# Patient Record
Sex: FEMALE | Race: WHITE | Employment: OTHER
[De-identification: names, ages, dates, MRNs, and addresses within clinical notes are randomized per-mention and may not be internally consistent; named-entity substitution may affect disease eponyms.]

---

## 2017-01-04 ENCOUNTER — PROCEDURE VISIT (OUTPATIENT)
Dept: PAIN MANAGEMENT | Facility: CLINIC | Age: 77
End: 2017-01-04

## 2017-01-04 DIAGNOSIS — M46.1 SACROILIITIS (HCC): Primary | ICD-10-CM

## 2017-01-04 PROCEDURE — 27096 INJECT SACROILIAC JOINT: CPT | Performed by: PAIN MEDICINE

## 2017-01-25 ENCOUNTER — PROCEDURE VISIT (OUTPATIENT)
Dept: PAIN MANAGEMENT | Facility: CLINIC | Age: 77
End: 2017-01-25

## 2017-01-25 DIAGNOSIS — M46.1 SACROILIITIS (HCC): Primary | ICD-10-CM

## 2017-01-25 PROCEDURE — 1036F TOBACCO NON-USER: CPT | Performed by: PAIN MEDICINE

## 2017-01-25 PROCEDURE — 27096 INJECT SACROILIAC JOINT: CPT | Performed by: PAIN MEDICINE

## 2017-02-21 ENCOUNTER — OFFICE VISIT (OUTPATIENT)
Dept: PAIN MANAGEMENT | Facility: CLINIC | Age: 77
End: 2017-02-21

## 2017-02-21 VITALS
RESPIRATION RATE: 13 BRPM | HEIGHT: 64 IN | HEART RATE: 60 BPM | DIASTOLIC BLOOD PRESSURE: 58 MMHG | BODY MASS INDEX: 35.85 KG/M2 | SYSTOLIC BLOOD PRESSURE: 124 MMHG | WEIGHT: 210 LBS

## 2017-02-21 DIAGNOSIS — M48.061 LUMBAR CANAL STENOSIS: ICD-10-CM

## 2017-02-21 DIAGNOSIS — M48.061 SPINAL STENOSIS, LUMBAR REGION, WITHOUT NEUROGENIC CLAUDICATION: Chronic | ICD-10-CM

## 2017-02-21 DIAGNOSIS — M51.26 LUMBAR DISCOGENIC PAIN SYNDROME: ICD-10-CM

## 2017-02-21 DIAGNOSIS — M51.37 DEGENERATION OF LUMBAR OR LUMBOSACRAL INTERVERTEBRAL DISC: Chronic | ICD-10-CM

## 2017-02-21 DIAGNOSIS — M51.36 LUMBAR DEGENERATIVE DISC DISEASE: Primary | ICD-10-CM

## 2017-02-21 LAB
AMPHETAMINE SCREEN, URINE: NEGATIVE
BARBITURATE SCREEN, URINE: NEGATIVE
BENZODIAZEPINE SCREEN, URINE: NEGATIVE
COCAINE METABOLITE SCREEN URINE: NEGATIVE
MDMA URINE: NORMAL
METHADONE SCREEN, URINE: NEGATIVE
METHAMPHETAMINE, URINE: NEGATIVE
OPIATE SCREEN URINE: NEGATIVE
OXYCODONE SCREEN URINE: POSITIVE
PHENCYCLIDINE SCREEN URINE: NEGATIVE
PROPOXYPHENE SCREEN, URINE: NORMAL
THC: NEGATIVE
TRICYCLIC ANTIDEPRESSANTS, UR: POSITIVE

## 2017-02-21 PROCEDURE — 80305 DRUG TEST PRSMV DIR OPT OBS: CPT | Performed by: NURSE PRACTITIONER

## 2017-02-21 PROCEDURE — 4040F PNEUMOC VAC/ADMIN/RCVD: CPT | Performed by: NURSE PRACTITIONER

## 2017-02-21 PROCEDURE — 1036F TOBACCO NON-USER: CPT | Performed by: NURSE PRACTITIONER

## 2017-02-21 PROCEDURE — 1090F PRES/ABSN URINE INCON ASSESS: CPT | Performed by: NURSE PRACTITIONER

## 2017-02-21 PROCEDURE — G8427 DOCREV CUR MEDS BY ELIG CLIN: HCPCS | Performed by: NURSE PRACTITIONER

## 2017-02-21 PROCEDURE — G8400 PT W/DXA NO RESULTS DOC: HCPCS | Performed by: NURSE PRACTITIONER

## 2017-02-21 PROCEDURE — G8484 FLU IMMUNIZE NO ADMIN: HCPCS | Performed by: NURSE PRACTITIONER

## 2017-02-21 PROCEDURE — G8417 CALC BMI ABV UP PARAM F/U: HCPCS | Performed by: NURSE PRACTITIONER

## 2017-02-21 PROCEDURE — 1123F ACP DISCUSS/DSCN MKR DOCD: CPT | Performed by: NURSE PRACTITIONER

## 2017-02-21 PROCEDURE — 99214 OFFICE O/P EST MOD 30 MIN: CPT | Performed by: NURSE PRACTITIONER

## 2017-02-21 RX ORDER — OXYCODONE AND ACETAMINOPHEN 10; 325 MG/1; MG/1
1 TABLET ORAL EVERY 8 HOURS PRN
Qty: 90 TABLET | Refills: 0 | Status: SHIPPED | OUTPATIENT
Start: 2017-03-14 | End: 2017-11-10 | Stop reason: ALTCHOICE

## 2017-02-21 RX ORDER — LIDOCAINE 50 MG/G
5 OINTMENT TOPICAL 3 TIMES DAILY
Qty: 1 TUBE | Refills: 1 | Status: SHIPPED | OUTPATIENT
Start: 2017-02-21 | End: 2017-11-21 | Stop reason: ALTCHOICE

## 2017-02-21 ASSESSMENT — ENCOUNTER SYMPTOMS
BACK PAIN: 1
ABDOMINAL PAIN: 0
COUGH: 0
WHEEZING: 0
SHORTNESS OF BREATH: 0

## 2017-04-11 ENCOUNTER — OFFICE VISIT (OUTPATIENT)
Dept: PAIN MANAGEMENT | Age: 77
End: 2017-04-11
Payer: MEDICARE

## 2017-04-11 VITALS
BODY MASS INDEX: 34.83 KG/M2 | WEIGHT: 204 LBS | SYSTOLIC BLOOD PRESSURE: 133 MMHG | HEIGHT: 64 IN | DIASTOLIC BLOOD PRESSURE: 67 MMHG | RESPIRATION RATE: 14 BRPM | HEART RATE: 63 BPM

## 2017-04-11 DIAGNOSIS — M47.817 LUMBOSACRAL SPONDYLOSIS WITHOUT MYELOPATHY: ICD-10-CM

## 2017-04-11 DIAGNOSIS — M51.36 LUMBAR DEGENERATIVE DISC DISEASE: ICD-10-CM

## 2017-04-11 DIAGNOSIS — M51.26 LUMBAR DISCOGENIC PAIN SYNDROME: ICD-10-CM

## 2017-04-11 DIAGNOSIS — M48.061 LUMBAR CANAL STENOSIS: ICD-10-CM

## 2017-04-11 DIAGNOSIS — M48.061 SPINAL STENOSIS, LUMBAR REGION, WITHOUT NEUROGENIC CLAUDICATION: Chronic | ICD-10-CM

## 2017-04-11 DIAGNOSIS — M51.37 DEGENERATION OF LUMBAR OR LUMBOSACRAL INTERVERTEBRAL DISC: Chronic | ICD-10-CM

## 2017-04-11 PROCEDURE — 4040F PNEUMOC VAC/ADMIN/RCVD: CPT | Performed by: INTERNAL MEDICINE

## 2017-04-11 PROCEDURE — G8427 DOCREV CUR MEDS BY ELIG CLIN: HCPCS | Performed by: INTERNAL MEDICINE

## 2017-04-11 PROCEDURE — G8400 PT W/DXA NO RESULTS DOC: HCPCS | Performed by: INTERNAL MEDICINE

## 2017-04-11 PROCEDURE — 1036F TOBACCO NON-USER: CPT | Performed by: INTERNAL MEDICINE

## 2017-04-11 PROCEDURE — G8417 CALC BMI ABV UP PARAM F/U: HCPCS | Performed by: INTERNAL MEDICINE

## 2017-04-11 PROCEDURE — 1090F PRES/ABSN URINE INCON ASSESS: CPT | Performed by: INTERNAL MEDICINE

## 2017-04-11 PROCEDURE — 1123F ACP DISCUSS/DSCN MKR DOCD: CPT | Performed by: INTERNAL MEDICINE

## 2017-04-11 PROCEDURE — 99214 OFFICE O/P EST MOD 30 MIN: CPT | Performed by: INTERNAL MEDICINE

## 2017-04-11 RX ORDER — OXYCODONE HYDROCHLORIDE 10 MG/1
10 TABLET ORAL EVERY 6 HOURS PRN
Qty: 100 TABLET | Refills: 0 | Status: SHIPPED | OUTPATIENT
Start: 2017-04-28 | End: 2017-11-10 | Stop reason: SDUPTHER

## 2017-04-11 RX ORDER — OXYCODONE HYDROCHLORIDE 10 MG/1
10 TABLET ORAL EVERY 6 HOURS PRN
Qty: 100 TABLET | Refills: 0 | Status: SHIPPED | OUTPATIENT
Start: 2017-05-28 | End: 2017-07-11 | Stop reason: SDUPTHER

## 2017-04-11 RX ORDER — OXYCODONE AND ACETAMINOPHEN 10; 325 MG/1; MG/1
1 TABLET ORAL EVERY 8 HOURS PRN
Qty: 90 TABLET | Refills: 0 | Status: CANCELLED | OUTPATIENT
Start: 2017-04-13 | End: 2017-05-13

## 2017-04-11 ASSESSMENT — ENCOUNTER SYMPTOMS
BACK PAIN: 1
ABDOMINAL PAIN: 0

## 2017-07-11 ENCOUNTER — OFFICE VISIT (OUTPATIENT)
Dept: PAIN MANAGEMENT | Age: 77
End: 2017-07-11
Payer: MEDICARE

## 2017-07-11 VITALS
DIASTOLIC BLOOD PRESSURE: 55 MMHG | SYSTOLIC BLOOD PRESSURE: 117 MMHG | RESPIRATION RATE: 15 BRPM | HEIGHT: 64 IN | HEART RATE: 59 BPM | BODY MASS INDEX: 34.83 KG/M2 | WEIGHT: 204 LBS

## 2017-07-11 DIAGNOSIS — M51.36 LUMBAR DEGENERATIVE DISC DISEASE: ICD-10-CM

## 2017-07-11 DIAGNOSIS — M47.817 LUMBOSACRAL SPONDYLOSIS WITHOUT MYELOPATHY: ICD-10-CM

## 2017-07-11 DIAGNOSIS — M48.061 LUMBAR CANAL STENOSIS: ICD-10-CM

## 2017-07-11 DIAGNOSIS — M51.26 LUMBAR DISCOGENIC PAIN SYNDROME: ICD-10-CM

## 2017-07-11 PROCEDURE — G8417 CALC BMI ABV UP PARAM F/U: HCPCS | Performed by: INTERNAL MEDICINE

## 2017-07-11 PROCEDURE — 1123F ACP DISCUSS/DSCN MKR DOCD: CPT | Performed by: INTERNAL MEDICINE

## 2017-07-11 PROCEDURE — G8427 DOCREV CUR MEDS BY ELIG CLIN: HCPCS | Performed by: INTERNAL MEDICINE

## 2017-07-11 PROCEDURE — 1036F TOBACCO NON-USER: CPT | Performed by: INTERNAL MEDICINE

## 2017-07-11 PROCEDURE — 4040F PNEUMOC VAC/ADMIN/RCVD: CPT | Performed by: INTERNAL MEDICINE

## 2017-07-11 PROCEDURE — 1090F PRES/ABSN URINE INCON ASSESS: CPT | Performed by: INTERNAL MEDICINE

## 2017-07-11 PROCEDURE — G8400 PT W/DXA NO RESULTS DOC: HCPCS | Performed by: INTERNAL MEDICINE

## 2017-07-11 PROCEDURE — 99213 OFFICE O/P EST LOW 20 MIN: CPT | Performed by: INTERNAL MEDICINE

## 2017-07-11 RX ORDER — LIDOCAINE 40 MG/G
CREAM TOPICAL PRN
COMMUNITY

## 2017-07-11 RX ORDER — OXYCODONE HYDROCHLORIDE 10 MG/1
10 TABLET ORAL EVERY 6 HOURS PRN
Qty: 100 TABLET | Refills: 0 | Status: SHIPPED | OUTPATIENT
Start: 2017-07-15 | End: 2017-08-15 | Stop reason: SDUPTHER

## 2017-07-11 ASSESSMENT — ENCOUNTER SYMPTOMS
ABDOMINAL PAIN: 0
BACK PAIN: 1

## 2017-08-15 ENCOUNTER — OFFICE VISIT (OUTPATIENT)
Dept: PAIN MANAGEMENT | Age: 77
End: 2017-08-15
Payer: MEDICARE

## 2017-08-15 VITALS
WEIGHT: 204 LBS | DIASTOLIC BLOOD PRESSURE: 74 MMHG | SYSTOLIC BLOOD PRESSURE: 123 MMHG | HEIGHT: 64 IN | BODY MASS INDEX: 34.83 KG/M2 | HEART RATE: 68 BPM | RESPIRATION RATE: 14 BRPM

## 2017-08-15 DIAGNOSIS — M47.817 LUMBOSACRAL SPONDYLOSIS WITHOUT MYELOPATHY: ICD-10-CM

## 2017-08-15 DIAGNOSIS — M48.061 LUMBAR CANAL STENOSIS: ICD-10-CM

## 2017-08-15 DIAGNOSIS — M51.36 LUMBAR DEGENERATIVE DISC DISEASE: ICD-10-CM

## 2017-08-15 DIAGNOSIS — M51.26 LUMBAR DISCOGENIC PAIN SYNDROME: ICD-10-CM

## 2017-08-15 LAB
AMPHETAMINE SCREEN, URINE: NEGATIVE
BARBITURATE SCREEN, URINE: NEGATIVE
BENZODIAZEPINE SCREEN, URINE: NEGATIVE
COCAINE METABOLITE SCREEN URINE: NEGATIVE
MDMA URINE: NORMAL
METHADONE SCREEN, URINE: NEGATIVE
METHAMPHETAMINE, URINE: NEGATIVE
OPIATE SCREEN URINE: NEGATIVE
OXYCODONE SCREEN URINE: POSITIVE
PHENCYCLIDINE SCREEN URINE: NEGATIVE
PROPOXYPHENE SCREEN, URINE: NORMAL
THC: NEGATIVE
TRICYCLIC ANTIDEPRESSANTS, UR: NEGATIVE

## 2017-08-15 PROCEDURE — 80305 DRUG TEST PRSMV DIR OPT OBS: CPT | Performed by: INTERNAL MEDICINE

## 2017-08-15 PROCEDURE — 1036F TOBACCO NON-USER: CPT | Performed by: INTERNAL MEDICINE

## 2017-08-15 PROCEDURE — G8417 CALC BMI ABV UP PARAM F/U: HCPCS | Performed by: INTERNAL MEDICINE

## 2017-08-15 PROCEDURE — G8427 DOCREV CUR MEDS BY ELIG CLIN: HCPCS | Performed by: INTERNAL MEDICINE

## 2017-08-15 PROCEDURE — 1123F ACP DISCUSS/DSCN MKR DOCD: CPT | Performed by: INTERNAL MEDICINE

## 2017-08-15 PROCEDURE — G8400 PT W/DXA NO RESULTS DOC: HCPCS | Performed by: INTERNAL MEDICINE

## 2017-08-15 PROCEDURE — 4040F PNEUMOC VAC/ADMIN/RCVD: CPT | Performed by: INTERNAL MEDICINE

## 2017-08-15 PROCEDURE — 1090F PRES/ABSN URINE INCON ASSESS: CPT | Performed by: INTERNAL MEDICINE

## 2017-08-15 PROCEDURE — 99214 OFFICE O/P EST MOD 30 MIN: CPT | Performed by: INTERNAL MEDICINE

## 2017-08-15 RX ORDER — OXYCODONE HYDROCHLORIDE 10 MG/1
10 TABLET ORAL EVERY 6 HOURS PRN
Qty: 100 TABLET | Refills: 0 | Status: SHIPPED | OUTPATIENT
Start: 2017-08-15 | End: 2017-09-12 | Stop reason: SDUPTHER

## 2017-08-15 RX ORDER — TOPIRAMATE 25 MG/1
25 TABLET ORAL 2 TIMES DAILY
Qty: 60 TABLET | Refills: 2 | Status: SHIPPED | OUTPATIENT
Start: 2017-08-15 | End: 2017-10-31 | Stop reason: SDUPTHER

## 2017-08-15 ASSESSMENT — ENCOUNTER SYMPTOMS
BACK PAIN: 1
ABDOMINAL PAIN: 0

## 2017-09-12 ENCOUNTER — OFFICE VISIT (OUTPATIENT)
Dept: PAIN MANAGEMENT | Age: 77
End: 2017-09-12
Payer: MEDICARE

## 2017-09-12 VITALS
RESPIRATION RATE: 14 BRPM | WEIGHT: 198 LBS | DIASTOLIC BLOOD PRESSURE: 64 MMHG | HEIGHT: 64 IN | HEART RATE: 60 BPM | SYSTOLIC BLOOD PRESSURE: 121 MMHG | BODY MASS INDEX: 33.8 KG/M2

## 2017-09-12 DIAGNOSIS — M48.061 LUMBAR CANAL STENOSIS: ICD-10-CM

## 2017-09-12 DIAGNOSIS — M47.817 LUMBOSACRAL SPONDYLOSIS WITHOUT MYELOPATHY: ICD-10-CM

## 2017-09-12 DIAGNOSIS — M51.26 LUMBAR DISCOGENIC PAIN SYNDROME: ICD-10-CM

## 2017-09-12 DIAGNOSIS — M51.36 LUMBAR DEGENERATIVE DISC DISEASE: ICD-10-CM

## 2017-09-12 PROCEDURE — G8417 CALC BMI ABV UP PARAM F/U: HCPCS | Performed by: INTERNAL MEDICINE

## 2017-09-12 PROCEDURE — 4040F PNEUMOC VAC/ADMIN/RCVD: CPT | Performed by: INTERNAL MEDICINE

## 2017-09-12 PROCEDURE — 1036F TOBACCO NON-USER: CPT | Performed by: INTERNAL MEDICINE

## 2017-09-12 PROCEDURE — G8427 DOCREV CUR MEDS BY ELIG CLIN: HCPCS | Performed by: INTERNAL MEDICINE

## 2017-09-12 PROCEDURE — 1123F ACP DISCUSS/DSCN MKR DOCD: CPT | Performed by: INTERNAL MEDICINE

## 2017-09-12 PROCEDURE — 99214 OFFICE O/P EST MOD 30 MIN: CPT | Performed by: INTERNAL MEDICINE

## 2017-09-12 PROCEDURE — 1090F PRES/ABSN URINE INCON ASSESS: CPT | Performed by: INTERNAL MEDICINE

## 2017-09-12 PROCEDURE — G8400 PT W/DXA NO RESULTS DOC: HCPCS | Performed by: INTERNAL MEDICINE

## 2017-09-12 RX ORDER — OXYCODONE HYDROCHLORIDE 10 MG/1
10 TABLET ORAL EVERY 6 HOURS PRN
Qty: 100 TABLET | Refills: 0 | Status: SHIPPED | OUTPATIENT
Start: 2017-09-14 | End: 2017-10-31 | Stop reason: SDUPTHER

## 2017-09-12 ASSESSMENT — ENCOUNTER SYMPTOMS
BACK PAIN: 1
ABDOMINAL PAIN: 0

## 2017-10-31 ENCOUNTER — OFFICE VISIT (OUTPATIENT)
Dept: PAIN MANAGEMENT | Age: 77
End: 2017-10-31
Payer: MEDICARE

## 2017-10-31 VITALS
HEART RATE: 59 BPM | DIASTOLIC BLOOD PRESSURE: 62 MMHG | WEIGHT: 199 LBS | SYSTOLIC BLOOD PRESSURE: 104 MMHG | HEIGHT: 64 IN | RESPIRATION RATE: 15 BRPM | BODY MASS INDEX: 33.97 KG/M2

## 2017-10-31 DIAGNOSIS — M51.36 LUMBAR DEGENERATIVE DISC DISEASE: ICD-10-CM

## 2017-10-31 DIAGNOSIS — M53.3 SACRO-ILIAC PAIN: Primary | ICD-10-CM

## 2017-10-31 DIAGNOSIS — M47.817 LUMBOSACRAL SPONDYLOSIS WITHOUT MYELOPATHY: ICD-10-CM

## 2017-10-31 DIAGNOSIS — M46.1 SACROILIITIS (HCC): ICD-10-CM

## 2017-10-31 DIAGNOSIS — M51.26 LUMBAR DISCOGENIC PAIN SYNDROME: ICD-10-CM

## 2017-10-31 PROCEDURE — G8484 FLU IMMUNIZE NO ADMIN: HCPCS | Performed by: INTERNAL MEDICINE

## 2017-10-31 PROCEDURE — G8427 DOCREV CUR MEDS BY ELIG CLIN: HCPCS | Performed by: INTERNAL MEDICINE

## 2017-10-31 PROCEDURE — 4040F PNEUMOC VAC/ADMIN/RCVD: CPT | Performed by: INTERNAL MEDICINE

## 2017-10-31 PROCEDURE — 1123F ACP DISCUSS/DSCN MKR DOCD: CPT | Performed by: INTERNAL MEDICINE

## 2017-10-31 PROCEDURE — 1090F PRES/ABSN URINE INCON ASSESS: CPT | Performed by: INTERNAL MEDICINE

## 2017-10-31 PROCEDURE — 1036F TOBACCO NON-USER: CPT | Performed by: INTERNAL MEDICINE

## 2017-10-31 PROCEDURE — G8417 CALC BMI ABV UP PARAM F/U: HCPCS | Performed by: INTERNAL MEDICINE

## 2017-10-31 PROCEDURE — 99214 OFFICE O/P EST MOD 30 MIN: CPT | Performed by: INTERNAL MEDICINE

## 2017-10-31 PROCEDURE — G8400 PT W/DXA NO RESULTS DOC: HCPCS | Performed by: INTERNAL MEDICINE

## 2017-10-31 RX ORDER — TOPIRAMATE 25 MG/1
25 TABLET ORAL 2 TIMES DAILY
Qty: 60 TABLET | Refills: 0 | Status: SHIPPED | OUTPATIENT
Start: 2017-10-31 | End: 2018-02-06 | Stop reason: SDUPTHER

## 2017-10-31 RX ORDER — OXYCODONE HYDROCHLORIDE 10 MG/1
10 TABLET ORAL EVERY 6 HOURS PRN
Qty: 100 TABLET | Refills: 0 | Status: SHIPPED | OUTPATIENT
Start: 2017-10-31 | End: 2017-11-20 | Stop reason: SDUPTHER

## 2017-10-31 ASSESSMENT — ENCOUNTER SYMPTOMS
ABDOMINAL PAIN: 0
BACK PAIN: 1

## 2017-10-31 NOTE — Clinical Note
Right and then Left Sacro Iliac joint Injections at two different appointments. Return visit with me.

## 2017-10-31 NOTE — PROGRESS NOTES
Patient ID: Ange Pastor is 68 y.o. caucasianfemale, with  Back Pain (low back and right and left hips)  . She c/o improvement in her leg and foot numbness but is feeling drowsy during the day. She has severe recurrent low back pain. Chief Complaint:   Chief Complaint   Patient presents with    Back Pain     low back and right and left hips        Back Pain   This is a chronic problem. The current episode started more than 1 year ago. The problem occurs intermittently. The problem has been gradually worsening since onset. The pain is present in the lumbar spine and gluteal (sharp). The quality of the pain is described as burning (sharp ). The pain radiates to the left thigh. The pain is at a severity of 3/10. The pain is mild. The pain is worse during the night. The symptoms are aggravated by standing (reaching). Stiffness is present in the morning. Associated symptoms include leg pain, tingling and weakness (low back). Pertinent negatives include no abdominal pain, chest pain, dysuria, fever, headaches or pelvic pain. Risk factors include lack of exercise. She has tried heat (oral narcotics,) for the symptoms. The treatment provided significant relief. Vitals:    10/31/17 1456   BP: 104/62   Pulse: 59   Resp: 15         Social History     Social History    Marital status:      Spouse name: N/A    Number of children: N/A    Years of education: N/A     Occupational History    Not on file.      Social History Main Topics    Smoking status: Never Smoker    Smokeless tobacco: Not on file    Alcohol use Yes      Comment: very rare    Drug use: No    Sexual activity: Not on file     Other Topics Concern    Not on file     Social History Narrative    No narrative on file       Current Outpatient Prescriptions   Medication Sig Dispense Refill    oxyCODONE HCl (OXY-IR) 10 MG immediate release tablet Take 1 tablet by mouth every 6 hours as needed for Pain (TAKE IT EVERY 6-8  HOURS AS NEEDED.) . Earliest Fill Date: 10/31/17 100 tablet 0    topiramate (TOPAMAX) 25 MG tablet Take 1 tablet by mouth 2 times daily Take one tablet at 9 pm x 2 weeks then   one two times a day at 9 am and 9 pm. 60 tablet 0    lidocaine (ASPERCREME W/LIDOCAINE) 4 % cream Apply topically as needed for Pain Apply topically as needed.  lidocaine (XYLOCAINE) 5 % ointment Apply 5 g topically 3 times daily Disp 30 Gm tube 1 Tube 1    furosemide (LASIX) 40 MG tablet Take 40 mg by mouth 3 times daily      aluminum & magnesium hydroxide-simethicone (MAALOX) 200-200-20 MG/5ML SUSP suspension Take 5 mLs by mouth every 6 hours as needed for Indigestion      fluticasone propionate 50 MCG/BLIST AEPB Inhale into the lungs      albuterol (ACCUNEB) 1.25 MG/3ML nebulizer solution Inhale 1 ampule into the lungs every 6 hours as needed for Wheezing      metoprolol (LOPRESSOR) 25 MG tablet Take 25 mg by mouth 2 times daily      simvastatin (ZOCOR) 40 MG tablet Take 40 mg by mouth nightly      Salmeterol Xinafoate (SEREVENT DISKUS IN) Inhale 1 puff into the lungs 2 times daily      Ketoprofen  MG CP24 Take 1 tablet by mouth daily      flecainide (TAMBOCOR) 50 MG tablet Take 50 mg by mouth 2 times daily.  Ipratropium Bromide (ATROVENT IN) Inhale 2 sprays into the lungs 2 times daily.  omeprazole (PRILOSEC) 40 MG capsule Take 40 mg by mouth nightly.  guaiFENesin (MUCINEX) 600 MG SR tablet Take 1,200 mg by mouth 2 times daily.  montelukast (SINGULAIR) 10 MG tablet Take 10 mg by mouth nightly.  DULoxetine (CYMBALTA) 60 MG capsule Take 60 mg by mouth daily.  artificial tears (ARTIFICIAL TEARS) OINT as needed.  beclomethasone (QVAR) 80 MCG/ACT inhaler Inhale 2 puffs into the lungs 2 times daily       lidocaine (LIDODERM) 5 % Place 1 patch onto the skin daily. 12 hours on, 12 hours off.       Saline 0.2 % SOLN by Nasal route daily as needed       Immune Globulin, Human, (HIZENTRA) 10 GM/50ML  Anesthesia complication     2nd post op day from total knee patient stated \"I was wild and mean\"    Anxiety and depression     Arthritis     ASD (atrial septal defect)     repair in [address] [facility] [facility]     COPD [facility]ch[facility] obstructive [facility] disease) [facility]     Disorder of immune system ([facility] [address] [facility]     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 (total left knee 3/2015 at [facility])    History of renal stone     passed    Hx of blood clots     DVT    Hypertension     MVP (mitral valve prolapse)     LONI (obstructive sleep apnea)     uses bipap nightly    Rectocele     Spinal stenosis     Thyroid disease late     overactive radioactive iodine done       Past Surgical History:   Procedure Laterality Date    BREAST LUMPECTOMY Left     BREAST LUMPECTOMY Left     CARDIAC SURGERY      ASD repair    CARDIAC VALVE SURGERY      mitral valve     SECTION  /    2x    CHOLECYSTECTOMY      COLONOSCOPY      three    DIAGNOSTIC CARDIAC CATH LAB PROCEDURE      DILATION AND CURETTAGE OF UTERUS      EYE SURGERY Bilateral     cataract    HYSTERECTOMY  2009    HYSTERECTOMY  2009    Total    JOINT REPLACEMENT Bilateral     knee    KNEE ARTHROSCOPY Left     PACEMAKER PLACEMENT  2014    ROTATOR CUFF REPAIR Right 2005 & 2010    2x       Family History   Problem Relation Age of Onset    Heart Disease Father          Chronic Pain Assessment Update  Last procedure: --   Date:-  Relief--25%    Since your [facility] to [facility]    have you been seen for pain by Anyone:No     Is any change in her health: No     Are you satisfied with your pain control? Yes    Do you have any questions or CP24 Take 1 tablet by mouth daily      flecainide (TAMBOCOR) 50 MG tablet Take 50 mg by mouth 2 times daily.  Ipratropium Bromide (ATROVENT IN) Inhale 2 sprays into the lungs 2 times daily.  omeprazole (PRILOSEC) 40 MG capsule Take 40 mg by mouth nightly.  guaiFENesin (MUCINEX) 600 MG SR tablet Take 1,200 mg by mouth 2 times daily.  montelukast (SINGULAIR) 10 MG tablet Take 10 mg by mouth nightly.  DULoxetine (CYMBALTA) 60 MG capsule Take 60 mg by mouth daily.  artificial tears (ARTIFICIAL TEARS) OINT as needed.  beclomethasone (QVAR) 80 MCG/ACT inhaler Inhale 2 puffs into the lungs 2 times daily       lidocaine (LIDODERM) 5 % Place 1 patch onto the skin daily. 12 hours on, 12 hours off.  Saline 0.2 % SOLN by Nasal route daily as needed       Immune Globulin, Human, (HIZENTRA) 10 GM/50ML SOLN Inject into the skin Tuesdays      diphenhydrAMINE (BENADRYL) 25 MG capsule Take 25 mg by mouth every 6 hours as needed for Itching.  Ranitidine HCl (ZANTAC 75 PO) Take  by mouth.  oxyCODONE HCl (OXY-IR) 10 MG immediate release tablet Take 1 tablet by mouth every 6 hours as needed for Pain (TAKE IT EVERY 6-8  HOURS AS NEEDED.) . Earliest Fill Date: 4/28/17 100 tablet 0    oxyCODONE-acetaminophen (PERCOCET)  MG per tablet Take 1 tablet by mouth every 8 hours as needed for Pain .  Earliest Fill Date: 3/14/17 90 tablet 0    oxyCODONE HCl (OXY-IR) 10 MG immediate release tablet Take 1 tablet by mouth every 6 hours as needed for Pain (TAKE IT EVERY 6-8  HOURS AS NEEDED.) 100 tablet 0     Current Facility-Administered Medications   Medication Dose Route Frequency Provider Last Rate Last Dose    triamcinolone acetonide (KENALOG-40) injection 40 mg  40 mg Intra-articular Once Blade Moe MD        lidocaine 2 % injection 5 mL  5 mL Intradermal Once Blade Moe MD        iohexol (OMNIPAQUE 180) injection 3 mL  3 mL Other ONCE PRN Blade Moe MD  bupivacaine (MARCAINE) 0.5 % injection 150 mg  30 mL Intra-articular Once Julius Aleman MD           Allergies:      Allergies   Allergen Reactions    Corgard [Nadolol] Other (See Comments)     Severe coughing and broke a rib as a result     Sulfa Antibiotics Shortness Of Breath    Ansaid [Flurbiprofen] Other (See Comments)     Light headed and difficult with vision \"seeing\"    Erythromycin Nausea Only    Inderal [Propranolol] Other (See Comments)     Severe cough    Mirapex [Pramipexole Dihydrochloride]     Mobic [Meloxicam] Nausea Only    Nsaids Other (See Comments)     lightheaded    Reglan [Metoclopramide] Other (See Comments)     Hyperactive and stomach pain    Ultram [Tramadol] Itching    Garamycin [Gentamicin Sulfate] Other (See Comments)     Redness and irritation       Past Medical History:     Past Medical History:   Diagnosis Date    Anesthesia complication     2nd post op day from total knee patient stated \"I was wild and mean\"    Anxiety and depression     Arthritis     ASD (atrial septal defect)     repair in 1963    Asthma     [facility] [address]     [facility]'s    Disorder of immune system ([facility])     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 ([facility])    History of renal stone     passed    Hx of blood clots 1970    DVT    Hypertension     MVP (mitral valve prolapse)     LONI (obstructive sleep apnea)     uses bipap nightly    Rectocele     Spinal stenosis     Thyroid disease late 1980's    overactive radioactive iodine done       Past Surgical History:     Past Surgical History:   Procedure Laterality Date    BREAST LUMPECTOMY Left     BREAST tenderness. She exhibits no edema. Right hip: She exhibits decreased range of motion, decreased strength, tenderness and bony tenderness. She exhibits no swelling, no crepitus, no deformity and no laceration. Left hip: She exhibits decreased range of motion, decreased strength, tenderness and bony tenderness. She exhibits no swelling, no crepitus, no deformity and no laceration. Lumbar back: She exhibits decreased range of motion, tenderness, bony tenderness, swelling, pain and spasm. She exhibits no edema, no deformity, no laceration and normal pulse. Back:    Inspection of the Sacrum and Lumbar spine show the spine to be in midline. Inspection of the sacrum shows the skin to be intact  without any lesions. Palpation  shows tenderness over bilateral  sacroiliac joints. Hip flexion, abduction and external rotation are positive Bilateral    Paramjit's test is positive bilateral with reproduction of some of the low back pain. No sensory or motor deficits noted. Tone and muscle strength in lower extremities is normal.     No muscle atrophy noted. Deep tendon reflexes are normal in the lower extremities without hyper reflexia. Lymphadenopathy:     She has no cervical adenopathy. Neurological: She is alert and oriented to person, place, and time. No cranial nerve deficit. Coordination normal.   Skin: Skin is warm and dry. No rash noted. She is not diaphoretic. No erythema. No pallor. Psychiatric: She has a normal mood and affect. Her speech is normal and behavior is normal. Judgment and thought content normal. Cognition and memory are normal.   Nursing note and vitals reviewed. Ortho Exam      Assessment:     DIAGNOSIS:    1. Sacro-iliac pain    2. Lumbar degenerative disc disease    3. Lumbar discogenic pain syndrome    4. Lumbosacral spondylosis without myelopathy    5. Sacroiliitis ([address]      She is symptomatic with bilateral sacro iliac joint pain.    As consulting with us. Patient had not been to any other pain clinic or ER since the last visit. Patient  does not complain of drowsiness and constipation occasionally from pain medications. Patient has taken medications as instructed and is satisfied with pain control. Patient denied illegal use of drugs and  is not currently enrolled in Physical Therapy or Psychological services. Patient does have questions or concerns. Patient's OARRS were reviewed. It is acceptable and appears patient is not receiving prescriptions from multiple prescribers. At each of patient's future visits we will try to taper pain medications, while adjusting the adjunct medications, and re-evaluating for Physical Therapy to improve spinal and joint strength. We will continue to have discussions to decrease pain medications as tolerated. We discussed the same at today's visit and have not been to implement it, as the patient's pain is not under control with current medications. Decision Making Process : Patient's health history and referral records thoroughly reviewed before focused physical examination and discussion with patient. Over 50% of today's visit is spent on examining the patient and counseling. Level of complexity of date to be reviewed is Moderate. The chart date reviewed include the following: Imaging Reports. Summary of Care. Time spent reviewing with patient the below reports:   Medication safety, Treatment options. Level of diagnosis and management options of this case is multiple: involving the following management options: Interventions as needed, medication management as appropriate, future visits, activity modification, heat/ice as needed, Urine drug screen as required. Return in  as soon as possible for bilateral sacro iliac joint injections at two different appointments weeks with  Ramila Bowers MD  for procedure and further plan of treatment. See orders for Rx changes. Carley Dowell M.D.     Electronically signed by Roberto Bran MD on 11/1/2017 at 1:11 PM

## 2017-11-10 ENCOUNTER — HOSPITAL ENCOUNTER (OUTPATIENT)
Dept: GENERAL RADIOLOGY | Age: 77
Discharge: HOME | End: 2017-11-10
Payer: MEDICARE

## 2017-11-10 ENCOUNTER — HOSPITAL ENCOUNTER (OUTPATIENT)
Dept: PAIN MANAGEMENT | Age: 77
Discharge: HOME | End: 2017-11-10
Payer: MEDICARE

## 2017-11-10 VITALS
HEIGHT: 64 IN | SYSTOLIC BLOOD PRESSURE: 136 MMHG | HEART RATE: 60 BPM | WEIGHT: 199 LBS | DIASTOLIC BLOOD PRESSURE: 77 MMHG | RESPIRATION RATE: 16 BRPM | TEMPERATURE: 98 F | BODY MASS INDEX: 33.97 KG/M2 | OXYGEN SATURATION: 97 %

## 2017-11-10 DIAGNOSIS — M70.61 GREATER TROCHANTERIC BURSITIS OF RIGHT HIP: ICD-10-CM

## 2017-11-10 DIAGNOSIS — M53.3 SACRO-ILIAC PAIN: ICD-10-CM

## 2017-11-10 DIAGNOSIS — M46.1 SACROILIITIS (HCC): Primary | ICD-10-CM

## 2017-11-10 DIAGNOSIS — R52 PAIN: ICD-10-CM

## 2017-11-10 PROCEDURE — 3209999900 FLUORO FOR SURGICAL PROCEDURES

## 2017-11-10 PROCEDURE — 20610 DRAIN/INJ JOINT/BURSA W/O US: CPT | Performed by: PAIN MEDICINE

## 2017-11-10 PROCEDURE — 27096 INJECT SACROILIAC JOINT: CPT | Performed by: PAIN MEDICINE

## 2017-11-10 PROCEDURE — 77002 NEEDLE LOCALIZATION BY XRAY: CPT

## 2017-11-10 PROCEDURE — 6360000004 HC RX CONTRAST MEDICATION

## 2017-11-10 PROCEDURE — 6360000002 HC RX W HCPCS

## 2017-11-10 PROCEDURE — G0260 INJ FOR SACROILIAC JT ANESTH: HCPCS

## 2017-11-10 PROCEDURE — 20610 DRAIN/INJ JOINT/BURSA W/O US: CPT

## 2017-11-10 RX ORDER — POTASSIUM CHLORIDE 20 MEQ/1
TABLET, EXTENDED RELEASE ORAL
Refills: 0 | COMMUNITY
Start: 2017-09-05 | End: 2018-05-15 | Stop reason: SDUPTHER

## 2017-11-10 ASSESSMENT — PAIN DESCRIPTION - DESCRIPTORS: DESCRIPTORS: SHARP;ACHING

## 2017-11-10 ASSESSMENT — ACTIVITIES OF DAILY LIVING (ADL): EFFECT OF PAIN ON DAILY ACTIVITIES: PAIN INCREASES WITH STANDING AND WALKING

## 2017-11-10 ASSESSMENT — PAIN DESCRIPTION - PAIN TYPE: TYPE: CHRONIC PAIN

## 2017-11-10 ASSESSMENT — PAIN DESCRIPTION - FREQUENCY: FREQUENCY: INTERMITTENT

## 2017-11-10 ASSESSMENT — PAIN DESCRIPTION - ORIENTATION: ORIENTATION: RIGHT;LEFT

## 2017-11-10 ASSESSMENT — PAIN SCALES - GENERAL
PAINLEVEL_OUTOF10: 3
PAINLEVEL_OUTOF10: 0

## 2017-11-10 ASSESSMENT — PAIN DESCRIPTION - LOCATION: LOCATION: BACK;HIP

## 2017-11-10 ASSESSMENT — PAIN DESCRIPTION - ONSET: ONSET: ON-GOING

## 2017-11-10 NOTE — PROCEDURES
Pre-Procedure Note    Patient Name: Mary Catherine   YOB: 1940  Room/Bed: Room/bed info not found  Medical Record Number: [id number]  Date: 11/10/2017       Indication:    1. Sacroiliitis ([address]    2. Sacro-iliac pain    3. Greater trochanteric bursitis of right hip        Consent: On file. Vital Signs:   Vitals:    11/10/17 1213   BP: 136/77   Pulse: 60   Resp: 16   Temp:    SpO2: 97%       Past Medical History:   has a past medical history of Anesthesia complication; Anxiety and depression; Arthritis; ASD (atrial septal defect); Asthma; COPD (chronic obstructive pulmonary disease) ([facility] [address] COPD (chronic obstructive pulmonary disease) ([facility] [address] Disorder of immune system ([facility] [address] GERD (gastroesophageal reflux disease); H/O cardiac catheterization; Heart palpitations; History of anesthesia complications; History of renal stone; Hx of blood clots; Hypertension; MVP (mitral valve prolapse); LONI (obstructive sleep apnea); Rectocele; Spinal stenosis; and Thyroid disease. Past Surgical History:   has a past surgical history that includes Hysterectomy (2009); Dilation and curettage of uterus (); Breast lumpectomy (Left); Cholecystectomy; Colonoscopy; Hysterectomy ();  section (8100/3493); Breast lumpectomy (Left); Rotator cuff repair (Right, 2005 & 2010); Knee arthroscopy (Left, ); pacemaker placement (2014); joint replacement (Bilateral); eye surgery (Bilateral); Diagnostic Cardiac Cath Lab Procedure; Cardiac valuve replacement; and Cardiac surgery. Pre-Sedation Documentation and Exam:   Vital signs have been reviewed (see flow sheet for vitals). Mallampati Airway Assessment:  normal    ASA Classification:  Class 3 - A patient with severe systemic disease that limits activity but is not incapacitating    Sedation/ Anesthesia Plan:   intravenous sedation  as needed. Medications Planned:   midazolam (Versed) / Fentanyl  Intravenously  as needed.     Patient is an (see eMar), Cold applied  Response to Pain Intervention:  (SI injection in past helped 90% per patient)  RASS Score (Ventilated): Alert and calm   Procedure: . The patient's vital signs including BP, EKG and SaO2 were monitored by the RN and they remained stable during the procedure. A meaningful communication was kept up with the patient throughout the procedure. The patient is placed in prone position. Skin over the back and right hip was prepped and draped in sterile manner. Then using fluoroscopy the Right sacroiliac joint was identified. Then the angle of the fluoroscopy was adjusted such that the view of the caudal aspect of the joint space was optimized. Then skin and deep tissues over the caudal aspect of the joint were infiltrated with 2 ml of 0.5% Naropin. The #22-gauge, 3-1/2 inch spinal needle was introduced through the skin wheal and directed such that the tip of the needle lies in the joint space. This was confirmed by injecting 1 ml of Omnipaque-180 through the needle and observing the spread of the contrast along the joint space. Then after negative aspiration a total of 30 mg of triamcinolone with 4 ml of  0.5%  Naropin was injected through the needle. The needle is removed and a Band-Aid was placed over the needle insertion site. Then skin and deep tissues over the right greater trochanter were infiltrated with about 1 mL of fluid 0.5% Naropin. Then 22-gauge 10/2 inch spinal needle was inserted under fluoroscopy guidance such that the tip of the needle lies over the tip of the right greater trochanter. The left. Patient about 3 mL of 0.5% Naropin and 10 mg of triamcinolone was injected through the needle. The patient tolerated the procedure well and vital signs remained stable. The patient was discharged home in stable condition and will be followed in the pain clinic in the next few weeks or further planning.     Electronically signed by Rk Santos MD on 11/10/2017 at 1:39 PM

## 2017-11-13 ENCOUNTER — TELEPHONE (OUTPATIENT)
Dept: PAIN MANAGEMENT | Age: 77
End: 2017-11-13

## 2017-11-20 ENCOUNTER — OFFICE VISIT (OUTPATIENT)
Dept: PAIN MANAGEMENT | Age: 77
End: 2017-11-20
Payer: MEDICARE

## 2017-11-20 VITALS
HEART RATE: 68 BPM | SYSTOLIC BLOOD PRESSURE: 127 MMHG | DIASTOLIC BLOOD PRESSURE: 71 MMHG | RESPIRATION RATE: 15 BRPM | HEIGHT: 64 IN | WEIGHT: 199 LBS | BODY MASS INDEX: 33.97 KG/M2

## 2017-11-20 DIAGNOSIS — M46.1 BILATERAL SACROILIITIS (HCC): ICD-10-CM

## 2017-11-20 DIAGNOSIS — G89.29 CHRONIC BILATERAL LOW BACK PAIN WITHOUT SCIATICA: Primary | ICD-10-CM

## 2017-11-20 DIAGNOSIS — M70.62 GREATER TROCHANTERIC BURSITIS OF LEFT HIP: ICD-10-CM

## 2017-11-20 DIAGNOSIS — M54.50 CHRONIC BILATERAL LOW BACK PAIN WITHOUT SCIATICA: Primary | ICD-10-CM

## 2017-11-20 DIAGNOSIS — M47.817 LUMBOSACRAL SPONDYLOSIS WITHOUT MYELOPATHY: ICD-10-CM

## 2017-11-20 DIAGNOSIS — M51.36 LUMBAR DEGENERATIVE DISC DISEASE: ICD-10-CM

## 2017-11-20 DIAGNOSIS — M51.26 LUMBAR DISCOGENIC PAIN SYNDROME: ICD-10-CM

## 2017-11-20 PROCEDURE — G8427 DOCREV CUR MEDS BY ELIG CLIN: HCPCS | Performed by: NURSE PRACTITIONER

## 2017-11-20 PROCEDURE — G8484 FLU IMMUNIZE NO ADMIN: HCPCS | Performed by: NURSE PRACTITIONER

## 2017-11-20 PROCEDURE — 99214 OFFICE O/P EST MOD 30 MIN: CPT | Performed by: NURSE PRACTITIONER

## 2017-11-20 PROCEDURE — 1036F TOBACCO NON-USER: CPT | Performed by: NURSE PRACTITIONER

## 2017-11-20 PROCEDURE — G8400 PT W/DXA NO RESULTS DOC: HCPCS | Performed by: NURSE PRACTITIONER

## 2017-11-20 PROCEDURE — G8417 CALC BMI ABV UP PARAM F/U: HCPCS | Performed by: NURSE PRACTITIONER

## 2017-11-20 PROCEDURE — 1090F PRES/ABSN URINE INCON ASSESS: CPT | Performed by: NURSE PRACTITIONER

## 2017-11-20 PROCEDURE — 1123F ACP DISCUSS/DSCN MKR DOCD: CPT | Performed by: NURSE PRACTITIONER

## 2017-11-20 PROCEDURE — 4040F PNEUMOC VAC/ADMIN/RCVD: CPT | Performed by: NURSE PRACTITIONER

## 2017-11-20 RX ORDER — OXYCODONE HYDROCHLORIDE 10 MG/1
10 TABLET ORAL EVERY 6 HOURS PRN
Qty: 100 TABLET | Refills: 0 | Status: SHIPPED | OUTPATIENT
Start: 2018-01-10 | End: 2018-02-05 | Stop reason: SDUPTHER

## 2017-11-20 RX ORDER — OXYCODONE HYDROCHLORIDE 10 MG/1
10 TABLET ORAL EVERY 6 HOURS PRN
Qty: 100 TABLET | Refills: 0 | Status: SHIPPED | OUTPATIENT
Start: 2017-12-11 | End: 2017-11-20 | Stop reason: SDUPTHER

## 2017-11-20 ASSESSMENT — ENCOUNTER SYMPTOMS
VOMITING: 0
COUGH: 0
SHORTNESS OF BREATH: 0
ABDOMINAL PAIN: 0
EYES NEGATIVE: 1
NAUSEA: 0
ABDOMINAL DISTENTION: 0
CHEST TIGHTNESS: 0
CHOKING: 0
BLOOD IN STOOL: 0
COLOR CHANGE: 0
CONSTIPATION: 1
DIARRHEA: 0
BACK PAIN: 1

## 2017-11-20 NOTE — PROGRESS NOTES
keeping all meds safely secured, and promptly reporting any side effects including, but not limited to, sedation. We will continue current medications as current medications are being tolerated without any adverse side effects and patient is adhering to dosing ecommendations. We will see her again in 8 weeks for reevaluation. Orders Placed This Encounter   Medications    DISCONTD: oxyCODONE HCl (OXY-IR) 10 MG immediate release tablet     Sig: Take 1 tablet by mouth every 6 hours as needed for Pain (TAKE IT EVERY 6-8  HOURS AS NEEDED.) . Earliest Fill Date: 12/11/17     Dispense:  100 tablet     Refill:  0    oxyCODONE HCl (OXY-IR) 10 MG immediate release tablet     Sig: Take 1 tablet by mouth every 6 hours as needed for Pain (TAKE IT EVERY 6-8  HOURS AS NEEDED.) . Earliest Fill Date: 1/10/18     Dispense:  100 tablet     Refill:  0     For chronic pain. May fill on or after1/10/18     She is encouraged to increase/continue core strengthening and stretching exercises and to return for an appt if any problems occur or if pain worsens. The plan of care for today's visit is a continuation of the treatment plan established by Dr. Radha Kelley and with regularly scheduled recheck appointments.   Melissa Duong, MS, ANP-BC (CNP)

## 2017-11-21 ENCOUNTER — HOSPITAL ENCOUNTER (OUTPATIENT)
Dept: GENERAL RADIOLOGY | Age: 77
Discharge: HOME | End: 2017-11-21
Payer: MEDICARE

## 2017-11-21 ENCOUNTER — HOSPITAL ENCOUNTER (OUTPATIENT)
Dept: PAIN MANAGEMENT | Age: 77
Discharge: HOME | End: 2017-11-21
Payer: MEDICARE

## 2017-11-21 VITALS
TEMPERATURE: 97.3 F | WEIGHT: 199 LBS | HEIGHT: 64 IN | DIASTOLIC BLOOD PRESSURE: 76 MMHG | SYSTOLIC BLOOD PRESSURE: 130 MMHG | HEART RATE: 73 BPM | RESPIRATION RATE: 16 BRPM | BODY MASS INDEX: 33.97 KG/M2 | OXYGEN SATURATION: 97 %

## 2017-11-21 DIAGNOSIS — M70.62 GREATER TROCHANTERIC BURSITIS OF LEFT HIP: ICD-10-CM

## 2017-11-21 DIAGNOSIS — R52 PAIN: ICD-10-CM

## 2017-11-21 DIAGNOSIS — M46.1 SACROILIITIS (HCC): Primary | ICD-10-CM

## 2017-11-21 PROCEDURE — 20610 DRAIN/INJ JOINT/BURSA W/O US: CPT | Performed by: PAIN MEDICINE

## 2017-11-21 PROCEDURE — 6360000002 HC RX W HCPCS

## 2017-11-21 PROCEDURE — 27096 INJECT SACROILIAC JOINT: CPT | Performed by: PAIN MEDICINE

## 2017-11-21 PROCEDURE — 20610 DRAIN/INJ JOINT/BURSA W/O US: CPT

## 2017-11-21 PROCEDURE — 6360000004 HC RX CONTRAST MEDICATION

## 2017-11-21 PROCEDURE — G0260 INJ FOR SACROILIAC JT ANESTH: HCPCS

## 2017-11-21 PROCEDURE — 3209999900 FLUORO FOR SURGICAL PROCEDURES

## 2017-11-21 RX ORDER — CITALOPRAM 10 MG/1
TABLET ORAL
Refills: 1 | COMMUNITY
Start: 2017-11-06 | End: 2018-04-16 | Stop reason: ALTCHOICE

## 2017-11-21 RX ORDER — BUDESONIDE AND FORMOTEROL FUMARATE DIHYDRATE 160; 4.5 UG/1; UG/1
2 AEROSOL RESPIRATORY (INHALATION) 2 TIMES DAILY
Status: ON HOLD | COMMUNITY
End: 2020-02-21 | Stop reason: ALTCHOICE

## 2017-11-21 RX ORDER — FLUOCINOLONE ACETONIDE 0.25 MG/G
OINTMENT TOPICAL 2 TIMES DAILY
COMMUNITY
End: 2018-04-16 | Stop reason: ALTCHOICE

## 2017-11-21 ASSESSMENT — ACTIVITIES OF DAILY LIVING (ADL): EFFECT OF PAIN ON DAILY ACTIVITIES: PAIN INCREASES W/STANDING & WALKING

## 2017-11-21 ASSESSMENT — PAIN DESCRIPTION - FREQUENCY: FREQUENCY: CONTINUOUS

## 2017-11-21 ASSESSMENT — PAIN DESCRIPTION - DIRECTION: RADIATING_TOWARDS: LEFT WORSE THAN RIGHT

## 2017-11-21 ASSESSMENT — PAIN DESCRIPTION - ORIENTATION: ORIENTATION: LEFT

## 2017-11-21 ASSESSMENT — PAIN DESCRIPTION - ONSET: ONSET: ON-GOING

## 2017-11-21 ASSESSMENT — PAIN DESCRIPTION - PAIN TYPE: TYPE: CHRONIC PAIN

## 2017-11-21 ASSESSMENT — PAIN - FUNCTIONAL ASSESSMENT: PAIN_FUNCTIONAL_ASSESSMENT: 0-10

## 2017-11-21 ASSESSMENT — PAIN DESCRIPTION - LOCATION: LOCATION: BACK;LEG;HIP

## 2017-11-21 ASSESSMENT — PAIN SCALES - GENERAL: PAINLEVEL_OUTOF10: 3

## 2017-11-21 ASSESSMENT — PAIN DESCRIPTION - PROGRESSION: CLINICAL_PROGRESSION: GRADUALLY WORSENING

## 2017-11-21 NOTE — PROGRESS NOTES
Discharge criteria met. Post procedure dressing dry and intact. Sensory and motor function intact as per pre-procedure. Patient alert and oriented x3  Instructions and follow up reviewed with pt at patient at discharge. Discharged home transported by wheelchair, accompanied by family .   Discharged @8052

## 2018-02-05 DIAGNOSIS — M47.817 LUMBOSACRAL SPONDYLOSIS WITHOUT MYELOPATHY: ICD-10-CM

## 2018-02-05 DIAGNOSIS — M54.50 CHRONIC BILATERAL LOW BACK PAIN WITHOUT SCIATICA: ICD-10-CM

## 2018-02-05 DIAGNOSIS — G89.29 CHRONIC BILATERAL LOW BACK PAIN WITHOUT SCIATICA: ICD-10-CM

## 2018-02-05 DIAGNOSIS — M51.36 LUMBAR DEGENERATIVE DISC DISEASE: ICD-10-CM

## 2018-02-05 DIAGNOSIS — M51.26 LUMBAR DISCOGENIC PAIN SYNDROME: ICD-10-CM

## 2018-02-06 RX ORDER — OXYCODONE HYDROCHLORIDE 10 MG/1
10 TABLET ORAL EVERY 6 HOURS PRN
Qty: 100 TABLET | Refills: 0 | Status: SHIPPED | OUTPATIENT
Start: 2018-02-09 | End: 2018-03-05 | Stop reason: SDUPTHER

## 2018-02-07 RX ORDER — TOPIRAMATE 25 MG/1
25 TABLET ORAL 2 TIMES DAILY
Qty: 60 TABLET | Refills: 0 | Status: SHIPPED | OUTPATIENT
Start: 2018-02-07 | End: 2018-03-05 | Stop reason: SDUPTHER

## 2018-03-05 ENCOUNTER — OFFICE VISIT (OUTPATIENT)
Dept: PAIN MANAGEMENT | Age: 78
End: 2018-03-05
Payer: MEDICARE

## 2018-03-05 VITALS
HEART RATE: 60 BPM | SYSTOLIC BLOOD PRESSURE: 120 MMHG | WEIGHT: 199.08 LBS | HEIGHT: 64 IN | OXYGEN SATURATION: 95 % | DIASTOLIC BLOOD PRESSURE: 64 MMHG | RESPIRATION RATE: 14 BRPM | BODY MASS INDEX: 33.99 KG/M2

## 2018-03-05 DIAGNOSIS — G89.29 CHRONIC BILATERAL LOW BACK PAIN WITHOUT SCIATICA: ICD-10-CM

## 2018-03-05 DIAGNOSIS — M47.817 LUMBOSACRAL SPONDYLOSIS WITHOUT MYELOPATHY: ICD-10-CM

## 2018-03-05 DIAGNOSIS — M54.50 CHRONIC BILATERAL LOW BACK PAIN WITHOUT SCIATICA: ICD-10-CM

## 2018-03-05 DIAGNOSIS — M51.36 LUMBAR DEGENERATIVE DISC DISEASE: ICD-10-CM

## 2018-03-05 DIAGNOSIS — M51.26 LUMBAR DISCOGENIC PAIN SYNDROME: ICD-10-CM

## 2018-03-05 PROCEDURE — G8417 CALC BMI ABV UP PARAM F/U: HCPCS | Performed by: INTERNAL MEDICINE

## 2018-03-05 PROCEDURE — 1123F ACP DISCUSS/DSCN MKR DOCD: CPT | Performed by: INTERNAL MEDICINE

## 2018-03-05 PROCEDURE — G8400 PT W/DXA NO RESULTS DOC: HCPCS | Performed by: INTERNAL MEDICINE

## 2018-03-05 PROCEDURE — 1036F TOBACCO NON-USER: CPT | Performed by: INTERNAL MEDICINE

## 2018-03-05 PROCEDURE — 1090F PRES/ABSN URINE INCON ASSESS: CPT | Performed by: INTERNAL MEDICINE

## 2018-03-05 PROCEDURE — G8484 FLU IMMUNIZE NO ADMIN: HCPCS | Performed by: INTERNAL MEDICINE

## 2018-03-05 PROCEDURE — G8427 DOCREV CUR MEDS BY ELIG CLIN: HCPCS | Performed by: INTERNAL MEDICINE

## 2018-03-05 PROCEDURE — 99213 OFFICE O/P EST LOW 20 MIN: CPT | Performed by: INTERNAL MEDICINE

## 2018-03-05 PROCEDURE — 4040F PNEUMOC VAC/ADMIN/RCVD: CPT | Performed by: INTERNAL MEDICINE

## 2018-03-05 RX ORDER — OXYCODONE HYDROCHLORIDE 10 MG/1
10 TABLET ORAL EVERY 6 HOURS PRN
Qty: 100 TABLET | Refills: 0 | Status: SHIPPED | OUTPATIENT
Start: 2018-04-18 | End: 2018-04-03 | Stop reason: SDUPTHER

## 2018-03-05 RX ORDER — OXYCODONE HYDROCHLORIDE 10 MG/1
10 TABLET ORAL EVERY 6 HOURS PRN
Qty: 100 TABLET | Refills: 0 | Status: SHIPPED | OUTPATIENT
Start: 2018-03-05 | End: 2018-03-05 | Stop reason: SDUPTHER

## 2018-03-05 RX ORDER — TOPIRAMATE 25 MG/1
25 TABLET ORAL 2 TIMES DAILY
Qty: 60 TABLET | Refills: 0 | Status: ON HOLD | OUTPATIENT
Start: 2018-03-05 | End: 2021-03-11

## 2018-03-05 ASSESSMENT — ENCOUNTER SYMPTOMS
BACK PAIN: 1
ABDOMINAL PAIN: 0

## 2018-03-05 NOTE — PROGRESS NOTES
Chronic Pain Assessment Update        Since your last visit to the [facility]     have you been seen for pain by Anyone:No      Is any change in her health: No      Are you satisfied with your pain control? Yes, mostly     Do you have any questions or concerns? No     ADVERSE MEDICATION EFFECTS:   Constipation: yes  Nausea/ vomiting:no  Drowsiness:  no  Urinary Retention: no  Any other side effects: no     ACTIVITY/EMOTIONAL:  Sleep Pattern: [place] Harper  Very restless   Activity Level:  unchanged  Currently in Physical Therapy:  No   Currently seeing a Psychiatrist or Psychologist:  No     ABERRANT BEHAVIORS SINCE LAST VISIT  Taking more medication than prescribed:----no  Received pain medications from anyone:---- no  Used  alcohol or any illegal drugs---------------no     Compliance:  pill count compliant:  Urine Drug Screen or --yes drug screen today

## 2018-03-05 NOTE — PROGRESS NOTES
[facility] PHYSICIANS  [facility] PAIN MANAGEMENT [address] [address]  Dept: [phone]  Dept Fax: [phone]    Dr. Eli Galindo    Progress Note    Date of patient's visit: 3/5/2018  YOB: 1940  Patient's Name:  Jason Christianson    Patient ID: Jason Christianson is 68 y.o. caucasianfemale, with  Lower Back Pain and Other (right thigh pain)  . Chief Complaint:   Chief Complaint   Patient presents with    Lower Back Pain    Other     right thigh pain        Back Pain   This is a chronic problem. The current episode started more than 1 year ago. The problem occurs intermittently. The problem has been gradually worsening since onset. The pain is present in the lumbar spine and gluteal (sharp). The quality of the pain is described as burning, aching and stabbing (sharp ). The pain radiates to the left thigh, right thigh, right knee and left knee. The pain is at a severity of 4/10. The pain is moderate. The pain is worse during the night. The symptoms are aggravated by standing (reaching). Stiffness is present in the morning. Associated symptoms include leg pain, tingling and weakness (low back). Pertinent negatives include no abdominal pain, chest pain, dysuria, fever, headaches or pelvic pain. Risk factors include lack of exercise. She has tried heat (oral narcotics,) for the symptoms. The treatment provided significant relief.         4 A's as obtained by Medical Assistant today reviewed by me during the visit. Medications Prior to Admission:     Current Outpatient Prescriptions   Medication Sig Dispense Refill    topiramate (TOPAMAX) 25 MG tablet Take 1 tablet by mouth 2 times daily 60 tablet 0    [START ON 4/18/2018] oxyCODONE HCl (OXY-IR) 10 MG immediate release tablet Take 1 tablet by mouth every 6 hours as needed for Pain (TAKE IT EVERY 6-8  HOURS AS NEEDED.) for up to 30 days.  Earliest Fill Date: 4/18/18 100 tablet 0    citalopram (CELEXA) Recent Vitals:     Vitals:    03/05/18 1333   BP: 120/64   Pulse: 60   Resp: 14   SpO2: 95%     Body mass index is 34.15 kg/m². Physical Exam   Constitutional: She is oriented to person, place, and time. She appears well-developed and well-nourished. No distress. HENT:   Head: Normocephalic and atraumatic. Eyes: Conjunctivae and EOM are normal. Pupils are equal, round, and reactive to light. No scleral icterus. Neck: Normal range of motion. Neck supple. No JVD present. No thyromegaly present. Pulmonary/Chest: Effort normal and breath sounds normal. No respiratory distress. Abdominal: Soft. Bowel sounds are normal. She exhibits no distension and no mass. There is no tenderness. Musculoskeletal: She exhibits tenderness. She exhibits no edema or deformity. Right hip: She exhibits decreased range of motion, decreased strength, tenderness and bony tenderness. She exhibits no swelling, no crepitus, no deformity and no laceration. Left hip: She exhibits decreased range of motion, decreased strength, tenderness and bony tenderness. She exhibits no swelling, no crepitus, no deformity and no laceration. Lumbar back: She exhibits decreased range of motion, tenderness, bony tenderness, swelling, pain and spasm. She exhibits no edema, no deformity, no laceration and normal pulse. Back:    Inspection of the Sacrum and Lumbar spine show the spine to be in midline. Inspection of the sacrum shows the skin to be intact  without any lesions. Palpation  shows tenderness over bilateral  sacroiliac joints. Hip flexion, abduction and external rotation are positive Bilateral    Paramjit's test is positive bilateral with reproduction of some of the low back pain. No sensory or motor deficits noted. Tone and muscle strength in lower extremities is normal.     No muscle atrophy noted. Deep tendon reflexes are normal in the lower extremities without hyper reflexia. Neurological: She is alert and oriented to person, place, and time. Coordination normal.   Skin: Skin is warm and dry. No rash noted. She is not diaphoretic. No erythema. No pallor. Psychiatric: She has a normal mood and affect. Her speech is normal and behavior is normal. Judgment and thought content normal. Cognition and memory are normal.   Nursing note and vitals reviewed. Ortho Exam      Assessment:     DIAGNOSIS:  1. Lumbar degenerative disc disease    2. Lumbar discogenic pain syndrome    3. Lumbosacral spondylosis without myelopathy    4. Chronic bilateral low back pain without sciatica          Treatment Plan:       Interventional Treatment:     No    Medical Necessity for Intervention:    See Chief complaint, HPI, Physical Examination. [x]The patient's questions were answered to the best of my abilities. Medical Management Plan: We will continue current pain medications. Current medications are being tolerated without any Adverse side effects. Goals for today's visit include to decrease pain to a lesser level, ability to engage in daily activities, decrease pain medication use, decrease health care utilization, muscle strengthening exercises. Controlled Substances Monitoring: Attestation: The Prescription Monitoring Report for this patient was reviewed today. (Jeffry Peoples MD)  Documentation: Obtaining appropriate analgesic effect of treatment., No signs of potential drug abuse or diversion identified. (Jeffry Peoples MD)    Orders Placed This Encounter   Medications    topiramate (TOPAMAX) 25 MG tablet     Sig: Take 1 tablet by mouth 2 times daily     Dispense:  60 tablet     Refill:  0    DISCONTD: oxyCODONE HCl (OXY-IR) 10 MG immediate release tablet     Sig: Take 1 tablet by mouth every 6 hours as needed for Pain (TAKE IT EVERY 6-8  HOURS AS NEEDED.) for up to 30 days.  Earliest Fill Date: 3/5/18     Dispense:  100 tablet     Refill:  0    oxyCODONE HCl

## 2018-04-03 ENCOUNTER — OFFICE VISIT (OUTPATIENT)
Dept: PAIN MANAGEMENT | Age: 78
End: 2018-04-03
Payer: MEDICARE

## 2018-04-03 VITALS
SYSTOLIC BLOOD PRESSURE: 101 MMHG | DIASTOLIC BLOOD PRESSURE: 58 MMHG | BODY MASS INDEX: 33.46 KG/M2 | HEIGHT: 64 IN | HEART RATE: 62 BPM | OXYGEN SATURATION: 95 % | RESPIRATION RATE: 14 BRPM | WEIGHT: 196 LBS

## 2018-04-03 DIAGNOSIS — M51.26 LUMBAR DISCOGENIC PAIN SYNDROME: ICD-10-CM

## 2018-04-03 DIAGNOSIS — M54.50 CHRONIC BILATERAL LOW BACK PAIN WITHOUT SCIATICA: ICD-10-CM

## 2018-04-03 DIAGNOSIS — G89.29 CHRONIC BILATERAL LOW BACK PAIN WITHOUT SCIATICA: ICD-10-CM

## 2018-04-03 DIAGNOSIS — M47.817 LUMBOSACRAL SPONDYLOSIS WITHOUT MYELOPATHY: ICD-10-CM

## 2018-04-03 DIAGNOSIS — M51.36 LUMBAR DEGENERATIVE DISC DISEASE: ICD-10-CM

## 2018-04-03 PROCEDURE — G8417 CALC BMI ABV UP PARAM F/U: HCPCS | Performed by: INTERNAL MEDICINE

## 2018-04-03 PROCEDURE — 1036F TOBACCO NON-USER: CPT | Performed by: INTERNAL MEDICINE

## 2018-04-03 PROCEDURE — 4040F PNEUMOC VAC/ADMIN/RCVD: CPT | Performed by: INTERNAL MEDICINE

## 2018-04-03 PROCEDURE — 1123F ACP DISCUSS/DSCN MKR DOCD: CPT | Performed by: INTERNAL MEDICINE

## 2018-04-03 PROCEDURE — G8400 PT W/DXA NO RESULTS DOC: HCPCS | Performed by: INTERNAL MEDICINE

## 2018-04-03 PROCEDURE — G8427 DOCREV CUR MEDS BY ELIG CLIN: HCPCS | Performed by: INTERNAL MEDICINE

## 2018-04-03 PROCEDURE — 99213 OFFICE O/P EST LOW 20 MIN: CPT | Performed by: INTERNAL MEDICINE

## 2018-04-03 PROCEDURE — 1090F PRES/ABSN URINE INCON ASSESS: CPT | Performed by: INTERNAL MEDICINE

## 2018-04-03 RX ORDER — OXYCODONE HYDROCHLORIDE 10 MG/1
10 TABLET ORAL EVERY 6 HOURS PRN
Qty: 100 TABLET | Refills: 0 | Status: SHIPPED | OUTPATIENT
Start: 2018-04-18 | End: 2018-05-15 | Stop reason: SDUPTHER

## 2018-04-03 ASSESSMENT — ENCOUNTER SYMPTOMS
SHORTNESS OF BREATH: 0
BLURRED VISION: 0
ABDOMINAL PAIN: 0
RESPIRATORY NEGATIVE: 1
BACK PAIN: 1
EYES NEGATIVE: 1
GASTROINTESTINAL NEGATIVE: 1

## 2018-04-09 ENCOUNTER — TELEPHONE (OUTPATIENT)
Dept: PAIN MANAGEMENT | Age: 78
End: 2018-04-09

## 2018-04-13 ENCOUNTER — TELEPHONE (OUTPATIENT)
Dept: PAIN MANAGEMENT | Age: 78
End: 2018-04-13

## 2018-04-16 ENCOUNTER — HOSPITAL ENCOUNTER (OUTPATIENT)
Dept: PAIN MANAGEMENT | Age: 78
Discharge: HOME | End: 2018-04-16
Payer: MEDICARE

## 2018-04-16 ENCOUNTER — HOSPITAL ENCOUNTER (OUTPATIENT)
Dept: GENERAL RADIOLOGY | Age: 78
Discharge: HOME | End: 2018-04-18
Payer: MEDICARE

## 2018-04-16 VITALS
SYSTOLIC BLOOD PRESSURE: 136 MMHG | RESPIRATION RATE: 16 BRPM | OXYGEN SATURATION: 100 % | BODY MASS INDEX: 33.46 KG/M2 | WEIGHT: 196 LBS | HEIGHT: 64 IN | HEART RATE: 66 BPM | DIASTOLIC BLOOD PRESSURE: 68 MMHG | TEMPERATURE: 97.9 F

## 2018-04-16 DIAGNOSIS — M54.5 CHRONIC BILATERAL LOW BACK PAIN, WITH SCIATICA PRESENCE UNSPECIFIED: ICD-10-CM

## 2018-04-16 DIAGNOSIS — G89.29 CHRONIC BILATERAL LOW BACK PAIN, WITH SCIATICA PRESENCE UNSPECIFIED: ICD-10-CM

## 2018-04-16 PROCEDURE — G0260 INJ FOR SACROILIAC JT ANESTH: HCPCS

## 2018-04-16 PROCEDURE — 77002 NEEDLE LOCALIZATION BY XRAY: CPT

## 2018-04-16 PROCEDURE — 6360000004 HC RX CONTRAST MEDICATION

## 2018-04-16 PROCEDURE — 2500000003 HC RX 250 WO HCPCS

## 2018-04-16 PROCEDURE — 6360000002 HC RX W HCPCS

## 2018-04-16 PROCEDURE — 20610 DRAIN/INJ JOINT/BURSA W/O US: CPT | Performed by: ANESTHESIOLOGY

## 2018-04-16 PROCEDURE — 27096 INJECT SACROILIAC JOINT: CPT | Performed by: ANESTHESIOLOGY

## 2018-04-16 PROCEDURE — 3209999900 FLUORO FOR SURGICAL PROCEDURES

## 2018-04-16 PROCEDURE — 20610 DRAIN/INJ JOINT/BURSA W/O US: CPT

## 2018-04-16 ASSESSMENT — ACTIVITIES OF DAILY LIVING (ADL): EFFECT OF PAIN ON DAILY ACTIVITIES: PAIN INCREASES W/STANDING & WALKING

## 2018-04-16 ASSESSMENT — PAIN DESCRIPTION - FREQUENCY: FREQUENCY: CONTINUOUS

## 2018-04-16 ASSESSMENT — PAIN DESCRIPTION - PAIN TYPE: TYPE: CHRONIC PAIN

## 2018-04-16 ASSESSMENT — PAIN SCALES - GENERAL
PAINLEVEL_OUTOF10: 3
PAINLEVEL_OUTOF10: 5

## 2018-04-16 ASSESSMENT — PAIN DESCRIPTION - PROGRESSION: CLINICAL_PROGRESSION: GRADUALLY WORSENING

## 2018-04-16 ASSESSMENT — PAIN DESCRIPTION - DESCRIPTORS: DESCRIPTORS: CONSTANT;ACHING

## 2018-04-16 ASSESSMENT — PAIN DESCRIPTION - ORIENTATION: ORIENTATION: RIGHT;LEFT

## 2018-04-16 ASSESSMENT — PAIN DESCRIPTION - ONSET: ONSET: ON-GOING

## 2018-04-16 ASSESSMENT — PAIN DESCRIPTION - LOCATION: LOCATION: BACK;LEG;HIP

## 2018-04-17 ENCOUNTER — TELEPHONE (OUTPATIENT)
Dept: PAIN MANAGEMENT | Age: 78
End: 2018-04-17

## 2018-05-15 ENCOUNTER — OFFICE VISIT (OUTPATIENT)
Dept: PAIN MANAGEMENT | Age: 78
End: 2018-05-15
Payer: MEDICARE

## 2018-05-15 VITALS
DIASTOLIC BLOOD PRESSURE: 79 MMHG | SYSTOLIC BLOOD PRESSURE: 134 MMHG | OXYGEN SATURATION: 97 % | WEIGHT: 195 LBS | HEIGHT: 64 IN | RESPIRATION RATE: 12 BRPM | HEART RATE: 61 BPM | BODY MASS INDEX: 33.29 KG/M2

## 2018-05-15 DIAGNOSIS — M46.1 BILATERAL SACROILIITIS (HCC): ICD-10-CM

## 2018-05-15 DIAGNOSIS — M54.50 CHRONIC BILATERAL LOW BACK PAIN WITHOUT SCIATICA: ICD-10-CM

## 2018-05-15 DIAGNOSIS — M70.62 GREATER TROCHANTERIC BURSITIS OF LEFT HIP: ICD-10-CM

## 2018-05-15 DIAGNOSIS — M47.817 LUMBOSACRAL SPONDYLOSIS WITHOUT MYELOPATHY: ICD-10-CM

## 2018-05-15 DIAGNOSIS — M51.36 LUMBAR DEGENERATIVE DISC DISEASE: Primary | ICD-10-CM

## 2018-05-15 DIAGNOSIS — G89.29 CHRONIC BILATERAL LOW BACK PAIN WITHOUT SCIATICA: ICD-10-CM

## 2018-05-15 DIAGNOSIS — M51.26 LUMBAR DISCOGENIC PAIN SYNDROME: ICD-10-CM

## 2018-05-15 PROCEDURE — G8400 PT W/DXA NO RESULTS DOC: HCPCS | Performed by: INTERNAL MEDICINE

## 2018-05-15 PROCEDURE — 99214 OFFICE O/P EST MOD 30 MIN: CPT | Performed by: INTERNAL MEDICINE

## 2018-05-15 PROCEDURE — 4040F PNEUMOC VAC/ADMIN/RCVD: CPT | Performed by: INTERNAL MEDICINE

## 2018-05-15 PROCEDURE — 1036F TOBACCO NON-USER: CPT | Performed by: INTERNAL MEDICINE

## 2018-05-15 PROCEDURE — 1123F ACP DISCUSS/DSCN MKR DOCD: CPT | Performed by: INTERNAL MEDICINE

## 2018-05-15 PROCEDURE — 1090F PRES/ABSN URINE INCON ASSESS: CPT | Performed by: INTERNAL MEDICINE

## 2018-05-15 PROCEDURE — G8417 CALC BMI ABV UP PARAM F/U: HCPCS | Performed by: INTERNAL MEDICINE

## 2018-05-15 PROCEDURE — G8427 DOCREV CUR MEDS BY ELIG CLIN: HCPCS | Performed by: INTERNAL MEDICINE

## 2018-05-15 RX ORDER — POTASSIUM CHLORIDE 20 MEQ/1
TABLET, EXTENDED RELEASE ORAL
Status: ON HOLD | COMMUNITY
Start: 2018-04-06 | End: 2022-03-04

## 2018-05-15 RX ORDER — DICYCLOMINE HYDROCHLORIDE 10 MG/1
CAPSULE ORAL
COMMUNITY
End: 2019-05-13 | Stop reason: ALTCHOICE

## 2018-05-15 RX ORDER — MONTELUKAST SODIUM 10 MG/1
10 TABLET ORAL
COMMUNITY
Start: 2018-05-09 | End: 2018-05-15 | Stop reason: SDUPTHER

## 2018-05-15 RX ORDER — OXYCODONE HYDROCHLORIDE 10 MG/1
10 TABLET ORAL EVERY 6 HOURS PRN
Qty: 100 TABLET | Refills: 0 | Status: SHIPPED | OUTPATIENT
Start: 2018-06-01 | End: 2019-05-03 | Stop reason: SDUPTHER

## 2018-05-15 RX ORDER — SIMVASTATIN 40 MG
TABLET ORAL
COMMUNITY
Start: 2018-04-20 | End: 2018-05-15 | Stop reason: SDUPTHER

## 2018-05-15 RX ORDER — GUAIFENESIN 600 MG/1
TABLET, EXTENDED RELEASE ORAL
COMMUNITY

## 2018-05-15 ASSESSMENT — ENCOUNTER SYMPTOMS
SHORTNESS OF BREATH: 0
BLURRED VISION: 0
GASTROINTESTINAL NEGATIVE: 1
EYES NEGATIVE: 1
BACK PAIN: 1
RESPIRATORY NEGATIVE: 1
NAUSEA: 0
ABDOMINAL PAIN: 0

## 2018-06-04 ENCOUNTER — OFFICE VISIT (OUTPATIENT)
Dept: PAIN MANAGEMENT | Age: 78
End: 2018-06-04
Payer: MEDICARE

## 2018-06-04 VITALS
BODY MASS INDEX: 32.78 KG/M2 | SYSTOLIC BLOOD PRESSURE: 124 MMHG | HEIGHT: 64 IN | WEIGHT: 192 LBS | DIASTOLIC BLOOD PRESSURE: 70 MMHG | RESPIRATION RATE: 20 BRPM

## 2018-06-04 DIAGNOSIS — M70.62 GREATER TROCHANTERIC BURSITIS OF BOTH HIPS: ICD-10-CM

## 2018-06-04 DIAGNOSIS — G89.29 ENCOUNTER FOR CHRONIC PAIN MANAGEMENT: ICD-10-CM

## 2018-06-04 DIAGNOSIS — M53.3 CHRONIC RIGHT SACROILIAC JOINT PAIN: Primary | ICD-10-CM

## 2018-06-04 DIAGNOSIS — M53.3 CHRONIC LEFT SACROILIAC JOINT PAIN: ICD-10-CM

## 2018-06-04 DIAGNOSIS — G89.29 CHRONIC LEFT SACROILIAC JOINT PAIN: ICD-10-CM

## 2018-06-04 DIAGNOSIS — G89.29 CHRONIC RIGHT SACROILIAC JOINT PAIN: Primary | ICD-10-CM

## 2018-06-04 DIAGNOSIS — M70.61 GREATER TROCHANTERIC BURSITIS OF BOTH HIPS: ICD-10-CM

## 2018-06-04 PROCEDURE — 4040F PNEUMOC VAC/ADMIN/RCVD: CPT | Performed by: NURSE PRACTITIONER

## 2018-06-04 PROCEDURE — G8427 DOCREV CUR MEDS BY ELIG CLIN: HCPCS | Performed by: NURSE PRACTITIONER

## 2018-06-04 PROCEDURE — G8400 PT W/DXA NO RESULTS DOC: HCPCS | Performed by: NURSE PRACTITIONER

## 2018-06-04 PROCEDURE — 1036F TOBACCO NON-USER: CPT | Performed by: NURSE PRACTITIONER

## 2018-06-04 PROCEDURE — 1123F ACP DISCUSS/DSCN MKR DOCD: CPT | Performed by: NURSE PRACTITIONER

## 2018-06-04 PROCEDURE — 99213 OFFICE O/P EST LOW 20 MIN: CPT | Performed by: NURSE PRACTITIONER

## 2018-06-04 PROCEDURE — G8417 CALC BMI ABV UP PARAM F/U: HCPCS | Performed by: NURSE PRACTITIONER

## 2018-06-04 PROCEDURE — 1090F PRES/ABSN URINE INCON ASSESS: CPT | Performed by: NURSE PRACTITIONER

## 2018-06-04 RX ORDER — LEVOTHYROXINE SODIUM 0.07 MG/1
75 TABLET ORAL DAILY
COMMUNITY
Start: 2018-05-18 | End: 2022-08-11 | Stop reason: SDUPTHER

## 2018-06-04 RX ORDER — PREDNISONE 10 MG/1
10 TABLET ORAL 2 TIMES DAILY
Status: ON HOLD | COMMUNITY
Start: 2018-05-28 | End: 2018-07-20 | Stop reason: ALTCHOICE

## 2018-06-04 RX ORDER — BACLOFEN 10 MG/1
10 TABLET ORAL
COMMUNITY
Start: 2018-05-30 | End: 2019-05-30

## 2018-06-04 RX ORDER — OXYCODONE HYDROCHLORIDE AND ACETAMINOPHEN 5; 325 MG/1; MG/1
TABLET ORAL
COMMUNITY
Start: 2018-05-28 | End: 2018-06-04 | Stop reason: ALTCHOICE

## 2018-06-04 ASSESSMENT — ENCOUNTER SYMPTOMS
CONSTIPATION: 1
RESPIRATORY NEGATIVE: 1
BACK PAIN: 1

## 2018-07-12 ENCOUNTER — OFFICE VISIT (OUTPATIENT)
Dept: PAIN MANAGEMENT | Age: 78
End: 2018-07-12
Payer: MEDICARE

## 2018-07-12 VITALS
BODY MASS INDEX: 33.97 KG/M2 | SYSTOLIC BLOOD PRESSURE: 130 MMHG | DIASTOLIC BLOOD PRESSURE: 72 MMHG | HEIGHT: 64 IN | HEART RATE: 60 BPM | WEIGHT: 199 LBS

## 2018-07-12 DIAGNOSIS — M70.62 GREATER TROCHANTERIC BURSITIS OF BOTH HIPS: ICD-10-CM

## 2018-07-12 DIAGNOSIS — M51.37 DEGENERATION OF LUMBAR OR LUMBOSACRAL INTERVERTEBRAL DISC: Chronic | ICD-10-CM

## 2018-07-12 DIAGNOSIS — G89.29 CHRONIC RIGHT SHOULDER PAIN: ICD-10-CM

## 2018-07-12 DIAGNOSIS — M25.511 CHRONIC RIGHT SHOULDER PAIN: ICD-10-CM

## 2018-07-12 DIAGNOSIS — M75.121 COMPLETE TEAR OF RIGHT ROTATOR CUFF: ICD-10-CM

## 2018-07-12 DIAGNOSIS — M53.3 SACROILIAC JOINT PAIN: ICD-10-CM

## 2018-07-12 DIAGNOSIS — M46.1 SACROILIITIS (HCC): ICD-10-CM

## 2018-07-12 DIAGNOSIS — G57.01 PIRIFORMIS SYNDROME OF RIGHT SIDE: ICD-10-CM

## 2018-07-12 DIAGNOSIS — M54.12 CERVICAL RADICULITIS: Primary | ICD-10-CM

## 2018-07-12 DIAGNOSIS — R41.3 MEMORY LOSS: ICD-10-CM

## 2018-07-12 DIAGNOSIS — Z95.0 PACEMAKER: ICD-10-CM

## 2018-07-12 DIAGNOSIS — M70.61 GREATER TROCHANTERIC BURSITIS OF BOTH HIPS: ICD-10-CM

## 2018-07-12 DIAGNOSIS — M48.061 SPINAL STENOSIS, LUMBAR REGION, WITHOUT NEUROGENIC CLAUDICATION: Chronic | ICD-10-CM

## 2018-07-12 PROCEDURE — 99214 OFFICE O/P EST MOD 30 MIN: CPT | Performed by: PHYSICAL MEDICINE & REHABILITATION

## 2018-07-12 PROCEDURE — 1090F PRES/ABSN URINE INCON ASSESS: CPT | Performed by: PHYSICAL MEDICINE & REHABILITATION

## 2018-07-12 PROCEDURE — 4040F PNEUMOC VAC/ADMIN/RCVD: CPT | Performed by: PHYSICAL MEDICINE & REHABILITATION

## 2018-07-12 PROCEDURE — 1123F ACP DISCUSS/DSCN MKR DOCD: CPT | Performed by: PHYSICAL MEDICINE & REHABILITATION

## 2018-07-12 PROCEDURE — 1101F PT FALLS ASSESS-DOCD LE1/YR: CPT | Performed by: PHYSICAL MEDICINE & REHABILITATION

## 2018-07-12 PROCEDURE — G8400 PT W/DXA NO RESULTS DOC: HCPCS | Performed by: PHYSICAL MEDICINE & REHABILITATION

## 2018-07-12 PROCEDURE — G8417 CALC BMI ABV UP PARAM F/U: HCPCS | Performed by: PHYSICAL MEDICINE & REHABILITATION

## 2018-07-12 PROCEDURE — 1036F TOBACCO NON-USER: CPT | Performed by: PHYSICAL MEDICINE & REHABILITATION

## 2018-07-12 PROCEDURE — G8427 DOCREV CUR MEDS BY ELIG CLIN: HCPCS | Performed by: PHYSICAL MEDICINE & REHABILITATION

## 2018-07-12 NOTE — PROGRESS NOTES
PAIN MANAGEMENT FOLLOW-UP NOTE  7/12/18    CHIEF COMPLAINT: This is a 68 y.o. female patient who returns to the Pain Management Clinic with a history of Back Pain (1 month follow up/medication management/pain in back and right shoulder)      PAIN HPI: Isidra Del Rio returns today for  reevaluation. Since the visit, the patient reports that the pain is worsened. Since last month has  Had   Almost daily R  Neck and shoulder pain causing twice to go to ER. ALso continues to have  R gluteal and  R hip pain  Which did get  Better with R S-I injection Dr. Rajani Uribe in April for 1 month then  Hurting more in  R gluteal  Slight into R lateral leg   ANALGESIA:   Are your Current Pain medication (s) helping to decrease pain? Yes. Percocet  10 2- 4 per day but  A few days no  Help  And had  Couple days of repeating self and memory loss  Current Pain score: Pain Score:   3    ADVERSE AFFECTS:   Medication Side Effects: Yes. ACTIVITY:  Are you able to be more active with your pain medications? No      ABERRANT BEHAVIORS SINCE LAST VISIT?  No    Review of Systems     Allergies   Allergen Reactions    Corgard [Nadolol] Other (See Comments)     Severe coughing and broke a rib as a result     Etodolac Anaphylaxis    Hydrocodone-Acetaminophen Anaphylaxis    Loratadine Anaphylaxis    Quinidine Anaphylaxis    Sulfa Antibiotics Shortness Of Breath    Sulfamethoxazole-Trimethoprim Anaphylaxis    Ansaid [Flurbiprofen] Other (See Comments)     Light headed and difficult with vision \"seeing\"    Erythromycin Nausea Only    Gentamicin     Inderal [Propranolol] Other (See Comments)     Severe cough    Lisinopril Itching    Mirapex [Pramipexole Dihydrochloride]     Mobic [Meloxicam] Nausea Only    Nsaids Other (See Comments)     lightheaded    Reglan [Metoclopramide] Other (See Comments)     Hyperactive and stomach pain    Trazodone     Ultram [Tramadol] Itching    Garamycin [Gentamicin Sulfate] Other (See Comments) Redness and irritation       Outpatient Medications Prior to Visit   Medication Sig Dispense Refill    levothyroxine (SYNTHROID) 75 MCG tablet Take 75 mcg by mouth daily      predniSONE (DELTASONE) 10 MG tablet Take 10 mg by mouth 2 times daily       dicyclomine (BENTYL) 10 MG capsule dicyclomine 10 mg capsule   Take 1 capsule 4 times a day by oral route for 10 days.  potassium chloride (KLOR-CON M) 20 MEQ extended release tablet TAKE ONE TABLET ONCE A DAY      guaiFENesin (MUCINEX) 600 MG extended release tablet Mucinex 600 mg tablet, extended release   Take 2 tablets every day by oral route.  oxyCODONE HCl (OXY-IR) 10 MG immediate release tablet Take 1 tablet by mouth every 6 hours as needed for Pain for up to 30 days. Vincent Jumper Date: 6/1/18 100 tablet 0    budesonide-formoterol (SYMBICORT) 160-4.5 MCG/ACT AERO Inhale 2 puffs into the lungs 2 times daily      lidocaine (ASPERCREME W/LIDOCAINE) 4 % cream Apply topically as needed for Pain Apply topically as needed.  furosemide (LASIX) 40 MG tablet Take 40 mg by mouth 3 times daily      aluminum & magnesium hydroxide-simethicone (MAALOX) 200-200-20 MG/5ML SUSP suspension Take 5 mLs by mouth every 6 hours as needed for Indigestion      fluticasone propionate 50 MCG/BLIST AEPB Inhale into the lungs      albuterol (ACCUNEB) 1.25 MG/3ML nebulizer solution Inhale 1 ampule into the lungs every 6 hours as needed for Wheezing      metoprolol (LOPRESSOR) 25 MG tablet Take 50 mg by mouth 2 times daily       simvastatin (ZOCOR) 40 MG tablet Take 40 mg by mouth nightly      flecainide (TAMBOCOR) 50 MG tablet Take 50 mg by mouth 2 times daily.  omeprazole (PRILOSEC) 40 MG capsule Take 40 mg by mouth nightly.  montelukast (SINGULAIR) 10 MG tablet Take 10 mg by mouth nightly.  DULoxetine (CYMBALTA) 60 MG capsule Take 60 mg by mouth daily.  artificial tears (ARTIFICIAL TEARS) OINT as needed.       Saline 0.2 % SOLN by Nasal route

## 2018-07-12 NOTE — PATIENT INSTRUCTIONS
[facility]  [address] [facility] [place], [address]  Telephone [phone]  Fax [phone]      PROCEDURE INSTRUCTIONS FOR  PAIN MANAGEMENT PROCEDURES WITHOUT IV SEDATION    Romayne Cocking scheduled to see Dr. Randy Batres to undergo the following procedure:  Right C5 and C6 Transforaminal Epidural Steroid Injection    Procedure Date: ____Friday 7/20/18____  Arrival Time: ____9:30am____     Report to the [place] [id number], Registration office on the 1st floor in the hospital, after check in and signing of paper work you will then go to the second floor to the surgery center. 1. Stop the following medications prior to the procedure:    Aspirin  Date__7/15/18__ : Stop blood thinners as directed before the injection, with permission from your cardiologist or primary care physician. We will send a letter to them requesting permission to hold the blood thinners. · Medication___Aspirin___ held for __5__ days    2. Take all routine medications unless otherwise instructed. Ok to take vitamins and antiinflammatory medications    3. EATING & DRINKING:  Ok to eat or drink before the injection - no IV sedation will be used. 4. If you are allergic to contrast or iodine, you must take benadryl and prednisone prior to the injection to prevent an allergic reaction. Follow the directions on the prescription for the times to take the medication. 5. Oral valium can be prescribed if your are anxious about the injections or feel that you can not lay still during the injection. If you take valium, you must have a . Make arrangements for a family member or friend to drive you to the surgery center. Your ride must stay in the hospital while you are having the injection done. If they cannot stay, the injection will be rescheduled. The valium may affect your judgment following the procedure and driving a vehicle within 24 hours after the sedation could be dangerous.     6.  Wear simple loose prednisone prior to the injection to prevent an allergic reaction. Follow the directions on the prescription for the times to take the medication. 5. Oral valium can be prescribed if your are anxious about the injections or feel that you can not lay still during the injection. If you take valium, you must have a . Make arrangements for a family member or friend to drive you to the surgery center. Your ride must stay in the hospital while you are having the injection done. If they cannot stay, the injection will be rescheduled. The valium may affect your judgment following the procedure and driving a vehicle within 24 hours after the sedation could be dangerous. 6.  Wear simple loose clothing, which can be easily changed. 7.  Leave jewelry (including rings) and other valuables at home. 8.  You will be asked to sign several forms prior to surgery; patients under the age of 25 must have a parent or legal guardian sign the permit to be able to do the procedure. 9.  You must have finished any antibiotic prescribed for recent infections. If required, please take pre-procedure antibiotic or other pre-procedure medications as instructed. 10. Bring inhalers and pain medications with you to your procedure. 11. Bring your MRI/CT films if they were done outside of the [facility]. 12. If you should develop a cold, sore throat, cough, fever or other new indication of illness or infection, or are started on antibiotics within 2 weeks of the scheduled procedure, please notify the [facility] office as early as possible at [phone].

## 2018-07-20 ENCOUNTER — APPOINTMENT (OUTPATIENT)
Dept: GENERAL RADIOLOGY | Age: 78
End: 2018-07-20
Attending: PHYSICAL MEDICINE & REHABILITATION
Payer: MEDICARE

## 2018-07-20 ENCOUNTER — HOSPITAL ENCOUNTER (OUTPATIENT)
Age: 78
Setting detail: OUTPATIENT SURGERY
Discharge: HOME | End: 2018-07-20
Attending: PHYSICAL MEDICINE & REHABILITATION | Admitting: PHYSICAL MEDICINE & REHABILITATION
Payer: MEDICARE

## 2018-07-20 VITALS
RESPIRATION RATE: 16 BRPM | SYSTOLIC BLOOD PRESSURE: 129 MMHG | DIASTOLIC BLOOD PRESSURE: 62 MMHG | HEART RATE: 68 BPM | WEIGHT: 199 LBS | OXYGEN SATURATION: 98 % | TEMPERATURE: 97.5 F | HEIGHT: 64 IN | BODY MASS INDEX: 33.97 KG/M2

## 2018-07-20 PROBLEM — M54.12 CERVICAL RADICULITIS: Status: ACTIVE | Noted: 2018-07-20

## 2018-07-20 PROBLEM — M54.2 CERVICALGIA: Status: ACTIVE | Noted: 2018-07-20

## 2018-07-20 PROCEDURE — 2709999900 HC NON-CHARGEABLE SUPPLY: Performed by: PHYSICAL MEDICINE & REHABILITATION

## 2018-07-20 PROCEDURE — 6360000002 HC RX W HCPCS: Performed by: PHYSICAL MEDICINE & REHABILITATION

## 2018-07-20 PROCEDURE — 7100000011 HC PHASE II RECOVERY - ADDTL 15 MIN: Performed by: PHYSICAL MEDICINE & REHABILITATION

## 2018-07-20 PROCEDURE — 7100000010 HC PHASE II RECOVERY - FIRST 15 MIN: Performed by: PHYSICAL MEDICINE & REHABILITATION

## 2018-07-20 PROCEDURE — 6360000004 HC RX CONTRAST MEDICATION: Performed by: PHYSICAL MEDICINE & REHABILITATION

## 2018-07-20 PROCEDURE — 64480 NJX AA&/STRD TFRM EPI C/T EA: CPT | Performed by: PHYSICAL MEDICINE & REHABILITATION

## 2018-07-20 PROCEDURE — 3600000056 HC PAIN LEVEL 4 BASE: Performed by: PHYSICAL MEDICINE & REHABILITATION

## 2018-07-20 PROCEDURE — 64479 NJX AA&/STRD TFRM EPI C/T 1: CPT | Performed by: PHYSICAL MEDICINE & REHABILITATION

## 2018-07-20 PROCEDURE — 2500000003 HC RX 250 WO HCPCS: Performed by: PHYSICAL MEDICINE & REHABILITATION

## 2018-07-20 PROCEDURE — 3209999900 FLUORO FOR SURGICAL PROCEDURES

## 2018-07-20 RX ORDER — BUPIVACAINE HYDROCHLORIDE 2.5 MG/ML
INJECTION, SOLUTION EPIDURAL; INFILTRATION; INTRACAUDAL PRN
Status: DISCONTINUED | OUTPATIENT
Start: 2018-07-20 | End: 2018-07-20 | Stop reason: HOSPADM

## 2018-07-20 RX ORDER — LIDOCAINE HYDROCHLORIDE 10 MG/ML
INJECTION, SOLUTION EPIDURAL; INFILTRATION; INTRACAUDAL; PERINEURAL PRN
Status: DISCONTINUED | OUTPATIENT
Start: 2018-07-20 | End: 2018-07-20 | Stop reason: HOSPADM

## 2018-07-20 RX ORDER — DEXAMETHASONE SODIUM PHOSPHATE 10 MG/ML
INJECTION INTRAMUSCULAR; INTRAVENOUS PRN
Status: DISCONTINUED | OUTPATIENT
Start: 2018-07-20 | End: 2018-07-20 | Stop reason: HOSPADM

## 2018-07-20 ASSESSMENT — PAIN SCALES - GENERAL
PAINLEVEL_OUTOF10: 1
PAINLEVEL_OUTOF10: 1

## 2018-07-20 ASSESSMENT — PAIN - FUNCTIONAL ASSESSMENT: PAIN_FUNCTIONAL_ASSESSMENT: 0-10

## 2018-07-20 ASSESSMENT — PAIN DESCRIPTION - DESCRIPTORS: DESCRIPTORS: DISCOMFORT

## 2018-07-20 NOTE — OP NOTE
TRANSFORAMINAL EPIDURAL STEROID INJECTION    7/20/18    Surgeon: Chano Gold MD    Pre-operative Diagnosis:   Patient Active Problem List   Diagnosis Code    Lumbar degenerative disc disease M51.36    Lumbar canal stenosis M48.061    Lumbar discogenic pain syndrome M51.26    Encounter for long-term (current) use of other medications Z79.899    Degeneration of lumbar or lumbosacral intervertebral disc M51.37    Spinal stenosis, lumbar region, without neurogenic claudication M48.061    Thoracic or lumbosacral neuritis or radiculitis, unspecified [id number]    Greater trochanteric bursitis of both hips M70.61, M70.62    Sacroiliitis (HCC) M46.1    Chronic left sacroiliac joint pain M53.3, G89.29    Encounter for chronic pain management G89.29    Cervical radiculitis M54.12    Cervicalgia M54.2       Post-operative Diagnosis: Same    Assistants: none    This is a 68 y.o. female patient with pain in the neck.  Previous treatment and examination findings are noted in the H&P.RC5,6 transforaminal epidural injection has been requested for diagnostic and therapeutic reasons. Conservative treatment was ineffective i.e.: ice, NSAIDS, rest, narcotic medication, chiropractic care, physical therapy and message therapy. Patient is unable to perform the following ADL's: toileting and personal cares     Pain Assessment: 0-10  Pain Level: 2     Pain Orientation: Right  Pain Location: Neck  Pain Descriptors: Discomfort    Last Plain films: 5/24/18      EXAMINATION:  1. RC5,6 transforaminal radiculogram/epidurogram.   2. RC5,6 transforaminal epidural anesthetic injection. 3. RC5,6 transforaminal epidural steroid injection. CONSENT: Written consent was obtained from the patient on preprinted consent form after explaining the procedure, indications, potential complications and outcomes. Alternative treatments were also discussed.     DISCUSSION: The patient was sterilely prepped and draped in the usual fashion in needle position verification, a test dose of .5 mL of 1 % lidocaine ( for C5 and  C6 ) was administered and patient monitored for any adverse effects. Then, 1 mL of Dexamethasone (Decadron 10 mg/mL) ( for C6 only)  was instilled into the epidural space and the patient's response was again monitored. Finally,  1 ml of 0.5% bupivacaine ( for C 6 only)was then instilled. The patient's response was again monitored. The spinal needle was removed. The patient tolerated the procedure well and without complications and was noted to be in stable condition prior to discharge from the procedure center with discharge instructions. EBL: no blood loss    SPECIMEN: none    IMPRESSIONS:  1. R C5,6  transforaminal epidurogram, epidural anesthesia and epidural steroid injection procedures accomplished without incident. RECOMMENDATIONS:  1. Complete and return Post-Procedure Pain and Activity Diary.   2. Contact the [address] for symptom exacerbation, fever or unusual symptoms. 3. Post-procedure care according to verbal and written discharge instructions    POST-PROCEDURE EPIDUROGRAPHY INTERPRETATION:    EXAMINATION: AP, lateral, and oblique views    FLUORO TIME: 26 seconds    DISCUSSION: Spot views of the spine reveal normal alignment and segmentation. Spinal needle is positioned at the R C5,6 neuroforamin. Contrast spreads and outlines the RC5,6 nerve/ neuroforamin and epidural space. The epidurogram reveals excellent contrast flow. Visualized spine reveals See radiology report. Soft tissues reveal no abnormalities. IMPRESSION: RC 5,6 transforaminal epidurogram/epineurogram reveals satisfactory needle position and contrast spread.      Electronically signed by Michael Ramirez MD on 7/20/2018 at 10:51 AM

## 2018-07-20 NOTE — H&P
Expand All Collapse All    ManualTemplate   Copied  PAIN MANAGEMENT FOLLOW-UP NOTE  7/12/18     CHIEF COMPLAINT: This is a 68 y.o. female patient who returns to the Pain Management Clinic with a history of Back Pain (1 month follow up/medication management/pain in back and right shoulder)        PAIN HPI: Hipolito White returns today for  reevaluation. Since the visit, the patient reports that the pain is worsened. Since last month has  Had   Almost daily R  Neck and shoulder pain causing twice to go to ER. ALso continues to have  R gluteal and  R hip pain  Which did get  Better with R S-I injection Dr. Doris Mari in April for 1 month then  Hurting more in  R gluteal  Slight into R lateral leg   ANALGESIA:   Are your Current Pain medication (s) helping to decrease pain? Yes. Percocet  10 2- 4 per day but  A few days no  Help  And had  Couple days of repeating self and memory loss  Current Pain score: Pain Score:   3     ADVERSE AFFECTS:   Medication Side Effects: Yes. ACTIVITY:  Are you able to be more active with your pain medications? No      ABERRANT BEHAVIORS SINCE LAST VISIT?  No     Review of Systems            Allergies   Allergen Reactions    Corgard [Nadolol] Other (See Comments)       Severe coughing and broke a rib as a result     Etodolac Anaphylaxis    Hydrocodone-Acetaminophen Anaphylaxis    Loratadine Anaphylaxis    Quinidine Anaphylaxis    Sulfa Antibiotics Shortness Of Breath    Sulfamethoxazole-Trimethoprim Anaphylaxis    Ansaid [Flurbiprofen] Other (See Comments)       Light headed and difficult with vision \"seeing\"    Erythromycin Nausea Only    Gentamicin      Inderal [Propranolol] Other (See Comments)       Severe cough    Lisinopril Itching    Mirapex [Pramipexole Dihydrochloride]      Mobic [Meloxicam] Nausea Only    Nsaids Other (See Comments)       lightheaded    Reglan [Metoclopramide] Other (See Comments)       Hyperactive and stomach pain    Trazodone      Ultram [Tramadol] Itching    Garamycin [Gentamicin Sulfate] Other (See Comments)       Redness and irritation         Medications Prior to Visit          Outpatient Medications Prior to Visit   Medication Sig Dispense Refill    levothyroxine (SYNTHROID) 75 MCG tablet Take 75 mcg by mouth daily        predniSONE (DELTASONE) 10 MG tablet Take 10 mg by mouth 2 times daily         dicyclomine (BENTYL) 10 MG capsule dicyclomine 10 mg capsule   Take 1 capsule 4 times a day by oral route for 10 days.  potassium chloride (KLOR-CON M) 20 MEQ extended release tablet TAKE ONE TABLET ONCE A DAY        guaiFENesin (MUCINEX) 600 MG extended release tablet Mucinex 600 mg tablet, extended release   Take 2 tablets every day by oral route.  oxyCODONE HCl (OXY-IR) 10 MG immediate release tablet Take 1 tablet by mouth every 6 hours as needed for Pain for up to 30 days. Marcial Dukes Date: 6/1/18 100 tablet 0    budesonide-formoterol (SYMBICORT) 160-4.5 MCG/ACT AERO Inhale 2 puffs into the lungs 2 times daily        lidocaine (ASPERCREME W/LIDOCAINE) 4 % cream Apply topically as needed for Pain Apply topically as needed.  furosemide (LASIX) 40 MG tablet Take 40 mg by mouth 3 times daily        aluminum & magnesium hydroxide-simethicone (MAALOX) 200-200-20 MG/5ML SUSP suspension Take 5 mLs by mouth every 6 hours as needed for Indigestion        fluticasone propionate 50 MCG/BLIST AEPB Inhale into the lungs        albuterol (ACCUNEB) 1.25 MG/3ML nebulizer solution Inhale 1 ampule into the lungs every 6 hours as needed for Wheezing        metoprolol (LOPRESSOR) 25 MG tablet Take 50 mg by mouth 2 times daily         simvastatin (ZOCOR) 40 MG tablet Take 40 mg by mouth nightly        flecainide (TAMBOCOR) 50 MG tablet Take 50 mg by mouth 2 times daily.  omeprazole (PRILOSEC) 40 MG capsule Take 40 mg by mouth nightly.         montelukast (SINGULAIR) 10 MG tablet Take 10 mg by mouth nightly.  DULoxetine (CYMBALTA) 60 MG capsule Take 60 mg by mouth daily.  artificial tears (ARTIFICIAL TEARS) OINT as needed.  Saline 0.2 % SOLN by Nasal route daily as needed         Immune Globulin, Human, (HIZENTRA) 10 GM/50ML SOLN Inject into the skin Tuesdays        diphenhydrAMINE (BENADRYL) 25 MG capsule Take 25 mg by mouth every 6 hours as needed for Itching.  Ranitidine HCl (ZANTAC 75 PO) Take  by mouth.  baclofen (LIORESAL) 10 MG tablet Take 10 mg by mouth        Heating Pads (RADHA PAD/SMARTHEAT PRO/) PADS 1 Units by Does not apply route 4 times daily Use for 15 minutes to 30 minutes at a time four times a day.  1 each 0    topiramate (TOPAMAX) 25 MG tablet Take 1 tablet by mouth 2 times daily (Patient taking differently: Take 25 mg by mouth daily ) 60 tablet 0                Facility-Administered Medications Prior to Visit   Medication Dose Route Frequency Provider Last Rate Last Dose    triamcinolone acetonide (KENALOG-40) injection 40 mg  40 mg Intra-articular Once Sundeep Shelley MD        lidocaine 2 % injection 5 mL  5 mL Intradermal Once Sundeep Shelley MD        iohexol (OMNIPAQUE 180) injection 3 mL  3 mL Other ONCE PRN Mak Felipe MD        bupivacaine (MARCAINE) 0.5 % injection 150 mg  30 mL Intra-articular Once Sundeep Shelley MD                Past Medical History        Past Medical History:   Diagnosis Date    Anesthesia complication       2nd post op day from total knee patient stated \"I was wild and mean\"    Anxiety and depression      Arthritis      ASD (atrial septal defect)       repair in [address] COPD (chronic obstructive pulmonary disease) ([address]      COPD (chronic obstructive pulmonary disease) ([address] 1980's    Disorder of immune system [address] [address] [facility]       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 Total time spent face to face with patient was 25 minutes in which  50% or more of the time was spent in counseling, education about risk and benefits of the above plan, and coordination of care.

## 2018-07-20 NOTE — INTERVAL H&P NOTE
I have interviewed and examined the patient and reviewed the recent History and Physical.  There have been no changes to the recent H&P documentation. The surgical consent form has been signed. Last anticoagulant medication use was:2-3 days    Premedication taken for contrast allergy? No    Valium taken for oral sedation? No    Outpatient Prescriptions Marked as Taking for the 7/20/18 encounter [facility] Encounter)   Medication Sig Dispense Refill    levothyroxine (SYNTHROID) 75 MCG tablet Take 75 mcg by mouth daily      potassium chloride (KLOR-CON M) 20 MEQ extended release tablet TAKE ONE TABLET ONCE A DAY      guaiFENesin (MUCINEX) 600 MG extended release tablet Mucinex 600 mg tablet, extended release   Take 2 tablets every day by oral route.  oxyCODONE HCl (OXY-IR) 10 MG immediate release tablet Take 1 tablet by mouth every 6 hours as needed for Pain for up to 30 days. Caty Cramer Date: 6/1/18 100 tablet 0    topiramate (TOPAMAX) 25 MG tablet Take 1 tablet by mouth 2 times daily (Patient taking differently: Take 25 mg by mouth daily ) 60 tablet 0    budesonide-formoterol (SYMBICORT) 160-4.5 MCG/ACT AERO Inhale 2 puffs into the lungs 2 times daily      lidocaine (ASPERCREME W/LIDOCAINE) 4 % cream Apply topically as needed for Pain Apply topically as needed.  furosemide (LASIX) 40 MG tablet Take 40 mg by mouth 3 times daily      fluticasone propionate 50 MCG/BLIST AEPB Inhale into the lungs      albuterol (ACCUNEB) 1.25 MG/3ML nebulizer solution Inhale 1 ampule into the lungs every 6 hours as needed for Wheezing      metoprolol (LOPRESSOR) 25 MG tablet Take 50 mg by mouth 2 times daily       simvastatin (ZOCOR) 40 MG tablet Take 40 mg by mouth nightly      flecainide (TAMBOCOR) 50 MG tablet Take 50 mg by mouth 2 times daily.  omeprazole (PRILOSEC) 40 MG capsule Take 40 mg by mouth nightly.  montelukast (SINGULAIR) 10 MG tablet Take 10 mg by mouth nightly.       DULoxetine

## 2018-07-31 ENCOUNTER — HOSPITAL ENCOUNTER (OUTPATIENT)
Age: 78
Setting detail: OUTPATIENT SURGERY
Discharge: HOME | End: 2018-07-31
Attending: PHYSICAL MEDICINE & REHABILITATION | Admitting: PHYSICAL MEDICINE & REHABILITATION
Payer: MEDICARE

## 2018-07-31 ENCOUNTER — APPOINTMENT (OUTPATIENT)
Dept: GENERAL RADIOLOGY | Age: 78
End: 2018-07-31
Attending: PHYSICAL MEDICINE & REHABILITATION
Payer: MEDICARE

## 2018-07-31 VITALS
SYSTOLIC BLOOD PRESSURE: 133 MMHG | HEIGHT: 64 IN | BODY MASS INDEX: 33.16 KG/M2 | TEMPERATURE: 98 F | WEIGHT: 194.25 LBS | DIASTOLIC BLOOD PRESSURE: 63 MMHG | OXYGEN SATURATION: 97 % | HEART RATE: 61 BPM | RESPIRATION RATE: 16 BRPM

## 2018-07-31 PROBLEM — M54.2 CERVICALGIA: Status: RESOLVED | Noted: 2018-07-20 | Resolved: 2018-07-31

## 2018-07-31 PROBLEM — M54.12 CERVICAL RADICULITIS: Status: RESOLVED | Noted: 2018-07-20 | Resolved: 2018-07-31

## 2018-07-31 PROBLEM — G57.02 PIRIFORMIS SYNDROME OF LEFT SIDE: Status: ACTIVE | Noted: 2018-07-31

## 2018-07-31 PROCEDURE — 20552 NJX 1/MLT TRIGGER POINT 1/2: CPT | Performed by: PHYSICAL MEDICINE & REHABILITATION

## 2018-07-31 PROCEDURE — 3600000056 HC PAIN LEVEL 4 BASE: Performed by: PHYSICAL MEDICINE & REHABILITATION

## 2018-07-31 PROCEDURE — 7100000011 HC PHASE II RECOVERY - ADDTL 15 MIN: Performed by: PHYSICAL MEDICINE & REHABILITATION

## 2018-07-31 PROCEDURE — 6360000002 HC RX W HCPCS: Performed by: PHYSICAL MEDICINE & REHABILITATION

## 2018-07-31 PROCEDURE — 20610 DRAIN/INJ JOINT/BURSA W/O US: CPT | Performed by: PHYSICAL MEDICINE & REHABILITATION

## 2018-07-31 PROCEDURE — 6360000004 HC RX CONTRAST MEDICATION: Performed by: PHYSICAL MEDICINE & REHABILITATION

## 2018-07-31 PROCEDURE — 27096 INJECT SACROILIAC JOINT: CPT | Performed by: PHYSICAL MEDICINE & REHABILITATION

## 2018-07-31 PROCEDURE — 2500000003 HC RX 250 WO HCPCS: Performed by: PHYSICAL MEDICINE & REHABILITATION

## 2018-07-31 PROCEDURE — 3209999900 FLUORO FOR SURGICAL PROCEDURES

## 2018-07-31 PROCEDURE — 7100000010 HC PHASE II RECOVERY - FIRST 15 MIN: Performed by: PHYSICAL MEDICINE & REHABILITATION

## 2018-07-31 PROCEDURE — 2709999900 HC NON-CHARGEABLE SUPPLY: Performed by: PHYSICAL MEDICINE & REHABILITATION

## 2018-07-31 RX ORDER — ROPIVACAINE HYDROCHLORIDE 5 MG/ML
INJECTION, SOLUTION EPIDURAL; INFILTRATION; PERINEURAL PRN
Status: DISCONTINUED | OUTPATIENT
Start: 2018-07-31 | End: 2018-07-31 | Stop reason: HOSPADM

## 2018-07-31 RX ORDER — DEXAMETHASONE SODIUM PHOSPHATE 10 MG/ML
INJECTION INTRAMUSCULAR; INTRAVENOUS PRN
Status: DISCONTINUED | OUTPATIENT
Start: 2018-07-31 | End: 2018-07-31 | Stop reason: HOSPADM

## 2018-07-31 RX ORDER — SODIUM CHLORIDE 0.9 % (FLUSH) 0.9 %
10 SYRINGE (ML) INJECTION PRN
Status: DISCONTINUED | OUTPATIENT
Start: 2018-07-31 | End: 2018-07-31

## 2018-07-31 RX ORDER — SODIUM CHLORIDE 0.9 % (FLUSH) 0.9 %
10 SYRINGE (ML) INJECTION EVERY 12 HOURS SCHEDULED
Status: DISCONTINUED | OUTPATIENT
Start: 2018-07-31 | End: 2018-07-31

## 2018-07-31 RX ORDER — LIDOCAINE HYDROCHLORIDE 20 MG/ML
INJECTION, SOLUTION EPIDURAL; INFILTRATION; INTRACAUDAL; PERINEURAL PRN
Status: DISCONTINUED | OUTPATIENT
Start: 2018-07-31 | End: 2018-07-31 | Stop reason: HOSPADM

## 2018-07-31 ASSESSMENT — PAIN SCALES - GENERAL: PAINLEVEL_OUTOF10: 0

## 2018-07-31 ASSESSMENT — PAIN - FUNCTIONAL ASSESSMENT: PAIN_FUNCTIONAL_ASSESSMENT: 0-10

## 2018-07-31 ASSESSMENT — ENCOUNTER SYMPTOMS
BACK PAIN: 1
RESPIRATORY NEGATIVE: 1
BLURRED VISION: 1
GASTROINTESTINAL NEGATIVE: 1

## 2018-07-31 NOTE — INTERVAL H&P NOTE
I have interviewed and examined the patient and reviewed the recent History and Physical.  There have been no changes to the recent H&P documentation. The surgical consent form has been signed. Last anticoagulant medication use was:na    Premedication taken for contrast allergy? No    Valium taken for oral sedation? No    Outpatient Prescriptions Marked as Taking for the 7/31/18 encounter [facility] Encounter)   Medication Sig Dispense Refill    levothyroxine (SYNTHROID) 75 MCG tablet Take 75 mcg by mouth daily      potassium chloride (KLOR-CON M) 20 MEQ extended release tablet TAKE ONE TABLET ONCE A DAY      guaiFENesin (MUCINEX) 600 MG extended release tablet Mucinex 600 mg tablet, extended release   Take 2 tablets every day by oral route.  Heating Pads (RADHA PAD/SMARTHEAT PRO/) PADS 1 Units by Does not apply route 4 times daily Use for 15 minutes to 30 minutes at a time four times a day. 1 each 0    budesonide-formoterol (SYMBICORT) 160-4.5 MCG/ACT AERO Inhale 2 puffs into the lungs 2 times daily      furosemide (LASIX) 40 MG tablet Take 40 mg by mouth 3 times daily      fluticasone propionate 50 MCG/BLIST AEPB Inhale into the lungs      albuterol (ACCUNEB) 1.25 MG/3ML nebulizer solution Inhale 1 ampule into the lungs every 6 hours as needed for Wheezing      metoprolol (LOPRESSOR) 25 MG tablet Take 50 mg by mouth 2 times daily       simvastatin (ZOCOR) 40 MG tablet Take 40 mg by mouth nightly      flecainide (TAMBOCOR) 50 MG tablet Take 50 mg by mouth 2 times daily.  omeprazole (PRILOSEC) 40 MG capsule Take 40 mg by mouth nightly.  montelukast (SINGULAIR) 10 MG tablet Take 10 mg by mouth nightly.  DULoxetine (CYMBALTA) 60 MG capsule Take 60 mg by mouth daily.  artificial tears (ARTIFICIAL TEARS) OINT as needed.  diphenhydrAMINE (BENADRYL) 25 MG capsule Take 25 mg by mouth every 6 hours as needed for Itching.          The patient understands the planned operation and

## 2018-07-31 NOTE — H&P
Other (See Comments)     Redness and irritation       Prior to Admission medications    Medication Sig Start Date End Date Taking? Authorizing Provider   aspirin 81 MG tablet Take 81 mg by mouth daily    Historical Provider, MD   levothyroxine (SYNTHROID) 75 MCG tablet Take 75 mcg by mouth daily 5/18/18 5/18/19  Historical Provider, MD   baclofen (LIORESAL) 10 MG tablet Take 10 mg by mouth 5/30/18 5/30/19  Historical Provider, MD   dicyclomine (BENTYL) 10 MG capsule dicyclomine 10 mg capsule   Take 1 capsule 4 times a day by oral route for 10 days. Historical Provider, MD   potassium chloride (KLOR-CON M) 20 MEQ extended release tablet TAKE ONE TABLET ONCE A DAY 4/6/18   Historical Provider, MD   guaiFENesin (MUCINEX) 600 MG extended release tablet Mucinex 600 mg tablet, extended release   Take 2 tablets every day by oral route. Historical Provider, MD   oxyCODONE HCl (OXY-IR) 10 MG immediate release tablet Take 1 tablet by mouth every 6 hours as needed for Pain for up to 30 days. Magdaleno Blunt Date: 6/1/18 6/1/18 7/20/18  Alberto Samuels MD   Heating Pads (RADHA PAD/SMARTHEAT PRO/) PADS 1 Units by Does not apply route 4 times daily Use for 15 minutes to 30 minutes at a time four times a day. 5/15/18 5/15/19  Krys Tom MD   topiramate (TOPAMAX) 25 MG tablet Take 1 tablet by mouth 2 times daily  Patient taking differently: Take 25 mg by mouth daily  3/5/18 7/20/18  Alberto Samuels MD   budesonide-formoterol (SYMBICORT) 160-4.5 MCG/ACT AERO Inhale 2 puffs into the lungs 2 times daily    Historical Provider, MD   lidocaine (ASPERCREME W/LIDOCAINE) 4 % cream Apply topically as needed for Pain Apply topically as needed.     Historical Provider, MD   furosemide (LASIX) 40 MG tablet Take 40 mg by mouth 3 times daily    Historical Provider, MD   aluminum & magnesium hydroxide-simethicone (MAALOX) 200-200-20 MG/5ML SUSP suspension Take 5 mLs by mouth every 6 hours as needed for Indigestion Neurologic Exam     Mental Status   Oriented to person, place, and time. Cranial Nerves     CN III, IV, VI   Pupils are equal, round, and reactive to light. Motor Exam     Strength   Right quadriceps: 5/5  Left quadriceps: 5/5  Right hamstrin/5  Left hamstrin/5    Back Exam     Tenderness   The patient is experiencing tenderness in the lumbar. Range of Motion   Lateral Bend Right: normal   Lateral Bend Left: abnormal   Rotation Right: normal   Rotation Left: abnormal     Muscle Strength   Right Quadriceps:  5/5   Left Quadriceps:  5/5   Right Hamstrings:  5/5   Left Hamstrings:  5/5     Tests   Straight leg raise right: negative  Straight leg raise left: positive    Other   Sensation: normal    Comments:  +FABERS L  +trochanteric bursa tender   ?+ Slump L   Kemps  Neg   Tender piriformis             Lab Results   Component Value Date    WBC 9.1 09/15/2015    HGB 12.2 09/15/2015    HCT 38.4 09/15/2015     09/15/2015     09/15/2015    K 4.1 09/15/2015     09/15/2015    CREATININE 0.67 09/15/2015    BUN 16 09/15/2015    CO2 25 09/15/2015       Assessment:                            Active Hospital Problems    Diagnosis Date Noted    Cervicalgia [M54.2] 2018    Cervical radiculitis [M54.12] 2018                  Plan:   Proceed with planned procedure  - L S-I /piriformis,  L trochanteric bursa injection    The patient understands the planned operation and its associated risks and benefits and agrees to proceed. The surgical consent form has been signed. Last NSAID/anticoagulant medication use was:na    Premedication taken for contrast allergy? No    Valium taken for oral sedation?  No

## 2018-07-31 NOTE — OP NOTE
[address] JOINT INJECTION    7/31/18    Surgeon: Araceli Nash MD    Pre-operative Diagnosis:   Patient Active Problem List   Diagnosis Code    Lumbar degenerative disc disease M51.36    Lumbar canal stenosis M48.061    Lumbar discogenic pain syndrome M51.26    Encounter for long-term (current) use of other medications Z79.899    Degeneration of lumbar or lumbosacral intervertebral disc M51.37    Spinal stenosis, lumbar region, without neurogenic claudication M48.061    Thoracic or lumbosacral neuritis or radiculitis, unspecified [id number]    Greater trochanteric bursitis of both hips M70.61, M70.62    Sacroiliitis (HCC) M46.1    Chronic left sacroiliac joint pain M53.3, G89.29    Encounter for chronic pain management G89.29    Piriformis syndrome of left side G57.02       Post-operative Diagnosis: Same    Assistants: none    INDICATION::Please see H&P for details on previous treatments, examination findings, and work up. Left  sacroiliac joint injection with arthrography is requested for diagnostic reasons. Conservative treatment was ineffective i.e.: ice, NSAIDS, rest, narcotic medication, chiropractic care, physical therapy and message therapy. Patient is unable to perform the following ADL's: ambulating and grooming     Pain Assessment: 0-10  Pain Level: 5     Pain Orientation: Lower  Pain Location: Back       Last Plain films: 5/24/18    EXAMINATION: Left sacroiliac joint injection with arthrography. CONSENT:  Written consent was obtained from the patient on preprinted consent form after explaining the procedure, indications, potential complications and outcomes. Alternative treatments were also discussed. DISCUSSION:  The patient was sterilely prepped and draped in the usual fashion in the prone position.  Time out was verified for correct patient, side, level and procedure.     SEDATION:   No conscious sedation was performed during the procedure.  The patient remained awake and negative for provocation of the patient's pain symptoms. 3. Left piriformis muscle injection    RECOMMENDATIONS:  1. Complete and return Post-Procedure Pain and Activity Diary.   2. Contact the [address] for symptom exacerbation, fever or unusual symptoms. 3. Post-procedure care according to verbal and written discharge instructions    PELVIC FLUOROSCOPIC IMAGE INTERPRETATION    EXAMINATION:  AP and lateral views. FLUORO TIME: 38 seconds    DISCUSSION:  Spot views of the spine reveal normal alignment and segmentation. Spinal needles are positioned in the Left sacroiliac joint. Contrast outlines the joint space and reveals excellent contrast flow. Spinal needles are placed just anterior to the sacrum for the piriformis muscle injection. Contrast pattern is consistent with contrast in the muscle. Visualized spine reveals See radiology report. Soft tissues reveal no abnormalities.     IMPRESSION:  Left sacroiliac joint arthrogram  with satiafactory needle placement and contrast dispersal.     Electronically signed by Oscar Carrero MD on 7/31/2018 at 1:54 PM

## 2018-07-31 NOTE — H&P
montelukast (SINGULAIR) 10 MG tablet Take 10 mg by mouth nightly.  DULoxetine (CYMBALTA) 60 MG capsule Take 60 mg by mouth daily.  artificial tears (ARTIFICIAL TEARS) OINT as needed.  Saline 0.2 % SOLN by Nasal route daily as needed         Immune Globulin, Human, (HIZENTRA) 10 GM/50ML SOLN Inject into the skin Tuesdays        diphenhydrAMINE (BENADRYL) 25 MG capsule Take 25 mg by mouth every 6 hours as needed for Itching.  Ranitidine HCl (ZANTAC 75 PO) Take  by mouth.  baclofen (LIORESAL) 10 MG tablet Take 10 mg by mouth        Heating Pads (RADHA PAD/SMARTHEAT PRO/) PADS 1 Units by Does not apply route 4 times daily Use for 15 minutes to 30 minutes at a time four times a day.  1 each 0    topiramate (TOPAMAX) 25 MG tablet Take 1 tablet by mouth 2 times daily (Patient taking differently: Take 25 mg by mouth daily ) 60 tablet 0                        Facility-Administered Medications Prior to Visit   Medication Dose Route Frequency Provider Last Rate Last Dose    triamcinolone acetonide (KENALOG-40) injection 40 mg  40 mg Intra-articular Once Maura Blanco MD        lidocaine 2 % injection 5 mL  5 mL Intradermal Once Maura Blanco MD        iohexol (OMNIPAQUE 180) injection 3 mL  3 mL Other ONCE PRN Mak Gregg Barnes MD        bupivacaine (MARCAINE) 0.5 % injection 150 mg  30 mL Intra-articular Once Maura Blanco MD                Past Medical History           Past Medical History:   Diagnosis Date    Anesthesia complication       2nd post op day from total knee patient stated \"I was wild and mean\"    Anxiety and depression      Arthritis      ASD (atrial septal defect)       repair in [address] COPD [facility]chronic obstructive pulmonary disease) [facility]      COPD (chronic obstructive pulmonary disease) ([facility] 1980's    Disorder of immune system ([facility]       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 (total left knee 3/2015 at [facility])    History of renal stone       passed    Hx of blood clots      DVT    Hypertension      MVP (mitral valve prolapse)      LONI (obstructive sleep apnea)       uses bipap nightly    Rectocele      Spinal stenosis      Thyroid disease late      overactive radioactive iodine done            Past Surgical History             Past Surgical History:   Procedure Laterality Date    BACK SURGERY        BREAST LUMPECTOMY Left      BREAST LUMPECTOMY Left      CARDIAC SURGERY         ASD repair    CARDIAC VALVE SURGERY         SECTION   3846/7837     2x    CHOLECYSTECTOMY        CHOLECYSTECTOMY        COLONOSCOPY         three    DIAGNOSTIC CARDIAC CATH LAB PROCEDURE        DILATION AND CURETTAGE OF UTERUS       EYE SURGERY Bilateral       cataract    HYSTERECTOMY   2009    HYSTERECTOMY   2009     Total    JOINT REPLACEMENT Bilateral       knee    KNEE ARTHROSCOPY Left     PACEMAKER PLACEMENT   2014    ROTATOR CUFF REPAIR Right 2005 & 2010     2x         Family History             Family History   Problem Relation Age of Onset    Heart Disease Father           Social History   Social History                Social History    Marital status:        Spouse name: N/A    Number of children: N/A    Years of education: N/A              Occupational History    retired                    Social History Main Topics    Smoking status: Never Smoker    Smokeless tobacco: Never Used    Alcohol use Yes         Comment: very rare    Drug use: No    Sexual activity: Not Asked              Other Topics Concern    None            Social History Narrative    None               Family and Social History reviewed in the electronic medical record. Imaging: Reviewed available imaging in our system with the patient. No results found. Objective:      Physical Exam:  Vitals         Vitals:     07/12/18 1354   BP: 130/72   Site: Left Arm   Position: Sitting   Cuff Size: Medium Adult   Pulse: 60   Weight: 199 lb (90.3 kg)   Height: 5' 4\" (1.626 m)         Pain Score:   3     Physical Exam +Spurlings R neck   +facet R neck  R shoulder 70 abduction    Sensorimotor R  Arm intact   lumbar kemps mild +  SLR R mild +  Fabers mild +  Trochanteric bursa tender   Ortho Exam     Assessment: This is a 68 y.o. female patient with:     Diagnosis:     Diagnosis Orders   1. Cervical radiculitis      2. Chronic right shoulder pain      3. Complete tear of right rotator cuff      4. Sacroiliac joint pain      5. Piriformis syndrome of right side      6. Pacemaker      7. Greater trochanteric bursitis of both hips      8. Degeneration of lumbar or lumbosacral intervertebral disc      9. Spinal stenosis, lumbar region, without neurogenic claudication      10. Sacroiliitis (HCC)            Medical Comorbidities:  As listed in the patient's past medical and surgical history     Functional Limitations:   Pain limits function and quality of life. Plan:   Obtain Ct Neck , can schedule   R C5,6 TFe x2  Hold baby ASA 3 days  Acute symtoms could be pain effect,  Cannot rule out  CNS manifestations from Percocet 3-4  Per day  Recommended take max 2 per day have neck procedures  Consider R shoulder injections or re eval with Ortho  Can have eval for lumbar, as this is  Radicular pain and secondary S-I      Meds:   Controlled Substances Monitoring: Pt educated about the risks of taking opiates, including increased sedation, constipation, slowed breathing, tolerance, dependence, and addiction. New Prescriptions     No medications on file         Encounter Medications    No orders of the defined types were placed in this encounter.       No orders of the defined types were placed in this encounter. No Follow-up on file. The patient expressed understanding of the above assessment and plan. Total time spent face to face with patient was 25 minutes in which  50% or more of the time was spent in counseling, education about risk and benefits of the above plan, and coordination of care.

## 2018-08-16 ENCOUNTER — OFFICE VISIT (OUTPATIENT)
Dept: PAIN MANAGEMENT | Age: 78
End: 2018-08-16
Payer: MEDICARE

## 2018-08-16 VITALS
BODY MASS INDEX: 32.09 KG/M2 | SYSTOLIC BLOOD PRESSURE: 120 MMHG | HEART RATE: 62 BPM | DIASTOLIC BLOOD PRESSURE: 72 MMHG | HEIGHT: 65 IN | RESPIRATION RATE: 16 BRPM | OXYGEN SATURATION: 99 % | WEIGHT: 192.6 LBS

## 2018-08-16 DIAGNOSIS — M47.816 LUMBAR FACET JOINT SYNDROME: ICD-10-CM

## 2018-08-16 DIAGNOSIS — M48.061 SPINAL STENOSIS, LUMBAR REGION, WITHOUT NEUROGENIC CLAUDICATION: Chronic | ICD-10-CM

## 2018-08-16 DIAGNOSIS — M46.1 BILATERAL SACROILIITIS (HCC): Primary | ICD-10-CM

## 2018-08-16 DIAGNOSIS — M54.16 LUMBAR RADICULITIS: ICD-10-CM

## 2018-08-16 PROCEDURE — 4040F PNEUMOC VAC/ADMIN/RCVD: CPT | Performed by: PHYSICAL MEDICINE & REHABILITATION

## 2018-08-16 PROCEDURE — 99214 OFFICE O/P EST MOD 30 MIN: CPT | Performed by: PHYSICAL MEDICINE & REHABILITATION

## 2018-08-16 PROCEDURE — G8427 DOCREV CUR MEDS BY ELIG CLIN: HCPCS | Performed by: PHYSICAL MEDICINE & REHABILITATION

## 2018-08-16 PROCEDURE — 1036F TOBACCO NON-USER: CPT | Performed by: PHYSICAL MEDICINE & REHABILITATION

## 2018-08-16 PROCEDURE — G8400 PT W/DXA NO RESULTS DOC: HCPCS | Performed by: PHYSICAL MEDICINE & REHABILITATION

## 2018-08-16 PROCEDURE — G8417 CALC BMI ABV UP PARAM F/U: HCPCS | Performed by: PHYSICAL MEDICINE & REHABILITATION

## 2018-08-16 PROCEDURE — 1090F PRES/ABSN URINE INCON ASSESS: CPT | Performed by: PHYSICAL MEDICINE & REHABILITATION

## 2018-08-16 PROCEDURE — 1101F PT FALLS ASSESS-DOCD LE1/YR: CPT | Performed by: PHYSICAL MEDICINE & REHABILITATION

## 2018-08-16 PROCEDURE — 1123F ACP DISCUSS/DSCN MKR DOCD: CPT | Performed by: PHYSICAL MEDICINE & REHABILITATION

## 2018-08-16 ASSESSMENT — ENCOUNTER SYMPTOMS
GASTROINTESTINAL NEGATIVE: 1
ALLERGIC/IMMUNOLOGIC NEGATIVE: 1
RESPIRATORY NEGATIVE: 1
EYES NEGATIVE: 1

## 2018-08-16 NOTE — PROGRESS NOTES
PAIN MANAGEMENT FOLLOW-UP NOTE  8/16/18    CHIEF COMPLAINT: This is a 66 y.o. female patient who returns to the [facility] with a history of Back Pain (pt states that the injection did not help)      PAIN HPI: Kenyatta Wilson returns today for  reevaluation. Since the visit, the patient reports that the pain is not changed. Pain in Right neck is better post Cervical R C5,6 TFEs. Pain in Gluteal   Sacroiliac is no better with  S-I piriformis injection ANALGESIA:   Are your Current Pain medication (s) helping to decrease pain? Yes. Has to take OxyIR 10 mg BID ( found an old bottle  A few weeks ago, 1/2 po not helpful , had been on OxyIR 10 TID QID in past years and was helpful   Current Pnoted S-I joint injections helped two years ago  And only took QoxyIR 1 daily when these helped for  A few monthsain score: Pain Score:   6    ADVERSE AFFECTS:   Medication Side Effects: No.    ACTIVITY:  Are you able to be more active with your pain medications? No      ABERRANT BEHAVIORS SINCE LAST VISIT? No    Review of Systems   Constitutional: Negative. HENT: Negative. Eyes: Negative. Respiratory: Negative. Cardiovascular: Negative. Gastrointestinal: Negative. Endocrine: Negative. Genitourinary: Negative. Musculoskeletal: Negative. Allergic/Immunologic: Negative. Neurological: Negative. Hematological: Negative. Psychiatric/Behavioral: Negative.          Allergies   Allergen Reactions    Corgard [Nadolol] Other (See Comments)     Severe coughing and broke a rib as a result     Etodolac Anaphylaxis    Hydrocodone-Acetaminophen Other (See Comments)     \"Didn't like the way it made me feel\"    Loratadine Anaphylaxis    Quinidine Anaphylaxis    Sulfa Antibiotics Shortness Of Breath    Sulfamethoxazole-Trimethoprim Anaphylaxis    Ansaid [Flurbiprofen] Other (See Comments)     Light headed and difficult with vision \"seeing\"    Erythromycin Nausea Only    Gentamicin     patient on hizentra injection    GERD (gastroesophageal reflux disease)     H/O cardiac catheterization     no blockage    Heart palpitations     History of anesthesia complications     pt states she went \"nuts\" with last anes. ([facility])    History of renal stone     passed    Hx of blood clots     DVT    Hypertension     MVP (mitral valve prolapse)     LONI (obstructive sleep apnea)     uses bipap nightly    Rectocele     Spinal stenosis     Thyroid disease late     overactive radioactive iodine done       Past Surgical History:   Procedure Laterality Date    BACK SURGERY      BREAST LUMPECTOMY Left     BREAST LUMPECTOMY Left     CARDIAC SURGERY      ASD repair    CARDIAC VALVE SURGERY       SECTION  0293/5616    2x    CHOLECYSTECTOMY      CHOLECYSTECTOMY      COLONOSCOPY      three    DIAGNOSTIC CARDIAC CATH LAB PROCEDURE      DILATION AND CURETTAGE OF UTERUS      EYE SURGERY Bilateral     cataract    HC INJECTION PROCEDURE FOR SACROILIAC JOINT Left 2018    Left SIJ and piriformis, left trocanteric bursa injection performed by Troy May MD at [address]  2009    HYSTERECTOMY  2009    Total    JOINT REPLACEMENT Bilateral     knee    KNEE ARTHROSCOPY Left     PACEMAKER PLACEMENT  2014    [place] INJECT ANES/STEROID FORAMEN CERV/THORACIC W IMG GUIDE ,EA ADD LEVEL Right 2018    Right C5 C6 TRANSFORAMINAL performed by Troy May MD at [facility] Right 2005 & 2010    2x     Family History   Problem Relation Age of Onset    Heart Disease Father      Social History     Social History    Marital status:      Spouse name: N/A    Number of children: N/A    Years of education: N/A     Occupational History    retired      Social History Main Topics    Smoking status: Never Smoker    Smokeless tobacco: Never Used    Alcohol use Yes      Comment: very rare

## 2018-08-30 ENCOUNTER — ANESTHESIA EVENT (OUTPATIENT)
Dept: OPERATING ROOM | Age: 78
End: 2018-08-30
Payer: MEDICARE

## 2018-08-30 ENCOUNTER — ANESTHESIA (OUTPATIENT)
Dept: OPERATING ROOM | Age: 78
End: 2018-08-30
Payer: MEDICARE

## 2018-08-30 ENCOUNTER — HOSPITAL ENCOUNTER (OUTPATIENT)
Age: 78
Setting detail: OUTPATIENT SURGERY
Discharge: HOME | End: 2018-08-30
Attending: PHYSICAL MEDICINE & REHABILITATION | Admitting: PHYSICAL MEDICINE & REHABILITATION
Payer: MEDICARE

## 2018-08-30 ENCOUNTER — APPOINTMENT (OUTPATIENT)
Dept: GENERAL RADIOLOGY | Age: 78
End: 2018-08-30
Attending: PHYSICAL MEDICINE & REHABILITATION
Payer: MEDICARE

## 2018-08-30 VITALS — DIASTOLIC BLOOD PRESSURE: 65 MMHG | OXYGEN SATURATION: 97 % | SYSTOLIC BLOOD PRESSURE: 140 MMHG

## 2018-08-30 VITALS
HEART RATE: 61 BPM | BODY MASS INDEX: 33.12 KG/M2 | HEIGHT: 64 IN | DIASTOLIC BLOOD PRESSURE: 63 MMHG | RESPIRATION RATE: 16 BRPM | WEIGHT: 194 LBS | SYSTOLIC BLOOD PRESSURE: 126 MMHG | OXYGEN SATURATION: 95 % | TEMPERATURE: 97.8 F

## 2018-08-30 PROBLEM — M47.816 LUMBAR FACET JOINT SYNDROME: Status: ACTIVE | Noted: 2018-08-30

## 2018-08-30 PROCEDURE — 6360000004 HC RX CONTRAST MEDICATION: Performed by: PHYSICAL MEDICINE & REHABILITATION

## 2018-08-30 PROCEDURE — 64495 INJ PARAVERT F JNT L/S 3 LEV: CPT | Performed by: PHYSICAL MEDICINE & REHABILITATION

## 2018-08-30 PROCEDURE — 64494 INJ PARAVERT F JNT L/S 2 LEV: CPT | Performed by: PHYSICAL MEDICINE & REHABILITATION

## 2018-08-30 PROCEDURE — 3209999900 FLUORO FOR SURGICAL PROCEDURES

## 2018-08-30 PROCEDURE — 3700000000 HC ANESTHESIA ATTENDED CARE: Performed by: PHYSICAL MEDICINE & REHABILITATION

## 2018-08-30 PROCEDURE — 6360000002 HC RX W HCPCS

## 2018-08-30 PROCEDURE — 3600000056 HC PAIN LEVEL 4 BASE: Performed by: PHYSICAL MEDICINE & REHABILITATION

## 2018-08-30 PROCEDURE — 7100000010 HC PHASE II RECOVERY - FIRST 15 MIN: Performed by: PHYSICAL MEDICINE & REHABILITATION

## 2018-08-30 PROCEDURE — 2500000003 HC RX 250 WO HCPCS: Performed by: NURSE ANESTHETIST, CERTIFIED REGISTERED

## 2018-08-30 PROCEDURE — 7100000011 HC PHASE II RECOVERY - ADDTL 15 MIN: Performed by: PHYSICAL MEDICINE & REHABILITATION

## 2018-08-30 PROCEDURE — 2580000003 HC RX 258: Performed by: NURSE ANESTHETIST, CERTIFIED REGISTERED

## 2018-08-30 PROCEDURE — 2709999900 HC NON-CHARGEABLE SUPPLY: Performed by: PHYSICAL MEDICINE & REHABILITATION

## 2018-08-30 PROCEDURE — 01992 ANES DX/THER NRV BLK&INJ PRN: CPT | Performed by: NURSE ANESTHETIST, CERTIFIED REGISTERED

## 2018-08-30 PROCEDURE — 6360000002 HC RX W HCPCS: Performed by: NURSE ANESTHETIST, CERTIFIED REGISTERED

## 2018-08-30 PROCEDURE — 64493 INJ PARAVERT F JNT L/S 1 LEV: CPT | Performed by: PHYSICAL MEDICINE & REHABILITATION

## 2018-08-30 PROCEDURE — 2500000003 HC RX 250 WO HCPCS: Performed by: PHYSICAL MEDICINE & REHABILITATION

## 2018-08-30 PROCEDURE — 2580000003 HC RX 258: Performed by: PHYSICAL MEDICINE & REHABILITATION

## 2018-08-30 RX ORDER — PROPOFOL 10 MG/ML
INJECTION, EMULSION INTRAVENOUS PRN
Status: DISCONTINUED | OUTPATIENT
Start: 2018-08-30 | End: 2018-08-30 | Stop reason: SDUPTHER

## 2018-08-30 RX ORDER — LIDOCAINE HYDROCHLORIDE 20 MG/ML
INJECTION, SOLUTION INFILTRATION; PERINEURAL PRN
Status: DISCONTINUED | OUTPATIENT
Start: 2018-08-30 | End: 2018-08-30 | Stop reason: SDUPTHER

## 2018-08-30 RX ORDER — SODIUM CHLORIDE 0.9 % (FLUSH) 0.9 %
10 SYRINGE (ML) INJECTION EVERY 12 HOURS SCHEDULED
Status: CANCELLED | OUTPATIENT
Start: 2018-08-30

## 2018-08-30 RX ORDER — SODIUM CHLORIDE 0.9 % (FLUSH) 0.9 %
10 SYRINGE (ML) INJECTION EVERY 12 HOURS SCHEDULED
Status: DISCONTINUED | OUTPATIENT
Start: 2018-08-30 | End: 2018-08-30 | Stop reason: HOSPADM

## 2018-08-30 RX ORDER — SODIUM CHLORIDE, SODIUM LACTATE, POTASSIUM CHLORIDE, CALCIUM CHLORIDE 600; 310; 30; 20 MG/100ML; MG/100ML; MG/100ML; MG/100ML
INJECTION, SOLUTION INTRAVENOUS CONTINUOUS PRN
Status: DISCONTINUED | OUTPATIENT
Start: 2018-08-30 | End: 2018-08-30 | Stop reason: SDUPTHER

## 2018-08-30 RX ORDER — SODIUM CHLORIDE 0.9 % (FLUSH) 0.9 %
10 SYRINGE (ML) INJECTION PRN
Status: DISCONTINUED | OUTPATIENT
Start: 2018-08-30 | End: 2018-08-30 | Stop reason: HOSPADM

## 2018-08-30 RX ORDER — SODIUM CHLORIDE, SODIUM LACTATE, POTASSIUM CHLORIDE, CALCIUM CHLORIDE 600; 310; 30; 20 MG/100ML; MG/100ML; MG/100ML; MG/100ML
INJECTION, SOLUTION INTRAVENOUS CONTINUOUS
Status: DISCONTINUED | OUTPATIENT
Start: 2018-08-30 | End: 2018-08-30 | Stop reason: HOSPADM

## 2018-08-30 RX ORDER — SODIUM CHLORIDE 0.9 % (FLUSH) 0.9 %
10 SYRINGE (ML) INJECTION PRN
Status: CANCELLED | OUTPATIENT
Start: 2018-08-30

## 2018-08-30 RX ORDER — LIDOCAINE HYDROCHLORIDE 20 MG/ML
INJECTION, SOLUTION EPIDURAL; INFILTRATION; INTRACAUDAL; PERINEURAL PRN
Status: DISCONTINUED | OUTPATIENT
Start: 2018-08-30 | End: 2018-08-30 | Stop reason: HOSPADM

## 2018-08-30 RX ADMIN — PROPOFOL 100 MG: 10 INJECTION, EMULSION INTRAVENOUS at 09:14

## 2018-08-30 RX ADMIN — LIDOCAINE HYDROCHLORIDE 40 MG: 20 INJECTION, SOLUTION INFILTRATION; PERINEURAL at 09:14

## 2018-08-30 RX ADMIN — SODIUM CHLORIDE, POTASSIUM CHLORIDE, SODIUM LACTATE AND CALCIUM CHLORIDE: 600; 310; 30; 20 INJECTION, SOLUTION INTRAVENOUS at 09:10

## 2018-08-30 RX ADMIN — SODIUM CHLORIDE, POTASSIUM CHLORIDE, SODIUM LACTATE AND CALCIUM CHLORIDE: 600; 310; 30; 20 INJECTION, SOLUTION INTRAVENOUS at 09:04

## 2018-08-30 ASSESSMENT — PAIN SCALES - GENERAL
PAINLEVEL_OUTOF10: 0

## 2018-08-30 ASSESSMENT — PULMONARY FUNCTION TESTS
PIF_VALUE: 0

## 2018-08-30 ASSESSMENT — PAIN - FUNCTIONAL ASSESSMENT: PAIN_FUNCTIONAL_ASSESSMENT: 0-10

## 2018-08-30 NOTE — H&P
ASD (atrial septal defect)       repair in  [address] COPD (chronic obstructive pulmonary disease) ([facility]      COPD (chronic obstructive pulmonary disease) ([facility]     Disorder of immune system ([facility]       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 ([facility] 3[facility])    History of renal stone       passed    Hx of blood clots      DVT    Hypertension      MVP (mitral valve prolapse)      LONI (obstructive sleep apnea)       uses bipap nightly    Rectocele      Spinal stenosis      Thyroid disease late      overactive radioactive iodine done            Past Surgical History         Past Surgical History:   Procedure Laterality Date    BACK SURGERY        BREAST LUMPECTOMY Left      BREAST LUMPECTOMY Left      CARDIAC SURGERY         ASD repair    CARDIAC VALVE SURGERY         SECTION   [id number]     2x    CHOLECYSTECTOMY        CHOLECYSTECTOMY        COLONOSCOPY         three    DIAGNOSTIC CARDIAC CATH LAB PROCEDURE        DILATION AND CURETTAGE OF UTERUS       EYE SURGERY Bilateral       cataract    HC INJECTION PROCEDURE FOR SACROILIAC JOINT Left 2018     Left SIJ and piriformis, left trocanteric bursa injection performed [facility]   2009    HYSTERECTOMY   2009     Total    JOINT REPLACEMENT Bilateral       knee    KNEE ARTHROSCOPY Left     PACEMAKER PLACEMENT   2014    [place] INJECT ANES/STEROID FORAMEN CERV/THORACIC W IMG GUIDE ,EA ADD LEVEL Right 2018     Right C5 C6 TRANSFORAMINAL performed by Steve Braga MD at [address] Right 2005 & 2010     2x         Family History         Family History   Problem Relation Age of Onset

## 2018-08-30 NOTE — ANESTHESIA PRE PROCEDURE
albuterol (ACCUNEB) 1.25 MG/3ML nebulizer solution Inhale 1 ampule into the lungs every 6 hours as needed for Wheezing   Yes Historical Provider, MD   metoprolol (LOPRESSOR) 25 MG tablet Take 50 mg by mouth 2 times daily    Yes Historical Provider, MD   simvastatin (ZOCOR) 40 MG tablet Take 40 mg by mouth nightly   Yes Historical Provider, MD   flecainide (TAMBOCOR) 50 MG tablet Take 50 mg by mouth 2 times daily. Yes Historical Provider, MD   omeprazole (PRILOSEC) 40 MG capsule Take 40 mg by mouth nightly. Yes Historical Provider, MD   montelukast (SINGULAIR) 10 MG tablet Take 10 mg by mouth nightly. Yes Historical Provider, MD   DULoxetine (CYMBALTA) 60 MG capsule Take 60 mg by mouth daily. Yes Historical Provider, MD   Immune Globulin, Human, (HIZENTRA) 10 GM/50ML SOLN Inject into the skin Tuesdays   Yes Historical Provider, MD   diphenhydrAMINE (BENADRYL) 25 MG capsule Take 25 mg by mouth every 6 hours as needed for Itching. Yes Historical Provider, MD   Ranitidine HCl (ZANTAC 75 PO) Take  by mouth. Yes Historical Provider, MD   baclofen (LIORESAL) 10 MG tablet Take 10 mg by mouth 5/30/18 5/30/19  Historical Provider, MD   dicyclomine (BENTYL) 10 MG capsule dicyclomine 10 mg capsule   Take 1 capsule 4 times a day by oral route for 10 days. Historical Provider, MD   Heating Pads (RADHA PAD/SMARTHEAT PRO/) PADS 1 Units by Does not apply route 4 times daily Use for 15 minutes to 30 minutes at a time four times a day. 5/15/18 5/15/19  Krys Stafford MD   artificial tears (ARTIFICIAL TEARS) OINT as needed. Historical Provider, MD   Saline 0.2 % SOLN by Nasal route daily as needed     Historical Provider, MD       Current medications:    No current facility-administered medications for this encounter. Allergies:     Allergies   Allergen Reactions    Corgard [Nadolol] Other (See Comments)     Severe coughing and broke a rib as a result     Etodolac Anaphylaxis    Hydrocodone-Acetaminophen Other (See Comments)     \"Didn't like the way it made me feel\"    Loratadine Anaphylaxis    Quinidine Anaphylaxis    Sulfa Antibiotics Shortness Of Breath    Sulfamethoxazole-Trimethoprim Anaphylaxis    Ansaid [Flurbiprofen] Other (See Comments)     Light headed and difficult with vision \"seeing\"    Erythromycin Nausea Only    Gentamicin     Inderal [Propranolol] Other (See Comments)     Severe cough    Lisinopril Itching    Mirapex [Pramipexole Dihydrochloride]     Mobic [Meloxicam] Nausea Only    Nsaids Other (See Comments)     lightheaded    Reglan [Metoclopramide] Other (See Comments)     Hyperactive and stomach pain    Trazodone     Ultram [Tramadol] Itching    Garamycin [Gentamicin Sulfate] Other (See Comments)     Redness and irritation       Problem List:    Patient Active Problem List   Diagnosis Code    Lumbar degenerative disc disease M51.36    Lumbar canal stenosis M48.061    Lumbar discogenic pain syndrome M51.26    Encounter for long-term (current) use of other medications Z79.899    Degeneration of lumbar or lumbosacral intervertebral disc M51.37    Spinal stenosis, lumbar region, without neurogenic claudication M48.061    Thoracic or lumbosacral neuritis or radiculitis, unspecified [id number]    Greater trochanteric bursitis of both hips M70.61, M70.62    Sacroiliitis ([facility]) M46.1    Chronic left sacroiliac joint pain M53.3, G89.29    Encounter for chronic pain management G89.29    Piriformis syndrome of left side G57.02    Lumbar facet joint syndrome (HCC) M46.96       Past Medical History:        Diagnosis Date    Anesthesia complication     2nd post op day from total knee patient stated \"I was wild and mean\"    Anxiety and depression     Arthritis     ASD (atrial septal defect)     repair in 1963    Asthma     [facility]     COPD (chronic obstructive pulmonary disease) ([facility]) 1980's    Disorder of immune system ([facility] [address] [facility]     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 ([facility] 3/2015 [facility])    History of renal stone     passed    Hx of blood clots     DVT    Hypertension     MVP (mitral valve prolapse)     LONI (obstructive sleep apnea)     uses bipap nightly    Rectocele     Spinal stenosis     Thyroid disease late     overactive radioactive iodine done       Past Surgical History:        Procedure Laterality Date    BACK SURGERY      BREAST LUMPECTOMY Left     BREAST LUMPECTOMY Left     CARDIAC SURGERY      ASD repair    CARDIAC VALVE SURGERY       SECTION  6715/3781    2x    CHOLECYSTECTOMY      CHOLECYSTECTOMY      COLONOSCOPY      three    DIAGNOSTIC CARDIAC CATH LAB PROCEDURE      DILATION AND CURETTAGE OF UTERUS      EYE SURGERY Bilateral     cataract    HC INJECTION PROCEDURE FOR SACROILIAC JOINT Left 2018    Left SIJ and piriformis, left trocanteric bursa injection performed by Michael Ramirez MD at [address]  2009    HYSTERECTOMY  2009    Total    JOINT REPLACEMENT Bilateral     knee    KNEE ARTHROSCOPY Left     PACEMAKER PLACEMENT  2014    [place] INJECT ANES/STEROID FORAMEN CERV/THORACIC W IMG GUIDE ,EA ADD LEVEL Right 2018    Right C5 C6 TRANSFORAMINAL performed by Michael Ramirez MD at [address] Right 2005 & 2010    2x       Social History:    Social History   Substance Use Topics    Smoking status: Never Smoker    Smokeless tobacco: Never Used    Alcohol use Yes      Comment: very rare                                Counseling given: Not Answered      Vital Signs (Current):   Vitals:    18 0847   BP: 126/60   Pulse: 60   Resp: 16   Temp: 36.6 °C (97.8 °F) TempSrc: Oral   SpO2: 98%   Weight: 194 lb (88 kg)   Height: 5' 4\" (1.626 m)                                              BP Readings from Last 3 Encounters:   08/30/18 126/60   08/16/18 120/72   07/31/18 133/63       NPO Status: Time of last liquid consumption: 2200 (am meds at 7am)                        Time of last solid consumption: 2200                        Date of last liquid consumption: 08/29/18                        Date of last solid food consumption: 08/29/18    BMI:   Wt Readings from Last 3 Encounters:   08/30/18 194 lb (88 kg)   08/16/18 192 lb 9.6 oz (87.4 kg)   07/31/18 194 lb 4 oz (88.1 kg)     Body mass index is 33.3 kg/m². CBC:   Lab Results   Component Value Date    WBC 9.1 09/15/2015    RBC 4.79 09/15/2015    HGB 12.2 09/15/2015    HCT 38.4 09/15/2015    MCV 80.2 09/15/2015    RDW 16.5 09/15/2015     09/15/2015       CMP:   Lab Results   Component Value Date     09/15/2015    K 4.1 09/15/2015     09/15/2015    CO2 25 09/15/2015    BUN 16 09/15/2015    CREATININE 0.67 09/15/2015    GFRAA >60 09/15/2015    LABGLOM >60 09/15/2015       POC Tests: No results for input(s): POCGLU, POCNA, POCK, POCCL, POCBUN, POCHEMO, POCHCT in the last 72 hours.     Coags: No results found for: PROTIME, INR, APTT    HCG (If Applicable): No results found for: PREGTESTUR, PREGSERUM, HCG, HCGQUANT     ABGs: No results found for: PHART, PO2ART, PPH4HJY, WMJ2DFV, BEART, M9EJXMTA     Type & Screen (If Applicable):  No results found for: LABABO, [address]    Anesthesia Evaluation  Patient summary reviewed and Nursing notes reviewed  Airway: Mallampati: II  TM distance: >3 FB   Neck ROM: full  Mouth opening: > = 3 FB Dental: normal exam         Pulmonary:Negative Pulmonary ROS and normal exam    (+) COPD:  sleep apnea: on CPAP,  asthma: exercise-induced asthma, allergic asthma, seasonal asthma,                            Cardiovascular:    (+) hypertension:,       ECG reviewed

## 2018-08-30 NOTE — ANESTHESIA POSTPROCEDURE EVALUATION
Department of Anesthesiology  Postprocedure Note    Patient: Karlo Dan  MRN: [id number]  YOB: 1940  Date of evaluation: 8/30/2018  Time:  9:30 AM     Procedure Summary     Date:  08/30/18 Room / Location:  [address] [place], [place] [id number] [facility]    Anesthesia Start:  0910 Anesthesia Stop:  0930    Procedure:  Bilateral L2 L3 L4 L5 Diagnostic Medial BB (Bilateral ) Diagnosis:  (panniculitis facet syndrom )    Surgeon:  Sadaf Mendoza MD Responsible Provider:  KENY Goldstein CRNA    Anesthesia Type:  MAC ASA Status:  4          Anesthesia Type: MAC    Felisha Phase I: Felisha Score: 10    Felisha Phase II:      Last vitals: Reviewed and per EMR flowsheets.        Anesthesia Post Evaluation    Patient location during evaluation: PACU  Patient participation: complete - patient participated  Level of consciousness: sleepy but conscious  Pain score: 0  Airway patency: patent  Nausea & Vomiting: no nausea and no vomiting  Complications: no  Cardiovascular status: blood pressure returned to baseline and hemodynamically stable  Respiratory status: acceptable, spontaneous ventilation and room air  Hydration status: euvolemic

## 2018-09-13 ENCOUNTER — APPOINTMENT (OUTPATIENT)
Dept: GENERAL RADIOLOGY | Age: 78
End: 2018-09-13
Attending: PHYSICAL MEDICINE & REHABILITATION
Payer: MEDICARE

## 2018-09-13 ENCOUNTER — HOSPITAL ENCOUNTER (OUTPATIENT)
Age: 78
Setting detail: OUTPATIENT SURGERY
Discharge: HOME | End: 2018-09-13
Attending: PHYSICAL MEDICINE & REHABILITATION | Admitting: PHYSICAL MEDICINE & REHABILITATION
Payer: MEDICARE

## 2018-09-13 ENCOUNTER — ANESTHESIA EVENT (OUTPATIENT)
Dept: OPERATING ROOM | Age: 78
End: 2018-09-13
Payer: MEDICARE

## 2018-09-13 ENCOUNTER — ANESTHESIA (OUTPATIENT)
Dept: OPERATING ROOM | Age: 78
End: 2018-09-13
Payer: MEDICARE

## 2018-09-13 VITALS
HEIGHT: 64 IN | OXYGEN SATURATION: 97 % | BODY MASS INDEX: 32.78 KG/M2 | WEIGHT: 192 LBS | HEART RATE: 60 BPM | TEMPERATURE: 97.7 F | SYSTOLIC BLOOD PRESSURE: 138 MMHG | DIASTOLIC BLOOD PRESSURE: 60 MMHG | RESPIRATION RATE: 16 BRPM

## 2018-09-13 VITALS — SYSTOLIC BLOOD PRESSURE: 138 MMHG | OXYGEN SATURATION: 73 % | DIASTOLIC BLOOD PRESSURE: 69 MMHG

## 2018-09-13 PROCEDURE — 6360000002 HC RX W HCPCS

## 2018-09-13 PROCEDURE — 3600000056 HC PAIN LEVEL 4 BASE: Performed by: PHYSICAL MEDICINE & REHABILITATION

## 2018-09-13 PROCEDURE — 3209999900 FLUORO FOR SURGICAL PROCEDURES

## 2018-09-13 PROCEDURE — 3600000057 HC PAIN LEVEL 4 ADDL 15 MIN: Performed by: PHYSICAL MEDICINE & REHABILITATION

## 2018-09-13 PROCEDURE — 2500000003 HC RX 250 WO HCPCS

## 2018-09-13 PROCEDURE — 2709999900 HC NON-CHARGEABLE SUPPLY: Performed by: PHYSICAL MEDICINE & REHABILITATION

## 2018-09-13 PROCEDURE — 6360000004 HC RX CONTRAST MEDICATION: Performed by: PHYSICAL MEDICINE & REHABILITATION

## 2018-09-13 PROCEDURE — 2580000003 HC RX 258: Performed by: PHYSICAL MEDICINE & REHABILITATION

## 2018-09-13 PROCEDURE — 7100000011 HC PHASE II RECOVERY - ADDTL 15 MIN: Performed by: PHYSICAL MEDICINE & REHABILITATION

## 2018-09-13 PROCEDURE — 3700000000 HC ANESTHESIA ATTENDED CARE: Performed by: PHYSICAL MEDICINE & REHABILITATION

## 2018-09-13 PROCEDURE — 01992 ANES DX/THER NRV BLK&INJ PRN: CPT | Performed by: NURSE ANESTHETIST, CERTIFIED REGISTERED

## 2018-09-13 PROCEDURE — 64493 INJ PARAVERT F JNT L/S 1 LEV: CPT | Performed by: PHYSICAL MEDICINE & REHABILITATION

## 2018-09-13 PROCEDURE — 64494 INJ PARAVERT F JNT L/S 2 LEV: CPT | Performed by: PHYSICAL MEDICINE & REHABILITATION

## 2018-09-13 PROCEDURE — 3700000001 HC ADD 15 MINUTES (ANESTHESIA): Performed by: PHYSICAL MEDICINE & REHABILITATION

## 2018-09-13 PROCEDURE — 6360000002 HC RX W HCPCS: Performed by: NURSE ANESTHETIST, CERTIFIED REGISTERED

## 2018-09-13 PROCEDURE — 7100000010 HC PHASE II RECOVERY - FIRST 15 MIN: Performed by: PHYSICAL MEDICINE & REHABILITATION

## 2018-09-13 PROCEDURE — 2500000003 HC RX 250 WO HCPCS: Performed by: PHYSICAL MEDICINE & REHABILITATION

## 2018-09-13 PROCEDURE — 64495 INJ PARAVERT F JNT L/S 3 LEV: CPT | Performed by: PHYSICAL MEDICINE & REHABILITATION

## 2018-09-13 RX ORDER — SODIUM CHLORIDE 0.9 % (FLUSH) 0.9 %
10 SYRINGE (ML) INJECTION EVERY 12 HOURS SCHEDULED
Status: DISCONTINUED | OUTPATIENT
Start: 2018-09-13 | End: 2018-09-13 | Stop reason: HOSPADM

## 2018-09-13 RX ORDER — BUPIVACAINE HYDROCHLORIDE 7.5 MG/ML
INJECTION, SOLUTION EPIDURAL; RETROBULBAR PRN
Status: DISCONTINUED | OUTPATIENT
Start: 2018-09-13 | End: 2018-09-13 | Stop reason: HOSPADM

## 2018-09-13 RX ORDER — SODIUM CHLORIDE, SODIUM LACTATE, POTASSIUM CHLORIDE, CALCIUM CHLORIDE 600; 310; 30; 20 MG/100ML; MG/100ML; MG/100ML; MG/100ML
INJECTION, SOLUTION INTRAVENOUS CONTINUOUS
Status: DISCONTINUED | OUTPATIENT
Start: 2018-09-13 | End: 2018-09-13 | Stop reason: HOSPADM

## 2018-09-13 RX ORDER — PROPOFOL 10 MG/ML
INJECTION, EMULSION INTRAVENOUS PRN
Status: DISCONTINUED | OUTPATIENT
Start: 2018-09-13 | End: 2018-09-13 | Stop reason: SDUPTHER

## 2018-09-13 RX ORDER — SODIUM CHLORIDE 0.9 % (FLUSH) 0.9 %
10 SYRINGE (ML) INJECTION PRN
Status: DISCONTINUED | OUTPATIENT
Start: 2018-09-13 | End: 2018-09-13 | Stop reason: HOSPADM

## 2018-09-13 RX ADMIN — SODIUM CHLORIDE, SODIUM LACTATE, POTASSIUM CHLORIDE, AND CALCIUM CHLORIDE: .6; .31; .03; .02 INJECTION, SOLUTION INTRAVENOUS at 09:31

## 2018-09-13 RX ADMIN — PROPOFOL 310 MG: 10 INJECTION, EMULSION INTRAVENOUS at 09:53

## 2018-09-13 ASSESSMENT — PULMONARY FUNCTION TESTS
PIF_VALUE: 0

## 2018-09-13 ASSESSMENT — PAIN - FUNCTIONAL ASSESSMENT: PAIN_FUNCTIONAL_ASSESSMENT: 0-10

## 2018-09-13 ASSESSMENT — PAIN SCALES - GENERAL: PAINLEVEL_OUTOF10: 0

## 2018-09-13 NOTE — INTERVAL H&P NOTE
I have interviewed and examined the patient and reviewed the recent History and Physical.  There have been no changes to the recent H&P documentation. The surgical consent form has been signed. Last anticoagulant medication use was:na    Premedication taken for contrast allergy? no    Valium taken for oral sedation? No    Outpatient Prescriptions Marked as Taking for the 9/13/18 encounter [facility] Encounter)   Medication Sig Dispense Refill    aspirin 81 MG tablet Take 81 mg by mouth daily      levothyroxine (SYNTHROID) 75 MCG tablet Take 75 mcg by mouth daily      baclofen (LIORESAL) 10 MG tablet Take 10 mg by mouth      dicyclomine (BENTYL) 10 MG capsule dicyclomine 10 mg capsule   Take 1 capsule 4 times a day by oral route for 10 days.  potassium chloride (KLOR-CON M) 20 MEQ extended release tablet TAKE ONE TABLET ONCE A DAY      budesonide-formoterol (SYMBICORT) 160-4.5 MCG/ACT AERO Inhale 2 puffs into the lungs 2 times daily      furosemide (LASIX) 40 MG tablet Take 40 mg by mouth 3 times daily      aluminum & magnesium hydroxide-simethicone (MAALOX) 200-200-20 MG/5ML SUSP suspension Take 5 mLs by mouth every 6 hours as needed for Indigestion      albuterol (ACCUNEB) 1.25 MG/3ML nebulizer solution Inhale 1 ampule into the lungs every 6 hours as needed for Wheezing      metoprolol (LOPRESSOR) 25 MG tablet Take 50 mg by mouth 2 times daily       simvastatin (ZOCOR) 40 MG tablet Take 40 mg by mouth nightly      flecainide (TAMBOCOR) 50 MG tablet Take 50 mg by mouth 2 times daily.  omeprazole (PRILOSEC) 40 MG capsule Take 40 mg by mouth nightly.  montelukast (SINGULAIR) 10 MG tablet Take 10 mg by mouth nightly.  DULoxetine (CYMBALTA) 60 MG capsule Take 60 mg by mouth daily.  artificial tears (ARTIFICIAL TEARS) OINT as needed.  Ranitidine HCl (ZANTAC 75 PO) Take  by mouth.          The patient understands the planned operation and its associated risks and benefits and

## 2018-09-13 NOTE — ANESTHESIA PRE PROCEDURE
Department of Anesthesiology  Preprocedure Note       Name:  Magdiel Santos   Age:  66 y.o.  :  1940                                          MRN:  [id number]         Date:  2018      Surgeon: Odalys Castro):  Momo Melgoza MD    Procedure: Procedure(s):  Bilateral L2 L3 L4 L5 Confirmatory Medial BB    Medications prior to admission:   Prior to Admission medications    Medication Sig Start Date End Date Taking? Authorizing Provider   aspirin 81 MG tablet Take 81 mg by mouth daily    Historical Provider, MD   levothyroxine (SYNTHROID) 75 MCG tablet Take 75 mcg by mouth daily 18  Historical Provider, MD   baclofen (LIORESAL) 10 MG tablet Take 10 mg by mouth 18  Historical Provider, MD   dicyclomine (BENTYL) 10 MG capsule dicyclomine 10 mg capsule   Take 1 capsule 4 times a day by oral route for 10 days. Historical Provider, MD   potassium chloride (KLOR-CON M) 20 MEQ extended release tablet TAKE ONE TABLET ONCE A DAY 18   Historical Provider, MD   guaiFENesin (MUCINEX) 600 MG extended release tablet Mucinex 600 mg tablet, extended release   Take 2 tablets every day by oral route. Historical Provider, MD   oxyCODONE HCl (OXY-IR) 10 MG immediate release tablet Take 1 tablet by mouth every 6 hours as needed for Pain for up to 30 days. Bonnie Roberson Date: 18  Patricia Anderson MD   Heating Pads (RADHA PAD/SMARTHEAT PRO/) PADS 1 Units by Does not apply route 4 times daily Use for 15 minutes to 30 minutes at a time four times a day.  5/15/18 5/15/19  Krys Bejarano MD   topiramate (TOPAMAX) 25 MG tablet Take 1 tablet by mouth 2 times daily  Patient taking differently: Take 25 mg by mouth daily  3/5/18 8/30/18  Patricia Anderson MD   budesonide-formoterol (SYMBICORT) 160-4.5 MCG/ACT AERO Inhale 2 puffs into the lungs 2 times daily    Historical Provider, MD   lidocaine (ASPERCREME W/LIDOCAINE) 4 % cream Apply topically as needed for Pain Allergies:     Allergies   Allergen Reactions    Corgard [Nadolol] Other (See Comments)     Severe coughing and broke a rib as a result     Etodolac Anaphylaxis    Hydrocodone-Acetaminophen Other (See Comments)     \"Didn't like the way it made me feel\"    Loratadine Anaphylaxis    Quinidine Anaphylaxis    Sulfa Antibiotics Shortness Of Breath    Sulfamethoxazole-Trimethoprim Anaphylaxis    Ansaid [Flurbiprofen] Other (See Comments)     Light headed and difficult with vision \"seeing\"    Erythromycin Nausea Only    Gentamicin     Inderal [Propranolol] Other (See Comments)     Severe cough    Lisinopril Itching    Mirapex [Pramipexole Dihydrochloride]     Mobic [Meloxicam] Nausea Only    Nsaids Other (See Comments)     lightheaded    Reglan [Metoclopramide] Other (See Comments)     Hyperactive and stomach pain    Trazodone     Ultram [Tramadol] Itching    Garamycin [Gentamicin Sulfate] Other (See Comments)     Redness and irritation       Problem List:    Patient Active Problem List   Diagnosis Code    Lumbar degenerative disc disease M51.36    Lumbar canal stenosis M48.061    Lumbar discogenic pain syndrome M51.26    Encounter for long-term (current) use of other medications Z79.899    Degeneration of lumbar or lumbosacral intervertebral disc M51.37    Spinal stenosis, lumbar region, without neurogenic claudication M48.061    Thoracic or lumbosacral neuritis or radiculitis, unspecified [id number]    Greater trochanteric bursitis of both hips M70.61, M70.62    Sacroiliitis (HCC) M46.1    Chronic left sacroiliac joint pain M53.3, G89.29    Encounter for chronic pain management G89.29    Piriformis syndrome of left side G57.02    Lumbar facet joint syndrome (HCC) M46.96       Past Medical History:        Diagnosis Date    Anesthesia complication     2nd post op day from total knee patient stated \"I was wild and mean\"    Anxiety and depression     Arthritis     ASD (atrial septal defect)     repair in [address] COPD [facility]ch[facility] obstructive pulmonary disease) [facility] [address]     COPD (chronic obstructive pulmonary disease) ([facility]     Disorder of immune system ([facility] [address]     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 ([facility])    History of renal stone     passed    Hx of blood clots     DVT    Hypertension     MVP (mitral valve prolapse)     LONI (obstructive sleep apnea)     uses bipap nightly    Rectocele     Spinal stenosis     Thyroid disease late     overactive radioactive iodine done       Past Surgical History:        Procedure Laterality Date    BACK SURGERY      BREAST LUMPECTOMY Left     BREAST LUMPECTOMY Left     CARDIAC SURGERY      ASD repair    CARDIAC VALVE SURGERY       SECTION  /    2x    CHOLECYSTECTOMY      CHOLECYSTECTOMY      COLONOSCOPY      three    DIAGNOSTIC CARDIAC CATH LAB PROCEDURE      DILATION AND CURETTAGE OF UTERUS      EYE SURGERY Bilateral     cataract    HC INJECTION PROCEDURE FOR SACROILIAC JOINT Left 2018    Left SIJ and piriformis, left trocanteric bursa injection performed by Rajan Carbajal MD at [address]  2009    HYSTERECTOMY  2009    Total    JOINT REPLACEMENT Bilateral     knee    KNEE ARTHROSCOPY Left     PACEMAKER PLACEMENT  2014    [place] INJ DX/THER AGNT PARAVERT FACET JOINT, LUMBAR/SAC, 2ND LEVEL Bilateral 2018    Bilateral L2 L3 L4 L5 Diagnostic Medial BB performed by Rajan Carbajal MD at [address] [facility] ANES/STEROID [address] [facility] ,EA ADD LEVEL Right 2018    Right C5 C6 TRANSFORAMINAL performed by Rajan Carbajal MD at [address] Right 2005 & 2010    2x Social History:    Social History   Substance Use Topics    Smoking status: Never Smoker    Smokeless tobacco: Never Used    Alcohol use Yes      Comment: very rare                                Counseling given: Not Answered      Vital Signs (Current): There were no vitals filed for this visit. BP Readings from Last 3 Encounters:   08/30/18 126/63   08/30/18 (!) 140/65   08/16/18 120/72       NPO Status:                                                                                 BMI:   Wt Readings from Last 3 Encounters:   08/30/18 194 lb (88 kg)   08/16/18 192 lb 9.6 oz (87.4 kg)   07/31/18 194 lb 4 oz (88.1 kg)     There is no height or weight on file to calculate BMI.    CBC:   Lab Results   Component Value Date    WBC 9.1 09/15/2015    RBC 4.79 09/15/2015    HGB 12.2 09/15/2015    HCT 38.4 09/15/2015    MCV 80.2 09/15/2015    RDW 16.5 09/15/2015     09/15/2015       CMP:   Lab Results   Component Value Date     09/15/2015    K 4.1 09/15/2015     09/15/2015    CO2 25 09/15/2015    BUN 16 09/15/2015    CREATININE 0.67 09/15/2015    GFRAA >60 09/15/2015    LABGLOM >60 09/15/2015       POC Tests: No results for input(s): POCGLU, POCNA, POCK, POCCL, POCBUN, POCHEMO, POCHCT in the last 72 hours.     Coags: No results found for: PROTIME, INR, APTT    HCG (If Applicable): No results found for: PREGTESTUR, PREGSERUM, HCG, HCGQUANT     ABGs: No results found for: PHART, PO2ART, NVX2FEB, JEU8UQS, BEART, H2DYFTRB     Type & Screen (If Applicable):  No results found for: [facility]    Anesthesia Evaluation  Patient summary reviewed and Nursing notes reviewed  Airway: Mallampati: II  TM distance: >3 FB   Neck ROM: full  Mouth opening: > = 3 FB Dental: normal exam         Pulmonary:Negative Pulmonary ROS and normal exam    (+) COPD:  sleep apnea: on CPAP,  asthma: exercise-induced asthma, allergic asthma, seasonal asthma,

## 2018-09-13 NOTE — OP NOTE
MEDIAL BRANCH BLOCK CONFIRMATORY    9/13/18    Surgeon: Rajan Carbajal MD    Pre-operative Diagnosis:   Patient Active Problem List   Diagnosis Code    Lumbar degenerative disc disease M51.36    Lumbar canal stenosis M48.061    Lumbar discogenic pain syndrome M51.26    Encounter for long-term (current) use of other medications Z79.899    Degeneration of lumbar or lumbosacral intervertebral disc M51.37    Spinal stenosis, lumbar region, without neurogenic claudication M48.061    Thoracic or lumbosacral neuritis or radiculitis, unspecified [id number]    Greater trochanteric bursitis of both hips M70.61, M70.62    Sacroiliitis (HCC) M46.1    Chronic left sacroiliac joint pain M53.3, G89.29    Encounter for chronic pain management G89.29    Piriformis syndrome of left side G57.02    Lumbar facet joint syndrome ([address] M46.96       Post-operative Diagnosis: Same    INDICATION:Please see H&P for details on previous treatments, examination findings, and work up. bilateral L2, L3, L4, L5 medial branch block( facet joints L-3 4- 4-5    Lds are requested for diagnostic reasons. Conservative treatment was ineffective i.e.: ice, NSAIDS, rest, narcotic medication, chiropractic care, physical therapy and message therapy. Patient is unable to perform the following ADL's: ambulating and grooming     Pain Assessment: 0-10  Pain Level: 4        Pain Location: Back, Hip, Leg       Last Plain films: 5/24/18    EXAMINATION:  bilateral L2, L3, L4, L5 medial branch blocks. CONSENT:  Written consent was obtained from the patient on preprinted consent form after explaining the procedure, indications, potential complications and outcomes. Alternative treatments were also discussed. DISCUSSION:  The patient was sterilely prepped and draped in the usual fashion in the prone position.  Time out was verified for correct patient, side, level and procedure.     SEDATION: per anesthesia    PROCEDURE:   Under image-intensifier control, 22 gauge needle x 5 inch spinal needles were directed into the bilateral L2, L3, L4, L5 medial branches of the dorsal rami at the target points, according to ARACELI guidelines.  Needle tip positions were confirmed with 0.2 mL of Omnipaque 240 contrast medium. Then, 1mL of equal volumes of 0.75% bupivacaine  were instilled at each site. EBL: no blood loss    SPECIMEN: none    The patient tolerated the procedure well and without complications and was noted to be in stable condition prior to discharge from the procedure center with discharge instructions. IMPRESSIONS:  1.     bilateral L2, L3, L4, L5 medial branch blocks performed uneventfully. 2.      bilateralL2, L3, L4, L5 medial branch blocks decreased pain to a 1 on 0 to 10 numeric pain scale at 15 and 30 minutes after the procedure. RECOMMENDATIONS:  1. Complete and return the \"Post-Procedure Pain and Activity Diary.   2. Contact me for symptom exacerbation, fever or unusual symptoms. 4.      Post-procedure care according to verbal and written instructions at discharge. 3. Follow this block with physical therapy, as per MD directions. 4. Consider confirmatory MBB if there is > 80% pain relief and improved activity scores. POST-PROCEDURE LUMBAR/CERVICAL SPINE FLUOROSCOPIC IMAGE INTERPRETATION:    EXAMINATION: AP, lateral, and oblique views. FLUORO TIME: 29 seconds    DISCUSSION:  Spot views of the spine reveal normal alignment and segmentation. Spinal needles are positioned at the base of the SAP at the junction with the transverse process/sacral ala  OR center of  the articular pillars on PA and lateral views. Contrast at needle tip confirms satisfactory placement. Visualized spine reveals bilateral See radiology report. Soft tissues reveal no abnormalities. IMPRESSION:  Satisfactory needle placement and contrast dispersal for bilateral L2, L3, L4, L5 medial branch block.      Electronically signed by Karla Alonso MD on 9/13/2018 at

## 2018-09-13 NOTE — ANESTHESIA POSTPROCEDURE EVALUATION
Department of Anesthesiology  Postprocedure Note    Patient: Purnima Marquez  MRN: [id number]  YOB: 1940  Date of evaluation: 9/13/2018  Time:  10:07 AM     Procedure Summary     Date:  09/13/18 Room / Location:  [address] [place] [address]    Anesthesia Start:  0895 Anesthesia Stop:  1007    Procedure:  Right L2 L3 L4 L5 Confirmatory Medial BB (Right ) Diagnosis:  (facet syndrom, panniculitis )    Surgeon:  Urszula Genao MD Responsible Provider:  KENY Solano CRNA    Anesthesia Type:  MAC ASA Status:  4          Anesthesia Type: MAC    Felisha Phase I: Felisha Score: 10    Felisha Phase II:      Last vitals: Reviewed and per EMR flowsheets.        Anesthesia Post Evaluation    Patient location during evaluation: PACU  Patient participation: complete - patient participated  Level of consciousness: awake and alert  Pain score: 0  Airway patency: patent  Nausea & Vomiting: no nausea and no vomiting  Complications: no  Cardiovascular status: blood pressure returned to baseline and hemodynamically stable  Respiratory status: acceptable  Hydration status: euvolemic

## 2018-09-13 NOTE — H&P
Ansaid [Flurbiprofen] Other (See Comments)       Light headed and difficult with vision \"seeing\"    Erythromycin Nausea Only    Gentamicin      Inderal [Propranolol] Other (See Comments)       Severe cough    Lisinopril Itching    Mirapex [Pramipexole Dihydrochloride]      Mobic [Meloxicam] Nausea Only    Nsaids Other (See Comments)       lightheaded    Reglan [Metoclopramide] Other (See Comments)       Hyperactive and stomach pain    Trazodone      Ultram [Tramadol] Itching    Garamycin [Gentamicin Sulfate] Other (See Comments)       Redness and irritation         Medications Prior to Visit               Outpatient Medications Prior to Visit   Medication Sig Dispense Refill    aspirin 81 MG tablet Take 81 mg by mouth daily        levothyroxine (SYNTHROID) 75 MCG tablet Take 75 mcg by mouth daily        baclofen (LIORESAL) 10 MG tablet Take 10 mg by mouth        dicyclomine (BENTYL) 10 MG capsule dicyclomine 10 mg capsule   Take 1 capsule 4 times a day by oral route for 10 days.  potassium chloride (KLOR-CON M) 20 MEQ extended release tablet TAKE ONE TABLET ONCE A DAY        guaiFENesin (MUCINEX) 600 MG extended release tablet Mucinex 600 mg tablet, extended release   Take 2 tablets every day by oral route.  Heating Pads (RADHA PAD/SMARTHEAT PRO/) PADS 1 Units by Does not apply route 4 times daily Use for 15 minutes to 30 minutes at a time four times a day. 1 each 0    budesonide-formoterol (SYMBICORT) 160-4.5 MCG/ACT AERO Inhale 2 puffs into the lungs 2 times daily        lidocaine (ASPERCREME W/LIDOCAINE) 4 % cream Apply topically as needed for Pain Apply topically as needed.         furosemide (LASIX) 40 MG tablet Take 40 mg by mouth 3 times daily        aluminum & magnesium hydroxide-simethicone (MAALOX) 200-200-20 MG/5ML SUSP suspension Take 5 mLs by mouth every 6 hours as needed for Indigestion        fluticasone propionate 50 MCG/BLIST AEPB Inhale into the lungs        albuterol (ACCUNEB) 1.25 MG/3ML nebulizer solution Inhale 1 ampule into the lungs every 6 hours as needed for Wheezing        metoprolol (LOPRESSOR) 25 MG tablet Take 50 mg by mouth 2 times daily         simvastatin (ZOCOR) 40 MG tablet Take 40 mg by mouth nightly        flecainide (TAMBOCOR) 50 MG tablet Take 50 mg by mouth 2 times daily.  omeprazole (PRILOSEC) 40 MG capsule Take 40 mg by mouth nightly.  montelukast (SINGULAIR) 10 MG tablet Take 10 mg by mouth nightly.  DULoxetine (CYMBALTA) 60 MG capsule Take 60 mg by mouth daily.  artificial tears (ARTIFICIAL TEARS) OINT as needed.  Saline 0.2 % SOLN by Nasal route daily as needed         Immune Globulin, Human, (HIZENTRA) 10 GM/50ML SOLN Inject into the skin Tuesdays        diphenhydrAMINE (BENADRYL) 25 MG capsule Take 25 mg by mouth every 6 hours as needed for Itching.  Ranitidine HCl (ZANTAC 75 PO) Take  by mouth.  oxyCODONE HCl (OXY-IR) 10 MG immediate release tablet Take 1 tablet by mouth every 6 hours as needed for Pain for up to 30 days. Bonnie Roberson Date: 6/1/18 100 tablet 0    topiramate (TOPAMAX) 25 MG tablet Take 1 tablet by mouth 2 times daily (Patient taking differently: Take 25 mg by mouth daily ) 60 tablet 0                        Facility-Administered Medications Prior to Visit   Medication Dose Route Frequency Provider Last Rate Last Dose    triamcinolone acetonide (KENALOG-40) injection 40 mg  40 mg Intra-articular Once Pascual Hatfield MD        lidocaine 2 % injection 5 mL  5 mL Intradermal Once Pascual Hatfield MD        iohexol (OMNIPAQUE 180) injection 3 mL  3 mL Other ONCE PRN Mak Yarbrough MD        bupivacaine (MARCAINE) 0.5 % injection 150 mg  30 mL Intra-articular Once Pascual Hatfield MD                Past Medical History           Past Medical History:   Diagnosis Date    Anesthesia complication       2nd post op day from total knee patient stated \"I was wild and mean\"    Anxiety and depression      Arthritis      ASD (atrial septal defect)       repair in [address] [facility]      COPD (chronic obstructive pulmonary disease) [facility]     Disorder of immune system ([facility]       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 ([facility] 3/[facility])    History of renal stone       passed    Hx of blood clots      DVT    Hypertension      MVP (mitral valve prolapse)      LONI (obstructive sleep apnea)       uses bipap nightly    Rectocele      Spinal stenosis      Thyroid disease late      overactive radioactive iodine done            Past Surgical History             Past Surgical History:   Procedure Laterality Date    BACK SURGERY        BREAST LUMPECTOMY Left      BREAST LUMPECTOMY Left      CARDIAC SURGERY         ASD repair    CARDIAC VALVE SURGERY         SECTION   2107/4452     2x    CHOLECYSTECTOMY        CHOLECYSTECTOMY        COLONOSCOPY         three    DIAGNOSTIC CARDIAC CATH LAB PROCEDURE        DILATION AND CURETTAGE OF UTERUS       EYE SURGERY Bilateral       cataract    HC INJECTION PROCEDURE FOR SACROILIAC JOINT Left 2018     Left SIJ and piriformis, left trocanteric bursa injection performed by Oscar Carrero MD at [address]   2009    HYSTERECTOMY   2009     Total    JOINT REPLACEMENT Bilateral       knee    KNEE ARTHROSCOPY Left     PACEMAKER PLACEMENT   2014    IA INJECT ANES/STEROID FORAMEN CERV/THORACIC W IMG GUIDE ,EA ADD LEVEL Right 2018     Right C5 C6 TRANSFORAMINAL performed by Oscar Carrero MD at [address] Right 2005 & 2010     2x

## 2018-10-08 ENCOUNTER — TELEPHONE (OUTPATIENT)
Dept: PAIN MANAGEMENT | Age: 78
End: 2018-10-08

## 2018-12-05 ENCOUNTER — TELEPHONE (OUTPATIENT)
Dept: PAIN MANAGEMENT | Age: 78
End: 2018-12-05

## 2018-12-20 ENCOUNTER — HOSPITAL ENCOUNTER (OUTPATIENT)
Age: 78
Setting detail: OUTPATIENT SURGERY
Discharge: HOME | End: 2018-12-20
Attending: PHYSICAL MEDICINE & REHABILITATION | Admitting: PHYSICAL MEDICINE & REHABILITATION
Payer: MEDICARE

## 2018-12-20 ENCOUNTER — APPOINTMENT (OUTPATIENT)
Dept: GENERAL RADIOLOGY | Age: 78
End: 2018-12-20
Attending: PHYSICAL MEDICINE & REHABILITATION
Payer: MEDICARE

## 2018-12-20 VITALS
RESPIRATION RATE: 16 BRPM | WEIGHT: 188.8 LBS | HEIGHT: 64 IN | OXYGEN SATURATION: 100 % | DIASTOLIC BLOOD PRESSURE: 72 MMHG | BODY MASS INDEX: 32.23 KG/M2 | SYSTOLIC BLOOD PRESSURE: 151 MMHG | HEART RATE: 73 BPM | TEMPERATURE: 98.1 F

## 2018-12-20 PROCEDURE — 64635 DESTROY LUMB/SAC FACET JNT: CPT | Performed by: PHYSICAL MEDICINE & REHABILITATION

## 2018-12-20 PROCEDURE — 2500000003 HC RX 250 WO HCPCS: Performed by: PHYSICAL MEDICINE & REHABILITATION

## 2018-12-20 PROCEDURE — 3209999900 FLUORO FOR SURGICAL PROCEDURES

## 2018-12-20 PROCEDURE — 3600000056 HC PAIN LEVEL 4 BASE: Performed by: PHYSICAL MEDICINE & REHABILITATION

## 2018-12-20 PROCEDURE — 7100000010 HC PHASE II RECOVERY - FIRST 15 MIN: Performed by: PHYSICAL MEDICINE & REHABILITATION

## 2018-12-20 PROCEDURE — 3600000057 HC PAIN LEVEL 4 ADDL 15 MIN: Performed by: PHYSICAL MEDICINE & REHABILITATION

## 2018-12-20 PROCEDURE — 2709999900 HC NON-CHARGEABLE SUPPLY: Performed by: PHYSICAL MEDICINE & REHABILITATION

## 2018-12-20 PROCEDURE — 64636 DESTROY L/S FACET JNT ADDL: CPT | Performed by: PHYSICAL MEDICINE & REHABILITATION

## 2018-12-20 PROCEDURE — 7100000011 HC PHASE II RECOVERY - ADDTL 15 MIN: Performed by: PHYSICAL MEDICINE & REHABILITATION

## 2018-12-20 PROCEDURE — 6360000002 HC RX W HCPCS: Performed by: PHYSICAL MEDICINE & REHABILITATION

## 2018-12-20 RX ORDER — DEXAMETHASONE SODIUM PHOSPHATE 10 MG/ML
INJECTION INTRAMUSCULAR; INTRAVENOUS PRN
Status: DISCONTINUED | OUTPATIENT
Start: 2018-12-20 | End: 2018-12-20 | Stop reason: HOSPADM

## 2018-12-20 RX ORDER — BUPIVACAINE HYDROCHLORIDE 5 MG/ML
INJECTION, SOLUTION EPIDURAL; INTRACAUDAL PRN
Status: DISCONTINUED | OUTPATIENT
Start: 2018-12-20 | End: 2018-12-20 | Stop reason: HOSPADM

## 2018-12-20 ASSESSMENT — PAIN DESCRIPTION - ORIENTATION
ORIENTATION: LEFT;LOWER
ORIENTATION: LOWER;LEFT

## 2018-12-20 ASSESSMENT — PAIN DESCRIPTION - PAIN TYPE
TYPE: CHRONIC PAIN
TYPE: CHRONIC PAIN

## 2018-12-20 ASSESSMENT — ENCOUNTER SYMPTOMS
DIARRHEA: 0
ALLERGIC/IMMUNOLOGIC NEGATIVE: 1
CONSTIPATION: 0
BACK PAIN: 1
RESPIRATORY NEGATIVE: 1
EYES NEGATIVE: 1

## 2018-12-20 ASSESSMENT — PAIN DESCRIPTION - LOCATION
LOCATION: BACK
LOCATION: BACK

## 2018-12-20 ASSESSMENT — PAIN SCALES - GENERAL
PAINLEVEL_OUTOF10: 2
PAINLEVEL_OUTOF10: 3

## 2018-12-20 ASSESSMENT — PAIN - FUNCTIONAL ASSESSMENT: PAIN_FUNCTIONAL_ASSESSMENT: 0-10

## 2018-12-20 NOTE — OP NOTE
[facility] OPERATIVE REPORT    12/20/18    Surgeon: Ferny Lemus MD    Pre-operative Diagnosis:   Patient Active Problem List   Diagnosis Code    Lumbar degenerative disc disease M51.36    Lumbar canal stenosis M48.061    Lumbar discogenic pain syndrome M51.26    Encounter for long-term (current) use of other medications Z79.899    Degeneration of lumbar or lumbosacral intervertebral disc M51.37    Spinal stenosis, lumbar region, without neurogenic claudication M48.061    Thoracic or lumbosacral neuritis or radiculitis, unspecified [id number]    Greater trochanteric bursitis of both hips M70.61, M70.62    Sacroiliitis (HCC) M46.1    Chronic left sacroiliac joint pain M53.3, G89.29    Encounter for chronic pain management G89.29    Piriformis syndrome of left side G57.02    Lumbar facet joint syndrome M47.816       Post-operative Diagnosis: Same    INDICATION:  Left  L2, L3, L4, L5 radiofrequency neurotomy ( facet joints L3-4 4-5 [id number] is requested for therapeutic reasons. Please see H&P for details on previous treatments, examination findings, and work up. left L2, L3, L4, L5 medial branch blocks are requested for diagnostic reasons. Conservative treatment was ineffective i.e.: ice, NSAIDS, rest,     Patient is unable to perform the following ADL's: ambulating, grooming, sitting and standing     Pain Assessment: 0-10  Pain Level: 4     Pain Orientation: Lower, Right, Left  Pain Location: Back       Last Plain films:5-24-18    EXAMINATION:  Left L2, L3, L4, L5 radiofrequency neurotomies. CONSENT:  Written consent was obtained from the patient on the preprinted consent form after explaining the procedure, indications, potential complications and outcomes. Alternative treatments were also discussed. DISCUSSION:  The patient was sterilely prepped and draped in the usual fashion in the prone position.   Time out was verified for the correct patient, side, level and procedure. SEDATION:   No conscious sedation was performed during the procedure.  The patient remained awake and conversed throughout the procedure.  The patient underwent pulse oximetry and blood pressure monitoring independently by a trained observer, as well as by a physician. FACET RF    Under image-intensifier control, a 14350  mm [employer] RFK insulated radiofrequency probe with 5 mm active tips were successfully directed at the Left L2, L3, L4, L5 medial branches of the dorsal rami at the target points described by ARACELI. Needle tip positions were confirmed in 3 views and sensory and motor test stimulation was performed. The patient reported sensory stimulation at 0.0 to 0.0 volts at 50 Hz and motor stimulation at 0.0 to 0.9 volts at 2 Hz, which confirmed satisfactory sensory motor dissociation. 1 ml of 2% lidocaine was instilled  at each site prior to lesioning. 3 lesions were performed at each level  BY SAGITTAL APPROACH at a temperature of 80 degrees Celsius for a duration of 90 seconds, as described by ARACELI. Impedance was measured and ranged from 209-315 ohms. Then, 0.5mL of  0.5% bupivacaine was instilled at each site after lesioning. The patient tolerated the procedure(s) well and without complications and was noted to be in stable condition prior to discharge from procedure center with discharge instructions. EBL: no blood loss    SPECIMEN: none    IMPRESSION:    1. Left L2, L3, L4, L5 radiofrequency neurotomies performed uneventfully. RECOMMENDATIONS:  1. Follow up in the [address] in 2 to 4 weeks  2. Continue Neurontin and opioid analgesia, as per protocol. 3.    Complete and return the \"Post-Procedure Pain and Activity Diary. 4.    Contact the [address] for symptom exacerbation, fever or unusual symptoms. 5.    Follow post-procedure care according to verbal and written instructions.     POST-PROCEDURE LUMBAR SPINE FLUOROSCOPIC IMAGE

## 2019-01-03 ENCOUNTER — HOSPITAL ENCOUNTER (OUTPATIENT)
Age: 79
Setting detail: OUTPATIENT SURGERY
Discharge: HOME | End: 2019-01-03
Attending: PHYSICAL MEDICINE & REHABILITATION | Admitting: PHYSICAL MEDICINE & REHABILITATION
Payer: MEDICARE

## 2019-01-03 ENCOUNTER — APPOINTMENT (OUTPATIENT)
Dept: GENERAL RADIOLOGY | Age: 79
End: 2019-01-03
Attending: PHYSICAL MEDICINE & REHABILITATION
Payer: MEDICARE

## 2019-01-03 VITALS
SYSTOLIC BLOOD PRESSURE: 136 MMHG | DIASTOLIC BLOOD PRESSURE: 65 MMHG | RESPIRATION RATE: 16 BRPM | WEIGHT: 176 LBS | BODY MASS INDEX: 30.05 KG/M2 | OXYGEN SATURATION: 97 % | TEMPERATURE: 98.3 F | HEART RATE: 64 BPM | HEIGHT: 64 IN

## 2019-01-03 PROCEDURE — 3600000057 HC PAIN LEVEL 4 ADDL 15 MIN: Performed by: PHYSICAL MEDICINE & REHABILITATION

## 2019-01-03 PROCEDURE — 3209999900 FLUORO FOR SURGICAL PROCEDURES

## 2019-01-03 PROCEDURE — 64636 DESTROY L/S FACET JNT ADDL: CPT | Performed by: PHYSICAL MEDICINE & REHABILITATION

## 2019-01-03 PROCEDURE — 6360000002 HC RX W HCPCS: Performed by: PHYSICAL MEDICINE & REHABILITATION

## 2019-01-03 PROCEDURE — 2709999900 HC NON-CHARGEABLE SUPPLY: Performed by: PHYSICAL MEDICINE & REHABILITATION

## 2019-01-03 PROCEDURE — 64635 DESTROY LUMB/SAC FACET JNT: CPT | Performed by: PHYSICAL MEDICINE & REHABILITATION

## 2019-01-03 PROCEDURE — 7100000011 HC PHASE II RECOVERY - ADDTL 15 MIN: Performed by: PHYSICAL MEDICINE & REHABILITATION

## 2019-01-03 PROCEDURE — 3600000056 HC PAIN LEVEL 4 BASE: Performed by: PHYSICAL MEDICINE & REHABILITATION

## 2019-01-03 PROCEDURE — 7100000010 HC PHASE II RECOVERY - FIRST 15 MIN: Performed by: PHYSICAL MEDICINE & REHABILITATION

## 2019-01-03 PROCEDURE — 2500000003 HC RX 250 WO HCPCS: Performed by: PHYSICAL MEDICINE & REHABILITATION

## 2019-01-03 RX ORDER — BUPIVACAINE HYDROCHLORIDE 5 MG/ML
INJECTION, SOLUTION EPIDURAL; INTRACAUDAL PRN
Status: DISCONTINUED | OUTPATIENT
Start: 2019-01-03 | End: 2019-01-03 | Stop reason: HOSPADM

## 2019-01-03 RX ORDER — DEXAMETHASONE SODIUM PHOSPHATE 10 MG/ML
INJECTION INTRAMUSCULAR; INTRAVENOUS PRN
Status: DISCONTINUED | OUTPATIENT
Start: 2019-01-03 | End: 2019-01-03 | Stop reason: HOSPADM

## 2019-01-03 RX ORDER — LIDOCAINE HYDROCHLORIDE 20 MG/ML
INJECTION, SOLUTION EPIDURAL; INFILTRATION; INTRACAUDAL; PERINEURAL PRN
Status: DISCONTINUED | OUTPATIENT
Start: 2019-01-03 | End: 2019-01-03 | Stop reason: HOSPADM

## 2019-01-03 ASSESSMENT — PAIN - FUNCTIONAL ASSESSMENT: PAIN_FUNCTIONAL_ASSESSMENT: 0-10

## 2019-01-03 ASSESSMENT — PAIN SCALES - GENERAL: PAINLEVEL_OUTOF10: 0

## 2019-02-14 ENCOUNTER — OFFICE VISIT (OUTPATIENT)
Dept: PAIN MANAGEMENT | Age: 79
End: 2019-02-14
Payer: MEDICARE

## 2019-02-14 VITALS
HEIGHT: 64 IN | DIASTOLIC BLOOD PRESSURE: 60 MMHG | HEART RATE: 62 BPM | RESPIRATION RATE: 16 BRPM | BODY MASS INDEX: 31.07 KG/M2 | WEIGHT: 182 LBS | SYSTOLIC BLOOD PRESSURE: 92 MMHG | OXYGEN SATURATION: 96 %

## 2019-02-14 DIAGNOSIS — M47.816 LUMBAR FACET JOINT SYNDROME: ICD-10-CM

## 2019-02-14 DIAGNOSIS — M48.061 SPINAL STENOSIS, LUMBAR REGION, WITHOUT NEUROGENIC CLAUDICATION: Primary | Chronic | ICD-10-CM

## 2019-02-14 DIAGNOSIS — Z91.81 AT HIGH RISK FOR FALLS: ICD-10-CM

## 2019-02-14 PROCEDURE — 1101F PT FALLS ASSESS-DOCD LE1/YR: CPT | Performed by: PHYSICAL MEDICINE & REHABILITATION

## 2019-02-14 PROCEDURE — 1090F PRES/ABSN URINE INCON ASSESS: CPT | Performed by: PHYSICAL MEDICINE & REHABILITATION

## 2019-02-14 PROCEDURE — 1036F TOBACCO NON-USER: CPT | Performed by: PHYSICAL MEDICINE & REHABILITATION

## 2019-02-14 PROCEDURE — G8427 DOCREV CUR MEDS BY ELIG CLIN: HCPCS | Performed by: PHYSICAL MEDICINE & REHABILITATION

## 2019-02-14 PROCEDURE — G8400 PT W/DXA NO RESULTS DOC: HCPCS | Performed by: PHYSICAL MEDICINE & REHABILITATION

## 2019-02-14 PROCEDURE — G8417 CALC BMI ABV UP PARAM F/U: HCPCS | Performed by: PHYSICAL MEDICINE & REHABILITATION

## 2019-02-14 PROCEDURE — 99214 OFFICE O/P EST MOD 30 MIN: CPT | Performed by: PHYSICAL MEDICINE & REHABILITATION

## 2019-02-14 PROCEDURE — 4040F PNEUMOC VAC/ADMIN/RCVD: CPT | Performed by: PHYSICAL MEDICINE & REHABILITATION

## 2019-02-14 PROCEDURE — G8484 FLU IMMUNIZE NO ADMIN: HCPCS | Performed by: PHYSICAL MEDICINE & REHABILITATION

## 2019-02-14 PROCEDURE — 1123F ACP DISCUSS/DSCN MKR DOCD: CPT | Performed by: PHYSICAL MEDICINE & REHABILITATION

## 2019-02-14 ASSESSMENT — ENCOUNTER SYMPTOMS
RESPIRATORY NEGATIVE: 1
BACK PAIN: 1
CONSTIPATION: 0
DIARRHEA: 0
ALLERGIC/IMMUNOLOGIC NEGATIVE: 1
EYES NEGATIVE: 1

## 2019-02-14 ASSESSMENT — PATIENT HEALTH QUESTIONNAIRE - PHQ9
SUM OF ALL RESPONSES TO PHQ QUESTIONS 1-9: 0
2. FEELING DOWN, DEPRESSED OR HOPELESS: 0
SUM OF ALL RESPONSES TO PHQ9 QUESTIONS 1 & 2: 0
SUM OF ALL RESPONSES TO PHQ QUESTIONS 1-9: 0
1. LITTLE INTEREST OR PLEASURE IN DOING THINGS: 0

## 2019-02-26 ENCOUNTER — APPOINTMENT (OUTPATIENT)
Dept: GENERAL RADIOLOGY | Age: 79
End: 2019-02-26
Attending: PHYSICAL MEDICINE & REHABILITATION
Payer: MEDICARE

## 2019-02-26 ENCOUNTER — HOSPITAL ENCOUNTER (OUTPATIENT)
Age: 79
Setting detail: OUTPATIENT SURGERY
Discharge: HOME | End: 2019-02-26
Attending: PHYSICAL MEDICINE & REHABILITATION | Admitting: PHYSICAL MEDICINE & REHABILITATION
Payer: MEDICARE

## 2019-02-26 VITALS
WEIGHT: 179 LBS | BODY MASS INDEX: 30.56 KG/M2 | RESPIRATION RATE: 16 BRPM | HEART RATE: 70 BPM | SYSTOLIC BLOOD PRESSURE: 124 MMHG | HEIGHT: 64 IN | OXYGEN SATURATION: 98 % | DIASTOLIC BLOOD PRESSURE: 61 MMHG | TEMPERATURE: 97.2 F

## 2019-02-26 PROCEDURE — 3600000056 HC PAIN LEVEL 4 BASE: Performed by: PHYSICAL MEDICINE & REHABILITATION

## 2019-02-26 PROCEDURE — 7100000010 HC PHASE II RECOVERY - FIRST 15 MIN: Performed by: PHYSICAL MEDICINE & REHABILITATION

## 2019-02-26 PROCEDURE — 64483 NJX AA&/STRD TFRM EPI L/S 1: CPT | Performed by: PHYSICAL MEDICINE & REHABILITATION

## 2019-02-26 PROCEDURE — 3209999900 FLUORO FOR SURGICAL PROCEDURES

## 2019-02-26 PROCEDURE — 6360000004 HC RX CONTRAST MEDICATION: Performed by: PHYSICAL MEDICINE & REHABILITATION

## 2019-02-26 PROCEDURE — 6360000002 HC RX W HCPCS: Performed by: PHYSICAL MEDICINE & REHABILITATION

## 2019-02-26 PROCEDURE — 2500000003 HC RX 250 WO HCPCS: Performed by: PHYSICAL MEDICINE & REHABILITATION

## 2019-02-26 PROCEDURE — 2580000003 HC RX 258: Performed by: PHYSICAL MEDICINE & REHABILITATION

## 2019-02-26 PROCEDURE — 2709999900 HC NON-CHARGEABLE SUPPLY: Performed by: PHYSICAL MEDICINE & REHABILITATION

## 2019-02-26 RX ORDER — DEXAMETHASONE SODIUM PHOSPHATE 10 MG/ML
INJECTION INTRAMUSCULAR; INTRAVENOUS PRN
Status: DISCONTINUED | OUTPATIENT
Start: 2019-02-26 | End: 2019-02-26 | Stop reason: ALTCHOICE

## 2019-02-26 RX ORDER — BUPIVACAINE HYDROCHLORIDE 5 MG/ML
INJECTION, SOLUTION EPIDURAL; INTRACAUDAL PRN
Status: DISCONTINUED | OUTPATIENT
Start: 2019-02-26 | End: 2019-02-26 | Stop reason: ALTCHOICE

## 2019-02-26 RX ORDER — SPIRONOLACTONE 25 MG/1
25 TABLET ORAL DAILY
COMMUNITY
End: 2019-09-16 | Stop reason: ALTCHOICE

## 2019-02-26 RX ORDER — 0.9 % SODIUM CHLORIDE 0.9 %
VIAL (ML) INJECTION PRN
Status: DISCONTINUED | OUTPATIENT
Start: 2019-02-26 | End: 2019-02-26 | Stop reason: ALTCHOICE

## 2019-02-26 ASSESSMENT — PAIN SCALES - GENERAL: PAINLEVEL_OUTOF10: 2

## 2019-02-26 ASSESSMENT — PAIN - FUNCTIONAL ASSESSMENT: PAIN_FUNCTIONAL_ASSESSMENT: 0-10

## 2019-02-26 ASSESSMENT — PAIN DESCRIPTION - LOCATION: LOCATION: BACK

## 2019-03-04 ENCOUNTER — TELEPHONE (OUTPATIENT)
Dept: PAIN MANAGEMENT | Age: 79
End: 2019-03-04

## 2019-03-12 ENCOUNTER — HOSPITAL ENCOUNTER (OUTPATIENT)
Age: 79
Setting detail: OUTPATIENT SURGERY
Discharge: HOME | End: 2019-03-12
Attending: PHYSICAL MEDICINE & REHABILITATION | Admitting: PHYSICAL MEDICINE & REHABILITATION
Payer: MEDICARE

## 2019-03-12 ENCOUNTER — APPOINTMENT (OUTPATIENT)
Dept: GENERAL RADIOLOGY | Age: 79
End: 2019-03-12
Attending: PHYSICAL MEDICINE & REHABILITATION
Payer: MEDICARE

## 2019-03-12 VITALS
HEART RATE: 70 BPM | HEIGHT: 64 IN | TEMPERATURE: 97 F | WEIGHT: 181 LBS | BODY MASS INDEX: 30.9 KG/M2 | SYSTOLIC BLOOD PRESSURE: 153 MMHG | RESPIRATION RATE: 16 BRPM | OXYGEN SATURATION: 98 % | DIASTOLIC BLOOD PRESSURE: 70 MMHG

## 2019-03-12 PROCEDURE — 2709999900 HC NON-CHARGEABLE SUPPLY: Performed by: PHYSICAL MEDICINE & REHABILITATION

## 2019-03-12 PROCEDURE — 6360000002 HC RX W HCPCS: Performed by: PHYSICAL MEDICINE & REHABILITATION

## 2019-03-12 PROCEDURE — 64483 NJX AA&/STRD TFRM EPI L/S 1: CPT | Performed by: PHYSICAL MEDICINE & REHABILITATION

## 2019-03-12 PROCEDURE — 2500000003 HC RX 250 WO HCPCS: Performed by: PHYSICAL MEDICINE & REHABILITATION

## 2019-03-12 PROCEDURE — 2580000003 HC RX 258: Performed by: PHYSICAL MEDICINE & REHABILITATION

## 2019-03-12 PROCEDURE — 7100000010 HC PHASE II RECOVERY - FIRST 15 MIN: Performed by: PHYSICAL MEDICINE & REHABILITATION

## 2019-03-12 PROCEDURE — 3209999900 FLUORO FOR SURGICAL PROCEDURES

## 2019-03-12 PROCEDURE — 6360000004 HC RX CONTRAST MEDICATION: Performed by: PHYSICAL MEDICINE & REHABILITATION

## 2019-03-12 PROCEDURE — 3600000056 HC PAIN LEVEL 4 BASE: Performed by: PHYSICAL MEDICINE & REHABILITATION

## 2019-03-12 RX ORDER — 0.9 % SODIUM CHLORIDE 0.9 %
VIAL (ML) INJECTION PRN
Status: DISCONTINUED | OUTPATIENT
Start: 2019-03-12 | End: 2019-03-12 | Stop reason: ALTCHOICE

## 2019-03-12 RX ORDER — DEXAMETHASONE SODIUM PHOSPHATE 10 MG/ML
INJECTION INTRAMUSCULAR; INTRAVENOUS PRN
Status: DISCONTINUED | OUTPATIENT
Start: 2019-03-12 | End: 2019-03-12 | Stop reason: ALTCHOICE

## 2019-03-12 RX ORDER — BUPIVACAINE HYDROCHLORIDE 5 MG/ML
INJECTION, SOLUTION EPIDURAL; INTRACAUDAL PRN
Status: DISCONTINUED | OUTPATIENT
Start: 2019-03-12 | End: 2019-03-12 | Stop reason: ALTCHOICE

## 2019-03-12 ASSESSMENT — PAIN - FUNCTIONAL ASSESSMENT: PAIN_FUNCTIONAL_ASSESSMENT: 0-10

## 2019-03-12 ASSESSMENT — PAIN SCALES - GENERAL: PAINLEVEL_OUTOF10: 0

## 2019-03-25 ENCOUNTER — OFFICE VISIT (OUTPATIENT)
Dept: PAIN MANAGEMENT | Age: 79
End: 2019-03-25
Payer: MEDICARE

## 2019-03-25 VITALS
BODY MASS INDEX: 30.9 KG/M2 | DIASTOLIC BLOOD PRESSURE: 80 MMHG | RESPIRATION RATE: 16 BRPM | WEIGHT: 181 LBS | HEART RATE: 72 BPM | OXYGEN SATURATION: 95 % | HEIGHT: 64 IN | SYSTOLIC BLOOD PRESSURE: 124 MMHG

## 2019-03-25 DIAGNOSIS — D50.0 IRON DEFICIENCY ANEMIA DUE TO CHRONIC BLOOD LOSS: ICD-10-CM

## 2019-03-25 DIAGNOSIS — F32.0 CURRENT MILD EPISODE OF MAJOR DEPRESSIVE DISORDER WITHOUT PRIOR EPISODE (HCC): ICD-10-CM

## 2019-03-25 DIAGNOSIS — M47.817 LUMBOSACRAL SPONDYLOSIS WITHOUT MYELOPATHY: ICD-10-CM

## 2019-03-25 DIAGNOSIS — M54.16 LUMBAR RADICULOPATHY: Primary | ICD-10-CM

## 2019-03-25 PROCEDURE — G8417 CALC BMI ABV UP PARAM F/U: HCPCS | Performed by: PHYSICAL MEDICINE & REHABILITATION

## 2019-03-25 PROCEDURE — G8400 PT W/DXA NO RESULTS DOC: HCPCS | Performed by: PHYSICAL MEDICINE & REHABILITATION

## 2019-03-25 PROCEDURE — 1123F ACP DISCUSS/DSCN MKR DOCD: CPT | Performed by: PHYSICAL MEDICINE & REHABILITATION

## 2019-03-25 PROCEDURE — 1101F PT FALLS ASSESS-DOCD LE1/YR: CPT | Performed by: PHYSICAL MEDICINE & REHABILITATION

## 2019-03-25 PROCEDURE — 1090F PRES/ABSN URINE INCON ASSESS: CPT | Performed by: PHYSICAL MEDICINE & REHABILITATION

## 2019-03-25 PROCEDURE — 99214 OFFICE O/P EST MOD 30 MIN: CPT | Performed by: PHYSICAL MEDICINE & REHABILITATION

## 2019-03-25 PROCEDURE — G8484 FLU IMMUNIZE NO ADMIN: HCPCS | Performed by: PHYSICAL MEDICINE & REHABILITATION

## 2019-03-25 PROCEDURE — 1036F TOBACCO NON-USER: CPT | Performed by: PHYSICAL MEDICINE & REHABILITATION

## 2019-03-25 PROCEDURE — G8427 DOCREV CUR MEDS BY ELIG CLIN: HCPCS | Performed by: PHYSICAL MEDICINE & REHABILITATION

## 2019-03-25 PROCEDURE — 4040F PNEUMOC VAC/ADMIN/RCVD: CPT | Performed by: PHYSICAL MEDICINE & REHABILITATION

## 2019-03-25 RX ORDER — TRAMADOL HYDROCHLORIDE 50 MG/1
50 TABLET ORAL EVERY 6 HOURS PRN
Qty: 120 TABLET | Refills: 0 | Status: SHIPPED | OUTPATIENT
Start: 2019-03-25 | End: 2019-04-19 | Stop reason: SDUPTHER

## 2019-03-25 ASSESSMENT — ENCOUNTER SYMPTOMS
CONSTIPATION: 0
EYES NEGATIVE: 1
RESPIRATORY NEGATIVE: 1
DIARRHEA: 0
BACK PAIN: 1
ALLERGIC/IMMUNOLOGIC NEGATIVE: 1

## 2019-03-25 ASSESSMENT — PATIENT HEALTH QUESTIONNAIRE - PHQ9
SUM OF ALL RESPONSES TO PHQ9 QUESTIONS 1 & 2: 0
2. FEELING DOWN, DEPRESSED OR HOPELESS: 0
SUM OF ALL RESPONSES TO PHQ QUESTIONS 1-9: 0
SUM OF ALL RESPONSES TO PHQ QUESTIONS 1-9: 0
1. LITTLE INTEREST OR PLEASURE IN DOING THINGS: 0

## 2019-04-05 ENCOUNTER — TELEPHONE (OUTPATIENT)
Dept: PAIN MANAGEMENT | Age: 79
End: 2019-04-05

## 2019-04-05 DIAGNOSIS — M54.16 LUMBAR RADICULITIS: Primary | ICD-10-CM

## 2019-04-12 NOTE — TELEPHONE ENCOUNTER
Called Baljit with [facility] office and informed her that Dr. Myrna Loera ordered the CT for the patient and that she is scheduled on 4/22/19. She requested that the report be sent to [facility] office when completed for there records.      When CT is complete report should be faxed to [facility] at [phone]

## 2019-04-16 DIAGNOSIS — F32.0 CURRENT MILD EPISODE OF MAJOR DEPRESSIVE DISORDER WITHOUT PRIOR EPISODE (HCC): ICD-10-CM

## 2019-04-16 DIAGNOSIS — M51.26 LUMBAR DISCOGENIC PAIN SYNDROME: ICD-10-CM

## 2019-04-16 DIAGNOSIS — D50.0 IRON DEFICIENCY ANEMIA DUE TO CHRONIC BLOOD LOSS: ICD-10-CM

## 2019-04-16 DIAGNOSIS — M51.36 LUMBAR DEGENERATIVE DISC DISEASE: ICD-10-CM

## 2019-04-16 DIAGNOSIS — M47.817 LUMBOSACRAL SPONDYLOSIS WITHOUT MYELOPATHY: ICD-10-CM

## 2019-04-16 DIAGNOSIS — M54.16 LUMBAR RADICULOPATHY: ICD-10-CM

## 2019-04-16 DIAGNOSIS — M54.50 CHRONIC BILATERAL LOW BACK PAIN WITHOUT SCIATICA: ICD-10-CM

## 2019-04-16 DIAGNOSIS — G89.29 CHRONIC BILATERAL LOW BACK PAIN WITHOUT SCIATICA: ICD-10-CM

## 2019-04-16 RX ORDER — TRAMADOL HYDROCHLORIDE 50 MG/1
50 TABLET ORAL EVERY 6 HOURS PRN
Qty: 120 TABLET | Refills: 0 | Status: CANCELLED | OUTPATIENT
Start: 2019-04-16 | End: 2019-05-16

## 2019-04-16 NOTE — TELEPHONE ENCOUNTER
When was the last time she filled oxycodone? Last note, Obdulia Chowdarylow wrote trial tramadol. She will need to pick one. If she is choosing tramadol, then okay for 30 days.

## 2019-04-16 NOTE — TELEPHONE ENCOUNTER
The oxycodone was last filled 6/1/18. Kaylynn JUNG with the patient asking that she call to discuss. When the patient called she will be asked how well she is tolerating the Tramadol and if it is effective.

## 2019-04-16 NOTE — TELEPHONE ENCOUNTER
Pt called refill line for Tramadol , to be sent to [facility]. Last Appt:  3/25/2019  Next Appt:   Visit date not found  Med verified in [address] checked for [place], and [place]: 3/25/19 Tramadol #30. Due Now.       LAst fill was a 7 day supply, okay to pend for a 30 day script

## 2019-04-18 RX ORDER — OXYCODONE HYDROCHLORIDE 10 MG/1
10 TABLET ORAL EVERY 6 HOURS PRN
Qty: 100 TABLET | Refills: 0 | Status: CANCELLED | OUTPATIENT
Start: 2019-04-18 | End: 2019-05-18

## 2019-04-18 NOTE — TELEPHONE ENCOUNTER
I meant please follow up on this phone call. What Rx is getting sent in. Oxycodone or tramadol?  She doesn't need seen on 4/22/19

## 2019-04-18 NOTE — TELEPHONE ENCOUNTER
Called the patient back. Patient stated that the Ox-IR work better then Tramadol. Patient would like Oxycodone 10 mg. Writer cancelled the appt for 4/22/19  Oxycodone is pended.

## 2019-04-19 RX ORDER — TRAMADOL HYDROCHLORIDE 50 MG/1
50 TABLET ORAL EVERY 6 HOURS PRN
Qty: 28 TABLET | Refills: 0 | OUTPATIENT
Start: 2019-04-19 | End: 2019-04-26

## 2019-04-19 NOTE — TELEPHONE ENCOUNTER
Since tramadol was the last prescribed by Dr. Mian Cintron and I have not seen her recently, continue tramadol until next OV. Can then discuss preferable opiate at that time.

## 2019-04-19 NOTE — TELEPHONE ENCOUNTER
Patient called the office back. Patient understood to just wait until the OV, to be switched to Oxycodone. Patient would like a short script to be sent to the pharamcy. Also informed the patient to bring the Tramadol script to the office visit on 4/25/19. Writer sent a 28 tablets into [facility].

## 2019-04-22 ENCOUNTER — HOSPITAL ENCOUNTER (OUTPATIENT)
Dept: CT IMAGING | Age: 79
Discharge: HOME | End: 2019-04-24
Payer: MEDICARE

## 2019-04-22 DIAGNOSIS — M54.16 LUMBAR RADICULITIS: ICD-10-CM

## 2019-04-22 PROCEDURE — 72131 CT LUMBAR SPINE W/O DYE: CPT

## 2019-04-23 ENCOUNTER — TELEPHONE (OUTPATIENT)
Dept: PAIN MANAGEMENT | Age: 79
End: 2019-04-23

## 2019-04-23 NOTE — TELEPHONE ENCOUNTER
Doyle for pt to call the office to discuss her CT results, and CT results were sent to [facility] as well

## 2019-05-03 ENCOUNTER — OFFICE VISIT (OUTPATIENT)
Dept: PAIN MANAGEMENT | Age: 79
End: 2019-05-03
Payer: MEDICARE

## 2019-05-03 ENCOUNTER — HOSPITAL ENCOUNTER (OUTPATIENT)
Age: 79
Setting detail: SPECIMEN
Discharge: HOME | End: 2019-05-03
Payer: MEDICARE

## 2019-05-03 VITALS
DIASTOLIC BLOOD PRESSURE: 74 MMHG | BODY MASS INDEX: 31.41 KG/M2 | WEIGHT: 184 LBS | HEIGHT: 64 IN | HEART RATE: 72 BPM | SYSTOLIC BLOOD PRESSURE: 116 MMHG

## 2019-05-03 DIAGNOSIS — Z02.83 ENCOUNTER FOR DRUG SCREENING: ICD-10-CM

## 2019-05-03 DIAGNOSIS — G89.29 CHRONIC BILATERAL LOW BACK PAIN WITHOUT SCIATICA: ICD-10-CM

## 2019-05-03 DIAGNOSIS — M47.817 LUMBOSACRAL SPONDYLOSIS WITHOUT MYELOPATHY: ICD-10-CM

## 2019-05-03 DIAGNOSIS — Z79.891 ENCOUNTER FOR LONG-TERM OPIATE ANALGESIC USE: ICD-10-CM

## 2019-05-03 DIAGNOSIS — M54.50 CHRONIC BILATERAL LOW BACK PAIN WITHOUT SCIATICA: ICD-10-CM

## 2019-05-03 DIAGNOSIS — M51.36 LUMBAR DEGENERATIVE DISC DISEASE: Primary | ICD-10-CM

## 2019-05-03 DIAGNOSIS — M51.26 LUMBAR DISCOGENIC PAIN SYNDROME: ICD-10-CM

## 2019-05-03 PROCEDURE — 4040F PNEUMOC VAC/ADMIN/RCVD: CPT | Performed by: NURSE PRACTITIONER

## 2019-05-03 PROCEDURE — G8417 CALC BMI ABV UP PARAM F/U: HCPCS | Performed by: NURSE PRACTITIONER

## 2019-05-03 PROCEDURE — 99214 OFFICE O/P EST MOD 30 MIN: CPT | Performed by: NURSE PRACTITIONER

## 2019-05-03 PROCEDURE — G8427 DOCREV CUR MEDS BY ELIG CLIN: HCPCS | Performed by: NURSE PRACTITIONER

## 2019-05-03 PROCEDURE — 1090F PRES/ABSN URINE INCON ASSESS: CPT | Performed by: NURSE PRACTITIONER

## 2019-05-03 PROCEDURE — 1123F ACP DISCUSS/DSCN MKR DOCD: CPT | Performed by: NURSE PRACTITIONER

## 2019-05-03 PROCEDURE — G8400 PT W/DXA NO RESULTS DOC: HCPCS | Performed by: NURSE PRACTITIONER

## 2019-05-03 PROCEDURE — 80307 DRUG TEST PRSMV CHEM ANLYZR: CPT

## 2019-05-03 PROCEDURE — 1036F TOBACCO NON-USER: CPT | Performed by: NURSE PRACTITIONER

## 2019-05-03 RX ORDER — OXYCODONE HYDROCHLORIDE 10 MG/1
10 TABLET ORAL 2 TIMES DAILY PRN
Qty: 60 TABLET | Refills: 0 | Status: SHIPPED | OUTPATIENT
Start: 2019-05-03 | End: 2019-07-05 | Stop reason: SDUPTHER

## 2019-05-03 ASSESSMENT — ENCOUNTER SYMPTOMS
BACK PAIN: 1
RESPIRATORY NEGATIVE: 1
CONSTIPATION: 1

## 2019-05-03 NOTE — PROGRESS NOTES
 Reglan [Metoclopramide] Other (See Comments)     Hyperactive and stomach pain    Trazodone     Ultram [Tramadol] Itching    Garamycin [Gentamicin Sulfate] Other (See Comments)     Redness and irritation       Outpatient Medications Marked as Taking for the 5/3/19 encounter (Office Visit) with KENY Tyler CNP   Medication Sig Dispense Refill    oxyCODONE HCl (OXY-IR) 10 MG immediate release tablet Take 1 tablet by mouth 2 times daily as needed for Pain for up to 30 days. 60 tablet 0    metoprolol tartrate (LOPRESSOR) 25 MG tablet Take 12.5 mg by mouth 2 times daily      spironolactone (ALDACTONE) 25 MG tablet Take 25 mg by mouth daily Indications: half tab daily       aspirin 81 MG tablet Take 81 mg by mouth daily      levothyroxine (SYNTHROID) 75 MCG tablet Take 75 mcg by mouth daily      baclofen (LIORESAL) 10 MG tablet Take 10 mg by mouth      dicyclomine (BENTYL) 10 MG capsule dicyclomine 10 mg capsule   Take 1 capsule 4 times a day by oral route for 10 days.  potassium chloride (KLOR-CON M) 20 MEQ extended release tablet TAKE ONE TABLET ONCE A DAY      guaiFENesin (MUCINEX) 600 MG extended release tablet Mucinex 600 mg tablet, extended release   Take 2 tablets every day by oral route.  Heating Pads (RADHA PAD/SMARTHEAT PRO/) PADS 1 Units by Does not apply route 4 times daily Use for 15 minutes to 30 minutes at a time four times a day. 1 each 0    budesonide-formoterol (SYMBICORT) 160-4.5 MCG/ACT AERO Inhale 2 puffs into the lungs 2 times daily      lidocaine (ASPERCREME W/LIDOCAINE) 4 % cream Apply topically as needed for Pain Apply topically as needed.       furosemide (LASIX) 40 MG tablet Take 40 mg by mouth 3 times daily      aluminum & magnesium hydroxide-simethicone (MAALOX) 200-200-20 MG/5ML SUSP suspension Take 5 mLs by mouth every 6 hours as needed for Indigestion      fluticasone propionate 50 MCG/BLIST AEPB Inhale into the lungs      albuterol (ACCUNEB) 1.25 MG/3ML nebulizer solution Inhale 1 ampule into the lungs every 6 hours as needed for Wheezing      simvastatin (ZOCOR) 40 MG tablet Take 40 mg by mouth nightly      flecainide (TAMBOCOR) 50 MG tablet Take 50 mg by mouth 2 times daily.  omeprazole (PRILOSEC) 40 MG capsule Take 40 mg by mouth nightly.  montelukast (SINGULAIR) 10 MG tablet Take 10 mg by mouth nightly.  DULoxetine (CYMBALTA) 60 MG capsule Take 60 mg by mouth daily.  artificial tears (ARTIFICIAL TEARS) OINT as needed.  Saline 0.2 % SOLN by Nasal route daily as needed       Immune Globulin, Human, (HIZENTRA) 10 GM/50ML SOLN Inject into the skin Tuesdays      diphenhydrAMINE (BENADRYL) 25 MG capsule Take 25 mg by mouth every 6 hours as needed for Itching.  Ranitidine HCl (ZANTAC 75 PO) Take  by mouth.          Past Medical History:   Diagnosis Date    Anesthesia complication     2nd post op day from total knee patient stated \"I was wild and mean\"    Anxiety and depression     Arthritis     ASD (atrial septal defect)     repair in [facility] COPD (chronic obstructive pulmonary disease) ([address]     COPD (chronic obstructive pulmonary disease) ([facility] [address] [facility] 1980's    Disorder of immune system ([facility] [address]     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 (total left knee 3/2015 at [facility])    History of renal stone     passed    Hx of blood clots 1970    DVT    Hypertension     MVP (mitral valve prolapse)     LONI (obstructive sleep apnea)     uses bipap nightly    Rectocele     Spinal stenosis     Thyroid disease late 1980's    overactive radioactive iodine done       Past Surgical History:   Procedure Laterality Date    BACK SURGERY      BREAST LUMPECTOMY Left     BREAST LUMPECTOMY Left     CARDIAC SURGERY ASD repair    CARDIAC VALVE SURGERY       SECTION  0933/5682    2x    CHOLECYSTECTOMY      CHOLECYSTECTOMY      COLONOSCOPY      three    DIAGNOSTIC CARDIAC CATH LAB PROCEDURE      DILATION AND CURETTAGE OF UTERUS      EYE SURGERY Bilateral     cataract    HC INJECTION PROCEDURE FOR SACROILIAC JOINT Left 2018    Left SIJ and piriformis, left trocanteric bursa injection performed by [facility] at [facility]  2009    HYSTERECTOMY  2009    Total    JOINT REPLACEMENT Bilateral     knee    KNEE ARTHROSCOPY Left     LUMBAR SPINE SURGERY Bilateral 2019    Bilateral L4 TRANSFORAMINAL  [id number] performed by Rayna Manzanares MD at [facility] Bilateral 3/12/2019    Bilateral L4 TRANSFORAMINAL performed by Rayna Manzanares MD at [place] [address]  2014    UT INJ DX/THER AGNT PARAVERT FACET JOINT, LUMBAR/SAC, 2ND LEVEL Bilateral 2018    Bilateral L2 L3 L4 L5 Diagnostic Medial BB performed by Rayna Manzanares MD at [address] [place] [address] DX/THER AGNT PARAVERT FACET JOINT, LUMBAR/SAC, 2ND LEVEL Right 2018    Right L2 L3 L4 L5 Confirmatory Medial BB performed by Rayna Manzanares MD at [facility] ANES/STEROID [facility] ADD LEVEL Right 2018    Right C5 C6 TRANSFORAMINAL performed by Rayna Manzanares MD at [address] Left 2018    Left L2 L3 L4 L5 RADIOFREQUENCY performed by Rayna Manzanares MD at [address] Right 1/3/2019    Right L2 L3 L4 L5 RADIOFREQUENCY performed by Rayna Manzanares MD at [place] [address] Right 2005 & 2010    2x       Family History   Problem Relation Age of Onset    Heart Disease Father        Social History     Socioeconomic History    Marital status:      Spouse name: None    Number of children: None    Years of education: None    Highest education level: None   Occupational History    Occupation: retired   Social Needs    Financial resource strain: None    Food insecurity:     Worry: None     Inability: None    Transportation needs:     Medical: None     Non-medical: None   Tobacco Use    Smoking status: Never Smoker    Smokeless tobacco: Never Used   Substance and Sexual Activity    Alcohol use: Yes     Comment: very rare    Drug use: No    Sexual activity: None   Lifestyle    Physical activity:     Days per week: None     Minutes per session: None    Stress: None   Relationships    Social connections:     Talks on phone: None     Gets together: None     Attends Ten[employer] service: None     Active member of club or organization: None     Attends meetings of clubs or organizations: None     Relationship status: None    Intimate partner violence:     Fear of current or ex partner: None     Emotionally abused: None     Physically abused: None     Forced sexual activity: None   Other Topics Concern    None   Social History Narrative    None     Review of Systems   Constitutional: Positive for activity change and fatigue. Respiratory: Negative. Cardiovascular: Negative. Gastrointestinal: Positive for constipation. Genitourinary: Negative. Musculoskeletal: Positive for arthralgias, back pain and myalgias. Neurological: Positive for weakness. Psychiatric/Behavioral: Positive for sleep disturbance. Objective:   Physical Exam   Constitutional: She is oriented to person, place, and time. She appears well-developed and well-nourished. She is cooperative. No distress. She is not intubated. HENT:   Head: Normocephalic and atraumatic. Cardiovascular: Normal rate. Pulmonary/Chest: Effort normal. No accessory muscle usage. No apnea, no tachypnea and no bradypnea. She is not intubated. No respiratory distress. Musculoskeletal:        Left shoulder: She exhibits pain. Lumbar back: She exhibits pain.    CT OF THE LUMBAR SPINE WITHOUT changes of bilateral sacroiliac joints.     SOFT TISSUES/RETROPERITONEUM: No paraspinal mass is seen.  Calcified plaque  in the wall of abdominal aorta.  Left renal vein has retroaortic course.        Impression  1.  6 lumbar vertebral bodies, however, for counting purposes, the last  vertebral body is counted as L5 with the last intervertebral disc space is  counted as L5-S1.     2.  Degenerative changes lead to severe spinal canal stenosis and moderate to  severe left neural foraminal narrowing at L3-L4.     3.  Moderate spinal canal stenosis at L2-L3.     4.  Moderate bilateral neural foraminal narrowing at L4-L5.          Neurological: She is alert and oriented to person, place, and time. No cranial nerve deficit. GCS eye subscore is 4. GCS verbal subscore is 5. GCS motor subscore is 6. Skin: Skin is warm, dry and intact. Capillary refill takes less than 2 seconds. She is not diaphoretic. No cyanosis. No pallor. Nails show no clubbing. Psychiatric: She has a normal mood and affect. Her speech is normal and behavior is normal. Judgment normal. She is not agitated, not aggressive, not withdrawn and not combative. She does not express impulsivity or inappropriate judgment. Vitals reviewed. Assessment:      1. Lumbar degenerative disc disease    2. Lumbar discogenic pain syndrome    3. Lumbosacral spondylosis without myelopathy    4. Chronic bilateral low back pain without sciatica    5. Encounter for long-term opiate analgesic use    6. Encounter for drug screening          Plan:    Discontinue tramadol. Will change to oxycodone 10mg bid  Controlled Substances Monitoring: The Prescription Monitoring Report for this patient was reviewed today.  Concepción Angeles, APRN - CNP)    Random urine drug screen sent today., Obtaining appropriate analgesic effect of treatment., No signs of potential drug abuse or diversion identified: otherwise, see note documentation Concepción Angeles, APRN - CNP)     follow up two months    Obtained or confirmed a written medication contract was on file.  Leesa Molina, KENY - BENOIT)

## 2019-05-05 LAB

## 2019-05-13 ENCOUNTER — OFFICE VISIT (OUTPATIENT)
Dept: PAIN MANAGEMENT | Age: 79
End: 2019-05-13
Payer: MEDICARE

## 2019-05-13 VITALS
WEIGHT: 178 LBS | BODY MASS INDEX: 30.39 KG/M2 | SYSTOLIC BLOOD PRESSURE: 98 MMHG | HEIGHT: 64 IN | RESPIRATION RATE: 16 BRPM | DIASTOLIC BLOOD PRESSURE: 50 MMHG

## 2019-05-13 DIAGNOSIS — M47.816 LUMBAR FACET JOINT SYNDROME: ICD-10-CM

## 2019-05-13 DIAGNOSIS — M51.36 LUMBAR DEGENERATIVE DISC DISEASE: Primary | ICD-10-CM

## 2019-05-13 DIAGNOSIS — M25.512 LEFT SHOULDER PAIN, UNSPECIFIED CHRONICITY: ICD-10-CM

## 2019-05-13 PROCEDURE — 1036F TOBACCO NON-USER: CPT | Performed by: NURSE PRACTITIONER

## 2019-05-13 PROCEDURE — 1123F ACP DISCUSS/DSCN MKR DOCD: CPT | Performed by: NURSE PRACTITIONER

## 2019-05-13 PROCEDURE — 1090F PRES/ABSN URINE INCON ASSESS: CPT | Performed by: NURSE PRACTITIONER

## 2019-05-13 PROCEDURE — 4040F PNEUMOC VAC/ADMIN/RCVD: CPT | Performed by: NURSE PRACTITIONER

## 2019-05-13 PROCEDURE — G8417 CALC BMI ABV UP PARAM F/U: HCPCS | Performed by: NURSE PRACTITIONER

## 2019-05-13 PROCEDURE — G8427 DOCREV CUR MEDS BY ELIG CLIN: HCPCS | Performed by: NURSE PRACTITIONER

## 2019-05-13 PROCEDURE — 99214 OFFICE O/P EST MOD 30 MIN: CPT | Performed by: NURSE PRACTITIONER

## 2019-05-13 PROCEDURE — G8400 PT W/DXA NO RESULTS DOC: HCPCS | Performed by: NURSE PRACTITIONER

## 2019-05-13 ASSESSMENT — ENCOUNTER SYMPTOMS
BACK PAIN: 1
RESPIRATORY NEGATIVE: 1
CONSTIPATION: 1

## 2019-05-13 NOTE — PROGRESS NOTES
(See Comments)     lightheaded    Reglan [Metoclopramide] Other (See Comments)     Hyperactive and stomach pain    Trazodone     Ultram [Tramadol] Itching    Garamycin [Gentamicin Sulfate] Other (See Comments)     Redness and irritation       Outpatient Medications Marked as Taking for the 19 encounter (Office Visit) with KENY Villagomez CNP   Medication Sig Dispense Refill    diclofenac sodium 1 % GEL Apply 2 g topically 4 times daily 3 Tube 0       Past Medical History:   Diagnosis Date    Anesthesia complication     2nd post op day from total knee patient stated \"I was wild and mean\"    Anxiety and depression     Arthritis     ASD (atrial septal defect)     repair in [address] COPD [facility]ch[facility] obstructive [facility]) [facility]     COPD (chronic obstructive pulmonary disease) [facility]     Disorder of immune system ([facility] [address]     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 (total left knee 3/2015 at [facility])    History of renal stone     passed    Hx of blood clots     DVT    Hypertension     MVP (mitral valve prolapse)     LONI (obstructive sleep apnea)     uses bipap nightly    Rectocele     Spinal stenosis     Thyroid disease late     overactive radioactive iodine done       Past Surgical History:   Procedure Laterality Date    BACK SURGERY      BREAST LUMPECTOMY Left     BREAST LUMPECTOMY Left     CARDIAC SURGERY      ASD repair    CARDIAC VALVE SURGERY       SECTION  /    2x    CHOLECYSTECTOMY      CHOLECYSTECTOMY      COLONOSCOPY      three    DIAGNOSTIC CARDIAC CATH LAB PROCEDURE      DILATION AND CURETTAGE OF UTERUS      EYE SURGERY Bilateral     cataract    HC INJECTION PROCEDURE FOR SACROILIAC JOINT Left 7/31/2018    Left SIJ and piriformis, left trocanteric bursa injection performed by Corina Larose MD at [address]  12/2009    HYSTERECTOMY  2009    Total    JOINT REPLACEMENT Bilateral     knee    KNEE ARTHROSCOPY Left 2008    LUMBAR SPINE SURGERY Bilateral 2/26/2019    Bilateral L4 TRANSFORAMINAL  [id number] performed by Corina Larose MD at [id number] [place] [address] Dr Bilateral 3/12/2019    Bilateral L4 TRANSFORAMINAL performed by Corina Larose MD at [place] [address]  8/2014    TN INJ DX/THER AGNT PARAVERT FACET JOINT, LUMBAR/SAC, 2ND LEVEL Bilateral 8/30/2018    Bilateral L2 L3 L4 L5 Diagnostic Medial BB performed by Corina Larose MD at [address] [facility] DX/THER AGNT PARAVERT FACET JOINT, LUMBAR/SAC, 2ND LEVEL Right 9/13/2018    Right L2 L3 L4 L5 Confirmatory Medial BB performed by Corina Larose MD at [address] [facility] ANES/STEROID [address] ,EA ADD LEVEL Right 7/20/2018    Right C5 C6 TRANSFORAMINAL performed by Corina Larose MD at [address] Left 12/20/2018    Left L2 L3 L4 L5 RADIOFREQUENCY performed by Corina Larose MD at [address] Right 1/3/2019    Right L2 L3 L4 L5 RADIOFREQUENCY performed by Corina Larose MD at [address] Right 01/28/2005 & 03/16/2010    2x       Family History   Problem Relation Age of Onset    Heart Disease Father        Social History     Socioeconomic History    Marital status:      Spouse name: None    Number of children: None    Years of education: None    Highest education level: None   Occupational History    Occupation: retired   Social Needs    Financial resource strain: None    Food insecurity:     Worry: None     Inability: None    Transportation needs:     Medical: None     Non-medical: None   Tobacco Use    Smoking status: Never Smoker    Smokeless tobacco: Never Used   Substance and Sexual intervertebral disc space is  counted as L5-S1.     2.  Degenerative changes lead to severe spinal canal stenosis and moderate to  severe left neural foraminal narrowing at L3-L4.     3.  Moderate spinal canal stenosis at L2-L3.     4.  Moderate bilateral neural foraminal narrowing at L4-L5.          Neurological: She is alert and oriented to person, place, and time. No cranial nerve deficit. GCS eye subscore is 4. GCS verbal subscore is 5. GCS motor subscore is 6. Skin: Skin is warm, dry and intact. Capillary refill takes less than 2 seconds. She is not diaphoretic. No cyanosis. No pallor. Nails show no clubbing. Psychiatric: She has a normal mood and affect. Her speech is normal and behavior is normal. Judgment normal. She is not agitated, not aggressive, not withdrawn and not combative. She does not express impulsivity or inappropriate judgment. Vitals reviewed. Assessment:      1. Lumbar degenerative disc disease    2. Lumbar facet joint syndrome    3. Left shoulder pain, unspecified chronicity          Plan:     Chronic pain diagnoses such as   1. Lumbar degenerative disc disease    2. Lumbar facet joint syndrome    3. Left shoulder pain, unspecified chronicity     controlled on current medication regime, wll continue current pain medications to improve quality of life and function. Voltaren gel left shoulder prn pain  Controlled Substances Monitoring: The Prescription Monitoring Report for this patient was reviewed today.  KENY Cottrell - CNP)          follow up two months

## 2019-07-05 DIAGNOSIS — M54.50 CHRONIC BILATERAL LOW BACK PAIN WITHOUT SCIATICA: ICD-10-CM

## 2019-07-05 DIAGNOSIS — G89.29 CHRONIC BILATERAL LOW BACK PAIN WITHOUT SCIATICA: ICD-10-CM

## 2019-07-05 DIAGNOSIS — M51.26 LUMBAR DISCOGENIC PAIN SYNDROME: Primary | ICD-10-CM

## 2019-07-05 DIAGNOSIS — M47.817 LUMBOSACRAL SPONDYLOSIS WITHOUT MYELOPATHY: ICD-10-CM

## 2019-07-05 DIAGNOSIS — M51.36 LUMBAR DEGENERATIVE DISC DISEASE: ICD-10-CM

## 2019-07-05 RX ORDER — OXYCODONE HYDROCHLORIDE 10 MG/1
10 TABLET ORAL 2 TIMES DAILY PRN
Qty: 60 TABLET | Refills: 0 | Status: SHIPPED | OUTPATIENT
Start: 2019-07-05 | End: 2019-08-16 | Stop reason: SDUPTHER

## 2019-07-05 NOTE — TELEPHONE ENCOUNTER
[place]AR[place] for [place], and [place]: 5/3/19 oxycodone 10mg #60. Due now.

## 2019-08-16 DIAGNOSIS — M51.36 LUMBAR DEGENERATIVE DISC DISEASE: Primary | ICD-10-CM

## 2019-08-16 DIAGNOSIS — M47.817 LUMBOSACRAL SPONDYLOSIS WITHOUT MYELOPATHY: ICD-10-CM

## 2019-08-16 DIAGNOSIS — G89.29 CHRONIC BILATERAL LOW BACK PAIN WITHOUT SCIATICA: ICD-10-CM

## 2019-08-16 DIAGNOSIS — M51.26 LUMBAR DISCOGENIC PAIN SYNDROME: ICD-10-CM

## 2019-08-16 DIAGNOSIS — M54.50 CHRONIC BILATERAL LOW BACK PAIN WITHOUT SCIATICA: ICD-10-CM

## 2019-08-16 RX ORDER — OXYCODONE HYDROCHLORIDE 10 MG/1
10 TABLET ORAL 2 TIMES DAILY PRN
Qty: 60 TABLET | Refills: 0 | Status: SHIPPED | OUTPATIENT
Start: 2019-08-16 | End: 2019-08-19 | Stop reason: SDUPTHER

## 2019-08-19 DIAGNOSIS — G89.29 CHRONIC BILATERAL LOW BACK PAIN WITHOUT SCIATICA: ICD-10-CM

## 2019-08-19 DIAGNOSIS — M51.36 LUMBAR DEGENERATIVE DISC DISEASE: ICD-10-CM

## 2019-08-19 DIAGNOSIS — M51.26 LUMBAR DISCOGENIC PAIN SYNDROME: ICD-10-CM

## 2019-08-19 DIAGNOSIS — M54.50 CHRONIC BILATERAL LOW BACK PAIN WITHOUT SCIATICA: ICD-10-CM

## 2019-08-19 DIAGNOSIS — M47.817 LUMBOSACRAL SPONDYLOSIS WITHOUT MYELOPATHY: ICD-10-CM

## 2019-08-19 RX ORDER — OXYCODONE HYDROCHLORIDE 10 MG/1
10 TABLET ORAL 2 TIMES DAILY PRN
Qty: 60 TABLET | Refills: 0 | Status: SHIPPED | OUTPATIENT
Start: 2019-08-19 | End: 2019-09-10 | Stop reason: SDUPTHER

## 2019-09-10 DIAGNOSIS — M51.36 LUMBAR DEGENERATIVE DISC DISEASE: ICD-10-CM

## 2019-09-10 DIAGNOSIS — M54.50 CHRONIC BILATERAL LOW BACK PAIN WITHOUT SCIATICA: ICD-10-CM

## 2019-09-10 DIAGNOSIS — G89.29 CHRONIC BILATERAL LOW BACK PAIN WITHOUT SCIATICA: ICD-10-CM

## 2019-09-10 DIAGNOSIS — M47.817 LUMBOSACRAL SPONDYLOSIS WITHOUT MYELOPATHY: ICD-10-CM

## 2019-09-10 DIAGNOSIS — M51.26 LUMBAR DISCOGENIC PAIN SYNDROME: ICD-10-CM

## 2019-09-11 RX ORDER — OXYCODONE HYDROCHLORIDE 10 MG/1
10 TABLET ORAL 2 TIMES DAILY PRN
Qty: 60 TABLET | Refills: 0 | Status: SHIPPED | OUTPATIENT
Start: 2019-09-15 | End: 2019-10-16 | Stop reason: SDUPTHER

## 2019-09-16 ENCOUNTER — OFFICE VISIT (OUTPATIENT)
Dept: PAIN MANAGEMENT | Age: 79
End: 2019-09-16
Payer: MEDICARE

## 2019-09-16 VITALS
DIASTOLIC BLOOD PRESSURE: 58 MMHG | HEIGHT: 64 IN | BODY MASS INDEX: 30.39 KG/M2 | SYSTOLIC BLOOD PRESSURE: 138 MMHG | WEIGHT: 178 LBS

## 2019-09-16 DIAGNOSIS — Z79.899 ENCOUNTER FOR LONG-TERM (CURRENT) USE OF OTHER MEDICATIONS: ICD-10-CM

## 2019-09-16 DIAGNOSIS — M48.061 SPINAL STENOSIS, LUMBAR REGION, WITHOUT NEUROGENIC CLAUDICATION: ICD-10-CM

## 2019-09-16 DIAGNOSIS — M51.36 LUMBAR DEGENERATIVE DISC DISEASE: ICD-10-CM

## 2019-09-16 DIAGNOSIS — M54.16 LUMBAR RADICULITIS: Primary | ICD-10-CM

## 2019-09-16 PROCEDURE — 1036F TOBACCO NON-USER: CPT | Performed by: PHYSICAL MEDICINE & REHABILITATION

## 2019-09-16 PROCEDURE — G8427 DOCREV CUR MEDS BY ELIG CLIN: HCPCS | Performed by: PHYSICAL MEDICINE & REHABILITATION

## 2019-09-16 PROCEDURE — 1090F PRES/ABSN URINE INCON ASSESS: CPT | Performed by: PHYSICAL MEDICINE & REHABILITATION

## 2019-09-16 PROCEDURE — 99214 OFFICE O/P EST MOD 30 MIN: CPT | Performed by: PHYSICAL MEDICINE & REHABILITATION

## 2019-09-16 PROCEDURE — 1123F ACP DISCUSS/DSCN MKR DOCD: CPT | Performed by: PHYSICAL MEDICINE & REHABILITATION

## 2019-09-16 PROCEDURE — G8400 PT W/DXA NO RESULTS DOC: HCPCS | Performed by: PHYSICAL MEDICINE & REHABILITATION

## 2019-09-16 PROCEDURE — G8417 CALC BMI ABV UP PARAM F/U: HCPCS | Performed by: PHYSICAL MEDICINE & REHABILITATION

## 2019-09-16 PROCEDURE — 4040F PNEUMOC VAC/ADMIN/RCVD: CPT | Performed by: PHYSICAL MEDICINE & REHABILITATION

## 2019-09-16 RX ORDER — METHYLPREDNISOLONE 4 MG/1
TABLET ORAL
Qty: 1 KIT | Refills: 1 | Status: SHIPPED | OUTPATIENT
Start: 2019-09-16 | End: 2019-09-22

## 2019-09-16 ASSESSMENT — ENCOUNTER SYMPTOMS
BACK PAIN: 1
RESPIRATORY NEGATIVE: 1
CONSTIPATION: 0
ALLERGIC/IMMUNOLOGIC NEGATIVE: 1
DIARRHEA: 0
EYES NEGATIVE: 1

## 2019-09-16 NOTE — PROGRESS NOTES
(KLOR-CON M) 20 MEQ extended release tablet TAKE ONE TABLET ONCE A DAY      guaiFENesin (MUCINEX) 600 MG extended release tablet Mucinex 600 mg tablet, extended release   Take 2 tablets every day by oral route.  Heating Pads (RADHA PAD/SMARTHEAT PRO/) PADS 1 Units by Does not apply route 4 times daily Use for 15 minutes to 30 minutes at a time four times a day. 1 each 0    topiramate (TOPAMAX) 25 MG tablet Take 1 tablet by mouth 2 times daily (Patient taking differently: Take 25 mg by mouth daily ) 60 tablet 0    budesonide-formoterol (SYMBICORT) 160-4.5 MCG/ACT AERO Inhale 2 puffs into the lungs 2 times daily      lidocaine (ASPERCREME W/LIDOCAINE) 4 % cream Apply topically as needed for Pain Apply topically as needed.  furosemide (LASIX) 40 MG tablet Take 40 mg by mouth 3 times daily      fluticasone propionate 50 MCG/BLIST AEPB Inhale into the lungs      albuterol (ACCUNEB) 1.25 MG/3ML nebulizer solution Inhale 1 ampule into the lungs every 6 hours as needed for Wheezing      simvastatin (ZOCOR) 40 MG tablet Take 40 mg by mouth nightly      flecainide (TAMBOCOR) 50 MG tablet Take 50 mg by mouth 2 times daily.  omeprazole (PRILOSEC) 40 MG capsule Take 40 mg by mouth nightly.  montelukast (SINGULAIR) 10 MG tablet Take 10 mg by mouth nightly.  DULoxetine (CYMBALTA) 60 MG capsule Take 60 mg by mouth daily.  artificial tears (ARTIFICIAL TEARS) OINT as needed.  Saline 0.2 % SOLN by Nasal route daily as needed       Immune Globulin, Human, (HIZENTRA) 10 GM/50ML SOLN Inject into the skin Tuesdays      diphenhydrAMINE (BENADRYL) 25 MG capsule Take 25 mg by mouth every 6 hours as needed for Itching.  Ranitidine HCl (ZANTAC 75 PO) Take  by mouth.        Facility-Administered Medications Prior to Visit   Medication Dose Route Frequency Provider Last Rate Last Dose    triamcinolone acetonide (KENALOG-40) injection 40 mg  40 mg Intra-articular Once Mallory Monsalve MD Stress: None   Relationships    Social connections:     Talks on phone: None     Gets together: None     Attends [employer] service: None     Active member of club or organization: None     Attends meetings of clubs or organizations: None     Relationship status: None    Intimate partner violence:     Fear of current or ex partner: None     Emotionally abused: None     Physically abused: None     Forced sexual activity: None   Other Topics Concern    None   Social History Narrative    None         Family and Social Historyreviewed in the electronic medical record. Imaging:Reviewed available imaging in our system with the patient. No results found. Objective:     Physical Exam:  Vitals:    09/16/19 1601   BP: (!) 138/58   Site: Right Upper Arm   Position: Sitting   Cuff Size: Medium Adult   Weight: 178 lb (80.7 kg)   Height: 5' 4\" (1.626 m)     Pain Score:  10 - Worst pain ever    Physical Exam   Constitutional: She is oriented to person, place, and time. She appears well-developed and well-nourished. No distress. HENT:   Head: Normocephalic and atraumatic. Right Ear: External ear normal. No decreased hearing is noted. Left Ear: External ear normal. No decreased hearing is noted. Nose: Nose normal.   Mouth/Throat: Oropharynx is clear and moist and mucous membranes are normal.   Eyes: Conjunctivae and lids are normal. No scleral icterus. Neck: Phonation normal.   Cardiovascular:   No BLE edema present   Pulmonary/Chest: Effort normal. No accessory muscle usage. No respiratory distress. Abdominal: Normal appearance. Genitourinary:   Genitourinary Comments: deferred   Neurological: She is alert and oriented to person, place, and time. Skin: Skin is warm, dry and intact. No rash noted. She is not diaphoretic. No cyanosis or erythema. No pallor. Psychiatric: She has a normal mood and affect.  Her speech is normal and behavior is normal.     Back Exam     Tenderness   The patient is experiencing tenderness in the lumbar. Range of Motion   Extension: normal   Flexion: normal   Lateral bend right: normal   Lateral bend left: normal   Rotation right: normal   Rotation left: normal     Muscle Strength   Right Quadriceps:  5/5   Left Quadriceps:  5/5   Right Hamstrings:  5/5   Left Hamstrings:  5/5     Tests   Straight leg raise right: negative  Straight leg raise left: negative    Other   Toe walk: normal  Heel walk: normal  Sensation: normal  Gait: normal                                      Assessment: This is a 78 y.o. female patient with:    Diagnosis:   Diagnosis Orders   1. Lumbar radiculitis     2. Lumbar degenerative disc disease     3. Spinal stenosis, lumbar region, without neurogenic claudication     4. Encounter for long-term (current) use of other medications         Medical Comorbidities:  As listed in the patient's past medical and surgical history    Functional Limitations:   Pain limits function and quality of life. Plan:   LL4,5 TFE x2 off asa 2 days      medrol dose pack if cant get spinal injection  For  A week   Meds:   Controlled Substances Monitoring: Pt educated about the risks of taking opiates,including increased sedation, constipation, slowed breathing, tolerance, dependence,and addiction. New Prescriptions    METHYLPREDNISOLONE (MEDROL DOSEPACK) 4 MG TABLET    Take by mouth. Orders Placed This Encounter   Medications    methylPREDNISolone (MEDROL DOSEPACK) 4 MG tablet     Sig: Take by mouth. Dispense:  1 kit     Refill:  1     No orders of the defined types were placed in this encounter. No follow-ups on file. The patient expressed understanding of the above assessment and plan. Totaltime spent face to face with patient was 25 minutes inwhich  50% or more of the time was spent in counseling, education about risk andbenefits of the above plan, and coordination of care.

## 2019-09-16 NOTE — PATIENT INSTRUCTIONS
judgment following the procedure and driving a vehicle within 24 hours after the sedation could be dangerous. 6.  Wear simple loose clothing, which can be easily changed. 7.  Leave jewelry (including rings) and other valuables at home. 8.  You will be asked to sign several forms prior to surgery; patients under the age of 25 must have a parent or legal guardian sign the permit to be able to do the procedure. 9.  You must have finished any antibiotic prescribed for recent infections. If required, please take pre-procedure antibiotic or other pre-procedure medications as instructed. 10. Bring inhalers and pain medications with you to your procedure. 11. Bring your MRI/CT films if they were done outside of the [facility]. 12. If you should develop a cold, sore throat, cough, fever or other new indication of illness or infection, or are started on antibiotics within 2 weeks of the scheduled procedure, please notify the [facility] office as early as possible at [phone]. Tarsha Saldaña scheduled to see Dr. Denis Ferraro to undergo the following procedure:  Left L4 and L5 Transforaminal Epidural Steroid Injection    Procedure Date: ____Tuesday 10/8/19____  Arrival Time: ____9:00am____     Report to the [facility], Registration office on the 1st floor in the hospital, after check in and signing of paper work you will then go to the second floor to the surgery center. 1. Stop the following medications prior to the procedure:    Aspirin  Date__10/5/19__ : Stop blood thinners as directed before the injection, with permission from your cardiologist or primary care physician. We will send a letter to them requesting permission to hold the blood thinners. · Medication___Aspirin___ held for __3__ days    If you take Warfarin (Coumadin), you must have your blood drawn for an INR the day before the procedure.  INR must be less than 1.5.    2.  Take all routine

## 2019-09-23 ENCOUNTER — TELEPHONE (OUTPATIENT)
Dept: PAIN MANAGEMENT | Age: 79
End: 2019-09-23

## 2019-10-15 ENCOUNTER — HOSPITAL ENCOUNTER (OUTPATIENT)
Age: 79
Setting detail: OUTPATIENT SURGERY
Discharge: HOME | End: 2019-10-15
Attending: PHYSICAL MEDICINE & REHABILITATION | Admitting: PHYSICAL MEDICINE & REHABILITATION
Payer: MEDICARE

## 2019-10-15 ENCOUNTER — APPOINTMENT (OUTPATIENT)
Dept: GENERAL RADIOLOGY | Age: 79
End: 2019-10-15
Attending: PHYSICAL MEDICINE & REHABILITATION
Payer: MEDICARE

## 2019-10-15 VITALS
HEIGHT: 64 IN | RESPIRATION RATE: 18 BRPM | WEIGHT: 178 LBS | HEART RATE: 70 BPM | OXYGEN SATURATION: 97 % | SYSTOLIC BLOOD PRESSURE: 130 MMHG | DIASTOLIC BLOOD PRESSURE: 66 MMHG | TEMPERATURE: 97 F | BODY MASS INDEX: 30.39 KG/M2

## 2019-10-15 PROCEDURE — 2500000003 HC RX 250 WO HCPCS: Performed by: PHYSICAL MEDICINE & REHABILITATION

## 2019-10-15 PROCEDURE — 64483 NJX AA&/STRD TFRM EPI L/S 1: CPT | Performed by: PHYSICAL MEDICINE & REHABILITATION

## 2019-10-15 PROCEDURE — 6360000002 HC RX W HCPCS: Performed by: PHYSICAL MEDICINE & REHABILITATION

## 2019-10-15 PROCEDURE — 3209999900 FLUORO FOR SURGICAL PROCEDURES

## 2019-10-15 PROCEDURE — 6360000004 HC RX CONTRAST MEDICATION: Performed by: PHYSICAL MEDICINE & REHABILITATION

## 2019-10-15 PROCEDURE — 7100000010 HC PHASE II RECOVERY - FIRST 15 MIN: Performed by: PHYSICAL MEDICINE & REHABILITATION

## 2019-10-15 PROCEDURE — 7100000011 HC PHASE II RECOVERY - ADDTL 15 MIN: Performed by: PHYSICAL MEDICINE & REHABILITATION

## 2019-10-15 PROCEDURE — 2709999900 HC NON-CHARGEABLE SUPPLY: Performed by: PHYSICAL MEDICINE & REHABILITATION

## 2019-10-15 PROCEDURE — 3600000056 HC PAIN LEVEL 4 BASE: Performed by: PHYSICAL MEDICINE & REHABILITATION

## 2019-10-15 PROCEDURE — 2580000003 HC RX 258: Performed by: PHYSICAL MEDICINE & REHABILITATION

## 2019-10-15 PROCEDURE — 64484 NJX AA&/STRD TFRM EPI L/S EA: CPT | Performed by: PHYSICAL MEDICINE & REHABILITATION

## 2019-10-15 RX ORDER — DEXAMETHASONE SODIUM PHOSPHATE 10 MG/ML
INJECTION INTRAMUSCULAR; INTRAVENOUS PRN
Status: DISCONTINUED | OUTPATIENT
Start: 2019-10-15 | End: 2019-10-15 | Stop reason: ALTCHOICE

## 2019-10-15 RX ORDER — BUPIVACAINE HYDROCHLORIDE 5 MG/ML
INJECTION, SOLUTION EPIDURAL; INTRACAUDAL PRN
Status: DISCONTINUED | OUTPATIENT
Start: 2019-10-15 | End: 2019-10-15 | Stop reason: ALTCHOICE

## 2019-10-15 RX ORDER — 0.9 % SODIUM CHLORIDE 0.9 %
VIAL (ML) INJECTION PRN
Status: DISCONTINUED | OUTPATIENT
Start: 2019-10-15 | End: 2019-10-15 | Stop reason: ALTCHOICE

## 2019-10-15 ASSESSMENT — PAIN SCALES - GENERAL: PAINLEVEL_OUTOF10: 0

## 2019-10-15 ASSESSMENT — PAIN - FUNCTIONAL ASSESSMENT: PAIN_FUNCTIONAL_ASSESSMENT: 0-10

## 2019-10-16 DIAGNOSIS — G89.29 CHRONIC BILATERAL LOW BACK PAIN WITHOUT SCIATICA: ICD-10-CM

## 2019-10-16 DIAGNOSIS — M51.36 LUMBAR DEGENERATIVE DISC DISEASE: ICD-10-CM

## 2019-10-16 DIAGNOSIS — M51.26 LUMBAR DISCOGENIC PAIN SYNDROME: ICD-10-CM

## 2019-10-16 DIAGNOSIS — M47.817 LUMBOSACRAL SPONDYLOSIS WITHOUT MYELOPATHY: ICD-10-CM

## 2019-10-16 DIAGNOSIS — M54.50 CHRONIC BILATERAL LOW BACK PAIN WITHOUT SCIATICA: ICD-10-CM

## 2019-10-16 RX ORDER — OXYCODONE HYDROCHLORIDE 10 MG/1
10 TABLET ORAL 2 TIMES DAILY PRN
Qty: 60 TABLET | Refills: 0 | Status: SHIPPED | OUTPATIENT
Start: 2019-10-16 | End: 2019-11-25 | Stop reason: SDUPTHER

## 2019-10-16 RX ORDER — OXYCODONE HYDROCHLORIDE 10 MG/1
10 TABLET ORAL EVERY 6 HOURS PRN
Qty: 20 TABLET | Refills: 0 | Status: SHIPPED | OUTPATIENT
Start: 2019-10-16 | End: 2019-11-04 | Stop reason: SDUPTHER

## 2019-11-01 ENCOUNTER — APPOINTMENT (OUTPATIENT)
Dept: GENERAL RADIOLOGY | Age: 79
End: 2019-11-01
Attending: PHYSICAL MEDICINE & REHABILITATION
Payer: MEDICARE

## 2019-11-01 ENCOUNTER — HOSPITAL ENCOUNTER (OUTPATIENT)
Age: 79
Setting detail: OUTPATIENT SURGERY
Discharge: HOME | End: 2019-11-01
Attending: PHYSICAL MEDICINE & REHABILITATION | Admitting: PHYSICAL MEDICINE & REHABILITATION
Payer: MEDICARE

## 2019-11-01 VITALS
RESPIRATION RATE: 16 BRPM | OXYGEN SATURATION: 98 % | DIASTOLIC BLOOD PRESSURE: 66 MMHG | TEMPERATURE: 98.2 F | SYSTOLIC BLOOD PRESSURE: 144 MMHG | HEART RATE: 71 BPM

## 2019-11-01 PROCEDURE — 64484 NJX AA&/STRD TFRM EPI L/S EA: CPT | Performed by: PHYSICAL MEDICINE & REHABILITATION

## 2019-11-01 PROCEDURE — 2500000003 HC RX 250 WO HCPCS: Performed by: PHYSICAL MEDICINE & REHABILITATION

## 2019-11-01 PROCEDURE — 7100000010 HC PHASE II RECOVERY - FIRST 15 MIN: Performed by: PHYSICAL MEDICINE & REHABILITATION

## 2019-11-01 PROCEDURE — 6360000004 HC RX CONTRAST MEDICATION: Performed by: PHYSICAL MEDICINE & REHABILITATION

## 2019-11-01 PROCEDURE — 7100000011 HC PHASE II RECOVERY - ADDTL 15 MIN: Performed by: PHYSICAL MEDICINE & REHABILITATION

## 2019-11-01 PROCEDURE — 2580000003 HC RX 258: Performed by: PHYSICAL MEDICINE & REHABILITATION

## 2019-11-01 PROCEDURE — 3600000056 HC PAIN LEVEL 4 BASE: Performed by: PHYSICAL MEDICINE & REHABILITATION

## 2019-11-01 PROCEDURE — 6360000002 HC RX W HCPCS: Performed by: PHYSICAL MEDICINE & REHABILITATION

## 2019-11-01 PROCEDURE — 2709999900 HC NON-CHARGEABLE SUPPLY: Performed by: PHYSICAL MEDICINE & REHABILITATION

## 2019-11-01 PROCEDURE — 64483 NJX AA&/STRD TFRM EPI L/S 1: CPT | Performed by: PHYSICAL MEDICINE & REHABILITATION

## 2019-11-01 PROCEDURE — 3209999900 FLUORO FOR SURGICAL PROCEDURES

## 2019-11-01 RX ORDER — DEXAMETHASONE SODIUM PHOSPHATE 10 MG/ML
INJECTION INTRAMUSCULAR; INTRAVENOUS PRN
Status: DISCONTINUED | OUTPATIENT
Start: 2019-11-01 | End: 2019-11-01 | Stop reason: ALTCHOICE

## 2019-11-01 RX ORDER — SODIUM CHLORIDE 9 MG/ML
INJECTION INTRAVENOUS PRN
Status: DISCONTINUED | OUTPATIENT
Start: 2019-11-01 | End: 2019-11-01 | Stop reason: ALTCHOICE

## 2019-11-01 RX ORDER — BUPIVACAINE HYDROCHLORIDE 5 MG/ML
INJECTION, SOLUTION EPIDURAL; INTRACAUDAL PRN
Status: DISCONTINUED | OUTPATIENT
Start: 2019-11-01 | End: 2019-11-01 | Stop reason: ALTCHOICE

## 2019-11-01 ASSESSMENT — PAIN SCALES - GENERAL: PAINLEVEL_OUTOF10: 0

## 2019-11-04 ENCOUNTER — OFFICE VISIT (OUTPATIENT)
Dept: PAIN MANAGEMENT | Age: 79
End: 2019-11-04
Payer: MEDICARE

## 2019-11-04 VITALS
DIASTOLIC BLOOD PRESSURE: 62 MMHG | BODY MASS INDEX: 30.8 KG/M2 | WEIGHT: 180.4 LBS | HEIGHT: 64 IN | RESPIRATION RATE: 16 BRPM | SYSTOLIC BLOOD PRESSURE: 112 MMHG

## 2019-11-04 DIAGNOSIS — M54.16 LUMBAR RADICULITIS: ICD-10-CM

## 2019-11-04 DIAGNOSIS — M48.061 SPINAL STENOSIS, LUMBAR REGION, WITHOUT NEUROGENIC CLAUDICATION: Chronic | ICD-10-CM

## 2019-11-04 DIAGNOSIS — M47.816 LUMBAR FACET JOINT SYNDROME: Primary | ICD-10-CM

## 2019-11-04 DIAGNOSIS — M51.36 LUMBAR DEGENERATIVE DISC DISEASE: ICD-10-CM

## 2019-11-04 DIAGNOSIS — M54.50 CHRONIC BILATERAL LOW BACK PAIN WITHOUT SCIATICA: ICD-10-CM

## 2019-11-04 DIAGNOSIS — G89.29 CHRONIC BILATERAL LOW BACK PAIN WITHOUT SCIATICA: ICD-10-CM

## 2019-11-04 PROCEDURE — 1090F PRES/ABSN URINE INCON ASSESS: CPT | Performed by: NURSE PRACTITIONER

## 2019-11-04 PROCEDURE — G8427 DOCREV CUR MEDS BY ELIG CLIN: HCPCS | Performed by: NURSE PRACTITIONER

## 2019-11-04 PROCEDURE — G8400 PT W/DXA NO RESULTS DOC: HCPCS | Performed by: NURSE PRACTITIONER

## 2019-11-04 PROCEDURE — 1123F ACP DISCUSS/DSCN MKR DOCD: CPT | Performed by: NURSE PRACTITIONER

## 2019-11-04 PROCEDURE — G8484 FLU IMMUNIZE NO ADMIN: HCPCS | Performed by: NURSE PRACTITIONER

## 2019-11-04 PROCEDURE — 99214 OFFICE O/P EST MOD 30 MIN: CPT | Performed by: NURSE PRACTITIONER

## 2019-11-04 PROCEDURE — G8417 CALC BMI ABV UP PARAM F/U: HCPCS | Performed by: NURSE PRACTITIONER

## 2019-11-04 PROCEDURE — 1036F TOBACCO NON-USER: CPT | Performed by: NURSE PRACTITIONER

## 2019-11-04 PROCEDURE — 4040F PNEUMOC VAC/ADMIN/RCVD: CPT | Performed by: NURSE PRACTITIONER

## 2019-11-04 RX ORDER — UREA 10 %
LOTION (ML) TOPICAL
COMMUNITY

## 2019-11-04 ASSESSMENT — ENCOUNTER SYMPTOMS
CONSTIPATION: 1
BACK PAIN: 1
RESPIRATORY NEGATIVE: 1

## 2019-11-25 DIAGNOSIS — M51.26 LUMBAR DISCOGENIC PAIN SYNDROME: ICD-10-CM

## 2019-11-25 DIAGNOSIS — G89.29 CHRONIC BILATERAL LOW BACK PAIN WITHOUT SCIATICA: ICD-10-CM

## 2019-11-25 DIAGNOSIS — M54.50 CHRONIC BILATERAL LOW BACK PAIN WITHOUT SCIATICA: ICD-10-CM

## 2019-11-25 DIAGNOSIS — M51.36 LUMBAR DEGENERATIVE DISC DISEASE: ICD-10-CM

## 2019-11-25 DIAGNOSIS — M47.817 LUMBOSACRAL SPONDYLOSIS WITHOUT MYELOPATHY: ICD-10-CM

## 2019-11-25 RX ORDER — OXYCODONE HYDROCHLORIDE 10 MG/1
10 TABLET ORAL 2 TIMES DAILY PRN
Qty: 60 TABLET | Refills: 0 | Status: SHIPPED | OUTPATIENT
Start: 2019-11-25 | End: 2019-12-06 | Stop reason: SDUPTHER

## 2019-12-06 ENCOUNTER — OFFICE VISIT (OUTPATIENT)
Dept: PAIN MANAGEMENT | Age: 79
End: 2019-12-06
Payer: MEDICARE

## 2019-12-06 VITALS
HEIGHT: 64 IN | SYSTOLIC BLOOD PRESSURE: 122 MMHG | RESPIRATION RATE: 16 BRPM | DIASTOLIC BLOOD PRESSURE: 74 MMHG | HEART RATE: 64 BPM | BODY MASS INDEX: 30.93 KG/M2 | WEIGHT: 181.2 LBS

## 2019-12-06 DIAGNOSIS — M51.36 LUMBAR DEGENERATIVE DISC DISEASE: ICD-10-CM

## 2019-12-06 DIAGNOSIS — M54.16 LUMBAR RADICULOPATHY: Primary | ICD-10-CM

## 2019-12-06 DIAGNOSIS — G89.29 CHRONIC BILATERAL LOW BACK PAIN WITHOUT SCIATICA: ICD-10-CM

## 2019-12-06 DIAGNOSIS — M47.817 LUMBOSACRAL SPONDYLOSIS WITHOUT MYELOPATHY: ICD-10-CM

## 2019-12-06 DIAGNOSIS — M48.07 SPINAL STENOSIS OF LUMBOSACRAL REGION: ICD-10-CM

## 2019-12-06 DIAGNOSIS — M51.26 LUMBAR DISCOGENIC PAIN SYNDROME: ICD-10-CM

## 2019-12-06 DIAGNOSIS — M54.50 CHRONIC BILATERAL LOW BACK PAIN WITHOUT SCIATICA: ICD-10-CM

## 2019-12-06 PROCEDURE — 1036F TOBACCO NON-USER: CPT | Performed by: PHYSICAL MEDICINE & REHABILITATION

## 2019-12-06 PROCEDURE — 99214 OFFICE O/P EST MOD 30 MIN: CPT | Performed by: PHYSICAL MEDICINE & REHABILITATION

## 2019-12-06 PROCEDURE — G8400 PT W/DXA NO RESULTS DOC: HCPCS | Performed by: PHYSICAL MEDICINE & REHABILITATION

## 2019-12-06 PROCEDURE — 1123F ACP DISCUSS/DSCN MKR DOCD: CPT | Performed by: PHYSICAL MEDICINE & REHABILITATION

## 2019-12-06 PROCEDURE — 1090F PRES/ABSN URINE INCON ASSESS: CPT | Performed by: PHYSICAL MEDICINE & REHABILITATION

## 2019-12-06 PROCEDURE — 4040F PNEUMOC VAC/ADMIN/RCVD: CPT | Performed by: PHYSICAL MEDICINE & REHABILITATION

## 2019-12-06 PROCEDURE — G8427 DOCREV CUR MEDS BY ELIG CLIN: HCPCS | Performed by: PHYSICAL MEDICINE & REHABILITATION

## 2019-12-06 PROCEDURE — G8417 CALC BMI ABV UP PARAM F/U: HCPCS | Performed by: PHYSICAL MEDICINE & REHABILITATION

## 2019-12-06 PROCEDURE — G8484 FLU IMMUNIZE NO ADMIN: HCPCS | Performed by: PHYSICAL MEDICINE & REHABILITATION

## 2019-12-06 RX ORDER — OXYCODONE HYDROCHLORIDE 10 MG/1
10 TABLET ORAL EVERY 8 HOURS PRN
Qty: 90 TABLET | Refills: 0 | Status: SHIPPED | OUTPATIENT
Start: 2019-12-06 | End: 2019-12-23

## 2019-12-06 ASSESSMENT — ENCOUNTER SYMPTOMS
DIARRHEA: 0
BACK PAIN: 1
EYES NEGATIVE: 1
ALLERGIC/IMMUNOLOGIC NEGATIVE: 1
CONSTIPATION: 0
RESPIRATORY NEGATIVE: 1

## 2020-01-06 ENCOUNTER — OFFICE VISIT (OUTPATIENT)
Dept: PAIN MANAGEMENT | Age: 80
End: 2020-01-06
Payer: MEDICARE

## 2020-01-06 VITALS
WEIGHT: 180 LBS | DIASTOLIC BLOOD PRESSURE: 68 MMHG | HEIGHT: 64 IN | HEART RATE: 76 BPM | BODY MASS INDEX: 30.73 KG/M2 | SYSTOLIC BLOOD PRESSURE: 124 MMHG

## 2020-01-06 PROCEDURE — 99214 OFFICE O/P EST MOD 30 MIN: CPT | Performed by: PHYSICAL MEDICINE & REHABILITATION

## 2020-01-06 PROCEDURE — 1123F ACP DISCUSS/DSCN MKR DOCD: CPT | Performed by: PHYSICAL MEDICINE & REHABILITATION

## 2020-01-06 PROCEDURE — 1036F TOBACCO NON-USER: CPT | Performed by: PHYSICAL MEDICINE & REHABILITATION

## 2020-01-06 PROCEDURE — 1090F PRES/ABSN URINE INCON ASSESS: CPT | Performed by: PHYSICAL MEDICINE & REHABILITATION

## 2020-01-06 PROCEDURE — G8484 FLU IMMUNIZE NO ADMIN: HCPCS | Performed by: PHYSICAL MEDICINE & REHABILITATION

## 2020-01-06 PROCEDURE — 4040F PNEUMOC VAC/ADMIN/RCVD: CPT | Performed by: PHYSICAL MEDICINE & REHABILITATION

## 2020-01-06 PROCEDURE — G8417 CALC BMI ABV UP PARAM F/U: HCPCS | Performed by: PHYSICAL MEDICINE & REHABILITATION

## 2020-01-06 PROCEDURE — G8427 DOCREV CUR MEDS BY ELIG CLIN: HCPCS | Performed by: PHYSICAL MEDICINE & REHABILITATION

## 2020-01-06 PROCEDURE — G8400 PT W/DXA NO RESULTS DOC: HCPCS | Performed by: PHYSICAL MEDICINE & REHABILITATION

## 2020-01-06 RX ORDER — METHYLPREDNISOLONE 4 MG/1
TABLET ORAL
Qty: 1 KIT | Refills: 1 | Status: SHIPPED | OUTPATIENT
Start: 2020-01-06 | End: 2020-01-12

## 2020-01-06 RX ORDER — BACLOFEN 10 MG/1
10 TABLET ORAL
COMMUNITY
Start: 2019-12-26 | End: 2020-01-25

## 2020-01-06 ASSESSMENT — ENCOUNTER SYMPTOMS
BACK PAIN: 1
EYES NEGATIVE: 1
ALLERGIC/IMMUNOLOGIC NEGATIVE: 1
CONSTIPATION: 0
DIARRHEA: 0
RESPIRATORY NEGATIVE: 1

## 2020-01-06 NOTE — PROGRESS NOTES
by oral route.  Heating Pads (RADHA PAD/SMARTHEAT PRO/) PADS 1 Units by Does not apply route 4 times daily Use for 15 minutes to 30 minutes at a time four times a day. 1 each 0    topiramate (TOPAMAX) 25 MG tablet Take 1 tablet by mouth 2 times daily (Patient taking differently: Take 25 mg by mouth daily ) 60 tablet 0    budesonide-formoterol (SYMBICORT) 160-4.5 MCG/ACT AERO Inhale 2 puffs into the lungs 2 times daily      furosemide (LASIX) 40 MG tablet Take 40 mg by mouth 3 times daily Indications: 40 in am & 20 @ night, 20mg additional if needed       fluticasone propionate 50 MCG/BLIST AEPB Inhale into the lungs      simvastatin (ZOCOR) 40 MG tablet Take 40 mg by mouth nightly      flecainide (TAMBOCOR) 50 MG tablet Take 50 mg by mouth 2 times daily.  omeprazole (PRILOSEC) 40 MG capsule Take 40 mg by mouth nightly.  montelukast (SINGULAIR) 10 MG tablet Take 10 mg by mouth nightly.  DULoxetine (CYMBALTA) 60 MG capsule Take 60 mg by mouth daily.  artificial tears (ARTIFICIAL TEARS) OINT as needed.  Immune Globulin, Human, (HIZENTRA) 10 GM/50ML SOLN Inject into the skin Tuesdays      diphenhydrAMINE (BENADRYL) 25 MG capsule Take 25 mg by mouth every 6 hours as needed for Itching.  BiPAP Machine MISC       lidocaine (ASPERCREME W/LIDOCAINE) 4 % cream Apply topically as needed for Pain Apply topically as needed.       albuterol (ACCUNEB) 1.25 MG/3ML nebulizer solution Inhale 1 ampule into the lungs every 6 hours as needed for Wheezing      Saline 0.2 % SOLN by Nasal route daily as needed        Facility-Administered Medications Prior to Visit   Medication Dose Route Frequency Provider Last Rate Last Dose    triamcinolone acetonide (KENALOG-40) injection 40 mg  40 mg Intra-articular Once Sonny Gtz MD        lidocaine 2 % injection 5 mL  5 mL Intradermal Once Sonny Gtz MD        bupivacaine (MARCAINE) 0.5 % injection 150 mg  30 mL Intra-articular Once Eric Gray MD           Past Medical History:   Diagnosis Date    Anesthesia complication     2nd post op day from total knee patient stated \"I was wild and mean\"    Anxiety and depression     Arthritis     ASD (atrial septal defect)     repair in [address] COPD [facility]chronic obstructive pulmonary [facility]) [facility] [address]     COPD (chronic obstructive pulmonary disease) ([facility] [address] 's    Disorder of immune system ([facility] [address]     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 ([facility] 3/2015 [facility])    History of renal stone     passed    Hx of blood clots     DVT    Hypertension     MVP (mitral valve prolapse)     LONI (obstructive sleep apnea)     uses bipap nightly    Rectocele     Spinal stenosis     Thyroid disease late     overactive radioactive iodine done       Past Surgical History:   Procedure Laterality Date    BACK SURGERY      BREAST LUMPECTOMY Left     BREAST LUMPECTOMY Left     CARDIAC SURGERY      ASD repair    CARDIAC VALVE SURGERY       SECTION  /    2x    CHOLECYSTECTOMY      CHOLECYSTECTOMY      COLONOSCOPY      three    DIAGNOSTIC CARDIAC CATH LAB PROCEDURE      DILATION AND CURETTAGE OF UTERUS      EYE SURGERY Bilateral     cataract    HC INJECTION PROCEDURE FOR SACROILIAC JOINT Left 2018    Left SIJ and piriformis, left trocanteric bursa injection performed by Marely Steele MD at [address]  2009    HYSTERECTOMY  2009    Total    JOINT REPLACEMENT Bilateral     knee    KNEE ARTHROSCOPY Left     LUMBAR SPINE SURGERY Bilateral 2019    Bilateral L4 TRANSFORAMINAL  [id number] performed by Marely Steele MD at [place] [address] Bilateral 3/12/2019    Bilateral L4 Relationships    Social connections:     Talks on phone: None     Gets together: None     Attends [employer] service: None     Active member of club or organization: None     Attends meetings of clubs or organizations: None     Relationship status: None    Intimate partner violence:     Fear of current or ex partner: None     Emotionally abused: None     Physically abused: None     Forced sexual activity: None   Other Topics Concern    None   Social History Narrative    None         Family and Social Historyreviewed in the electronic medical record. Imaging:Reviewed available imaging in our system with the patient. No results found. Objective:     Physical Exam:  Vitals:    01/06/20 1319   BP: 124/68   Pulse: 76   Weight: 180 lb (81.6 kg)   Height: 5' 4\" (1.626 m)          Physical Exam  Constitutional:       General: She is not in acute distress. Appearance: Normal appearance. She is well-developed. She is not diaphoretic. HENT:      Head: Normocephalic and atraumatic. Right Ear: External ear normal. No decreased hearing noted. Left Ear: External ear normal. No decreased hearing noted. Nose: Nose normal.   Eyes:      General: Lids are normal. No scleral icterus. Conjunctiva/sclera: Conjunctivae normal.   Neck:      Trachea: Phonation normal.   Cardiovascular:      Comments: No BLE edema present  Pulmonary:      Effort: Pulmonary effort is normal. No accessory muscle usage or respiratory distress. Genitourinary:     Comments: deferred  Skin:     General: Skin is warm and dry. Coloration: Skin is not pale. Findings: No erythema or rash. Neurological:      Mental Status: She is alert and oriented to person, place, and time.    Psychiatric:         Speech: Speech normal.         Behavior: Behavior normal.       Back Exam     Tenderness   The patient is experiencing tenderness in the lumbar and cervical.    Range of Motion   Extension: normal   Flexion: normal   Lateral

## 2020-01-09 ENCOUNTER — TELEPHONE (OUTPATIENT)
Dept: PAIN MANAGEMENT | Age: 80
End: 2020-01-09

## 2020-01-09 NOTE — TELEPHONE ENCOUNTER
FYI:  The patient stated that she has been having a lot of back pain and requested to have a procedure in her low back on both sides     Per last appt:     Plan:   Discussed can order R then L cervical facet injections  And consider R and L L4,5 TFEs    Writer scheduled the patient for her Left L4 and L5 TFE on 1/24/20 and Right on 2/7/20

## 2020-01-22 NOTE — TELEPHONE ENCOUNTER
The patient called to cancel her procedure on 1/24/20 due to being on an antibiotic for sinus infection. She rescheduled for 2/21/20.  She will be done with antibiotics on 1/26/20

## 2020-01-23 RX ORDER — OXYCODONE HYDROCHLORIDE 10 MG/1
10 TABLET ORAL 2 TIMES DAILY
Qty: 60 TABLET | Refills: 0 | Status: SHIPPED | OUTPATIENT
Start: 2020-01-25 | End: 2020-03-17 | Stop reason: SDUPTHER

## 2020-02-07 ENCOUNTER — HOSPITAL ENCOUNTER (OUTPATIENT)
Age: 80
Setting detail: OUTPATIENT SURGERY
Discharge: HOME | End: 2020-02-07
Attending: PHYSICAL MEDICINE & REHABILITATION | Admitting: PHYSICAL MEDICINE & REHABILITATION
Payer: MEDICARE

## 2020-02-07 ENCOUNTER — APPOINTMENT (OUTPATIENT)
Dept: GENERAL RADIOLOGY | Age: 80
End: 2020-02-07
Attending: PHYSICAL MEDICINE & REHABILITATION
Payer: MEDICARE

## 2020-02-07 VITALS
SYSTOLIC BLOOD PRESSURE: 116 MMHG | HEIGHT: 64 IN | OXYGEN SATURATION: 98 % | BODY MASS INDEX: 31.07 KG/M2 | DIASTOLIC BLOOD PRESSURE: 59 MMHG | RESPIRATION RATE: 16 BRPM | HEART RATE: 77 BPM | TEMPERATURE: 97 F | WEIGHT: 182 LBS

## 2020-02-07 PROCEDURE — 2709999900 HC NON-CHARGEABLE SUPPLY: Performed by: PHYSICAL MEDICINE & REHABILITATION

## 2020-02-07 PROCEDURE — 3600000056 HC PAIN LEVEL 4 BASE: Performed by: PHYSICAL MEDICINE & REHABILITATION

## 2020-02-07 PROCEDURE — 6360000002 HC RX W HCPCS: Performed by: PHYSICAL MEDICINE & REHABILITATION

## 2020-02-07 PROCEDURE — 3209999900 FLUORO FOR SURGICAL PROCEDURES

## 2020-02-07 PROCEDURE — 64484 NJX AA&/STRD TFRM EPI L/S EA: CPT | Performed by: PHYSICAL MEDICINE & REHABILITATION

## 2020-02-07 PROCEDURE — 6360000004 HC RX CONTRAST MEDICATION: Performed by: PHYSICAL MEDICINE & REHABILITATION

## 2020-02-07 PROCEDURE — 2580000003 HC RX 258: Performed by: PHYSICAL MEDICINE & REHABILITATION

## 2020-02-07 PROCEDURE — 64483 NJX AA&/STRD TFRM EPI L/S 1: CPT | Performed by: PHYSICAL MEDICINE & REHABILITATION

## 2020-02-07 PROCEDURE — 7100000010 HC PHASE II RECOVERY - FIRST 15 MIN: Performed by: PHYSICAL MEDICINE & REHABILITATION

## 2020-02-07 PROCEDURE — 2500000003 HC RX 250 WO HCPCS: Performed by: PHYSICAL MEDICINE & REHABILITATION

## 2020-02-07 RX ORDER — BUPIVACAINE HYDROCHLORIDE 5 MG/ML
INJECTION, SOLUTION EPIDURAL; INTRACAUDAL PRN
Status: DISCONTINUED | OUTPATIENT
Start: 2020-02-07 | End: 2020-02-07 | Stop reason: ALTCHOICE

## 2020-02-07 RX ORDER — DEXAMETHASONE SODIUM PHOSPHATE 10 MG/ML
INJECTION INTRAMUSCULAR; INTRAVENOUS PRN
Status: DISCONTINUED | OUTPATIENT
Start: 2020-02-07 | End: 2020-02-07 | Stop reason: ALTCHOICE

## 2020-02-07 RX ORDER — SODIUM CHLORIDE 9 MG/ML
INJECTION INTRAVENOUS PRN
Status: DISCONTINUED | OUTPATIENT
Start: 2020-02-07 | End: 2020-02-07 | Stop reason: ALTCHOICE

## 2020-02-07 ASSESSMENT — ENCOUNTER SYMPTOMS
ALLERGIC/IMMUNOLOGIC NEGATIVE: 1
CONSTIPATION: 0
BACK PAIN: 1
DIARRHEA: 0
EYES NEGATIVE: 1
RESPIRATORY NEGATIVE: 1

## 2020-02-07 ASSESSMENT — PAIN DESCRIPTION - DESCRIPTORS: DESCRIPTORS: SHARP

## 2020-02-07 ASSESSMENT — PAIN - FUNCTIONAL ASSESSMENT: PAIN_FUNCTIONAL_ASSESSMENT: 0-10

## 2020-02-07 ASSESSMENT — PAIN SCALES - GENERAL: PAINLEVEL_OUTOF10: 0

## 2020-02-07 NOTE — OP NOTE
TRANSFORAMINAL EPIDURAL STEROID INJECTION    2/7/20    Surgeon: Samantha Peace MD    Pre-operative Diagnosis:   Patient Active Problem List   Diagnosis Code    Lumbar degenerative disc disease M51.36    Lumbar canal stenosis M48.061    Lumbar discogenic pain syndrome M51.26    Encounter for long-term (current) use of other medications Z79.899    Degeneration of lumbar or lumbosacral intervertebral disc M51.37    Spinal stenosis, lumbar region, without neurogenic claudication M48.061    Thoracic or lumbosacral neuritis or radiculitis, unspecified [id number]    Greater trochanteric bursitis of both hips M70.61, M70.62    Sacroiliitis (HCC) M46.1    Chronic left sacroiliac joint pain M53.3, 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 female patient with pain in the Back, neck. Previous treatment and examination findings are noted in the H&P.RL4,5 transforaminal epidural injection has been requested for diagnostic and therapeutic reasons. Conservative treatment was ineffective i.e.: ice, NSAIDS, rest, narcotic medication, chiropractic care, physical therapy and message therapy. Patient is unable to perform the following ADL's: ambulating and grooming     Pain Assessment: 0-10  Pain Level: 5     Pain Orientation: Right, Lower  Pain Location: Back  Pain Descriptors: Sharp    Last Plain films: 2019      EXAMINATION:  1. RL4,5 transforaminal radiculogram/epidurogram.   2. RL4,5 transforaminal epidural anesthetic injection. 3. RL4,5 transforaminal epidural steroid injection. CONSENT: Written consent was obtained from the patient on preprinted consent form after explaining the procedure, indications, potential complications and outcomes. Alternative treatments were also discussed.     DISCUSSION: The

## 2020-02-21 ENCOUNTER — HOSPITAL ENCOUNTER (OUTPATIENT)
Age: 80
Setting detail: OUTPATIENT SURGERY
Discharge: HOME | End: 2020-02-21
Attending: PHYSICAL MEDICINE & REHABILITATION | Admitting: PHYSICAL MEDICINE & REHABILITATION
Payer: MEDICARE

## 2020-02-21 ENCOUNTER — APPOINTMENT (OUTPATIENT)
Dept: GENERAL RADIOLOGY | Age: 80
End: 2020-02-21
Attending: PHYSICAL MEDICINE & REHABILITATION
Payer: MEDICARE

## 2020-02-21 VITALS
HEIGHT: 64 IN | WEIGHT: 182 LBS | DIASTOLIC BLOOD PRESSURE: 73 MMHG | RESPIRATION RATE: 16 BRPM | HEART RATE: 70 BPM | SYSTOLIC BLOOD PRESSURE: 135 MMHG | BODY MASS INDEX: 31.07 KG/M2 | OXYGEN SATURATION: 98 % | TEMPERATURE: 98.2 F

## 2020-02-21 PROCEDURE — 3600000056 HC PAIN LEVEL 4 BASE: Performed by: PHYSICAL MEDICINE & REHABILITATION

## 2020-02-21 PROCEDURE — 64483 NJX AA&/STRD TFRM EPI L/S 1: CPT | Performed by: PHYSICAL MEDICINE & REHABILITATION

## 2020-02-21 PROCEDURE — 2709999900 HC NON-CHARGEABLE SUPPLY: Performed by: PHYSICAL MEDICINE & REHABILITATION

## 2020-02-21 PROCEDURE — 7100000010 HC PHASE II RECOVERY - FIRST 15 MIN: Performed by: PHYSICAL MEDICINE & REHABILITATION

## 2020-02-21 PROCEDURE — 2500000003 HC RX 250 WO HCPCS: Performed by: PHYSICAL MEDICINE & REHABILITATION

## 2020-02-21 PROCEDURE — 2580000003 HC RX 258: Performed by: PHYSICAL MEDICINE & REHABILITATION

## 2020-02-21 PROCEDURE — 6360000004 HC RX CONTRAST MEDICATION: Performed by: PHYSICAL MEDICINE & REHABILITATION

## 2020-02-21 PROCEDURE — 7100000011 HC PHASE II RECOVERY - ADDTL 15 MIN: Performed by: PHYSICAL MEDICINE & REHABILITATION

## 2020-02-21 PROCEDURE — 6360000002 HC RX W HCPCS: Performed by: PHYSICAL MEDICINE & REHABILITATION

## 2020-02-21 PROCEDURE — 3209999900 FLUORO FOR SURGICAL PROCEDURES

## 2020-02-21 PROCEDURE — 64484 NJX AA&/STRD TFRM EPI L/S EA: CPT | Performed by: PHYSICAL MEDICINE & REHABILITATION

## 2020-02-21 RX ORDER — BUPIVACAINE HYDROCHLORIDE 5 MG/ML
INJECTION, SOLUTION EPIDURAL; INTRACAUDAL PRN
Status: DISCONTINUED | OUTPATIENT
Start: 2020-02-21 | End: 2020-02-21 | Stop reason: ALTCHOICE

## 2020-02-21 RX ORDER — DEXAMETHASONE SODIUM PHOSPHATE 10 MG/ML
INJECTION INTRAMUSCULAR; INTRAVENOUS PRN
Status: DISCONTINUED | OUTPATIENT
Start: 2020-02-21 | End: 2020-02-21 | Stop reason: ALTCHOICE

## 2020-02-21 RX ORDER — SODIUM CHLORIDE 9 MG/ML
INJECTION INTRAVENOUS PRN
Status: DISCONTINUED | OUTPATIENT
Start: 2020-02-21 | End: 2020-02-21 | Stop reason: ALTCHOICE

## 2020-02-21 ASSESSMENT — PAIN - FUNCTIONAL ASSESSMENT: PAIN_FUNCTIONAL_ASSESSMENT: 0-10

## 2020-02-21 ASSESSMENT — PAIN SCALES - GENERAL
PAINLEVEL_OUTOF10: 0
PAINLEVEL_OUTOF10: 0

## 2020-02-21 NOTE — H&P
Signed        Expand All Collapse All     Show:Clear all  [x]Manual[x]Template[]Copied    Added by:  [x]Mariano Koenig MD    []Deborah for details     Subjective:       Patient is here today for a diagnostic/therapeutic injection. 2/7/20          Active Hospital Problems     Diagnosis Date Noted    Lumbar radiculitis [M54.16]      Lumbar facet joint syndrome [M47.816] 08/30/2018         HPI: Patient is here today for adiagnostic/therapeutic injection. The most recent [facility] office visit notes have been reviewed and are unchanged. Review of Systems   Constitutional: Positive for fatigue. HENT: Negative. Eyes: Negative. Respiratory: Negative. Cardiovascular: Negative. Gastrointestinal: Negative for constipation and diarrhea. Endocrine: Negative. Genitourinary: Negative for difficulty urinating and flank pain. Musculoskeletal: Positive for back pain and myalgias. Skin: Negative. Allergic/Immunologic: Negative. Neurological: Positive for weakness and numbness. Hematological: Negative. Psychiatric/Behavioral: Positive for sleep disturbance.                Allergies   Allergen Reactions    Sulfa Antibiotics Shortness Of Breath    Sulfamethoxazole-Trimethoprim Anaphylaxis    Ansaid [Flurbiprofen] Other (See Comments)       Light headed and difficult with vision \"seeing\"    Gentamicin Other (See Comments)       Eye ointment caused eye swelling, redness    Quinidine         Light headed    Hydrocodone-Acetaminophen      Mirapex [Pramipexole Dihydrochloride] Other (See Comments)       Unknown reaction    Mobic [Meloxicam] Nausea Only    Motrin [Ibuprofen] Other (See Comments)       \"I coughed so bad I broke a rib\"    Nsaids Other (See Comments)       lightheaded    Reglan [Metoclopramide] Other (See Comments)       Hyperactive and stomach pain    Trazodone Other (See Comments)       unknown    Corgard [Nadolol] Other (See deferred  Skin:     General: Skin is warm and dry. Coloration: Skin is not pale. Findings: No erythema or rash. Neurological:      Mental Status: She is alert and oriented to person, place, and time. Psychiatric:         Speech: Speech normal.         Behavior: Behavior normal.         Neurologic Exam      Mental Status   Oriented to person, place, and time. Speech: speech is normal      Motor Exam      Strength   Right quadriceps: 5/5  Left quadriceps: 5/5  Right hamstrin/5  Left hamstrin/5     Back Exam      Tenderness   The patient is experiencing tenderness in the lumbar. Range of Motion   Extension: normal   Flexion: normal   Lateral bend right: normal   Lateral bend left: normal   Rotation right: normal   Rotation left: normal      Muscle Strength   Right Quadriceps:  5/5   Left Quadriceps:  5/5   Right Hamstrings:  5/5   Left Hamstrings:  5/5      Tests   Straight leg raise right: positive  Straight leg raise left: negative     Other   Toe walk: normal  Heel walk: normal  Sensation: normal  Gait: normal      Comments:  +Slump B                       Lab Results   Component Value Date     WBC 9.1 09/15/2015     HGB 12.2 09/15/2015     HCT 38.4 09/15/2015      09/15/2015      09/15/2015     K 4.1 09/15/2015      09/15/2015     CREATININE 0.67 09/15/2015     BUN 16 09/15/2015     CO2 25 09/15/2015         Assessment:                                 Active Hospital Problems     Diagnosis Date Noted    Lumbar radiculitis [M54.16]      Lumbar facet joint syndrome [M47.816] 2018                                                                                                            Plan:   Proceed with planned procedure  -R L4,5 TFE     The patientunderstands the planned operation and its associated risks and benefits and agrees to proceed. The surgical consent form has been signed.      Last NSAID/anticoagulant medication use was:3-5 days     Premedication taken for contrast allergy? No     Valium taken for oral sedation?  No

## 2020-02-21 NOTE — OP NOTE
and draped in the usual fashion in the prone position. Time out was verified for correct patient, side, level and procedure. SEDATION:   No conscious sedation was performed during the procedure. The patient remained awake and conversed throughout the procedure. The patient underwent pulse oximetry and blood pressure monitoring independently by a trained observer, as well as by a physician. PROCEDURE:  Under image-intensifier control, a 22 gauge needle x 5 inch spinal needle was guided successfully into the epidural space employing a posterior lateral/oblique approach. Needle aspiration was negative for heme or CSF. Instillation of  .5 mL of Omnipaque 240 contrast medium opacified the spinal nerve and demonstrated contiguous flow into theLL4  epidural space. No vascular spread was noted. Digital subtraction was not employed to evaluate for vascular spread. The patient was monitored for any untoward reaction to contrast medium before proceeding with procedure #2. The patient did not report pain reproduction in a concordant distribution. Following needle position verification, a test dose of .5 mL of sterile lidocaine 0.5% was administered and patient monitored for any adverse effects. Then, 1 mL of   was instilled into the epidural space and the patient's response was again monitored. Finally, Dexamethasone (Decadron 10 mg/mL) 1 ml of 0.5% bupivacaine was then instilled. The patient's response was again monitored. The spinal needle was removed. Instillation of  .5 mL of Omnipaque 240 contrast medium opacified the spinal nerve and demonstrated contiguous flow into the LL5  epidural space. No vascular spread was noted. Digital subtraction was not employed to evaluate for vascular spread. The patient was monitored for any untoward reaction to contrast medium before proceeding with procedure #2. The patient did not report pain reproduction in a concordant distribution.     Following needle position verification, a test dose of .5 mL of sterile lidocaine 0.5% was administered and patient monitored for any adverse effects. Then, 1 mL of   was instilled into the epidural space and the patient's response was again monitored. Finally, Dexamethasone (Decadron 10 mg/mL) 1 ml of 0.5% bupivacaine was then instilled. The patient's response was again monitored. The spinal needle was removed. The patient tolerated the procedure well and without complications and was noted to be in stable condition prior to discharge from the procedure center with discharge instructions. EBL: no blood loss    SPECIMEN: none    IMPRESSIONS:  1. LL4,5 transforaminal epidurogram, epidural anesthesia and epidural steroid injection procedures accomplished without incident. RECOMMENDATIONS:  1. Complete and return Post-Procedure Pain and Activity Diary.   2. Contact the [address] for symptom exacerbation, fever or unusual symptoms. 3. Post-procedure care according to verbal and written discharge instructions    POST-PROCEDURE EPIDUROGRAPHY INTERPRETATION:    EXAMINATION: AP, lateral, and oblique views    FLUORO TIME: 23 seconds    DISCUSSION: Spot views of the spine reveal normal alignment and segmentation. Spinal needle is positioned at the LL4,5 neuroforamin. Contrast spreads and outlines the LL4,5 nerve/ neuroforamin and epidural space. The epidurogram reveals excellent contrast flow. Visualized spine reveals See radiology report. Soft tissues reveal no abnormalities. IMPRESSION: LL4,5 transforaminal epidurogram/epineurogram reveals satisfactory needle position and contrast spread.      Electronically signed by Rigo Kumari MD on 2/21/2020 at 9:56 AM

## 2020-02-21 NOTE — INTERVAL H&P NOTE
I have interviewed and examined the patient and reviewed the recent History and Physical.  There have been no changes to the recent H&P documentation. The surgical consent form has been signed. Last anticoagulant medication use was:na    Premedication taken for contrast allergy? No    Valium taken for oral sedation? No    Outpatient Medications Marked as Taking for the 2/21/20 encounter [facility] Encounter)   Medication Sig Dispense Refill    oxyCODONE HCl (OXY-IR) 10 MG immediate release tablet Take 1 tablet by mouth 2 times daily for 30 days. 60 tablet 0    melatonin 1 MG tablet melatonin   once daily      metoprolol tartrate (LOPRESSOR) 25 MG tablet Take 12.5 mg by mouth 2 times daily      potassium chloride (KLOR-CON M) 20 MEQ extended release tablet TAKE ONE TABLET ONCE A DAY      furosemide (LASIX) 40 MG tablet Take 40 mg by mouth 3 times daily Indications: 40 in am & 20 @ night, 20mg additional if needed       fluticasone propionate 50 MCG/BLIST AEPB Inhale into the lungs      albuterol (ACCUNEB) 1.25 MG/3ML nebulizer solution Inhale 1 ampule into the lungs every 6 hours as needed for Wheezing      simvastatin (ZOCOR) 40 MG tablet Take 40 mg by mouth nightly      flecainide (TAMBOCOR) 50 MG tablet Take 50 mg by mouth 2 times daily.  omeprazole (PRILOSEC) 40 MG capsule Take 40 mg by mouth nightly.  montelukast (SINGULAIR) 10 MG tablet Take 10 mg by mouth nightly.  DULoxetine (CYMBALTA) 60 MG capsule Take 60 mg by mouth daily.  Immune Globulin, Human, (HIZENTRA) 10 GM/50ML SOLN Inject into the skin Tuesdays         The patient understands the planned operation and its associated risks and benefits and agrees to proceed.         Electronically signed by Meg Massey MD on 2/21/2020 at 9:55 AM

## 2020-03-17 RX ORDER — OXYCODONE HYDROCHLORIDE 10 MG/1
10 TABLET ORAL 2 TIMES DAILY
Qty: 60 TABLET | Refills: 0 | Status: SHIPPED | OUTPATIENT
Start: 2020-03-17 | End: 2020-04-23 | Stop reason: SDUPTHER

## 2020-04-22 RX ORDER — OXYCODONE HYDROCHLORIDE 10 MG/1
10 TABLET ORAL 2 TIMES DAILY
Qty: 60 TABLET | Refills: 0 | Status: CANCELLED | OUTPATIENT
Start: 2020-04-22 | End: 2020-05-22

## 2020-04-23 RX ORDER — OXYCODONE HYDROCHLORIDE 10 MG/1
10 TABLET ORAL 2 TIMES DAILY
Qty: 60 TABLET | Refills: 0 | Status: SHIPPED | OUTPATIENT
Start: 2020-04-23 | End: 2020-05-22 | Stop reason: SDUPTHER

## 2020-04-23 NOTE — TELEPHONE ENCOUNTER
Last Appt:  04/06/20 VV  Next Appt:   Visit date not found  [facility] [place], and [place]: 03/17/20 Oxycodone 10mg #60. Due NOW    Pt did not give a 5 business day notice for the refill.

## 2020-05-05 ENCOUNTER — TELEPHONE (OUTPATIENT)
Dept: PAIN MANAGEMENT | Age: 80
End: 2020-05-05

## 2020-05-05 NOTE — TELEPHONE ENCOUNTER
I don't see where Patient has  Seen  Us for her L shoulder . I also don't see  Has been to PCP here  Or elsewhere. Can  Someone call  Her and ask her this.    If not , then can schedule for [employer] [facility] new problem L shoulder    Thank You

## 2020-05-12 ENCOUNTER — OFFICE VISIT (OUTPATIENT)
Dept: PAIN MANAGEMENT | Age: 80
End: 2020-05-12
Payer: MEDICARE

## 2020-05-12 VITALS
HEIGHT: 64 IN | BODY MASS INDEX: 31.5 KG/M2 | WEIGHT: 184.5 LBS | DIASTOLIC BLOOD PRESSURE: 62 MMHG | SYSTOLIC BLOOD PRESSURE: 100 MMHG

## 2020-05-12 PROCEDURE — 20610 DRAIN/INJ JOINT/BURSA W/O US: CPT | Performed by: NURSE PRACTITIONER

## 2020-05-12 RX ORDER — CITALOPRAM 10 MG/1
10 TABLET ORAL DAILY
COMMUNITY
End: 2020-11-24

## 2020-05-12 RX ORDER — MULTIVITAMIN
TABLET ORAL
COMMUNITY

## 2020-05-12 RX ORDER — TRIAMCINOLONE ACETONIDE 40 MG/ML
40 INJECTION, SUSPENSION INTRA-ARTICULAR; INTRAMUSCULAR ONCE
Status: COMPLETED | OUTPATIENT
Start: 2020-05-12 | End: 2020-05-12

## 2020-05-12 RX ORDER — KETOTIFEN FUMARATE 0.35 MG/ML
1 SOLUTION/ DROPS OPHTHALMIC 2 TIMES DAILY
COMMUNITY

## 2020-05-12 RX ORDER — FAMOTIDINE 10 MG
10 TABLET ORAL 2 TIMES DAILY
COMMUNITY
End: 2021-10-25

## 2020-05-12 RX ORDER — BACLOFEN 10 MG/1
10 TABLET ORAL DAILY
COMMUNITY
End: 2022-08-23 | Stop reason: SDUPTHER

## 2020-05-12 RX ADMIN — TRIAMCINOLONE ACETONIDE 40 MG: 40 INJECTION, SUSPENSION INTRA-ARTICULAR; INTRAMUSCULAR at 13:25

## 2020-05-19 ENCOUNTER — TELEPHONE (OUTPATIENT)
Dept: PAIN MANAGEMENT | Age: 80
End: 2020-05-19

## 2020-05-22 RX ORDER — OXYCODONE HYDROCHLORIDE 10 MG/1
10 TABLET ORAL 2 TIMES DAILY
Qty: 60 TABLET | Refills: 0 | Status: SHIPPED | OUTPATIENT
Start: 2020-05-22 | End: 2020-11-24 | Stop reason: SDUPTHER

## 2020-05-22 NOTE — TELEPHONE ENCOUNTER
Last Appt:  5/12/2020  Next Appt:   5/27/2020  [facility] [place], and [place]: 04/23/20 Oxycodone Hcl 10mg #60. Due 05/23/20 .

## 2020-05-27 ENCOUNTER — OFFICE VISIT (OUTPATIENT)
Dept: PAIN MANAGEMENT | Age: 80
End: 2020-05-27
Payer: MEDICARE

## 2020-05-27 ENCOUNTER — HOSPITAL ENCOUNTER (OUTPATIENT)
Age: 80
Setting detail: SPECIMEN
Discharge: HOME | End: 2020-05-27
Payer: MEDICARE

## 2020-05-27 VITALS — SYSTOLIC BLOOD PRESSURE: 94 MMHG | DIASTOLIC BLOOD PRESSURE: 68 MMHG

## 2020-05-27 PROCEDURE — 1123F ACP DISCUSS/DSCN MKR DOCD: CPT | Performed by: PHYSICAL MEDICINE & REHABILITATION

## 2020-05-27 PROCEDURE — 99213 OFFICE O/P EST LOW 20 MIN: CPT | Performed by: PHYSICAL MEDICINE & REHABILITATION

## 2020-05-27 PROCEDURE — 1090F PRES/ABSN URINE INCON ASSESS: CPT | Performed by: PHYSICAL MEDICINE & REHABILITATION

## 2020-05-27 PROCEDURE — 1036F TOBACCO NON-USER: CPT | Performed by: PHYSICAL MEDICINE & REHABILITATION

## 2020-05-27 PROCEDURE — G8400 PT W/DXA NO RESULTS DOC: HCPCS | Performed by: PHYSICAL MEDICINE & REHABILITATION

## 2020-05-27 PROCEDURE — 80307 DRUG TEST PRSMV CHEM ANLYZR: CPT

## 2020-05-27 PROCEDURE — 99214 OFFICE O/P EST MOD 30 MIN: CPT | Performed by: PHYSICAL MEDICINE & REHABILITATION

## 2020-05-27 PROCEDURE — G8427 DOCREV CUR MEDS BY ELIG CLIN: HCPCS | Performed by: PHYSICAL MEDICINE & REHABILITATION

## 2020-05-27 PROCEDURE — G8417 CALC BMI ABV UP PARAM F/U: HCPCS | Performed by: PHYSICAL MEDICINE & REHABILITATION

## 2020-05-27 PROCEDURE — 4040F PNEUMOC VAC/ADMIN/RCVD: CPT | Performed by: PHYSICAL MEDICINE & REHABILITATION

## 2020-05-27 RX ORDER — OXYCODONE HYDROCHLORIDE 10 MG/1
10 TABLET ORAL EVERY 8 HOURS PRN
Qty: 90 TABLET | Refills: 0 | Status: SHIPPED | OUTPATIENT
Start: 2020-06-22 | End: 2020-09-01 | Stop reason: SDUPTHER

## 2020-05-27 RX ORDER — OXYCODONE HYDROCHLORIDE 10 MG/1
10 TABLET ORAL 3 TIMES DAILY
Qty: 90 TABLET | Refills: 0 | Status: SHIPPED | OUTPATIENT
Start: 2020-06-22 | End: 2020-06-26 | Stop reason: SDUPTHER

## 2020-05-27 RX ORDER — NALOXONE HYDROCHLORIDE 4 MG/.1ML
1 SPRAY NASAL PRN
Qty: 1 EACH | Refills: 5 | Status: SHIPPED | OUTPATIENT
Start: 2020-05-27 | End: 2022-10-10

## 2020-05-27 ASSESSMENT — ENCOUNTER SYMPTOMS
EYES NEGATIVE: 1
ALLERGIC/IMMUNOLOGIC NEGATIVE: 1
RESPIRATORY NEGATIVE: 1
DIARRHEA: 0
BACK PAIN: 1
CONSTIPATION: 0

## 2020-05-27 NOTE — PROGRESS NOTES
 HYSTERECTOMY  2009    Total    JOINT REPLACEMENT Bilateral     knee    KNEE ARTHROSCOPY Left 2008    LUMBAR SPINE SURGERY Bilateral 2/26/2019    Bilateral L4 TRANSFORAMINAL  [id number] performed by Jose Mc MD at [address] [facility] [facility] Bilateral 3/12/2019    Bilateral L4 TRANSFORAMINAL performed by Jose Mc MD at [address] [facility] [facility] Right 10/15/2019    RIGHT L4 & L5 TRANSFORAMINAL performed by Jose Mc MD at [address] [facility] [facility] Left 11/1/2019    Left L4 & L5 TRANSFORAMINAL performed by Jose Mc MD at [place] [address]  8/2014    PAIN MANAGEMENT PROCEDURE Right 2/7/2020    Right L4 & L5 TRANSFORAMINAL performed by Jose Mc MD at [address] [facility] Left 2/21/2020    Left L4 & L5 TRANSFORAMINAL performed by Jose Mc MD at [address] [place] [address] DX/THER AGNT PARAVERT FACET JOINT, LUMBAR/SAC, 2ND LEVEL Bilateral 8/30/2018    Bilateral L2 L3 L4 L5 Diagnostic Medial BB performed by Jose Mc MD at [address] [place] [address] DX/THER AGNT PARAVERT FACET JOINT, LUMBAR/SAC, 2ND LEVEL Right 9/13/2018    Right L2 L3 L4 L5 Confirmatory Medial BB performed by Jose Mc MD at [address] [facility] ANES/STEROID [address] [facility] , ADD LEVEL Right 7/20/2018    Right C5 C6 TRANSFORAMINAL performed by Jose Mc MD at [address] Left 12/20/2018    Left L2 L3 L4 L5 RADIOFREQUENCY performed by Jose Mc MD at [address] Right 1/3/2019    Right L2 L3 L4 L5 RADIOFREQUENCY performed by Jose Mc MD at [address] Right 01/28/2005 & 03/16/2010    2x     Family History   Problem Relation Age of Onset    Heart Disease Father      Social History     Socioeconomic History    Marital status:      Spouse name: None    Number of children: None    Years of education: None    Highest education level: None Occupational History    Occupation: retired   Social Needs    Financial resource strain: None    Food insecurity     Worry: None     Inability: None    Transportation needs     Medical: None     Non-medical: None   Tobacco Use    Smoking status: Never Smoker    Smokeless tobacco: Never Used   Substance and Sexual Activity    Alcohol use: Yes     Comment: very rare    Drug use: No    Sexual activity: None   Lifestyle    Physical activity     Days per week: None     Minutes per session: None    Stress: None   Relationships    Social connections     Talks on phone: None     Gets together: None     Attends Orthodoxy service: None     Active member of club or organization: None     Attends meetings of clubs or organizations: None     Relationship status: None    Intimate partner violence     Fear of current or ex partner: None     Emotionally abused: None     Physically abused: None     Forced sexual activity: None   Other Topics Concern    None   Social History Narrative    None         Family and Social Historyreviewed in the electronic medical record. Imaging:Reviewed available imaging in our system with the patient. No results found. Objective:     Physical Exam:  Vitals:    05/27/20 1317   BP: 94/68   Site: Left Upper Arm   Position: Sitting   Cuff Size: Large Adult     Pain Score:   8    Physical Exam  Constitutional:       General: She is not in acute distress. Appearance: Normal appearance. She is well-developed. She is not diaphoretic. HENT:      Head: Normocephalic and atraumatic. Right Ear: External ear normal. No decreased hearing noted. Left Ear: External ear normal. No decreased hearing noted. Nose: Nose normal.   Eyes:      General: Lids are normal. No scleral icterus.      Conjunctiva/sclera: Conjunctivae normal.   Neck:      Trachea: Phonation normal.   Cardiovascular:      Comments: No BLE edema present  Pulmonary:      Effort: Pulmonary effort is normal. No accessory muscle usage or respiratory distress. Genitourinary:     Comments: deferred  Skin:     General: Skin is warm and dry. Coloration: Skin is not pale. Findings: No erythema or rash. Neurological:      Mental Status: She is alert and oriented to person, place, and time. Psychiatric:         Speech: Speech normal.         Behavior: Behavior normal.       Left Hip Exam     Range of Motion   Internal rotation: abnormal       Back Exam     Range of Motion   Extension: normal   Flexion: normal   Lateral bend right: normal   Lateral bend left: normal   Rotation right: normal   Rotation left: normal     Muscle Strength   Right Quadriceps:  5/5   Left Quadriceps:  5/5   Right Hamstrings:  5/5   Left Hamstrings:  5/5     Tests   Straight leg raise right: negative  Straight leg raise left: negative    Other   Toe walk: normal  Heel walk: normal  Sensation: normal  Gait: normal     Comments:  -gui luis                                        Assessment: This is a 78 y.o. female patient with:    Diagnosis:   Diagnosis Orders   1. Lumbar radiculopathy     2. Lumbosacral spondylosis without myelopathy     3. Encounter for long-term opiate analgesic use     4. Chronic left sacroiliac joint pain         Medical Comorbidities:  As listed in the patient's past medical and surgical history    Functional Limitations:   Pain limits function and quality of life. Plan:   Patient given info about  Her age [de-identified] and cardiac  Pacer, will hold off on LL4,5 TFE   versus L S-I  Piriformis injections  Cant have TENS due to pacer  Says topical lidocaine does not work   allergies to multiple pain medications  Desires increase   Percocet 10 BID  Will   Obtain UDT  Refilled 4 days  Ago         Meds:   Controlled Substances Monitoring: Pt educated about the risks of taking opiates,including increased sedation, constipation, slowed breathing, tolerance, dependence,and addiction.       New Prescriptions    No medications on file      No orders of the defined types were placed in this encounter. No orders of the defined types were placed in this encounter. Return in about 2 months (around 7/27/2020). The patient expressed understanding of the above assessment and plan. Totaltime spent face to face with patient was 25 minutes inwhich  50% or more of the time was spent in counseling, education about risk andbenefits of the above plan, and coordination of care.

## 2020-05-30 LAB

## 2020-06-03 ENCOUNTER — TELEPHONE (OUTPATIENT)
Dept: PAIN MANAGEMENT | Age: 80
End: 2020-06-03

## 2020-06-03 RX ORDER — METHYLPREDNISOLONE 4 MG/1
TABLET ORAL
Qty: 1 KIT | Refills: 1 | Status: SHIPPED | OUTPATIENT
Start: 2020-06-03 | End: 2020-06-09

## 2020-06-11 ENCOUNTER — TELEPHONE (OUTPATIENT)
Dept: PAIN MANAGEMENT | Age: 80
End: 2020-06-11

## 2020-06-12 RX ORDER — PREDNISONE 20 MG/1
20 TABLET ORAL SEE ADMIN INSTRUCTIONS
Qty: 10 TABLET | Refills: 0 | Status: SHIPPED | OUTPATIENT
Start: 2020-06-12 | End: 2020-06-22

## 2020-06-24 ENCOUNTER — TELEPHONE (OUTPATIENT)
Dept: PAIN MANAGEMENT | Age: 80
End: 2020-06-24

## 2020-06-24 RX ORDER — GABAPENTIN 100 MG/1
CAPSULE ORAL
Qty: 90 CAPSULE | Refills: 1 | Status: SHIPPED | OUTPATIENT
Start: 2020-06-24 | End: 2020-10-20

## 2020-06-24 NOTE — TELEPHONE ENCOUNTER
Last Appt:  5/27/2020  Next Appt:   Visit date not found  Med verified in Epic    Patient also reported that she is having back spasms so she has been taking baclofen 10 mg from Dr. Ally Pretty.

## 2020-06-24 NOTE — TELEPHONE ENCOUNTER
Please call Jo Ann Zabala she has been having muscle spasms for two days she has been taking baclofen and oxycodone  to try to keep them under control she has enough for two days please advise spasms are constant until she takes the meds and then returns before time for more meds.

## 2020-06-26 RX ORDER — OXYCODONE HYDROCHLORIDE 10 MG/1
10 TABLET ORAL 3 TIMES DAILY
Qty: 90 TABLET | Refills: 0 | Status: SHIPPED | OUTPATIENT
Start: 2020-06-26 | End: 2020-07-31 | Stop reason: SDUPTHER

## 2020-06-26 NOTE — TELEPHONE ENCOUNTER
[place] Report [place] for [place], and [place]: 05/22/20 Oxycodone HCL 10mg #60. Due NOW.

## 2020-07-01 ENCOUNTER — HOSPITAL ENCOUNTER (OUTPATIENT)
Dept: PHYSICAL THERAPY | Age: 80
Setting detail: THERAPIES SERIES
Discharge: HOME | End: 2020-07-01
Payer: MEDICARE

## 2020-07-01 PROCEDURE — 97162 PT EVAL MOD COMPLEX 30 MIN: CPT | Performed by: PHYSICAL THERAPIST

## 2020-07-01 PROCEDURE — 97110 THERAPEUTIC EXERCISES: CPT | Performed by: PHYSICAL THERAPIST

## 2020-07-01 ASSESSMENT — PAIN DESCRIPTION - FREQUENCY: FREQUENCY: CONTINUOUS

## 2020-07-01 ASSESSMENT — PAIN DESCRIPTION - LOCATION: LOCATION: BACK;LEG

## 2020-07-01 ASSESSMENT — PAIN DESCRIPTION - PROGRESSION: CLINICAL_PROGRESSION: GRADUALLY WORSENING

## 2020-07-01 ASSESSMENT — PAIN DESCRIPTION - ONSET: ONSET: ON-GOING

## 2020-07-01 ASSESSMENT — PAIN SCALES - GENERAL: PAINLEVEL_OUTOF10: 7

## 2020-07-01 ASSESSMENT — PAIN - FUNCTIONAL ASSESSMENT: PAIN_FUNCTIONAL_ASSESSMENT: PREVENTS OR INTERFERES SOME ACTIVE ACTIVITIES AND ADLS

## 2020-07-01 ASSESSMENT — PAIN DESCRIPTION - PAIN TYPE: TYPE: CHRONIC PAIN

## 2020-07-01 NOTE — PROGRESS NOTES
Physical Therapy  Initial Assessment  Date: 2020  Patient Name: La Nena Holly  MRN: [id number]  : 1940     Treatment Diagnosis: back pain    Restrictions       Subjective   General  Chart Reviewed: Yes  Patient assessed for rehabilitation services?: Yes  Response To Previous Treatment: Not applicable  Family / Caregiver Present: No  Referring Practitioner: Aviva Livingston CNP  Referral Date : 20  Diagnosis: Muscle spasm  Follows Commands: Within Functional Limits  General Comment  Comments: Previous laminectomy and discectomy  PT Visit Information  Onset Date: 20  PT Insurance Information: Medicare  Subjective  Subjective: \"I've had back pain and thigh/hip pain for about 20-30 years , but gotten really bad the past 4-5 years. Recently I only feel better/relief if I lay down on my side. Even pain injections and pain pills don't work. Standing or walking makes the pain worse or sitting for a long time. Anything where I'm on my feet more than a half hour makes the pain worse. The pain is right in the center of my back and then travels around the outside of the back and down into the outer hip region and around into the groin. Sometimes I feel like my legs aren't going to work right. I do have unrelated neuropathy in my right ankle/foot. \"             Pain Screening  Patient Currently in Pain: Yes  Pain Assessment  Pain Assessment: 0-10  Pain Level: 7  Patient's Stated Pain Goal: No pain  Pain Type: Chronic pain  Pain Location: Back;Leg  Pain Orientation: Right;Left;Lower;Mid;Outer;Posterior;Proximal  Pain Radiating Towards: from lower back and around hips and wraps to upper thigh and groin region, bilaterally  Pain Descriptors: Spasm;Nagging;Jabbing;Discomfort;Dull;Aching  Pain Frequency: Continuous  Pain Onset: On-going  Clinical Progression: Gradually worsening  Functional Pain Assessment: Prevents or interferes some active activities and ADLs  Non-Pharmaceutical Pain Intervention(s): Rest;Repositioned; Heat applied;Cold applied  Vital Signs  Patient Currently in Pain: Yes    Vision/Hearing       Orientation  Orientation  Overall Orientation Status: Within Normal Limits    Social/Functional History  Social/Functional History  Lives With: Alone  Type of Home: House  Home Layout: One level  Home Access: Stairs to enter with rails; Ramped entrance  Entrance Stairs - Number of Steps: 2  Bathroom Shower/Tub: Walk-in shower  Bathroom Toilet: Handicap height  Bathroom Equipment: Grab bars in shower;Grab bars around toilet  Bathroom Accessibility: Accessible  Home Equipment: [employer] Help From: Family  ADL Assistance: Independent  Homemaking Assistance: Independent  Homemaking Responsibilities: Yes  Meal Prep Responsibility: Primary  Laundry Responsibility: Primary  Cleaning Responsibility: Primary  Shopping Responsibility: Primary  Ambulation Assistance: Independent  Transfer Assistance: Independent  Active : Yes  Mode of Transportation: Car  Occupation: Retired  Leisure & Hobbies: taking care of 2 dogs; sewing, needlework, baking, reading    Objective     Observation/Palpation  Posture: Fair  Palpation: increased lumbar and thoracic paraspinals  Observation: 30 degree anterior pelvic tilt, increased lumbar lordosis    PROM RLE (degrees)  RLE PROM: WFL  AROM RLE (degrees)  RLE AROM: WFL  PROM LLE (degrees)  LLE PROM: WFL  AROM LLE (degrees)  LLE AROM : WFL  Spine  Lumbar: flexion: fingertips 5\" from floor   ext: to neutral   sidebend: 12 degrees bilaterally    Strength RLE  Strength RLE: WFL  Strength LLE  Strength LLE: WFL  Strength Other  Other: upper/lower abdominals: 3+/5   TransAb: Poor +     Additional Measures  Special Tests: + repeated lumbar flexion, - repeated lumbar extension     Ambulation  Ambulation?: Yes  Ambulation 1  Surface: level tile  Device: Single point cane  Assistance: Independent  Quality of Gait: anterior pelvic tilt with ambulation and forward flexed at trunk  Balance  Posture: Fair  Sitting - Static: Good  Sitting - Dynamic: Good  Standing - Static: Fair  Standing - Dynamic: Fair;-     Assessment   Conditions Requiring Skilled Therapeutic Intervention  Body structures, Functions, Activity limitations: Decreased ADL status; Decreased ROM; Decreased strength; Increased pain;Decreased posture  Treatment Diagnosis: back pain  Prognosis: Good  Decision Making: Medium Complexity  REQUIRES PT FOLLOW UP: Yes  Activity Tolerance  Activity Tolerance: Patient Tolerated treatment well     Plan   Plan  Times per week: 2  Plan weeks: 4  Current Treatment Recommendations: Strengthening, ROM, Neuromuscular Re-education, Manual Therapy - Soft Tissue Mobilization, Integrated Dry Needling, Modalities, Gait Training    Goals  Short term goals  Time Frame for Short term goals: 2 weeks  Short term goal 1: Initiate HEP  Short term goal 2: Decrease LBP to <5/10 for improved ease with ADL and ambulation  Short term goal 3: Increase lumbar/core strength to 4/5 for improved ease with ADL and ambulation  Long term goals  Time Frame for Long term goals : 4 weeks  Long term goal 1: Indep with HEP  Long term goal 2: Decrease LBP to <3/10 for improved ease with ADL and ambulation  Long term goal 3: Increase lumbar/core strength to 4+/5 for improved ease with ADL and ambulation  Long term goal 4:  Increase lumbar posture to [facility] for improved ease with ADL and ambulation/balance  Long term goal 5: Oswestry score <20% disabled for return to previous level of function     Therapy Time   Individual Concurrent Group Co-treatment   Time In 1430         Time Out 1521         Minutes 51         Timed Code Treatment Minutes: Good Raphael, PT, DPT

## 2020-07-01 NOTE — FLOWSHEET NOTE
Physical Therapy Daily Treatment Note    Date:  2020    Patient Name:  Jazmín Danielle    :  1940  MRN: [id number]  Restrictions/Precautions:     Medical/Treatment Diagnosis Information:   · Diagnosis: Muscle spasm  · Treatment Diagnosis: back pain  Insurance/Certification information:  PT Insurance Information: Medicare  Physician Information:  Referring Practitioner: Tanda Dubin, CNP  Plan of care signed (Y/N):  N  Visit# / total visits:  1/10  Pain level: 7/10     Time In: 2:30   Time Out: 4:21    Progress Note: [x]  Yes  []  No  Next due by: Visit #10  Or by 2020    Subjective:   See eval    Objective: see eval  Observation:   Test measurements:      Exercises:   Exercise/Equipment Resistance/Repetitions Other comments   Lay Prone          LTR 5\" x 10    PPT 3\" x 15    Bridges          Modified back bends 15x    Hip ext, ABD 10x each    Ab Brace      Ab Brace with sidesteps                         [x] Provided verbal/tactile cueing for activities related to strengthening, flexibility, endurance, ROM. (59636)  [] Provided verbal/tactile cueing for activities related to improving balance, coordination, kinesthetic sense, posture, motor skill, proprioception. (88383)    Therapeutic Activities:     [] Therapeutic activities, direct (one-on-one) patient contact (use of dynamic activities to improve functional performance). (25695)    Gait:   [] Provided training and instruction to the patient for ambulation re-education. (19763)    Self-Care/ADL's  [] Self-care/home management training and compensatory training, meal preparation, safety procedures, and instructions in use of assistive technology devices/adaptive equipment, direct one-on-one contact.  (15498)    Home Exercise Program:     [x] Reviewed/Progressed HEP activities related to strengthening, flexibility, endurance, ROM. (99192)  [] Reviewed/Progressed HEP activities related to improving balance, coordination, kinesthetic sense, posture, motor skill, proprioception.  (73836)    Manual Treatments:    [] Provided manual therapy to mobilize soft tissue/joints for the purpose of modulating pain, promoting relaxation,  increasing ROM, reducing/eliminating soft tissue swelling/inflammation/restriction, improving soft tissue extensibility. (20038)    Service Based Modalities:      Timed Code Treatment Minutes:   13' there-ex/HEP    Total Treatment Minutes:   46'    Treatment/Activity Tolerance:  [x] Patient tolerated treatment well [] Patient limited by fatique  [] Patient limited by pain  [] Patient limited by other medical complications  [] Other:     Prognosis: [x] Good [] Fair  [] Poor    Patient Requires Follow-up: [x] Yes  [] No      Goals:  Short term goals  Time Frame for Short term goals: 2 weeks  Short term goal 1: Initiate HEP  Short term goal 2: Decrease LBP to <5/10 for improved ease with ADL and ambulation  Short term goal 3: Increase lumbar/core strength to 4/5 for improved ease with ADL and ambulation    Long term goals  Time Frame for Long term goals : 4 weeks  Long term goal 1: Indep with HEP  Long term goal 2: Decrease LBP to <3/10 for improved ease with ADL and ambulation  Long term goal 3: Increase lumbar/core strength to 4+/5 for improved ease with ADL and ambulation  Long term goal 4:  Increase lumbar posture to [facility] for improved ease with ADL and ambulation/balance  Long term goal 5: Oswestry score <20% disabled for return to previous level of function    Plan:   [] Continue per plan of care [] Alter current plan (see comments)  [x] Plan of care initiated [] Hold pending MD visit [] Discharge    Plan for Next Session:  Progress as tolerated    Electronically signed by:  Roberto Colon DPT

## 2020-07-01 NOTE — PLAN OF CARE
[facility]    [] [place]  Phone: [phone]  Fax: [phone]      [] [place]  Phone: [phone]  Fax: [phone]        To: Referring Practitioner: Isamar Isaac CNP      Patient: Viktor Beasley  : 1940   MRN: [id number]  Evaluation Date: 2020      Diagnosis Information:  · Diagnosis: Muscle spasm   · Treatment Diagnosis: back pain     Physical Therapy Certification Form  Dear Ms Toussaintnorma Lopezever  The following patient has been evaluated for physical therapy services and for therapy to continue, Medicare requires monthly physician review of the treatment plan. Please review the attached evaluation and/or summary of the patient's plan of care, and verify that you agree therapy should continue by signing the attached document and sending it back to our office. Plan of Care/Treatment to date:  [x] Therapeutic Exercise    [] Modalities:  [] Therapeutic Activity     [] Ultrasound  [] Electrical Stimulation  [x] Gait Training      [] Cervical Traction [] Lumbar Traction  [x] Neuromuscular Re-education    [] Cold/hotpack [] Iontophoresis   [x] Instruction in HEP     Other:  [x] Manual Therapy / IDN      []             [] Aquatic Therapy      []           ? Goals:  Short term goals  Time Frame for Short term goals: 2 weeks  Short term goal 1: Initiate HEP  Short term goal 2: Decrease LBP to <5/10 for improved ease with ADL and ambulation  Short term goal 3: Increase lumbar/core strength to 4/5 for improved ease with ADL and ambulation    Long term goals  Time Frame for Long term goals : 4 weeks  Long term goal 1: Indep with HEP  Long term goal 2: Decrease LBP to <3/10 for improved ease with ADL and ambulation  Long term goal 3: Increase lumbar/core strength to 4+/5 for improved ease with ADL and ambulation  Long term goal 4:  Increase lumbar posture to [facility] for improved ease with ADL and ambulation/balance  Long term goal 5: Oswestry score <20% disabled for return to previous level of function    Frequency/Duration: 7/1/2020 - 8/1/2020  # Days per week: [] 1 day # Weeks: [] 1 week [] 5 weeks     [x] 2 days? [] 2 weeks [] 6 weeks     [] 3 days   [] 3 weeks [] 7 weeks     [] 4 days   [x] 4 weeks [] 8 weeks    Rehab Potential: [] Excellent [x] Good [] Fair  [] Poor     Electronically signed by:  Tamiko Zamora PT, DPT    If you have any questions or concerns, please don't hesitate to call.   Thank you for your referral.      Physician Signature:________________________________Date:__________________  By signing above, therapists plan is approved by physician

## 2020-07-07 ENCOUNTER — HOSPITAL ENCOUNTER (OUTPATIENT)
Dept: PHYSICAL THERAPY | Age: 80
Setting detail: THERAPIES SERIES
Discharge: HOME | End: 2020-07-07
Payer: MEDICARE

## 2020-07-07 PROCEDURE — 97110 THERAPEUTIC EXERCISES: CPT | Performed by: PHYSICAL THERAPIST

## 2020-07-07 NOTE — FLOWSHEET NOTE
Physical Therapy Daily Treatment Note    Date:  2020    Patient Name:  Aundrea Brumfield    :  1940  MRN: [id number]  Restrictions/Precautions:     Medical/Treatment Diagnosis Information:   · Diagnosis: Muscle spasm  · Treatment Diagnosis: back pain  Insurance/Certification information:  PT Insurance Information: Medicare  Physician Information:  Referring Practitioner: Michael Hopkins CNP  Plan of care signed (Y/N):  N  Visit# / total visits:  2/10  Pain level: 3-4/10     Time In: 11:20   Time Out: 12:00     NOTE: Patient arrives late for appointment this date      Progress Note: []  Yes  [x]  No  Next due by: Visit #10  Or by 2020    Subjective: \"I'm still having pain mainly on the right side of my lower back. I believe the exercises are already working, as I believe I am able to stand up taller. I am sometimes only able to do the exercises 1x per day at home because sometimes 2x per day wears me out. \"    Objective:   Observation: improved upright standing posture  Test measurements:      Exercises:   Exercise/Equipment Resistance/Repetitions Other comments   Lay Prone 5'    Prone knee bends 10x    Prone hip IR/ER     LTR 5\" x 10    PPT 3\" x 15    Bridges x10         Modified back bends 15x    Hip ext, ABD 10x each    Ab Brace 3\" x 15     Ab Brace with sidesteps                         [x] Provided verbal/tactile cueing for activities related to strengthening, flexibility, endurance, ROM. (93308)  [] Provided verbal/tactile cueing for activities related to improving balance, coordination, kinesthetic sense, posture, motor skill, proprioception. (49635)    Therapeutic Activities:     [] Therapeutic activities, direct (one-on-one) patient contact (use of dynamic activities to improve functional performance). (96187)    Gait:   [] Provided training and instruction to the patient for ambulation re-education.  (03648)    Self-Care/ADL's  [] Self-care/home management training and compensatory training, meal preparation, safety procedures, and instructions in use of assistive technology devices/adaptive equipment, direct one-on-one contact. (04096)    Home Exercise Program:     [x] Reviewed/Progressed HEP activities related to strengthening, flexibility, endurance, ROM. (59833)  [] Reviewed/Progressed HEP activities related to improving balance, coordination, kinesthetic sense, posture, motor skill, proprioception.  (97032)    Manual Treatments:    [] Provided manual therapy to mobilize soft tissue/joints for the purpose of modulating pain, promoting relaxation,  increasing ROM, reducing/eliminating soft tissue swelling/inflammation/restriction, improving soft tissue extensibility. (38329)    Service Based Modalities:      Timed Code Treatment Minutes:   36' there-ex    Total Treatment Minutes:   36'    Treatment/Activity Tolerance:  [x] Patient tolerated treatment well [] Patient limited by fatique  [] Patient limited by pain  [] Patient limited by other medical complications  [] Other:     Prognosis: [x] Good [] Fair  [] Poor    Patient Requires Follow-up: [x] Yes  [] No      Goals:  Short term goals  Time Frame for Short term goals: 2 weeks  Short term goal 1: Initiate HEP MET  Short term goal 2: Decrease LBP to <5/10 for improved ease with ADL and ambulation  Short term goal 3: Increase lumbar/core strength to 4/5 for improved ease with ADL and ambulation    Long term goals  Time Frame for Long term goals : 4 weeks  Long term goal 1: Indep with HEP  Long term goal 2: Decrease LBP to <3/10 for improved ease with ADL and ambulation  Long term goal 3: Increase lumbar/core strength to 4+/5 for improved ease with ADL and ambulation  Long term goal 4:  Increase lumbar posture to [facility] for improved ease with ADL and ambulation/balance  Long term goal 5: Oswestry score <20% disabled for return to previous level of function    Plan:   [x] Continue per plan of care [] Alter current plan (see comments)  [] Plan of care initiated [] Hold pending MD visit [] Discharge    Plan for Next Session:  Initiate IDN at next visit    Electronically signed by:  Oliver Francis DPT

## 2020-07-10 ENCOUNTER — HOSPITAL ENCOUNTER (OUTPATIENT)
Dept: PHYSICAL THERAPY | Age: 80
Setting detail: THERAPIES SERIES
Discharge: HOME | End: 2020-07-10
Payer: MEDICARE

## 2020-07-10 PROCEDURE — 97110 THERAPEUTIC EXERCISES: CPT | Performed by: PHYSICAL THERAPIST

## 2020-07-10 PROCEDURE — 97140 MANUAL THERAPY 1/> REGIONS: CPT | Performed by: PHYSICAL THERAPIST

## 2020-07-10 NOTE — FLOWSHEET NOTE
Physical Therapy Daily Treatment Note    Date:  7/10/2020    Patient Name:  Chelsey Forbes    :  1940  MRN: [id number]  Restrictions/Precautions:     Medical/Treatment Diagnosis Information:   · Diagnosis: Muscle spasm  · Treatment Diagnosis: back pain  Insurance/Certification information:  PT Insurance Information: Medicare  Physician Information:  Referring Practitioner: Imelda Hunt CNP  Plan of care signed (Y/N):  Y  Visit# / total visits:  3/10  Pain level: 3-4/10     Time In: 2:45   Time Out: 3:25    NOTE: Patient arrives 15' late for appointment this date      Progress Note: []  Yes  [x]  No  Next due by: Visit #10  Or by 2020    Subjective: \"I'm still having pain mainly on the right side of my lower back. I believe the exercises are already working, as I believe I am able to stand up taller. I am sometimes only able to do the exercises 1x per day at home because sometimes 2x per day wears me out. \"    Objective:   Observation: improved upright standing posture  Test measurements:      Exercises:   Exercise/Equipment Resistance/Repetitions Other comments   Lay Prone 5'    Prone Prop on Elbows 3'    Prone knee bends 10x    Prone hip IR/ER 10x with assist    LTR 5\" x 10    PPT 3\" x 15    Bridges x10         Modified back bends 15x    Hip ext, ABD 10x each    Ab Brace 3\" x 15     Ab Brace with sidesteps               Manual: IDN 10'         [x] Provided verbal/tactile cueing for activities related to strengthening, flexibility, endurance, ROM. (00461)  [] Provided verbal/tactile cueing for activities related to improving balance, coordination, kinesthetic sense, posture, motor skill, proprioception. (73159)    Therapeutic Activities:     [] Therapeutic activities, direct (one-on-one) patient contact (use of dynamic activities to improve functional performance). (15775)    Gait:   [] Provided training and instruction to the patient for ambulation re-education.  (60837)    Self-Care/ADL's  [] Self-care/home management training and compensatory training, meal preparation, safety procedures, and instructions in use of assistive technology devices/adaptive equipment, direct one-on-one contact. (32299)    Home Exercise Program:     [x] Reviewed/Progressed HEP activities related to strengthening, flexibility, endurance, ROM. (45578)  [] Reviewed/Progressed HEP activities related to improving balance, coordination, kinesthetic sense, posture, motor skill, proprioception.  (23548)    Manual Treatments:    [x] Provided manual therapy to mobilize soft tissue/joints for the purpose of modulating pain, promoting relaxation,  increasing ROM, reducing/eliminating soft tissue swelling/inflammation/restriction, improving soft tissue extensibility. (31799)    Service Based Modalities:      Timed Code Treatment Minutes:   27' there-ex       10' manual    Total Treatment Minutes:   36'    Treatment/Activity Tolerance:  [x] Patient tolerated treatment well [] Patient limited by fatique  [] Patient limited by pain  [] Patient limited by other medical complications  [] Other:     Prognosis: [x] Good [] Fair  [] Poor    Patient Requires Follow-up: [x] Yes  [] No      Goals:  Short term goals  Time Frame for Short term goals: 2 weeks  Short term goal 1: Initiate HEP MET  Short term goal 2: Decrease LBP to <5/10 for improved ease with ADL and ambulation  Short term goal 3: Increase lumbar/core strength to 4/5 for improved ease with ADL and ambulation    Long term goals  Time Frame for Long term goals : 4 weeks  Long term goal 1: Indep with HEP  Long term goal 2: Decrease LBP to <3/10 for improved ease with ADL and ambulation  Long term goal 3: Increase lumbar/core strength to 4+/5 for improved ease with ADL and ambulation  Long term goal 4:  Increase lumbar posture to [facility] for improved ease with ADL and ambulation/balance  Long term goal 5: Oswestry score <20% disabled for return to previous level of function    Plan:   [x]

## 2020-07-15 ENCOUNTER — HOSPITAL ENCOUNTER (OUTPATIENT)
Dept: PHYSICAL THERAPY | Age: 80
Setting detail: THERAPIES SERIES
Discharge: HOME | End: 2020-07-15
Payer: MEDICARE

## 2020-07-15 PROCEDURE — 97110 THERAPEUTIC EXERCISES: CPT | Performed by: PHYSICAL THERAPIST

## 2020-07-15 PROCEDURE — 97140 MANUAL THERAPY 1/> REGIONS: CPT | Performed by: PHYSICAL THERAPIST

## 2020-07-15 NOTE — FLOWSHEET NOTE
Physical Therapy Daily Treatment Note    Date:  7/15/2020    Patient Name:  Oswaldo Elizabeth    :  1940  MRN: [id number]  Restrictions/Precautions:     Medical/Treatment Diagnosis Information:   · Diagnosis: Muscle spasm  · Treatment Diagnosis: back pain  Insurance/Certification information:  PT Insurance Information: Medicare  Physician Information:  Referring Practitioner: Sukh Santamaria CNP  Plan of care signed (Y/N):  Y  Visit# / total visits:  4/10  Pain level: 3-4/10     Time In: 3:15   Time Out: 3:49    NOTE: Patient arrives 13' late for appointment this date      Progress Note: []  Yes  [x]  No  Next due by: Visit #10  Or by 2020    Subjective: \"My lower back is feeling much better. I still get some twinges on the left side, but the right side feels improved a lot. I have been feeling a little bit woozy since . I have another appointment in a different town, so I'd like to be finished about 10 minutes early today\"    Objective:   Observation: improved upright standing posture  Test measurements:      Exercises:   Exercise/Equipment Resistance/Repetitions Other comments   Lay Prone 5'    Prone Prop on Elbows 3'    Prone knee bends 10x    Prone hip IR/ER 10x with assist    LTR 5\" x 10    PPT 3\" x 15    Bridges x10         Modified back bends    Hip ext, ABD    Ab Brace     Ab Brace with sidesteps               Manual: IDN 10'         [x] Provided verbal/tactile cueing for activities related to strengthening, flexibility, endurance, ROM. (73013)  [] Provided verbal/tactile cueing for activities related to improving balance, coordination, kinesthetic sense, posture, motor skill, proprioception. (21789)    Therapeutic Activities:     [] Therapeutic activities, direct (one-on-one) patient contact (use of dynamic activities to improve functional performance). (56569)    Gait:   [] Provided training and instruction to the patient for ambulation re-education.  (58934)    Self-Care/ADL's  [] Self-care/home management training and compensatory training, meal preparation, safety procedures, and instructions in use of assistive technology devices/adaptive equipment, direct one-on-one contact. (85958)    Home Exercise Program:     [x] Reviewed/Progressed HEP activities related to strengthening, flexibility, endurance, ROM. (80966)  [] Reviewed/Progressed HEP activities related to improving balance, coordination, kinesthetic sense, posture, motor skill, proprioception.  (93519)    Manual Treatments:    [x] Provided manual therapy to mobilize soft tissue/joints for the purpose of modulating pain, promoting relaxation,  increasing ROM, reducing/eliminating soft tissue swelling/inflammation/restriction, improving soft tissue extensibility. (08759)    Service Based Modalities:      Timed Code Treatment Minutes:   24' there-ex  (1 unit)       10' manual    Total Treatment Minutes:   29'    Treatment/Activity Tolerance:  [x] Patient tolerated treatment well [] Patient limited by fatique  [] Patient limited by pain  [] Patient limited by other medical complications  [] Other:     Prognosis: [x] Good [] Fair  [] Poor    Patient Requires Follow-up: [x] Yes  [] No      Goals:  Short term goals  Time Frame for Short term goals: 2 weeks  Short term goal 1: Initiate HEP MET  Short term goal 2: Decrease LBP to <5/10 for improved ease with ADL and ambulation  Short term goal 3: Increase lumbar/core strength to 4/5 for improved ease with ADL and ambulation    Long term goals  Time Frame for Long term goals : 4 weeks  Long term goal 1: Indep with HEP  Long term goal 2: Decrease LBP to <3/10 for improved ease with ADL and ambulation  Long term goal 3: Increase lumbar/core strength to 4+/5 for improved ease with ADL and ambulation  Long term goal 4:  Increase lumbar posture to [facility] for improved ease with ADL and ambulation/balance  Long term goal 5: Oswestry score <20% disabled for return to previous level of function    Plan:   [x] Continue per plan of care [] Alter current plan (see comments)  [] Plan of care initiated [] Hold pending MD visit [] Discharge    Plan for Next Session:  Initiate IDN at next visit    Electronically signed by:  Stephanie Cates DPT

## 2020-07-17 ENCOUNTER — HOSPITAL ENCOUNTER (OUTPATIENT)
Dept: PHYSICAL THERAPY | Age: 80
Setting detail: THERAPIES SERIES
Discharge: HOME | End: 2020-07-17
Payer: MEDICARE

## 2020-07-17 PROCEDURE — 97110 THERAPEUTIC EXERCISES: CPT

## 2020-07-17 NOTE — FLOWSHEET NOTE
Physical Therapy Daily Treatment Note    Date:  2020    Patient Name:  Rachelle Juarez    :  1940  MRN: [id number]  Restrictions/Precautions:     Medical/Treatment Diagnosis Information:   · Diagnosis: Muscle spasm  · Treatment Diagnosis: back pain  Insurance/Certification information:  PT Insurance Information: Medicare  Physician Information:  Referring Practitioner: Aidee Mireles CNP  Plan of care signed (Y/N):  Y  Visit# / total visits:  5/10  Pain level: 2-3/10     Time In: 1:32   Time Out:2:15           Progress Note: []  Yes  [x]  No  Next due by: Visit #10  Or by 2020    Subjective:Pt reports my right side has greatly improved but the L side still is bothering. Reports she took a pain pill about an hr before she came. Objective: There ex completed per log. Verbal cues for proper technique and sequencing of exercises. Good tolerance to tx session with no pain reported post tx session. Observation: improved upright standing posture  Test measurements:      Exercises:   Exercise/Equipment Resistance/Repetitions Other comments   Lay Prone 5'    Prone Prop on Elbows 3'    Prone knee bends 10x    Prone hip IR/ER 10x with assist    LTR 5\" x 10    PPT 3\" x 15    Bridges x10         Modified back bends 15x    Hip ext, ABD 10x each    Ab Brace 3\" x 15     Ab Brace with sidesteps               Manual: IDN 5'         [x] Provided verbal/tactile cueing for activities related to strengthening, flexibility, endurance, ROM. (64020)  [] Provided verbal/tactile cueing for activities related to improving balance, coordination, kinesthetic sense, posture, motor skill, proprioception. (50298)    Therapeutic Activities:     [] Therapeutic activities, direct (one-on-one) patient contact (use of dynamic activities to improve functional performance). (06893)    Gait:   [] Provided training and instruction to the patient for ambulation re-education.  (08290)    Self-Care/ADL's  [] Self-care/home management training and compensatory training, meal preparation, safety procedures, and instructions in use of assistive technology devices/adaptive equipment, direct one-on-one contact. (57870)    Home Exercise Program:     [x] Reviewed/Progressed HEP activities related to strengthening, flexibility, endurance, ROM. (01219)  [] Reviewed/Progressed HEP activities related to improving balance, coordination, kinesthetic sense, posture, motor skill, proprioception.  (86681)    Manual Treatments:    [x] Provided manual therapy to mobilize soft tissue/joints for the purpose of modulating pain, promoting relaxation,  increasing ROM, reducing/eliminating soft tissue swelling/inflammation/restriction, improving soft tissue extensibility. (61898)  IDN performed by Emperatriz Stevens PT,DPT     Service Based Modalities:      Timed Code Treatment Minutes:   37' there-ex             Total Treatment Minutes:   37'    Treatment/Activity Tolerance:  [x] Patient tolerated treatment well [] Patient limited by fatique  [] Patient limited by pain  [] Patient limited by other medical complications  [] Other:     Prognosis: [x] Good [] Fair  [] Poor    Patient Requires Follow-up: [x] Yes  [] No      Goals:  Short term goals  Time Frame for Short term goals: 2 weeks  Short term goal 1: Initiate HEP MET  Short term goal 2: Decrease LBP to <5/10 for improved ease with ADL and ambulation  Short term goal 3: Increase lumbar/core strength to 4/5 for improved ease with ADL and ambulation    Long term goals  Time Frame for Long term goals : 4 weeks  Long term goal 1: Indep with HEP  Long term goal 2: Decrease LBP to <3/10 for improved ease with ADL and ambulation  Long term goal 3: Increase lumbar/core strength to 4+/5 for improved ease with ADL and ambulation  Long term goal 4:  Increase lumbar posture to [facility] for improved ease with ADL and ambulation/balance  Long term goal 5: Oswestry score <20% disabled for return to previous level of function    Plan:   [x]

## 2020-07-21 ENCOUNTER — HOSPITAL ENCOUNTER (OUTPATIENT)
Dept: PHYSICAL THERAPY | Age: 80
Setting detail: THERAPIES SERIES
Discharge: HOME | End: 2020-07-21
Payer: MEDICARE

## 2020-07-21 PROCEDURE — 97110 THERAPEUTIC EXERCISES: CPT

## 2020-07-21 NOTE — FLOWSHEET NOTE
Physical Therapy Daily Treatment Note    Date:  2020    Patient Name:  Kirt Shearer    :  1940  MRN: [id number]  Restrictions/Precautions:     Medical/Treatment Diagnosis Information:   · Diagnosis: Muscle spasm  · Treatment Diagnosis: back pain  Insurance/Certification information:  PT Insurance Information: Medicare  Physician Information:  Referring Practitioner: Marquita Farfan CNP  Plan of care signed (Y/N):  Y  Visit# / total visits:  6/10   Pain level: 4/10      Time In: 3:01   Time Out: 3:46          Progress Note: []  Yes  [x]  No  Next due by: Visit #10  Or by 2020    Subjective: Pt rpts to clinic with moderate complaints of LBP stating \"I have worse pain today and I don't know why. Its going down into my hips and legs a bit\". Objective: Pt tolerated todays session noting no change in symptoms. Pt was able to initiate SPO's with good tolerance but was limited by UE fatigue. Continues to tolerate IDN tx provided by Talento al Aula, PT,DPT well noting slight decrease in pain immediately following tx. Still has not met any goals to date.    Observation: improved upright standing posture  Test measurements:      Exercises:   Exercise/Equipment Resistance/Repetitions Other comments   Lay Prone 5'    Prone Prop on Elbows 3'    Prone knee bends 10x3    Prone hip IR/ER 10x with assist    LTR 5\" x 10    PPT 3\" x 15    Bridges x10    Prone hip ext x15 ea    Modified back bends 15x    Hip ext, ABD 10x each    Ab Brace 3\" x 15     Ab Brace with sidesteps 3 laps    T-band SPO's x15 red         Manual: IDN 5'         [x] Provided verbal/tactile cueing for activities related to strengthening, flexibility, endurance, ROM. (45290)  [] Provided verbal/tactile cueing for activities related to improving balance, coordination, kinesthetic sense, posture, motor skill, proprioception. (57809)    Therapeutic Activities:     [] Therapeutic activities, direct (one-on-one) patient contact (use of dynamic activities to improve functional performance). (97925)    Gait:   [] Provided training and instruction to the patient for ambulation re-education. (13451)    Self-Care/ADL's  [] Self-care/home management training and compensatory training, meal preparation, safety procedures, and instructions in use of assistive technology devices/adaptive equipment, direct one-on-one contact. (67702)    Home Exercise Program: Continue current HEP    [x] Reviewed/Progressed HEP activities related to strengthening, flexibility, endurance, ROM. (30291)  [] Reviewed/Progressed HEP activities related to improving balance, coordination, kinesthetic sense, posture, motor skill, proprioception.  (92829)    Manual Treatments:    [x] Provided manual therapy to mobilize soft tissue/joints for the purpose of modulating pain, promoting relaxation,  increasing ROM, reducing/eliminating soft tissue swelling/inflammation/restriction, improving soft tissue extensibility. (76799)  IDN performed by Marilyn Johnson PT,DPT     Service Based Modalities:      Timed Code Treatment Minutes:   39' there-ex             Total Treatment Minutes:   39'    Treatment/Activity Tolerance:  [x] Patient tolerated treatment well [] Patient limited by fatique  [] Patient limited by pain  [] Patient limited by other medical complications  [] Other:     Prognosis: [x] Good [] Fair  [] Poor    Patient Requires Follow-up: [x] Yes  [] No      Goals:  Short term goals  Time Frame for Short term goals: 2 weeks  Short term goal 1: Initiate HEP MET  Short term goal 2: Decrease LBP to <5/10 for improved ease with ADL and ambulation  Short term goal 3: Increase lumbar/core strength to 4/5 for improved ease with ADL and ambulation    Long term goals  Time Frame for Long term goals : 4 weeks  Long term goal 1: Indep with HEP  Long term goal 2: Decrease LBP to <3/10 for improved ease with ADL and ambulation (Ongoing)  Long term goal 3:  Increase lumbar/core strength to 4+/5 for

## 2020-07-24 ENCOUNTER — HOSPITAL ENCOUNTER (OUTPATIENT)
Dept: PHYSICAL THERAPY | Age: 80
Setting detail: THERAPIES SERIES
Discharge: HOME | End: 2020-07-24
Payer: MEDICARE

## 2020-07-24 PROCEDURE — 97140 MANUAL THERAPY 1/> REGIONS: CPT | Performed by: PHYSICAL THERAPIST

## 2020-07-24 PROCEDURE — 97110 THERAPEUTIC EXERCISES: CPT | Performed by: PHYSICAL THERAPIST

## 2020-07-24 NOTE — FLOWSHEET NOTE
Physical Therapy Daily Treatment Note    Date:  2020    Patient Name:  Nik Sanches    :  1940  MRN: [id number]  Restrictions/Precautions:     Medical/Treatment Diagnosis Information:   · Diagnosis: Muscle spasm  · Treatment Diagnosis: back pain  Insurance/Certification information:  PT Insurance Information: Medicare  Physician Information:  Referring Practitioner: Roverto Fuller CNP  Plan of care signed (Y/N):  Y  Visit# / total visits:  7/10   Pain level: 3/10      Time In: 3:34   Time Out: 4:30    Progress Note: []  Yes  [x]  No  Next due by: Visit #10  Or by 2020    Subjective: \"I've had a busy day today, but my back is doing alright. Last night it was a lot higher pain level than it is right now. But I haven't had to take a second pain pill today yet. \"      Objective: Pt tolerated todays session noting no change in symptoms. Pt was able to initiate SPO's with good tolerance but was limited by UE fatigue. Continues to tolerate IDN tx provided by Huan Xiong, PT,DPT well noting slight decrease in pain immediately following tx. Still has not met any goals to date. Observation: improved upright standing posture.    Test measurements:    Anterior pelvic tilt: 26 degrees (2020)    Exercises:   Exercise/Equipment Resistance/Repetitions Other comments   Lay Prone 5'    Prone Prop on Elbows 3'    Prone knee bends 10x3    Prone hip IR/ER 10x with assist    LTR 5\" x 10    PPT 3\" x 15    Bridges x10    Prone hip ext x15 ea    Modified back bends 15x    Hip ext, ABD 10x each    Ab Brace 3\" x 15     Ab Brace with sidesteps 3 laps    T-band SPO's x15 red         Manual: IDN 10'         [x] Provided verbal/tactile cueing for activities related to strengthening, flexibility, endurance, ROM. (14675)  [] Provided verbal/tactile cueing for activities related to improving balance, coordination, kinesthetic sense, posture, motor skill, proprioception. (77878)    Therapeutic Activities:     [] Therapeutic activities, direct (one-on-one) patient contact (use of dynamic activities to improve functional performance). (02623)    Gait:   [] Provided training and instruction to the patient for ambulation re-education. (25526)    Self-Care/ADL's  [] Self-care/home management training and compensatory training, meal preparation, safety procedures, and instructions in use of assistive technology devices/adaptive equipment, direct one-on-one contact. (20510)    Home Exercise Program: Continue current HEP    [x] Reviewed/Progressed HEP activities related to strengthening, flexibility, endurance, ROM. (10452)  [] Reviewed/Progressed HEP activities related to improving balance, coordination, kinesthetic sense, posture, motor skill, proprioception.  (64302)    Manual Treatments:    [x] Provided manual therapy to mobilize soft tissue/joints for the purpose of modulating pain, promoting relaxation,  increasing ROM, reducing/eliminating soft tissue swelling/inflammation/restriction, improving soft tissue extensibility. (45747)  IDN performed by Zenobia Smiley PT,DPT     Service Based Modalities:      Timed Code Treatment Minutes:   55' there-ex         10' Manual           Total Treatment Minutes:   64'    Treatment/Activity Tolerance:  [x] Patient tolerated treatment well [] Patient limited by fatique  [] Patient limited by pain  [] Patient limited by other medical complications  [] Other:     Prognosis: [x] Good [] Fair  [] Poor    Patient Requires Follow-up: [x] Yes  [] No      Goals:  Short term goals  Time Frame for Short term goals: 2 weeks  Short term goal 1: Initiate HEP MET  Short term goal 2: Decrease LBP to <5/10 for improved ease with ADL and ambulation  Short term goal 3:  Increase lumbar/core strength to 4/5 for improved ease with ADL and ambulation    Long term goals  Time Frame for Long term goals : 4 weeks  Long term goal 1: Indep with HEP  Long term goal 2: Decrease LBP to <3/10 for improved ease with ADL and ambulation (Ongoing)  Long term goal 3: Increase lumbar/core strength to 4+/5 for improved ease with ADL and ambulation   Long term goal 4: Increase lumbar posture to [facility] for improved ease with ADL and ambulation/balance  Long term goal 5: Oswestry score <20% disabled for return to previous level of function (Scored 23/50=46% disability.  21JUL)    Plan:   [x] Continue per plan of care [] Alter current plan (see comments)  [] Plan of care initiated [] Hold pending MD visit [] Discharge    Plan for Next Session:  Continue to progress per patient tolerance     Electronically signed by:  Leland Anderson PT, DPT

## 2020-07-27 ENCOUNTER — HOSPITAL ENCOUNTER (OUTPATIENT)
Dept: PHYSICAL THERAPY | Age: 80
Setting detail: THERAPIES SERIES
Discharge: HOME | End: 2020-07-27
Payer: MEDICARE

## 2020-07-27 PROCEDURE — 97140 MANUAL THERAPY 1/> REGIONS: CPT

## 2020-07-27 PROCEDURE — 97110 THERAPEUTIC EXERCISES: CPT

## 2020-07-27 NOTE — FLOWSHEET NOTE
Physical Therapy Daily Treatment Note    Date:  2020    Patient Name:  Marina Goddard    :  1940  MRN: [id number]  Restrictions/Precautions:     Medical/Treatment Diagnosis Information:   · Diagnosis: Muscle spasm  · Treatment Diagnosis: back pain   Insurance/Certification information:  PT Insurance Information: Medicare  Physician Information:  Referring Practitioner: Adelita Ignacio CNP  Plan of care signed (Y/N):  Y   Visit# / total visits:  8/10    Pain level: 3/10      Time In: 3:49   Time Out: 4:30    Progress Note: []  Yes  [x]  No  Next due by: Visit #10  Or by 2020    Subjective: Pt rpts to clinic with mild complaints of LBP stating \"It feels okay right now but I just took a pain pill recently\". Objective: Pt tolerated todays session noting no change in symptoms. Pt demonstrates good tolerance to progressions and notes no fatigue with any exercise this date. Continues to tolerate IDN tx provided by [employer], PT,DPT well noting slight decrease in pain immediately following tx. Observation: improved upright standing posture.     Test measurements:    Anterior pelvic tilt: 26 degrees (2020)    Exercises:   Exercise/Equipment Resistance/Repetitions Other comments   Lay Prone 5'    Prone Prop on Elbows 3'    Prone knee bends 10x3    Prone hip IR/ER 10x with assist    LTR 5\" x 10    PPT 3\" x 15    Bridges 2x10    Prone hip ext x15 ea    Modified back bends 15x    Hip ext, ABD 15x each    Ab Brace 3\" x 15     Ab Brace with sidesteps 3 laps    T-band SPO's x15 red    Back bends x15    Manual: IDN 10'         [x] Provided verbal/tactile cueing for activities related to strengthening, flexibility, endurance, ROM. (52460)  [] Provided verbal/tactile cueing for activities related to improving balance, coordination, kinesthetic sense, posture, motor skill, proprioception. (97022)    Therapeutic Activities:     [] Therapeutic activities, direct (one-on-one) patient contact (use of dynamic activities to improve functional performance). (28482)    Gait:   [] Provided training and instruction to the patient for ambulation re-education. (51834)    Self-Care/ADL's  [] Self-care/home management training and compensatory training, meal preparation, safety procedures, and instructions in use of assistive technology devices/adaptive equipment, direct one-on-one contact. (19590)    Home Exercise Program: Continue current HEP    [x] Reviewed/Progressed HEP activities related to strengthening, flexibility, endurance, ROM. (63666)  [] Reviewed/Progressed HEP activities related to improving balance, coordination, kinesthetic sense, posture, motor skill, proprioception.  (06926)    Manual Treatments:    [x] Provided manual therapy to mobilize soft tissue/joints for the purpose of modulating pain, promoting relaxation,  increasing ROM, reducing/eliminating soft tissue swelling/inflammation/restriction, improving soft tissue extensibility. (81040)  IDN performed by Jazmín Villanueva PT, DPT     Service Based Modalities:      Timed Code Treatment Minutes:   32' there-ex         10' Manual           Total Treatment Minutes:   39'    Treatment/Activity Tolerance:  [x] Patient tolerated treatment well [] Patient limited by fatique  [] Patient limited by pain  [] Patient limited by other medical complications  [] Other:     Prognosis: [x] Good [] Fair  [] Poor    Patient Requires Follow-up: [x] Yes  [] No      Goals:  Short term goals  Time Frame for Short term goals: 2 weeks  Short term goal 1: Initiate HEP MET  Short term goal 2: Decrease LBP to <5/10 for improved ease with ADL and ambulation  Short term goal 3: Increase lumbar/core strength to 4/5 for improved ease with ADL and ambulation    Long term goals  Time Frame for Long term goals : 4 weeks  Long term goal 1: Indep with HEP  Long term goal 2: Decrease LBP to <3/10 for improved ease with ADL and ambulation (Ongoing)  Long term goal 3:  Increase lumbar/core strength to 4+/5 for improved ease with ADL and ambulation   Long term goal 4: Increase lumbar posture to [facility] for improved ease with ADL and ambulation/balance  Long term goal 5: Oswestry score <20% disabled for return to previous level of function (Scored 23/50=46% disability.  21JUL)    Plan:   [x] Continue per plan of care [] Alter current plan (see comments)  [] Plan of care initiated [] Hold pending MD visit [] Discharge    Plan for Next Session:  Continue to progress per patient tolerance     Electronically signed by:  Valeria Tai PTA

## 2020-07-30 ENCOUNTER — HOSPITAL ENCOUNTER (OUTPATIENT)
Dept: PHYSICAL THERAPY | Age: 80
Setting detail: THERAPIES SERIES
Discharge: HOME | End: 2020-07-30
Payer: MEDICARE

## 2020-07-30 PROCEDURE — 97110 THERAPEUTIC EXERCISES: CPT

## 2020-07-30 NOTE — FLOWSHEET NOTE
Physical Therapy Daily Treatment Note    Date:  2020    Patient Name:  Marleen Stein    :  1940  MRN: [id number]  Restrictions/Precautions:     Medical/Treatment Diagnosis Information:   · Diagnosis: Muscle spasm  · Treatment Diagnosis: back pain   Insurance/Certification information:  PT Insurance Information: Medicare  Physician Information:  Referring Practitioner: Alex Lynn CNP  Plan of care signed (Y/N):  Y    Visit# / total visits:  9/10    Pain level: 3/10      Time In: 2:57   Time Out: 3:52    Progress Note: []  Yes  [x]  No  Next due by: Visit #10  Or by 2020    Subjective: Pt rpts to clinic with mild complaints of LBP stating \"It hurts more when I'm moving about but when I sit its still there its just not intense\". Objective: Pt tolerated todays session well indicating no increase in pain post session. Pt was able to perform all GUILHERME per flow chart with vc required for proper performance. Pt was most challenged by position changes on bed noting slight increase in pain. Able to progress multiple exercises this date with good tolerance. Sent home with grn T-band for HEP. Observation: improved upright standing posture.     Test measurements:    Anterior pelvic tilt: 26 degrees (2020)    Exercises:   Exercise/Equipment Resistance/Repetitions Other comments   Lay Prone 5'    Prone Prop on Elbows 3'    Prone knee bends 10x3    Prone hip IR/ER 10x with assist    LTR 5\" x 10    PPT 3\" x 15    Bridges 2x10    Prone hip ext x15 ea    Modified back bends 15x    Hip ext, ABD 15x each    Ab Brace 3\" x 15     Ab Brace with sidesteps 3 laps    T-band SPO's x15 grn    Back bends x15    Manual: IDN          [x] Provided verbal/tactile cueing for activities related to strengthening, flexibility, endurance, ROM. (47359)  [] Provided verbal/tactile cueing for activities related to improving balance, coordination, kinesthetic sense, posture, motor skill, proprioception. (37324)    Therapeutic Activities:     [] Therapeutic activities, direct (one-on-one) patient contact (use of dynamic activities to improve functional performance). (74679)    Gait:   [] Provided training and instruction to the patient for ambulation re-education. (36003)    Self-Care/ADL's  [] Self-care/home management training and compensatory training, meal preparation, safety procedures, and instructions in use of assistive technology devices/adaptive equipment, direct one-on-one contact. (18466)    Home Exercise Program: Continue current HEP    [x] Reviewed/Progressed HEP activities related to strengthening, flexibility, endurance, ROM. (96483)  [] Reviewed/Progressed HEP activities related to improving balance, coordination, kinesthetic sense, posture, motor skill, proprioception.  (21346)    Manual Treatments:    [] Provided manual therapy to mobilize soft tissue/joints for the purpose of modulating pain, promoting relaxation,  increasing ROM, reducing/eliminating soft tissue swelling/inflammation/restriction, improving soft tissue extensibility. (13283)      Service Based Modalities:      Timed Code Treatment Minutes:   54' there-ex                    Total Treatment Minutes:   54'    Treatment/Activity Tolerance:  [x] Patient tolerated treatment well [] Patient limited by fatique  [] Patient limited by pain  [] Patient limited by other medical complications  [] Other:     Prognosis: [x] Good [] Fair  [] Poor    Patient Requires Follow-up: [x] Yes  [] No      Goals:  Short term goals  Time Frame for Short term goals: 2 weeks  Short term goal 1: Initiate HEP MET  Short term goal 2: Decrease LBP to <5/10 for improved ease with ADL and ambulation (Ongoing)  Short term goal 3:  Increase lumbar/core strength to 4/5 for improved ease with ADL and ambulation    Long term goals  Time Frame for Long term goals : 4 weeks  Long term goal 1: Indep with HEP MET  Long term goal 2: Decrease LBP to <3/10 for improved ease with ADL and ambulation (Not met. Reaches 5/10 with standing/walking activities. 30JUL)  Long term goal 3: Increase lumbar/core strength to 4+/5 for improved ease with ADL and ambulation   Long term goal 4: [facility] for improved ease with ADL and ambulation/balance (Continues to utilize cane with hunched posture present. 30JUL)  Long term goal 5: Oswestry score <20% disabled for return to previous level of function (Scored 23/50=46% disability.  21JUL)     Plan:   [x] Continue per plan of care [] Alter current plan (see comments)  [] Plan of care initiated [] Hold pending MD visit [] Discharge    Plan for Next Session:  Continue to progress per patient tolerance     Electronically signed by:  Gera Jacob PTA

## 2020-07-31 RX ORDER — OXYCODONE HYDROCHLORIDE 10 MG/1
10 TABLET ORAL 3 TIMES DAILY
Qty: 90 TABLET | Refills: 0 | Status: SHIPPED | OUTPATIENT
Start: 2020-07-31 | End: 2020-09-01 | Stop reason: SDUPTHER

## 2020-07-31 NOTE — TELEPHONE ENCOUNTER
Patient called refill line for her oxy 10s. Last Appt:  5/27/2020  Next Appt:   Visit date not found  Med verified in [address] [place] [address] checked for [place], [place], and [place]: 6/26/20 oxycodone Hcl 10mg #90. Due Now .

## 2020-08-04 ENCOUNTER — HOSPITAL ENCOUNTER (OUTPATIENT)
Dept: PHYSICAL THERAPY | Age: 80
Setting detail: THERAPIES SERIES
Discharge: HOME | End: 2020-08-04
Payer: MEDICARE

## 2020-08-04 PROCEDURE — 97110 THERAPEUTIC EXERCISES: CPT | Performed by: PHYSICAL THERAPIST

## 2020-08-04 NOTE — FLOWSHEET NOTE
Physical Therapy Daily Treatment Note    Date:  2020    Patient Name:  Sameera Domingo    :  1940  MRN: [id number]  Restrictions/Precautions:     Medical/Treatment Diagnosis Information:   · Diagnosis: Muscle spasm  · Treatment Diagnosis: back pain   Insurance/Certification information:  PT Insurance Information: Medicare  Physician Information:  Referring Practitioner: Thaddeus Keane CNP  Plan of care signed (Y/N):  Y    Visit# / total visits:  10/10    Pain level: 3/10      Time In: 3:35   Time Out: 4:23    Progress Note: []  Yes  [x]  No  Next due by: Visit #10  Or by 2020    Subjective: Pt rpts to clinic with mild complaints of LBP stating \"It's way better than when I started, but still bothers me if I don't take a pain pill. \"    Objective: Pt tolerated todays session well indicating no increase in pain post session. Pt was able to perform all GUILHERME per flow chart with vc required for proper performance. Pt was most challenged by position changes on bed noting slight increase in pain. Able to progress multiple exercises this date with good tolerance. Sent home with grn T-band for HEP. Observation: improved upright standing posture.     Test measurements:    Anterior pelvic tilt: 26 degrees (2020)    Exercises:   Exercise/Equipment Resistance/Repetitions Other comments   Lay Prone 5'    Prone Prop on Elbows 5'    Prone knee bends 10x3    Prone hip IR/ER 15x with assist    LTR 5\" x 15    PPT 3\" x 15    Bridges 2x10    Prone hip ext x15 ea    Modified back bends 15x    Hip ext, ABD 15x each    Ab Brace 3\" x 15     Ab Brace with sidesteps 3 laps    T-band SPO's x15 grn    Back bends x15    Manual: IDN 5'         [x] Provided verbal/tactile cueing for activities related to strengthening, flexibility, endurance, ROM. (11330)  [] Provided verbal/tactile cueing for activities related to improving balance, coordination, kinesthetic sense, posture, motor skill, proprioception. (58494)    Therapeutic Activities:     [] Therapeutic activities, direct (one-on-one) patient contact (use of dynamic activities to improve functional performance). (45652)    Gait:   [] Provided training and instruction to the patient for ambulation re-education. (89825)    Self-Care/ADL's  [] Self-care/home management training and compensatory training, meal preparation, safety procedures, and instructions in use of assistive technology devices/adaptive equipment, direct one-on-one contact. (83016)    Home Exercise Program: Continue current HEP    [x] Reviewed/Progressed HEP activities related to strengthening, flexibility, endurance, ROM. (36389)  [] Reviewed/Progressed HEP activities related to improving balance, coordination, kinesthetic sense, posture, motor skill, proprioception.  (92294)    Manual Treatments:    [x] Provided manual therapy to mobilize soft tissue/joints for the purpose of modulating pain, promoting relaxation,  increasing ROM, reducing/eliminating soft tissue swelling/inflammation/restriction, improving soft tissue extensibility. (73632)  IDN performed by Tamiko Cintron PT, DPT     Service Based Modalities:      Timed Code Treatment Minutes:   50' there-ex             Total Treatment Minutes:   48'    Treatment/Activity Tolerance:  [x] Patient tolerated treatment well [] Patient limited by fatique  [] Patient limited by pain  [] Patient limited by other medical complications  [] Other:     Prognosis: [x] Good [] Fair  [] Poor    Patient Requires Follow-up: [x] Yes  [] No      Goals:  Short term goals  Time Frame for Short term goals: 2 weeks  Short term goal 1: Initiate HEP MET  Short term goal 2: Decrease LBP to <5/10 for improved ease with ADL and ambulation (Ongoing)  Short term goal 3:  Increase lumbar/core strength to 4/5 for improved ease with ADL and ambulation    Long term goals  Time Frame for Long term goals : 4 weeks  Long term goal 1: Indep with HEP MET  Long term goal 2: Decrease LBP to <3/10 for improved ease with ADL and ambulation (Not met. Reaches 5/10 with standing/walking activities. 30JUL)  Long term goal 3: Increase lumbar/core strength to 4+/5 for improved ease with ADL and ambulation   Long term goal 4: [facility] for improved ease with ADL and ambulation/balance (Continues to utilize cane with hunched posture present. 30JUL)  Long term goal 5: Oswestry score <20% disabled for return to previous level of function (Scored 23/50=46% disability.  21JUL)     Plan:   [x] Continue per plan of care [] Alter current plan (see comments)  [] Plan of care initiated [] Hold pending MD visit [] Discharge    Plan for Next Session:  Will try HEP only for 2 weeks    Electronically signed by:  Yovanny Toscano, PT, DPT

## 2020-08-18 ENCOUNTER — APPOINTMENT (OUTPATIENT)
Dept: PHYSICAL THERAPY | Age: 80
End: 2020-08-18
Payer: MEDICARE

## 2020-08-21 ENCOUNTER — HOSPITAL ENCOUNTER (OUTPATIENT)
Dept: PHYSICAL THERAPY | Age: 80
Setting detail: THERAPIES SERIES
Discharge: HOME | End: 2020-08-21
Payer: MEDICARE

## 2020-08-21 ENCOUNTER — APPOINTMENT (OUTPATIENT)
Dept: PHYSICAL THERAPY | Age: 80
End: 2020-08-21
Payer: MEDICARE

## 2020-08-25 ENCOUNTER — APPOINTMENT (OUTPATIENT)
Dept: PHYSICAL THERAPY | Age: 80
End: 2020-08-25
Payer: MEDICARE

## 2020-08-26 ENCOUNTER — HOSPITAL ENCOUNTER (OUTPATIENT)
Dept: PHYSICAL THERAPY | Age: 80
Setting detail: THERAPIES SERIES
Discharge: HOME | End: 2020-08-26
Payer: MEDICARE

## 2020-08-26 PROCEDURE — 97110 THERAPEUTIC EXERCISES: CPT

## 2020-08-26 NOTE — FLOWSHEET NOTE
Physical Therapy Daily Treatment Note    Date:  2020    Patient Name:  Perico Spencer    :  1940  MRN: [id number]  Restrictions/Precautions:     Medical/Treatment Diagnosis Information:   · Diagnosis: Muscle spasm  · Treatment Diagnosis: back pain   Insurance/Certification information:  PT Insurance Information: Medicare  Physician Information:  Referring Practitioner: Ayde Orantes CNP  Plan of care signed (Y/N):  Y    Visit# / total visits:  1/10    Pain level: 4-5/10      Time In: 1:20 PM   Time Out: 2:00 PM    Progress Note: [x]  Yes  []  No  Next due by: Visit #10  Or by 2020    Subjective: Patient reports she had been doing pretty good since last therapy visit and rated pain 2/10 until last few days she has had increased pain and rates at a 4-5/10. Patient states she is pretty good if she takes her pain medication in the morning and afternoon. Patient reports compliance with HEP. Objective: Performed there-ex as documented on flowsheet to improve strength and ROM for ease with daily tasks. Patient tolerated treatment well with no complaints of increased pain or soreness. Patient given minimal verbal cues to perform ther-ex properly.   Observation: fair posture noted with ambulation with a straight cane    Test measurements:    Exercises:   Exercise/Equipment Resistance/Repetitions Other comments   Lay Prone 5'    Prone Prop on Elbows 5'    Prone knee bends 10x3    Prone hip IR/ER 15x with assist    LTR 5\" x 15    PPT 3\" x 15    Bridges 2x10    Prone hip ext x15 ea    Modified back bends 15x    Hip ext, ABD 15x each    Ab Brace 3\" x 15     Ab Brace with sidesteps 3 laps    T-band SPO's x15 grn    Back bends x15    Manual: IDN          [x] Provided verbal/tactile cueing for activities related to strengthening, flexibility, endurance, ROM. (61432)  [] Provided verbal/tactile cueing for activities related to improving balance, coordination, kinesthetic sense, posture, motor skill, proprioception. (57842)    Therapeutic Activities:     [] Therapeutic activities, direct (one-on-one) patient contact (use of dynamic activities to improve functional performance). (71117)    Gait:   [] Provided training and instruction to the patient for ambulation re-education. (25558)    Self-Care/ADL's  [] Self-care/home management training and compensatory training, meal preparation, safety procedures, and instructions in use of assistive technology devices/adaptive equipment, direct one-on-one contact. (44669)    Home Exercise Program: Continue current HEP    [x] Reviewed/Progressed HEP activities related to strengthening, flexibility, endurance, ROM. (66888)  [] Reviewed/Progressed HEP activities related to improving balance, coordination, kinesthetic sense, posture, motor skill, proprioception.  (02417)    Manual Treatments:    [x] Provided manual therapy to mobilize soft tissue/joints for the purpose of modulating pain, promoting relaxation,  increasing ROM, reducing/eliminating soft tissue swelling/inflammation/restriction, improving soft tissue extensibility. (07798)      Service Based Modalities:      Timed Code Treatment Minutes:   36' ther-ex             Total Treatment Minutes:   36'    Treatment/Activity Tolerance:  [x] Patient tolerated treatment well [] Patient limited by fatique  [] Patient limited by pain  [] Patient limited by other medical complications  [] Other:     Prognosis: [x] Good [] Fair  [] Poor    Patient Requires Follow-up: [x] Yes  [] No      Goals:  Short term goals  Time Frame for Short term goals: 2 weeks  Short term goal 1: Initiate HEP MET  Short term goal 2: Decrease LBP to <5/10 for improved ease with ADL and ambulation (Ongoing)  Short term goal 3:  Increase lumbar/core strength to 4/5 for improved ease with ADL and ambulation    Long term goals  Time Frame for Long term goals : 4 weeks  Long term goal 1: Indep with HEP MET  Long term goal 2: Decrease LBP to <3/10 for

## 2020-09-01 RX ORDER — OXYCODONE HYDROCHLORIDE 10 MG/1
10 TABLET ORAL 3 TIMES DAILY
Qty: 90 TABLET | Refills: 0 | Status: SHIPPED | OUTPATIENT
Start: 2020-09-01 | End: 2020-10-01 | Stop reason: SDUPTHER

## 2020-09-01 NOTE — TELEPHONE ENCOUNTER
Pt called the office requesting a refill of oxycodone. [place], and [place]: 7/31/2020 oxycodone 10mg #90. Due NOW. LM for the patient to call the office to set up an OV.      Last Appt:  5/27/2020  Next Appt:   Visit date not found  Med verified in [facility] [address]

## 2020-09-15 ENCOUNTER — OFFICE VISIT (OUTPATIENT)
Dept: PAIN MANAGEMENT | Age: 80
End: 2020-09-15
Payer: MEDICARE

## 2020-09-15 VITALS
SYSTOLIC BLOOD PRESSURE: 110 MMHG | BODY MASS INDEX: 30.73 KG/M2 | DIASTOLIC BLOOD PRESSURE: 60 MMHG | HEIGHT: 64 IN | WEIGHT: 180 LBS

## 2020-09-15 PROCEDURE — 1036F TOBACCO NON-USER: CPT | Performed by: PHYSICAL MEDICINE & REHABILITATION

## 2020-09-15 PROCEDURE — 1090F PRES/ABSN URINE INCON ASSESS: CPT | Performed by: PHYSICAL MEDICINE & REHABILITATION

## 2020-09-15 PROCEDURE — G8427 DOCREV CUR MEDS BY ELIG CLIN: HCPCS | Performed by: PHYSICAL MEDICINE & REHABILITATION

## 2020-09-15 PROCEDURE — 99214 OFFICE O/P EST MOD 30 MIN: CPT

## 2020-09-15 PROCEDURE — G8417 CALC BMI ABV UP PARAM F/U: HCPCS | Performed by: PHYSICAL MEDICINE & REHABILITATION

## 2020-09-15 PROCEDURE — 1123F ACP DISCUSS/DSCN MKR DOCD: CPT | Performed by: PHYSICAL MEDICINE & REHABILITATION

## 2020-09-15 PROCEDURE — G8400 PT W/DXA NO RESULTS DOC: HCPCS | Performed by: PHYSICAL MEDICINE & REHABILITATION

## 2020-09-15 PROCEDURE — 99214 OFFICE O/P EST MOD 30 MIN: CPT | Performed by: PHYSICAL MEDICINE & REHABILITATION

## 2020-09-15 PROCEDURE — 4040F PNEUMOC VAC/ADMIN/RCVD: CPT | Performed by: PHYSICAL MEDICINE & REHABILITATION

## 2020-09-15 ASSESSMENT — ENCOUNTER SYMPTOMS
ALLERGIC/IMMUNOLOGIC NEGATIVE: 1
DIARRHEA: 0
BACK PAIN: 1
CONSTIPATION: 0
EYES NEGATIVE: 1
RESPIRATORY NEGATIVE: 1

## 2020-09-15 NOTE — PROGRESS NOTES
PAIN MANAGEMENT FOLLOW-UP NOTE  9/15/20    CHIEF COMPLAINT: This is [de-identified] y.o. female patientwho returns to the [facility] with a history of Back Pain      PAIN Jayson Kerr returns today for  reevaluation. Since the visit, the patient reports that the pain is not changed. Pain in B lumbar mostly  Some pain in   Upper legs  Says PT  Helps   Decrease intensity of  B lumbar     Says  Percocet 10 TID  Helps  If also lays down for a few minutes     Location: Neck  Location Modifier: entire back  Severity of Pain: 6  Duration of Pain: Intermittent  Frequency of Pain: Intermittent  Aggravating Factors: -  Limiting Behavior: Standing   Relieving Factors: relaxation        Previous pain medication trials have included:          Mental health:    Patient feels - secondary to their current pain problems as described above. H/O depression and anxiety: No   Patient is not seeing psychologist orpsychiatrist   Abuse history? No    Employed? No    ANALGESIA:   Are your Current Pain medication (s) helping to decrease pain? No.   Current Pain score: Pain Score:   4    ADVERSE AFFECTS:   Medication Side Effects: No.    ACTIVITY:  Are you able to be more active with your pain medications? No      ABERRANT BEHAVIORS SINCE LAST VISIT? No    Review of Systems   Constitutional: Positive for fatigue. HENT: Negative. Eyes: Negative. Respiratory: Negative. Cardiovascular: Negative. Gastrointestinal: Negative for constipation and diarrhea. Endocrine: Negative. Genitourinary: Negative for difficulty urinating and flank pain. Musculoskeletal: Positive for back pain and myalgias. Skin: Negative. Allergic/Immunologic: Negative. Neurological: Positive for weakness and numbness. Hematological: Negative. Psychiatric/Behavioral: Positive for sleep disturbance.         Allergies   Allergen Reactions    Sulfa Antibiotics Shortness Of Breath    Sulfamethoxazole-Trimethoprim Anaphylaxis    Ansaid [Flurbiprofen] Other (See Comments)     Light headed and difficult with vision \"seeing\"    Gentamicin Other (See Comments)     Eye ointment caused eye swelling, redness    Quinidine      Light headed    Hydrocodone-Acetaminophen     Mirapex [Pramipexole Dihydrochloride] Other (See Comments)     Unknown reaction    Mobic [Meloxicam] Nausea Only    Motrin [Ibuprofen] Other (See Comments)     \"I coughed so bad I broke a rib\"    Nsaids Other (See Comments)     lightheaded    Reglan [Metoclopramide] Other (See Comments)     Hyperactive and stomach pain    Trazodone Other (See Comments)     unknown    Corgard [Nadolol] Other (See Comments)     Severe coughing and broke a rib as a result     Erythromycin Nausea Only    Etodolac      GI issues    Garamycin [Gentamicin Sulfate] Other (See Comments)     Redness and irritation    Inderal [Propranolol] Other (See Comments)     Severe cough    Lisinopril Itching    Loratadine      Does not work    Ultram [Tramadol] Other (See Comments)     Didn't work         Outpatient Medications Prior to Visit   Medication Sig Dispense Refill    oxyCODONE HCl (OXY-IR) 10 MG immediate release tablet Take 1 tablet by mouth three times daily for 30 days.  90 tablet 0    naloxone 4 MG/0.1ML LIQD nasal spray 1 spray by Nasal route as needed for Opioid Reversal 1 each 5    citalopram (CELEXA) 10 MG tablet Take 10 mg by mouth daily      baclofen (LIORESAL) 10 MG tablet Take 10 mg by mouth 3 times daily      ketotifen (ZADITOR) 0.025 % ophthalmic solution 1 drop 2 times daily      Multiple Vitamin (MULTI-VITAMIN DAILY) TABS Take by mouth      famotidine (PEPCID) 10 MG tablet Take 10 mg by mouth 2 times daily      BiPAP Machine MISC       melatonin 1 MG tablet melatonin   once daily      Triprolidine-Pseudoephedrine (ANTIHISTAMINE PO) Take by mouth Indications: zyrtec       diclofenac sodium 1 % GEL Apply 2 g topically 4 times daily 3 Tube 0    metoprolol tartrate (LOPRESSOR) 25 MG tablet Take 12.5 mg by mouth 2 times daily      aspirin 81 MG tablet Take 81 mg by mouth daily      potassium chloride (KLOR-CON M) 20 MEQ extended release tablet TAKE ONE TABLET ONCE A DAY      guaiFENesin (MUCINEX) 600 MG extended release tablet Mucinex 600 mg tablet, extended release   Take 2 tablets every day by oral route.  lidocaine (ASPERCREME W/LIDOCAINE) 4 % cream Apply topically as needed for Pain Apply topically as needed.  furosemide (LASIX) 40 MG tablet Take 40 mg by mouth 3 times daily Indications: 40 in am & 20 @ night, 20mg additional if needed       fluticasone propionate 50 MCG/BLIST AEPB Inhale into the lungs      albuterol (ACCUNEB) 1.25 MG/3ML nebulizer solution Inhale 1 ampule into the lungs every 6 hours as needed for Wheezing      simvastatin (ZOCOR) 40 MG tablet Take 40 mg by mouth nightly      flecainide (TAMBOCOR) 50 MG tablet Take 50 mg by mouth 2 times daily.  omeprazole (PRILOSEC) 40 MG capsule Take 40 mg by mouth nightly.  montelukast (SINGULAIR) 10 MG tablet Take 10 mg by mouth nightly.  DULoxetine (CYMBALTA) 60 MG capsule Take 60 mg by mouth daily.  artificial tears (ARTIFICIAL TEARS) OINT as needed.  Saline 0.2 % SOLN by Nasal route daily as needed       Immune Globulin, Human, (HIZENTRA) 10 GM/50ML SOLN Inject into the skin Tuesdays      diphenhydrAMINE (BENADRYL) 25 MG capsule Take 25 mg by mouth every 6 hours as needed for Itching.  gabapentin (NEURONTIN) 100 MG capsule Gabapentin (Neurontin) Dosing Instructions: Take 1 (one) at bedtime for 5 days, then twice daily for 5 days, then three times per day. 90 capsule 1    oxyCODONE HCl (OXY-IR) 10 MG immediate release tablet Take 1 tablet by mouth 2 times daily for 30 days.  60 tablet 0    levothyroxine (SYNTHROID) 75 MCG tablet Take 75 mcg by mouth daily      Heating Pads (RADHA PAD/SMARTHEAT PRO/) PADS 1 Units by Does not apply route 4 times daily Use for 15 01/28/2005 & 03/16/2010    2x     Family History   Problem Relation Age of Onset    Heart Disease Father      Social History     Socioeconomic History    Marital status:      Spouse name: None    Number of children: None    Years of education: None    Highest education level: None   Occupational History    Occupation: retired   Social Needs    Financial resource strain: None    Food insecurity     Worry: None     Inability: None    Transportation needs     Medical: None     Non-medical: None   Tobacco Use    Smoking status: Never Smoker    Smokeless tobacco: Never Used   Substance and Sexual Activity    Alcohol use: Yes     Comment: very rare    Drug use: No    Sexual activity: None   Lifestyle    Physical activity     Days per week: None     Minutes per session: None    Stress: None   Relationships    Social connections     Talks on phone: None     Gets together: None     Attends Spiritism service: None     Active member of club or organization: None     Attends meetings of clubs or organizations: None     Relationship status: None    Intimate partner violence     Fear of current or ex partner: None     Emotionally abused: None     Physically abused: None     Forced sexual activity: None   Other Topics Concern    None   Social History Narrative    None         Family and Social Historyreviewed in the electronic medical record. Imaging:Reviewed available imaging in our system with the patient. No results found. Objective:     Physical Exam:  Vitals:    09/15/20 1347   BP: 110/60   Site: Right Upper Arm   Position: Sitting   Cuff Size: Medium Adult   Weight: 180 lb (81.6 kg)   Height: 5' 4\" (1.626 m)     Pain Score:   4    Physical Exam  Constitutional:       General: She is not in acute distress. Appearance: Normal appearance. She is well-developed. She is not diaphoretic. HENT:      Head: Normocephalic and atraumatic.       Right Ear: External ear normal. No decreased hearing visit  Meds:   Controlled Substances Monitoring: Pt educated about the risks of taking opiates,including increased sedation, constipation, slowed breathing, tolerance, dependence,and addiction. New Prescriptions    No medications on file      No orders of the defined types were placed in this encounter. No orders of the defined types were placed in this encounter. No follow-ups on file. The patient expressed understanding of the above assessment and plan. Totaltime spent face to face with patient was 25 minutes inwhich  50% or more of the time was spent in counseling, education about risk andbenefits of the above plan, and coordination of care.

## 2020-10-01 NOTE — TELEPHONE ENCOUNTER
Pt called refill line for her Oxycodone to [facility] in [place]. Last Appt:  9/15/2020  Next Appt:   11/24/2020  Med verified in [address] [place] [address] checked for [place], and [place]: 9/1/20 oxycodone Hcl 10mg #90. Due 10/1/20.

## 2020-10-02 RX ORDER — OXYCODONE HYDROCHLORIDE 10 MG/1
10 TABLET ORAL 3 TIMES DAILY
Qty: 90 TABLET | Refills: 0 | Status: SHIPPED | OUTPATIENT
Start: 2020-10-02 | End: 2020-10-29 | Stop reason: SDUPTHER

## 2020-10-02 NOTE — DISCHARGE SUMMARY
ADL    Plan:    D/C to HEP due to meeting goals at this time       Goals:    Short term goals  Time Frame for Short term goals: 2 weeks  Short term goal 1: Initiate HEP MET  Short term goal 2: Decrease LBP to <5/10 for improved ease with ADL and ambulation (Ongoing) met  Short term goal 3: Increase lumbar/core strength to 4/5 for improved ease with ADL and ambulation met     Long term goals  Time Frame for Long term goals : 4 weeks  Long term goal 1: Indep with HEP MET  Long term goal 2: Decrease LBP to <3/10 for improved ease with ADL and ambulation (Pain rated 2/10, currently has a little flare and pain rated at 4-5/10  26 AUG) met  Long term goal 3: Increase lumbar/core strength to 4+/5 for improved ease with ADL and ambulation (TA control: fair  26 AUG) met  Long term goal 4: Increase lumbar posture to [facility] for improved ease with ADL and ambulation/balance (Fair posture noted with ambulation with straight cane. 26 AUG) met  Long term goal 5: Oswestry score <20% disabled for return to previous level of function (Scored 22/50=44% disability.  26 AUG) Not met       Percentage of Goals Met:    7/8 = 88%         Discharge Prognosis: [] Excellent [x] Good [] Fair  [] Poor     Goal Status:  [x] Achieved [] Partially Achieved  [] Not Achieved       Electronically signed by:  Tee Lau, PT, DPT

## 2020-10-20 RX ORDER — GABAPENTIN 100 MG/1
CAPSULE ORAL
Qty: 90 CAPSULE | Refills: 2 | Status: SHIPPED | OUTPATIENT
Start: 2020-10-20 | End: 2020-10-29 | Stop reason: SDUPTHER

## 2020-10-23 ENCOUNTER — TELEPHONE (OUTPATIENT)
Dept: PAIN MANAGEMENT | Age: 80
End: 2020-10-23

## 2020-10-23 RX ORDER — METHYLPREDNISOLONE 4 MG/1
TABLET ORAL
Qty: 1 KIT | Refills: 0 | Status: SHIPPED | OUTPATIENT
Start: 2020-10-23 | End: 2020-10-29

## 2020-10-23 NOTE — TELEPHONE ENCOUNTER
Pt called stating that she is having increased right sided lower back pain that radiates down to her buttocks area and is wondering if she can get steroids for her sent to [facility] in [place]. Pt stated that she is in quarantine right now since her daughter is COVID 19 positive and she cannot go out right now.      Last Appt:  9/15/2020  Next Appt:   11/24/2020  Med verified in Epic

## 2020-10-29 RX ORDER — OXYCODONE HYDROCHLORIDE 10 MG/1
10 TABLET ORAL 3 TIMES DAILY
Qty: 90 TABLET | Refills: 0 | Status: SHIPPED | OUTPATIENT
Start: 2020-11-01 | End: 2020-12-03 | Stop reason: SDUPTHER

## 2020-10-29 RX ORDER — GABAPENTIN 100 MG/1
CAPSULE ORAL
Qty: 90 CAPSULE | Refills: 2 | Status: SHIPPED | OUTPATIENT
Start: 2020-11-01 | End: 2022-06-26

## 2020-10-29 NOTE — TELEPHONE ENCOUNTER
Last Appt:  9/15/2020  Next Appt:   11/24/2020  Med verified in [facility] checked for [place], and [place]: 10/02/2020 oxy #90. Due 11/01/2020. [place] for [place], and [place]: 10/02/2020 gabapentin #90. Due 11/01/2020.

## 2020-11-24 ENCOUNTER — OFFICE VISIT (OUTPATIENT)
Dept: PAIN MANAGEMENT | Age: 80
End: 2020-11-24
Payer: MEDICARE

## 2020-11-24 ENCOUNTER — HOSPITAL ENCOUNTER (OUTPATIENT)
Age: 80
Setting detail: SPECIMEN
Discharge: HOME | End: 2020-11-24
Payer: MEDICARE

## 2020-11-24 VITALS — DIASTOLIC BLOOD PRESSURE: 64 MMHG | SYSTOLIC BLOOD PRESSURE: 115 MMHG

## 2020-11-24 PROCEDURE — G8427 DOCREV CUR MEDS BY ELIG CLIN: HCPCS | Performed by: PHYSICAL MEDICINE & REHABILITATION

## 2020-11-24 PROCEDURE — 4040F PNEUMOC VAC/ADMIN/RCVD: CPT | Performed by: PHYSICAL MEDICINE & REHABILITATION

## 2020-11-24 PROCEDURE — 1036F TOBACCO NON-USER: CPT | Performed by: PHYSICAL MEDICINE & REHABILITATION

## 2020-11-24 PROCEDURE — 1123F ACP DISCUSS/DSCN MKR DOCD: CPT | Performed by: PHYSICAL MEDICINE & REHABILITATION

## 2020-11-24 PROCEDURE — 99214 OFFICE O/P EST MOD 30 MIN: CPT | Performed by: PHYSICAL MEDICINE & REHABILITATION

## 2020-11-24 PROCEDURE — G8417 CALC BMI ABV UP PARAM F/U: HCPCS | Performed by: PHYSICAL MEDICINE & REHABILITATION

## 2020-11-24 PROCEDURE — 80307 DRUG TEST PRSMV CHEM ANLYZR: CPT

## 2020-11-24 PROCEDURE — G8400 PT W/DXA NO RESULTS DOC: HCPCS | Performed by: PHYSICAL MEDICINE & REHABILITATION

## 2020-11-24 PROCEDURE — 1090F PRES/ABSN URINE INCON ASSESS: CPT | Performed by: PHYSICAL MEDICINE & REHABILITATION

## 2020-11-24 PROCEDURE — G8484 FLU IMMUNIZE NO ADMIN: HCPCS | Performed by: PHYSICAL MEDICINE & REHABILITATION

## 2020-11-24 PROCEDURE — 99212 OFFICE O/P EST SF 10 MIN: CPT

## 2020-11-24 RX ORDER — FUROSEMIDE 20 MG/1
20 TABLET ORAL 3 TIMES DAILY
COMMUNITY
Start: 2020-11-03 | End: 2022-08-11 | Stop reason: SDUPTHER

## 2020-11-24 RX ORDER — METHYLPREDNISOLONE 4 MG/1
TABLET ORAL
Qty: 1 KIT | Refills: 1 | Status: SHIPPED | OUTPATIENT
Start: 2020-11-24 | End: 2020-11-30

## 2020-11-24 ASSESSMENT — ENCOUNTER SYMPTOMS
EYES NEGATIVE: 1
CONSTIPATION: 0
DIARRHEA: 0
ALLERGIC/IMMUNOLOGIC NEGATIVE: 1
BACK PAIN: 1
RESPIRATORY NEGATIVE: 1

## 2020-11-24 NOTE — PROGRESS NOTES
PAIN MANAGEMENT FOLLOW-UP NOTE  11/24/20    CHIEF COMPLAINT: This is [de-identified] y.o. female patientwho returns to the [facility] with a history of Back Pain (2 month follow up) and Other (having thigh pain, and sciatica in the right side)      PAIN Peace Horn returns today for  reevaluation. Since the visit, the patient reports that the pain is not changed. Still B lumbar and  L leg pain to foot  B and B hips    did well with  Oral steroid   Not so much  With  TFEs    Feels legs could give out  And same with R  ankle     Location: Back  Location Modifier: entire back  Severity of Pain: 6  Duration of Pain: Intermittent  Frequency of Pain: Intermittent  Aggravating Factors: -  Limiting Behavior: Twisting  Relieving Factors: narcotics        Previous pain medication trials have included:          Mental health:    Patient feels - secondary to their current pain problems as described above. H/O depression and anxiety: No   Patient is not seeing psychologist orpsychiatrist   Abuse history? No    Employed? No    ANALGESIA:   Are your Current Pain medication (s) helping to decrease pain? No.   Current Pain score: Pain Score:   5    ADVERSE AFFECTS:   Medication Side Effects: No.    ACTIVITY:  Are you able to be more active with your pain medications? No      ABERRANT BEHAVIORS SINCE LAST VISIT? No    Review of Systems   Constitutional: Positive for fatigue. HENT: Negative. Eyes: Negative. Respiratory: Negative. Cardiovascular: Negative. Gastrointestinal: Negative for constipation and diarrhea. Endocrine: Negative. Genitourinary: Negative for difficulty urinating and flank pain. Musculoskeletal: Positive for back pain and myalgias. Skin: Negative. Allergic/Immunologic: Negative. Neurological: Positive for weakness and numbness. Hematological: Negative. Psychiatric/Behavioral: Positive for sleep disturbance.         Allergies   Allergen Reactions    Sulfa Antibiotics Shortness Of Breath    Sulfamethoxazole-Trimethoprim Anaphylaxis    Ansaid [Flurbiprofen] Other (See Comments)     Light headed and difficult with vision \"seeing\"    Gentamicin Other (See Comments)     Eye ointment caused eye swelling, redness    Quinidine      Light headed    Hydrocodone-Acetaminophen     Mirapex [Pramipexole Dihydrochloride] Other (See Comments)     Unknown reaction    Mobic [Meloxicam] Nausea Only    Motrin [Ibuprofen] Other (See Comments)     \"I coughed so bad I broke a rib\"    Nsaids Other (See Comments)     lightheaded    Reglan [Metoclopramide] Other (See Comments)     Hyperactive and stomach pain    Trazodone Other (See Comments)     unknown    Corgard [Nadolol] Other (See Comments)     Severe coughing and broke a rib as a result     Erythromycin Nausea Only    Etodolac      GI issues    Garamycin [Gentamicin Sulfate] Other (See Comments)     Redness and irritation    Inderal [Propranolol] Other (See Comments)     Severe cough    Lisinopril Itching    Loratadine      Does not work    Ultram [Tramadol] Other (See Comments)     Didn't work         Outpatient Medications Prior to Visit   Medication Sig Dispense Refill    furosemide (LASIX) 20 MG tablet take 2 tablets by mouth every morning and 1 EVERY AFTERNOON      gabapentin (NEURONTIN) 100 MG capsule TAKE 1 CAPSULE BY MOUTH THREE TIMES A DAY 90 capsule 2    oxyCODONE HCl (OXY-IR) 10 MG immediate release tablet Take 1 tablet by mouth three times daily for 30 days.  90 tablet 0    baclofen (LIORESAL) 10 MG tablet Take 10 mg by mouth 3 times daily      ketotifen (ZADITOR) 0.025 % ophthalmic solution 1 drop 2 times daily      Multiple Vitamin (MULTI-VITAMIN DAILY) TABS Take by mouth      famotidine (PEPCID) 10 MG tablet Take 10 mg by mouth 2 times daily      melatonin 1 MG tablet melatonin   once daily      Triprolidine-Pseudoephedrine (ANTIHISTAMINE PO) Take by mouth Indications: zyrtec       metoprolol tartrate (LOPRESSOR) 25 MG tablet Take 12.5 mg by mouth 2 times daily      aspirin 81 MG tablet Take 81 mg by mouth daily      potassium chloride (KLOR-CON M) 20 MEQ extended release tablet TAKE ONE TABLET ONCE A DAY      guaiFENesin (MUCINEX) 600 MG extended release tablet Mucinex 600 mg tablet, extended release   Take 2 tablets every day by oral route.  lidocaine (ASPERCREME W/LIDOCAINE) 4 % cream Apply topically as needed for Pain Apply topically as needed.  simvastatin (ZOCOR) 40 MG tablet Take 40 mg by mouth nightly      flecainide (TAMBOCOR) 50 MG tablet Take 50 mg by mouth 2 times daily.  omeprazole (PRILOSEC) 40 MG capsule Take 40 mg by mouth nightly.  montelukast (SINGULAIR) 10 MG tablet Take 10 mg by mouth nightly.  DULoxetine (CYMBALTA) 60 MG capsule Take 60 mg by mouth daily.  artificial tears (ARTIFICIAL TEARS) OINT as needed.  Immune Globulin, Human, (HIZENTRA) 10 GM/50ML SOLN Inject into the skin Tuesdays      diphenhydrAMINE (BENADRYL) 25 MG capsule Take 25 mg by mouth every 6 hours as needed for Itching.  naloxone 4 MG/0.1ML LIQD nasal spray 1 spray by Nasal route as needed for Opioid Reversal (Patient not taking: Reported on 11/24/2020) 1 each 5    oxyCODONE HCl (OXY-IR) 10 MG immediate release tablet Take 1 tablet by mouth 2 times daily for 30 days. 60 tablet 0    citalopram (CELEXA) 10 MG tablet Take 10 mg by mouth daily      BiPAP Machine MISC       diclofenac sodium 1 % GEL Apply 2 g topically 4 times daily 3 Tube 0    levothyroxine (SYNTHROID) 75 MCG tablet Take 75 mcg by mouth daily      Heating Pads (RADHA PAD/SMARTHEAT PRO/) PADS 1 Units by Does not apply route 4 times daily Use for 15 minutes to 30 minutes at a time four times a day.  1 each 0    topiramate (TOPAMAX) 25 MG tablet Take 1 tablet by mouth 2 times daily (Patient taking differently: Take 25 mg by mouth daily ) 60 tablet 0    furosemide (LASIX) 40 MG tablet Take 40 mg by mouth 3 times daily Indications: 40 in am & 20 @ night, 20mg additional if needed       fluticasone propionate 50 MCG/BLIST AEPB Inhale into the lungs      albuterol (ACCUNEB) 1.25 MG/3ML nebulizer solution Inhale 1 ampule into the lungs every 6 hours as needed for Wheezing      Saline 0.2 % SOLN by Nasal route daily as needed        Facility-Administered Medications Prior to Visit   Medication Dose Route Frequency Provider Last Rate Last Dose    triamcinolone acetonide (KENALOG-40) injection 40 mg  40 mg Intra-articular Once Courtenay Bamberger, MD        lidocaine 2 % injection 5 mL  5 mL Intradermal Once Courtenay Bamberger, MD        bupivacaine (MARCAINE) 0.5 % injection 150 mg  30 mL Intra-articular Once Courtenay Bamberger, MD           Past Medical History:   Diagnosis Date    Anesthesia complication     2nd post op day from total knee patient stated \"I was wild and mean\"    Anxiety and depression     Arthritis     ASD (atrial septal defect)     repair [facility] COPD [facility]chronic obstructive [facility] disease) [facility]     COPD [facility]chronic obstructive [facility] disease) [facility]     Disorder of immune system ([facility]     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 (total left knee 3/2015 at [facility])    History of renal stone     passed    Hx of blood clots     DVT    Hypertension     MVP (mitral valve prolapse)     LONI (obstructive sleep apnea)     uses bipap nightly    Rectocele     Spinal stenosis     Thyroid disease late     overactive radioactive iodine done       Past Surgical History:   Procedure Laterality Date    BACK SURGERY      BREAST LUMPECTOMY Left     BREAST LUMPECTOMY Left     CARDIAC SURGERY      ASD repair    CARDIAC VALVE SURGERY       SECTION  1962/1966    2x    CHOLECYSTECTOMY      CHOLECYSTECTOMY      COLONOSCOPY      three    DIAGNOSTIC CARDIAC CATH LAB PROCEDURE      DILATION AND CURETTAGE OF UTERUS  1980    EYE SURGERY Bilateral     cataract    HC INJECTION PROCEDURE FOR SACROILIAC JOINT Left 7/31/2018    Left SIJ and piriformis, left trocanteric bursa injection performed by Bucky Farnsworth MD at /[facility]  12/2009    HYSTERECTOMY  2009    Total    JOINT REPLACEMENT Bilateral     knee    KNEE ARTHROSCOPY Left 2008    LUMBAR SPINE SURGERY Bilateral 2/26/2019    Bilateral L4 TRANSFORAMINAL  [id number] performed by Bucky Farnsworth MD at [address] [place] [address] Dr Bilateral 3/12/2019    Bilateral L4 TRANSFORAMINAL performed by Bucky Farnsworth MD at [address] [place] [address] Dr Right 10/15/2019    RIGHT L4 & L5 TRANSFORAMINAL performed by Bucky Farnsworth MD at [address] [place] [address] Dr Left 11/1/2019    Left L4 & L5 TRANSFORAMINAL performed by Bucky Farnsworth MD at [place] [address]  8/2014    PAIN MANAGEMENT PROCEDURE Right 2/7/2020    Right L4 & L5 TRANSFORAMINAL performed by Bucky Farnsworth MD at [facility] Left 2/21/2020    Left L4 & L5 TRANSFORAMINAL performed by Bucky Farnsworth MD at [address] [place] [address] DX/THER AGNT PARAVERT FACET JOINT, LUMBAR/SAC, 2ND LEVEL Bilateral 8/30/2018    Bilateral L2 L3 L4 L5 Diagnostic Medial BB performed by Bucky Farnsworth MD at [address] [place] [address] DX/THER AGNT PARAVERT FACET JOINT, LUMBAR/SAC, 2ND LEVEL Right 9/13/2018    Right L2 L3 L4 L5 Confirmatory Medial BB performed by Bucky Farnsworth MD at [facility] ANES/STEROID [facility] ADD LEVEL Right 7/20/2018    Right C5 C6 TRANSFORAMINAL performed by Bucky Farnsworth MD at [address] Left 12/20/2018    Left L2 L3 L4 L5 RADIOFREQUENCY performed by Bucky Farnsworth MD at [address] Right 1/3/2019    Right L2 L3 L4 L5 RADIOFREQUENCY performed by Theo Duncan MD at [address] Right 01/28/2005 & 03/16/2010    2x     Family History   Problem Relation Age of Onset    Heart Disease Father      Social History     Socioeconomic History    Marital status:      Spouse name: None    Number of children: None    Years of education: None    Highest education level: None   Occupational History    Occupation: retired   Social Needs    Financial resource strain: None    Food insecurity     Worry: None     Inability: None    Transportation needs     Medical: None     Non-medical: None   Tobacco Use    Smoking status: Never Smoker    Smokeless tobacco: Never Used   Substance and Sexual Activity    Alcohol use: Yes     Comment: very rare    Drug use: No    Sexual activity: None   Lifestyle    Physical activity     Days per week: None     Minutes per session: None    Stress: None   Relationships    Social connections     Talks on phone: None     Gets together: None     Attends [employer] service: None     Active member of club or organization: None     Attends meetings of clubs or organizations: None     Relationship status: None    Intimate partner violence     Fear of current or ex partner: None     Emotionally abused: None     Physically abused: None     Forced sexual activity: None   Other Topics Concern    None   Social History Narrative    None         Family and Social Historyreviewed in the electronic medical record. Imaging:Reviewed available imaging in our system with the patient. No results found. Objective:     Physical Exam:  Vitals:    11/24/20 1448   BP: 115/64   Site: Right Upper Arm   Position: Sitting   Cuff Size: Large Adult     Pain Score:   5    Physical Exam  Constitutional:       General: She is not in acute distress. Appearance: Normal appearance. She is well-developed. She is not diaphoretic.    HENT:      Head: Normocephalic and atraumatic. Right Ear: External ear normal. No decreased hearing noted. Left Ear: External ear normal. No decreased hearing noted. Nose: Nose normal.   Eyes:      General: Lids are normal. No scleral icterus. Conjunctiva/sclera: Conjunctivae normal.   Neck:      Trachea: Phonation normal.   Cardiovascular:      Comments: No BLE edema present  Pulmonary:      Effort: Pulmonary effort is normal. No accessory muscle usage or respiratory distress. Genitourinary:     Comments: deferred  Skin:     General: Skin is warm and dry. Coloration: Skin is not pale. Findings: No erythema or rash. Neurological:      General: No focal deficit present. Mental Status: She is alert and oriented to person, place, and time. Psychiatric:         Mood and Affect: Mood normal.         Speech: Speech normal.         Behavior: Behavior normal.       Back Exam     Tenderness   The patient is experiencing tenderness in the lumbar. Range of Motion   Extension: normal   Flexion: normal   Lateral bend right: normal   Lateral bend left: normal   Rotation right: normal   Rotation left: normal     Muscle Strength   Right Quadriceps:  5/5   Left Quadriceps:  5/5   Right Hamstrings:  5/5   Left Hamstrings:  5/5     Tests   Straight leg raise right: negative  Straight leg raise left: negative    Other   Toe walk: normal  Heel walk: normal  Sensation: normal  Gait: normal     Comments:  +slump   SLR mild + B  -kemps   -fabers                                      Assessment: This is a [de-identified] y.o. female patient with:    Diagnosis:   Diagnosis Orders   1. Lumbosacral spondylosis without myelopathy     2. Lumbar radiculopathy         Medical Comorbidities:  As listed in the patient's past medical and surgical history    Functional Limitations:   Pain limits function and quality of life.      Plan:   Medrol dose  Pack  Stronger amount causes  Too much  Nausea    Consider B L5 TFEs  If not better with   Oral steroid    Meds:   Controlled Substances Monitoring: Pt educated about the risks of taking opiates,including increased sedation, constipation, slowed breathing, tolerance, dependence,and addiction. New Prescriptions    No medications on file      No orders of the defined types were placed in this encounter. No orders of the defined types were placed in this encounter. No follow-ups on file. The patient expressed understanding of the above assessment and plan. Totaltime spent face to face with patient was 30 minutes inwhich  50% or more of the time was spent in counseling, education about risk andbenefits of the above plan, and coordination of care.

## 2020-11-27 LAB
6-ACETYLMORPHINE, UR: NOT DETECTED
7-AMINOCLONAZEPAM, URINE: NOT DETECTED
ALPHA-OH-ALPRAZ, URINE: NOT DETECTED
ALPRAZOLAM, URINE: NOT DETECTED
AMPHETAMINES, URINE: NOT DETECTED
BARBITURATES, URINE: NOT DETECTED
BENZOYLECGONINE, UR: NOT DETECTED
BUPRENORPHINE URINE: NOT DETECTED
CARISOPRODOL, UR: NOT DETECTED
CLONAZEPAM, URINE: NOT DETECTED
CODEINE, URINE: NOT DETECTED
CREATININE URINE: 34 MG/DL (ref 20–400)
DIAZEPAM, URINE: NOT DETECTED
EER PAIN MGT DRUG PANEL, HIGH RES/EMIT U: NORMAL
ETHYL GLUCURONIDE UR: NOT DETECTED
FENTANYL URINE: NOT DETECTED
HYDROCODONE, URINE: NOT DETECTED
HYDROMORPHONE, URINE: NOT DETECTED
LORAZEPAM, URINE: NOT DETECTED
MARIJUANA METAB, UR: NOT DETECTED
MDA, UR: NOT DETECTED
MDEA, EVE, UR: NOT DETECTED
MDMA URINE: NOT DETECTED
MEPERIDINE METAB, UR: NOT DETECTED
METHADONE, URINE: NOT DETECTED
METHAMPHETAMINE, URINE: NOT DETECTED
METHYLPHENIDATE: NOT DETECTED
MIDAZOLAM, URINE: NOT DETECTED
MORPHINE URINE: NOT DETECTED
NORBUPRENORPHINE, URINE: NOT DETECTED
NORDIAZEPAM, URINE: NOT DETECTED
NORFENTANYL, URINE: NOT DETECTED
NORHYDROCODONE, URINE: NOT DETECTED
NOROXYCODONE, URINE: PRESENT
NOROXYMORPHONE, URINE: NOT DETECTED
OXAZEPAM, URINE: NOT DETECTED
OXYCODONE URINE: PRESENT
OXYMORPHONE, URINE: NOT DETECTED
PAIN MGT DRUG PANEL, HI RES, UR: NORMAL
PCP,URINE: NOT DETECTED
PHENTERMINE, UR: NOT DETECTED
PROPOXYPHENE, URINE: NOT DETECTED
TAPENTADOL, URINE: NOT DETECTED
TAPENTADOL-O-SULFATE, URINE: NOT DETECTED
TEMAZEPAM, URINE: NOT DETECTED
TRAMADOL, URINE: NOT DETECTED
ZOLPIDEM, URINE: NOT DETECTED

## 2020-12-03 RX ORDER — OXYCODONE HYDROCHLORIDE 10 MG/1
10 TABLET ORAL 3 TIMES DAILY
Qty: 90 TABLET | Refills: 0 | Status: SHIPPED | OUTPATIENT
Start: 2020-12-03 | End: 2020-12-23 | Stop reason: SDUPTHER

## 2020-12-03 NOTE — TELEPHONE ENCOUNTER
Pt called refill line for her oxycodone sent to [facility] in [place]. Last Appt:  11/24/2020  Next Appt:   1/25/2021  Med verified in [address] [place] [address] checked for [place], and [place]: 11/3/20 oxycodone Hcl 10mg #90. Due 12/3/20.

## 2020-12-22 ENCOUNTER — TELEPHONE (OUTPATIENT)
Dept: PAIN MANAGEMENT | Age: 80
End: 2020-12-22

## 2020-12-23 RX ORDER — OXYCODONE HYDROCHLORIDE 10 MG/1
10 TABLET ORAL 3 TIMES DAILY
Qty: 90 TABLET | Refills: 0 | Status: SHIPPED | OUTPATIENT
Start: 2021-01-03 | End: 2021-02-02 | Stop reason: SDUPTHER

## 2020-12-23 RX ORDER — OXYCODONE HYDROCHLORIDE 10 MG/1
10 TABLET ORAL 3 TIMES DAILY
Qty: 90 TABLET | Refills: 0 | Status: SHIPPED | OUTPATIENT
Start: 2021-01-03 | End: 2020-12-23 | Stop reason: SDUPTHER

## 2020-12-23 RX ORDER — PREDNISONE 20 MG/1
20 TABLET ORAL DAILY
Qty: 10 TABLET | Refills: 1 | Status: SHIPPED | OUTPATIENT
Start: 2020-12-23 | End: 2022-06-08 | Stop reason: SDUPTHER

## 2020-12-23 NOTE — TELEPHONE ENCOUNTER
Last Appt:  11/24/2020  Next Appt:   1/25/2021  Med verified in [address] checked for [place], and [place]: 12/03/20 Oxycodone Hcl 10mg #90. Due 01/03/20.

## 2021-01-28 ENCOUNTER — OFFICE VISIT (OUTPATIENT)
Dept: PAIN MANAGEMENT | Age: 81
End: 2021-01-28
Payer: MEDICARE

## 2021-01-28 VITALS
SYSTOLIC BLOOD PRESSURE: 124 MMHG | DIASTOLIC BLOOD PRESSURE: 80 MMHG | HEIGHT: 64 IN | BODY MASS INDEX: 30.73 KG/M2 | WEIGHT: 180 LBS

## 2021-01-28 DIAGNOSIS — M54.16 LUMBAR RADICULOPATHY: Primary | ICD-10-CM

## 2021-01-28 DIAGNOSIS — Z79.891 ENCOUNTER FOR LONG-TERM OPIATE ANALGESIC USE: ICD-10-CM

## 2021-01-28 DIAGNOSIS — M51.36 LUMBAR DEGENERATIVE DISC DISEASE: ICD-10-CM

## 2021-01-28 DIAGNOSIS — M47.817 LUMBOSACRAL SPONDYLOSIS WITHOUT MYELOPATHY: ICD-10-CM

## 2021-01-28 DIAGNOSIS — R42 VERTIGO: ICD-10-CM

## 2021-01-28 PROCEDURE — 1123F ACP DISCUSS/DSCN MKR DOCD: CPT | Performed by: PHYSICAL MEDICINE & REHABILITATION

## 2021-01-28 PROCEDURE — G8484 FLU IMMUNIZE NO ADMIN: HCPCS | Performed by: PHYSICAL MEDICINE & REHABILITATION

## 2021-01-28 PROCEDURE — 4040F PNEUMOC VAC/ADMIN/RCVD: CPT | Performed by: PHYSICAL MEDICINE & REHABILITATION

## 2021-01-28 PROCEDURE — G8427 DOCREV CUR MEDS BY ELIG CLIN: HCPCS | Performed by: PHYSICAL MEDICINE & REHABILITATION

## 2021-01-28 PROCEDURE — G8417 CALC BMI ABV UP PARAM F/U: HCPCS | Performed by: PHYSICAL MEDICINE & REHABILITATION

## 2021-01-28 PROCEDURE — 99215 OFFICE O/P EST HI 40 MIN: CPT | Performed by: PHYSICAL MEDICINE & REHABILITATION

## 2021-01-28 PROCEDURE — 1090F PRES/ABSN URINE INCON ASSESS: CPT | Performed by: PHYSICAL MEDICINE & REHABILITATION

## 2021-01-28 PROCEDURE — 1036F TOBACCO NON-USER: CPT | Performed by: PHYSICAL MEDICINE & REHABILITATION

## 2021-01-28 PROCEDURE — G8400 PT W/DXA NO RESULTS DOC: HCPCS | Performed by: PHYSICAL MEDICINE & REHABILITATION

## 2021-01-28 PROCEDURE — 99214 OFFICE O/P EST MOD 30 MIN: CPT | Performed by: PHYSICAL MEDICINE & REHABILITATION

## 2021-01-28 ASSESSMENT — ENCOUNTER SYMPTOMS
RESPIRATORY NEGATIVE: 1
BACK PAIN: 1
EYES NEGATIVE: 1
DIARRHEA: 0
CONSTIPATION: 0
ALLERGIC/IMMUNOLOGIC NEGATIVE: 1

## 2021-01-28 NOTE — PATIENT INSTRUCTIONS
[facility]  [address]  Telephone [phone]  Fax [phone]      PROCEDURE INSTRUCTIONS FOR  PAIN MANAGEMENT PROCEDURES WITHOUT IV SEDATION    Raheloctavia Porsha scheduled to see Dr. Osiel Tapia to undergo the following procedure:  Right L4 and L5 TFE    Procedure Date: ____2/9/21____  Carlos Stubbs will receive a phone call the day prior to your procedure to confirm a time or arrival.    Report to the [place] [address], Registration office on the 1st floor in the hospital, after check in and signing of paper work you will then go to the second floor to the surgery center. 1. Stop the following medications prior to the procedure:    Aspirin  Date__2/5/2021__ : Stop blood thinners as directed before the injection, with permission from your cardiologist or primary care physician. We will send a letter to them requesting permission to hold the blood thinners. · Medication___Aspirin___ held for __3__ days    If you take Warfarin (Coumadin), you must have your blood drawn for an INR the day before the procedure. INR must be less than 1.5. if not complete prior to check in the procedure this will be drawn at the procedure center prior to having the procedure completed. 2.  Take all routine medications unless otherwise instructed. Ok to take vitamins and antiinflammatory medications    3. EATING & DRINKING:  Ok to eat or drink before the injection - no IV sedation will be used. 4. If you are allergic to contrast or iodine, you must take benadryl and prednisone prior to the injection to prevent an allergic reaction. Follow the directions on the prescription for the times to take the medication. 5. Oral valium can be prescribed if your are anxious about the injections or feel that you can not lay still during the injection. If you take valium, you must have a . Make arrangements for a family member or friend to drive you to the surgery center. Your ride must stay in the hospital while you are having the injection done. If they cannot stay, the injection will be rescheduled. The valium may affect your judgment following the procedure and driving a vehicle within 24 hours after the sedation could be dangerous. 6.  Wear simple loose clothing, which can be easily changed. 7.  Leave jewelry (including rings) and other valuables at home. 8.  You will be asked to sign several forms prior to surgery; patients under the age of 25 must have a parent or legal guardian sign the permit to be able to do the procedure. 9.  You must have finished any antibiotic prescribed for recent infections. If required, please take pre-procedure antibiotic or other pre-procedure medications as instructed. 10. Bring inhalers and pain medications with you to your procedure. 11. Bring your MRI/CT films if they were done outside of the [facility]. 12. If you should develop a cold, sore throat, cough, fever or other new indication of illness or infection, or are started on antibiotics within 2 weeks of the scheduled procedure, please notify the [facility] office as early as possible at [phone] If calling after 4:30pm the day prior to your scheduled procedure please contact [phone] and Leave a Voice Message.        Gloria Galaviz scheduled to see Dr. Ayala Paige to undergo the following procedure:  Right L4 and L5 TFE    Procedure Date: ____2/23/21____  You will receive a phone call the day prior to your procedure to confirm a time or arrival. [facility] Registration office on the 1st floor in the hospital, after check in and signing of paper work you will then go to the second floor to the surgery center. 1. Stop the following medications prior to the procedure:    Aspirin  Date__2/19/2021__ : Stop blood thinners as directed before the injection, with permission from your cardiologist or primary care physician. We will send a letter to them requesting permission to hold the blood thinners. · Medication___Aspirin___ held for __3__ days    If you take Warfarin (Coumadin), you must have your blood drawn for an INR the day before the procedure. INR must be less than 1.5. if not complete prior to check in the procedure this will be drawn at the procedure center prior to having the procedure completed. 2.  Take all routine medications unless otherwise instructed. Ok to take vitamins and antiinflammatory medications    3. EATING & DRINKING:  Ok to eat or drink before the injection - no IV sedation will be used. 4. If you are allergic to contrast or iodine, you must take benadryl and prednisone prior to the injection to prevent an allergic reaction. Follow the directions on the prescription for the times to take the medication. 5. Oral valium can be prescribed if your are anxious about the injections or feel that you can not lay still during the injection. If you take valium, you must have a . Make arrangements for a family member or friend to drive you to the surgery center. Your ride must stay in the hospital while you are having the injection done. If they cannot stay, the injection will be rescheduled. The valium may affect your judgment following the procedure and driving a vehicle within 24 hours after the sedation could be dangerous. 6.  Wear simple loose clothing, which can be easily changed. 7.  Leave jewelry (including rings) and other valuables at home. 8.  You will be asked to sign several forms prior to surgery; patients under the age of 25 must have a parent or legal guardian sign the permit to be able to do the procedure. 9.  You must have finished any antibiotic prescribed for recent infections. If required, please take pre-procedure antibiotic or other pre-procedure medications as instructed. 10. Bring inhalers and pain medications with you to your procedure. 11. Bring your MRI/CT films if they were done outside of the [facility]. 12. If you should develop a cold, sore throat, cough, fever or other new indication of illness or infection, or are started on antibiotics within 2 weeks of the scheduled procedure, please notify the [place] office as early as possible at [phone]. If calling after 4:30pm the day prior to your scheduled procedure please contact [phone] and Leave a Voice Message.

## 2021-01-28 NOTE — PROGRESS NOTES
PAIN MANAGEMENT FOLLOW-UP NOTE  1/28/21    CHIEF COMPLAINT: This is [de-identified] y.o. female patientwho returns [facility] the [facility] with a history of Back Pain      PAIN [facility] returns today for  reevaluation. Since the visit, the patient reports that the pain is not changed. Notes last R L4,5 and LL4,5 TFEs in February 202 gave good relief for  6 months  And  Now R  Lumbar pain   Down anteromedial leg with numbness has returned. Also notes now cramping in R lateral thigh to knee no injury  To back or legs. Has ongoing numbness B feet and doesn't know if pain numbness from feet to  Legs or from back to  Feet . Also notes dizziness in a  Band  Across forehead  Has tried   Halving Baclofen  Dose with a little bit of improvement     Location: Back  Location Modifier: entire back  Severity of Pain: 4  Duration of Pain: Intermittent  Frequency of Pain: Intermittent  Aggravating Factors: -  Limiting Behavior: -  Relieving Factors: relaxation        Previous pain medication trials have included:          Mental health:    Patient feels - secondary to their current pain problems as described above. H/O depression and anxiety: No   Patient is not seeing psychologist orpsychiatrist   Abuse history? No    Employed? No    ANALGESIA:   Are your Current Pain medication (s) helping to decrease pain? No.   Current Pain score: Pain Score:   6    ADVERSE AFFECTS:   Medication Side Effects: No.    ACTIVITY:  Are you able to be more active with your pain medications? No      ABERRANT BEHAVIORS SINCE LAST VISIT? No    Review of Systems   Constitutional: Positive for fatigue. HENT: Negative. Eyes: Negative. Respiratory: Negative. Cardiovascular: Negative. Gastrointestinal: Negative for constipation and diarrhea. Endocrine: Negative. Genitourinary: Negative for difficulty urinating and flank pain. Musculoskeletal: Positive for back pain and myalgias. Skin: Negative.     Allergic/Immunologic: Negative. Neurological: Positive for weakness and numbness. Hematological: Negative. Psychiatric/Behavioral: Positive for sleep disturbance. Allergies   Allergen Reactions    Sulfa Antibiotics Shortness Of Breath    Sulfamethoxazole-Trimethoprim Anaphylaxis    Ansaid [Flurbiprofen] Other (See Comments)     Light headed and difficult with vision \"seeing\"    Gentamicin Other (See Comments)     Eye ointment caused eye swelling, redness    Quinidine      Light headed    Hydrocodone-Acetaminophen     Mirapex [Pramipexole Dihydrochloride] Other (See Comments)     Unknown reaction    Mobic [Meloxicam] Nausea Only    Motrin [Ibuprofen] Other (See Comments)     \"I coughed so bad I broke a rib\"    Nsaids Other (See Comments)     lightheaded    Reglan [Metoclopramide] Other (See Comments)     Hyperactive and stomach pain    Trazodone Other (See Comments)     unknown    Corgard [Nadolol] Other (See Comments)     Severe coughing and broke a rib as a result     Erythromycin Nausea Only    Etodolac      GI issues    Garamycin [Gentamicin Sulfate] Other (See Comments)     Redness and irritation    Inderal [Propranolol] Other (See Comments)     Severe cough    Lisinopril Itching    Loratadine      Does not work    Ultram [Tramadol] Other (See Comments)     Didn't work         Outpatient Medications Prior to Visit   Medication Sig Dispense Refill    oxyCODONE HCl (OXY-IR) 10 MG immediate release tablet Take 1 tablet by mouth three times daily for 30 days.  90 tablet 0    furosemide (LASIX) 20 MG tablet take 2 tablets by mouth every morning and 1 EVERY AFTERNOON      gabapentin (NEURONTIN) 100 MG capsule TAKE 1 CAPSULE BY MOUTH THREE TIMES A DAY 90 capsule 2    naloxone 4 MG/0.1ML LIQD nasal spray 1 spray by Nasal route as needed for Opioid Reversal 1 each 5    baclofen (LIORESAL) 10 MG tablet Take 10 mg by mouth 3 times daily      ketotifen (ZADITOR) 0.025 % ophthalmic solution 1 drop 2 times daily      Multiple Vitamin (MULTI-VITAMIN DAILY) TABS Take by mouth      famotidine (PEPCID) 10 MG tablet Take 10 mg by mouth 2 times daily      BiPAP Machine MISC       melatonin 1 MG tablet melatonin   once daily      Triprolidine-Pseudoephedrine (ANTIHISTAMINE PO) Take by mouth Indications: zyrtec       metoprolol tartrate (LOPRESSOR) 25 MG tablet Take 12.5 mg by mouth 2 times daily      aspirin 81 MG tablet Take 81 mg by mouth daily      potassium chloride (KLOR-CON M) 20 MEQ extended release tablet TAKE ONE TABLET ONCE A DAY      guaiFENesin (MUCINEX) 600 MG extended release tablet Mucinex 600 mg tablet, extended release   Take 2 tablets every day by oral route.  lidocaine (ASPERCREME W/LIDOCAINE) 4 % cream Apply topically as needed for Pain Apply topically as needed.  fluticasone propionate 50 MCG/BLIST AEPB Inhale into the lungs      albuterol (ACCUNEB) 1.25 MG/3ML nebulizer solution Inhale 1 ampule into the lungs every 6 hours as needed for Wheezing      simvastatin (ZOCOR) 40 MG tablet Take 40 mg by mouth nightly      flecainide (TAMBOCOR) 50 MG tablet Take 50 mg by mouth 2 times daily.  omeprazole (PRILOSEC) 40 MG capsule Take 40 mg by mouth nightly.  montelukast (SINGULAIR) 10 MG tablet Take 10 mg by mouth nightly.  DULoxetine (CYMBALTA) 60 MG capsule Take 60 mg by mouth daily.  artificial tears (ARTIFICIAL TEARS) OINT as needed.  Saline 0.2 % SOLN by Nasal route daily as needed       Immune Globulin, Human, (HIZENTRA) 10 GM/50ML SOLN Inject into the skin Tuesdays      diphenhydrAMINE (BENADRYL) 25 MG capsule Take 25 mg by mouth every 6 hours as needed for Itching.  levothyroxine (SYNTHROID) 75 MCG tablet Take 75 mcg by mouth daily      Heating Pads (RADHA PAD/SMARTHEAT PRO/) PADS 1 Units by Does not apply route 4 times daily Use for 15 minutes to 30 minutes at a time four times a day.  1 each 0    topiramate (TOPAMAX) 25 MG tablet Take 1 tablet by mouth 2 times daily (Patient taking differently: Take 25 mg by mouth daily ) 60 tablet 0     Facility-Administered Medications Prior to Visit   Medication Dose Route Frequency Provider Last Rate Last Admin    triamcinolone acetonide (KENALOG-40) injection 40 mg  40 mg Intra-articular Once Zainab Ramires MD        lidocaine 2 % injection 5 mL  5 mL Intradermal Once Zainab Ramires MD        bupivacaine (MARCAINE) 0.5 % injection 150 mg  30 mL Intra-articular Once Zainab Ramires MD           Past Medical History:   Diagnosis Date    Anesthesia complication     2nd post op day from total knee patient stated \"I was wild and mean\"    Anxiety and depression     Arthritis     ASD (atrial septal defect)     [facility]     COPD [facility] obstructive [facility] disease) [facility]     Disorder of immune system ([facility]     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 ([facility] 3/[facility])    History of renal stone     passed    Hx of blood clots     DVT    Hypertension     MVP (mitral valve prolapse)     LONI (obstructive sleep apnea)     uses bipap nightly    Rectocele     Spinal stenosis     Thyroid disease late     overactive radioactive iodine done       Past Surgical History:   Procedure Laterality Date    BACK SURGERY      BREAST LUMPECTOMY Left     BREAST LUMPECTOMY Left     CARDIAC SURGERY      ASD repair    CARDIAC VALVE SURGERY       SECTION  /    2x    CHOLECYSTECTOMY      CHOLECYSTECTOMY      COLONOSCOPY      three    DIAGNOSTIC CARDIAC CATH LAB PROCEDURE      DILATION AND CURETTAGE OF UTERUS      EYE SURGERY Bilateral     cataract    HC Socioeconomic History    Marital status:      Spouse name: None    Number of children: None    Years of education: None    Highest education level: None   Occupational History    Occupation: retired   Social Needs    Financial resource strain: None    Food insecurity     Worry: None     Inability: None    Transportation needs     Medical: None     Non-medical: None   Tobacco Use    Smoking status: Never Smoker    Smokeless tobacco: Never Used   Substance and Sexual Activity    Alcohol use: Yes     Comment: very rare    Drug use: No    Sexual activity: None   Lifestyle    Physical activity     Days per week: None     Minutes per session: None    Stress: None   Relationships    Social connections     Talks on phone: None     Gets together: None     Attends Druze service: None     Active member of club or organization: None     Attends meetings of clubs or organizations: None     Relationship status: None    Intimate partner violence     Fear of current or ex partner: None     Emotionally abused: None     Physically abused: None     Forced sexual activity: None   Other Topics Concern    None   Social History Narrative    None         Family and Social Historyreviewed in the electronic medical record. Imaging:Reviewed available imaging in our system with the patient. No results found. Objective:     Physical Exam:  Vitals:    01/28/21 1310   BP: 124/80   Site: Right Upper Arm   Position: Sitting   Cuff Size: Medium Adult   Weight: 180 lb (81.6 kg)   Height: 5' 4\" (1.626 m)     Pain Score:   6    Physical Exam  Vitals signs and nursing note reviewed. Constitutional:       General: She is not in acute distress. Appearance: Normal appearance. She is well-developed. She is not diaphoretic. HENT:      Head: Normocephalic and atraumatic. Right Ear: External ear normal. No decreased hearing noted. Left Ear: External ear normal. No decreased hearing noted.       Nose: Nose normal.   Eyes:      General: Lids are normal. No scleral icterus. Conjunctiva/sclera: Conjunctivae normal.   Neck:      Trachea: Phonation normal.   Cardiovascular:      Comments: No BLE edema present  Pulmonary:      Effort: Pulmonary effort is normal. No accessory muscle usage or respiratory distress. Genitourinary:     Comments: deferred  Skin:     General: Skin is warm and dry. Coloration: Skin is not pale. Findings: No erythema or rash. Neurological:      Mental Status: She is alert and oriented to person, place, and time. Psychiatric:         Speech: Speech normal.         Behavior: Behavior normal.       Back Exam     Tenderness   The patient is experiencing tenderness in the lumbar. Range of Motion   Extension: normal   Flexion: normal   Lateral bend right: normal   Lateral bend left: normal   Rotation right: normal   Rotation left: normal     Muscle Strength   Right Quadriceps:  5/5   Left Quadriceps:  5/5   Right Hamstrings:  5/5   Left Hamstrings:  5/5     Tests   Straight leg raise right: negative  Straight leg raise left: negative    Other   Toe walk: normal  Heel walk: normal  Sensation: normal  Gait: normal     Comments:  +slump Right more than Left  -fabers  Full ROM R hip   - kemps    Sensory decrease  R foot dorsal  Into  Anteromedial  Leg to knee   R  Ankle 4+/5 Manual Muscle Testing                                      Assessment: This is a [de-identified] y.o. female patient with:    Diagnosis:   Diagnosis Orders   1. Lumbar radiculopathy     2. Lumbar degenerative disc disease     3. Lumbosacral spondylosis without myelopathy     4. Encounter for long-term opiate analgesic use     5. Vertigo         Medical Comorbidities:  As listed in the patient's past medical and surgical history    Functional Limitations:   Pain limits function and quality of life.      Plan:   R L4,5 TFE x2 off ASA    To have cardiology  eval next week for dizziness  Has cut  Baclofen in half if still contributes to dizziness  Stop     Meds:   Controlled Substances Monitoring: Pt educated about the risks of taking opiates,including increased sedation, constipation, slowed breathing, tolerance, dependence,and addiction. New Prescriptions    No medications on file      No orders of the defined types were placed in this encounter. No orders of the defined types were placed in this encounter. No follow-ups on file. The patient expressed understanding of the above assessment and plan. Totaltime spent face to face with patient was 25 minutes inwhich  50% or more of the time was spent in counseling, education about risk andbenefits of the above plan, and coordination of care.

## 2021-02-02 DIAGNOSIS — G89.29 CHRONIC LEFT SACROILIAC JOINT PAIN: ICD-10-CM

## 2021-02-02 DIAGNOSIS — M47.817 LUMBOSACRAL SPONDYLOSIS WITHOUT MYELOPATHY: ICD-10-CM

## 2021-02-02 DIAGNOSIS — M53.3 CHRONIC LEFT SACROILIAC JOINT PAIN: ICD-10-CM

## 2021-02-02 DIAGNOSIS — M54.16 LUMBAR RADICULOPATHY: ICD-10-CM

## 2021-02-02 RX ORDER — OXYCODONE HYDROCHLORIDE 10 MG/1
10 TABLET ORAL 3 TIMES DAILY
Qty: 90 TABLET | Refills: 0 | Status: SHIPPED | OUTPATIENT
Start: 2021-02-02 | End: 2021-03-03

## 2021-02-02 NOTE — TELEPHONE ENCOUNTER
Last Appt:  1/28/2021  Next Appt:   3/9/2021  Med verified in [facility] checked for [place], and [place]: 01/03/21 Oxycodone 10mg #90. Due 02/02/21  (NOW)    ALICIA Osborne

## 2021-02-08 ENCOUNTER — TELEPHONE (OUTPATIENT)
Dept: PAIN MANAGEMENT | Age: 81
End: 2021-02-08

## 2021-02-08 NOTE — TELEPHONE ENCOUNTER
Pt returned call.  Please call her back    [phone]    Last Appt:  1/28/2021  Next Appt:   3/9/2021  Med verified in Epic

## 2021-02-08 NOTE — TELEPHONE ENCOUNTER
Patient called stating that she is nauseous and wishes to reschedule her procedure that is for tomorrow.      Last Appt:  1/28/2021  Next Appt:   3/9/2021  Med verified in Epic

## 2021-02-23 ENCOUNTER — APPOINTMENT (OUTPATIENT)
Dept: GENERAL RADIOLOGY | Age: 81
End: 2021-02-23
Attending: PHYSICAL MEDICINE & REHABILITATION
Payer: MEDICARE

## 2021-02-23 ENCOUNTER — HOSPITAL ENCOUNTER (OUTPATIENT)
Age: 81
Setting detail: OUTPATIENT SURGERY
Discharge: HOME | End: 2021-02-23
Attending: PHYSICAL MEDICINE & REHABILITATION | Admitting: PHYSICAL MEDICINE & REHABILITATION
Payer: MEDICARE

## 2021-02-23 VITALS
DIASTOLIC BLOOD PRESSURE: 60 MMHG | TEMPERATURE: 97.8 F | HEIGHT: 64 IN | BODY MASS INDEX: 31.07 KG/M2 | WEIGHT: 182 LBS | HEART RATE: 68 BPM | SYSTOLIC BLOOD PRESSURE: 128 MMHG | OXYGEN SATURATION: 95 % | RESPIRATION RATE: 16 BRPM

## 2021-02-23 PROCEDURE — 7100000011 HC PHASE II RECOVERY - ADDTL 15 MIN: Performed by: PHYSICAL MEDICINE & REHABILITATION

## 2021-02-23 PROCEDURE — 3209999900 FLUORO FOR SURGICAL PROCEDURES

## 2021-02-23 PROCEDURE — 64484 NJX AA&/STRD TFRM EPI L/S EA: CPT | Performed by: PHYSICAL MEDICINE & REHABILITATION

## 2021-02-23 PROCEDURE — 3600000056 HC PAIN LEVEL 4 BASE: Performed by: PHYSICAL MEDICINE & REHABILITATION

## 2021-02-23 PROCEDURE — 6360000002 HC RX W HCPCS: Performed by: PHYSICAL MEDICINE & REHABILITATION

## 2021-02-23 PROCEDURE — 2500000003 HC RX 250 WO HCPCS: Performed by: PHYSICAL MEDICINE & REHABILITATION

## 2021-02-23 PROCEDURE — 64483 NJX AA&/STRD TFRM EPI L/S 1: CPT | Performed by: PHYSICAL MEDICINE & REHABILITATION

## 2021-02-23 PROCEDURE — 2580000003 HC RX 258: Performed by: PHYSICAL MEDICINE & REHABILITATION

## 2021-02-23 PROCEDURE — 2709999900 HC NON-CHARGEABLE SUPPLY: Performed by: PHYSICAL MEDICINE & REHABILITATION

## 2021-02-23 PROCEDURE — 7100000010 HC PHASE II RECOVERY - FIRST 15 MIN: Performed by: PHYSICAL MEDICINE & REHABILITATION

## 2021-02-23 PROCEDURE — 6360000004 HC RX CONTRAST MEDICATION: Performed by: PHYSICAL MEDICINE & REHABILITATION

## 2021-02-23 RX ORDER — DEXAMETHASONE SODIUM PHOSPHATE 10 MG/ML
INJECTION INTRAMUSCULAR; INTRAVENOUS PRN
Status: DISCONTINUED | OUTPATIENT
Start: 2021-02-23 | End: 2021-02-23 | Stop reason: ALTCHOICE

## 2021-02-23 RX ORDER — BUPIVACAINE HYDROCHLORIDE 5 MG/ML
INJECTION, SOLUTION EPIDURAL; INTRACAUDAL PRN
Status: DISCONTINUED | OUTPATIENT
Start: 2021-02-23 | End: 2021-02-23 | Stop reason: ALTCHOICE

## 2021-02-23 RX ORDER — SODIUM CHLORIDE 9 MG/ML
INJECTION INTRAVENOUS PRN
Status: DISCONTINUED | OUTPATIENT
Start: 2021-02-23 | End: 2021-02-23 | Stop reason: ALTCHOICE

## 2021-02-23 ASSESSMENT — PAIN - FUNCTIONAL ASSESSMENT: PAIN_FUNCTIONAL_ASSESSMENT: 0-10

## 2021-02-23 NOTE — INTERVAL H&P NOTE
montelukast (SINGULAIR) 10 MG tablet Take 10 mg by mouth nightly. DULoxetine (CYMBALTA) 60 MG capsule Take 60 mg by mouth daily. artificial tears (ARTIFICIAL TEARS) OINT as needed. Immune Globulin, Human, (HIZENTRA) 10 GM/50ML SOLN Inject into the skin Tuesdays         The patient understands the planned operation and its associated risks and benefits and agrees to proceed.         Electronically signed by Tania Jaimes MD on 2/23/2021 at 12:13 PM

## 2021-02-23 NOTE — H&P
.            Signed        Expand AllCollapse All     Show:Clear all  [x]Manual[x]Template[]Copied    Added by:  [x]Mariano Coffey MD    []Deborah for details  PAIN MANAGEMENT FOLLOW-UP NOTE  1/28/21     CHIEF COMPLAINT: This is [de-identified] y.o. female patientwho returns to the [facility] with a history of Back Pain        PAIN Author Joyce returns today for  reevaluation. Since the visit, the patient reports that the pain is not changed. Notes last R L4,5 and LL4,5 TFEs in February 202 gave good relief for  6 months  And  Now R  Lumbar pain   Down anteromedial leg with numbness has returned. Also notes now cramping in R lateral thigh to knee no injury  To back or legs. Has ongoing numbness B feet and doesn't know if pain numbness from feet to  Legs or from back to  Feet . Also notes dizziness in a  Band  Across forehead  Has tried   Halving Baclofen  Dose with a little bit of improvement     Location: Back  Location Modifier: entire back  Severity of Pain: 4  Duration of Pain: Intermittent  Frequency of Pain: Intermittent  Aggravating Factors: -  Limiting Behavior: -  Relieving Factors: relaxation           Previous pain medication trials have included:                      Mental health:               Patient feels - secondary to their current pain problems as described above. H/O depression and anxiety: No              Patient is not seeing psychologist orpsychiatrist              Abuse history? No     Employed? No     ANALGESIA:   Are your Current Pain medication (s) helping to decrease pain? No.   Current Pain score: Pain Score:   6     ADVERSE AFFECTS:   Medication Side Effects: No.     ACTIVITY:  Are you able to be more active with your pain medications? No      ABERRANT BEHAVIORS SINCE LAST VISIT? No     Review of Systems   Constitutional: Positive for fatigue. HENT: Negative. Eyes: Negative. Respiratory: Negative. Cardiovascular: Negative. Gastrointestinal: Negative for constipation and diarrhea. Endocrine: Negative. Genitourinary: Negative for difficulty urinating and flank pain. Musculoskeletal: Positive for back pain and myalgias. Skin: Negative. Allergic/Immunologic: Negative. Neurological: Positive for weakness and numbness. Hematological: Negative. Psychiatric/Behavioral: Positive for sleep disturbance. Allergies   Allergen Reactions    Sulfa Antibiotics Shortness Of Breath    Sulfamethoxazole-Trimethoprim Anaphylaxis    Ansaid [Flurbiprofen] Other (See Comments)       Light headed and difficult with vision \"seeing\"    Gentamicin Other (See Comments)       Eye ointment caused eye swelling, redness    Quinidine         Light headed    Hydrocodone-Acetaminophen      Mirapex [Pramipexole Dihydrochloride] Other (See Comments)       Unknown reaction    Mobic [Meloxicam] Nausea Only    Motrin [Ibuprofen] Other (See Comments)       \"I coughed so bad I broke a rib\"    Nsaids Other (See Comments)       lightheaded    Reglan [Metoclopramide] Other (See Comments)       Hyperactive and stomach pain    Trazodone Other (See Comments)       unknown    Corgard [Nadolol] Other (See Comments)       Severe coughing and broke a rib as a result     Erythromycin Nausea Only    Etodolac         GI issues    Garamycin [Gentamicin Sulfate] Other (See Comments)       Redness and irritation    Inderal [Propranolol] Other (See Comments)       Severe cough    Lisinopril Itching    Loratadine         Does not work    Ultram [Tramadol] Other (See Comments)       Didn't work            Medications Prior to Visit   Outpatient Medications Prior to Visit   Medication Sig Dispense Refill    oxyCODONE HCl (OXY-IR) 10 MG immediate release tablet Take 1 tablet by mouth three times daily for 30 days.  90 tablet 0    furosemide (LASIX) 20 MG tablet take 2 tablets by mouth every morning and 1 EVERY AFTERNOON  gabapentin (NEURONTIN) 100 MG capsule TAKE 1 CAPSULE BY MOUTH THREE TIMES A DAY 90 capsule 2    naloxone 4 MG/0.1ML LIQD nasal spray 1 spray by Nasal route as needed for Opioid Reversal 1 each 5    baclofen (LIORESAL) 10 MG tablet Take 10 mg by mouth 3 times daily        ketotifen (ZADITOR) 0.025 % ophthalmic solution 1 drop 2 times daily        Multiple Vitamin (MULTI-VITAMIN DAILY) TABS Take by mouth        famotidine (PEPCID) 10 MG tablet Take 10 mg by mouth 2 times daily        BiPAP Machine MISC          melatonin 1 MG tablet melatonin   once daily        Triprolidine-Pseudoephedrine (ANTIHISTAMINE PO) Take by mouth Indications: zyrtec         metoprolol tartrate (LOPRESSOR) 25 MG tablet Take 12.5 mg by mouth 2 times daily        aspirin 81 MG tablet Take 81 mg by mouth daily        potassium chloride (KLOR-CON M) 20 MEQ extended release tablet TAKE ONE TABLET ONCE A DAY        guaiFENesin (MUCINEX) 600 MG extended release tablet Mucinex 600 mg tablet, extended release   Take 2 tablets every day by oral route.  lidocaine (ASPERCREME W/LIDOCAINE) 4 % cream Apply topically as needed for Pain Apply topically as needed.  fluticasone propionate 50 MCG/BLIST AEPB Inhale into the lungs        albuterol (ACCUNEB) 1.25 MG/3ML nebulizer solution Inhale 1 ampule into the lungs every 6 hours as needed for Wheezing        simvastatin (ZOCOR) 40 MG tablet Take 40 mg by mouth nightly        flecainide (TAMBOCOR) 50 MG tablet Take 50 mg by mouth 2 times daily.  omeprazole (PRILOSEC) 40 MG capsule Take 40 mg by mouth nightly.  montelukast (SINGULAIR) 10 MG tablet Take 10 mg by mouth nightly.  DULoxetine (CYMBALTA) 60 MG capsule Take 60 mg by mouth daily.  artificial tears (ARTIFICIAL TEARS) OINT as needed.         Saline 0.2 % SOLN by Nasal route daily as needed         Immune Globulin, Human, (HIZENTRA) 10 GM/50ML SOLN Inject into the skin Tuesdays  diphenhydrAMINE (BENADRYL) 25 MG capsule Take 25 mg by mouth every 6 hours as needed for Itching.  levothyroxine (SYNTHROID) 75 MCG tablet Take 75 mcg by mouth daily        Heating Pads (RADHA PAD/SMARTHEAT PRO/) PADS 1 Units by Does not apply route 4 times daily Use for 15 minutes to 30 minutes at a time four times a day.  1 each 0    topiramate (TOPAMAX) 25 MG tablet Take 1 tablet by mouth 2 times daily (Patient taking differently: Take 25 mg by mouth daily ) 60 tablet 0      Facility-Administered Medications Prior to Visit   Medication Dose Route Frequency Provider Last Rate Last Admin    triamcinolone acetonide (KENALOG-40) injection 40 mg  40 mg Intra-articular Once Светлана Hand MD        lidocaine 2 % injection 5 mL  5 mL Intradermal Once Светлана Hand MD        bupivacaine (MARCAINE) 0.5 % injection 150 mg  30 mL Intra-articular Once Светлана Hand MD                Past Medical History        Past Medical History:   Diagnosis Date    Anesthesia complication       2nd post op day from total knee patient stated \"I was wild and mean\"    Anxiety and depression      Arthritis      ASD (atrial septal defect)       repair in [address] COPD [facility]chronic obstructive [facility]      COPD (chronic obstructive pulmonary disease) ([facility] 1980's    Disorder of immune system ([facility]       low immune count patient on hizentra injection    GERD (gastroesophageal reflux disease)      H/O cardiac catheterization 2008     no blockage    Heart palpitations      History of anesthesia complications       pt states she went \"nuts\" with last anes. ([facility] 3/2015 [facility])    History of renal stone       passed    Hx of blood clots 1970     DVT    Hypertension      MVP (mitral valve prolapse)      LONI (obstructive sleep apnea)       uses bipap nightly    Rectocele      Spinal stenosis      Thyroid disease late 1980's overactive radioactive iodine done            Past Surgical History         Past Surgical History:   Procedure Laterality Date    BACK SURGERY        BREAST LUMPECTOMY Left      BREAST LUMPECTOMY Left      CARDIAC SURGERY         ASD repair    CARDIAC VALVE SURGERY         SECTION   8191/3784     2x    CHOLECYSTECTOMY        CHOLECYSTECTOMY        COLONOSCOPY         three    DIAGNOSTIC CARDIAC CATH LAB PROCEDURE        DILATION AND CURETTAGE OF UTERUS       EYE SURGERY Bilateral       cataract    HC INJECTION PROCEDURE FOR SACROILIAC JOINT Left 2018     Left SIJ and piriformis, left trocanteric bursa injection performed by Tania Jaimes MD at [address] [place] [address]   2009    HYSTERECTOMY   2009     Total    JOINT REPLACEMENT Bilateral       knee    KNEE ARTHROSCOPY Left     LUMBAR SPINE SURGERY Bilateral 2019     Bilateral L4 TRANSFORAMINAL  [id number] performed by Tania Jaimes MD at [address] [place] [address] Dr Bilateral 3/12/2019     Bilateral L4 TRANSFORAMINAL performed by Tania Jaimes MD at [address] [place] [address] Dr Right 10/15/2019     RIGHT L4 & L5 TRANSFORAMINAL performed by Tania Jaimes MD at [address] [place] [address] Dr Left 2019     Left L4 & L5 TRANSFORAMINAL performed by Tania Jaimes MD at [place] [address]   2014    PAIN MANAGEMENT PROCEDURE Right 2020     Right L4 & L5 TRANSFORAMINAL performed by Tania Jaimes MD at [place] [address] Left 2020     Left L4 & L5 TRANSFORAMINAL performed by Tania Jaimes MD at [address] [place] [address] DX/THER AGNT PARAVERT FACET JOINT, LUMBAR/SAC, 2ND LEVEL Bilateral 2018     Bilateral L2 L3 L4 L5 Diagnostic Medial BB performed by Tania Jaimes MD at [address] [place] [address] DX/THER AGNT PARAVERT FACET JOINT, LUMBAR/SAC, 2ND LEVEL Right 2018     Right L2 L3 L4 L5 Confirmatory Medial BB performed by Tania Jaimes MD at [address] [place] [address]  VA NJX AA&/STRD TFRML EPI CERVICAL/THORACIC EA ADDL Right 7/20/2018     Right C5 C6 TRANSFORAMINAL performed by Rafaela Allen MD at [address] [place] [address] Left 12/20/2018     Left L2 L3 L4 L5 RADIOFREQUENCY performed by Rafaela Allen MD at [address] [place] [address] Right 1/3/2019     Right L2 L3 L4 L5 RADIOFREQUENCY performed by Rafaela Allen MD at [address] [place] [address] Right 01/28/2005 & 03/16/2010     2x         Family History         Family History   Problem Relation Age of Onset    Heart Disease Father           Social History   Social History            Socioeconomic History    Marital status:        Spouse name: None    Number of children: None    Years of education: None    Highest education level: None   Occupational History    Occupation: retired   Social Needs    Financial resource strain: None    Food insecurity       Worry: None       Inability: None    Transportation needs       Medical: None       Non-medical: None   Tobacco Use    Smoking status: Never Smoker    Smokeless tobacco: Never Used   Substance and Sexual Activity    Alcohol use: Yes       Comment: very rare    Drug use: No    Sexual activity: None   Lifestyle    Physical activity       Days per week: None       Minutes per session: None    Stress: None   Relationships    Social connections       Talks on phone: None       Gets together: None       Attends [employer] service: None       Active member of club or organization: None       Attends meetings of clubs or organizations: None       Relationship status: None    Intimate partner violence       Fear of current or ex partner: None       Emotionally abused: None       Physically abused: None       Forced sexual activity: None   Other Topics Concern    None   Social History Narrative    None               Family and Social Historyreviewed in the electronic medical record. Imaging:Reviewed available imaging in our system with the patient. No results found. Objective:      Physical Exam:  Vitals       Vitals:     01/28/21 1310   BP: 124/80   Site: Right Upper Arm   Position: Sitting   Cuff Size: Medium Adult   Weight: 180 lb (81.6 kg)   Height: 5' 4\" (1.626 m)         Pain Score:   6     Physical Exam  Vitals signs and nursing note reviewed. Constitutional:       General: She is not in acute distress. Appearance: Normal appearance. She is well-developed. She is not diaphoretic. HENT:      Head: Normocephalic and atraumatic. Right Ear: External ear normal. No decreased hearing noted. Left Ear: External ear normal. No decreased hearing noted. Nose: Nose normal.   Eyes:      General: Lids are normal. No scleral icterus. Conjunctiva/sclera: Conjunctivae normal.   Neck:      Trachea: Phonation normal.   Cardiovascular:      Comments: No BLE edema present  Pulmonary:      Effort: Pulmonary effort is normal. No accessory muscle usage or respiratory distress. Genitourinary:     Comments: deferred  Skin:     General: Skin is warm and dry. Coloration: Skin is not pale. Findings: No erythema or rash. Neurological:      Mental Status: She is alert and oriented to person, place, and time. Psychiatric:         Speech: Speech normal.         Behavior: Behavior normal.         Back Exam      Tenderness   The patient is experiencing tenderness in the lumbar.      Range of Motion   Extension: normal   Flexion: normal   Lateral bend right: normal   Lateral bend left: normal   Rotation right: normal   Rotation left: normal      Muscle Strength   Right Quadriceps:  5/5   Left Quadriceps:  5/5   Right Hamstrings:  5/5   Left Hamstrings:  5/5      Tests   Straight leg raise right: negative  Straight leg raise left: negative     Other   Toe walk: normal  Heel walk: normal  Sensation: normal  Gait: normal      Comments:  +slump Right more than Left  -gui Full ROM R hip   - kemps     Sensory decrease  R foot dorsal  Into  Anteromedial  Leg to knee   R  Ankle 4+/5 Manual Muscle Testing                                          Assessment: This is a [de-identified] y.o. female patient with:     Diagnosis:    Diagnosis Orders   1. Lumbar radiculopathy      2. Lumbar degenerative disc disease      3. Lumbosacral spondylosis without myelopathy      4. Encounter for long-term opiate analgesic use      5. Vertigo            Medical Comorbidities:  As listed in the patient's past medical and surgical history     Functional Limitations:   Pain limits function and quality of life. Plan:   R L4,5 TFE x2 off ASA     To have cardiology  eval next week for dizziness  Has cut  Baclofen in half if still contributes to dizziness  Stop      Meds:   Controlled Substances Monitoring: Pt educated about the risks of taking opiates,including increased sedation, constipation, slowed breathing, tolerance, dependence,and addiction. New Prescriptions     No medications on file        Encounter Medications    No orders of the defined types were placed in this encounter. No orders of the defined types were placed in this encounter. No follow-ups on file. The patient expressed understanding of the above assessment and plan. Totaltime spent face to face with patient was 25 minutes inwhich  50% or more of the time was spent in counseling, education about risk andbenefits of the above plan, and coordination of care.

## 2021-02-23 NOTE — OP NOTE
TRANSFORAMINAL EPIDURAL STEROID INJECTION    2/23/21  The patient was counseled at length about the risks of min Covid-19 during their perioperative period and any recovery window from their procedure. The patient was made aware that min Covid-19  may worsen their prognosis for recovering from their procedure  and lend to a higher morbidity and/or mortality risk. All material risks, benefits, and reasonable alternatives including postponing the procedure were discussed. The patient does wish to proceed with the procedure at this time. Surgeon: Marlo Dickson MD    Pre-operative Diagnosis:   Patient Active Problem List   Diagnosis Code    Lumbar degenerative disc disease M51.36    Lumbar canal stenosis M48.061    Lumbar discogenic pain syndrome M51.26    Encounter for long-term (current) use of other medications Z79.899    Degeneration of lumbar or lumbosacral intervertebral disc M51.37    Spinal stenosis, lumbar region, without neurogenic claudication M48.061    Thoracic or lumbosacral neuritis or radiculitis, unspecified [id number]    Greater trochanteric bursitis of both hips M70.61, M70.62    Sacroiliitis (HCC) M46.1    Chronic left sacroiliac joint pain M53.3, G89.29    Encounter for chronic pain management G89.29    Piriformis syndrome of left side G57.02    Lumbar facet joint syndrome M47.816    Lumbar radiculitis M54.16       Post-operative Diagnosis: Same    Assistants: none    This is a [de-identified] y.o. female patient with pain in the Back, right leg. Previous treatment and examination findings are noted in the H&P.RL 4,5 transforaminal epidural injection has been requested for diagnostic and therapeutic reasons. Conservative treatment was ineffective i.e.: ice, NSAIDS, rest, narcotic medication, chiropractic care, physical therapy and message therapy.     Patient is unable to perform the following ADL's: ambulating and grooming     Pain Assessment: 0-10  Pain Level: 8 Pain Orientation: Left  Pain Location: Hip       Last Plain films: 2020      EXAMINATION:  1. RL4,5 transforaminal radiculogram/epidurogram.   2. RL4-,5 transforaminal epidural anesthetic injection. 3. RL4,5 transforaminal epidural steroid injection. CONSENT: Written consent was obtained from the patient on preprinted consent form after explaining the procedure, indications, potential complications and outcomes. Alternative treatments were also discussed. DISCUSSION: The patient was sterilely prepped and draped in the usual fashion in the prone position. Time out was verified for correct patient, side, level and procedure. SEDATION:   No conscious sedation was performed during the procedure. The patient remained awake and conversed throughout the procedure. The patient underwent pulse oximetry and blood pressure monitoring independently by a trained observer, as well as by a physician. PROCEDURE:  Under image-intensifier control, a 22 gauge needle x 5 inch spinal needle was guided successfully into the epidural space employing a posterior lateral/oblique approach. Needle aspiration was negative for heme or CSF. Instillation of  .5 mL of Omnipaque 240 contrast medium opacified the spinal nerve and demonstrated contiguous flow into the  RL4 epidural space. No vascular spread was noted. Digital subtraction was not employed to evaluate for vascular spread. The patient was monitored for any untoward reaction to contrast medium before proceeding with procedure #2. The patient did not report pain reproduction in a concordant distribution. Following needle position verification, a test dose of .5 mL of sterile lidocaine 0.5% was administered and patient monitored for any adverse effects. Then, 1 mL of   was instilled into the epidural space and the patient's response was again monitored. Finally, Dexamethasone (Decadron 10 mg/mL) 1 ml of 0.5% bupivacaine was then instilled. The patient's response was again monitored. The spinal needle was removed. Instillation of  .5 mL of Omnipaque 240 contrast medium opacified the spinal nerve and demonstrated contiguous flow into the  RL 5 epidural space. No vascular spread was noted. Digital subtraction was not employed to evaluate for vascular spread. The patient was monitored for any untoward reaction to contrast medium before proceeding with procedure #2. The patient did not report pain reproduction in a concordant distribution. Following needle position verification, a test dose of .5 mL of sterile lidocaine 0.5% was administered and patient monitored for any adverse effects. Then, 1 mL of   was instilled into the epidural space and the patient's response was again monitored. Finally, Dexamethasone (Decadron 10 mg/mL) 1 ml of 0.5% bupivacaine was then instilled. The patient's response was again monitored. The spinal needle was removed. The patient tolerated the procedure well and without complications and was noted to be in stable condition prior to discharge from the procedure center with discharge instructions. EBL: no blood loss    SPECIMEN: none    IMPRESSIONS:  1. RL4,5 transforaminal epidurogram, epidural anesthesia and epidural steroid injection procedures accomplished without incident. RECOMMENDATIONS:  1. Complete and return Post-Procedure Pain and Activity Diary.   2. Contact the [address] for symptom exacerbation, fever or unusual symptoms.    3. Post-procedure care according to verbal and written discharge instructions POST-PROCEDURE EPIDUROGRAPHY INTERPRETATION:    EXAMINATION: AP, lateral, and oblique views    FLUORO TIME: 21 seconds    DISCUSSION: Spot views of the spine reveal normal alignment and segmentation. Spinal needle is positioned at the RL4,5 neuroforamin. Contrast spreads and outlines the RL4,5 nerve/ neuroforamin and epidural space. The epidurogram reveals excellent contrast flow. Visualized spine reveals See radiology report. Soft tissues reveal no abnormalities. IMPRESSION: RL4,5 transforaminal epidurogram/epineurogram reveals satisfactory needle position and contrast spread.      Electronically signed by Carrie Schofield MD on 2/23/2021 at 12:15 PM

## 2021-03-11 ENCOUNTER — HOSPITAL ENCOUNTER (OUTPATIENT)
Age: 81
Setting detail: OUTPATIENT SURGERY
Discharge: HOME | End: 2021-03-11
Attending: PHYSICAL MEDICINE & REHABILITATION | Admitting: PHYSICAL MEDICINE & REHABILITATION
Payer: MEDICARE

## 2021-03-11 ENCOUNTER — APPOINTMENT (OUTPATIENT)
Dept: GENERAL RADIOLOGY | Age: 81
End: 2021-03-11
Attending: PHYSICAL MEDICINE & REHABILITATION
Payer: MEDICARE

## 2021-03-11 VITALS
OXYGEN SATURATION: 97 % | HEART RATE: 70 BPM | DIASTOLIC BLOOD PRESSURE: 67 MMHG | RESPIRATION RATE: 18 BRPM | WEIGHT: 180 LBS | BODY MASS INDEX: 29.99 KG/M2 | SYSTOLIC BLOOD PRESSURE: 144 MMHG | HEIGHT: 65 IN | TEMPERATURE: 98.2 F

## 2021-03-11 PROCEDURE — 3600000057 HC PAIN LEVEL 4 ADDL 15 MIN: Performed by: PHYSICAL MEDICINE & REHABILITATION

## 2021-03-11 PROCEDURE — 7100000011 HC PHASE II RECOVERY - ADDTL 15 MIN: Performed by: PHYSICAL MEDICINE & REHABILITATION

## 2021-03-11 PROCEDURE — 2580000003 HC RX 258: Performed by: PHYSICAL MEDICINE & REHABILITATION

## 2021-03-11 PROCEDURE — 7100000010 HC PHASE II RECOVERY - FIRST 15 MIN: Performed by: PHYSICAL MEDICINE & REHABILITATION

## 2021-03-11 PROCEDURE — 64483 NJX AA&/STRD TFRM EPI L/S 1: CPT | Performed by: PHYSICAL MEDICINE & REHABILITATION

## 2021-03-11 PROCEDURE — 3600000056 HC PAIN LEVEL 4 BASE: Performed by: PHYSICAL MEDICINE & REHABILITATION

## 2021-03-11 PROCEDURE — 3209999900 FLUORO FOR SURGICAL PROCEDURES

## 2021-03-11 PROCEDURE — 6360000004 HC RX CONTRAST MEDICATION: Performed by: PHYSICAL MEDICINE & REHABILITATION

## 2021-03-11 PROCEDURE — 64484 NJX AA&/STRD TFRM EPI L/S EA: CPT | Performed by: PHYSICAL MEDICINE & REHABILITATION

## 2021-03-11 PROCEDURE — 6360000002 HC RX W HCPCS: Performed by: PHYSICAL MEDICINE & REHABILITATION

## 2021-03-11 PROCEDURE — 2709999900 HC NON-CHARGEABLE SUPPLY: Performed by: PHYSICAL MEDICINE & REHABILITATION

## 2021-03-11 PROCEDURE — 2500000003 HC RX 250 WO HCPCS: Performed by: PHYSICAL MEDICINE & REHABILITATION

## 2021-03-11 RX ORDER — SODIUM CHLORIDE 9 MG/ML
INJECTION INTRAVENOUS PRN
Status: DISCONTINUED | OUTPATIENT
Start: 2021-03-11 | End: 2021-03-11 | Stop reason: ALTCHOICE

## 2021-03-11 RX ORDER — DEXAMETHASONE SODIUM PHOSPHATE 10 MG/ML
INJECTION INTRAMUSCULAR; INTRAVENOUS PRN
Status: DISCONTINUED | OUTPATIENT
Start: 2021-03-11 | End: 2021-03-11 | Stop reason: ALTCHOICE

## 2021-03-11 RX ORDER — BUPIVACAINE HYDROCHLORIDE 5 MG/ML
INJECTION, SOLUTION EPIDURAL; INTRACAUDAL PRN
Status: DISCONTINUED | OUTPATIENT
Start: 2021-03-11 | End: 2021-03-11 | Stop reason: ALTCHOICE

## 2021-03-11 ASSESSMENT — PAIN SCALES - GENERAL: PAINLEVEL_OUTOF10: 0

## 2021-03-11 ASSESSMENT — PAIN - FUNCTIONAL ASSESSMENT: PAIN_FUNCTIONAL_ASSESSMENT: 0-10

## 2021-03-11 NOTE — OP NOTE
Orientation: Left  Pain Location: Back, Hip  Pain Descriptors: Aching    Last Plain films: 2020      EXAMINATION:  1. LL4,5 transforaminal radiculogram/epidurogram.   2. LL4,5 transforaminal epidural anesthetic injection. 3. LL4,5 transforaminal epidural steroid injection. CONSENT: Written consent was obtained from the patient on preprinted consent form after explaining the procedure, indications, potential complications and outcomes. Alternative treatments were also discussed. DISCUSSION: The patient was sterilely prepped and draped in the usual fashion in the prone position. Time out was verified for correct patient, side, level and procedure. SEDATION:   No conscious sedation was performed during the procedure. The patient remained awake and conversed throughout the procedure. The patient underwent pulse oximetry and blood pressure monitoring independently by a trained observer, as well as by a physician. PROCEDURE:  Under image-intensifier control, a 22 gauge needle x 5 inch spinal needle was guided successfully into the epidural space employing a posterior lateral/oblique approach. Needle aspiration was negative for heme or CSF. Instillation of  .5 mL of Omnipaque 240 contrast medium opacified the spinal nerve and demonstrated contiguous flow into the epidural space. No vascular spread was noted. Digital subtraction was not employed to evaluate for vascular spread. The patient was monitored for any untoward reaction to contrast medium before proceeding with procedure #2. The patient did not report pain reproduction in a concordant distribution. Following needle position verification, a test dose of 1 mL of sterile lidocaine 0.5% was administered and patient monitored for any adverse effects. Then, .5 mL of   was instilled into the LL4 epidural space and the patient's response was again monitored. Finally, Dexamethasone (Decadron 10 mg/mL) 1 ml of 0.5% bupivacaine was then instilled. The patient's response was again monitored. The spinal needle was removed. Instillation of  .5 mL of Omnipaque 240 contrast medium opacified the spinal nerve and demonstrated contiguous flow into the epidural space. No vascular spread was noted. Digital subtraction was not employed to evaluate for vascular spread. The patient was monitored for any untoward reaction to contrast medium before proceeding with procedure #2. The patient did not report pain reproduction in a concordant distribution. Following needle position verification, a test dose of 1 mL of sterile lidocaine 0.5% was administered and patient monitored for any adverse effects. Then, .5 mL of   was instilled into the LL 5 epidural space and the patient's response was again monitored. Finally, Dexamethasone (Decadron 10 mg/mL) 1 ml of 0.5% bupivacaine was then instilled. The patient's response was again monitored. The spinal needle was removed. The patient tolerated the procedure well and without complications and was noted to be in stable condition prior to discharge from the procedure center with discharge instructions. EBL: no blood loss    SPECIMEN: none    IMPRESSIONS:  1. LL4,5 transforaminal epidurogram, epidural anesthesia and epidural steroid injection procedures accomplished without incident. RECOMMENDATIONS:  1. Complete and return Post-Procedure Pain and Activity Diary.   2. Contact the [address] for symptom exacerbation, fever or unusual symptoms. 3. Post-procedure care according to verbal and written discharge instructions    POST-PROCEDURE EPIDUROGRAPHY INTERPRETATION:    EXAMINATION: AP, lateral, and oblique views    FLUORO TIME: 21 seconds    DISCUSSION: Spot views of the spine reveal normal alignment and segmentation. Spinal needle is positioned at the LL4,5 neuroforamin. Contrast spreads and outlines the LL4,5 nerve/ neuroforamin and epidural space.  The epidurogram reveals excellent contrast flow. Visualized spine reveals See radiology report. Soft tissues reveal no abnormalities. IMPRESSION: LL4,5 transforaminal epidurogram/epineurogram reveals satisfactory needle position and contrast spread.      Electronically signed by Belén James MD on 3/11/2021 at 12:25 PM

## 2021-03-11 NOTE — H&P
Subjective:       Patient is here today for a diagnostic/therapeutic injection. 3/25/21    Active Hospital Problems    Diagnosis Date Noted    Lumbar canal stenosis [M48.061] 02/04/2014     Priority: High    Lumbar radiculitis [M54.16]     Degeneration of lumbar or lumbosacral intervertebral disc [M51.37] 03/15/2014       HPI: Patient is here today for adiagnostic/therapeutic injection. The most recent [facility] office visit notes have been reviewed and are unchanged. Review of Systems   Constitutional: Positive for fatigue. HENT: Negative. Eyes: Negative. Respiratory: Negative. Cardiovascular: Negative. Gastrointestinal: Negative for constipation and diarrhea. Endocrine: Negative. Genitourinary: Negative for difficulty urinating and flank pain. Musculoskeletal: Positive for back pain and myalgias. Skin: Negative. Allergic/Immunologic: Negative. Neurological: Positive for weakness and numbness. Hematological: Negative. Psychiatric/Behavioral: Positive for sleep disturbance.        Allergies   Allergen Reactions    Sulfa Antibiotics Shortness Of Breath    Sulfamethoxazole-Trimethoprim Anaphylaxis    Ansaid [Flurbiprofen] Other (See Comments)     Light headed and difficult with vision \"seeing\"    Gentamicin Other (See Comments)     Eye ointment caused eye swelling, redness    Quinidine      Light headed    Hydrocodone-Acetaminophen     Mirapex [Pramipexole Dihydrochloride] Other (See Comments)     Unknown reaction    Mobic [Meloxicam] Nausea Only    Motrin [Ibuprofen] Other (See Comments)     \"I coughed so bad I broke a rib\"    Nsaids Other (See Comments)     lightheaded    Reglan [Metoclopramide] Other (See Comments)     Hyperactive and stomach pain    Trazodone Other (See Comments)     unknown    Corgard [Nadolol] Other (See Comments)     Severe coughing and broke a rib as a result     Erythromycin Nausea Only    Etodolac      GI issues    Garamycin [Gentamicin Sulfate] Other (See Comments)     Redness and irritation    Inderal [Propranolol] Other (See Comments)     Severe cough    Lisinopril Itching    Loratadine      Does not work    Ultram [Tramadol] Other (See Comments)     Didn't work            Prior to Admission medications    Medication Sig Start Date End Date Taking? Authorizing Provider   oxyCODONE HCl (OXY-IR) 10 MG immediate release tablet TAKE 1 TABLET BY MOUTH THREE TIMES A DAY 3/4/21 4/3/21 Yes KENY Graham CNP   ketotifen (ZADITOR) 0.025 % ophthalmic solution 1 drop 2 times daily   Yes Historical Provider, MD   Multiple Vitamin (MULTI-VITAMIN DAILY) TABS Take by mouth   Yes Historical Provider, MD   melatonin 1 MG tablet melatonin   once daily   Yes Historical Provider, MD   metoprolol tartrate (LOPRESSOR) 25 MG tablet Take 12.5 mg by mouth 2 times daily   Yes Historical Provider, MD   aspirin 81 MG tablet Take 81 mg by mouth daily   Yes Historical Provider, MD   potassium chloride (KLOR-CON M) 20 MEQ extended release tablet TAKE ONE TABLET ONCE A DAY 4/6/18  Yes Historical Provider, MD   guaiFENesin (MUCINEX) 600 MG extended release tablet Mucinex 600 mg tablet, extended release   Take 2 tablets every day by oral route. Yes Historical Provider, MD   simvastatin (ZOCOR) 40 MG tablet Take 40 mg by mouth nightly   Yes Historical Provider, MD   DULoxetine (CYMBALTA) 60 MG capsule Take 60 mg by mouth daily. Yes Historical Provider, MD   artificial tears (ARTIFICIAL TEARS) OINT as needed.    Yes Historical Provider, MD   furosemide (LASIX) 20 MG tablet 20 mg daily  11/3/20 2/23/21  Historical Provider, MD   gabapentin (NEURONTIN) 100 MG capsule TAKE 1 CAPSULE BY MOUTH THREE TIMES A DAY 11/1/20 2/23/21  KENY Graham CNP   naloxone 4 MG/0.1ML LIQD nasal spray 1 spray by Nasal route as needed for Opioid Reversal 5/27/20   Yunior Bains MD   baclofen (LIORESAL) 10 MG tablet Take 10 mg by mouth 3 times daily    Historical Provider, MD   famotidine (PEPCID) 10 MG tablet Take 10 mg by mouth 2 times daily    Historical Provider, MD   BiPAP Machine MISC     Historical Provider, MD   Triprolidine-Pseudoephedrine (ANTIHISTAMINE PO) Take by mouth Indications: zyrtec     Historical Provider, MD   levothyroxine (SYNTHROID) 75 MCG tablet Take 75 mcg by mouth daily 5/18/18 2/23/21  Historical Provider, MD   Heating Pads (RADHA PAD/SMARTHEAT PRO/) PADS 1 Units by Does not apply route 4 times daily Use for 15 minutes to 30 minutes at a time four times a day. 5/15/18 2/7/20  Krys Dunbar MD   lidocaine (ASPERCREME W/LIDOCAINE) 4 % cream Apply topically as needed for Pain Apply topically as needed. Historical Provider, MD   fluticasone propionate 50 MCG/BLIST AEPB Inhale into the lungs    Historical Provider, MD   albuterol (ACCUNEB) 1.25 MG/3ML nebulizer solution Inhale 1 ampule into the lungs every 6 hours as needed for Wheezing    Historical Provider, MD   flecainide (TAMBOCOR) 50 MG tablet Take 50 mg by mouth 2 times daily. Historical Provider, MD   omeprazole (PRILOSEC) 40 MG capsule Take 40 mg by mouth nightly. Historical Provider, MD   montelukast (SINGULAIR) 10 MG tablet Take 10 mg by mouth nightly. Historical Provider, MD   Saline 0.2 % SOLN by Nasal route daily as needed     Historical Provider, MD   Immune Globulin, Human, (HIZENTRA) 10 GM/50ML SOLN Inject into the skin Tuesdays    Historical Provider, MD   diphenhydrAMINE (BENADRYL) 25 MG capsule Take 25 mg by mouth every 6 hours as needed for Itching.     Historical Provider, MD       Past Medical History:   Diagnosis Date    Anesthesia complication     2nd post op day from total knee patient stated \"I was wild and mean\"    Anxiety and depression     Arthritis     ASD (atrial septal defect)     repair in 1963    Asthma     [facility] [address]     COPD (chronic obstructive pulmonary disease) ([facility] [address] 1980's Kingsley Mccartney MD at [address] [facility] PAIN MANAGEMENT PROCEDURE Left 2/21/2020    Left L4 & L5 TRANSFORAMINAL performed by Gwyndolyn Felty, MD at [address] [place] [address] Right 2/23/2021    Right L4 & L5 TRANSFORAMINAL performed by Gwyndolyn Felty, MD at [address] [place] [address] Left 3/11/2021    left L4 & L5 TRANSFORAMINAL performed by Gwyndolyn Felty, MD at [address] [place] [address] DX/THER AGNT PARAVERT FACET JOINT, LUMBAR/SAC, 2ND LEVEL Bilateral 8/30/2018    Bilateral L2 L3 L4 L5 Diagnostic Medial BB performed by Gwyndolyn Felty, MD at [address] [place] [address] DX/THER AGNT PARAVERT FACET JOINT, LUMBAR/SAC, 2ND LEVEL Right 9/13/2018    Right L2 L3 L4 L5 Confirmatory Medial BB performed by Gwyndolyn Felty, MD at [address] [facility] AA&/STRD TFRML EPI CERVICAL/THORACIC EA ADDL Right 7/20/2018    Right C5 C6 TRANSFORAMINAL performed by Gwyndolyn Felty, MD at [address] Left 12/20/2018    Left L2 L3 L4 L5 RADIOFREQUENCY performed by Gwyndolyn Felty, MD at [address] Right 1/3/2019    Right L2 L3 L4 L5 RADIOFREQUENCY performed by Gwyndolyn Felty, MD at [address] [place] [address] Right 01/28/2005 & 03/16/2010    2x    TONSILLECTOMY      at age 12       Family andSocial History reviewed in the electronic medical record. Imaging: Reviewed available imaging in oursystem with the patient. No results found. Objective:     Vitals:    03/11/21 1229   BP: (!) 144/67   Pulse: 70   Resp: 18   Temp:    SpO2: 97%          Physical Exam  Constitutional:       General: She is not in acute distress. Appearance: Normal appearance. She is well-developed. She is not diaphoretic. HENT:      Head: Normocephalic and atraumatic. Right Ear: External ear normal. No decreased hearing noted. Left Ear: External ear normal. No decreased hearing noted. Nose: Nose normal.   Eyes:      General: Lids are normal. No scleral icterus.      Conjunctiva/sclera: Conjunctivae normal.   Neck:      Trachea: Phonation normal.   Cardiovascular:      Comments: No BLE edema present  Pulmonary:      Effort: Pulmonary effort is normal. No accessory muscle usage or respiratory distress. Abdominal:      General: Abdomen is flat. Palpations: Abdomen is soft. Genitourinary:     Comments: deferred  Skin:     General: Skin is warm and dry. Coloration: Skin is not pale. Findings: No erythema or rash. Neurological:      General: No focal deficit present. Mental Status: She is alert and oriented to person, place, and time. Psychiatric:         Mood and Affect: Mood normal.         Speech: Speech normal.         Behavior: Behavior normal.       Neurologic Exam     Mental Status   Oriented to person, place, and time. Speech: speech is normal     Motor Exam     Strength   Right quadriceps: 5/5  Left quadriceps: 5/5  Right hamstrin/5  Left hamstrin/5    Back Exam     Tenderness   The patient is experiencing tenderness in the lumbar.     Range of Motion   Extension: normal   Flexion: normal   Lateral bend right: normal   Lateral bend left: normal   Rotation right: normal   Rotation left: normal     Muscle Strength   Right Quadriceps:  5/5   Left Quadriceps:  5/5   Right Hamstrings:  5/5   Left Hamstrings:  5/5     Tests   Straight leg raise right: negative  Straight leg raise left: positive    Other   Toe walk: normal  Heel walk: normal  Sensation: normal  Gait: normal     Comments:  +slump Left lumbar            Lab Results   Component Value Date    WBC 9.1 09/15/2015    HGB 12.2 09/15/2015    HCT 38.4 09/15/2015     09/15/2015     09/15/2015    K 4.1 09/15/2015     09/15/2015    CREATININE 0.67 09/15/2015    BUN 16 09/15/2015    CO2 25 09/15/2015       Assessment:                            Active Hospital Problems    Diagnosis Date Noted    Lumbar canal stenosis [M48.061] 2014     Priority: High    Lumbar radiculitis [M54.16]    

## 2021-03-25 ASSESSMENT — ENCOUNTER SYMPTOMS
ALLERGIC/IMMUNOLOGIC NEGATIVE: 1
EYES NEGATIVE: 1
CONSTIPATION: 0
DIARRHEA: 0
BACK PAIN: 1
RESPIRATORY NEGATIVE: 1

## 2021-03-29 ENCOUNTER — HOSPITAL ENCOUNTER (OUTPATIENT)
Age: 81
Setting detail: SPECIMEN
Discharge: HOME | End: 2021-03-29
Payer: MEDICARE

## 2021-03-29 ENCOUNTER — OFFICE VISIT (OUTPATIENT)
Dept: PAIN MANAGEMENT | Age: 81
End: 2021-03-29
Payer: MEDICARE

## 2021-03-29 VITALS
DIASTOLIC BLOOD PRESSURE: 70 MMHG | BODY MASS INDEX: 30.73 KG/M2 | HEIGHT: 64 IN | WEIGHT: 180 LBS | SYSTOLIC BLOOD PRESSURE: 128 MMHG

## 2021-03-29 DIAGNOSIS — Z79.891 ENCOUNTER FOR LONG-TERM OPIATE ANALGESIC USE: ICD-10-CM

## 2021-03-29 DIAGNOSIS — M47.817 LUMBOSACRAL SPONDYLOSIS WITHOUT MYELOPATHY: ICD-10-CM

## 2021-03-29 DIAGNOSIS — M54.16 LUMBAR RADICULOPATHY: ICD-10-CM

## 2021-03-29 DIAGNOSIS — Z02.83 ENCOUNTER FOR DRUG SCREENING: ICD-10-CM

## 2021-03-29 DIAGNOSIS — Z79.891 ENCOUNTER FOR LONG-TERM OPIATE ANALGESIC USE: Primary | ICD-10-CM

## 2021-03-29 DIAGNOSIS — M79.671 ACUTE FOOT PAIN, RIGHT: ICD-10-CM

## 2021-03-29 PROCEDURE — 1123F ACP DISCUSS/DSCN MKR DOCD: CPT | Performed by: PHYSICAL MEDICINE & REHABILITATION

## 2021-03-29 PROCEDURE — G8427 DOCREV CUR MEDS BY ELIG CLIN: HCPCS | Performed by: PHYSICAL MEDICINE & REHABILITATION

## 2021-03-29 PROCEDURE — G8484 FLU IMMUNIZE NO ADMIN: HCPCS | Performed by: PHYSICAL MEDICINE & REHABILITATION

## 2021-03-29 PROCEDURE — 99214 OFFICE O/P EST MOD 30 MIN: CPT | Performed by: PHYSICAL MEDICINE & REHABILITATION

## 2021-03-29 PROCEDURE — 4040F PNEUMOC VAC/ADMIN/RCVD: CPT | Performed by: PHYSICAL MEDICINE & REHABILITATION

## 2021-03-29 PROCEDURE — G8417 CALC BMI ABV UP PARAM F/U: HCPCS | Performed by: PHYSICAL MEDICINE & REHABILITATION

## 2021-03-29 PROCEDURE — 99215 OFFICE O/P EST HI 40 MIN: CPT

## 2021-03-29 PROCEDURE — G8400 PT W/DXA NO RESULTS DOC: HCPCS | Performed by: PHYSICAL MEDICINE & REHABILITATION

## 2021-03-29 PROCEDURE — 1036F TOBACCO NON-USER: CPT | Performed by: PHYSICAL MEDICINE & REHABILITATION

## 2021-03-29 PROCEDURE — 1090F PRES/ABSN URINE INCON ASSESS: CPT | Performed by: PHYSICAL MEDICINE & REHABILITATION

## 2021-03-29 PROCEDURE — 80307 DRUG TEST PRSMV CHEM ANLYZR: CPT

## 2021-03-29 ASSESSMENT — ENCOUNTER SYMPTOMS
RESPIRATORY NEGATIVE: 1
EYES NEGATIVE: 1
BACK PAIN: 1
CONSTIPATION: 0
ALLERGIC/IMMUNOLOGIC NEGATIVE: 1
DIARRHEA: 0

## 2021-03-29 NOTE — PROGRESS NOTES
PAIN MANAGEMENT FOLLOW-UP NOTE  3/29/21    CHIEF COMPLAINT: This is [de-identified] y.o. female patientwho returns [facility] the [facility] with a history of Back Pain (radiates into hip ) and Chest Pain (pain in ribs, fell on escalator. )      PAIN Moo Watkins returns today for  reevaluation. Since the visit, the patient reports that the pain is not changed. About 60% better  After LL4,5 TFE few weeks ago   And tolerable  Notes accidentally fell  Right leg felt like toes stubbed   Some new pain  In R foot  ? R lumbar leg not as severe as Left leg  Had xRay  No fracture  Did not aggravate Left leg     Location: Back  Location Modifier: left lower back  Severity of Pain: 3  Duration of Pain: Intermittent  Frequency of Pain: Intermittent  Aggravating Factors: walking  Limiting Behavior: Standing   Relieving Factors: narcotics        Previous pain medication trials have included:          Mental health:    Patient feels - secondary to their current pain problems as described above. H/O depression and anxiety: No   Patient is not seeing psychologist orpsychiatrist   Abuse history? No    Employed? No    ANALGESIA:   Are your Current Pain medication (s) helping to decrease pain? No.   Current Pain score: Pain Score:  10 - Worst pain ever    ADVERSE AFFECTS:   Medication Side Effects: No.    ACTIVITY:  Are you able to be more active with your pain medications? No      ABERRANT BEHAVIORS SINCE LAST VISIT? No    Review of Systems   Constitutional: Positive for fatigue. HENT: Negative. Eyes: Negative. Respiratory: Negative. Cardiovascular: Negative. Gastrointestinal: Negative for constipation and diarrhea. Endocrine: Negative. Genitourinary: Negative for difficulty urinating and flank pain. Musculoskeletal: Positive for back pain and myalgias. Skin: Negative. Allergic/Immunologic: Negative. Neurological: Positive for weakness and numbness. Hematological: Negative. Psychiatric/Behavioral: Positive for sleep disturbance.         Allergies   Allergen Reactions    Sulfa Antibiotics Shortness Of Breath    Sulfamethoxazole-Trimethoprim Anaphylaxis    Ansaid [Flurbiprofen] Other (See Comments)     Light headed and difficult with vision \"seeing\"    Gentamicin Other (See Comments)     Eye ointment caused eye swelling, redness    Quinidine      Light headed    Hydrocodone-Acetaminophen     Mirapex [Pramipexole Dihydrochloride] Other (See Comments)     Unknown reaction    Mobic [Meloxicam] Nausea Only    Motrin [Ibuprofen] Other (See Comments)     \"I coughed so bad I broke a rib\"    Nsaids Other (See Comments)     lightheaded    Reglan [Metoclopramide] Other (See Comments)     Hyperactive and stomach pain    Trazodone Other (See Comments)     unknown    Corgard [Nadolol] Other (See Comments)     Severe coughing and broke a rib as a result     Erythromycin Nausea Only    Etodolac      GI issues    Garamycin [Gentamicin Sulfate] Other (See Comments)     Redness and irritation    Inderal [Propranolol] Other (See Comments)     Severe cough    Lisinopril Itching    Loratadine      Does not work    Ultram [Tramadol] Other (See Comments)     Didn't work         Outpatient Medications Prior to Visit   Medication Sig Dispense Refill    oxyCODONE HCl (OXY-IR) 10 MG immediate release tablet TAKE 1 TABLET BY MOUTH THREE TIMES A DAY 90 tablet 0    furosemide (LASIX) 20 MG tablet 20 mg daily       gabapentin (NEURONTIN) 100 MG capsule TAKE 1 CAPSULE BY MOUTH THREE TIMES A DAY 90 capsule 2    naloxone 4 MG/0.1ML LIQD nasal spray 1 spray by Nasal route as needed for Opioid Reversal 1 each 5    baclofen (LIORESAL) 10 MG tablet Take 10 mg by mouth 3 times daily      ketotifen (ZADITOR) 0.025 % ophthalmic solution 1 drop 2 times daily      Multiple Vitamin (MULTI-VITAMIN DAILY) TABS Take by mouth      famotidine (PEPCID) 10 MG tablet Take 10 mg by mouth 2 times daily      BiPAP Machine MISC       melatonin 1 MG tablet melatonin   once daily      Triprolidine-Pseudoephedrine (ANTIHISTAMINE PO) Take by mouth Indications: zyrtec       metoprolol tartrate (LOPRESSOR) 25 MG tablet Take 12.5 mg by mouth 2 times daily      aspirin 81 MG tablet Take 81 mg by mouth daily      levothyroxine (SYNTHROID) 75 MCG tablet Take 75 mcg by mouth daily      potassium chloride (KLOR-CON M) 20 MEQ extended release tablet TAKE ONE TABLET ONCE A DAY      guaiFENesin (MUCINEX) 600 MG extended release tablet Mucinex 600 mg tablet, extended release   Take 2 tablets every day by oral route.  Heating Pads (RADHA PAD/SMARTHEAT PRO/) PADS 1 Units by Does not apply route 4 times daily Use for 15 minutes to 30 minutes at a time four times a day. 1 each 0    lidocaine (ASPERCREME W/LIDOCAINE) 4 % cream Apply topically as needed for Pain Apply topically as needed.  fluticasone propionate 50 MCG/BLIST AEPB Inhale into the lungs      albuterol (ACCUNEB) 1.25 MG/3ML nebulizer solution Inhale 1 ampule into the lungs every 6 hours as needed for Wheezing      simvastatin (ZOCOR) 40 MG tablet Take 40 mg by mouth nightly      flecainide (TAMBOCOR) 50 MG tablet Take 50 mg by mouth 2 times daily.  omeprazole (PRILOSEC) 40 MG capsule Take 40 mg by mouth nightly.  montelukast (SINGULAIR) 10 MG tablet Take 10 mg by mouth nightly.  DULoxetine (CYMBALTA) 60 MG capsule Take 60 mg by mouth daily.  artificial tears (ARTIFICIAL TEARS) OINT as needed.  Saline 0.2 % SOLN by Nasal route daily as needed       Immune Globulin, Human, (HIZENTRA) 10 GM/50ML SOLN Inject into the skin Tuesdays      diphenhydrAMINE (BENADRYL) 25 MG capsule Take 25 mg by mouth every 6 hours as needed for Itching.        Facility-Administered Medications Prior to Visit   Medication Dose Route Frequency Provider Last Rate Last Admin    triamcinolone acetonide (KENALOG-40) injection 40 mg  40 mg Intra-articular Once Arnold Babcock MD        lidocaine 2 % injection 5 mL  5 mL Intradermal Once Arnold Babcock MD        bupivacaine (MARCAINE) 0.5 % injection 150 mg  30 mL Intra-articular Once Arnold Babcock MD           Past Medical History:   Diagnosis Date    Anesthesia complication     2nd post op day from total knee patient stated \"I was wild and mean\"    Anxiety and depression     Arthritis     ASD (atrial septal defect)     repair in [address] COPD (chronic obstructive pulmonary disease) ([address]     COPD (chronic obstructive pulmonary disease) ([address]     Disorder of immune system ([address]     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 ([facility])    History of renal stone     passed    Hx of blood clots     DVT    Hypertension     MVP (mitral valve prolapse)     LONI (obstructive sleep apnea)     has not used bipap since     Rectocele     Spinal stenosis     Thyroid disease late     overactive radioactive iodine done       Past Surgical History:   Procedure Laterality Date    BACK SURGERY      BREAST LUMPECTOMY Left     BREAST LUMPECTOMY Left     CARDIAC SURGERY      ASD repair     SECTION  /    2x    CHOLECYSTECTOMY      CHOLECYSTECTOMY      COLONOSCOPY      three    DIAGNOSTIC CARDIAC CATH LAB PROCEDURE      DILATION AND CURETTAGE OF UTERUS      EYE SURGERY Bilateral     cataract    HC INJECTION PROCEDURE FOR SACROILIAC JOINT Left 2018    Left SIJ and piriformis, left trocanteric bursa injection performed by Ko Gandara MD at [address]  2009    HYSTERECTOMY  2009    Total    JOINT REPLACEMENT Bilateral     knee    KNEE ARTHROSCOPY Left    [address] SURGERY Bilateral 2/26/2019    Bilateral L4 TRANSFORAMINAL  [id number] performed by Perry Gamez MD at [address] [place] [address]  Bilateral 3/12/2019    Bilateral L4 TRANSFORAMINAL performed by Perry Gamez MD at [address] [place] [address] Dr Right 10/15/2019    RIGHT L4 & L5 TRANSFORAMINAL performed by Perry Gamez MD at [address] [place] [address] Dr Left 11/1/2019    Left L4 & L5 TRANSFORAMINAL performed by Perry Gamez MD at [address] [facility] [address]  8/2014    PAIN MANAGEMENT PROCEDURE Right 2/7/2020    Right L4 & L5 TRANSFORAMINAL performed by Perry Gamez MD at [place] [id number] Left 2/21/2020    Left L4 & L5 TRANSFORAMINAL performed by Perry Gamez MD at [place] [address] Right 2/23/2021    Right L4 & L5 TRANSFORAMINAL performed by Perry Gamez MD at [place] [address] Left 3/11/2021    left L4 & L5 TRANSFORAMINAL performed by Perry Gamez MD at [address] [place] [address] DX/THER AGNT PARAVERT FACET JOINT, LUMBAR/SAC, 2ND LEVEL Bilateral 8/30/2018    Bilateral L2 L3 L4 L5 Diagnostic Medial BB performed by Perry Gamez MD at [address] [place] [address] DX/THER AGNT PARAVERT FACET JOINT, LUMBAR/SAC, 2ND LEVEL Right 9/13/2018    Right L2 L3 L4 L5 Confirmatory Medial BB performed by Perry Gamez MD at [address] [facility] AA&/STRD TFRML EPI CERVICAL/THORACIC EA ADDL Right 7/20/2018    Right C5 C6 TRANSFORAMINAL performed by Perry Gamez MD at [address] Left 12/20/2018    Left L2 L3 L4 L5 RADIOFREQUENCY performed by Perry Gamez MD at [address] Right 1/3/2019    Right L2 L3 L4 L5 RADIOFREQUENCY performed by Perry Gamez MD at [address] Right 01/28/2005 & 03/16/2010    2x    TONSILLECTOMY      at age 12     Family History   Problem Relation Age of Onset    Heart Disease Father      Social History     Socioeconomic History    Marital status:      Spouse name: None    Number of children: None    Years of education: None    Highest education level: None   Occupational History    Occupation: retired   Social Needs    Financial resource strain: None    Food insecurity     Worry: None     Inability: None    Transportation needs     Medical: None     Non-medical: None   Tobacco Use    Smoking status: Never Smoker    Smokeless tobacco: Never Used   Substance and Sexual Activity    Alcohol use: Yes     Comment: very rare    Drug use: No    Sexual activity: None   Lifestyle    Physical activity     Days per week: None     Minutes per session: None    Stress: None   Relationships    Social connections     Talks on phone: None     Gets together: None     Attends Gnosticism service: None     Active member of club or organization: None     Attends meetings of clubs or organizations: None     Relationship status: None    Intimate partner violence     Fear of current or ex partner: None     Emotionally abused: None     Physically abused: None     Forced sexual activity: None   Other Topics Concern    None   Social History Narrative    None         Family and Social Historyreviewed in the electronic medical record. Imaging:Reviewed available imaging in our system with the patient. No results found. Objective:     Physical Exam:  Vitals:    03/29/21 1337   BP: 128/70   Site: Left Upper Arm   Position: Sitting   Cuff Size: Medium Adult   Weight: 180 lb (81.6 kg)   Height: 5' 4\" (1.626 m)     Pain Score:  10 - Worst pain ever    Physical Exam  Constitutional:       General: She is not in acute distress. Appearance: Normal appearance. She is well-developed. She is not diaphoretic. HENT:      Head: Normocephalic and atraumatic. Right Ear: External ear normal. No decreased hearing noted. Left Ear: External ear normal. No decreased hearing noted.       Nose: Nose normal.   Eyes:      General: Lids are normal. No scleral icterus. Conjunctiva/sclera: Conjunctivae normal.   Neck:      Trachea: Phonation normal.   Cardiovascular:      Comments: No BLE edema present  Pulmonary:      Effort: Pulmonary effort is normal. No accessory muscle usage or respiratory distress. Abdominal:      General: Abdomen is flat. Palpations: Abdomen is soft. Genitourinary:     Comments: deferred  Skin:     General: Skin is warm and dry. Coloration: Skin is not pale. Findings: No erythema or rash. Neurological:      General: No focal deficit present. Mental Status: She is alert and oriented to person, place, and time. Psychiatric:         Mood and Affect: Mood normal.         Speech: Speech normal.         Behavior: Behavior normal.       Back Exam     Tenderness   The patient is experiencing tenderness in the lumbar. Range of Motion   Extension: normal   Flexion: normal   Lateral bend right: normal   Lateral bend left: normal   Rotation right: normal   Rotation left: normal     Muscle Strength   Right Quadriceps:  5/5   Left Quadriceps:  5/5   Right Hamstrings:  5/5   Left Hamstrings:  5/5     Tests   Straight leg raise right: negative  Straight leg raise left: negative    Other   Toe walk: normal  Heel walk: normal  Sensation: normal  Gait: normal                                   Research  has found that  Spine injections    reduce pain and  give  better functional  outcomes. Assessment: This is a [de-identified] y.o. female patient with:    Diagnosis:   Diagnosis Orders   1. Lumbar radiculopathy     2. Lumbosacral spondylosis without myelopathy     3. Acute foot pain, right     4. Encounter for long-term opiate analgesic use         Medical Comorbidities:  As listed in the patient's past medical and surgical history    Functional Limitations:   Pain limits function and quality of life.      Plan:   Wants to hold on  Another LL4,5 TFE  Will call  50 mg MEDD for pain function  Meds:   Controlled Substances Monitoring: Pt educated about the risks of taking opiates,including increased sedation, constipation, slowed breathing, tolerance, dependence,and addiction. New Prescriptions    No medications on file      No orders of the defined types were placed in this encounter. No orders of the defined types were placed in this encounter. No follow-ups on file. Opioid medication has  significant  risk  benefit concerns. We  instruct    our patients that  a Random Urine Drug Screen is required  along with a  an Opioid assessment questionaire such as ORT  or SOAPP  The patient expressed understanding of the above assessment and plan. Totaltime spent face to face with patient was 25 minutes inwhich  50% or more of the time was spent in counseling, education about risk andbenefits of the above plan, and coordination of care.

## 2021-04-02 LAB
6-ACETYLMORPHINE, UR: NOT DETECTED
7-AMINOCLONAZEPAM, URINE: NOT DETECTED
ALPHA-OH-ALPRAZ, URINE: NOT DETECTED
ALPHA-OH-MIDAZOLAM, URINE: NOT DETECTED
ALPRAZOLAM, URINE: NOT DETECTED
AMPHETAMINES, URINE: NOT DETECTED
BARBITURATES, URINE: NOT DETECTED
BENZOYLECGONINE, UR: NOT DETECTED
BUPRENORPHINE URINE: NOT DETECTED
CARISOPRODOL, UR: NOT DETECTED
CLONAZEPAM, URINE: NOT DETECTED
CODEINE, URINE: NOT DETECTED
CREATININE URINE: 24.1 MG/DL (ref 20–400)
DIAZEPAM, URINE: NOT DETECTED
EER PAIN MGT DRUG PANEL, HIGH RES/EMIT U: NORMAL
ETHYL GLUCURONIDE UR: NOT DETECTED
FENTANYL URINE: NOT DETECTED
GABAPENTIN: PRESENT
HYDROCODONE, URINE: NOT DETECTED
HYDROMORPHONE, URINE: NOT DETECTED
LORAZEPAM, URINE: NOT DETECTED
MARIJUANA METAB, UR: NOT DETECTED
MDA, UR: NOT DETECTED
MDEA, EVE, UR: NOT DETECTED
MDMA URINE: NOT DETECTED
MEPERIDINE METAB, UR: NOT DETECTED
METHADONE, URINE: NOT DETECTED
METHAMPHETAMINE, URINE: NOT DETECTED
METHYLPHENIDATE: NOT DETECTED
MIDAZOLAM, URINE: NOT DETECTED
MORPHINE URINE: NOT DETECTED
NALOXONE URINE: NOT DETECTED
NORBUPRENORPHINE, URINE: NOT DETECTED
NORDIAZEPAM, URINE: NOT DETECTED
NORFENTANYL, URINE: NOT DETECTED
NORHYDROCODONE, URINE: NOT DETECTED
NOROXYCODONE, URINE: PRESENT
NOROXYMORPHONE, URINE: NOT DETECTED
OXAZEPAM, URINE: NOT DETECTED
OXYCODONE URINE: PRESENT
OXYMORPHONE, URINE: PRESENT
PAIN MGT DRUG PANEL, HI RES, UR: NORMAL
PCP,URINE: NOT DETECTED
PHENTERMINE, UR: NOT DETECTED
PREGABALIN: NOT DETECTED
TAPENTADOL, URINE: NOT DETECTED
TAPENTADOL-O-SULFATE, URINE: NOT DETECTED
TEMAZEPAM, URINE: NOT DETECTED
TRAMADOL, URINE: NOT DETECTED
ZOLPIDEM METABOLITE (ZCA), URINE: NOT DETECTED
ZOLPIDEM, URINE: NOT DETECTED

## 2021-04-09 DIAGNOSIS — M47.817 LUMBOSACRAL SPONDYLOSIS WITHOUT MYELOPATHY: ICD-10-CM

## 2021-04-09 DIAGNOSIS — M54.16 LUMBAR RADICULOPATHY: ICD-10-CM

## 2021-04-09 DIAGNOSIS — M53.3 CHRONIC LEFT SACROILIAC JOINT PAIN: ICD-10-CM

## 2021-04-09 DIAGNOSIS — G89.29 CHRONIC LEFT SACROILIAC JOINT PAIN: ICD-10-CM

## 2021-04-09 RX ORDER — OXYCODONE HYDROCHLORIDE 10 MG/1
10 TABLET ORAL 3 TIMES DAILY
Qty: 90 TABLET | Refills: 0 | Status: SHIPPED | OUTPATIENT
Start: 2021-04-09 | End: 2021-05-10 | Stop reason: SDUPTHER

## 2021-04-09 NOTE — TELEPHONE ENCOUNTER
Last Appt:  3/29/2021  Next Appt:   4/13/2021  Med verified in [facility] checked for [place], [place], and [place]: 03/04/21 Oxycodone Hcl 10Mg #90.  Due NOW    Pt did not give 5 business day refill request.

## 2021-04-19 ENCOUNTER — OFFICE VISIT (OUTPATIENT)
Dept: PAIN MANAGEMENT | Age: 81
End: 2021-04-19
Payer: MEDICARE

## 2021-04-19 VITALS
DIASTOLIC BLOOD PRESSURE: 74 MMHG | HEIGHT: 64 IN | SYSTOLIC BLOOD PRESSURE: 126 MMHG | BODY MASS INDEX: 30.9 KG/M2 | WEIGHT: 181 LBS

## 2021-04-19 DIAGNOSIS — M54.16 LUMBAR RADICULOPATHY: Primary | ICD-10-CM

## 2021-04-19 DIAGNOSIS — Z79.891 ENCOUNTER FOR LONG-TERM OPIATE ANALGESIC USE: ICD-10-CM

## 2021-04-19 DIAGNOSIS — M47.817 LUMBOSACRAL SPONDYLOSIS WITHOUT MYELOPATHY: ICD-10-CM

## 2021-04-19 PROCEDURE — G8427 DOCREV CUR MEDS BY ELIG CLIN: HCPCS | Performed by: PHYSICAL MEDICINE & REHABILITATION

## 2021-04-19 PROCEDURE — 4040F PNEUMOC VAC/ADMIN/RCVD: CPT | Performed by: PHYSICAL MEDICINE & REHABILITATION

## 2021-04-19 PROCEDURE — 1123F ACP DISCUSS/DSCN MKR DOCD: CPT | Performed by: PHYSICAL MEDICINE & REHABILITATION

## 2021-04-19 PROCEDURE — 1036F TOBACCO NON-USER: CPT | Performed by: PHYSICAL MEDICINE & REHABILITATION

## 2021-04-19 PROCEDURE — 99215 OFFICE O/P EST HI 40 MIN: CPT | Performed by: PHYSICAL MEDICINE & REHABILITATION

## 2021-04-19 PROCEDURE — 1090F PRES/ABSN URINE INCON ASSESS: CPT | Performed by: PHYSICAL MEDICINE & REHABILITATION

## 2021-04-19 PROCEDURE — G8400 PT W/DXA NO RESULTS DOC: HCPCS | Performed by: PHYSICAL MEDICINE & REHABILITATION

## 2021-04-19 PROCEDURE — 99214 OFFICE O/P EST MOD 30 MIN: CPT | Performed by: PHYSICAL MEDICINE & REHABILITATION

## 2021-04-19 PROCEDURE — G8417 CALC BMI ABV UP PARAM F/U: HCPCS | Performed by: PHYSICAL MEDICINE & REHABILITATION

## 2021-04-19 RX ORDER — ACETAMINOPHEN 500 MG
650 TABLET ORAL EVERY 6 HOURS PRN
COMMUNITY
End: 2021-06-07

## 2021-04-19 RX ORDER — TOPIRAMATE 25 MG/1
25 TABLET ORAL 2 TIMES DAILY
COMMUNITY
End: 2022-08-23 | Stop reason: SDUPTHER

## 2021-04-19 ASSESSMENT — ENCOUNTER SYMPTOMS
RESPIRATORY NEGATIVE: 1
BACK PAIN: 1
CONSTIPATION: 0
ALLERGIC/IMMUNOLOGIC NEGATIVE: 1
EYES NEGATIVE: 1
DIARRHEA: 0

## 2021-04-19 NOTE — PROGRESS NOTES
PAIN MANAGEMENT FOLLOW-UP NOTE  4/19/21    CHIEF COMPLAINT: This is [de-identified] y.o. female patientwho returns to the [facility] with a history of Back Pain      [facility]  [facility]. Since the visit, the patient reports that the pain is not changed. Received  Moderate relief for 3-4 weeks with  L L4,5 TFEs  Now pain has recurred  Down  Side of L Leg      Location: Back  Location Modifier: left lower back  Severity of Pain: 4  Duration of Pain: Intermittent  Frequency of Pain: Intermittent  Aggravating Factors: -  Limiting Behavior: Standing   Relieving Factors: narcotics        Previous pain medication trials have included:          Mental health:    Patient feels - secondary to their current pain problems as described above. H/O depression and anxiety: No   Patient is not seeing psychologist orpsychiatrist   Abuse history? No    Employed? No    ANALGESIA:   Are your Current Pain medication (s) helping to decrease pain? No.   Current Pain score: Pain Score:   6    ADVERSE AFFECTS:   Medication Side Effects: No.    ACTIVITY:  Are you able to be more active with your pain medications? No      ABERRANT BEHAVIORS SINCE LAST VISIT? No    Review of Systems   Constitutional: Positive for fatigue. HENT: Negative. Eyes: Negative. Respiratory: Negative. Cardiovascular: Negative. Gastrointestinal: Negative for constipation and diarrhea. Endocrine: Negative. Genitourinary: Negative for difficulty urinating and flank pain. Musculoskeletal: Positive for back pain and myalgias. Skin: Negative. Allergic/Immunologic: Negative. Neurological: Positive for weakness and numbness. Hematological: Negative. Psychiatric/Behavioral: Positive for sleep disturbance.         Allergies   Allergen Reactions    Sulfa Antibiotics Shortness Of Breath    Sulfamethoxazole-Trimethoprim Anaphylaxis    Ansaid [Flurbiprofen] Other (See Comments)     Light headed and mg by mouth 2 times daily      aspirin 81 MG tablet Take 81 mg by mouth daily      potassium chloride (KLOR-CON M) 20 MEQ extended release tablet TAKE ONE TABLET ONCE A DAY      guaiFENesin (MUCINEX) 600 MG extended release tablet Mucinex 600 mg tablet, extended release   Take 2 tablets every day by oral route.  lidocaine (ASPERCREME W/LIDOCAINE) 4 % cream Apply topically as needed for Pain Apply topically as needed.  fluticasone propionate 50 MCG/BLIST AEPB Inhale into the lungs      albuterol (ACCUNEB) 1.25 MG/3ML nebulizer solution Inhale 1 ampule into the lungs every 6 hours as needed for Wheezing      simvastatin (ZOCOR) 40 MG tablet Take 40 mg by mouth nightly      flecainide (TAMBOCOR) 50 MG tablet Take 50 mg by mouth 2 times daily.  omeprazole (PRILOSEC) 40 MG capsule Take 40 mg by mouth nightly.  montelukast (SINGULAIR) 10 MG tablet Take 10 mg by mouth nightly.  DULoxetine (CYMBALTA) 60 MG capsule Take 60 mg by mouth daily.  artificial tears (ARTIFICIAL TEARS) OINT as needed.  Saline 0.2 % SOLN by Nasal route daily as needed       Immune Globulin, Human, (HIZENTRA) 10 GM/50ML SOLN Inject into the skin Tuesdays      diphenhydrAMINE (BENADRYL) 25 MG capsule Take 25 mg by mouth every 6 hours as needed for Itching.  furosemide (LASIX) 20 MG tablet 20 mg 3 times daily       gabapentin (NEURONTIN) 100 MG capsule TAKE 1 CAPSULE BY MOUTH THREE TIMES A DAY 90 capsule 2    levothyroxine (SYNTHROID) 75 MCG tablet Take 75 mcg by mouth daily      Heating Pads (RADHA PAD/SMARTHEAT PRO/) PADS 1 Units by Does not apply route 4 times daily Use for 15 minutes to 30 minutes at a time four times a day.  1 each 0     Facility-Administered Medications Prior to Visit   Medication Dose Route Frequency Provider Last Rate Last Admin    triamcinolone acetonide (KENALOG-40) injection 40 mg  40 mg Intra-articular Once Bethany Young MD        lidocaine 2 % injection 5 mL  5 mL Intradermal Once Candice Saenz MD        bupivacaine (MARCAINE) 0.5 % injection 150 mg  30 mL Intra-articular Once Candice Saenz MD           Past Medical History:   Diagnosis Date    Anesthesia complication     2nd post op day from total knee patient stated \"I was wild and mean\"    Anxiety and depression     Arthritis     ASD (atrial septal defect)     repair in [address] [facility]     COPD [facility]     Disorder of immune system ([facility]     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 ([facility])    History of renal stone     passed    Hx of blood clots     DVT    Hypertension     MVP (mitral valve prolapse)     LONI (obstructive sleep apnea)     has not used bipap since     Rectocele     Spinal stenosis     Thyroid disease late     overactive radioactive iodine done       Past Surgical History:   Procedure Laterality Date    BACK SURGERY      BREAST LUMPECTOMY Left     BREAST LUMPECTOMY Left     CARDIAC SURGERY      ASD repair     SECTION  /    2x    CHOLECYSTECTOMY      CHOLECYSTECTOMY      COLONOSCOPY      three    DIAGNOSTIC CARDIAC CATH LAB PROCEDURE      DILATION AND CURETTAGE OF UTERUS      EYE SURGERY Bilateral     cataract    HC INJECTION PROCEDURE FOR SACROILIAC JOINT Left 2018    Left SIJ and piriformis, left trocanteric bursa injection performed by Andrew Rubio MD at [address]  2009    HYSTERECTOMY  2009    Total    JOINT REPLACEMENT Bilateral     knee    KNEE ARTHROSCOPY Left     LUMBAR SPINE SURGERY Bilateral 2019    Bilateral L4 TRANSFORAMINAL  [id number] performed by Juan Simms MD at [address] [place] [address] Dr Bilateral 3/12/2019    Bilateral L4 TRANSFORAMINAL performed by Juan Simms MD at [address] [place] [address] Dr Right 10/15/2019    RIGHT L4 & L5 TRANSFORAMINAL performed by Juan Simms MD at [address] [place] [address] Dr Left 11/1/2019    Left L4 & L5 TRANSFORAMINAL performed by Juan Simms MD at [place] [address]  8/2014    PAIN MANAGEMENT PROCEDURE Right 2/7/2020    Right L4 & L5 TRANSFORAMINAL performed by Juan Simms MD at [facility] Left 2/21/2020    Left L4 & L5 TRANSFORAMINAL performed by Juan Simms MD at [facility] Right 2/23/2021    Right L4 & L5 TRANSFORAMINAL performed by Juan Simms MD at [facility] Left 3/11/2021    left L4 & L5 TRANSFORAMINAL performed by Juan Simms MD at [address] [place] [address] DX/THER AGNT PARAVERT FACET JOINT, LUMBAR/SAC, 2ND LEVEL Bilateral 8/30/2018    Bilateral L2 L3 L4 L5 Diagnostic Medial BB performed by Juan Simms MD at [address] [place] [address] DX/THER AGNT PARAVERT FACET JOINT, LUMBAR/SAC, 2ND LEVEL Right 9/13/2018    Right L2 L3 L4 L5 Confirmatory Medial BB performed by Juan Simms MD at [address] [facility] AA&/STRD TFRML EPI CERVICAL/THORACIC EA ADDL Right 7/20/2018    Right C5 C6 TRANSFORAMINAL performed by Juan Simms MD at [address] Left 12/20/2018    Left L2 L3 L4 L5 RADIOFREQUENCY performed by Juan Simms MD at [address] Right 1/3/2019    Right L2 L3 L4 L5 RADIOFREQUENCY performed by Juan Simms MD at [address] [place] [address] Right 01/28/2005 & 03/16/2010    2x    TONSILLECTOMY      at age 12     Family History   Problem Relation Age of Onset    Heart Disease Father      Social History     Socioeconomic History    Marital status:      Spouse name: None    Number of children: None normal.   Cardiovascular:      Comments: No BLE edema present  Pulmonary:      Effort: Pulmonary effort is normal. No accessory muscle usage or respiratory distress. Abdominal:      General: Abdomen is flat. Palpations: Abdomen is soft. Genitourinary:     Comments: deferred  Skin:     General: Skin is warm and dry. Coloration: Skin is not pale. Findings: No erythema or rash. Neurological:      General: No focal deficit present. Mental Status: She is alert and oriented to person, place, and time. Psychiatric:         Mood and Affect: Mood normal.         Speech: Speech normal.         Behavior: Behavior normal.       Back Exam     Tenderness   The patient is experiencing tenderness in the lumbar. Range of Motion   Extension: normal   Flexion: normal   Lateral bend right: normal   Lateral bend left: normal   Rotation right: normal   Rotation left: normal     Muscle Strength   Right Quadriceps:  5/5   Left Quadriceps:  5/5   Right Hamstrings:  5/5   Left Hamstrings:  5/5     Tests   Straight leg raise right: negative  Straight leg raise left: positive    Other   Toe walk: normal  Heel walk: normal  Sensation: normal  Gait: normal     Comments:  +slump  Left  -gui Rothman                                   Research  has found that  Spine injections    reduce pain and  give  better functional  outcomes. Assessment: This is a [de-identified] y.o. female patient with:    Diagnosis:   Diagnosis Orders   1. Lumbar radiculopathy     2. Lumbosacral spondylosis without myelopathy     3. Encounter for long-term opiate analgesic use     45 MEDD  Pain function  LL4,5 TFE x1     Medical Comorbidities:  As listed in the patient's past medical and surgical history    Functional Limitations:   Pain limits function and quality of life.      Plan:   MEDD 45   LL 4,5 for pain function     Meds:   Controlled Substances Monitoring: Pt educated about the risks of taking opiates,including increased sedation, constipation, slowed breathing, tolerance, dependence,and addiction. New Prescriptions    No medications on file      No orders of the defined types were placed in this encounter. No orders of the defined types were placed in this encounter. No follow-ups on file. Opioid medication has  significant  risk  benefit concerns. We  instruct    our patients that  a Random Urine Drug Screen is required  along with a  an Opioid assessment questionaire such as ORT  or SOAPP  The patient expressed understanding of the above assessment and plan. Totaltime spent face to face with patient was 20 minutes inwhich  50% or more of the time was spent in counseling, education about risk andbenefits of the above plan, and coordination of care.

## 2021-04-19 NOTE — PATIENT INSTRUCTIONS
[facility]  [address]  Telephone [phone]  Fax [phone]      PROCEDURE INSTRUCTIONS FOR  PAIN MANAGEMENT PROCEDURES WITHOUT IV SEDATION    Jose Antonio Noyola scheduled to see Dr. Nelly Cespedes to undergo the following procedure:  Left L4 and L5 Transforaminal MICHAEL     Procedure Date: ____Thursday 5/6/2021____  You will receive a phone call the day prior to your procedure to confirm a time or arrival.    Report to the [place] [address], Registration office on the 1st floor in the hospital, after check in and signing of paper work you will then go to the second floor to the surgery center. 1. Stop the following medications prior to the procedure:    Aspirin  Date__5/3/2021__ : Stop blood thinners as directed before the injection, with permission from your cardiologist or primary care physician. We will send a letter to them requesting permission to hold the blood thinners. · Medication___Aspirin___ held for __3__ days    If you take Warfarin (Coumadin), you must have your blood drawn for an INR the day before the procedure. INR must be less than 1.5. if not complete prior to check in the procedure this will be drawn at the procedure center prior to having the procedure completed. 2.  Take all routine medications unless otherwise instructed. Ok to take vitamins and antiinflammatory medications    3. EATING & DRINKING:  Ok to eat or drink before the injection - no IV sedation will be used. 4. If you are allergic to contrast or iodine, you must take benadryl and prednisone prior to the injection to prevent an allergic reaction. Follow the directions on the prescription for the times to take the medication. 5. Oral valium can be prescribed if your are anxious about the injections or feel that you can not lay still during the injection. If you take valium, you must have a .  Make arrangements for a family member or friend to drive you to the surgery center. Your ride must stay in the hospital while you are having the injection done. If they cannot stay, the injection will be rescheduled. The valium may affect your judgment following the procedure and driving a vehicle within 24 hours after the sedation could be dangerous. 6.  Wear simple loose clothing, which can be easily changed. 7.  Leave jewelry (including rings) and other valuables at home. 8.  You will be asked to sign several forms prior to surgery; patients under the age of 25 must have a parent or legal guardian sign the permit to be able to do the procedure. 9.  You must have finished any antibiotic prescribed for recent infections. If required, please take pre-procedure antibiotic or other pre-procedure medications as instructed. 10. Bring inhalers and pain medications with you to your procedure. 11. Bring your MRI/CT films if they were done outside of the [facility]. 12. If you should develop a cold, sore throat, cough, fever or other new indication of illness or infection, or are started on antibiotics within 2 weeks of the scheduled procedure, please notify the [facility] office as early as possible at [phone]. If calling after 4:30pm the day prior to your scheduled procedure please contact [phone] and Leave a Voice Message.

## 2021-05-06 ENCOUNTER — HOSPITAL ENCOUNTER (OUTPATIENT)
Age: 81
Setting detail: OUTPATIENT SURGERY
Discharge: HOME | End: 2021-05-06
Attending: PHYSICAL MEDICINE & REHABILITATION | Admitting: PHYSICAL MEDICINE & REHABILITATION
Payer: MEDICARE

## 2021-05-06 ENCOUNTER — APPOINTMENT (OUTPATIENT)
Dept: GENERAL RADIOLOGY | Age: 81
End: 2021-05-06
Attending: PHYSICAL MEDICINE & REHABILITATION
Payer: MEDICARE

## 2021-05-06 VITALS
BODY MASS INDEX: 31.38 KG/M2 | WEIGHT: 183.8 LBS | DIASTOLIC BLOOD PRESSURE: 64 MMHG | SYSTOLIC BLOOD PRESSURE: 136 MMHG | TEMPERATURE: 97.6 F | OXYGEN SATURATION: 96 % | HEIGHT: 64 IN | HEART RATE: 70 BPM | RESPIRATION RATE: 16 BRPM

## 2021-05-06 PROCEDURE — 6360000002 HC RX W HCPCS: Performed by: PHYSICAL MEDICINE & REHABILITATION

## 2021-05-06 PROCEDURE — 7100000010 HC PHASE II RECOVERY - FIRST 15 MIN: Performed by: PHYSICAL MEDICINE & REHABILITATION

## 2021-05-06 PROCEDURE — 6360000004 HC RX CONTRAST MEDICATION: Performed by: PHYSICAL MEDICINE & REHABILITATION

## 2021-05-06 PROCEDURE — 64483 NJX AA&/STRD TFRM EPI L/S 1: CPT | Performed by: PHYSICAL MEDICINE & REHABILITATION

## 2021-05-06 PROCEDURE — 3209999900 FLUORO FOR SURGICAL PROCEDURES

## 2021-05-06 PROCEDURE — 2500000003 HC RX 250 WO HCPCS: Performed by: PHYSICAL MEDICINE & REHABILITATION

## 2021-05-06 PROCEDURE — 2580000003 HC RX 258: Performed by: PHYSICAL MEDICINE & REHABILITATION

## 2021-05-06 PROCEDURE — 3600000056 HC PAIN LEVEL 4 BASE: Performed by: PHYSICAL MEDICINE & REHABILITATION

## 2021-05-06 PROCEDURE — 7100000011 HC PHASE II RECOVERY - ADDTL 15 MIN: Performed by: PHYSICAL MEDICINE & REHABILITATION

## 2021-05-06 PROCEDURE — 64484 NJX AA&/STRD TFRM EPI L/S EA: CPT | Performed by: PHYSICAL MEDICINE & REHABILITATION

## 2021-05-06 PROCEDURE — 2709999900 HC NON-CHARGEABLE SUPPLY: Performed by: PHYSICAL MEDICINE & REHABILITATION

## 2021-05-06 RX ORDER — DEXAMETHASONE SODIUM PHOSPHATE 10 MG/ML
INJECTION INTRAMUSCULAR; INTRAVENOUS PRN
Status: DISCONTINUED | OUTPATIENT
Start: 2021-05-06 | End: 2021-05-06 | Stop reason: ALTCHOICE

## 2021-05-06 RX ORDER — BUPIVACAINE HYDROCHLORIDE 5 MG/ML
INJECTION, SOLUTION EPIDURAL; INTRACAUDAL PRN
Status: DISCONTINUED | OUTPATIENT
Start: 2021-05-06 | End: 2021-05-06 | Stop reason: ALTCHOICE

## 2021-05-06 RX ORDER — SODIUM CHLORIDE 9 MG/ML
INJECTION INTRAVENOUS PRN
Status: DISCONTINUED | OUTPATIENT
Start: 2021-05-06 | End: 2021-05-06 | Stop reason: ALTCHOICE

## 2021-05-06 ASSESSMENT — PAIN DESCRIPTION - DESCRIPTORS: DESCRIPTORS: DISCOMFORT;ACHING

## 2021-05-06 ASSESSMENT — PAIN - FUNCTIONAL ASSESSMENT: PAIN_FUNCTIONAL_ASSESSMENT: 0-10

## 2021-05-06 NOTE — H&P
.            Signed        Expand AllCollapse All     Show:Clear all  [x]Manual[x]Template[]Copied    Added by:  [x]Mariano Duke MD    []Deborah for details  PAIN MANAGEMENT FOLLOW-UP NOTE  4/19/21     CHIEF COMPLAINT: This is [de-identified] y.o. female patientwho returns to the [facility] with a history of Back Pain        PAIN Yinka Cui returns today for  reevaluation. Since the visit, the patient reports that the pain is not changed. Received  Moderate relief for 3-4 weeks with  L L4,5 TFEs  Now pain has recurred  Down  Side of L Leg      Location: Back  Location Modifier: left lower back  Severity of Pain: 4  Duration of Pain: Intermittent  Frequency of Pain: Intermittent  Aggravating Factors: -  Limiting Behavior: Standing   Relieving Factors: narcotics           Previous pain medication trials have included:                      Mental health:               Patient feels - secondary to their current pain problems as described above. H/O depression and anxiety: No              Patient is not seeing psychologist orpsychiatrist              Abuse history? No     Employed? No     ANALGESIA:   Are your Current Pain medication (s) helping to decrease pain? No.   Current Pain score: Pain Score:   6     ADVERSE AFFECTS:   Medication Side Effects: No.     ACTIVITY:  Are you able to be more active with your pain medications? No      ABERRANT BEHAVIORS SINCE LAST VISIT? No     Review of Systems   Constitutional: Positive for fatigue. HENT: Negative. Eyes: Negative. Respiratory: Negative. Cardiovascular: Negative. Gastrointestinal: Negative for constipation and diarrhea. Endocrine: Negative. Genitourinary: Negative for difficulty urinating and flank pain. Musculoskeletal: Positive for back pain and myalgias. Skin: Negative. Allergic/Immunologic: Negative. Neurological: Positive for weakness and numbness. Hematological: Negative.     Psychiatric/Behavioral: Positive for sleep disturbance. Allergies   Allergen Reactions    Sulfa Antibiotics Shortness Of Breath    Sulfamethoxazole-Trimethoprim Anaphylaxis    Ansaid [Flurbiprofen] Other (See Comments)       Light headed and difficult with vision \"seeing\"    Gentamicin Other (See Comments)       Eye ointment caused eye swelling, redness    Quinidine         Light headed    Hydrocodone-Acetaminophen      Mirapex [Pramipexole Dihydrochloride] Other (See Comments)       Unknown reaction    Mobic [Meloxicam] Nausea Only    Motrin [Ibuprofen] Other (See Comments)       \"I coughed so bad I broke a rib\"    Nsaids Other (See Comments)       lightheaded    Reglan [Metoclopramide] Other (See Comments)       Hyperactive and stomach pain    Trazodone Other (See Comments)       unknown    Corgard [Nadolol] Other (See Comments)       Severe coughing and broke a rib as a result     Erythromycin Nausea Only    Etodolac         GI issues    Garamycin [Gentamicin Sulfate] Other (See Comments)       Redness and irritation    Inderal [Propranolol] Other (See Comments)       Severe cough    Lisinopril Itching    Loratadine         Does not work    Ultram [Tramadol] Other (See Comments)       Didn't work            Medications Prior to Visit          Outpatient Medications Prior to Visit   Medication Sig Dispense Refill    acetaminophen (TYLENOL) 500 MG tablet Take 500 mg by mouth every 6 hours as needed for Pain        topiramate (TOPAMAX) 25 MG tablet Take 25 mg by mouth 2 times daily        oxyCODONE HCl (OXY-IR) 10 MG immediate release tablet Take 1 tablet by mouth 3 times daily for 30 days.  90 tablet 0    naloxone 4 MG/0.1ML LIQD nasal spray 1 spray by Nasal route as needed for Opioid Reversal 1 each 5    baclofen (LIORESAL) 10 MG tablet Take 10 mg by mouth 3 times daily        ketotifen (ZADITOR) 0.025 % ophthalmic solution 1 drop 2 times daily        Multiple Vitamin (MULTI-VITAMIN DAILY) TABS Take by mouth        famotidine (PEPCID) 10 MG tablet Take 10 mg by mouth 2 times daily        BiPAP Machine MISC          melatonin 1 MG tablet melatonin   once daily        Triprolidine-Pseudoephedrine (ANTIHISTAMINE PO) Take by mouth Indications: zyrtec         metoprolol tartrate (LOPRESSOR) 25 MG tablet Take 12.5 mg by mouth 2 times daily        aspirin 81 MG tablet Take 81 mg by mouth daily        potassium chloride (KLOR-CON M) 20 MEQ extended release tablet TAKE ONE TABLET ONCE A DAY        guaiFENesin (MUCINEX) 600 MG extended release tablet Mucinex 600 mg tablet, extended release   Take 2 tablets every day by oral route.  lidocaine (ASPERCREME W/LIDOCAINE) 4 % cream Apply topically as needed for Pain Apply topically as needed.  fluticasone propionate 50 MCG/BLIST AEPB Inhale into the lungs        albuterol (ACCUNEB) 1.25 MG/3ML nebulizer solution Inhale 1 ampule into the lungs every 6 hours as needed for Wheezing        simvastatin (ZOCOR) 40 MG tablet Take 40 mg by mouth nightly        flecainide (TAMBOCOR) 50 MG tablet Take 50 mg by mouth 2 times daily.  omeprazole (PRILOSEC) 40 MG capsule Take 40 mg by mouth nightly.  montelukast (SINGULAIR) 10 MG tablet Take 10 mg by mouth nightly.  DULoxetine (CYMBALTA) 60 MG capsule Take 60 mg by mouth daily.  artificial tears (ARTIFICIAL TEARS) OINT as needed.  Saline 0.2 % SOLN by Nasal route daily as needed         Immune Globulin, Human, (HIZENTRA) 10 GM/50ML SOLN Inject into the skin Tuesdays        diphenhydrAMINE (BENADRYL) 25 MG capsule Take 25 mg by mouth every 6 hours as needed for Itching.         furosemide (LASIX) 20 MG tablet 20 mg 3 times daily         gabapentin (NEURONTIN) 100 MG capsule TAKE 1 CAPSULE BY MOUTH THREE TIMES A DAY 90 capsule 2    levothyroxine (SYNTHROID) 75 MCG tablet Take 75 mcg by mouth daily        Heating Pads (RADHA PAD/SMARTHEAT PRO/) PADS 1 Units by Does not PROCEDURE        DILATION AND CURETTAGE OF UTERUS   1980    EYE SURGERY Bilateral       cataract    HC INJECTION PROCEDURE FOR SACROILIAC JOINT Left 7/31/2018     Left SIJ and piriformis, left trocanteric bursa injection performed by Adrienne Granger MD at [facility] [address]   12/2009    HYSTERECTOMY   2009     Total    JOINT REPLACEMENT Bilateral       knee    KNEE ARTHROSCOPY Left 2008    LUMBAR SPINE SURGERY Bilateral 2/26/2019     Bilateral L4 TRANSFORAMINAL  [id number] performed by Adrienne Granger MD at [address] [place] [address] Dr Bilateral 3/12/2019     Bilateral L4 TRANSFORAMINAL performed by Adrienne Granger MD at [address] [place] [address] Dr Right 10/15/2019     RIGHT L4 & L5 TRANSFORAMINAL performed by Adrienne Granger MD at [address] [place] [address] Dr Left 11/1/2019     Left L4 & L5 TRANSFORAMINAL performed by Adrienne Granger MD at [place] [address]   8/2014    PAIN MANAGEMENT PROCEDURE Right 2/7/2020     Right L4 & L5 TRANSFORAMINAL performed by Adrienne Granger MD at [address] Left 2/21/2020     Left L4 & L5 TRANSFORAMINAL performed by Adrienne Granger MD at [address] Right 2/23/2021     Right L4 & L5 TRANSFORAMINAL performed by Adrienne Granger MD at [address] Left 3/11/2021     left L4 & L5 TRANSFORAMINAL performed by Adrienne Granger MD at [address] [place] [address] DX/THER AGNT PARAVERT FACET JOINT, LUMBAR/SAC, 2ND LEVEL Bilateral 8/30/2018     Bilateral L2 L3 L4 L5 Diagnostic Medial BB performed by Adrienne Granger MD at [address] [place] [address] DX/THER AGNT PARAVERT FACET JOINT, LUMBAR/SAC, 2ND LEVEL Right 9/13/2018     Right L2 L3 L4 L5 Confirmatory Medial BB performed by Adrienne Granger MD at [address] [facility] AA&/STRD TFRML EPI CERVICAL/THORACIC EA ADDL Right 7/20/2018     Right C5 C6 TRANSFORAMINAL performed by Adrienne Granger MD at [address] Left 12/20/2018 Left L2 L3 L4 L5 RADIOFREQUENCY performed by Alen Yarbrough MD at [address] Right 1/3/2019     Right L2 L3 L4 L5 RADIOFREQUENCY performed by Alen Yarbrough MD at [address] Right 01/28/2005 & 03/16/2010     2x    TONSILLECTOMY         at age 12         Family History         Family History   Problem Relation Age of Onset    Heart Disease Father           Social History   Social History            Socioeconomic History    Marital status:        Spouse name: None    Number of children: None    Years of education: None    Highest education level: None   Occupational History    Occupation: retired   Social Needs    Financial resource strain: None    Food insecurity       Worry: None       Inability: None    Transportation needs       Medical: None       Non-medical: None   Tobacco Use    Smoking status: Never Smoker    Smokeless tobacco: Never Used   Substance and Sexual Activity    Alcohol use: Yes       Comment: very rare    Drug use: No    Sexual activity: None   Lifestyle    Physical activity       Days per week: None       Minutes per session: None    Stress: None   Relationships    Social connections       Talks on phone: None       Gets together: None       Attends [employer] service: None       Active member of club or organization: None       Attends meetings of clubs or organizations: None       Relationship status: None    Intimate partner violence       Fear of current or ex partner: None       Emotionally abused: None       Physically abused: None       Forced sexual activity: None   Other Topics Concern    None   Social History Narrative    None               Family and Social Historyreviewed in the electronic medical record. Imaging:Reviewed available imaging in our system with the patient. No results found.      Objective:      Physical Exam:  Vitals       Vitals:     04/19/21 1348   BP: 126/74   Site: Left Upper Arm and  give  better functional  outcomes. Assessment: This is a [de-identified] y.o. female patient with:     Diagnosis:    Diagnosis Orders   1. Lumbar radiculopathy      2. Lumbosacral spondylosis without myelopathy      3. Encounter for long-term opiate analgesic use      45 MEDD  Pain function  LL4,5 TFE x1      Medical Comorbidities:  As listed in the patient's past medical and surgical history     Functional Limitations:   Pain limits function and quality of life. Plan:   MEDD 45   LL 4,5 for pain function      Meds:   Controlled Substances Monitoring: Pt educated about the risks of taking opiates,including increased sedation, constipation, slowed breathing, tolerance, dependence,and addiction. New Prescriptions     No medications on file        Encounter Medications    No orders of the defined types were placed in this encounter. No orders of the defined types were placed in this encounter. No follow-ups on file. Opioid medication has  significant  risk  benefit concerns. We  instruct    our patients that  a Random Urine Drug Screen is required  along with a  an Opioid assessment questionaire such as ORT  or SOAPP  The patient expressed understanding of the above assessment and plan. Totaltime spent face to face with patient was 20 minutes inwhich  50% or more of the time was spent in counseling, education about risk andbenefits of the above plan, and coordination of care.

## 2021-05-06 NOTE — OP NOTE
Orientation: Left, Lower  Pain Location: Back  Pain Descriptors: Discomfort, Aching    Last Plain films: 2020      EXAMINATION:  1. RL4,5 transforaminal radiculogram/epidurogram.   2. RL4,5 transforaminal epidural anesthetic injection. 3. RL4,5 transforaminal epidural steroid injection. CONSENT: Written consent was obtained from the patient on preprinted consent form after explaining the procedure, indications, potential complications and outcomes. Alternative treatments were also discussed. DISCUSSION: The patient was sterilely prepped and draped in the usual fashion in the prone position. Time out was verified for correct patient, side, level and procedure. SEDATION:   No conscious sedation was performed during the procedure. The patient remained awake and conversed throughout the procedure. The patient underwent pulse oximetry and blood pressure monitoring independently by a trained observer, as well as by a physician. PROCEDURE:  Under image-intensifier control, a 22 gauge needle x 5 inch spinal needle was guided successfully into the epidural space employing a posterior lateral/oblique approach. Needle aspiration was negative for heme or CSF. Instillation of  .5 mL of Omnipaque 240 contrast medium opacified the spinal nerve and demonstrated contiguous flow into the epidural space. No vascular spread was noted. Digital subtraction was not employed to evaluate for vascular spread. The patient was monitored for any untoward reaction to contrast medium before proceeding with procedure #2. The patient did not report pain reproduction in a concordant distribution. Following needle position verification, a test dose of 1 mL of sterile lidocaine 0.5% was administered and patient monitored for any adverse effects. Then, 1 mL of   was instilled into the RL 4 epidural space and the patient's response was again monitored.   Finally, Dexamethasone (Decadron 10 mg/mL) 1 ml of 0.5% bupivacaine was then instilled. The patient's response was again monitored. The spinal needle was removed. Instillation of  .5 mL of Omnipaque 240 contrast medium opacified the spinal nerve and demonstrated contiguous flow into the epidural space. No vascular spread was noted. Digital subtraction was not employed to evaluate for vascular spread. The patient was monitored for any untoward reaction to contrast medium before proceeding with procedure #2. The patient did not report pain reproduction in a concordant distribution. Following needle position verification, a test dose of 1 mL of sterile lidocaine 0.5% was administered and patient monitored for any adverse effects. Then, 1 mL of   was instilled into the  Right L 4 epidural space and the patient's response was again monitored. Finally, Dexamethasone (Decadron 10 mg/mL) 1 ml of 0.5% bupivacaine was then instilled. The patient's response was again monitored. The spinal needle was removed. The patient tolerated the procedure well and without complications and was noted to be in stable condition prior to discharge from the procedure center with discharge instructions. EBL: no blood loss    SPECIMEN: none    IMPRESSIONS:  1. RL4,5 transforaminal epidurogram, epidural anesthesia and epidural steroid injection procedures accomplished without incident. RECOMMENDATIONS:  1. Complete and return Post-Procedure Pain and Activity Diary.   2. Contact the [address] for symptom exacerbation, fever or unusual symptoms. 3. Post-procedure care according to verbal and written discharge instructions    POST-PROCEDURE EPIDUROGRAPHY INTERPRETATION:    EXAMINATION: AP, lateral, and oblique views    FLUORO TIME: 21 seconds    DISCUSSION: Spot views of the spine reveal normal alignment and segmentation. Spinal needle is positioned at the RL4,5 neuroforamin. Contrast spreads and outlines the RL4,5 nerve/ neuroforamin and epidural space.  The epidurogram reveals excellent contrast flow. Visualized spine reveals See radiology report. Soft tissues reveal no abnormalities. IMPRESSION: RL4,5 transforaminal epidurogram/epineurogram reveals satisfactory needle position and contrast spread.      Electronically signed by Angel Garcia MD on 5/6/2021 at 11:53 AM

## 2021-05-06 NOTE — INTERVAL H&P NOTE
fluticasone propionate 50 MCG/BLIST AEPB Inhale into the lungs      albuterol (ACCUNEB) 1.25 MG/3ML nebulizer solution Inhale 1 ampule into the lungs every 6 hours as needed for Wheezing      simvastatin (ZOCOR) 40 MG tablet Take 40 mg by mouth nightly      flecainide (TAMBOCOR) 50 MG tablet Take 50 mg by mouth 2 times daily. omeprazole (PRILOSEC) 40 MG capsule Take 40 mg by mouth nightly. montelukast (SINGULAIR) 10 MG tablet Take 10 mg by mouth nightly. DULoxetine (CYMBALTA) 60 MG capsule Take 60 mg by mouth daily. artificial tears (ARTIFICIAL TEARS) OINT as needed. Saline 0.2 % SOLN by Nasal route daily as needed          The patient understands the planned operation and its associated risks and benefits and agrees to proceed.         Electronically signed by Ansley Yang MD on 5/6/2021 at 11:51 AM

## 2021-05-10 DIAGNOSIS — M54.16 LUMBAR RADICULOPATHY: ICD-10-CM

## 2021-05-10 DIAGNOSIS — M53.3 CHRONIC LEFT SACROILIAC JOINT PAIN: ICD-10-CM

## 2021-05-10 DIAGNOSIS — M47.817 LUMBOSACRAL SPONDYLOSIS WITHOUT MYELOPATHY: ICD-10-CM

## 2021-05-10 DIAGNOSIS — G89.29 CHRONIC LEFT SACROILIAC JOINT PAIN: ICD-10-CM

## 2021-05-10 RX ORDER — OXYCODONE HYDROCHLORIDE 10 MG/1
10 TABLET ORAL 3 TIMES DAILY
Qty: 90 TABLET | Refills: 0 | Status: SHIPPED | OUTPATIENT
Start: 2021-05-10 | End: 2021-06-10 | Stop reason: SDUPTHER

## 2021-05-10 NOTE — TELEPHONE ENCOUNTER
Pt called for refill of her oxycodone 10mg sent to [facility] in [place]. Last Appt:  4/19/2021  Next Appt:   5/25/2021  Med verified in [address] [place] [address] checked for [place], [place], and [place]: 4/9/21 Oxycodone Hcl 10mg #90. Due Now.

## 2021-06-07 ENCOUNTER — OFFICE VISIT (OUTPATIENT)
Dept: PAIN MANAGEMENT | Age: 81
End: 2021-06-07
Payer: MEDICARE

## 2021-06-07 VITALS
BODY MASS INDEX: 30.56 KG/M2 | HEIGHT: 64 IN | DIASTOLIC BLOOD PRESSURE: 80 MMHG | SYSTOLIC BLOOD PRESSURE: 132 MMHG | WEIGHT: 179 LBS

## 2021-06-07 DIAGNOSIS — M46.1 SACROILIITIS (HCC): Primary | ICD-10-CM

## 2021-06-07 DIAGNOSIS — G57.03 PIRIFORMIS SYNDROME OF BOTH SIDES: ICD-10-CM

## 2021-06-07 DIAGNOSIS — M65.9 FLEXOR TENOSYNOVITIS OF FINGER: ICD-10-CM

## 2021-06-07 DIAGNOSIS — M51.36 LUMBAR DEGENERATIVE DISC DISEASE: ICD-10-CM

## 2021-06-07 PROCEDURE — 99215 OFFICE O/P EST HI 40 MIN: CPT | Performed by: PHYSICAL MEDICINE & REHABILITATION

## 2021-06-07 PROCEDURE — 4040F PNEUMOC VAC/ADMIN/RCVD: CPT | Performed by: PHYSICAL MEDICINE & REHABILITATION

## 2021-06-07 PROCEDURE — G8417 CALC BMI ABV UP PARAM F/U: HCPCS | Performed by: PHYSICAL MEDICINE & REHABILITATION

## 2021-06-07 PROCEDURE — G8400 PT W/DXA NO RESULTS DOC: HCPCS | Performed by: PHYSICAL MEDICINE & REHABILITATION

## 2021-06-07 PROCEDURE — 1090F PRES/ABSN URINE INCON ASSESS: CPT | Performed by: PHYSICAL MEDICINE & REHABILITATION

## 2021-06-07 PROCEDURE — 1123F ACP DISCUSS/DSCN MKR DOCD: CPT | Performed by: PHYSICAL MEDICINE & REHABILITATION

## 2021-06-07 PROCEDURE — 99214 OFFICE O/P EST MOD 30 MIN: CPT | Performed by: PHYSICAL MEDICINE & REHABILITATION

## 2021-06-07 PROCEDURE — G8427 DOCREV CUR MEDS BY ELIG CLIN: HCPCS | Performed by: PHYSICAL MEDICINE & REHABILITATION

## 2021-06-07 PROCEDURE — 20551 NJX 1 TENDON ORIGIN/INSJ: CPT | Performed by: PHYSICAL MEDICINE & REHABILITATION

## 2021-06-07 PROCEDURE — 1036F TOBACCO NON-USER: CPT | Performed by: PHYSICAL MEDICINE & REHABILITATION

## 2021-06-07 RX ORDER — DULOXETIN HYDROCHLORIDE 30 MG/1
30 CAPSULE, DELAYED RELEASE ORAL DAILY
COMMUNITY
End: 2022-08-11 | Stop reason: SDUPTHER

## 2021-06-07 RX ORDER — ACETAMINOPHEN 650 MG/1
650 SUPPOSITORY RECTAL EVERY 4 HOURS PRN
COMMUNITY
End: 2022-04-27

## 2021-06-07 ASSESSMENT — ENCOUNTER SYMPTOMS
BACK PAIN: 1
ALLERGIC/IMMUNOLOGIC NEGATIVE: 1
CONSTIPATION: 0
RESPIRATORY NEGATIVE: 1
EYES NEGATIVE: 1
DIARRHEA: 0

## 2021-06-07 NOTE — PROGRESS NOTES
10 mg by mouth 3 times daily      ketotifen (ZADITOR) 0.025 % ophthalmic solution 1 drop 2 times daily      Multiple Vitamin (MULTI-VITAMIN DAILY) TABS Take by mouth      famotidine (PEPCID) 10 MG tablet Take 10 mg by mouth 2 times daily      BiPAP Machine MISC       melatonin 1 MG tablet melatonin   once daily      Triprolidine-Pseudoephedrine (ANTIHISTAMINE PO) Take by mouth Indications: zyrtec       metoprolol tartrate (LOPRESSOR) 25 MG tablet Take 12.5 mg by mouth 2 times daily      aspirin 81 MG tablet Take 81 mg by mouth daily      levothyroxine (SYNTHROID) 75 MCG tablet Take 75 mcg by mouth daily      potassium chloride (KLOR-CON M) 20 MEQ extended release tablet TAKE ONE TABLET ONCE A DAY      guaiFENesin (MUCINEX) 600 MG extended release tablet Mucinex 600 mg tablet, extended release   Take 2 tablets every day by oral route.  Heating Pads (RADHA PAD/SMARTHEAT PRO/) PADS 1 Units by Does not apply route 4 times daily Use for 15 minutes to 30 minutes at a time four times a day. 1 each 0    lidocaine (ASPERCREME W/LIDOCAINE) 4 % cream Apply topically as needed for Pain Apply topically as needed.  fluticasone propionate 50 MCG/BLIST AEPB Inhale into the lungs      simvastatin (ZOCOR) 40 MG tablet Take 40 mg by mouth nightly      flecainide (TAMBOCOR) 50 MG tablet Take 50 mg by mouth 2 times daily.  omeprazole (PRILOSEC) 40 MG capsule Take 40 mg by mouth nightly.  montelukast (SINGULAIR) 10 MG tablet Take 10 mg by mouth nightly.  DULoxetine (CYMBALTA) 60 MG extended release capsule Take 60 mg by mouth daily       artificial tears (ARTIFICIAL TEARS) OINT as needed.  Saline 0.2 % SOLN by Nasal route daily as needed       Immune Globulin, Human, (HIZENTRA) 10 GM/50ML SOLN Inject into the skin Tuesdays      diphenhydrAMINE (BENADRYL) 25 MG capsule Take 25 mg by mouth every 6 hours as needed for Itching.       acetaminophen (TYLENOL) 500 MG tablet Take 650 mg by mouth PROCEDURE      DILATION AND CURETTAGE OF UTERUS  1980    EYE SURGERY Bilateral     cataract    HC INJECTION PROCEDURE FOR SACROILIAC JOINT Left 7/31/2018    Left SIJ and piriformis, left trocanteric bursa injection performed by Juan Simms MD at [address]  12/2009    HYSTERECTOMY  2009    Total    JOINT REPLACEMENT Bilateral     knee    KNEE ARTHROSCOPY Left 2008    LUMBAR SPINE SURGERY Bilateral 2/26/2019    Bilateral L4 TRANSFORAMINAL  [id number] performed by Juan Simms MD at [address] [place] [address] Dr Bilateral 3/12/2019    Bilateral L4 TRANSFORAMINAL performed by Juan Simms MD at [address] [place] [address] Dr Right 10/15/2019    RIGHT L4 & L5 TRANSFORAMINAL performed by Juan Simms MD at [address] [place] [address] Dr Left 11/1/2019    Left L4 & L5 TRANSFORAMINAL performed by Juan Simms MD at [place] [address]  8/2014    PAIN MANAGEMENT PROCEDURE Right 2/7/2020    Right L4 & L5 TRANSFORAMINAL performed by Juan Simms MD at [address] [place] [address] Left 2/21/2020    Left L4 & L5 TRANSFORAMINAL performed by Juan Simms MD at [address] [place] [address] Right 2/23/2021    Right L4 & L5 TRANSFORAMINAL performed by Juan Simms MD at [address] [place] [address] Left 3/11/2021    left L4 & L5 TRANSFORAMINAL performed by Juan Simms MD at [address] [place] [address] Left 5/6/2021    Left L4 & L5 TRANSFORAMINAL performed by Juan Simms MD at [address] [place] [address] DX/THER AGNT PARAVERT FACET JOINT, LUMBAR/SAC, 2ND LEVEL Bilateral 8/30/2018    Bilateral L2 L3 L4 L5 Diagnostic Medial BB performed by Juan Simms MD at [address] [place] [address] DX/THER AGNT PARAVERT FACET JOINT, LUMBAR/SAC, 2ND LEVEL Right 9/13/2018    Right L2 L3 L4 L5 Confirmatory Medial BB performed by Juan Simms MD at [address] [facility] AA&/STRD TFRML EPI CERVICAL/THORACIC EA ADDL Right 7/20/2018    Right C5 C6 TRANSFORAMINAL performed by Gladis Rayo MD at [address] Left 12/20/2018    Left L2 L3 L4 L5 RADIOFREQUENCY performed by Gladis Rayo MD at [address] Right 1/3/2019    Right L2 L3 L4 L5 RADIOFREQUENCY performed by Gladis Rayo MD at [address] Right 01/28/2005 & 03/16/2010    2x    TONSILLECTOMY      at age 12     Family History   Problem Relation Age of Onset    Heart Disease Father      Social History     Socioeconomic History    Marital status:      Spouse name: None    Number of children: None    Years of education: None    Highest education level: None   Occupational History    Occupation: retired   Tobacco Use    Smoking status: Never Smoker    Smokeless tobacco: Never Used   Vaping Use    Vaping Use: Never used   Substance and Sexual Activity    Alcohol use: Yes     Comment: very rare    Drug use: No    Sexual activity: None   Other Topics Concern    None   Social History Narrative    None     Social Determinants of Health     Financial Resource Strain:     Difficulty of Paying Living Expenses:    Food Insecurity:     Worried About Running Out of Food in the Last Year:     Ran Out of Food in the Last Year:    Transportation Needs:     Lack of Transportation (Medical):      Lack of Transportation (Non-Medical):    Physical Activity:     Days of Exercise per Week:     Minutes of Exercise per Session:    Stress:     Feeling of Stress :    Social Connections:     Frequency of Communication with Friends and Family:     Frequency of Social Gatherings with Friends and Family:     Attends Protestant Services:     Active Member of Clubs or Organizations:     Attends Club or Organization Meetings:     Marital Status:    Intimate Partner Violence:     Fear of Current or Ex-Partner:     Emotionally Abused:     Physically Abused:     Sexually Abused:          Family and Social Historyreviewed in the electronic medical record. Imaging:Reviewed available imaging in our system with the patient. No results found. Objective:     Physical Exam:  Vitals:    06/07/21 1132   BP: 132/80   Site: Left Upper Arm   Position: Sitting   Cuff Size: Medium Adult   Weight: 179 lb (81.2 kg)   Height: 5' 4\" (1.626 m)     Pain Score:   8    Physical Exam  Constitutional:       General: She is not in acute distress. Appearance: Normal appearance. She is well-developed. She is not diaphoretic. HENT:      Head: Normocephalic and atraumatic. Right Ear: External ear normal. No decreased hearing noted. Left Ear: External ear normal. No decreased hearing noted. Nose: Nose normal.   Eyes:      General: Lids are normal. No scleral icterus. Conjunctiva/sclera: Conjunctivae normal.   Neck:      Trachea: Phonation normal.   Cardiovascular:      Comments: No BLE edema present  Pulmonary:      Effort: Pulmonary effort is normal. No accessory muscle usage or respiratory distress. Abdominal:      General: Abdomen is flat. Palpations: Abdomen is soft. Genitourinary:     Comments: deferred  Musculoskeletal:      Lumbar back: Negative right straight leg raise test and negative left straight leg raise test.   Skin:     General: Skin is warm and dry. Coloration: Skin is not pale. Findings: No erythema or rash. Neurological:      General: No focal deficit present. Mental Status: She is alert and oriented to person, place, and time. Psychiatric:         Mood and Affect: Mood normal.         Speech: Speech normal.         Behavior: Behavior normal.       Back Exam     Tenderness   The patient is experiencing tenderness in the lumbar and sacroiliac.     Range of Motion   Extension: normal   Flexion: normal   Lateral bend right: normal   Lateral bend left: normal   Rotation right: normal   Rotation left: normal     Muscle Strength   Right Quadriceps:  5/5   Left Quadriceps:  5/5   Right Hamstrings: 5/5   Left Hamstrings:  5/5     Tests   Straight leg raise right: negative  Straight leg raise left: negative    Other   Toe walk: normal  Heel walk: normal  Sensation: normal  Gait: normal       Right Hand Exam     Tests   Right hand Finkelsteins test: R fourth digit full flexion contracture oat PIP DIP. Comments:  R  Hand 4th  Digit cannot  Extend without severe pain                                    Research  has found that  Spine injections    reduce pain and  give  better functional  outcomes. Assessment: This is a [de-identified] y.o. female patient with:    Diagnosis:   Diagnosis Orders   1. Sacroiliitis ([facility] [address]     2. Lumbar degenerative disc disease     3. Piriformis syndrome of both sides     4. Flexor tenosynovitis of finger         Medical Comorbidities:  As listed in the patient's past medical and surgical history    Functional Limitations:   Pain limits function and quality of life. Plan:   B S-I  And Piriformis injections    Today R Digit 4  Steroid injection of  Finger flexor     Meds:   Controlled Substances Monitoring: Pt educated about the risks of taking opiates,including increased sedation, constipation, slowed breathing, tolerance, dependence,and addiction. New Prescriptions    No medications on file      No orders of the defined types were placed in this encounter. No orders of the defined types were placed in this encounter. No follow-ups on file. Opioid medication has  significant  risk  benefit concerns. We  instruct    our patients that  a Random Urine Drug Screen is required  along with a  an Opioid assessment questionaire such as ORT  or SOAPP  The patient expressed understanding of the above assessment and plan. Totaltime spent face to face with patient was 30 minutes inwhich  50% or more of the time was spent in counseling, education about risk andbenefits of the above plan, and coordination of care.

## 2021-06-07 NOTE — PROGRESS NOTES
Saeed Valero  1940 6/7/21      PAIN MANAGEMENT CLINIC PROCEDURE NOTE    CHIEF COMPLAINT: This is a [de-identified] y.o. female patient who presents to the [facility] Management Clinic with a history of pain in the Right 4th  Finger . PRE-PROCEDURE DIAGNOSIS: Right fourth trigger finger flexor tendonitis  Stenosing     POST-PROCEDURE DIAGNOSIS: same as pre-procedure diagnosis    Vitals:    06/07/21 1132   BP: 132/80       Pain Score:   8    PROCEDURE:     The procedure was explained, including risks and benefits, and the patient has agreed to proceed. The injection site was marked on the patients skin. A large area around the injection site was cleaned with chlora-prep. JOINT INJECTION Right Fourth  Trigger Finger injection  A 25G 1.5 inch needle was inserted into the \"right 4th MCP joint/ space using a anterior approach. Depth and direction of the needle were monitored at all times. The syringe was aspirated and was negative for heme. Synovial fluid  was not not aspirated. Then, 1 ml of  1% lidocaine and 10mg of kenalog (NDC# 0527-7124-11) was injected into the joint. The injection site was cleaned and dressed with a spot band aid. The patient tolerated the procedure well and with out complication The patient was instructed avoid heat and excessive activity for 24-48 hours.

## 2021-06-07 NOTE — PATIENT INSTRUCTIONS
antibiotic prescribed for recent infections. If required, please take pre-procedure antibiotic or other pre-procedure medications as instructed. 10. Bring inhalers and pain medications with you to your procedure. 11. Bring your MRI/CT films if they were done outside of the [facility]. 12. If you should develop a cold, sore throat, cough, fever or other new indication of illness or infection, or are started on antibiotics within 2 weeks of the scheduled procedure, please notify the [facility] office as early as possible at [phone]. If calling after 4:30pm the day prior to your scheduled procedure please contact [phone] and Leave a Voice Message.

## 2021-06-10 ENCOUNTER — TELEPHONE (OUTPATIENT)
Dept: PAIN MANAGEMENT | Age: 81
End: 2021-06-10

## 2021-06-10 DIAGNOSIS — M53.3 CHRONIC LEFT SACROILIAC JOINT PAIN: ICD-10-CM

## 2021-06-10 DIAGNOSIS — G89.29 CHRONIC LEFT SACROILIAC JOINT PAIN: ICD-10-CM

## 2021-06-10 DIAGNOSIS — M47.817 LUMBOSACRAL SPONDYLOSIS WITHOUT MYELOPATHY: ICD-10-CM

## 2021-06-10 DIAGNOSIS — M54.16 LUMBAR RADICULOPATHY: ICD-10-CM

## 2021-06-10 NOTE — TELEPHONE ENCOUNTER
[place]: 5/12/21 oxycodone 10mg #90. Due 6/11/21.     Last Appt:  6/7/2021  Next Appt:   6/15/2021  Med verified in [address]

## 2021-06-10 NOTE — TELEPHONE ENCOUNTER
Pt calling to report had trigger finger injection. First day post, the finger was straight. Second day, finger was crooked again. What should she do?

## 2021-06-10 NOTE — TELEPHONE ENCOUNTER
Needs oxy refill.      Last Appt:  6/7/2021  Next Appt:   6/15/2021  Med verified in [address]

## 2021-06-11 RX ORDER — OXYCODONE HYDROCHLORIDE 10 MG/1
10 TABLET ORAL 3 TIMES DAILY
Qty: 90 TABLET | Refills: 0 | Status: SHIPPED | OUTPATIENT
Start: 2021-06-11 | End: 2021-07-09 | Stop reason: SDUPTHER

## 2021-06-15 ENCOUNTER — PROCEDURE VISIT (OUTPATIENT)
Dept: PAIN MANAGEMENT | Age: 81
End: 2021-06-15
Payer: MEDICARE

## 2021-06-15 VITALS
HEART RATE: 69 BPM | SYSTOLIC BLOOD PRESSURE: 118 MMHG | WEIGHT: 180 LBS | DIASTOLIC BLOOD PRESSURE: 82 MMHG | OXYGEN SATURATION: 96 % | HEIGHT: 64 IN | BODY MASS INDEX: 30.73 KG/M2

## 2021-06-15 DIAGNOSIS — M65.341 TRIGGER RING FINGER OF RIGHT HAND: ICD-10-CM

## 2021-06-15 DIAGNOSIS — M79.642 BILATERAL HAND PAIN: Primary | ICD-10-CM

## 2021-06-15 DIAGNOSIS — Z79.891 ENCOUNTER FOR LONG-TERM OPIATE ANALGESIC USE: ICD-10-CM

## 2021-06-15 DIAGNOSIS — G57.02 PIRIFORMIS SYNDROME OF LEFT SIDE: ICD-10-CM

## 2021-06-15 DIAGNOSIS — M79.641 BILATERAL HAND PAIN: Primary | ICD-10-CM

## 2021-06-15 PROCEDURE — 99214 OFFICE O/P EST MOD 30 MIN: CPT | Performed by: PHYSICAL MEDICINE & REHABILITATION

## 2021-06-15 PROCEDURE — 4040F PNEUMOC VAC/ADMIN/RCVD: CPT | Performed by: PHYSICAL MEDICINE & REHABILITATION

## 2021-06-15 PROCEDURE — 1036F TOBACCO NON-USER: CPT | Performed by: PHYSICAL MEDICINE & REHABILITATION

## 2021-06-15 PROCEDURE — G8417 CALC BMI ABV UP PARAM F/U: HCPCS | Performed by: PHYSICAL MEDICINE & REHABILITATION

## 2021-06-15 PROCEDURE — G8427 DOCREV CUR MEDS BY ELIG CLIN: HCPCS | Performed by: PHYSICAL MEDICINE & REHABILITATION

## 2021-06-15 PROCEDURE — 20600 DRAIN/INJ JOINT/BURSA W/O US: CPT | Performed by: PHYSICAL MEDICINE & REHABILITATION

## 2021-06-15 PROCEDURE — 1090F PRES/ABSN URINE INCON ASSESS: CPT | Performed by: PHYSICAL MEDICINE & REHABILITATION

## 2021-06-15 PROCEDURE — G8400 PT W/DXA NO RESULTS DOC: HCPCS | Performed by: PHYSICAL MEDICINE & REHABILITATION

## 2021-06-15 PROCEDURE — 1123F ACP DISCUSS/DSCN MKR DOCD: CPT | Performed by: PHYSICAL MEDICINE & REHABILITATION

## 2021-06-15 RX ORDER — KETOPROFEN 75 MG/1
75 CAPSULE ORAL 2 TIMES DAILY PRN
Qty: 120 CAPSULE | Refills: 0 | Status: SHIPPED | OUTPATIENT
Start: 2021-06-15 | End: 2021-09-13

## 2021-06-15 ASSESSMENT — ENCOUNTER SYMPTOMS
RESPIRATORY NEGATIVE: 1
DIARRHEA: 0
EYES NEGATIVE: 1
ALLERGIC/IMMUNOLOGIC NEGATIVE: 1
BACK PAIN: 1
CONSTIPATION: 0

## 2021-06-15 NOTE — PROGRESS NOTES
PAIN MANAGEMENT FOLLOW-UP NOTE  6/15/21    CHIEF COMPLAINT: This is [de-identified] y.o. female patientwho returns to the [facility] with a history of Procedure (TPI)      PAIN Prudy Mood returns today for  reevaluation. Since the visit, the patient reports that the pain is not changed. Patient  Received  One day of  Relief  In which R   Trigger  Finger  Extended out without  Pain but  Then returned to triggering  A day  Later. Also  Pain in  B MCP notes a little more swollen in last week    Pain tolerable in B S-I and piriformis      Location: B hands   Location Modifier: B Hands   Severity of Pain: 4  Duration of Pain: Intermittent  Frequency of Pain: Intermittent  Aggravating Factors: -  Limiting Behavior: using hands   Relieving Factors: relaxation        Previous pain medication trials have included:          Mental health:    Patient feels - secondary to their current pain problems as described above. H/O depression and anxiety: No   Patient is not seeing psychologist orpsychiatrist   Abuse history? No    Employed? No    ANALGESIA:   Are your Current Pain medication (s) helping to decrease pain? No.   Current Pain score:      ADVERSE AFFECTS:   Medication Side Effects: No.    ACTIVITY:  Are you able to be more active with your pain medications? No      ABERRANT BEHAVIORS SINCE LAST VISIT? No    Review of Systems   Constitutional: Positive for fatigue. HENT: Negative. Eyes: Negative. Respiratory: Negative. Cardiovascular: Negative. Gastrointestinal: Negative for constipation and diarrhea. Endocrine: Negative. Genitourinary: Negative for difficulty urinating and flank pain. Musculoskeletal: Positive for back pain and myalgias. Skin: Negative. Allergic/Immunologic: Negative. Neurological: Positive for weakness and numbness. Hematological: Negative. Psychiatric/Behavioral: Positive for sleep disturbance.         Allergies   Allergen Reactions    Sulfa Antibiotics Shortness Of Breath    Sulfamethoxazole-Trimethoprim Anaphylaxis     (Bactrim)    Ansaid [Flurbiprofen] Other (See Comments)     Light headed and difficult with vision \"seeing\"    Gentamicin Other (See Comments)     Eye ointment caused eye swelling, redness    Motrin [Ibuprofen] Other (See Comments)     \"I coughed so bad I broke a rib\"    Quinidine      Light headed    Hydrocodone-Acetaminophen     Mirapex [Pramipexole Dihydrochloride] Other (See Comments)     Unknown reaction    Mobic [Meloxicam] Nausea Only    Nsaids Other (See Comments)     lightheaded    Reglan [Metoclopramide] Other (See Comments)     Hyperactive and stomach pain    Trazodone Other (See Comments)     unknown    Corgard [Nadolol] Other (See Comments)     Severe coughing and broke a rib as a result     Erythromycin Nausea Only    Etodolac      GI issues    Garamycin [Gentamicin Sulfate] Other (See Comments)     Redness and irritation    Inderal [Propranolol] Other (See Comments)     Severe cough    Lisinopril Itching    Loratadine      Does not work    Ultram [Tramadol] Other (See Comments)     Didn't work         Outpatient Medications Prior to Visit   Medication Sig Dispense Refill    oxyCODONE HCl (OXY-IR) 10 MG immediate release tablet Take 1 tablet by mouth 3 times daily for 30 days.  90 tablet 0    DULoxetine (CYMBALTA) 30 MG extended release capsule Take 30 mg by mouth daily      topiramate (TOPAMAX) 25 MG tablet Take 25 mg by mouth 2 times daily      furosemide (LASIX) 20 MG tablet 20 mg 3 times daily       gabapentin (NEURONTIN) 100 MG capsule TAKE 1 CAPSULE BY MOUTH THREE TIMES A DAY 90 capsule 2    baclofen (LIORESAL) 10 MG tablet Take 10 mg by mouth daily       ketotifen (ZADITOR) 0.025 % ophthalmic solution 1 drop 2 times daily      Multiple Vitamin (MULTI-VITAMIN DAILY) TABS Take by mouth      melatonin 1 MG tablet melatonin   once daily      Triprolidine-Pseudoephedrine (ANTIHISTAMINE PO) Take by mouth Indications: zyrtec       metoprolol tartrate (LOPRESSOR) 25 MG tablet Take 12.5 mg by mouth 2 times daily      aspirin 81 MG tablet Take 81 mg by mouth daily      levothyroxine (SYNTHROID) 75 MCG tablet Take 75 mcg by mouth daily      potassium chloride (KLOR-CON M) 20 MEQ extended release tablet TAKE ONE TABLET ONCE A DAY      guaiFENesin (MUCINEX) 600 MG extended release tablet Mucinex 600 mg tablet, extended release   Take 2 tablets every day by oral route.  lidocaine (ASPERCREME W/LIDOCAINE) 4 % cream Apply topically as needed for Pain Apply topically as needed.  fluticasone propionate 50 MCG/BLIST AEPB Inhale into the lungs      simvastatin (ZOCOR) 40 MG tablet Take 40 mg by mouth nightly      flecainide (TAMBOCOR) 50 MG tablet Take 50 mg by mouth 2 times daily.  omeprazole (PRILOSEC) 40 MG capsule Take 40 mg by mouth nightly.  montelukast (SINGULAIR) 10 MG tablet Take 10 mg by mouth nightly.  DULoxetine (CYMBALTA) 60 MG extended release capsule Take 60 mg by mouth daily       artificial tears (ARTIFICIAL TEARS) OINT as needed.  Saline 0.2 % SOLN by Nasal route daily as needed       acetaminophen (TYLENOL) 650 MG suppository Place 650 mg rectally every 4 hours as needed for Fever (Patient not taking: Reported on 6/15/2021)      naloxone 4 MG/0.1ML LIQD nasal spray 1 spray by Nasal route as needed for Opioid Reversal (Patient not taking: Reported on 6/15/2021) 1 each 5    famotidine (PEPCID) 10 MG tablet Take 10 mg by mouth 2 times daily (Patient not taking: Reported on 6/15/2021)      BiPAP Machine MISC  (Patient not taking: Reported on 6/15/2021)      Heating Pads (RADHA PAD/SMARTHEAT PRO/) PADS 1 Units by Does not apply route 4 times daily Use for 15 minutes to 30 minutes at a time four times a day.  (Patient not taking: Reported on 6/15/2021) 1 each 0    albuterol (ACCUNEB) 1.25 MG/3ML nebulizer solution Inhale 1 ampule into the lungs every 6 hours as needed for SECTION  1962/1966    2x    CHOLECYSTECTOMY      CHOLECYSTECTOMY      COLONOSCOPY      three    DIAGNOSTIC CARDIAC CATH LAB PROCEDURE      DILATION AND CURETTAGE OF UTERUS  1980    EYE SURGERY Bilateral     cataract    HC INJECTION PROCEDURE FOR SACROILIAC JOINT Left 7/31/2018    Left SIJ and piriformis, left trocanteric bursa injection performed by Gladis [facility]o [facility] at [facility]  12/2009    HYSTERECTOMY  2009    Total    JOINT REPLACEMENT Bilateral     knee    KNEE ARTHROSCOPY Left 2008    LUMBAR SPINE SURGERY Bilateral 2/26/2019    Bilateral L4 TRANSFORAMINAL  [id number] performed by Gladis Rayo MD at [address] [place] [address] Dr Bilateral 3/12/2019    Bilateral L4 TRANSFORAMINAL performed by Gladis Rayo MD at [address] [place] [address] Dr Right 10/15/2019    RIGHT L4 & L5 TRANSFORAMINAL performed by Gladis Rayo MD at [address] [place] [address] Dr Left 11/1/2019    Left L4 & L5 TRANSFORAMINAL performed by Gladis Rayo MD at [place] [address]  8/2014    PAIN MANAGEMENT PROCEDURE Right 2/7/2020    Right L4 & L5 TRANSFORAMINAL performed by Gladis Rayo MD at [address] Left 2/21/2020    Left L4 & L5 TRANSFORAMINAL performed by Gladis Rayo MD at [address] Right 2/23/2021    Right L4 & L5 TRANSFORAMINAL performed by Gladis Rayo MD at [address] Left 3/11/2021    left L4 & L5 TRANSFORAMINAL performed by Gladis Rayo MD at [address] Left 5/6/2021    Left L4 & L5 TRANSFORAMINAL performed by Gladis Rayo MD at [address] [place] [address] DX/THER AGNT PARAVERT FACET JOINT, LUMBAR/SAC, 2ND LEVEL Bilateral 8/30/2018    Bilateral L2 L3 L4 L5 Diagnostic Medial BB performed by Gladis Rayo MD at [address] [place] [address] DX/THER AGNT PARAVERT FACET JOINT, LUMBAR/SAC, 2ND LEVEL Right 9/13/2018    Right L2 L3 L4 L5 Confirmatory Medial BB performed by Karla Gupta Melissa Currie MD at [address] [facility] [address] AA&/STRD TFRML EPI CERVICAL/THORACIC EA ADDL Right 7/20/2018    Right C5 C6 TRANSFORAMINAL performed by Elle Lr MD at [address] Left 12/20/2018    Left L2 L3 L4 L5 RADIOFREQUENCY performed by Elle Lr MD at [address] Right 1/3/2019    Right L2 L3 L4 L5 RADIOFREQUENCY performed by Elle Lr MD at [facility] Right 01/28/2005 & 03/16/2010    2x    TONSILLECTOMY      at age 12     Family History   Problem Relation Age of Onset    Heart Disease Father      Social History     Socioeconomic History    Marital status:      Spouse name: None    Number of children: None    Years of education: None    Highest education level: None   Occupational History    Occupation: retired   Tobacco Use    Smoking status: Never Smoker    Smokeless tobacco: Never Used   Vaping Use    Vaping Use: Never used   Substance and Sexual Activity    Alcohol use: Yes     Comment: very rare    Drug use: No    Sexual activity: None   Other Topics Concern    None   Social History Narrative    None     Social Determinants of Health     Financial Resource Strain:     Difficulty of Paying Living Expenses:    Food Insecurity:     Worried About Running Out of Food in the Last Year:     Ran Out of Food in the Last Year:    Transportation Needs:     Lack of Transportation (Medical):      Lack of Transportation (Non-Medical):    Physical Activity:     Days of Exercise per Week:     Minutes of Exercise per Session:    Stress:     Feeling of Stress :    Social Connections:     Frequency of Communication with Friends and Family:     Frequency of Social Gatherings with Friends and Family:     Attends Mandaen Services:     Active Member of Clubs or Organizations:     Attends Club or Organization Meetings:     Marital Status:    Intimate Partner Violence:     Fear of Current or Ex-Partner:  Emotionally Abused:     Physically Abused:     Sexually Abused:          Family and Social Historyreviewed in the electronic medical record. Imaging:Reviewed available imaging in our system with the patient. No results found. Objective:     Physical Exam:  Vitals:    06/15/21 1450   BP: 118/82   Site: Right Upper Arm   Position: Sitting   Cuff Size: Medium Adult   Pulse: 69   SpO2: 96%   Weight: 180 lb (81.6 kg)   Height: 5' 4\" (1.626 m)          Physical Exam  Constitutional:       General: She is not in acute distress. Appearance: Normal appearance. She is well-developed. She is not diaphoretic. HENT:      Head: Normocephalic and atraumatic. Right Ear: External ear normal. No decreased hearing noted. Left Ear: External ear normal. No decreased hearing noted. Nose: Nose normal.   Eyes:      General: Lids are normal. No scleral icterus. Conjunctiva/sclera: Conjunctivae normal.   Neck:      Trachea: Phonation normal.   Cardiovascular:      Comments: No BLE edema present  Pulmonary:      Effort: Pulmonary effort is normal. No accessory muscle usage or respiratory distress. Abdominal:      General: Abdomen is flat. Palpations: Abdomen is soft. Genitourinary:     Comments: deferred  Musculoskeletal:      Lumbar back: Negative right straight leg raise test and negative left straight leg raise test.   Skin:     General: Skin is warm and dry. Coloration: Skin is not pale. Findings: No erythema or rash. Neurological:      General: No focal deficit present. Mental Status: She is alert and oriented to person, place, and time. Psychiatric:         Mood and Affect: Mood normal.         Speech: Speech normal.         Behavior: Behavior normal.       Back Exam     Tenderness   The patient is experiencing tenderness in the sacroiliac.     Range of Motion   Extension: normal   Flexion: normal   Lateral bend right: normal   Lateral bend left: normal   Rotation right: normal   Rotation left: normal     Muscle Strength   Right Quadriceps:  5/5   Left Quadriceps:  5/5   Right Hamstrings:  5/5   Left Hamstrings:  5/5     Tests   Straight leg raise right: negative  Straight leg raise left: negative    Other   Toe walk: normal  Heel walk: normal  Sensation: normal  Gait: normal     Comments:  Faberes  ? Right Hand Exam     Comments:  R  4th finger   Flexed at DIP PIP and MCP   Tender  Volar MCP  Unable to  Fully extend  MCP 2 B  Red tender  Active synovitis ? Research  has found that  Spine injections    reduce pain and  give  better functional  outcomes. Assessment: This is a [de-identified] y.o. female patient with:    Diagnosis:   Diagnosis Orders   1. Bilateral hand pain     2. Trigger ring finger of right hand     3. Piriformis syndrome of left side     4. Encounter for long-term opiate analgesic use         Medical Comorbidities:  As listed in the patient's past medical and surgical history    Functional Limitations:   Pain limits function and quality of life. Plan:   Trigger finger  Injection today #2    If no improvement  Will consult ortho hand    Meds:   Controlled Substances Monitoring: Pt educated about the risks of taking opiates,including increased sedation, constipation, slowed breathing, tolerance, dependence,and addiction. New Prescriptions    KETOPROFEN (ORUDIS) 75 MG CAPSULE    Take 1 capsule by mouth 2 times daily as needed for Pain      Orders Placed This Encounter   Medications    ketoprofen (ORUDIS) 75 MG capsule     Sig: Take 1 capsule by mouth 2 times daily as needed for Pain     Dispense:  120 capsule     Refill:  0     No orders of the defined types were placed in this encounter. No follow-ups on file. Opioid medication has  significant  risk  benefit concerns.    We  instruct    our patients that  a Random Urine Drug Screen is required  along with a  an Opioid assessment questionaire such as ORT  or SOAPP  The patient expressed understanding of the above assessment and plan. Totaltime spent face to face with patient was 25 minutes inwhich  50% or more of the time was spent in counseling, education about risk andbenefits of the above plan, and coordination of care.

## 2021-07-09 ENCOUNTER — APPOINTMENT (OUTPATIENT)
Dept: GENERAL RADIOLOGY | Age: 81
End: 2021-07-09
Attending: PHYSICAL MEDICINE & REHABILITATION
Payer: MEDICARE

## 2021-07-09 ENCOUNTER — HOSPITAL ENCOUNTER (OUTPATIENT)
Age: 81
Setting detail: OUTPATIENT SURGERY
Discharge: HOME | End: 2021-07-09
Attending: PHYSICAL MEDICINE & REHABILITATION | Admitting: PHYSICAL MEDICINE & REHABILITATION
Payer: MEDICARE

## 2021-07-09 VITALS
WEIGHT: 178 LBS | TEMPERATURE: 97 F | RESPIRATION RATE: 16 BRPM | DIASTOLIC BLOOD PRESSURE: 60 MMHG | HEART RATE: 70 BPM | OXYGEN SATURATION: 97 % | HEIGHT: 64 IN | BODY MASS INDEX: 30.39 KG/M2 | SYSTOLIC BLOOD PRESSURE: 126 MMHG

## 2021-07-09 DIAGNOSIS — G89.29 CHRONIC LEFT SACROILIAC JOINT PAIN: ICD-10-CM

## 2021-07-09 DIAGNOSIS — M54.16 LUMBAR RADICULOPATHY: ICD-10-CM

## 2021-07-09 DIAGNOSIS — M53.3 CHRONIC LEFT SACROILIAC JOINT PAIN: ICD-10-CM

## 2021-07-09 DIAGNOSIS — M47.817 LUMBOSACRAL SPONDYLOSIS WITHOUT MYELOPATHY: ICD-10-CM

## 2021-07-09 PROCEDURE — 6360000002 HC RX W HCPCS: Performed by: PHYSICAL MEDICINE & REHABILITATION

## 2021-07-09 PROCEDURE — 7100000010 HC PHASE II RECOVERY - FIRST 15 MIN: Performed by: PHYSICAL MEDICINE & REHABILITATION

## 2021-07-09 PROCEDURE — 20552 NJX 1/MLT TRIGGER POINT 1/2: CPT | Performed by: PHYSICAL MEDICINE & REHABILITATION

## 2021-07-09 PROCEDURE — 2500000003 HC RX 250 WO HCPCS: Performed by: PHYSICAL MEDICINE & REHABILITATION

## 2021-07-09 PROCEDURE — 2709999900 HC NON-CHARGEABLE SUPPLY: Performed by: PHYSICAL MEDICINE & REHABILITATION

## 2021-07-09 PROCEDURE — 6360000004 HC RX CONTRAST MEDICATION: Performed by: PHYSICAL MEDICINE & REHABILITATION

## 2021-07-09 PROCEDURE — 77002 NEEDLE LOCALIZATION BY XRAY: CPT | Performed by: PHYSICAL MEDICINE & REHABILITATION

## 2021-07-09 PROCEDURE — 27096 INJECT SACROILIAC JOINT: CPT | Performed by: PHYSICAL MEDICINE & REHABILITATION

## 2021-07-09 PROCEDURE — 3600000056 HC PAIN LEVEL 4 BASE: Performed by: PHYSICAL MEDICINE & REHABILITATION

## 2021-07-09 PROCEDURE — 3209999900 FLUORO FOR SURGICAL PROCEDURES

## 2021-07-09 RX ORDER — DEXAMETHASONE SODIUM PHOSPHATE 10 MG/ML
INJECTION INTRAMUSCULAR; INTRAVENOUS PRN
Status: DISCONTINUED | OUTPATIENT
Start: 2021-07-09 | End: 2021-07-09 | Stop reason: ALTCHOICE

## 2021-07-09 RX ORDER — OXYCODONE HYDROCHLORIDE 10 MG/1
10 TABLET ORAL 3 TIMES DAILY
Qty: 90 TABLET | Refills: 0 | Status: SHIPPED | OUTPATIENT
Start: 2021-07-11 | End: 2021-08-24 | Stop reason: SDUPTHER

## 2021-07-09 RX ORDER — LIDOCAINE HYDROCHLORIDE 20 MG/ML
INJECTION, SOLUTION EPIDURAL; INFILTRATION; INTRACAUDAL; PERINEURAL PRN
Status: DISCONTINUED | OUTPATIENT
Start: 2021-07-09 | End: 2021-07-09 | Stop reason: ALTCHOICE

## 2021-07-09 RX ORDER — ROPIVACAINE HYDROCHLORIDE 5 MG/ML
INJECTION, SOLUTION EPIDURAL; INFILTRATION; PERINEURAL PRN
Status: DISCONTINUED | OUTPATIENT
Start: 2021-07-09 | End: 2021-07-09 | Stop reason: ALTCHOICE

## 2021-07-09 ASSESSMENT — PAIN DESCRIPTION - PAIN TYPE: TYPE: SURGICAL PAIN;CHRONIC PAIN

## 2021-07-09 ASSESSMENT — PAIN DESCRIPTION - DESCRIPTORS
DESCRIPTORS: ACHING
DESCRIPTORS: ACHING

## 2021-07-09 ASSESSMENT — PAIN SCALES - GENERAL: PAINLEVEL_OUTOF10: 1

## 2021-07-09 ASSESSMENT — PAIN - FUNCTIONAL ASSESSMENT: PAIN_FUNCTIONAL_ASSESSMENT: 0-10

## 2021-07-09 ASSESSMENT — PAIN DESCRIPTION - LOCATION: LOCATION: BACK

## 2021-07-09 NOTE — OP NOTE
Descriptors: Aching    Last Plain films: 2020    EXAMINATION: Bilateral sacroiliac joint injection with arthrography. CONSENT:  Written consent was obtained from the patient on preprinted consent form after explaining the procedure, indications, potential complications and outcomes. Alternative treatments were also discussed. DISCUSSION:  The patient was sterilely prepped and draped in the usual fashion in the prone position.  Time out was verified for correct patient, side, level and procedure. SEDATION:   No conscious sedation was performed during the procedure.  The patient remained awake and conversed throughout the procedure.  The patient underwent pulse oximetry and blood pressure monitoring independently by a trained observer, as well as by a physician. PROCEDURES #1 to #3:  Under image-intensifier control, a standard technique was employed, a 22 gauge needle x 5 inch spinal needle was guided successfully to cannulate the Bilateral sacroiliac joint via a posterior lateral approach. Instillation of 1 mL of Omnipaque 240 contrast medium demonstrated contiguous flow into the joint and the joint capsule. No vascular spread was noted. Digital subtraction was not employed to evaluate for vascular spread. The patient was monitored for any untoward reaction to contrast medium before proceeding with procedure #2. Then 3.0 mL of equal volumes of 0.50% ropivacaine, 2% lidocaine and betamethasone (Celestone 6 mg/mL), dexamethasone (Decadron 10 mg/mL), methylprednisolone (Depo-Medrol 80 mg/mL was injected into each joint.       PIRIFORMIS MUSCLE TRIGGER POINT INJECTION:   Using a 22 gauge needle x 5 inch spinal needle and fluoroscopic imaging the Bilateral piriformis muscle was accessed just inferior and lateral to the inferior margin of the Bilateral sacroiliac joint.  Then, 0.3 mL of Omnipaque 240 confirmed avascular injection into the muscle, and 3 mL of 2% lidocaine was injected into the muscle and the needle removed. PROVOCATION: The patient did not report pain reproduction in a concordant distribution upon capsular distention of the Bilateral sacroiliac joints. ARTHROGRAPHY: The Bilateral sacroiliac arthrogram revealed contrast in the joint space and inferior recesses; no contrast extravasation. EBL: no blood loss    SPECIMEN: none    The patient tolerated the procedure well and without complications and was noted to be in stable condition prior to discharge from the procedure center with discharge instructions. IMPRESSIONS:  1. Bilateral  sacroiliac arthrogram is abnormal: . [place] 2. Bilateral  sacroiliac  joint injection  negative for provocation of the patient's pain symptoms. 3. Bilateral piriformis muscle injection    RECOMMENDATIONS:  1. Complete and return Post-Procedure Pain and Activity Diary.   2. Contact the [address] for symptom exacerbation, fever or unusual symptoms. 3. Post-procedure care according to verbal and written discharge instructions    PELVIC FLUOROSCOPIC IMAGE INTERPRETATION    EXAMINATION:  AP and lateral views. FLUORO TIME: 21 seconds    DISCUSSION:  Spot views of the spine reveal normal alignment and segmentation. Spinal needles are positioned in the Bilateral sacroiliac joint. Contrast outlines the joint space and reveals excellent contrast flow. Spinal needles are placed just anterior to the sacrum for the piriformis muscle injection. Contrast pattern is consistent with contrast in the muscle. Visualized spine reveals See radiology report. Soft tissues reveal no abnormalities.     IMPRESSION:  Bilateral sacroiliac joint arthrogram  with satiafactory needle placement and contrast dispersal.     Electronically signed by Faiza Cortez MD on 7/9/2021 at 11:27 AM

## 2021-07-09 NOTE — TELEPHONE ENCOUNTER
Pt called for refill of her oxy 10mg sent to [facility] in [place]. Last Appt:  6/15/2021  Next Appt:   8/17/2021  Med verified in [address] [place] [address] [place], [place], and [place]: 6/11/21 oxycodone Hcl 10mg #90. Due 7/11/21.

## 2021-07-09 NOTE — INTERVAL H&P NOTE
I have interviewed and examined the patient and reviewed the recent History and Physical.  There have been no changes to the recent H&P documentation. The surgical consent form has been signed. Last anticoagulant medication use was:na    Premedication taken for contrast allergy? No    Valium taken for oral sedation? No    Outpatient Medications Marked as Taking for the 7/9/21 encounter [facility] Encounter)   Medication Sig Dispense Refill    ketoprofen (ORUDIS) 75 MG capsule Take 1 capsule by mouth 2 times daily as needed for Pain 120 capsule 0    oxyCODONE HCl (OXY-IR) 10 MG immediate release tablet Take 1 tablet by mouth 3 times daily for 30 days. 90 tablet 0    DULoxetine (CYMBALTA) 30 MG extended release capsule Take 30 mg by mouth daily      acetaminophen (TYLENOL) 650 MG suppository Place 650 mg rectally every 4 hours as needed for Fever       topiramate (TOPAMAX) 25 MG tablet Take 25 mg by mouth 2 times daily      baclofen (LIORESAL) 10 MG tablet Take 10 mg by mouth daily       ketotifen (ZADITOR) 0.025 % ophthalmic solution 1 drop 2 times daily      Multiple Vitamin (MULTI-VITAMIN DAILY) TABS Take by mouth      famotidine (PEPCID) 10 MG tablet Take 10 mg by mouth 2 times daily       Triprolidine-Pseudoephedrine (ANTIHISTAMINE PO) Take by mouth Indications: zyrtec       metoprolol tartrate (LOPRESSOR) 25 MG tablet Take 12.5 mg by mouth 2 times daily      aspirin 81 MG tablet Take 81 mg by mouth daily      potassium chloride (KLOR-CON M) 20 MEQ extended release tablet TAKE ONE TABLET ONCE A DAY      guaiFENesin (MUCINEX) 600 MG extended release tablet Mucinex 600 mg tablet, extended release   Take 2 tablets every day by oral route. simvastatin (ZOCOR) 40 MG tablet Take 40 mg by mouth nightly      omeprazole (PRILOSEC) 40 MG capsule Take 40 mg by mouth nightly. montelukast (SINGULAIR) 10 MG tablet Take 10 mg by mouth nightly.       DULoxetine (CYMBALTA) 60 MG extended release capsule Take 60 mg by mouth daily       artificial tears (ARTIFICIAL TEARS) OINT as needed. diphenhydrAMINE (BENADRYL) 25 MG capsule Take 25 mg by mouth every 6 hours as needed for Itching          The patient understands the planned operation and its associated risks and benefits and agrees to proceed.         Electronically signed by Jose Ramon Royal MD on 7/9/2021 at 11:25 AM

## 2021-07-09 NOTE — H&P
<table width=\"672\" cellpadding=\"0\" cellspacing=\"0\" style=\"border-collapse: collapse; border: none; \"><tr><td align=\"left\" valign=\"top\" width=\"672\" style=\"border: solid #[id number] 0px;\"><div align=\"left\"><table width=\"281\" cellpadding=\"0\" cellspacing=\"0\" style=\"border-collapse: collapse; border: none; \"><tr><td colspan=\"3\" align=\"left\" valign=\"top\" width=\"281\" style=\"border: solid #239266 0px;\"><div align=\"left\"><div align=\"left\"><font face=\"[place]\"><span style=\" font-size:11pt\">Mariano Auguste MD</span></font></div></div></td></tr><tr><td colspan=\"3\" align=\"left\" valign=\"top\" width=\"281\" style=\"border: solid #[id number] 0px;\"><div align=\"left\"><font face=\"[place]\"><span style=\" font-size:11pt\">Physician</span></font></div></td></tr><tr><td colspan=\"3\" align=\"left\" valign=\"top\" width=\"281\" style=\"border: solid #[id number] 0px;\"><div align=\"left\"><font face=\"[place]\"><span style=\" font-size:11pt\">Specialty:&nbsp; Physical Medicine and Rehab</span></font></div></td></tr></table><table width=\"180\" cellpadding=\"0\" cellspacing=\"0\" style=\"border-collapse: collapse; border: none; \"><tr><td align=\"left\" valign=\"top\" width=\"180\" style=\"border: solid #[id number] 0px;\"><div align=\"left\"><font face=\"[place]\"><span style=\" font-size:11pt\">Progress Notes &nbsp; </span></font></div></td></tr><tr><td align=\"left\" valign=\"top\" width=\"180\" style=\"border: solid #[id number] 0px;\"><div align=\"left\"><font face=\"[place]\"><span style=\" font-size:11pt\">Signed</span></font></div></td></tr></table><table width=\"230\" cellpadding=\"0\" cellspacing=\"0\" style=\"border-collapse: collapse; border: none; \"><tr><td colspan=\"2\" align=\"left\" valign=\"top\" width=\"230\" style=\"border: solid #[id number] 0px;\"><div align=\"left\"><font face=\"[place]\"><span style=\" font-size:11pt\">Encounter Date:&nbsp; 6/15/2021</span></font></div></td></tr><tr><td colspan=\"2\" align=\"left\" valign=\"top\" width=\"230\" style=\"border: solid #[id number] 0px;\"><div align=\"left\"><span style=\"font-size: 11pt;\">&nbsp;</span></div></td></tr></table><div align=\"left\">&nbsp;</div></div></td></tr><tr><td align=\"left\" valign=\"top\" width=\"672\" style=\"border: solid #[id number] 0px;\"><div align=\"left\"><font face=\"[facility]\"><span style=\" font-size:11pt\">Related encounter: Procedure visit from 6/15/2021 in [address]  [facility] A department of [facility]</span></font></div></td></tr><tr><td align=\"left\" valign=\"top\" width=\"672\" style=\"border: solid #[id number] 0px;\"><div align=\"left\"><span style=\"font-size: 11pt;\">&nbsp;</span></div></td></tr><tr><td align=\"left\" valign=\"top\" width=\"672\" style=\"border: solid #[id number] 0px;\"><div align=\"left\"><span style=\"font-size: 11pt;\">&nbsp;</span></div></td></tr></table><table width=\"677\" cellpadding=\"0\" cellspacing=\"0\" style=\"border-collapse: collapse; border: none; \"><tr><td colspan=\"2\" align=\"left\" valign=\"top\" width=\"677\" style=\"border: solid #[id number] 0px;\"><div align=\"left\"><table width=\"675\" cellpadding=\"0\" cellspacing=\"0\" style=\"border-collapse: collapse; border: none; \"><tr><td align=\"left\" valign=\"top\" width=\"675\" style=\"border: solid #[id number] 0px;\"><div align=\"left\"><table width=\"76\" cellpadding=\"0\" cellspacing=\"0\" style=\"border-collapse: collapse; border: none; \"><tr><td align=\"left\" valign=\"top\" width=\"69\" style=\"border: solid #[id number] 0px;\"><div align=\"left\"><font face=\"[facility]\"><span style=\" font-size:11pt\"><b>Signed </b></span></font></div></td><td align=\"left\" valign=\"top\" width=\"7\" style=\"border: solid #708635 0px;\"><div align=\"left\"><span style=\"font-size: 11pt;\">&nbsp;</span></div></td></tr></table><div align=\"left\">&nbsp;</div></div></td></tr><tr><td align=\"left\" valign=\"top\" width=\"677\" style=\"border: solid #[id number] 0px;\"><div align=\"left\"><table width=\"672\" cellpadding=\"0\" cellspacing=\"0\" style=\"border-collapse: collapse; border: none; \"><tr><td align=\"left\" valign=\"top\" width=\"672\" style=\"border: solid #022661 0px;\"><a href=\"\"><font face=\"[facility]\" color=\"#0000ff\"><span style=\" font-size:11pt\"><u>Expand AllCollapse All</u></span></font></a><font face=\"[facility]\"><span style=\" font-size:11pt\"> </span></font><table width=\"672\" cellpadding=\"0\" cellspacing=\"0\" style=\"border-collapse: collapse; border: none; \"><tr><td align=\"left\" valign=\"top\" width=\"672\" style=\"border: solid #[id number] 0px;\"><div style=\"border-top: solid 1px #[id number]; border-bottom: solid 1px #[id number]; border-left: solid 1px #[id number]; border-right: solid 1px #[id number]; \"><div align=\"left\" style=\"margin-left:1mm; margin-right:0mm; text-indent:0mm; margin-top:0.00mm; margin-bottom:0.00mm;\"><div style=\"border-top: solid 1px #119872; border-bottom: solid 1px #456236; border-left: solid 1px #601367; border-right: solid 1px #[id number]; \"><div align=\"left\" style=\"margin-left:1mm; margin-right:0mm; text-indent:0mm; margin-top:0.00mm; margin-bottom:0.00mm;\"><img src=\"[email or web address]-hsw-prd.[facility]. [email or web address]/Base/[email or web address]/ReportViewer/AttributionExpanded. png\" width=\"1\" height=\"1\" alt=\"Expand widget button\"/><img src=\"[email or web address]://-[email or web address].health-[facility]. [email or web address]/ReportViewer/AttributionCollapsed. png\" width=\"1\" height=\"1\" alt=\"Collapse widget button\"/></div></div><div align=\"left\" style=\"margin-left:4mm; margin-right:0mm; text-indent:0mm; margin-top:0.00mm; margin-bottom:0.00mm;\"><font face=\"'[place]\" color=\"#[id number]\" size=\"2\"><span style=\" font-size:10pt\">Show:</span></font><a href=\"\"><font face=\"'[place]\" color=\"#0000ff\" size=\"2\"><span style=\" font-size:10pt\"><u>Clear all</u></span></font></a></div><div align=\"left\" style=\"margin-left:19mm; margin-right:0mm; text-indent:0mm; margin-top:4.75mm; margin-bottom:4.75mm;\"><input type=\"checkbox\" checked=\"checked\"/><font face=\"'[place]\" size=\"2\"><span style=\" font-size:10pt\">Manual</span></font><input type=\"checkbox\" checked=\"checked\"/><font face=\"'[place]\" size=\"2\"><span style=\" font-size:11pt\">&nbsp;4</span></font></div><div align=\"left\"><font face=\"[place]\"><span style=\" font-size:11pt\"><b>Duration of Pain:</b></span></font><font face=\"[place]\"><span style=\" font-size:11pt\">&nbsp; Intermittent</span></font></div><div align=\"left\"><font face=\"[place]\"><span style=\" font-size:11pt\"><b>Frequency of Pain:&nbsp;</b></span></font><font face=\"[place]\"><span style=\" font-size:11pt\">Intermittent</span></font></div><div align=\"left\"><font face=\"[place]\"><span style=\" font-size:11pt\"><b>Aggravating Factors:</b></span></font><font face=\"[place]\"><span style=\" font-size:11pt\">&nbsp;-</span></font></div><div align=\"left\"><font face=\"[place]\"><span style=\" font-size:11pt\"><b>Limiting Behavior:</b></span></font><font face=\"[place]\"><span style=\" font-size:11pt\">&nbsp;using hands&nbsp;</span></font></div><div align=\"left\"><font face=\"[place]\"><span style=\" font-size:11pt\"><b>Relieving Factors:</b></span></font><font face=\"[place]\"><span style=\" font-size:11pt\">&nbsp;relaxation</span></font></div><div align=\"left\"><font face=\"[place]\"><span style=\" font-size:11pt\"><b>&nbsp;</b></span></font></div><div align=\"left\"><font face=\"[place]\"><span style=\" font-size:11pt\">&nbsp;</span></font></div><div align=\"left\"><font face=\"[place]\"><span style=\" font-size:11pt\"><b>&nbsp;</b></span></font></div><div align=\"left\"><font face=\"[place]\"><span style=\" font-size:11pt\"><b>Previous pain medication trials have included: </b></span></font></div><div align=\"left\"><font face=\"[place]\"><span style=\" font-size:11pt\">&nbsp;&nbsp;&nbsp;&nbsp;&nbsp;&nbsp;&nbsp;&nbsp;&nbsp;&nbsp;&nbsp;&nbsp;</span></font></div><div align=\"left\"><font face=\"[place]\"><span style=\" font-size:11pt\"><br />&nbsp;</span></font></div><div align=\"left\"><font face=\"[place]\"><span style=\" font-size:11pt\"><b>Mental health: </b></span></font></div><div align=\"left\"><font face=\"[place]\"><span style=\" font-size:11pt\">&nbsp;&nbsp;&nbsp;&nbsp;&nbsp;&nbsp;&nbsp;&nbsp;&nbsp;&nbsp;&nbsp;&nbsp;Patient feels -&nbsp;secondary to their current pain problems as described above. 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 No</span></font></div><div align=\"left\"><font face=\"[place]\"><span style=\" font-size:11pt\">&nbsp;</span></font></div><div align=\"left\"><font face=\"[place]\"><span style=\" font-size:11pt\">Employed? No</span></font></div><div align=\"left\"><font face=\"[place]\"><span style=\" font-size:11pt\"><b>&nbsp;</b></span></font></div><div align=\"left\"><font face=\"arial\"><span style=\" font-size:11pt\"><b><u>ANALGESIA: </u></b></span></font></div><div align=\"left\"><font face=\"[place]\"><span style=\" font-size:11pt\"><b>Are your Current Pain medication (s) helping to decrease pain?</b></span></font><font face=\"[place]\"><span style=\" font-size:11pt\">&nbsp;&nbsp;No. </span></font></div><div align=\"left\"><font face=\"[place]\"><span style=\" font-size:11pt\"><b>Current Pain score:</b></span></font><font face=\"[place]\"><span style=\" font-size:11pt\">&nbsp;&nbsp;</span></font></div><div align=\"left\"><font face=\"[place]\"><span style=\" font-size:11pt\">&nbsp;</span></font></div><div align=\"left\"><font face=\"arial\"><span style=\" font-size:11pt\"><b><u>ADVERSE AFFECTS: </u></b></span></font></div><div align=\"left\"><font face=\"[place]\"><span style=\" font-size:11pt\"><b>Medication Side Effects</b></span></font><font face=\"[place]\"><span style=\" font-size:11pt\">:&nbsp;No.</span></font></div><div align=\"left\"><font face=\"[place]\"><span style=\" font-size:11pt\">&nbsp;</span></font></div><div align=\"left\"><font face=\"arial\"><span style=\" font-size:11pt\"><b><u>ACTIVITY:</u></b></span></font></div><div align=\"left\"><font face=\"[place]\"><span style=\" font-size:11pt\"><b>Are you able to be more active with your pain medications?</b></span></font><font face=\"[place]\"><span style=\" font-size:11pt\">No</span></font><font face=\"[place]\"><span style=\" font-size:11pt\"><b>&nbsp;</b></span></font></div><div align=\"left\"><font face=\"[place]\"><span style=\" font-size:11pt\">&nbsp;</span></font></div><div align=\"left\"><font face=\"arial\"><span style=\" font-size:11pt\"><b><u>ABERRANT BEHAVIORS SINCE LAST VISIT?&nbsp;</u></b></span></font><font face=\"[place]\"><span style=\" font-size:11pt\">No</span></font></div><div align=\"left\"><font face=\"[place]\"><span style=\" font-size:11pt\">&nbsp;</span></font></div><div align=\"left\"><font face=\"[place]\"><span style=\" font-size:11pt\">Review of Systems </span></font></div><div align=\"left\"><font face=\"[place]\"><span style=\" font-size:11pt\">Constitutional: Positive for&nbsp;</span></font><font face=\"arial\" color=\"#[id number]\"><span style=\" font-size:11pt\">fatigue</span></font><font face=\"[place]\"><span style=\" font-size:11pt\">. </span></font></div><div align=\"left\"><font face=\"[place]\"><span style=\" font-size:11pt\">HENT:&nbsp;Negative. &nbsp;</span></font></div><div align=\"left\"><font face=\"[place]\"><span style=\" font-size:11pt\">Eyes:&nbsp;Negative. &nbsp;</span></font></div><div align=\"left\"><font face=\"[place]\"><span style=\" font-size:11pt\">Respiratory:&nbsp;Negative. &nbsp;</span></font></div><div align=\"left\"><font face=\"[place]\"><span style=\" font-size:11pt\">Cardiovascular:&nbsp;Negative. &nbsp;</span></font></div><div align=\"left\"><font face=\"[place]\"><span style=\" font-size:11pt\">Gastrointestinal: Negative for&nbsp;constipation&nbsp;and diarrhea. </span></font></div><div align=\"left\"><font face=\"[place]\"><span style=\" font-size:11pt\">Endocrine:&nbsp;Negative. &nbsp;</span></font></div><div align=\"left\"><font face=\"[place]\"><span style=\" font-size:11pt\">Genitourinary: Negative for&nbsp;difficulty urinating&nbsp;and flank pain.  </span></font></div><div align=\"left\"><font face=\"[place]\"><span style=\" font-size:11pt\">Musculoskeletal: Positive for&nbsp;</span></font><font face=\"arial\" color=\"#[id number]\"><span style=\" font-size:11pt\">back pain</span></font><font face=\"[place]\"><span style=\" font-size:11pt\">&nbsp;and </span></font><font face=\"arial\" color=\"#[id number]\"><span style=\" font-size:11pt\">myalgias</span></font><font face=\"[place]\"><span style=\" font-size:11pt\">. </span></font></div><div align=\"left\"><font face=\"[place]\"><span style=\" font-size:11pt\">Skin:&nbsp;Negative. &nbsp;</span></font></div><div align=\"left\"><font face=\"[place]\"><span style=\" font-size:11pt\">Allergic/Immunologic:&nbsp;Negative. &nbsp;</span></font></div><div align=\"left\"><font face=\"[place]\"><span style=\" font-size:11pt\">Neurological: Positive for&nbsp;</span></font><font face=\"arial\" color=\"#[id number]\"><span style=\" font-size:11pt\">weakness</span></font><font face=\"[place]\"><span style=\" font-size:11pt\">&nbsp;and </span></font><font face=\"arial\" color=\"#[id number]\"><span style=\" font-size:11pt\">numbness</span></font><font face=\"[place]\"><span style=\" font-size:11pt\">. </span></font></div><div align=\"left\"><font face=\"[place]\"><span style=\" font-size:11pt\">Hematological:&nbsp;Negative. &nbsp;</span></font></div><div align=\"left\"><font face=\"[place]\"><span style=\" font-size:11pt\">Psychiatric/Behavioral: Positive for&nbsp;</span></font><font face=\"arial\" color=\"#[id number]\"><span style=\" font-size:11pt\">sleep disturbance</span></font><font face=\"[place]\"><span style=\" font-size:11pt\">.&nbsp;</span></font></div><div align=\"left\"><font face=\"[place]\"><span style=\" font-size:11pt\">&nbsp;</span></font></div><div align=\"left\"><font face=\"[place]\"><span style=\" font-size:11pt\">&nbsp;</span></font></div><table width=\"672\" cellpadding=\"0\" cellspacing=\"0\" style=\"border-collapse: collapse; border: none; \"><tr><td align=\"left\" valign=\"middle\" width=\"20\" style=\"border: solid #[id number] 0px;\"><div align=\"left\"><span style=\"font-size: 11pt;\">&nbsp;</span><div align=\"left\">&nbsp;</div></div></td><td align=\"left\" valign=\"middle\" width=\"20\" style=\"border: solid #472141 0px;\"><div align=\"left\"><span style=\"font-size: 11pt;\">&nbsp;</span></div></td><td align=\"left\" valign=\"middle\" width=\"316\" style=\"border: solid #[id number] 0px;\"><div align=\"left\"><span style=\"font-size: 11pt;\">&nbsp;</span></div></td><td align=\"left\" valign=\"middle\" width=\"316\" style=\"border: solid #[id number] 0px;\"><div align=\"left\"><span style=\"font-size: 11pt;\">&nbsp;</span></div></td></tr><tr><td colspan=\"4\" align=\"left\" valign=\"top\" width=\"672\" style=\"border: solid #[id number] 0px;\"><div align=\"left\" style=\"margin-left:0mm; margin-right:2mm; text-indent:0mm; margin-top:0.00mm; margin-bottom:0.00mm;\"><font face=\"arial\" color=\"#[id number]\"><span style=\" font-size:11pt\"><b>Allergies</b></span></font></div></td></tr><tr><td colspan=\"3\" align=\"left\" valign=\"top\" width=\"356\" bgcolor=\"#eeeeee\" style=\"border: solid #[id number] 0px;\"><div align=\"left\" style=\"margin-left:0mm; margin-right:2mm; text-indent:0mm; margin-top:0.00mm; margin-bottom:0.00mm;\"><font face=\"arial\" color=\"#[id number]\"><span style=\" font-size:11pt\">Allergen</span></font></div></td><td align=\"left\" valign=\"top\" width=\"316\" bgcolor=\"#eeeeee\" style=\"border: solid #[id number] 0px;\"><div align=\"left\" style=\"margin-left:0mm; margin-right:2mm; text-indent:0mm; margin-top:0.00mm; margin-bottom:0.00mm;\"><font face=\"arial\" color=\"#[id number]\"><span style=\" font-size:11pt\">Reactions</span></font></div></td></tr><tr><td align=\"center\" valign=\"top\" width=\"20\" style=\"border: solid #159446 0px;\"><div align=\"center\"><font face=\"[place]\"><span style=\" font-size:11pt\">&bull;</span></font></div></td><td colspan=\"2\" align=\"left\" valign=\"top\" width=\"336\" style=\"border: solid #[id number] 0px;\"><div align=\"left\" style=\"margin-left:0mm; margin-right:2mm; text-indent:0mm; margin-top:0.00mm; margin-bottom:0.00mm;\"><font face=\"[address]\"><span style=\" font-size:11pt\">Sulfa Antibiotics</span></font></div></td><td align=\"left\" valign=\"top\" width=\"316\" style=\"border: solid #[id number] 0px;\"><div align=\"left\" style=\"margin-left:0mm; margin-right:2mm; text-indent:0mm; margin-top:0.00mm; margin-bottom:0.00mm;\"><font face=\"[address]\"><span style=\" font-size:11pt\">Shortness Of Breath</span></font></div></td></tr><tr><td align=\"center\" valign=\"top\" width=\"20\" style=\"border: solid #[id number] 0px;\"><div align=\"center\"><font face=\"[address]\"><span style=\" font-size:11pt\">&bull;</span></font></div></td><td colspan=\"2\" align=\"left\" valign=\"top\" width=\"336\" style=\"border: solid #[id number] 0px;\"><div align=\"left\" style=\"margin-left:0mm; margin-right:2mm; text-indent:0mm; margin-top:0.00mm; margin-bottom:0.00mm;\"><font face=\"[address]\"><span style=\" font-size:11pt\">Sulfamethoxazole-Trimethoprim</span></font></div></td><td align=\"left\" valign=\"top\" width=\"316\" style=\"border: solid #[id number] 0px;\"><div align=\"left\" style=\"margin-left:0mm; margin-right:2mm; text-indent:0mm; margin-top:0.00mm; margin-bottom:0.00mm;\"><font face=\"[address]\"><span style=\" font-size:11pt\">Anaphylaxis</span></font></div></td></tr><tr><td align=\"center\" valign=\"top\" width=\"20\" style=\"border: solid #[id number] 0px;\"><div align=\"center\"><font face=\"[address]\"><span style=\" font-size:11pt\">&nbsp;</span></font></div></td><td align=\"center\" valign=\"top\" width=\"20\" style=\"border: solid #[id number] 0px;\"><div align=\"center\"><font face=\"[address]\"><span style=\" font-size:11pt\">&nbsp;</span></font></div></td><td colspan=\"2\" align=\"left\" valign=\"top\" width=\"632\" style=\"border: solid #[id number] 0px;\"><div align=\"left\" style=\"margin-left:0mm; margin-right:2mm; text-indent:0mm; margin-top:0.00mm; margin-bottom:0.00mm;\"><font face=\"arial\"><span style=\" font-size:11pt\"><i>(Bactrim)</i></span></font></div></td></tr><tr><td align=\"center\" valign=\"top\" width=\"20\" style=\"border: solid #[id number] 0px;\"><div align=\"center\"><font face=\"[place]\"><span style=\" font-size:11pt\">&bull;</span></font></div></td><td colspan=\"2\" align=\"left\" valign=\"top\" width=\"336\" style=\"border: solid #[id number] 0px;\"><div align=\"left\" style=\"margin-left:0mm; margin-right:2mm; text-indent:0mm; margin-top:0.00mm; margin-bottom:0.00mm;\"><font face=\"[place]\"><span style=\" font-size:11pt\">Ansaid [Flurbiprofen]</span></font></div></td><td align=\"left\" valign=\"top\" width=\"316\" style=\"border: solid #[id number] 0px;\"><div align=\"left\" style=\"margin-left:0mm; margin-right:2mm; text-indent:0mm; margin-top:0.00mm; margin-bottom:0.00mm;\"><font face=\"[place]\"><span style=\" font-size:11pt\">Other (See Comments)</span></font></div></td></tr><tr><td align=\"center\" valign=\"top\" width=\"20\" style=\"border: solid #[id number] 0px;\"><div align=\"center\"><font face=\"[place]\"><span style=\" font-size:11pt\">&nbsp;</span></font></div></td><td align=\"center\" valign=\"top\" width=\"20\" style=\"border: solid #[id number] 0px;\"><div align=\"center\"><font face=\"[place]\"><span style=\" font-size:11pt\">&nbsp;</span></font></div></td><td colspan=\"2\" align=\"left\" valign=\"top\" width=\"632\" style=\"border: solid #[id number] 0px;\"><div align=\"left\" style=\"margin-left:0mm; margin-right:2mm; text-indent:0mm; margin-top:0.00mm; margin-bottom:0.00mm;\"><font face=\"arial\"><span style=\" font-size:11pt\"><i>Light headed and difficult with vision &quot;seeing&quot;</i></span></font></div></td></tr><tr><td align=\"center\" valign=\"top\" width=\"20\" style=\"border: solid #[id number] 0px;\"><div align=\"center\"><font face=\"[place]\"><span style=\" font-size:11pt\">&bull;</span></font></div></td><td colspan=\"2\" align=\"left\" valign=\"top\" width=\"336\" style=\"border: solid #[id number] 0px;\"><div align=\"left\" style=\"margin-left:0mm; margin-right:2mm; text-indent:0mm; margin-top:0.00mm; margin-bottom:0.00mm;\"><font face=\"[place]\"><span style=\" font-size:11pt\">Gentamicin</span></font></div></td><td align=\"left\" valign=\"top\" width=\"316\" style=\"border: solid #[id number] 0px;\"><div align=\"left\" style=\"margin-left:0mm; margin-right:2mm; text-indent:0mm; margin-top:0.00mm; margin-bottom:0.00mm;\"><font face=\"[place]\"><span style=\" font-size:11pt\">Other (See Comments)</span></font></div></td></tr><tr><td align=\"center\" valign=\"top\" width=\"20\" style=\"border: solid #313740 0px;\"><div align=\"center\"><font face=\"[place]\"><span style=\" font-size:11pt\">&nbsp;</span></font></div></td><td align=\"center\" valign=\"top\" width=\"20\" style=\"border: solid #[id number] 0px;\"><div align=\"center\"><font face=\"[place]\"><span style=\" font-size:11pt\">&nbsp;</span></font></div></td><td colspan=\"2\" align=\"left\" valign=\"top\" width=\"632\" style=\"border: solid #[id number] 0px;\"><div align=\"left\" style=\"margin-left:0mm; margin-right:2mm; text-indent:0mm; margin-top:0.00mm; margin-bottom:0.00mm;\"><font face=\"arial\"><span style=\" font-size:11pt\"><i>Eye ointment caused eye swelling, redness</i></span></font></div></td></tr><tr><td align=\"center\" valign=\"top\" width=\"20\" style=\"border: solid #[id number] 0px;\"><div align=\"center\"><font face=\"[place]\"><span style=\" font-size:11pt\">&bull;</span></font></div></td><td colspan=\"2\" align=\"left\" valign=\"top\" width=\"336\" style=\"border: solid #[id number] 0px;\"><div align=\"left\" style=\"margin-left:0mm; margin-right:2mm; text-indent:0mm; margin-top:0.00mm; margin-bottom:0.00mm;\"><font face=\"[place]\"><span style=\" font-size:11pt\">Motrin [Ibuprofen]</span></font></div></td><td align=\"left\" valign=\"top\" width=\"316\" style=\"border: solid #[id number] 0px;\"><div align=\"left\" style=\"margin-left:0mm; margin-right:2mm; text-indent:0mm; margin-top:0.00mm; margin-bottom:0.00mm;\"><font face=\"[place]\"><span style=\" font-size:11pt\">Other (See Comments)</span></font></div></td></tr><tr><td align=\"center\" valign=\"top\" width=\"20\" style=\"border: solid #[id number] 0px;\"><div align=\"center\"><font face=\"[place]\"><span style=\" font-size:11pt\">&nbsp;</span></font></div></td><td align=\"center\" valign=\"top\" width=\"20\" style=\"border: solid #[id number] 0px;\"><div align=\"center\"><font face=\"[place]\"><span style=\" font-size:11pt\">&nbsp;</span></font></div></td><td colspan=\"2\" align=\"left\" valign=\"top\" width=\"632\" style=\"border: solid #[id number] 0px;\"><div align=\"left\" style=\"margin-left:0mm; margin-right:2mm; text-indent:0mm; margin-top:0.00mm; margin-bottom:0.00mm;\"><font face=\"arial\"><span style=\" font-size:11pt\"><i>&quot;I coughed so bad I broke a rib&quot;</i></span></font></div></td></tr><tr><td align=\"center\" valign=\"top\" width=\"20\" style=\"border: solid #[id number] 0px;\"><div align=\"center\"><font face=\"[place]\"><span style=\" font-size:11pt\">&bull;</span></font></div></td><td colspan=\"2\" align=\"left\" valign=\"top\" width=\"336\" style=\"border: solid #[id number] 0px;\"><div align=\"left\" style=\"margin-left:0mm; margin-right:2mm; text-indent:0mm; margin-top:0.00mm; margin-bottom:0.00mm;\"><font face=\"[place]\"><span style=\" font-size:11pt\">Quinidine</span></font></div></td><td align=\"left\" valign=\"top\" width=\"316\" style=\"border: solid #[id number] 0px;\"><div align=\"left\"><font face=\"[place]\"><span style=\" font-size:11pt\">&nbsp;</span></font></div></td></tr><tr><td align=\"center\" valign=\"top\" width=\"20\" style=\"border: solid #[id number] 0px;\"><div align=\"center\"><font face=\"[place]\"><span style=\" font-size:11pt\">&nbsp;</span></font></div></td><td align=\"center\" valign=\"top\" width=\"20\" style=\"border: solid #[id number] 0px;\"><div align=\"center\"><font face=\"[place]\"><span style=\" font-size:11pt\">&nbsp;</span></font></div></td><td colspan=\"2\" align=\"left\" valign=\"top\" width=\"632\" style=\"border: solid #[id number] 0px;\"><div align=\"left\" style=\"margin-left:0mm; margin-right:2mm; text-indent:0mm; margin-top:0.00mm; margin-bottom:0.00mm;\"><font face=\"arial\"><span style=\" font-size:11pt\"><i>Light headed</i></span></font></div></td></tr><tr><td margin-right:2mm; text-indent:0mm; margin-top:0.00mm; margin-bottom:0.00mm;\"><font face=\"arial\"><span style=\" font-size:11pt\"><i>Unknown reaction</i></span></font></div></td></tr><tr><td align=\"center\" valign=\"top\" width=\"20\" style=\"border: solid #[id number] 0px;\"><div align=\"center\"><font face=\"[place]\"><span style=\" font-size:11pt\">&bull;</span></font></div></td><td colspan=\"2\" align=\"left\" valign=\"top\" width=\"336\" style=\"border: solid #[id number] 0px;\"><div align=\"left\" style=\"margin-left:0mm; margin-right:2mm; text-indent:0mm; margin-top:0.00mm; margin-bottom:0.00mm;\"><font face=\"[place]\"><span style=\" font-size:11pt\">Mobic [Meloxicam]</span></font></div></td><td align=\"left\" valign=\"top\" width=\"316\" style=\"border: solid #[id number] 0px;\"><div align=\"left\" style=\"margin-left:0mm; margin-right:2mm; text-indent:0mm; margin-top:0.00mm; margin-bottom:0.00mm;\"><font face=\"[place]\"><span style=\" font-size:11pt\">Nausea Only</span></font></div></td></tr><tr><td align=\"center\" valign=\"top\" width=\"20\" style=\"border: solid #[id number] 0px;\"><div align=\"center\"><font face=\"[place]\"><span style=\" font-size:11pt\">&bull;</span></font></div></td><td colspan=\"2\" align=\"left\" valign=\"top\" width=\"336\" style=\"border: solid #[id number] 0px;\"><div align=\"left\" style=\"margin-left:0mm; margin-right:2mm; text-indent:0mm; margin-top:0.00mm; margin-bottom:0.00mm;\"><font face=\"[place]\"><span style=\" font-size:11pt\">Nsaids</span></font></div></td><td align=\"left\" valign=\"top\" width=\"316\" style=\"border: solid #[id number] 0px;\"><div align=\"left\" style=\"margin-left:0mm; margin-right:2mm; text-indent:0mm; margin-top:0.00mm; margin-bottom:0.00mm;\"><font face=\"[place]\"><span style=\" font-size:11pt\">Other (See Comments)</span></font></div></td></tr><tr><td align=\"center\" valign=\"top\" width=\"20\" style=\"border: solid #[id number] 0px;\"><div align=\"center\"><font face=\"[place]\"><span style=\" font-size:11pt\">&nbsp;</span></font></div></td><td align=\"center\" valign=\"top\" width=\"20\" style=\"border: solid #[id number] 0px;\"><div align=\"center\"><font face=\"[place]\"><span style=\" font-size:11pt\">&nbsp;</span></font></div></td><td colspan=\"2\" align=\"left\" valign=\"top\" width=\"632\" style=\"border: solid #[id number] 0px;\"><div align=\"left\" style=\"margin-left:0mm; margin-right:2mm; text-indent:0mm; margin-top:0.00mm; margin-bottom:0.00mm;\"><font face=\"arial\"><span style=\" font-size:11pt\"><i>lightheaded</i></span></font></div></td></tr><tr><td align=\"center\" valign=\"top\" width=\"20\" style=\"border: solid #[id number] 0px;\"><div align=\"center\"><font face=\"[place]\"><span style=\" font-size:11pt\">&bull;</span></font></div></td><td colspan=\"2\" align=\"left\" valign=\"top\" width=\"336\" style=\"border: solid #[id number] 0px;\"><div align=\"left\" style=\"margin-left:0mm; margin-right:2mm; text-indent:0mm; margin-top:0.00mm; margin-bottom:0.00mm;\"><font face=\"[place]\"><span style=\" font-size:11pt\">Reglan [Metoclopramide]</span></font></div></td><td align=\"left\" valign=\"top\" width=\"316\" style=\"border: solid #[id number] 0px;\"><div align=\"left\" style=\"margin-left:0mm; margin-right:2mm; text-indent:0mm; margin-top:0.00mm; margin-bottom:0.00mm;\"><font face=\"[place]\"><span style=\" font-size:11pt\">Other (See Comments)</span></font></div></td></tr><tr><td align=\"center\" valign=\"top\" width=\"20\" style=\"border: solid #[id number] 0px;\"><div align=\"center\"><font face=\"[place]\"><span style=\" font-size:11pt\">&nbsp;</span></font></div></td><td align=\"center\" valign=\"top\" width=\"20\" style=\"border: solid #[id number] 0px;\"><div align=\"center\"><font face=\"[place]\"><span style=\" font-size:11pt\">&nbsp;</span></font></div></td><td colspan=\"2\" align=\"left\" valign=\"top\" width=\"632\" style=\"border: solid #[id number] 0px;\"><div align=\"left\" style=\"margin-left:0mm; margin-right:2mm; text-indent:0mm; margin-top:0.00mm; margin-bottom:0.00mm;\"><font face=\"arial\"><span style=\" font-size:11pt\"><i>Hyperactive and stomach pain</i></span></font></div></td></tr><tr><td align=\"center\" valign=\"top\" width=\"20\" style=\"border: solid #[id number] 0px;\"><div align=\"center\"><font face=\"[place]\"><span style=\" font-size:11pt\">&bull;</span></font></div></td><td colspan=\"2\" align=\"left\" valign=\"top\" width=\"336\" style=\"border: solid #[id number] 0px;\"><div align=\"left\" style=\"margin-left:0mm; margin-right:2mm; text-indent:0mm; margin-top:0.00mm; margin-bottom:0.00mm;\"><font face=\"[place]\"><span style=\" font-size:11pt\">Trazodone</span></font></div></td><td align=\"left\" valign=\"top\" width=\"316\" style=\"border: solid #[id number] 0px;\"><div align=\"left\" style=\"margin-left:0mm; margin-right:2mm; text-indent:0mm; margin-top:0.00mm; margin-bottom:0.00mm;\"><font face=\"[place]\"><span style=\" font-size:11pt\">Other (See Comments)</span></font></div></td></tr><tr><td align=\"center\" valign=\"top\" width=\"20\" style=\"border: solid #[id number] 0px;\"><div align=\"center\"><font face=\"[place]\"><span style=\" font-size:11pt\">&nbsp;</span></font></div></td><td align=\"center\" valign=\"top\" width=\"20\" style=\"border: solid #[id number] 0px;\"><div align=\"center\"><font face=\"[place]\"><span style=\" font-size:11pt\">&nbsp;</span></font></div></td><td colspan=\"2\" align=\"left\" valign=\"top\" width=\"632\" style=\"border: solid #[id number] 0px;\"><div align=\"left\" style=\"margin-left:0mm; margin-right:2mm; text-indent:0mm; margin-top:0.00mm; margin-bottom:0.00mm;\"><font face=\"arial\"><span style=\" font-size:11pt\"><i>unknown</i></span></font></div></td></tr><tr><td align=\"center\" valign=\"top\" width=\"20\" style=\"border: solid #[id number] 0px;\"><div align=\"center\"><font face=\"[place]\"><span style=\" font-size:11pt\">&bull;</span></font></div></td><td colspan=\"2\" align=\"left\" valign=\"top\" width=\"336\" style=\"border: solid #[id number] 0px;\"><div align=\"left\" style=\"margin-left:0mm; margin-right:2mm; text-indent:0mm; margin-top:0.00mm; margin-bottom:0.00mm;\"><font face=\"[place]\"><span style=\" font-size:11pt\">Corgard [Nadolol]</span></font></div></td><td align=\"left\" valign=\"top\" width=\"316\" style=\"border: solid #[id number] 0px;\"><div align=\"left\" style=\"margin-left:0mm; margin-right:2mm; text-indent:0mm; margin-top:0.00mm; margin-bottom:0.00mm;\"><font face=\"[place]\"><span style=\" font-size:11pt\">Other (See Comments)</span></font></div></td></tr><tr><td align=\"center\" valign=\"top\" width=\"20\" style=\"border: solid #[id number] 0px;\"><div align=\"center\"><font face=\"[place]\"><span style=\" font-size:11pt\">&nbsp;</span></font></div></td><td align=\"center\" valign=\"top\" width=\"20\" style=\"border: solid #[id number] 0px;\"><div align=\"center\"><font face=\"[place]\"><span style=\" font-size:11pt\">&nbsp;</span></font></div></td><td colspan=\"2\" align=\"left\" valign=\"top\" width=\"632\" style=\"border: solid #[id number] 0px;\"><div align=\"left\" style=\"margin-left:0mm; margin-right:2mm; text-indent:0mm; margin-top:0.00mm; margin-bottom:0.00mm;\"><font face=\"arial\"><span style=\" font-size:11pt\"><i>Severe coughing and broke a rib as a result </i></span></font></div></td></tr><tr><td align=\"center\" valign=\"top\" width=\"20\" style=\"border: solid #[id number] 0px;\"><div align=\"center\"><font face=\"[place]\"><span style=\" font-size:11pt\">&bull;</span></font></div></td><td colspan=\"2\" align=\"left\" valign=\"top\" width=\"336\" style=\"border: solid #[id number] 0px;\"><div align=\"left\" style=\"margin-left:0mm; margin-right:2mm; text-indent:0mm; margin-top:0.00mm; margin-bottom:0.00mm;\"><font face=\"[place]\"><span style=\" font-size:11pt\">Erythromycin</span></font></div></td><td align=\"left\" valign=\"top\" width=\"316\" style=\"border: solid #[id number] 0px;\"><div align=\"left\" style=\"margin-left:0mm; margin-right:2mm; text-indent:0mm; margin-top:0.00mm; margin-bottom:0.00mm;\"><font face=\"[place]\"><span style=\" font-size:11pt\">Nausea Only</span></font></div></td></tr><tr><td align=\"center\" valign=\"top\" width=\"20\" style=\"border: solid #[id number] 0px;\"><div align=\"center\"><font face=\"[place]\"><span style=\" font-size:11pt\">&bull;</span></font></div></td><td colspan=\"2\" align=\"left\" valign=\"top\" width=\"336\" style=\"border: solid #[id number] 0px;\"><div align=\"left\" style=\"margin-left:0mm; margin-right:2mm; text-indent:0mm; margin-top:0.00mm; margin-bottom:0.00mm;\"><font face=\"[place]\"><span style=\" font-size:11pt\">Etodolac</span></font></div></td><td align=\"left\" valign=\"top\" width=\"316\" style=\"border: solid #[id number] 0px;\"><div align=\"left\"><font face=\"[place]\"><span style=\" font-size:11pt\">&nbsp;</span></font></div></td></tr><tr><td align=\"center\" valign=\"top\" width=\"20\" style=\"border: solid #[id number] 0px;\"><div align=\"center\"><font face=\"[place]\"><span style=\" font-size:11pt\">&nbsp;</span></font></div></td><td align=\"center\" valign=\"top\" width=\"20\" style=\"border: solid #[id number] 0px;\"><div align=\"center\"><font face=\"[place]\"><span style=\" font-size:11pt\">&nbsp;</span></font></div></td><td colspan=\"2\" align=\"left\" valign=\"top\" width=\"632\" style=\"border: solid #[id number] 0px;\"><div align=\"left\" style=\"margin-left:0mm; margin-right:2mm; text-indent:0mm; margin-top:0.00mm; margin-bottom:0.00mm;\"><font face=\"arial\"><span style=\" font-size:11pt\"><i>GI issues</i></span></font></div></td></tr><tr><td align=\"center\" valign=\"top\" width=\"20\" style=\"border: solid #[id number] 0px;\"><div align=\"center\"><font face=\"[place]\"><span style=\" font-size:11pt\">&bull;</span></font></div></td><td colspan=\"2\" align=\"left\" valign=\"top\" width=\"336\" style=\"border: solid #[id number] 0px;\"><div align=\"left\" style=\"margin-left:0mm; margin-right:2mm; text-indent:0mm; margin-top:0.00mm; margin-bottom:0.00mm;\"><font face=\"[place]\"><span style=\" font-size:11pt\">Garamycin [Gentamicin Sulfate]</span></font></div></td><td align=\"left\" valign=\"top\" width=\"316\" style=\"border: solid #[id number] 0px;\"><div align=\"left\" style=\"margin-left:0mm; margin-right:2mm; text-indent:0mm; margin-top:0.00mm; margin-bottom:0.00mm;\"><font face=\"[place]\"><span style=\" font-size:11pt\">Other (See Comments)</span></font></div></td></tr><tr><td align=\"center\" valign=\"top\" width=\"20\" style=\"border: solid #[id number] 0px;\"><div align=\"center\"><font face=\"[place]\"><span style=\" font-size:11pt\">&nbsp;</span></font></div></td><td align=\"center\" valign=\"top\" width=\"20\" style=\"border: solid #[id number] 0px;\"><div align=\"center\"><font face=\"[place]\"><span style=\" font-size:11pt\">&nbsp;</span></font></div></td><td colspan=\"2\" align=\"left\" valign=\"top\" width=\"632\" style=\"border: solid #[id number] 0px;\"><div align=\"left\" style=\"margin-left:0mm; margin-right:2mm; text-indent:0mm; margin-top:0.00mm; margin-bottom:0.00mm;\"><font face=\"arial\"><span style=\" font-size:11pt\"><i>Redness and irritation</i></span></font></div></td></tr><tr><td align=\"center\" valign=\"top\" width=\"20\" style=\"border: solid #[id number] 0px;\"><div align=\"center\"><font face=\"[place]\"><span style=\" font-size:11pt\">&bull;</span></font></div></td><td colspan=\"2\" align=\"left\" valign=\"top\" width=\"336\" style=\"border: solid #[id number] 0px;\"><div align=\"left\" style=\"margin-left:0mm; margin-right:2mm; text-indent:0mm; margin-top:0.00mm; margin-bottom:0.00mm;\"><font face=\"[place]\"><span style=\" font-size:11pt\">Inderal [Propranolol]</span></font></div></td><td align=\"left\" valign=\"top\" width=\"316\" style=\"border: solid #[id number] 0px;\"><div align=\"left\" style=\"margin-left:0mm; margin-right:2mm; text-indent:0mm; margin-top:0.00mm; margin-bottom:0.00mm;\"><font face=\"[place]\"><span style=\" font-size:11pt\">Other (See Comments)</span></font></div></td></tr><tr><td align=\"center\" valign=\"top\" width=\"20\" style=\"border: solid #[id number] 0px;\"><div align=\"center\"><font face=\"[place]\"><span style=\" font-size:11pt\">&nbsp;</span></font></div></td><td align=\"center\" valign=\"top\" width=\"20\" style=\"border: solid #[id number] 0px;\"><div align=\"center\"><font face=\"[place]\"><span style=\" font-size:11pt\">&nbsp;</span></font></div></td><td colspan=\"2\" align=\"left\" valign=\"top\" width=\"632\" style=\"border: solid #[id number] 0px;\"><div align=\"left\" style=\"margin-left:0mm; margin-right:2mm; text-indent:0mm; margin-top:0.00mm; margin-bottom:0.00mm;\"><font face=\"arial\"><span style=\" font-size:11pt\"><i>Severe cough</i></span></font></div></td></tr><tr><td align=\"center\" valign=\"top\" width=\"20\" style=\"border: solid #[id number] 0px;\"><div align=\"center\"><font face=\"[place]\"><span style=\" font-size:11pt\">&bull;</span></font></div></td><td colspan=\"2\" align=\"left\" valign=\"top\" width=\"336\" style=\"border: solid #[id number] 0px;\"><div align=\"left\" style=\"margin-left:0mm; margin-right:2mm; text-indent:0mm; margin-top:0.00mm; margin-bottom:0.00mm;\"><font face=\"[place]\"><span style=\" font-size:11pt\">Lisinopril</span></font></div></td><td align=\"left\" valign=\"top\" width=\"316\" style=\"border: solid #[id number] 0px;\"><div align=\"left\" style=\"margin-left:0mm; margin-right:2mm; text-indent:0mm; margin-top:0.00mm; margin-bottom:0.00mm;\"><font face=\"[place]\"><span style=\" font-size:11pt\">Itching</span></font></div></td></tr><tr><td align=\"center\" valign=\"top\" width=\"20\" style=\"border: solid #137961 0px;\"><div align=\"center\"><font face=\"[place]\"><span style=\" font-size:11pt\">&bull;</span></font></div></td><td colspan=\"2\" align=\"left\" valign=\"top\" width=\"336\" style=\"border: solid #867045 0px;\"><div align=\"left\" style=\"margin-left:0mm; margin-right:2mm; text-indent:0mm; margin-top:0.00mm; margin-bottom:0.00mm;\"><font face=\"[place]\"><span style=\" font-size:11pt\">Loratadine</span></font></div></td><td align=\"left\" valign=\"top\" width=\"316\" style=\"border: solid #650083 0px;\"><div align=\"left\"><font face=\"[place]\"><span style=\" font-size:11pt\">&nbsp;</span></font></div></td></tr><tr><td align=\"center\" valign=\"top\" width=\"20\" style=\"border: solid #[id number] 0px;\"><div align=\"center\"><font face=\"[place]\"><span style=\" font-size:11pt\">&nbsp;</span></font></div></td><td align=\"center\" valign=\"top\" width=\"20\" style=\"border: solid #[id number] 0px;\"><div align=\"center\"><font face=\"[place]\"><span style=\" font-size:11pt\">&nbsp;</span></font></div></td><td colspan=\"2\" align=\"left\" valign=\"top\" width=\"632\" style=\"border: solid #[id number] 0px;\"><div align=\"left\" style=\"margin-left:0mm; margin-right:2mm; text-indent:0mm; margin-top:0.00mm; margin-bottom:0.00mm;\"><font face=\"arial\"><span style=\" font-size:11pt\"><i>Does not work</i></span></font></div></td></tr><tr><td align=\"center\" valign=\"top\" width=\"20\" style=\"border: solid #[id number] 0px;\"><div align=\"center\"><font face=\"[place]\"><span style=\" font-size:11pt\">&bull;</span></font></div></td><td colspan=\"2\" align=\"left\" valign=\"top\" width=\"336\" style=\"border: solid #[id number] 0px;\"><div align=\"left\" style=\"margin-left:0mm; margin-right:2mm; text-indent:0mm; margin-top:0.00mm; margin-bottom:0.00mm;\"><font face=\"[place]\"><span style=\" font-size:11pt\">Ultram [Tramadol]</span></font></div></td><td align=\"left\" valign=\"top\" width=\"316\" style=\"border: solid #826629 0px;\"><div align=\"left\" style=\"margin-left:0mm; margin-right:2mm; text-indent:0mm; margin-top:0.00mm; margin-bottom:0.00mm;\"><font face=\"[place]\"><span style=\" font-size:11pt\">Other (See Comments)</span></font></div></td></tr><tr><td align=\"center\" valign=\"top\" width=\"20\" style=\"border: solid #872672 0px;\"><div align=\"center\"><font face=\"[place]\"><span style=\" font-size:11pt\">&nbsp;</span></font></div></td><td align=\"center\" valign=\"top\" width=\"20\" style=\"border: solid #507571 0px;\"><div align=\"center\"><font face=\"[place]\"><span style=\" font-size:11pt\">&nbsp;</span></font></div></td><td colspan=\"2\" align=\"left\" valign=\"top\" width=\"632\" style=\"border: solid #[id number] 0px;\"><div align=\"left\" style=\"margin-left:0mm; margin-right:2mm; text-indent:0mm; margin-top:0.00mm; margin-bottom:0.00mm;\"><font face=\"arial\"><span style=\" font-size:11pt\"><i>Didn't work</i></span></font></div><div align=\"left\"><font face=\"[place]\"><span style=\" font-size:11pt\">&nbsp;</span></font></div></td></tr></table><div align=\"left\"><font face=\"[place]\"><span style=\" font-size:11pt\">&nbsp;</span></font></div><div align=\"left\"><font face=\"[place]\"><span style=\" font-size:11pt\">&nbsp;</span></font></div><table width=\"510\" cellpadding=\"0\" cellspacing=\"0\" style=\"border-collapse: collapse; border: none; \"><tr><td align=\"left\" valign=\"middle\" width=\"510\" style=\"border: solid #[id number] 0px;\"><div align=\"left\"><div align=\"left\"><font face=\"[place]\"><span style=\" font-size:11pt\">Medications Prior to Visit</span></font><img src=\"C:\ProgramData\Epic\[email or web address]\[id number]. [id number]\" width=\"1\" height=\"1\" alt=\"graphic\"/></div></div></td></tr><tr><td align=\"left\" valign=\"middle\" width=\"510\" style=\"border: solid #[id number] 0px;\"><div align=\"left\"><span style=\"font-size: 11pt;\">&nbsp;</span><table width=\"510\" cellpadding=\"0\" cellspacing=\"0\" style=\"border-collapse: collapse; border: none; \"><tr><td align=\"left\" valign=\"middle\" width=\"15\" style=\"border: solid #[id number] 0px;\"><div align=\"left\"><span style=\"font-size: 11pt;\">&nbsp;</span></div></td><td align=\"left\" valign=\"middle\" width=\"209\" style=\"border: solid #953051 0px;\"><div align=\"left\"><span style=\"font-size: 11pt;\">&nbsp;</span></div></td><td align=\"left\" valign=\"middle\" width=\"143\" style=\"border: solid #439063 0px;\"><div align=\"left\"><span style=\"font-size: 11pt;\">&nbsp;</span></div></td><td align=\"left\" valign=\"middle\" width=\"87\" style=\"border: solid #359389 0px;\"><div align=\"left\"><span style=\"font-size: 11pt;\">&nbsp;</span></div></td><td align=\"left\" valign=\"middle\" width=\"56\" style=\"border: solid #398997 0px;\"><div align=\"left\"><span style=\"font-size: 11pt;\">&nbsp;</span></div></td></tr><tr><td colspan=\"5\" align=\"left\" valign=\"top\" width=\"510\" style=\"border: solid #047048 0px;\"><div align=\"left\" style=\"margin-left:0mm; margin-right:2mm; text-indent:0mm; margin-top:0.00mm; margin-bottom:0.00mm;\"><font face=\"arial\" color=\"#[id number]\"><span style=\" font-size:11pt\"><b>Outpatient Medications Prior to Visit</b></span></font></div></td></tr><tr><td colspan=\"2\" align=\"left\" valign=\"top\" width=\"225\" bgcolor=\"#eeeeee\" style=\"border: solid #809749 0px;\"><div align=\"left\" style=\"margin-left:0mm; margin-right:2mm; text-indent:0mm; margin-top:0.00mm; margin-bottom:0.00mm;\"><font face=\"arial\" color=\"#[id number]\"><span style=\" font-size:11pt\">Medication</span></font></div></td><td align=\"left\" valign=\"top\" width=\"143\" bgcolor=\"#eeeeee\" style=\"border: solid #893933 0px;\"><div align=\"left\" style=\"margin-left:0mm; margin-right:2mm; text-indent:0mm; margin-top:0.00mm; margin-bottom:0.00mm;\"><font face=\"arial\" color=\"#[id number]\"><span style=\" font-size:11pt\">Sig</span></font></div></td><td align=\"left\" valign=\"top\" width=\"87\" bgcolor=\"#eeeeee\" style=\"border: solid #560522 0px;\"><div align=\"left\" style=\"margin-left:0mm; margin-right:2mm; text-indent:0mm; margin-top:0.00mm; margin-bottom:0.00mm;\"><font face=\"arial\" color=\"#892654\"><span style=\" font-size:11pt\">Dispense</span></font></div></td><td align=\"left\" valign=\"top\" width=\"56\" bgcolor=\"#eeeeee\" style=\"border: solid #919604 0px;\"><div align=\"left\" style=\"margin-left:0mm; margin-right:2mm; text-indent:0mm; margin-top:0.00mm; margin-bottom:0.00mm;\"><font face=\"arial\" color=\"#[id number]\"><span style=\" font-size:11pt\">Refill</span></font></div></td></tr><tr><td align=\"center\" valign=\"top\" width=\"15\" style=\"border: solid #907215 0px;\"><div align=\"center\"><font face=\"[place]\"><span style=\" font-size:11pt\">&bull;</span></font></div></td><td align=\"left\" valign=\"top\" width=\"209\" style=\"border: solid #399494 0px;\"><div align=\"left\" style=\"margin-left:0mm; margin-right:2mm; text-indent:0mm; margin-top:0.00mm; margin-bottom:0.00mm;\"><font face=\"[place]\"><span style=\" font-size:11pt\">oxyCODONE HCl (OXY-IR) 10 MG immediate release tablet</span></font></div></td><td align=\"left\" valign=\"top\" width=\"143\" style=\"border: solid #247180 0px;\"><div align=\"left\" style=\"margin-left:0mm; margin-right:2mm; text-indent:0mm; margin-top:0.00mm; margin-bottom:0.00mm;\"><font face=\"[place]\"><span style=\" font-size:11pt\">Take 1 tablet by mouth 3 times daily for 30 days. </span></font></div></td><td align=\"left\" valign=\"top\" width=\"87\" style=\"border: solid #238200 0px;\"><div align=\"left\" style=\"margin-left:0mm; margin-right:2mm; text-indent:0mm; margin-top:0.00mm; margin-bottom:0.00mm;\"><font face=\"[place]\"><span style=\" font-size:11pt\">90 tablet</span></font></div></td><td align=\"left\" valign=\"top\" width=\"56\" style=\"border: solid #676137 0px;\"><div align=\"left\" style=\"margin-left:0mm; margin-right:2mm; text-indent:0mm; margin-top:0.00mm; margin-bottom:0.00mm;\"><font face=\"[place]\"><span style=\" font-size:11pt\">0</span></font></div></td></tr><tr><td align=\"center\" valign=\"top\" width=\"15\" style=\"border: solid #217246 0px;\"><div align=\"center\"><font face=\"[place]\"><span style=\" font-size:11pt\">&bull;</span></font></div></td><td align=\"left\" valign=\"top\" width=\"209\" style=\"border: solid #495276 0px;\"><div align=\"left\" style=\"margin-left:0mm; margin-right:2mm; text-indent:0mm; margin-top:0.00mm; margin-bottom:0.00mm;\"><font face=\"[place]\"><span style=\" font-size:11pt\">DULoxetine (CYMBALTA) 30 MG extended release capsule</span></font></div></td><td align=\"left\" valign=\"top\" width=\"143\" style=\"border: solid #856950 0px;\"><div align=\"left\" style=\"margin-left:0mm; margin-right:2mm; text-indent:0mm; margin-top:0.00mm; margin-bottom:0.00mm;\"><font face=\"[place]\"><span style=\" font-size:11pt\">Take 30 mg by mouth daily</span></font></div></td><td align=\"left\" valign=\"top\" width=\"87\" style=\"border: solid #[id number] 0px;\"><div align=\"left\"><font face=\"[place]\"><span style=\" font-size:11pt\">&nbsp;</span></font></div></td><td align=\"left\" valign=\"top\" width=\"56\" style=\"border: solid #[id number] 0px;\"><div align=\"left\"><font face=\"[place]\"><span style=\" font-size:11pt\">&nbsp;</span></font></div></td></tr><tr><td align=\"center\" valign=\"top\" width=\"15\" style=\"border: solid #[id number] 0px;\"><div align=\"center\"><font face=\"[place]\"><span style=\" font-size:11pt\">&bull;</span></font></div></td><td align=\"left\" valign=\"top\" width=\"209\" style=\"border: solid #[id number] 0px;\"><div align=\"left\" style=\"margin-left:0mm; margin-right:2mm; text-indent:0mm; margin-top:0.00mm; margin-bottom:0.00mm;\"><font face=\"[place]\"><span style=\" font-size:11pt\">topiramate (TOPAMAX) 25 MG tablet</span></font></div></td><td align=\"left\" valign=\"top\" width=\"143\" style=\"border: solid #[id number] 0px;\"><div align=\"left\" style=\"margin-left:0mm; margin-right:2mm; text-indent:0mm; margin-top:0.00mm; margin-bottom:0.00mm;\"><font face=\"[facility]\"><span style=\" font-size:11pt\">Take 25 mg by mouth 2 times daily</span></font></div></td><td align=\"left\" valign=\"top\" width=\"87\" style=\"border: solid #[id number] 0px;\"><div align=\"left\"><font face=\"[facility]\"><span style=\" font-size:11pt\">&nbsp;</span></font></div></td><td align=\"left\" valign=\"top\" width=\"56\" style=\"border: solid #[id number] 0px;\"><div align=\"left\"><font face=\"[place]\"><span style=\" font-size:11pt\">&nbsp;</span></font></div></td></tr><tr><td align=\"center\" valign=\"top\" width=\"15\" style=\"border: solid #[id number] 0px;\"><div align=\"center\"><font face=\"[facility]\"><span style=\" font-size:11pt\">&bull;</span></font></div></td><td align=\"left\" valign=\"top\" width=\"209\" style=\"border: solid #[id number] 0px;\"><div align=\"left\" style=\"margin-left:0mm; margin-right:2mm; text-indent:0mm; margin-top:0.00mm; margin-bottom:0.00mm;\"><font face=\"[facility]\"><span style=\" font-size:11pt\">furosemide (LASIX) 20 MG tablet</span></font></div></td><td align=\"left\" valign=\"top\" width=\"143\" style=\"border: solid #[id number] 0px;\"><div align=\"left\" style=\"margin-left:0mm; margin-right:2mm; text-indent:0mm; margin-top:0.00mm; margin-bottom:0.00mm;\"><font face=\"[facility]\"><span style=\" font-size:11pt\">20 mg 3 times daily </span></font></div></td><td align=\"left\" valign=\"top\" width=\"87\" style=\"border: solid #[id number] 0px;\"><div align=\"left\"><font face=\"[facility]\"><span style=\" font-size:11pt\">&nbsp;</span></font></div></td><td align=\"left\" valign=\"top\" width=\"56\" style=\"border: solid #[id number] 0px;\"><div align=\"left\"><font face=\"[facility]\"><span style=\" font-size:11pt\">&nbsp;</span></font></div></td></tr><tr><td align=\"center\" valign=\"top\" width=\"15\" style=\"border: solid #[id number] 0px;\"><div align=\"center\"><font face=\"[facility]\"><span style=\" font-size:11pt\">&bull;</span></font></div></td><td align=\"left\" valign=\"top\" width=\"209\" style=\"border: solid #[id number] 0px;\"><div align=\"left\" style=\"margin-left:0mm; margin-right:2mm; text-indent:0mm; margin-top:0.00mm; margin-bottom:0.00mm;\"><font face=\"[facility]\"><span style=\" font-size:11pt\">gabapentin (NEURONTIN) 100 MG capsule</span></font></div></td><td align=\"left\" valign=\"top\" width=\"143\" style=\"border: solid #[id number] 0px;\"><div align=\"left\" style=\"margin-left:0mm; margin-right:2mm; text-indent:0mm; margin-top:0.00mm; margin-bottom:0.00mm;\"><font face=\"[facility]\"><span style=\" font-size:11pt\">TAKE 1 CAPSULE BY MOUTH THREE TIMES A DAY</span></font></div></td><td align=\"left\" valign=\"top\" width=\"87\" style=\"border: solid #[id number] 0px;\"><div align=\"left\" style=\"margin-left:0mm; margin-right:2mm; text-indent:0mm; margin-top:0.00mm; margin-bottom:0.00mm;\"><font face=\"[facility]\"><span style=\" font-size:11pt\">90 capsule</span></font></div></td><td align=\"left\" valign=\"top\" width=\"56\" style=\"border: solid #[id number] 0px;\"><div align=\"left\" style=\"margin-left:0mm; margin-right:2mm; text-indent:0mm; margin-top:0.00mm; margin-bottom:0.00mm;\"><font face=\"[facility]\"><span style=\" font-size:11pt\">2</span></font></div></td></tr><tr><td align=\"center\" valign=\"top\" width=\"15\" style=\"border: solid #[id number] 0px;\"><div align=\"center\"><font face=\"[facility]\"><span style=\" font-size:11pt\">&bull;</span></font></div></td><td align=\"left\" valign=\"top\" width=\"209\" style=\"border: solid #[id number] 0px;\"><div align=\"left\" style=\"margin-left:0mm; margin-right:2mm; text-indent:0mm; margin-top:0.00mm; margin-bottom:0.00mm;\"><font face=\"[facility]\"><span style=\" font-size:11pt\">baclofen (LIORESAL) 10 MG style=\" font-size:11pt\">&bull;</span></font></div></td><td align=\"left\" valign=\"top\" width=\"209\" style=\"border: solid #[id number] 0px;\"><div align=\"left\" style=\"margin-left:0mm; margin-right:2mm; text-indent:0mm; margin-top:0.00mm; margin-bottom:0.00mm;\"><font face=\"[place]\"><span style=\" font-size:11pt\">Multiple Vitamin (MULTI-VITAMIN DAILY) TABS</span></font></div></td><td align=\"left\" valign=\"top\" width=\"143\" style=\"border: solid #[id number] 0px;\"><div align=\"left\" style=\"margin-left:0mm; margin-right:2mm; text-indent:0mm; margin-top:0.00mm; margin-bottom:0.00mm;\"><font face=\"[place]\"><span style=\" font-size:11pt\">Take by mouth</span></font></div></td><td align=\"left\" valign=\"top\" width=\"87\" style=\"border: solid #[id number] 0px;\"><div align=\"left\"><font face=\"[place]\"><span style=\" font-size:11pt\">&nbsp;</span></font></div></td><td align=\"left\" valign=\"top\" width=\"56\" style=\"border: solid #[id number] 0px;\"><div align=\"left\"><font face=\"[place]\"><span style=\" font-size:11pt\">&nbsp;</span></font></div></td></tr><tr><td align=\"center\" valign=\"top\" width=\"15\" style=\"border: solid #[id number] 0px;\"><div align=\"center\"><font face=\"[place]\"><span style=\" font-size:11pt\">&bull;</span></font></div></td><td align=\"left\" valign=\"top\" width=\"209\" style=\"border: solid #[id number] 0px;\"><div align=\"left\" style=\"margin-left:0mm; margin-right:2mm; text-indent:0mm; margin-top:0.00mm; margin-bottom:0.00mm;\"><font face=\"[place]\"><span style=\" font-size:11pt\">melatonin 1 MG tablet</span></font></div></td><td align=\"left\" valign=\"top\" width=\"143\" style=\"border: solid #[id number] 0px;\"><div align=\"left\" style=\"margin-left:0mm; margin-right:2mm; text-indent:0mm; margin-top:0.00mm; margin-bottom:0.00mm;\"><font face=\"[place]\"><span style=\" font-size:11pt\">melatonin</span></font></div><div align=\"left\" style=\"margin-left:0mm; margin-right:2mm; text-indent:0mm; margin-top:0.00mm; margin-bottom:0.00mm;\"><font face=\"[place]\"><span style=\" font-size:11pt\">&nbsp;once daily</span></font></div></td><td align=\"left\" valign=\"top\" width=\"87\" style=\"border: solid #[id number] 0px;\"><div align=\"left\"><font face=\"[place]\"><span style=\" font-size:11pt\">&nbsp;</span></font></div></td><td align=\"left\" valign=\"top\" width=\"56\" style=\"border: solid #[id number] 0px;\"><div align=\"left\"><font face=\"[place]\"><span style=\" font-size:11pt\">&nbsp;</span></font></div></td></tr><tr><td align=\"center\" valign=\"top\" width=\"15\" style=\"border: solid #[id number] 0px;\"><div align=\"center\"><font face=\"[place]\"><span style=\" font-size:11pt\">&bull;</span></font></div></td><td align=\"left\" valign=\"top\" width=\"209\" style=\"border: solid #[id number] 0px;\"><div align=\"left\" style=\"margin-left:0mm; margin-right:2mm; text-indent:0mm; margin-top:0.00mm; margin-bottom:0.00mm;\"><font face=\"[place]\"><span style=\" font-size:11pt\">Triprolidine-Pseudoephedrine (ANTIHISTAMINE PO)</span></font></div></td><td align=\"left\" valign=\"top\" width=\"143\" style=\"border: solid #[id number] 0px;\"><div align=\"left\" style=\"margin-left:0mm; margin-right:2mm; text-indent:0mm; margin-top:0.00mm; margin-bottom:0.00mm;\"><font face=\"[place]\"><span style=\" font-size:11pt\">Take by mouth Indications: zyrtec </span></font></div></td><td align=\"left\" valign=\"top\" width=\"87\" style=\"border: solid #[id number] 0px;\"><div align=\"left\"><font face=\"[place]\"><span style=\" font-size:11pt\">&nbsp;</span></font></div></td><td align=\"left\" valign=\"top\" width=\"56\" style=\"border: solid #[id number] 0px;\"><div align=\"left\"><font face=\"[place]\"><span style=\" font-size:11pt\">&nbsp;</span></font></div></td></tr><tr><td align=\"center\" valign=\"top\" width=\"15\" style=\"border: solid #[id number] 0px;\"><div align=\"center\"><font face=\"[place]\"><span style=\" font-size:11pt\">&bull;</span></font></div></td><td align=\"left\" valign=\"top\" width=\"209\" style=\"border: solid #[id number] 0px;\"><div align=\"left\" style=\"margin-left:0mm; margin-right:2mm; text-indent:0mm; margin-top:0.00mm; margin-bottom:0.00mm;\"><font face=\"[place]\"><span style=\" align=\"center\"><font face=\"[place]\"><span style=\" font-size:11pt\">&bull;</span></font></div></td><td align=\"left\" valign=\"top\" width=\"209\" style=\"border: solid #[id number] 0px;\"><div align=\"left\" style=\"margin-left:0mm; margin-right:2mm; text-indent:0mm; margin-top:0.00mm; margin-bottom:0.00mm;\"><font face=\"[place]\"><span style=\" font-size:11pt\">levothyroxine (SYNTHROID) 75 MCG tablet</span></font></div></td><td align=\"left\" valign=\"top\" width=\"143\" style=\"border: solid #[id number] 0px;\"><div align=\"left\" style=\"margin-left:0mm; margin-right:2mm; text-indent:0mm; margin-top:0.00mm; margin-bottom:0.00mm;\"><font face=\"[place]\"><span style=\" font-size:11pt\">Take 75 mcg by mouth daily</span></font></div></td><td align=\"left\" valign=\"top\" width=\"87\" style=\"border: solid #[id number] 0px;\"><div align=\"left\"><font face=\"[place]\"><span style=\" font-size:11pt\">&nbsp;</span></font></div></td><td align=\"left\" valign=\"top\" width=\"56\" style=\"border: solid #[id number] 0px;\"><div align=\"left\"><font face=\"[place]\"><span style=\" font-size:11pt\">&nbsp;</span></font></div></td></tr><tr><td align=\"center\" valign=\"top\" width=\"15\" style=\"border: solid #[id number] 0px;\"><div align=\"center\"><font face=\"[place]\"><span style=\" font-size:11pt\">&bull;</span></font></div></td><td align=\"left\" valign=\"top\" width=\"209\" style=\"border: solid #[id number] 0px;\"><div align=\"left\" style=\"margin-left:0mm; margin-right:2mm; text-indent:0mm; margin-top:0.00mm; margin-bottom:0.00mm;\"><font face=\"[place]\"><span style=\" font-size:11pt\">potassium chloride (KLOR-CON M) 20 MEQ extended release tablet</span></font></div></td><td align=\"left\" valign=\"top\" width=\"143\" style=\"border: solid #[id number] 0px;\"><div align=\"left\" style=\"margin-left:0mm; margin-right:2mm; text-indent:0mm; margin-top:0.00mm; margin-bottom:0.00mm;\"><font face=\"[place]\"><span style=\" font-size:11pt\">TAKE ONE TABLET ONCE A DAY</span></font></div></td><td align=\"left\" valign=\"top\" width=\"87\" style=\"border: solid #[id number] 0px;\"><div align=\"left\"><font face=\"[place]\"><span style=\" font-size:11pt\">&nbsp;</span></font></div></td><td align=\"left\" valign=\"top\" width=\"56\" style=\"border: solid #[id number] 0px;\"><div align=\"left\"><font face=\"[place]\"><span style=\" font-size:11pt\">&nbsp;</span></font></div></td></tr><tr><td align=\"center\" valign=\"top\" width=\"15\" style=\"border: solid #[id number] 0px;\"><div align=\"center\"><font face=\"[place]\"><span style=\" font-size:11pt\">&bull;</span></font></div></td><td align=\"left\" valign=\"top\" width=\"209\" style=\"border: solid #[id number] 0px;\"><div align=\"left\" style=\"margin-left:0mm; margin-right:2mm; text-indent:0mm; margin-top:0.00mm; margin-bottom:0.00mm;\"><font face=\"[place]\"><span style=\" font-size:11pt\">guaiFENesin (MUCINEX) 600 MG extended release tablet</span></font></div></td><td align=\"left\" valign=\"top\" width=\"143\" style=\"border: solid #[id number] 0px;\"><div align=\"left\" style=\"margin-left:0mm; margin-right:2mm; text-indent:0mm; margin-top:0.00mm; margin-bottom:0.00mm;\"><font face=\"[place]\"><span style=\" font-size:11pt\">Mucinex 600 mg tablet, extended release</span></font></div><div align=\"left\" style=\"margin-left:0mm; margin-right:2mm; text-indent:0mm; margin-top:0.00mm; margin-bottom:0.00mm;\"><font face=\"[place]\"><span style=\" font-size:11pt\">&nbsp; Take 2 tablets every day by oral route. </span></font></div></td><td align=\"left\" valign=\"top\" width=\"87\" style=\"border: solid #[id number] 0px;\"><div align=\"left\"><font face=\"[place]\"><span style=\" font-size:11pt\">&nbsp;</span></font></div></td><td align=\"left\" valign=\"top\" width=\"56\" style=\"border: solid #[id number] 0px;\"><div align=\"left\"><font face=\"[place]\"><span style=\" font-size:11pt\">&nbsp;</span></font></div></td></tr><tr><td align=\"center\" valign=\"top\" width=\"15\" style=\"border: solid #[id number] 0px;\"><div align=\"center\"><font face=\"[place]\"><span style=\" font-size:11pt\">&bull;</span></font></div></td><td align=\"left\" valign=\"top\" width=\"209\" style=\"border: solid #[id number] 0px;\"><div align=\"left\" style=\"margin-left:0mm; margin-right:2mm; text-indent:0mm; margin-top:0.00mm; margin-bottom:0.00mm;\"><font face=\"[place]\"><span style=\" font-size:11pt\">lidocaine (ASPERCREME W/LIDOCAINE) 4 % cream</span></font></div></td><td align=\"left\" valign=\"top\" width=\"143\" style=\"border: solid #[id number] 0px;\"><div align=\"left\" style=\"margin-left:0mm; margin-right:2mm; text-indent:0mm; margin-top:0.00mm; margin-bottom:0.00mm;\"><font face=\"[place]\"><span style=\" font-size:11pt\">Apply topically as needed for Pain Apply topically as needed. </span></font></div></td><td align=\"left\" valign=\"top\" width=\"87\" style=\"border: solid #[id number] 0px;\"><div align=\"left\"><font face=\"[place]\"><span style=\" font-size:11pt\">&nbsp;</span></font></div></td><td align=\"left\" valign=\"top\" width=\"56\" style=\"border: solid #[id number] 0px;\"><div align=\"left\"><font face=\"[place]\"><span style=\" font-size:11pt\">&nbsp;</span></font></div></td></tr><tr><td align=\"center\" valign=\"top\" width=\"15\" style=\"border: solid #[id number] 0px;\"><div align=\"center\"><font face=\"[place]\"><span style=\" font-size:11pt\">&bull;</span></font></div></td><td align=\"left\" valign=\"top\" width=\"209\" style=\"border: solid #[id number] 0px;\"><div align=\"left\" style=\"margin-left:0mm; margin-right:2mm; text-indent:0mm; margin-top:0.00mm; margin-bottom:0.00mm;\"><font face=\"[place]\"><span style=\" font-size:11pt\">fluticasone propionate 50 MCG/BLIST AEPB</span></font></div></td><td align=\"left\" valign=\"top\" width=\"143\" style=\"border: solid #[id number] 0px;\"><div align=\"left\" style=\"margin-left:0mm; margin-right:2mm; text-indent:0mm; margin-top:0.00mm; margin-bottom:0.00mm;\"><font face=\"[place]\"><span style=\" font-size:11pt\">Inhale into the lungs</span></font></div></td><td align=\"left\" valign=\"top\" width=\"87\" style=\"border: solid #[id number] 0px;\"><div align=\"left\"><font face=\"[place]\"><span style=\" font-size:11pt\">&nbsp;</span></font></div></td><td align=\"left\" valign=\"top\" width=\"56\" style=\"border: solid #[id number] 0px;\"><div align=\"left\"><font face=\"[facility]\"><span style=\" font-size:11pt\">&nbsp;</span></font></div></td></tr><tr><td align=\"center\" valign=\"top\" width=\"15\" style=\"border: solid #[id number] 0px;\"><div align=\"center\"><font face=\"[place]\"><span style=\" font-size:11pt\">&bull;</span></font></div></td><td align=\"left\" valign=\"top\" width=\"209\" style=\"border: solid #[id number] 0px;\"><div align=\"left\" style=\"margin-left:0mm; margin-right:2mm; text-indent:0mm; margin-top:0.00mm; margin-bottom:0.00mm;\"><font face=\"[place]\"><span style=\" font-size:11pt\">simvastatin (ZOCOR) 40 MG tablet</span></font></div></td><td align=\"left\" valign=\"top\" width=\"143\" style=\"border: solid #[id number] 0px;\"><div align=\"left\" style=\"margin-left:0mm; margin-right:2mm; text-indent:0mm; margin-top:0.00mm; margin-bottom:0.00mm;\"><font face=\"[facility]\"><span style=\" font-size:11pt\">Take 40 mg by mouth nightly</span></font></div></td><td align=\"left\" valign=\"top\" width=\"87\" style=\"border: solid #[id number] 0px;\"><div align=\"left\"><font face=\"[place]\"><span style=\" font-size:11pt\">&nbsp;</span></font></div></td><td align=\"left\" valign=\"top\" width=\"56\" style=\"border: solid #[id number] 0px;\"><div align=\"left\"><font face=\"[place]\"><span style=\" font-size:11pt\">&nbsp;</span></font></div></td></tr><tr><td align=\"center\" valign=\"top\" width=\"15\" style=\"border: solid #[id number] 0px;\"><div align=\"center\"><font face=\"[place]\"><span style=\" font-size:11pt\">&bull;</span></font></div></td><td align=\"left\" valign=\"top\" width=\"209\" style=\"border: solid #[id number] 0px;\"><div align=\"left\" style=\"margin-left:0mm; margin-right:2mm; text-indent:0mm; margin-top:0.00mm; margin-bottom:0.00mm;\"><font face=\"[place]\"><span style=\" font-size:11pt\">flecainide (TAMBOCOR) 50 MG tablet</span></font></div></td><td align=\"left\" valign=\"top\" width=\"143\" style=\"border: solid #[id number] 0px;\"><div align=\"left\" style=\"margin-left:0mm; margin-right:2mm; text-indent:0mm; margin-top:0.00mm; margin-bottom:0.00mm;\"><font face=\"[place]\"><span style=\" font-size:11pt\">Take 50 mg by mouth 2 times daily. </span></font></div></td><td align=\"left\" valign=\"top\" width=\"87\" style=\"border: solid #[id number] 0px;\"><div align=\"left\"><font face=\"[facility]\"><span style=\" font-size:11pt\">&nbsp;</span></font></div></td><td align=\"left\" valign=\"top\" width=\"56\" style=\"border: solid #[id number] 0px;\"><div align=\"left\"><font face=\"[facility]\"><span style=\" font-size:11pt\">&nbsp;</span></font></div></td></tr><tr><td align=\"center\" valign=\"top\" width=\"15\" style=\"border: solid #[id number] 0px;\"><div align=\"center\"><font face=\"[facility]\"><span style=\" font-size:11pt\">&bull;</span></font></div></td><td align=\"left\" valign=\"top\" width=\"209\" style=\"border: solid #[id number] 0px;\"><div align=\"left\" style=\"margin-left:0mm; margin-right:2mm; text-indent:0mm; margin-top:0.00mm; margin-bottom:0.00mm;\"><font face=\"[facility]\"><span style=\" font-size:11pt\">omeprazole (PRILOSEC) 40 MG capsule</span></font></div></td><td align=\"left\" valign=\"top\" width=\"143\" style=\"border: solid #[id number] 0px;\"><div align=\"left\" style=\"margin-left:0mm; margin-right:2mm; text-indent:0mm; margin-top:0.00mm; margin-bottom:0.00mm;\"><font face=\"[facility]\"><span style=\" font-size:11pt\">Take 40 mg by mouth nightly. </span></font></div></td><td align=\"left\" valign=\"top\" width=\"87\" style=\"border: solid #[id number] 0px;\"><div align=\"left\"><font face=\"[facility]\"><span style=\" font-size:11pt\">&nbsp;</span></font></div></td><td align=\"left\" valign=\"top\" width=\"56\" style=\"border: solid #[id number] 0px;\"><div align=\"left\"><font face=\"[facility]\"><span style=\" font-size:11pt\">&nbsp;</span></font></div></td></tr><tr><td align=\"center\" valign=\"top\" width=\"15\" style=\"border: solid #[id number] 0px;\"><div align=\"center\"><font face=\"[facility]\"><span style=\" font-size:11pt\">&bull;</span></font></div></td><td align=\"left\" valign=\"top\" width=\"209\" style=\"border: solid #[id number] 0px;\"><div align=\"left\" style=\"margin-left:0mm; margin-right:2mm; text-indent:0mm; margin-top:0.00mm; margin-bottom:0.00mm;\"><font face=\"[place]\"><span style=\" font-size:11pt\">montelukast (SINGULAIR) 10 MG tablet</span></font></div></td><td align=\"left\" valign=\"top\" width=\"143\" style=\"border: solid #[id number] 0px;\"><div align=\"left\" style=\"margin-left:0mm; margin-right:2mm; text-indent:0mm; margin-top:0.00mm; margin-bottom:0.00mm;\"><font face=\"[place]\"><span style=\" font-size:11pt\">Take 10 mg by mouth nightly. </span></font></div></td><td align=\"left\" valign=\"top\" width=\"87\" style=\"border: solid #[id number] 0px;\"><div align=\"left\"><font face=\"[place]\"><span style=\" font-size:11pt\">&nbsp;</span></font></div></td><td align=\"left\" valign=\"top\" width=\"56\" style=\"border: solid #[id number] 0px;\"><div align=\"left\"><font face=\"[place]\"><span style=\" font-size:11pt\">&nbsp;</span></font></div></td></tr><tr><td align=\"center\" valign=\"top\" width=\"15\" style=\"border: solid #[id number] 0px;\"><div align=\"center\"><font face=\"[place]\"><span style=\" font-size:11pt\">&bull;</span></font></div></td><td align=\"left\" valign=\"top\" width=\"209\" style=\"border: solid #[id number] 0px;\"><div align=\"left\" style=\"margin-left:0mm; margin-right:2mm; text-indent:0mm; margin-top:0.00mm; margin-bottom:0.00mm;\"><font face=\"[place]\"><span style=\" font-size:11pt\">DULoxetine (CYMBALTA) 60 MG extended release capsule</span></font></div></td><td align=\"left\" valign=\"top\" width=\"143\" style=\"border: solid #[id number] 0px;\"><div align=\"left\" style=\"margin-left:0mm; margin-right:2mm; text-indent:0mm; margin-top:0.00mm; margin-bottom:0.00mm;\"><font face=\"[place]\"><span style=\" font-size:11pt\">Take 60 mg by mouth daily </span></font></div></td><td align=\"left\" valign=\"top\" width=\"87\" style=\"border: solid #[id number] 0px;\"><div align=\"left\"><font face=\"[place]\"><span style=\" font-size:11pt\">&nbsp;</span></font></div></td><td align=\"left\" valign=\"top\" width=\"56\" style=\"border: solid #[id number] 0px;\"><div align=\"left\"><font face=\"[place]\"><span style=\" font-size:11pt\">&nbsp;</span></font></div></td></tr><tr><td align=\"center\" valign=\"top\" width=\"15\" style=\"border: solid #[id number] 0px;\"><div align=\"center\"><font face=\"[place]\"><span style=\" font-size:11pt\">&bull;</span></font></div></td><td align=\"left\" valign=\"top\" width=\"209\" style=\"border: solid #[id number] 0px;\"><div align=\"left\" style=\"margin-left:0mm; margin-right:2mm; text-indent:0mm; margin-top:0.00mm; margin-bottom:0.00mm;\"><font face=\"[place]\"><span style=\" font-size:11pt\">artificial tears (ARTIFICIAL TEARS) OINT</span></font></div></td><td align=\"left\" valign=\"top\" width=\"143\" style=\"border: solid #[id number] 0px;\"><div align=\"left\" style=\"margin-left:0mm; margin-right:2mm; text-indent:0mm; margin-top:0.00mm; margin-bottom:0.00mm;\"><font face=\"[place]\"><span style=\" font-size:11pt\">as needed. </span></font></div></td><td align=\"left\" valign=\"top\" width=\"87\" style=\"border: solid #[id number] 0px;\"><div align=\"left\"><font face=\"[place]\"><span style=\" font-size:11pt\">&nbsp;</span></font></div></td><td align=\"left\" valign=\"top\" width=\"56\" style=\"border: solid #[id number] 0px;\"><div align=\"left\"><font face=\"[place]\"><span style=\" font-size:11pt\">&nbsp;</span></font></div></td></tr><tr><td align=\"center\" valign=\"top\" width=\"15\" style=\"border: solid #[id number] 0px;\"><div align=\"center\"><font face=\"[place]\"><span style=\" font-size:11pt\">&bull;</span></font></div></td><td align=\"left\" valign=\"top\" width=\"209\" style=\"border: solid #[id number] 0px;\"><div align=\"left\" style=\"margin-left:0mm; margin-right:2mm; text-indent:0mm; margin-top:0.00mm; margin-bottom:0.00mm;\"><font face=\"[place]\"><span style=\" font-size:11pt\">Saline 0.2 % SOLN</span></font></div></td><td align=\"left\" valign=\"top\" width=\"143\" style=\"border: solid #[id number] 0px;\"><div align=\"left\" style=\"margin-left:0mm; margin-right:2mm; text-indent:0mm; margin-top:0.00mm; margin-bottom:0.00mm;\"><font face=\"[place]\"><span style=\" font-size:11pt\">by Nasal route daily as needed </span></font></div></td><td align=\"left\" valign=\"top\" width=\"87\" style=\"border: solid #[id number] 0px;\"><div align=\"left\"><font face=\"[place]\"><span style=\" font-size:11pt\">&nbsp;</span></font></div></td><td align=\"left\" valign=\"top\" width=\"56\" style=\"border: solid #[id number] 0px;\"><div align=\"left\"><font face=\"[place]\"><span style=\" font-size:11pt\">&nbsp;</span></font></div></td></tr><tr><td align=\"center\" valign=\"top\" width=\"15\" style=\"border: solid #[id number] 0px;\"><div align=\"center\"><font face=\"[place]\"><span style=\" font-size:11pt\">&bull;</span></font></div></td><td align=\"left\" valign=\"top\" width=\"209\" style=\"border: solid #[id number] 0px;\"><div align=\"left\" style=\"margin-left:0mm; margin-right:2mm; text-indent:0mm; margin-top:0.00mm; margin-bottom:0.00mm;\"><font face=\"[place]\"><span style=\" font-size:11pt\">acetaminophen (TYLENOL) 650 MG suppository</span></font></div></td><td align=\"left\" valign=\"top\" width=\"143\" style=\"border: solid #[id number] 0px;\"><div align=\"left\" style=\"margin-left:0mm; margin-right:2mm; text-indent:0mm; margin-top:0.00mm; margin-bottom:0.00mm;\"><font face=\"[place]\"><span style=\" font-size:11pt\">Place 650 mg rectally every 4 hours as needed for Fever (Patient not taking: Reported on 6/15/2021)</span></font></div></td><td align=\"left\" valign=\"top\" width=\"87\" style=\"border: solid #[id number] 0px;\"><div align=\"left\"><font face=\"[place]\"><span style=\" font-size:11pt\">&nbsp;</span></font></div></td><td align=\"left\" valign=\"top\" width=\"56\" style=\"border: solid #[id number] 0px;\"><div align=\"left\"><font face=\"[place]\"><span style=\" font-size:11pt\">&nbsp;</span></font></div></td></tr><tr><td align=\"center\" valign=\"top\" width=\"15\" style=\"border: solid #[id number] 0px;\"><div align=\"center\"><font face=\"[place]\"><span style=\" font-size:11pt\">&bull;</span></font></div></td><td align=\"left\" valign=\"top\" width=\"209\" style=\"border: solid #[id number] 0px;\"><div align=\"left\" style=\"margin-left:0mm; margin-right:2mm; text-indent:0mm; margin-top:0.00mm; margin-bottom:0.00mm;\"><font face=\"[place]\"><span style=\" font-size:11pt\">naloxone 4 MG/0.1ML LIQD nasal spray</span></font></div></td><td align=\"left\" valign=\"top\" width=\"143\" style=\"border: solid #[id number] 0px;\"><div align=\"left\" style=\"margin-left:0mm; margin-right:2mm; text-indent:0mm; margin-top:0.00mm; margin-bottom:0.00mm;\"><font face=\"[address]\"><span style=\" font-size:11pt\">1 spray by Nasal route as needed for Opioid Reversal (Patient not taking: Reported on 6/15/2021)</span></font></div></td><td align=\"left\" valign=\"top\" width=\"87\" style=\"border: solid #[id number] 0px;\"><div align=\"left\" style=\"margin-left:0mm; margin-right:2mm; text-indent:0mm; margin-top:0.00mm; margin-bottom:0.00mm;\"><font face=\"[address]\"><span style=\" font-size:11pt\">1 each</span></font></div></td><td align=\"left\" valign=\"top\" width=\"56\" style=\"border: solid #[id number] 0px;\"><div align=\"left\" style=\"margin-left:0mm; margin-right:2mm; text-indent:0mm; margin-top:0.00mm; margin-bottom:0.00mm;\"><font face=\"[address]\"><span style=\" font-size:11pt\">5</span></font></div></td></tr><tr><td align=\"center\" valign=\"top\" width=\"15\" style=\"border: solid #[id number] 0px;\"><div align=\"center\"><font face=\"[address]\"><span style=\" font-size:11pt\">&bull;</span></font></div></td><td align=\"left\" valign=\"top\" width=\"209\" style=\"border: solid #[id number] 0px;\"><div align=\"left\" style=\"margin-left:0mm; margin-right:2mm; text-indent:0mm; margin-top:0.00mm; margin-bottom:0.00mm;\"><font face=\"[address]\"><span style=\" font-size:11pt\">famotidine (PEPCID) 10 MG tablet</span></font></div></td><td align=\"left\" valign=\"top\" width=\"143\" style=\"border: solid #[id number] 0px;\"><div align=\"left\" style=\"margin-left:0mm; margin-right:2mm; text-indent:0mm; margin-top:0.00mm; margin-bottom:0.00mm;\"><font face=\"[address]\"><span style=\" font-size:11pt\">Take 10 mg by mouth 2 times daily (Patient not taking: Reported on 6/15/2021)</span></font></div></td><td align=\"left\" valign=\"top\" width=\"87\" style=\"border: solid #[id number] 0px;\"><div align=\"left\"><font face=\"[address]\"><span style=\" font-size:11pt\">&nbsp;</span></font></div></td><td align=\"left\" valign=\"top\" width=\"56\" style=\"border: solid #[id number] 0px;\"><div align=\"left\"><font face=\"[place]\"><span style=\" font-size:11pt\">&nbsp;</span></font></div></td></tr><tr><td align=\"center\" valign=\"top\" width=\"15\" style=\"border: solid #[id number] 0px;\"><div align=\"center\"><font face=\"[place]\"><span style=\" font-size:11pt\">&bull;</span></font></div></td><td align=\"left\" valign=\"top\" width=\"209\" style=\"border: solid #[id number] 0px;\"><div align=\"left\" style=\"margin-left:0mm; margin-right:2mm; text-indent:0mm; margin-top:0.00mm; margin-bottom:0.00mm;\"><font face=\"[place]\"><span style=\" font-size:11pt\">BiPAP Machine MISC</span></font></div></td><td align=\"left\" valign=\"top\" width=\"143\" style=\"border: solid #[id number] 0px;\"><div align=\"left\" style=\"margin-left:0mm; margin-right:2mm; text-indent:0mm; margin-top:0.00mm; margin-bottom:0.00mm;\"><font face=\"[place]\"><span style=\" font-size:11pt\">&nbsp;(Patient not taking: Reported on 6/15/2021)</span></font></div></td><td align=\"left\" valign=\"top\" width=\"87\" style=\"border: solid #[id number] 0px;\"><div align=\"left\"><font face=\"[place]\"><span style=\" font-size:11pt\">&nbsp;</span></font></div></td><td align=\"left\" valign=\"top\" width=\"56\" style=\"border: solid #[id number] 0px;\"><div align=\"left\"><font face=\"[place]\"><span style=\" font-size:11pt\">&nbsp;</span></font></div></td></tr><tr><td align=\"center\" valign=\"top\" width=\"15\" style=\"border: solid #[id number] 0px;\"><div align=\"center\"><font face=\"[place]\"><span style=\" font-size:11pt\">&bull;</span></font></div></td><td align=\"left\" valign=\"top\" width=\"209\" style=\"border: solid #[id number] 0px;\"><div align=\"left\" style=\"margin-left:0mm; margin-right:2mm; text-indent:0mm; margin-top:0.00mm; margin-bottom:0.00mm;\"><font face=\"[place]\"><span style=\" font-size:11pt\">Heating Pads (RADHA PAD/SMARTHEAT PRO/) PADS</span></font></div></td><td align=\"left\" valign=\"top\" width=\"143\" style=\"border: solid #[id number] 0px;\"><div align=\"left\" style=\"margin-left:0mm; margin-right:2mm; text-indent:0mm; margin-top:0.00mm; margin-bottom:0.00mm;\"><font face=\"[place]\"><span style=\" font-size:11pt\">1 Units by Does not apply route 4 times daily Use for 15 minutes to 30 minutes at a time four times a day.  (Patient not taking: Reported on 6/15/2021)</span></font></div></td><td align=\"left\" valign=\"top\" width=\"87\" style=\"border: solid #[id number] 0px;\"><div align=\"left\" style=\"margin-left:0mm; margin-right:2mm; text-indent:0mm; margin-top:0.00mm; margin-bottom:0.00mm;\"><font face=\"[place]\"><span style=\" font-size:11pt\">1 each</span></font></div></td><td align=\"left\" valign=\"top\" width=\"56\" style=\"border: solid #[id number] 0px;\"><div align=\"left\" style=\"margin-left:0mm; margin-right:2mm; text-indent:0mm; margin-top:0.00mm; margin-bottom:0.00mm;\"><font face=\"[place]\"><span style=\" font-size:11pt\">0</span></font></div></td></tr><tr><td align=\"center\" valign=\"top\" width=\"15\" style=\"border: solid #[id number] 0px;\"><div align=\"center\"><font face=\"[place]\"><span style=\" font-size:11pt\">&bull;</span></font></div></td><td align=\"left\" valign=\"top\" width=\"209\" style=\"border: solid #[id number] 0px;\"><div align=\"left\" style=\"margin-left:0mm; margin-right:2mm; text-indent:0mm; margin-top:0.00mm; margin-bottom:0.00mm;\"><font face=\"[place]\"><span style=\" font-size:11pt\">albuterol (ACCUNEB) 1.25 MG/3ML nebulizer solution</span></font></div></td><td align=\"left\" valign=\"top\" width=\"143\" style=\"border: solid #[id number] 0px;\"><div align=\"left\" style=\"margin-left:0mm; margin-right:2mm; text-indent:0mm; margin-top:0.00mm; margin-bottom:0.00mm;\"><font face=\"[place]\"><span style=\" font-size:11pt\">Inhale 1 ampule into the lungs every 6 hours as needed for Wheezing (Patient not taking: Reported on 6/15/2021)</span></font></div></td><td align=\"left\" valign=\"top\" width=\"87\" style=\"border: solid #[id number] 0px;\"><div align=\"left\"><font face=\"[place]\"><span style=\" font-size:11pt\">&nbsp;</span></font></div></td><td align=\"left\" valign=\"top\" width=\"56\" style=\"border: solid #[id number] 0px;\"><div align=\"left\"><font face=\"[place]\"><span style=\" font-size:11pt\">&nbsp;</span></font></div></td></tr><tr><td align=\"center\" valign=\"top\" width=\"15\" style=\"border: solid #[id number] 0px;\"><div align=\"center\"><font face=\"[place]\"><span style=\" font-size:11pt\">&bull;</span></font></div></td><td align=\"left\" valign=\"top\" width=\"209\" style=\"border: solid #[id number] 0px;\"><div align=\"left\" style=\"margin-left:0mm; margin-right:2mm; text-indent:0mm; margin-top:0.00mm; margin-bottom:0.00mm;\"><font face=\"[place]\"><span style=\" font-size:11pt\">Immune Globulin, Human, (HIZENTRA) 10 GM/50ML SOLN</span></font></div></td><td align=\"left\" valign=\"top\" width=\"143\" style=\"border: solid #309892 0px;\"><div align=\"left\" style=\"margin-left:0mm; margin-right:2mm; text-indent:0mm; margin-top:0.00mm; margin-bottom:0.00mm;\"><font face=\"[place]\"><span style=\" font-size:11pt\">Inject into the skin Tuesdays (Patient not taking: Reported on 6/15/2021)</span></font></div></td><td align=\"left\" valign=\"top\" width=\"87\" style=\"border: solid #[id number] 0px;\"><div align=\"left\"><font face=\"[place]\"><span style=\" font-size:11pt\">&nbsp;</span></font></div></td><td align=\"left\" valign=\"top\" width=\"56\" style=\"border: solid #[id number] 0px;\"><div align=\"left\"><font face=\"[place]\"><span style=\" font-size:11pt\">&nbsp;</span></font></div></td></tr><tr><td align=\"center\" valign=\"top\" width=\"15\" style=\"border: solid #[id number] 0px;\"><div align=\"center\"><font face=\"[place]\"><span style=\" font-size:11pt\">&bull;</span></font></div></td><td align=\"left\" valign=\"top\" width=\"209\" style=\"border: solid #[id number] 0px;\"><div align=\"left\" style=\"margin-left:0mm; margin-right:2mm; text-indent:0mm; margin-top:0.00mm; margin-bottom:0.00mm;\"><font face=\"[place]\"><span style=\" font-size:11pt\">diphenhydrAMINE (BENADRYL) 25 MG capsule</span></font></div></td><td align=\"left\" valign=\"top\" width=\"143\" style=\"border: solid #[id number] 0px;\"><div align=\"left\" style=\"margin-left:0mm; margin-right:2mm; text-indent:0mm; margin-top:0.00mm; margin-bottom:0.00mm;\"><font face=\"[place]\"><span style=\" font-size:11pt\">Take 25 mg by mouth every 6 hours as needed for Itching.  (Patient not taking: Reported on 6/15/2021)</span></font></div></td><td align=\"left\" valign=\"top\" width=\"87\" style=\"border: solid #[id number] 0px;\"><div align=\"left\"><font face=\"[place]\"><span style=\" font-size:11pt\">&nbsp;</span></font></div></td><td align=\"left\" valign=\"top\" width=\"56\" style=\"border: solid #[id number] 0px;\"><div align=\"left\"><font face=\"[place]\"><span style=\" font-size:11pt\">&nbsp;</span></font></div></td></tr></table><div align=\"left\"><font face=\"[place]\"><span style=\" font-size:11pt\">&nbsp;</span></font></div><table width=\"510\" cellpadding=\"0\" cellspacing=\"0\" style=\"border-collapse: collapse; border: none; \"><tr><td align=\"left\" valign=\"middle\" width=\"15\" style=\"border: solid #[id number] 0px;\"><div align=\"left\"><span style=\"font-size: 11pt;\">&nbsp;</span><div align=\"left\">&nbsp;</div></div></td><td align=\"left\" valign=\"middle\" width=\"102\" style=\"border: solid #[id number] 0px;\"><div align=\"left\"><span style=\"font-size: 11pt;\">&nbsp;</span></div></td><td align=\"left\" valign=\"middle\" width=\"49\" style=\"border: solid #[id number] 0px;\"><div align=\"left\"><span style=\"font-size: 11pt;\">&nbsp;</span></div></td><td align=\"left\" valign=\"middle\" width=\"90\" style=\"border: solid #[id number] 0px;\"><div align=\"left\"><span style=\"font-size: 11pt;\">&nbsp;</span></div></td><td align=\"left\" valign=\"middle\" width=\"84\" style=\"border: solid #499057 0px;\"><div align=\"left\"><span style=\"font-size: 11pt;\">&nbsp;</span></div></td><td align=\"left\" valign=\"middle\" width=\"77\" style=\"border: solid #429582 0px;\"><div align=\"left\"><span style=\"font-size: 11pt;\">&nbsp;</span></div></td><td align=\"left\" valign=\"middle\" width=\"43\" style=\"border: solid #[id number] 0px;\"><div align=\"left\"><span style=\"font-size: 11pt;\">&nbsp;</span></div></td><td align=\"left\" valign=\"middle\" width=\"50\" style=\"border: solid #629153 0px;\"><div align=\"left\"><span style=\"font-size: 11pt;\">&nbsp;</span></div></td></tr><tr><td colspan=\"8\" align=\"left\" valign=\"top\" width=\"510\" style=\"border: solid #332245 0px;\"><div align=\"left\" style=\"margin-left:0mm; margin-right:2mm; text-indent:0mm; margin-top:0.00mm; margin-bottom:0.00mm;\"><font face=\"arial\" color=\"#[id number]\"><span style=\" font-size:11pt\"><b>Facility-Administered Medications Prior to Visit</b></span></font></div></td></tr><tr><td colspan=\"2\" align=\"left\" valign=\"top\" width=\"117\" bgcolor=\"#eeeeee\" style=\"border: solid #647060 0px;\"><div align=\"left\" style=\"margin-left:0mm; margin-right:2mm; text-indent:0mm; margin-top:0.00mm; margin-bottom:0.00mm;\"><font face=\"arial\" color=\"#[id number]\"><span style=\" font-size:11pt\">Medication</span></font></div></td><td align=\"left\" valign=\"top\" width=\"49\" bgcolor=\"#eeeeee\" style=\"border: solid #601402 0px;\"><div align=\"left\" style=\"margin-left:0mm; margin-right:2mm; text-indent:0mm; margin-top:0.00mm; margin-bottom:0.00mm;\"><font face=\"arial\" color=\"#[id number]\"><span style=\" font-size:11pt\">Dose</span></font></div></td><td align=\"left\" valign=\"top\" width=\"90\" bgcolor=\"#eeeeee\" style=\"border: solid #094205 0px;\"><div align=\"left\" style=\"margin-left:0mm; margin-right:2mm; text-indent:0mm; margin-top:0.00mm; margin-bottom:0.00mm;\"><font face=\"arial\" color=\"#[id number]\"><span style=\" font-size:11pt\">Route</span></font></div></td><td align=\"left\" valign=\"top\" width=\"84\" bgcolor=\"#eeeeee\" style=\"border: solid #[id number] 0px;\"><div align=\"left\" style=\"margin-left:0mm; margin-right:2mm; text-indent:0mm; margin-top:0.00mm; margin-bottom:0.00mm;\"><font face=\"arial\" color=\"#[id number]\"><span style=\" font-size:11pt\">Frequency</span></font></div></td><td align=\"left\" valign=\"top\" width=\"77\" bgcolor=\"#eeeeee\" style=\"border: solid #[id number] 0px;\"><div align=\"left\" style=\"margin-left:0mm; margin-right:2mm; text-indent:0mm; margin-top:0.00mm; margin-bottom:0.00mm;\"><font face=\"arial\" color=\"#[id number]\"><span style=\" font-size:11pt\">Provider</span></font></div></td><td align=\"left\" valign=\"top\" width=\"43\" bgcolor=\"#eeeeee\" style=\"border: solid #[id number] 0px;\"><div align=\"left\" style=\"margin-left:0mm; margin-right:2mm; text-indent:0mm; margin-top:0.00mm; margin-bottom:0.00mm;\"><font face=\"arial\" color=\"#[id number]\"><span style=\" font-size:11pt\">Last Rate</span></font></div></td><td align=\"left\" valign=\"top\" width=\"50\" bgcolor=\"#eeeeee\" style=\"border: solid #[id number] 0px;\"><div align=\"left\" style=\"margin-left:0mm; margin-right:2mm; text-indent:0mm; margin-top:0.00mm; margin-bottom:0.00mm;\"><font face=\"arial\" color=\"#[id number]\"><span style=\" font-size:11pt\">Last Admin</span></font></div></td></tr><tr><td align=\"center\" valign=\"top\" width=\"15\" style=\"border: solid #[id number] 0px;\"><div align=\"center\"><font face=\"[place]\"><span style=\" font-size:11pt\">&bull;</span></font></div></td><td align=\"left\" valign=\"top\" width=\"102\" style=\"border: solid #313860 0px;\"><div align=\"left\" style=\"margin-left:0mm; margin-right:2mm; text-indent:0mm; margin-top:0.00mm; margin-bottom:0.00mm;\"><font face=\"[place]\"><span style=\" font-size:11pt\">triamcinolone acetonide (KENALOG-40) injection 40 mg</span></font></div></td><td align=\"left\" valign=\"top\" width=\"49\" style=\"border: solid #321406 0px;\"><div align=\"left\" style=\"margin-left:0mm; margin-right:2mm; text-indent:0mm; margin-top:0.00mm; margin-bottom:0.00mm;\"><font face=\"[place]\"><span style=\" font-size:11pt\">&nbsp;40 mg</span></font></div></td><td align=\"left\" valign=\"top\" width=\"90\" style=\"border: solid #914091 0px;\"><div align=\"left\" style=\"margin-left:0mm; margin-right:2mm; text-indent:0mm; margin-top:0.00mm; margin-bottom:0.00mm;\"><font face=\"[place]\"><span style=\" font-size:11pt\">Intra-articular</span></font></div></td><td align=\"left\" valign=\"top\" width=\"84\" style=\"border: solid #558356 0px;\"><div align=\"left\" style=\"margin-left:0mm; margin-right:2mm; text-indent:0mm; margin-top:0.00mm; margin-bottom:0.00mm;\"><font face=\"[place]\"><span style=\" font-size:11pt\">Once</span></font></div></td><td align=\"left\" valign=\"top\" width=\"77\" style=\"border: solid #313042 0px;\"><div align=\"left\" style=\"margin-left:0mm; margin-right:2mm; text-indent:0mm; margin-top:0.00mm; margin-bottom:0.00mm;\"><font face=\"[place]\"><span style=\" font-size:11pt\">Mak Hi MD</span></font></div></td><td align=\"left\" valign=\"top\" width=\"43\" style=\"border: solid #741520 0px;\"><div align=\"left\" style=\"margin-left:0mm; margin-right:2mm; text-indent:0mm; margin-top:0.00mm; margin-bottom:0.00mm;\"><font face=\"[place]\"><span style=\" font-size:11pt\">&nbsp;</span></font></div></td><td align=\"left\" valign=\"top\" width=\"50\" style=\"border: solid #514465 0px;\"><div align=\"left\" style=\"margin-left:0mm; margin-right:2mm; text-indent:0mm; margin-top:0.00mm; margin-bottom:0.00mm;\"><font face=\"[place]\"><span style=\" font-size:11pt\">&nbsp;</span></font></div></td></tr><tr><td align=\"center\" valign=\"top\" width=\"15\" style=\"border: solid #[id number] 0px;\"><div align=\"center\"><font face=\"[place]\"><span style=\" font-size:11pt\">&bull;</span></font></div></td><td align=\"left\" valign=\"top\" width=\"102\" style=\"border: solid #363905 0px;\"><div align=\"left\" style=\"margin-left:0mm; margin-right:2mm; text-indent:0mm; margin-top:0.00mm; margin-bottom:0.00mm;\"><font face=\"[place]\"><span style=\" font-size:11pt\">lidocaine 2 % injection 5 mL</span></font></div></td><td align=\"left\" valign=\"top\" width=\"49\" style=\"border: solid #[id number] 0px;\"><div align=\"left\" style=\"margin-left:0mm; margin-right:2mm; text-indent:0mm; margin-top:0.00mm; margin-bottom:0.00mm;\"><font face=\"[place]\"><span style=\" font-size:11pt\">&nbsp;5 mL</span></font></div></td><td align=\"left\" valign=\"top\" width=\"90\" style=\"border: solid #875145 0px;\"><div align=\"left\" style=\"margin-left:0mm; margin-right:2mm; text-indent:0mm; margin-top:0.00mm; margin-bottom:0.00mm;\"><font face=\"[place]\"><span style=\" font-size:11pt\">Intradermal</span></font></div></td><td align=\"left\" valign=\"top\" width=\"84\" style=\"border: solid #[id number] 0px;\"><div align=\"left\" style=\"margin-left:0mm; margin-right:2mm; text-indent:0mm; margin-top:0.00mm; margin-bottom:0.00mm;\"><font face=\"[place]\"><span style=\" font-size:11pt\">Once</span></font></div></td><td align=\"left\" valign=\"top\" width=\"77\" style=\"border: solid #538692 0px;\"><div align=\"left\" style=\"margin-left:0mm; margin-right:2mm; text-indent:0mm; margin-top:0.00mm; margin-bottom:0.00mm;\"><font face=\"[place]\"><span style=\" font-size:11pt\">Mak Hi MD</span></font></div></td><td align=\"left\" valign=\"top\" width=\"43\" style=\"border: solid #[id number] 0px;\"><div align=\"left\" style=\"margin-left:0mm; margin-right:2mm; text-indent:0mm; margin-top:0.00mm; margin-bottom:0.00mm;\"><font face=\"[place]\"><span style=\" font-size:11pt\">&nbsp;</span></font></div></td><td align=\"left\" valign=\"top\" width=\"50\" style=\"border: solid #027239 0px;\"><div align=\"left\" style=\"margin-left:0mm; margin-right:2mm; text-indent:0mm; margin-top:0.00mm; margin-bottom:0.00mm;\"><font face=\"[place]\"><span style=\" font-size:11pt\">&nbsp;</span></font></div></td></tr><tr><td align=\"center\" valign=\"top\" width=\"15\" style=\"border: solid #[id number] 0px;\"><div align=\"center\"><font face=\"[place]\"><span style=\" font-size:11pt\">&bull;</span></font></div></td><td align=\"left\" valign=\"top\" width=\"102\" style=\"border: solid #351172 0px;\"><div align=\"left\" style=\"margin-left:0mm; margin-right:2mm; text-indent:0mm; margin-top:0.00mm; margin-bottom:0.00mm;\"><font face=\"[place]\"><span style=\" font-size:11pt\">bupivacaine (MARCAINE) 0.5 % injection 150 mg</span></font></div></td><td align=\"left\" valign=\"top\" width=\"49\" style=\"border: solid #487932 0px;\"><div align=\"left\" style=\"margin-left:0mm; margin-right:2mm; text-indent:0mm; margin-top:0.00mm; margin-bottom:0.00mm;\"><font align=\"left\"><font face=\"[place]\"><span style=\" font-size:11pt\">Past Medical History</span></font><img src=\"C:\ProgramData\Epic\95\TempData\[id number]. [id number]\" width=\"1\" height=\"1\" alt=\"graphic\"/></div></div></td></tr><tr><td align=\"left\" valign=\"middle\" width=\"205\" style=\"border: solid #[id number] 0px;\"><div align=\"left\"><span style=\"font-size: 11pt;\">&nbsp;</span><table width=\"205\" cellpadding=\"0\" cellspacing=\"0\" style=\"border-collapse: collapse; border: none; \"><tr><td align=\"left\" valign=\"middle\" width=\"15\" style=\"border: solid #410961 0px;\"><div align=\"left\"><span style=\"font-size: 11pt;\">&nbsp;</span></div></td><td align=\"left\" valign=\"middle\" width=\"133\" style=\"border: solid #747518 0px;\"><div align=\"left\"><span style=\"font-size: 11pt;\">&nbsp;</span></div></td><td align=\"left\" valign=\"middle\" width=\"57\" style=\"border: solid #899204 0px;\"><div align=\"left\"><span style=\"font-size: 11pt;\">&nbsp;</span></div></td></tr><tr><td colspan=\"3\" align=\"left\" valign=\"top\" width=\"205\" style=\"border: solid #722958 0px;\"><div align=\"left\" style=\"margin-left:0mm; margin-right:2mm; text-indent:0mm; margin-top:0.00mm; margin-bottom:0.00mm;\"><font face=\"arial\" color=\"#[id number]\"><span style=\" font-size:11pt\"><b>Past Medical History:</b></span></font></div></td></tr><tr><td colspan=\"2\" align=\"left\" valign=\"top\" width=\"148\" bgcolor=\"#eeeeee\" style=\"border: solid #694338 0px;\"><div align=\"left\" style=\"margin-left:0mm; margin-right:2mm; text-indent:0mm; margin-top:0.00mm; margin-bottom:0.00mm;\"><font face=\"arial\" color=\"#[id number]\"><span style=\" font-size:11pt\">Diagnosis</span></font></div></td><td align=\"left\" valign=\"top\" width=\"57\" bgcolor=\"#eeeeee\" style=\"border: solid #651115 0px;\"><div align=\"left\" style=\"margin-left:0mm; margin-right:2mm; text-indent:0mm; margin-top:0.00mm; margin-bottom:0.00mm;\"><font face=\"arial\" color=\"#[id number]\"><span style=\" font-size:11pt\">Date</span></font></div></td></tr><tr><td align=\"center\" valign=\"top\" width=\"15\" style=\"border: solid #866305 0px;\"><div align=\"center\"><font face=\"[place]\"><span style=\" font-size:11pt\">&bull;</span></font></div></td><td align=\"left\" valign=\"top\" width=\"133\" style=\"border: solid #763799 0px;\"><div align=\"left\" style=\"margin-left:0mm; margin-right:2mm; text-indent:0mm; margin-top:0.00mm; margin-bottom:0.00mm;\"><font face=\"[place]\"><span style=\" BAQN-LWTQ:07XX\">DOMRZNFACE complication</span></font></div></td><td align=\"left\" valign=\"top\" width=\"57\" style=\"border: solid #741340 0px;\"><div align=\"left\"><font face=\"[place]\"><span style=\" font-size:11pt\">&nbsp;</span></font></div></td></tr><tr><td align=\"center\" valign=\"top\" width=\"15\" style=\"border: solid #743475 0px;\"><div align=\"center\"><font face=\"[place]\"><span style=\" font-size:11pt\">&nbsp;</span></font></div></td><td colspan=\"2\" align=\"left\" valign=\"top\" width=\"190\" style=\"border: solid #164267 0px;\"><div align=\"left\" style=\"margin-left:0mm; margin-right:2mm; text-indent:0mm; margin-top:0.00mm; margin-bottom:0.00mm;\"><font face=\"arial\"><span style=\" font-size:11pt\"><i>2nd post op day from total knee patient stated &quot;I was wild and mean&quot;</i></span></font></div></td></tr><tr><td align=\"center\" valign=\"top\" width=\"15\" style=\"border: solid #[id number] 0px;\"><div align=\"center\"><font face=\"[place]\"><span style=\" font-size:11pt\">&bull;</span></font></div></td><td align=\"left\" valign=\"top\" width=\"133\" style=\"border: solid #[id number] 0px;\"><div align=\"left\" style=\"margin-left:0mm; margin-right:2mm; text-indent:0mm; margin-top:0.00mm; margin-bottom:0.00mm;\"><font face=\"[place]\"><span style=\" font-size:11pt\">Anxiety and depression</span></font></div></td><td align=\"left\" valign=\"top\" width=\"57\" style=\"border: solid #[id number] 0px;\"><div align=\"left\"><font face=\"[place]\"><span style=\" font-size:11pt\">&nbsp;</span></font></div></td></tr><tr><td align=\"center\" valign=\"top\" width=\"15\" style=\"border: solid #[id number] 0px;\"><div align=\"center\"><font face=\"[place]\"><span style=\" font-size:11pt\">&bull;</span></font></div></td><td align=\"left\" valign=\"top\" width=\"133\" style=\"border: solid #[id number] 0px;\"><div align=\"left\" style=\"margin-left:0mm; margin-right:2mm; text-indent:0mm; margin-top:0.00mm; margin-bottom:0.00mm;\"><font face=\"[place]\"><span style=\" font-size:11pt\">Arthritis</span></font></div></td><td align=\"left\" valign=\"top\" width=\"57\" style=\"border: solid #[id number] 0px;\"><div align=\"left\"><font face=\"[place]\"><span style=\" font-size:11pt\">&nbsp;</span></font></div></td></tr><tr><td align=\"center\" valign=\"top\" width=\"15\" style=\"border: solid #[id number] 0px;\"><div align=\"center\"><font face=\"[place]\"><span style=\" font-size:11pt\">&bull;</span></font></div></td><td align=\"left\" valign=\"top\" width=\"133\" style=\"border: solid #[id number] 0px;\"><div align=\"left\" style=\"margin-left:0mm; margin-right:2mm; text-indent:0mm; margin-top:0.00mm; margin-bottom:0.00mm;\"><font face=\"[place]\"><span style=\" font-size:11pt\">ASD (atrial septal defect)</span></font></div></td><td align=\"left\" valign=\"top\" width=\"57\" style=\"border: solid #[id number] 0px;\"><div align=\"left\"><font face=\"[place]\"><span style=\" font-size:11pt\">&nbsp;</span></font></div></td></tr><tr><td align=\"center\" valign=\"top\" width=\"15\" style=\"border: solid #[id number] 0px;\"><div align=\"center\"><font face=\"[place]\"><span style=\" font-size:11pt\">&nbsp;</span></font></div></td><td colspan=\"2\" align=\"left\" valign=\"top\" width=\"190\" style=\"border: solid #[id number] 0px;\"><div align=\"left\" style=\"margin-left:0mm; margin-right:2mm; text-indent:0mm; margin-top:0.00mm; margin-bottom:0.00mm;\"><font face=\"arial\"><span style=\" font-size:11pt\"><i>repair in 1963</i></span></font></div></td></tr><tr><td align=\"center\" valign=\"top\" width=\"15\" style=\"border: solid #[id number] 0px;\"><div align=\"center\"><font face=\"[place]\"><span style=\" font-size:11pt\">&bull;</span></font></div></td><td align=\"left\" valign=\"top\" width=\"133\" style=\"border: solid #[id number] 0px;\"><div align=\"left\" style=\"margin-left:0mm; margin-right:2mm; text-indent:0mm; margin-top:0.00mm; margin-bottom:0.00mm;\"><font face=\"[place]\"><span style=\" font-size:11pt\">Asthma</span></font></div></td><td align=\"left\" valign=\"top\" width=\"57\" style=\"border: solid #[id number] 0px;\"><div align=\"left\"><font face=\"[place]\"><span style=\" font-size:11pt\">&nbsp;</span></font></div></td></tr><tr><td align=\"center\" valign=\"top\" width=\"15\" style=\"border: solid #[id number] 0px;\"><div align=\"center\"><font face=\"[place]\"><span style=\" font-size:11pt\">&bull;</span></font></div></td><td align=\"left\" valign=\"top\" width=\"133\" style=\"border: solid #[id number] 0px;\"><div align=\"left\" style=\"margin-left:0mm; margin-right:2mm; text-indent:0mm; margin-top:0.00mm; margin-bottom:0.00mm;\"><font face=\"[place]\"><span style=\" font-size:11pt\">COPD (chronic obstructive pulmonary disease) (HCC)</span></font></div></td><td align=\"left\" valign=\"top\" width=\"57\" style=\"border: solid #[id number] 0px;\"><div align=\"left\"><font face=\"[place]\"><span style=\" font-size:11pt\">&nbsp;</span></font></div></td></tr><tr><td align=\"center\" valign=\"top\" width=\"15\" style=\"border: solid #[id number] 0px;\"><div align=\"center\"><font face=\"[place]\"><span style=\" font-size:11pt\">&bull;</span></font></div></td><td align=\"left\" valign=\"top\" width=\"133\" style=\"border: solid #[id number] 0px;\"><div align=\"left\" style=\"margin-left:0mm; margin-right:2mm; text-indent:0mm; margin-top:0.00mm; margin-bottom:0.00mm;\"><font face=\"[place]\"><span style=\" font-size:11pt\">COPD (chronic obstructive pulmonary disease) (HCC)</span></font></div></td><td align=\"left\" valign=\"top\" width=\"57\" style=\"border: solid #[id number] 0px;\"><div align=\"left\" style=\"margin-left:0mm; margin-right:2mm; text-indent:0mm; margin-top:0.00mm; margin-bottom:0.00mm;\"><font face=\"[place]\"><span style=\" font-size:11pt\">1980's</span></font></div></td></tr><tr><td align=\"center\" valign=\"top\" width=\"15\" style=\"border: solid #[id number] 0px;\"><div align=\"center\"><font face=\"[place]\"><span style=\" font-size:11pt\">&bull;</span></font></div></td><td align=\"left\" valign=\"top\" width=\"133\" style=\"border: solid #[id number] 0px;\"><div align=\"left\" style=\"margin-left:0mm; margin-right:2mm; text-indent:0mm; margin-top:0.00mm; margin-bottom:0.00mm;\"><font face=\"[place]\"><span style=\" font-size:11pt\">Disorder of immune system (HCC)</span></font></div></td><td align=\"left\" valign=\"top\" width=\"57\" style=\"border: solid #[id number] 0px;\"><div align=\"left\"><font face=\"[place]\"><span style=\" font-size:11pt\">&nbsp;</span></font></div></td></tr><tr><td align=\"center\" valign=\"top\" width=\"15\" style=\"border: solid #[id number] 0px;\"><div align=\"center\"><font face=\"[place]\"><span style=\" font-size:11pt\">&nbsp;</span></font></div></td><td colspan=\"2\" align=\"left\" valign=\"top\" width=\"190\" style=\"border: solid #[id number] 0px;\"><div align=\"left\" style=\"margin-left:0mm; margin-right:2mm; text-indent:0mm; margin-top:0.00mm; margin-bottom:0.00mm;\"><font face=\"arial\"><span style=\" font-size:11pt\"><i>low immune count patient on hizentra injection</i></span></font></div></td></tr><tr><td align=\"center\" valign=\"top\" width=\"15\" style=\"border: solid #[id number] 0px;\"><div align=\"center\"><font face=\"[place]\"><span style=\" font-size:11pt\">&bull;</span></font></div></td><td align=\"left\" valign=\"top\" width=\"133\" style=\"border: solid #[id number] 0px;\"><div align=\"left\" style=\"margin-left:0mm; margin-right:2mm; text-indent:0mm; margin-top:0.00mm; margin-bottom:0.00mm;\"><font face=\"[place]\"><span style=\" font-size:11pt\">GERD (gastroesophageal reflux disease)</span></font></div></td><td align=\"left\" valign=\"top\" width=\"57\" style=\"border: solid #[id number] 0px;\"><div align=\"left\"><font face=\"[place]\"><span style=\" font-size:11pt\">&nbsp;</span></font></div></td></tr><tr><td align=\"center\" valign=\"top\" width=\"15\" style=\"border: solid #[id number] 0px;\"><div align=\"center\"><font face=\"[place]\"><span style=\" font-size:11pt\">&bull;</span></font></div></td><td align=\"left\" valign=\"top\" width=\"133\" style=\"border: solid #[id number] 0px;\"><div align=\"left\" style=\"margin-left:0mm; margin-right:2mm; text-indent:0mm; margin-top:0.00mm; margin-bottom:0.00mm;\"><font face=\"[place]\"><span style=\" font-size:11pt\">H/O cardiac catheterization</span></font></div></td><td align=\"left\" valign=\"top\" width=\"57\" style=\"border: solid #[id number] 0px;\"><div align=\"left\" style=\"margin-left:0mm; margin-right:2mm; text-indent:0mm; margin-top:0.00mm; margin-bottom:0.00mm;\"><font face=\"[place]\"><span style=\" font-size:11pt\">2008</span></font></div></td></tr><tr><td align=\"center\" valign=\"top\" width=\"15\" style=\"border: solid #[id number] 0px;\"><div align=\"center\"><font face=\"[place]\"><span style=\" font-size:11pt\">&nbsp;</span></font></div></td><td colspan=\"2\" align=\"left\" valign=\"top\" width=\"190\" style=\"border: solid #[id number] 0px;\"><div align=\"left\" style=\"margin-left:0mm; margin-right:2mm; text-indent:0mm; margin-top:0.00mm; margin-bottom:0.00mm;\"><font face=\"arial\"><span style=\" font-size:11pt\"><i>no blockage</i></span></font></div></td></tr><tr><td align=\"center\" valign=\"top\" width=\"15\" style=\"border: solid #[id number] 0px;\"><div align=\"center\"><font face=\"[place]\"><span style=\" font-size:11pt\">&bull;</span></font></div></td><td align=\"left\" valign=\"top\" width=\"133\" style=\"border: solid #[id number] 0px;\"><div align=\"left\" style=\"margin-left:0mm; margin-right:2mm; text-indent:0mm; margin-top:0.00mm; margin-bottom:0.00mm;\"><font face=\"[place]\"><span style=\" font-size:11pt\">Heart palpitations</span></font></div></td><td align=\"left\" valign=\"top\" width=\"57\" style=\"border: solid #[id number] 0px;\"><div align=\"left\"><font face=\"[place]\"><span style=\" font-size:11pt\">&nbsp;</span></font></div></td></tr><tr><td align=\"center\" valign=\"top\" width=\"15\" style=\"border: solid #[id number] 0px;\"><div align=\"center\"><font face=\"[place]\"><span style=\" font-size:11pt\">&bull;</span></font></div></td><td align=\"left\" valign=\"top\" width=\"133\" style=\"border: solid #[id number] 0px;\"><div align=\"left\" style=\"margin-left:0mm; margin-right:2mm; text-indent:0mm; margin-top:0.00mm; margin-bottom:0.00mm;\"><font face=\"[place]\"><span style=\" font-size:11pt\">History of anesthesia complications</span></font></div></td><td align=\"left\" valign=\"top\" width=\"57\" style=\"border: solid #[id number] 0px;\"><div align=\"left\"><font face=\"[place]\"><span style=\" font-size:11pt\">&nbsp;</span></font></div></td></tr><tr><td align=\"center\" valign=\"top\" width=\"15\" style=\"border: solid #[id number] 0px;\"><div align=\"center\"><font face=\"[place]\"><span style=\" font-size:11pt\">&nbsp;</span></font></div></td><td colspan=\"2\" align=\"left\" valign=\"top\" width=\"190\" style=\"border: solid #[id number] 0px;\"><div align=\"left\" style=\"margin-left:0mm; margin-right:2mm; text-indent:0mm; margin-top:0.00mm; margin-bottom:0.00mm;\"><font face=\"arial\"><span style=\" font-size:11pt\"><i>pt states she went &quot;nuts&quot; with last anes. (total left knee 3/2015 at [facility])</i></span></font></div></td></tr><tr><td align=\"center\" valign=\"top\" width=\"15\" style=\"border: solid #[id number] 0px;\"><div align=\"center\"><font face=\"[place]\"><span style=\" font-size:11pt\">&bull;</span></font></div></td><td align=\"left\" valign=\"top\" width=\"133\" style=\"border: solid #[id number] 0px;\"><div align=\"left\" style=\"margin-left:0mm; margin-right:2mm; text-indent:0mm; margin-top:0.00mm; margin-bottom:0.00mm;\"><font face=\"[place]\"><span style=\" font-size:11pt\">History of renal stone</span></font></div></td><td align=\"left\" valign=\"top\" width=\"57\" style=\"border: solid #[id number] 0px;\"><div align=\"left\"><font face=\"[place]\"><span style=\" font-size:11pt\">&nbsp;</span></font></div></td></tr><tr><td align=\"center\" valign=\"top\" width=\"15\" style=\"border: solid #[id number] 0px;\"><div align=\"center\"><font face=\"[place]\"><span style=\" font-size:11pt\">&nbsp;</span></font></div></td><td colspan=\"2\" align=\"left\" valign=\"top\" width=\"190\" style=\"border: solid #[id number] 0px;\"><div align=\"left\" style=\"margin-left:0mm; margin-right:2mm; text-indent:0mm; margin-top:0.00mm; margin-bottom:0.00mm;\"><font face=\"arial\"><span style=\" font-size:11pt\"><i>passed</i></span></font></div></td></tr><tr><td align=\"center\" valign=\"top\" width=\"15\" style=\"border: solid #[id number] 0px;\"><div align=\"center\"><font face=\"[place]\"><span style=\" font-size:11pt\">&bull;</span></font></div></td><td align=\"left\" valign=\"top\" width=\"133\" style=\"border: solid #[id number] 0px;\"><div align=\"left\" style=\"margin-left:0mm; margin-right:2mm; text-indent:0mm; margin-top:0.00mm; margin-bottom:0.00mm;\"><font face=\"[place]\"><span style=\" font-size:11pt\">Hx of blood clots</span></font></div></td><td align=\"left\" valign=\"top\" width=\"57\" style=\"border: solid #[id number] 0px;\"><div align=\"left\" style=\"margin-left:0mm; margin-right:2mm; text-indent:0mm; margin-top:0.00mm; margin-bottom:0.00mm;\"><font face=\"[place]\"><span style=\" font-size:11pt\">1970</span></font></div></td></tr><tr><td align=\"center\" valign=\"top\" width=\"15\" style=\"border: solid #[id number] 0px;\"><div align=\"center\"><font face=\"[place]\"><span style=\" font-size:11pt\">&nbsp;</span></font></div></td><td colspan=\"2\" align=\"left\" valign=\"top\" width=\"190\" style=\"border: solid #[id number] 0px;\"><div align=\"left\" style=\"margin-left:0mm; margin-right:2mm; text-indent:0mm; margin-top:0.00mm; margin-bottom:0.00mm;\"><font face=\"[facility]\"><span style=\" font-size:11pt\"><i>DVT</i></span></font></div></td></tr><tr><td align=\"center\" valign=\"top\" width=\"15\" style=\"border: solid #[id number] 0px;\"><div align=\"center\"><font face=\"[place]\"><span style=\" font-size:11pt\">&bull;</span></font></div></td><td align=\"left\" valign=\"top\" width=\"133\" style=\"border: solid #[id number] 0px;\"><div align=\"left\" style=\"margin-left:0mm; margin-right:2mm; text-indent:0mm; margin-top:0.00mm; margin-bottom:0.00mm;\"><font face=\"[place]\"><span style=\" font-size:11pt\">Hypertension</span></font></div></td><td align=\"left\" valign=\"top\" width=\"57\" style=\"border: solid #[id number] 0px;\"><div align=\"left\"><font face=\"[place]\"><span style=\" font-size:11pt\">&nbsp;</span></font></div></td></tr><tr><td align=\"center\" valign=\"top\" width=\"15\" style=\"border: solid #[id number] 0px;\"><div align=\"center\"><font face=\"[place]\"><span style=\" font-size:11pt\">&bull;</span></font></div></td><td align=\"left\" valign=\"top\" width=\"133\" style=\"border: solid #[id number] 0px;\"><div align=\"left\" style=\"margin-left:0mm; margin-right:2mm; text-indent:0mm; margin-top:0.00mm; margin-bottom:0.00mm;\"><font face=\"[place]\"><span style=\" font-size:11pt\">MVP (mitral valve prolapse)</span></font></div></td><td align=\"left\" valign=\"top\" width=\"57\" style=\"border: solid #[id number] 0px;\"><div align=\"left\"><font face=\"[place]\"><span style=\" font-size:11pt\">&nbsp;</span></font></div></td></tr><tr><td align=\"center\" valign=\"top\" width=\"15\" style=\"border: solid #[id number] 0px;\"><div align=\"center\"><font face=\"[place]\"><span style=\" font-size:11pt\">&bull;</span></font></div></td><td align=\"left\" valign=\"top\" width=\"133\" style=\"border: solid #[id number] 0px;\"><div align=\"left\" style=\"margin-left:0mm; margin-right:2mm; text-indent:0mm; margin-top:0.00mm; margin-bottom:0.00mm;\"><font face=\"[place]\"><span style=\" font-size:11pt\">LONI (obstructive sleep apnea)</span></font></div></td><td align=\"left\" valign=\"top\" width=\"57\" style=\"border: solid #[id number] 0px;\"><div align=\"left\"><font face=\"[place]\"><span style=\" font-size:11pt\">&nbsp;</span></font></div></td></tr><tr><td align=\"center\" valign=\"top\" width=\"15\" style=\"border: solid #[id number] 0px;\"><div align=\"center\"><font face=\"[place]\"><span style=\" font-size:11pt\">&nbsp;</span></font></div></td><td colspan=\"2\" align=\"left\" valign=\"top\" width=\"190\" style=\"border: solid #079176 0px;\"><div align=\"left\" style=\"margin-left:0mm; margin-right:2mm; text-indent:0mm; margin-top:0.00mm; margin-bottom:0.00mm;\"><font face=\"[facility]\"><span style=\" font-size:11pt\"><i>has not used bipap since June, 2020</i></span></font></div></td></tr><tr><td align=\"center\" valign=\"top\" width=\"15\" style=\"border: solid #[id number] 0px;\"><div align=\"center\"><font face=\"[place]\"><span style=\" font-size:11pt\">&bull;</span></font></div></td><td align=\"left\" valign=\"top\" width=\"133\" style=\"border: solid #849605 0px;\"><div align=\"left\" style=\"margin-left:0mm; margin-right:2mm; text-indent:0mm; margin-top:0.00mm; margin-bottom:0.00mm;\"><font face=\"[place]\"><span style=\" font-size:11pt\">Rectocele</span></font></div></td><td align=\"left\" valign=\"top\" width=\"57\" style=\"border: solid #[id number] 0px;\"><div align=\"left\"><font face=\"[place]\"><span style=\" font-size:11pt\">&nbsp;</span></font></div></td></tr><tr><td align=\"center\" valign=\"top\" width=\"15\" style=\"border: solid #[id number] 0px;\"><div align=\"center\"><font face=\"[place]\"><span style=\" font-size:11pt\">&bull;</span></font></div></td><td align=\"left\" valign=\"top\" width=\"133\" style=\"border: solid #[id number] 0px;\"><div align=\"left\" style=\"margin-left:0mm; margin-right:2mm; text-indent:0mm; margin-top:0.00mm; margin-bottom:0.00mm;\"><font face=\"[place]\"><span style=\" font-size:11pt\">Spinal stenosis</span></font></div></td><td align=\"left\" valign=\"top\" width=\"57\" style=\"border: solid #400774 0px;\"><div align=\"left\"><font face=\"[place]\"><span style=\" font-size:11pt\">&nbsp;</span></font></div></td></tr><tr><td align=\"center\" valign=\"top\" width=\"15\" style=\"border: solid #[id number] 0px;\"><div align=\"center\"><font face=\"[place]\"><span style=\" font-size:11pt\">&bull;</span></font></div></td><td align=\"left\" valign=\"top\" width=\"133\" style=\"border: solid #584074 0px;\"><div align=\"left\" style=\"margin-left:0mm; margin-right:2mm; text-indent:0mm; margin-top:0.00mm; margin-bottom:0.00mm;\"><font face=\"[place]\"><span style=\" font-size:11pt\">Thyroid disease</span></font></div></td><td align=\"left\" valign=\"top\" width=\"57\" style=\"border: solid #966081 0px;\"><div align=\"left\" style=\"margin-left:0mm; margin-right:2mm; 11pt;\">&nbsp;</span></div></td><td align=\"left\" valign=\"middle\" width=\"74\" style=\"border: solid #685422 0px;\"><div align=\"left\"><span style=\"font-size: 11pt;\">&nbsp;</span></div></td><td align=\"left\" valign=\"middle\" width=\"51\" style=\"border: solid #350800 0px;\"><div align=\"left\"><span style=\"font-size: 11pt;\">&nbsp;</span></div></td></tr><tr><td colspan=\"4\" align=\"left\" valign=\"top\" width=\"312\" style=\"border: solid #[id number] 0px;\"><div align=\"left\" style=\"margin-left:0mm; margin-right:2mm; text-indent:0mm; margin-top:0.00mm; margin-bottom:0.00mm;\"><font face=\"[facility]\" color=\"#[id number]\"><span style=\" font-size:11pt\"><b>Past Surgical History:</b></span></font></div></td></tr><tr><td colspan=\"2\" align=\"left\" valign=\"top\" width=\"187\" bgcolor=\"#eeeeee\" style=\"border: solid #[id number] 0px;\"><div align=\"left\" style=\"margin-left:0mm; margin-right:2mm; text-indent:0mm; margin-top:0.00mm; margin-bottom:0.00mm;\"><font face=\"arial\" color=\"#[id number]\"><span style=\" font-size:11pt\">Procedure</span></font></div></td><td align=\"left\" valign=\"top\" width=\"74\" bgcolor=\"#eeeeee\" style=\"border: solid #[id number] 0px;\"><div align=\"left\" style=\"margin-left:0mm; margin-right:2mm; text-indent:0mm; margin-top:0.00mm; margin-bottom:0.00mm;\"><font face=\"arial\" color=\"#[id number]\"><span style=\" font-size:11pt\">Laterality</span></font></div></td><td align=\"left\" valign=\"top\" width=\"51\" bgcolor=\"#eeeeee\" style=\"border: solid #[id number] 0px;\"><div align=\"left\" style=\"margin-left:0mm; margin-right:2mm; text-indent:0mm; margin-top:0.00mm; margin-bottom:0.00mm;\"><font face=\"arial\" color=\"#[id number]\"><span style=\" font-size:11pt\">Date</span></font></div></td></tr><tr><td align=\"center\" valign=\"top\" width=\"15\" style=\"border: solid #868132 0px;\"><div align=\"center\"><font face=\"[place]\"><span style=\" font-size:11pt\">&bull;</span></font></div></td><td align=\"left\" valign=\"top\" width=\"172\" style=\"border: solid #[id number] 0px;\"><div align=\"left\" style=\"margin-left:0mm; margin-right:2mm; 0px;\"><div align=\"left\" style=\"margin-left:0mm; margin-right:2mm; text-indent:0mm; margin-top:0.00mm; margin-bottom:0.00mm;\"><font face=\"[place]\"><span style=\" font-size:11pt\"> SECTION</span></font></div></td><td align=\"left\" valign=\"top\" width=\"74\" style=\"border: solid #911705 0px;\"><div align=\"left\"><font face=\"[place]\"><span style=\" font-size:11pt\">&nbsp;</span></font></div></td><td align=\"left\" valign=\"top\" width=\"51\" style=\"border: solid #[id number] 0px;\"><div align=\"left\" style=\"margin-left:0mm; margin-right:2mm; text-indent:0mm; margin-top:0.00mm; margin-bottom:0.00mm;\"><font face=\"[place]\"><span style=\" font-size:11pt\">/</span></font></div></td></tr><tr><td align=\"center\" valign=\"top\" width=\"15\" style=\"border: solid #[id number] 0px;\"><div align=\"center\"><font face=\"[place]\"><span style=\" font-size:11pt\">&nbsp;</span></font></div></td><td colspan=\"3\" align=\"left\" valign=\"top\" width=\"297\" style=\"border: solid #[id number] 0px;\"><div align=\"left\" style=\"margin-left:0mm; margin-right:2mm; text-indent:0mm; margin-top:0.00mm; margin-bottom:0.00mm;\"><font face=\"arial\"><span style=\" font-size:11pt\"><i>2x</i></span></font></div></td></tr><tr><td align=\"center\" valign=\"top\" width=\"15\" style=\"border: solid #777726 0px;\"><div align=\"center\"><font face=\"[place]\"><span style=\" font-size:11pt\">&bull;</span></font></div></td><td align=\"left\" valign=\"top\" width=\"172\" style=\"border: solid #775202 0px;\"><div align=\"left\" style=\"margin-left:0mm; margin-right:2mm; text-indent:0mm; margin-top:0.00mm; margin-bottom:0.00mm;\"><font face=\"[place]\"><span style=\" font-size:11pt\">CHOLECYSTECTOMY</span></font></div></td><td align=\"left\" valign=\"top\" width=\"74\" style=\"border: solid #147511 0px;\"><div align=\"left\"><font face=\"[place]\"><span style=\" font-size:11pt\">&nbsp;</span></font></div></td><td align=\"left\" valign=\"top\" width=\"51\" style=\"border: solid #857182 0px;\"><div align=\"left\"><font face=\"[place]\"><span style=\" #[id number] 0px;\"><div align=\"left\" style=\"margin-left:0mm; margin-right:2mm; text-indent:0mm; margin-top:0.00mm; margin-bottom:0.00mm;\"><font face=\"arial\"><span style=\" font-size:11pt\"><i>three</i></span></font></div></td></tr><tr><td align=\"center\" valign=\"top\" width=\"15\" style=\"border: solid #968267 0px;\"><div align=\"center\"><font face=\"[place]\"><span style=\" font-size:11pt\">&bull;</span></font></div></td><td align=\"left\" valign=\"top\" width=\"172\" style=\"border: solid #[id number] 0px;\"><div align=\"left\" style=\"margin-left:0mm; margin-right:2mm; text-indent:0mm; margin-top:0.00mm; margin-bottom:0.00mm;\"><font face=\"[place]\"><span style=\" font-size:11pt\">DIAGNOSTIC CARDIAC CATH LAB PROCEDURE</span></font></div></td><td align=\"left\" valign=\"top\" width=\"74\" style=\"border: solid #[id number] 0px;\"><div align=\"left\"><font face=\"[place]\"><span style=\" font-size:11pt\">&nbsp;</span></font></div></td><td align=\"left\" valign=\"top\" width=\"51\" style=\"border: solid #[id number] 0px;\"><div align=\"left\"><font face=\"[place]\"><span style=\" font-size:11pt\">&nbsp;</span></font></div></td></tr><tr><td align=\"center\" valign=\"top\" width=\"15\" style=\"border: solid #[id number] 0px;\"><div align=\"center\"><font face=\"[place]\"><span style=\" font-size:11pt\">&bull;</span></font></div></td><td align=\"left\" valign=\"top\" width=\"172\" style=\"border: solid #627511 0px;\"><div align=\"left\" style=\"margin-left:0mm; margin-right:2mm; text-indent:0mm; margin-top:0.00mm; margin-bottom:0.00mm;\"><font face=\"[place]\"><span style=\" font-size:11pt\">DILATION AND CURETTAGE OF UTERUS</span></font></div></td><td align=\"left\" valign=\"top\" width=\"74\" style=\"border: solid #850242 0px;\"><div align=\"left\"><font face=\"[place]\"><span style=\" font-size:11pt\">&nbsp;</span></font></div></td><td align=\"left\" valign=\"top\" width=\"51\" style=\"border: solid #908739 0px;\"><div align=\"left\" style=\"margin-left:0mm; margin-right:2mm; text-indent:0mm; margin-top:0.00mm; margin-bottom:0.00mm;\"><font face=\"[place]\"><span style=\" font-size:11pt\">1980</span></font></div></td></tr><tr><td align=\"center\" valign=\"top\" width=\"15\" style=\"border: solid #[id number] 0px;\"><div align=\"center\"><font face=\"[place]\"><span style=\" font-size:11pt\">&bull;</span></font></div></td><td align=\"left\" valign=\"top\" width=\"172\" style=\"border: solid #[id number] 0px;\"><div align=\"left\" style=\"margin-left:0mm; margin-right:2mm; text-indent:0mm; margin-top:0.00mm; margin-bottom:0.00mm;\"><font face=\"[place]\"><span style=\" font-size:11pt\">EYE SURGERY</span></font></div></td><td align=\"left\" valign=\"top\" width=\"74\" style=\"border: solid #[id number] 0px;\"><div align=\"left\" style=\"margin-left:0mm; margin-right:2mm; text-indent:0mm; margin-top:0.00mm; margin-bottom:0.00mm;\"><font face=\"[place]\"><span style=\" font-size:11pt\">Bilateral</span></font></div></td><td align=\"left\" valign=\"top\" width=\"51\" style=\"border: solid #[id number] 0px;\"><div align=\"left\"><font face=\"[place]\"><span style=\" font-size:11pt\">&nbsp;</span></font></div></td></tr><tr><td align=\"center\" valign=\"top\" width=\"15\" style=\"border: solid #[id number] 0px;\"><div align=\"center\"><font face=\"[place]\"><span style=\" font-size:11pt\">&nbsp;</span></font></div></td><td colspan=\"3\" align=\"left\" valign=\"top\" width=\"297\" style=\"border: solid #[id number] 0px;\"><div align=\"left\" style=\"margin-left:0mm; margin-right:2mm; text-indent:0mm; margin-top:0.00mm; margin-bottom:0.00mm;\"><font face=\"arial\"><span style=\" font-size:11pt\"><i>cataract</i></span></font></div></td></tr><tr><td align=\"center\" valign=\"top\" width=\"15\" style=\"border: solid #[id number] 0px;\"><div align=\"center\"><font face=\"[place]\"><span style=\" font-size:11pt\">&bull;</span></font></div></td><td align=\"left\" valign=\"top\" width=\"172\" style=\"border: solid #[id number] 0px;\"><div align=\"left\" style=\"margin-left:0mm; margin-right:2mm; text-indent:0mm; margin-top:0.00mm; margin-bottom:0.00mm;\"><font face=\"[place]\"><span style=\" font-size:11pt\">HC INJECTION PROCEDURE FOR SACROILIAC JOINT</span></font></div></td><td align=\"left\" valign=\"top\" width=\"74\" style=\"border: solid #[id number] 0px;\"><div align=\"left\" style=\"margin-left:0mm; margin-right:2mm; text-indent:0mm; margin-top:0.00mm; margin-bottom:0.00mm;\"><font face=\"[place]\"><span style=\" font-size:11pt\">Left</span></font></div></td><td align=\"left\" valign=\"top\" width=\"51\" style=\"border: solid #[id number] 0px;\"><div align=\"left\" style=\"margin-left:0mm; margin-right:2mm; text-indent:0mm; margin-top:0.00mm; margin-bottom:0.00mm;\"><font face=\"[place]\"><span style=\" font-size:11pt\">7/31/2018</span></font></div></td></tr><tr><td align=\"center\" valign=\"top\" width=\"15\" style=\"border: solid #[id number] 0px;\"><div align=\"center\"><font face=\"[place]\"><span style=\" font-size:11pt\">&nbsp;</span></font></div></td><td colspan=\"3\" align=\"left\" valign=\"top\" width=\"297\" style=\"border: solid #[id number] 0px;\"><div align=\"left\" style=\"margin-left:0mm; margin-right:2mm; text-indent:0mm; margin-top:0.00mm; margin-bottom:0.00mm;\"><font face=\"arial\"><span style=\" font-size:11pt\"><i>Left SIJ and piriformis, left trocanteric bursa injection performed by Bucky Farnsworth MD at [facility] OR</i></span></font></div></td></tr><tr><td align=\"center\" valign=\"top\" width=\"15\" style=\"border: solid #[id number] 0px;\"><div align=\"center\"><font face=\"[place]\"><span style=\" font-size:11pt\">&bull;</span></font></div></td><td align=\"left\" valign=\"top\" width=\"172\" style=\"border: solid #[id number] 0px;\"><div align=\"left\" style=\"margin-left:0mm; margin-right:2mm; text-indent:0mm; margin-top:0.00mm; margin-bottom:0.00mm;\"><font face=\"[place]\"><span style=\" font-size:11pt\">HYSTERECTOMY</span></font></div></td><td align=\"left\" valign=\"top\" width=\"74\" style=\"border: solid #[id number] 0px;\"><div align=\"left\"><font face=\"[place]\"><span style=\" font-size:11pt\">&nbsp;</span></font></div></td><td align=\"left\" valign=\"top\" width=\"51\" style=\"border: solid #[id number] 0px;\"><div align=\"left\" style=\"margin-left:0mm; margin-right:2mm; text-indent:0mm; margin-top:0.00mm; margin-bottom:0.00mm;\"><font face=\"[place]\"><span style=\" font-size:11pt\">12/2009</span></font></div></td></tr><tr><td align=\"center\" valign=\"top\" width=\"15\" style=\"border: solid #[id number] 0px;\"><div align=\"center\"><font face=\"[place]\"><span style=\" font-size:11pt\">&bull;</span></font></div></td><td align=\"left\" valign=\"top\" width=\"172\" style=\"border: solid #[id number] 0px;\"><div align=\"left\" style=\"margin-left:0mm; margin-right:2mm; text-indent:0mm; margin-top:0.00mm; margin-bottom:0.00mm;\"><font face=\"[place]\"><span style=\" font-size:11pt\">HYSTERECTOMY</span></font></div></td><td align=\"left\" valign=\"top\" width=\"74\" style=\"border: solid #[id number] 0px;\"><div align=\"left\"><font face=\"[place]\"><span style=\" font-size:11pt\">&nbsp;</span></font></div></td><td align=\"left\" valign=\"top\" width=\"51\" style=\"border: solid #[id number] 0px;\"><div align=\"left\" style=\"margin-left:0mm; margin-right:2mm; text-indent:0mm; margin-top:0.00mm; margin-bottom:0.00mm;\"><font face=\"[place]\"><span style=\" font-size:11pt\">2009</span></font></div></td></tr><tr><td align=\"center\" valign=\"top\" width=\"15\" style=\"border: solid #[id number] 0px;\"><div align=\"center\"><font face=\"[place]\"><span style=\" font-size:11pt\">&nbsp;</span></font></div></td><td colspan=\"3\" align=\"left\" valign=\"top\" width=\"297\" style=\"border: solid #[id number] 0px;\"><div align=\"left\" style=\"margin-left:0mm; margin-right:2mm; text-indent:0mm; margin-top:0.00mm; margin-bottom:0.00mm;\"><font face=\"arial\"><span style=\" font-size:11pt\"><i>Total</i></span></font></div></td></tr><tr><td align=\"center\" valign=\"top\" width=\"15\" style=\"border: solid #[id number] 0px;\"><div align=\"center\"><font face=\"[place]\"><span style=\" font-size:11pt\">&bull;</span></font></div></td><td align=\"left\" valign=\"top\" width=\"172\" style=\"border: solid #[id number] 0px;\"><div align=\"left\" style=\"margin-left:0mm; margin-right:2mm; text-indent:0mm; margin-top:0.00mm; margin-bottom:0.00mm;\"><font face=\"[place]\"><span style=\" font-size:11pt\">JOINT REPLACEMENT</span></font></div></td><td align=\"left\" valign=\"top\" width=\"74\" style=\"border: solid #[id number] 0px;\"><div align=\"left\" style=\"margin-left:0mm; margin-right:2mm; text-indent:0mm; margin-top:0.00mm; margin-bottom:0.00mm;\"><font face=\"[place]\"><span style=\" font-size:11pt\">Bilateral</span></font></div></td><td align=\"left\" valign=\"top\" width=\"51\" style=\"border: solid #[id number] 0px;\"><div align=\"left\"><font face=\"[place]\"><span style=\" font-size:11pt\">&nbsp;</span></font></div></td></tr><tr><td align=\"center\" valign=\"top\" width=\"15\" style=\"border: solid #[id number] 0px;\"><div align=\"center\"><font face=\"[place]\"><span style=\" font-size:11pt\">&nbsp;</span></font></div></td><td colspan=\"3\" align=\"left\" valign=\"top\" width=\"297\" style=\"border: solid #[id number] 0px;\"><div align=\"left\" style=\"margin-left:0mm; margin-right:2mm; text-indent:0mm; margin-top:0.00mm; margin-bottom:0.00mm;\"><font face=\"arial\"><span style=\" font-size:11pt\"><i>knee</i></span></font></div></td></tr><tr><td align=\"center\" valign=\"top\" width=\"15\" style=\"border: solid #[id number] 0px;\"><div align=\"center\"><font face=\"[place]\"><span style=\" font-size:11pt\">&bull;</span></font></div></td><td align=\"left\" valign=\"top\" width=\"172\" style=\"border: solid #[id number] 0px;\"><div align=\"left\" style=\"margin-left:0mm; margin-right:2mm; text-indent:0mm; margin-top:0.00mm; margin-bottom:0.00mm;\"><font face=\"[place]\"><span style=\" font-size:11pt\">KNEE ARTHROSCOPY</span></font></div></td><td align=\"left\" valign=\"top\" width=\"74\" style=\"border: solid #[id number] 0px;\"><div align=\"left\" style=\"margin-left:0mm; margin-right:2mm; text-indent:0mm; margin-top:0.00mm; margin-bottom:0.00mm;\"><font face=\"[place]\"><span style=\" font-size:11pt\">Left</span></font></div></td><td align=\"left\" valign=\"top\" width=\"51\" style=\"border: solid #[id number] 0px;\"><div align=\"left\" style=\"margin-left:0mm; margin-right:2mm; text-indent:0mm; margin-top:0.00mm; margin-bottom:0.00mm;\"><font face=\"[place]\"><span style=\" font-size:11pt\">2008</span></font></div></td></tr><tr><td align=\"center\" valign=\"top\" width=\"15\" style=\"border: solid #[id number] 0px;\"><div align=\"center\"><font face=\"[place]\"><span style=\" font-size:11pt\">&bull;</span></font></div></td><td align=\"left\" valign=\"top\" width=\"172\" style=\"border: solid #[id number] 0px;\"><div align=\"left\" style=\"margin-left:0mm; margin-right:2mm; text-indent:0mm; margin-top:0.00mm; margin-bottom:0.00mm;\"><font face=\"[place]\"><span style=\" font-size:11pt\">LUMBAR SPINE SURGERY</span></font></div></td><td align=\"left\" valign=\"top\" width=\"74\" style=\"border: solid #[id number] 0px;\"><div align=\"left\" style=\"margin-left:0mm; margin-right:2mm; text-indent:0mm; margin-top:0.00mm; margin-bottom:0.00mm;\"><font face=\"[place]\"><span style=\" font-size:11pt\">Bilateral</span></font></div></td><td align=\"left\" valign=\"top\" width=\"51\" style=\"border: solid #[id number] 0px;\"><div align=\"left\" style=\"margin-left:0mm; margin-right:2mm; text-indent:0mm; margin-top:0.00mm; margin-bottom:0.00mm;\"><font face=\"[place]\"><span style=\" font-size:11pt\">2/26/2019</span></font></div></td></tr><tr><td align=\"center\" valign=\"top\" width=\"15\" style=\"border: solid #[id number] 0px;\"><div align=\"center\"><font face=\"[place]\"><span style=\" font-size:11pt\">&nbsp;</span></font></div></td><td colspan=\"3\" align=\"left\" valign=\"top\" width=\"297\" style=\"border: solid #[id number] 0px;\"><div align=\"left\" style=\"margin-left:0mm; margin-right:2mm; text-indent:0mm; margin-top:0.00mm; margin-bottom:0.00mm;\"><font face=\"arial\"><span style=\" font-size:11pt\"><i>Bilateral L4 TRANSFORAMINAL &nbsp;[id number] performed by Sandeep Patterson MD at [facility] OR</i></span></font></div></td></tr><tr><td align=\"center\" valign=\"top\" width=\"15\" style=\"border: solid #[id number] 0px;\"><div align=\"center\"><font face=\"[place]\"><span style=\" font-size:11pt\">&bull;</span></font></div></td><td align=\"left\" valign=\"top\" width=\"172\" style=\"border: solid #[id number] 0px;\"><div align=\"left\" style=\"margin-left:0mm; margin-right:2mm; text-indent:0mm; margin-top:0.00mm; margin-bottom:0.00mm;\"><font face=\"[place]\"><span style=\" font-size:11pt\">LUMBAR SPINE SURGERY</span></font></div></td><td align=\"left\" valign=\"top\" width=\"74\" style=\"border: solid #[id number] 0px;\"><div align=\"left\" style=\"margin-left:0mm; margin-right:2mm; text-indent:0mm; margin-top:0.00mm; margin-bottom:0.00mm;\"><font face=\"[place]\"><span style=\" font-size:11pt\">Bilateral</span></font></div></td><td align=\"left\" valign=\"top\" width=\"51\" style=\"border: solid #[id number] 0px;\"><div align=\"left\" style=\"margin-left:0mm; margin-right:2mm; text-indent:0mm; margin-top:0.00mm; margin-bottom:0.00mm;\"><font face=\"[place]\"><span style=\" font-size:11pt\">3/12/2019</span></font></div></td></tr><tr><td align=\"center\" valign=\"top\" width=\"15\" style=\"border: solid #[id number] 0px;\"><div align=\"center\"><font face=\"[place]\"><span style=\" font-size:11pt\">&nbsp;</span></font></div></td><td colspan=\"3\" align=\"left\" valign=\"top\" width=\"297\" style=\"border: solid #[id number] 0px;\"><div align=\"left\" style=\"margin-left:0mm; margin-right:2mm; text-indent:0mm; margin-top:0.00mm; margin-bottom:0.00mm;\"><font face=\"arial\"><span style=\" font-size:11pt\"><i>Bilateral L4 TRANSFORAMINAL performed by Eddie Campuzano MD at [facility] OR</i></span></font></div></td></tr><tr><td align=\"center\" valign=\"top\" width=\"15\" style=\"border: solid #[id number] 0px;\"><div align=\"center\"><font face=\"[place]\"><span style=\" font-size:11pt\">&bull;</span></font></div></td><td align=\"left\" valign=\"top\" width=\"172\" style=\"border: solid #[id number] 0px;\"><div align=\"left\" style=\"margin-left:0mm; margin-right:2mm; text-indent:0mm; margin-top:0.00mm; margin-bottom:0.00mm;\"><font face=\"[place]\"><span style=\" font-size:11pt\">LUMBAR SPINE SURGERY</span></font></div></td><td align=\"left\" valign=\"top\" width=\"74\" style=\"border: solid #[id number] 0px;\"><div align=\"left\" style=\"margin-left:0mm; margin-right:2mm; text-indent:0mm; margin-top:0.00mm; margin-bottom:0.00mm;\"><font face=\"[place]\"><span style=\" font-size:11pt\">Right</span></font></div></td><td align=\"left\" valign=\"top\" width=\"51\" style=\"border: solid #[id number] 0px;\"><div align=\"left\" style=\"margin-left:0mm; margin-right:2mm; text-indent:0mm; margin-top:0.00mm; margin-bottom:0.00mm;\"><font face=\"[place]\"><span style=\" font-size:11pt\">10/15/2019</span></font></div></td></tr><tr><td align=\"center\" valign=\"top\" width=\"15\" style=\"border: solid #[id number] 0px;\"><div align=\"center\"><font face=\"[place]\"><span style=\" font-size:11pt\">&nbsp;</span></font></div></td><td colspan=\"3\" align=\"left\" valign=\"top\" width=\"297\" style=\"border: solid #[id number] 0px;\"><div align=\"left\" style=\"margin-left:0mm; margin-right:2mm; text-indent:0mm; margin-top:0.00mm; margin-bottom:0.00mm;\"><font face=\"arial\"><span style=\" font-size:11pt\"><i>RIGHT L4 &amp; L5 TRANSFORAMINAL performed by Theo Duncan MD at [address] [place] [address] OR</i></span></font></div></td></tr><tr><td align=\"center\" valign=\"top\" width=\"15\" style=\"border: solid #[id number] 0px;\"><div align=\"center\"><font face=\"[place]\"><span style=\" font-size:11pt\">&bull;</span></font></div></td><td align=\"left\" valign=\"top\" width=\"172\" style=\"border: solid #[id number] 0px;\"><div align=\"left\" style=\"margin-left:0mm; margin-right:2mm; text-indent:0mm; margin-top:0.00mm; margin-bottom:0.00mm;\"><font face=\"[place]\"><span style=\" font-size:11pt\">LUMBAR SPINE SURGERY</span></font></div></td><td align=\"left\" valign=\"top\" width=\"74\" style=\"border: solid #[id number] 0px;\"><div align=\"left\" style=\"margin-left:0mm; margin-right:2mm; text-indent:0mm; margin-top:0.00mm; margin-bottom:0.00mm;\"><font face=\"[place]\"><span style=\" font-size:11pt\">Left</span></font></div></td><td align=\"left\" valign=\"top\" width=\"51\" style=\"border: solid #[id number] 0px;\"><div align=\"left\" style=\"margin-left:0mm; margin-right:2mm; text-indent:0mm; margin-top:0.00mm; margin-bottom:0.00mm;\"><font face=\"[place]\"><span style=\" font-size:11pt\">11/1/2019</span></font></div></td></tr><tr><td align=\"center\" valign=\"top\" width=\"15\" style=\"border: solid #[id number] 0px;\"><div align=\"center\"><font face=\"[place]\"><span style=\" font-size:11pt\">&nbsp;</span></font></div></td><td colspan=\"3\" align=\"left\" valign=\"top\" width=\"297\" style=\"border: solid #[id number] 0px;\"><div align=\"left\" style=\"margin-left:0mm; margin-right:2mm; text-indent:0mm; margin-top:0.00mm; margin-bottom:0.00mm;\"><font face=\"arial\"><span style=\" font-size:11pt\"><i>Left L4 &amp; L5 TRANSFORAMINAL performed by Osbaldo Ramos MD at [facility] OR</i></span></font></div></td></tr><tr><td align=\"center\" valign=\"top\" width=\"15\" style=\"border: solid #[id number] 0px;\"><div align=\"center\"><font face=\"[place]\"><span style=\" font-size:11pt\">&bull;</span></font></div></td><td align=\"left\" valign=\"top\" width=\"172\" style=\"border: solid #[id number] 0px;\"><div align=\"left\" style=\"margin-left:0mm; margin-right:2mm; text-indent:0mm; margin-top:0.00mm; margin-bottom:0.00mm;\"><font face=\"[place]\"><span style=\" font-size:11pt\">PACEMAKER PLACEMENT</span></font></div></td><td align=\"left\" valign=\"top\" width=\"74\" style=\"border: solid #[id number] 0px;\"><div align=\"left\"><font face=\"[place]\"><span style=\" font-size:11pt\">&nbsp;</span></font></div></td><td align=\"left\" valign=\"top\" width=\"51\" style=\"border: solid #[id number] 0px;\"><div align=\"left\" style=\"margin-left:0mm; margin-right:2mm; text-indent:0mm; margin-top:0.00mm; margin-bottom:0.00mm;\"><font face=\"[place]\"><span style=\" font-size:11pt\">8/2014</span></font></div></td></tr><tr><td align=\"center\" valign=\"top\" width=\"15\" style=\"border: solid #[id number] 0px;\"><div align=\"center\"><font face=\"[place]\"><span style=\" font-size:11pt\">&bull;</span></font></div></td><td align=\"left\" valign=\"top\" width=\"172\" style=\"border: solid #[id number] 0px;\"><div align=\"left\" style=\"margin-left:0mm; margin-right:2mm; text-indent:0mm; margin-top:0.00mm; margin-bottom:0.00mm;\"><font face=\"[place]\"><span style=\" font-size:11pt\">PAIN MANAGEMENT PROCEDURE</span></font></div></td><td align=\"left\" valign=\"top\" width=\"74\" style=\"border: solid #[id number] 0px;\"><div align=\"left\" style=\"margin-left:0mm; margin-right:2mm; text-indent:0mm; margin-top:0.00mm; margin-bottom:0.00mm;\"><font face=\"[place]\"><span style=\" font-size:11pt\">Right</span></font></div></td><td align=\"left\" valign=\"top\" width=\"51\" style=\"border: solid #[id number] 0px;\"><div align=\"left\" style=\"margin-left:0mm; margin-right:2mm; text-indent:0mm; margin-top:0.00mm; margin-bottom:0.00mm;\"><font face=\"[place]\"><span style=\" font-size:11pt\">2/7/2020</span></font></div></td></tr><tr><td align=\"center\" valign=\"top\" width=\"15\" style=\"border: solid #[id number] 0px;\"><div align=\"center\"><font face=\"[place]\"><span style=\" font-size:11pt\">&nbsp;</span></font></div></td><td colspan=\"3\" align=\"left\" valign=\"top\" width=\"297\" style=\"border: solid #[id number] 0px;\"><div align=\"left\" style=\"margin-left:0mm; margin-right:2mm; text-indent:0mm; margin-top:0.00mm; margin-bottom:0.00mm;\"><font face=\"arial\"><span style=\" font-size:11pt\"><i>Right L4 &amp; L5 TRANSFORAMINAL performed by Chon Lopez MD at [facility] OR</i></span></font></div></td></tr><tr><td align=\"center\" valign=\"top\" width=\"15\" style=\"border: solid #[id number] 0px;\"><div align=\"center\"><font face=\"[place]\"><span style=\" font-size:11pt\">&bull;</span></font></div></td><td align=\"left\" valign=\"top\" width=\"172\" style=\"border: solid #[id number] 0px;\"><div align=\"left\" style=\"margin-left:0mm; margin-right:2mm; text-indent:0mm; margin-top:0.00mm; margin-bottom:0.00mm;\"><font face=\"[place]\"><span style=\" font-size:11pt\">PAIN MANAGEMENT PROCEDURE</span></font></div></td><td align=\"left\" valign=\"top\" width=\"74\" style=\"border: solid #[id number] 0px;\"><div align=\"left\" style=\"margin-left:0mm; margin-right:2mm; text-indent:0mm; margin-top:0.00mm; margin-bottom:0.00mm;\"><font face=\"[place]\"><span style=\" font-size:11pt\">Left</span></font></div></td><td align=\"left\" valign=\"top\" width=\"51\" style=\"border: solid #[id number] 0px;\"><div align=\"left\" style=\"margin-left:0mm; margin-right:2mm; text-indent:0mm; margin-top:0.00mm; margin-bottom:0.00mm;\"><font face=\"[place]\"><span style=\" font-size:11pt\">2/21/2020</span></font></div></td></tr><tr><td align=\"center\" valign=\"top\" width=\"15\" style=\"border: solid #[id number] 0px;\"><div align=\"center\"><font face=\"[place]\"><span style=\" font-size:11pt\">&nbsp;</span></font></div></td><td colspan=\"3\" align=\"left\" valign=\"top\" width=\"297\" style=\"border: solid #[id number] 0px;\"><div align=\"left\" style=\"margin-left:0mm; margin-right:2mm; text-indent:0mm; margin-top:0.00mm; margin-bottom:0.00mm;\"><font face=\"arial\"><span style=\" font-size:11pt\"><i>Left L4 &amp; L5 TRANSFORAMINAL performed by Osbaldo Ramos MD at [facility] [place]</i></span></font></div></td></tr><tr><td align=\"center\" valign=\"top\" width=\"15\" style=\"border: solid #[id number] 0px;\"><div align=\"center\"><font face=\"[place]\"><span style=\" font-size:11pt\">&bull;</span></font></div></td><td align=\"left\" valign=\"top\" width=\"172\" style=\"border: solid #[id number] 0px;\"><div align=\"left\" style=\"margin-left:0mm; margin-right:2mm; text-indent:0mm; margin-top:0.00mm; margin-bottom:0.00mm;\"><font face=\"[place]\"><span style=\" font-size:11pt\">PAIN MANAGEMENT PROCEDURE</span></font></div></td><td align=\"left\" valign=\"top\" width=\"74\" style=\"border: solid #[id number] 0px;\"><div align=\"left\" style=\"margin-left:0mm; margin-right:2mm; text-indent:0mm; margin-top:0.00mm; margin-bottom:0.00mm;\"><font face=\"[place]\"><span style=\" font-size:11pt\">Right</span></font></div></td><td align=\"left\" valign=\"top\" width=\"51\" style=\"border: solid #[id number] 0px;\"><div align=\"left\" style=\"margin-left:0mm; margin-right:2mm; text-indent:0mm; margin-top:0.00mm; margin-bottom:0.00mm;\"><font face=\"[place]\"><span style=\" font-size:11pt\">2/23/2021</span></font></div></td></tr><tr><td align=\"center\" valign=\"top\" width=\"15\" style=\"border: solid #[id number] 0px;\"><div align=\"center\"><font face=\"[place]\"><span style=\" font-size:11pt\">&nbsp;</span></font></div></td><td colspan=\"3\" align=\"left\" valign=\"top\" width=\"297\" style=\"border: solid #[id number] 0px;\"><div align=\"left\" style=\"margin-left:0mm; margin-right:2mm; text-indent:0mm; margin-top:0.00mm; margin-bottom:0.00mm;\"><font face=\"arial\"><span style=\" font-size:11pt\"><i>Right L4 &amp; L5 TRANSFORAMINAL performed by Eddie Campuzano MD at [facility] [place]</i></span></font></div></td></tr><tr><td align=\"center\" valign=\"top\" width=\"15\" style=\"border: solid #[id number] 0px;\"><div align=\"center\"><font face=\"[place]\"><span style=\" font-size:11pt\">&bull;</span></font></div></td><td align=\"left\" valign=\"top\" width=\"172\" style=\"border: solid #[id number] 0px;\"><div align=\"left\" style=\"margin-left:0mm; margin-right:2mm; text-indent:0mm; margin-top:0.00mm; margin-bottom:0.00mm;\"><font face=\"[place]\"><span style=\" font-size:11pt\">PAIN MANAGEMENT PROCEDURE</span></font></div></td><td align=\"left\" valign=\"top\" width=\"74\" style=\"border: solid #[id number] 0px;\"><div align=\"left\" style=\"margin-left:0mm; margin-right:2mm; text-indent:0mm; margin-top:0.00mm; margin-bottom:0.00mm;\"><font face=\"[place]\"><span style=\" font-size:11pt\">Left</span></font></div></td><td align=\"left\" valign=\"top\" width=\"51\" style=\"border: solid #[id number] 0px;\"><div align=\"left\" style=\"margin-left:0mm; margin-right:2mm; text-indent:0mm; margin-top:0.00mm; margin-bottom:0.00mm;\"><font face=\"[place]\"><span style=\" font-size:11pt\">3/11/2021</span></font></div></td></tr><tr><td align=\"center\" valign=\"top\" width=\"15\" style=\"border: solid #[id number] 0px;\"><div align=\"center\"><font face=\"[place]\"><span style=\" font-size:11pt\">&nbsp;</span></font></div></td><td colspan=\"3\" align=\"left\" valign=\"top\" width=\"297\" style=\"border: solid #[id number] 0px;\"><div align=\"left\" style=\"margin-left:0mm; margin-right:2mm; text-indent:0mm; margin-top:0.00mm; margin-bottom:0.00mm;\"><font face=\"arial\"><span style=\" font-size:11pt\"><i>left L4 &amp; L5 TRANSFORAMINAL performed by Annel Hylton MD at [facility] OR</i></span></font></div></td></tr><tr><td align=\"center\" valign=\"top\" width=\"15\" style=\"border: solid #[id number] 0px;\"><div align=\"center\"><font face=\"[place]\"><span style=\" font-size:11pt\">&bull;</span></font></div></td><td align=\"left\" valign=\"top\" width=\"172\" style=\"border: solid #[id number] 0px;\"><div align=\"left\" style=\"margin-left:0mm; margin-right:2mm; text-indent:0mm; margin-top:0.00mm; margin-bottom:0.00mm;\"><font face=\"[place]\"><span style=\" font-size:11pt\">PAIN MANAGEMENT PROCEDURE</span></font></div></td><td align=\"left\" valign=\"top\" width=\"74\" style=\"border: solid #[id number] 0px;\"><div align=\"left\" style=\"margin-left:0mm; margin-right:2mm; text-indent:0mm; margin-top:0.00mm; margin-bottom:0.00mm;\"><font face=\"[place]\"><span style=\" font-size:11pt\">Left</span></font></div></td><td align=\"left\" valign=\"top\" width=\"51\" style=\"border: solid #[id number] 0px;\"><div align=\"left\" style=\"margin-left:0mm; margin-right:2mm; text-indent:0mm; margin-top:0.00mm; margin-bottom:0.00mm;\"><font face=\"[place]\"><span style=\" font-size:11pt\">5/6/2021</span></font></div></td></tr><tr><td align=\"center\" valign=\"top\" width=\"15\" style=\"border: solid #[id number] 0px;\"><div align=\"center\"><font face=\"[place]\"><span style=\" font-size:11pt\">&nbsp;</span></font></div></td><td colspan=\"3\" align=\"left\" valign=\"top\" width=\"297\" style=\"border: solid #[id number] 0px;\"><div align=\"left\" style=\"margin-left:0mm; margin-right:2mm; text-indent:0mm; margin-top:0.00mm; margin-bottom:0.00mm;\"><font face=\"arial\"><span style=\" font-size:11pt\"><i>Left L4 &amp; L5 TRANSFORAMINAL performed by Annel Hylton MD at [facility] OR</i></span></font></div></td></tr><tr><td align=\"center\" valign=\"top\" width=\"15\" style=\"border: solid #[id number] 0px;\"><div align=\"center\"><font face=\"[place]\"><span style=\" font-size:11pt\">&bull;</span></font></div></td><td align=\"left\" valign=\"top\" width=\"172\" style=\"border: solid #[id number] 0px;\"><div align=\"left\" style=\"margin-left:0mm; margin-right:2mm; text-indent:0mm; margin-top:0.00mm; margin-bottom:0.00mm;\"><font face=\"[place]\"><span style=\" font-size:11pt\">OK INJ DX/THER AGNT PARAVERT FACET JOINT, LUMBAR/SAC, 2ND LEVEL</span></font></div></td><td align=\"left\" valign=\"top\" width=\"74\" style=\"border: solid #[id number] 0px;\"><div align=\"left\" style=\"margin-left:0mm; margin-right:2mm; text-indent:0mm; margin-top:0.00mm; margin-bottom:0.00mm;\"><font face=\"[place]\"><span style=\" font-size:11pt\">Bilateral</span></font></div></td><td align=\"left\" valign=\"top\" width=\"51\" style=\"border: solid #[id number] 0px;\"><div align=\"left\" style=\"margin-left:0mm; margin-right:2mm; text-indent:0mm; margin-top:0.00mm; margin-bottom:0.00mm;\"><font face=\"[place]\"><span style=\" font-size:11pt\">8/30/2018</span></font></div></td></tr><tr><td align=\"center\" valign=\"top\" width=\"15\" style=\"border: solid #[id number] 0px;\"><div align=\"center\"><font face=\"[place]\"><span style=\" font-size:11pt\">&nbsp;</span></font></div></td><td colspan=\"3\" align=\"left\" valign=\"top\" width=\"297\" style=\"border: solid #[id number] 0px;\"><div align=\"left\" style=\"margin-left:0mm; margin-right:2mm; text-indent:0mm; margin-top:0.00mm; margin-bottom:0.00mm;\"><font face=\"arial\"><span style=\" font-size:11pt\"><i>Bilateral L2 L3 L4 L5 Diagnostic Medial BB performed by Theo Duncan MD at [facility] [place]</i></span></font></div></td></tr><tr><td align=\"center\" valign=\"top\" width=\"15\" style=\"border: solid #[id number] 0px;\"><div align=\"center\"><font face=\"[place]\"><span style=\" font-size:11pt\">&bull;</span></font></div></td><td align=\"left\" valign=\"top\" width=\"172\" style=\"border: solid #[id number] 0px;\"><div align=\"left\" style=\"margin-left:0mm; margin-right:2mm; text-indent:0mm; margin-top:0.00mm; margin-bottom:0.00mm;\"><font face=\"[place]\"><span style=\" font-size:11pt\">OK INJ DX/THER AGNT PARAVERT FACET JOINT, LUMBAR/SAC, 2ND LEVEL</span></font></div></td><td align=\"left\" valign=\"top\" width=\"74\" style=\"border: solid #[id number] 0px;\"><div align=\"left\" style=\"margin-left:0mm; margin-right:2mm; text-indent:0mm; margin-top:0.00mm; margin-bottom:0.00mm;\"><font face=\"[place]\"><span style=\" font-size:11pt\">Right</span></font></div></td><td align=\"left\" valign=\"top\" width=\"51\" style=\"border: solid #[id number] 0px;\"><div align=\"left\" style=\"margin-left:0mm; margin-right:2mm; text-indent:0mm; margin-top:0.00mm; margin-bottom:0.00mm;\"><font face=\"[place]\"><span style=\" font-size:11pt\">9/13/2018</span></font></div></td></tr><tr><td align=\"center\" valign=\"top\" width=\"15\" style=\"border: solid #[id number] 0px;\"><div align=\"center\"><font face=\"[place]\"><span style=\" font-size:11pt\">&nbsp;</span></font></div></td><td colspan=\"3\" align=\"left\" valign=\"top\" width=\"297\" style=\"border: solid #[id number] 0px;\"><div align=\"left\" style=\"margin-left:0mm; margin-right:2mm; text-indent:0mm; margin-top:0.00mm; margin-bottom:0.00mm;\"><font face=\"arial\"><span style=\" font-size:11pt\"><i>Right L2 L3 L4 L5 Confirmatory Medial BB performed by Alok Hogue MD at [facility] OR</i></span></font></div></td></tr><tr><td align=\"center\" valign=\"top\" width=\"15\" style=\"border: solid #[id number] 0px;\"><div align=\"center\"><font face=\"[place]\"><span style=\" font-size:11pt\">&bull;</span></font></div></td><td align=\"left\" valign=\"top\" width=\"172\" style=\"border: solid #[id number] 0px;\"><div align=\"left\" style=\"margin-left:0mm; margin-right:2mm; text-indent:0mm; margin-top:0.00mm; margin-bottom:0.00mm;\"><font face=\"[place]\"><span style=\" font-size:11pt\">GA NJX AA&amp;/STRD TFRML EPI CERVICAL/THORACIC EA ADDL</span></font></div></td><td align=\"left\" valign=\"top\" width=\"74\" style=\"border: solid #195612 0px;\"><div align=\"left\" style=\"margin-left:0mm; margin-right:2mm; text-indent:0mm; margin-top:0.00mm; margin-bottom:0.00mm;\"><font face=\"[place]\"><span style=\" font-size:11pt\">Right</span></font></div></td><td align=\"left\" valign=\"top\" width=\"51\" style=\"border: solid #[id number] 0px;\"><div align=\"left\" margin-bottom:0.00mm;\"><font face=\"[place]\"><span style=\" font-size:11pt\">TONSILLECTOMY</span></font></div></td><td align=\"left\" valign=\"top\" width=\"74\" style=\"border: solid #[id number] 0px;\"><div align=\"left\"><font face=\"[place]\"><span style=\" font-size:11pt\">&nbsp;</span></font></div></td><td align=\"left\" valign=\"top\" width=\"51\" style=\"border: solid #[id number] 0px;\"><div align=\"left\"><font face=\"[place]\"><span style=\" font-size:11pt\">&nbsp;</span></font></div></td></tr><tr><td align=\"center\" valign=\"top\" width=\"15\" style=\"border: solid #[id number] 0px;\"><div align=\"center\"><font face=\"[place]\"><span style=\" font-size:11pt\">&nbsp;</span></font></div></td><td colspan=\"3\" align=\"left\" valign=\"top\" width=\"297\" style=\"border: solid #[id number] 0px;\"><div align=\"left\" style=\"margin-left:0mm; margin-right:2mm; text-indent:0mm; margin-top:0.00mm; margin-bottom:0.00mm;\"><font face=\"arial\"><span style=\" font-size:11pt\"><i>at age 16</i></span></font></div></td></tr></table><div align=\"left\">&nbsp;</div></div></td></tr></table><div align=\"left\"><font face=\"[place]\"><span style=\" font-size:11pt\">&nbsp;</span></font></div><table width=\"197\" cellpadding=\"0\" cellspacing=\"0\" style=\"border-collapse: collapse; border: none; \"><tr><td align=\"left\" valign=\"middle\" width=\"197\" style=\"border: solid #[id number] 0px;\"><div align=\"left\"><div align=\"left\"><font face=\"[place]\"><span style=\" font-size:11pt\">Family History</span></font><img src=\"C:\ProgramData\Epic\95\TempData\[id number]. [id number]\" width=\"1\" height=\"1\" alt=\"graphic\"/></div></div></td></tr><tr><td align=\"left\" valign=\"middle\" width=\"197\" style=\"border: solid #[id number] 0px;\"><div align=\"left\"><span style=\"font-size: 11pt;\">&nbsp;</span><table width=\"197\" cellpadding=\"0\" cellspacing=\"0\" style=\"border-collapse: collapse; border: none; \"><tr><td align=\"left\" valign=\"middle\" width=\"15\" style=\"border: solid #[id number] 0px;\"><div align=\"left\"><span color=\"#[id number]\"><span style=\" font-size:11pt\"><b>Social History</b></span></font></div></td></tr></table><div align=\"left\"><font face=\"[place]\"><span style=\" font-size:11pt\">&nbsp;</span></font></div><table width=\"201\" cellpadding=\"0\" cellspacing=\"0\" style=\"border-collapse: collapse; border: none; \"><tr><td align=\"left\" valign=\"middle\" width=\"15\" style=\"border: solid #[id number] 0px;\"><div align=\"left\"><span style=\"font-size: 11pt;\">&nbsp;</span><div align=\"left\">&nbsp;</div></div></td><td align=\"left\" valign=\"middle\" width=\"22\" style=\"border: solid #211610 0px;\"><div align=\"left\"><span style=\"font-size: 11pt;\">&nbsp;</span></div></td><td align=\"left\" valign=\"middle\" width=\"70\" style=\"border: solid #[id number] 0px;\"><div align=\"left\"><span style=\"font-size: 11pt;\">&nbsp;</span></div></td><td align=\"left\" valign=\"middle\" width=\"93\" style=\"border: solid #193685 0px;\"><div align=\"left\"><span style=\"font-size: 11pt;\">&nbsp;</span></div></td></tr><tr><td colspan=\"4\" align=\"left\" valign=\"top\" width=\"201\" bgcolor=\"#eeeeee\" style=\"border: solid #[id number] 0px;\"><div align=\"left\" style=\"margin-left:0mm; margin-right:2mm; text-indent:0mm; margin-top:0.00mm; margin-bottom:0.00mm;\"><font face=\"arial\" color=\"#[id number]\"><span style=\" font-size:11pt\">Socioeconomic History</span></font></div></td></tr><tr><td align=\"center\" valign=\"top\" width=\"15\" style=\"border: solid #966380 0px;\"><div align=\"center\"><font face=\"[place]\"><span style=\" font-size:11pt\">&bull;</span></font></div></td><td colspan=\"2\" align=\"left\" valign=\"top\" width=\"92\" style=\"border: solid #472294 0px;\"><div align=\"left\" style=\"margin-left:0mm; margin-right:2mm; text-indent:0mm; margin-top:0.00mm; margin-bottom:0.00mm;\"><font face=\"[place]\"><span style=\" font-size:11pt\">Marital status:</span></font></div></td><td align=\"left\" valign=\"top\" width=\"93\" style=\"border: solid #733113 0px;\"><div align=\"left\" style=\"margin-left:0mm; margin-right:2mm; text-indent:0mm; margin-top:0.00mm; margin-bottom:0.00mm;\"><font face=\"[place]\"><span style=\" font-size:11pt\"></span></font></div></td></tr><tr><td align=\"center\" valign=\"top\" width=\"15\" style=\"border: solid #913662 0px;\"><div align=\"center\"><font face=\"[place]\"><span style=\" font-size:11pt\">&nbsp;</span></font></div></td><td align=\"center\" valign=\"top\" width=\"22\" style=\"border: solid #053297 0px;\"><div align=\"center\"><font face=\"[place]\"><span style=\" font-size:11pt\">&nbsp;</span></font></div></td><td align=\"left\" valign=\"top\" width=\"70\" style=\"border: solid #546785 0px;\"><div align=\"left\" style=\"margin-left:0mm; margin-right:2mm; text-indent:0mm; margin-top:0.00mm; margin-bottom:0.00mm;\"><font face=\"[place]\"><span style=\" font-size:11pt\">Spouse name:</span></font></div></td><td align=\"left\" valign=\"top\" width=\"93\" style=\"border: solid #962844 0px;\"><div align=\"left\" style=\"margin-left:0mm; margin-right:2mm; text-indent:0mm; margin-top:0.00mm; margin-bottom:0.00mm;\"><font face=\"[place]\"><span style=\" font-size:11pt\">None</span></font></div></td></tr><tr><td align=\"center\" valign=\"top\" width=\"15\" style=\"border: solid #930991 0px;\"><div align=\"center\"><font face=\"[place]\"><span style=\" font-size:11pt\">&bull;</span></font></div></td><td colspan=\"2\" align=\"left\" valign=\"top\" width=\"92\" style=\"border: solid #816427 0px;\"><div align=\"left\" style=\"margin-left:0mm; margin-right:2mm; text-indent:0mm; margin-top:0.00mm; margin-bottom:0.00mm;\"><font face=\"[place]\"><span style=\" font-size:11pt\">Number of children:</span></font></div></td><td align=\"left\" valign=\"top\" width=\"93\" style=\"border: solid #720296 0px;\"><div align=\"left\" style=\"margin-left:0mm; margin-right:2mm; text-indent:0mm; margin-top:0.00mm; margin-bottom:0.00mm;\"><font face=\"[place]\"><span style=\" font-size:11pt\">None</span></font></div></td></tr><tr><td align=\"center\" valign=\"top\" width=\"15\" style=\"border: solid #033469 0px;\"><div align=\"center\"><font face=\"[place]\"><span style=\" font-size:11pt\">&bull;</span></font></div></td><td colspan=\"2\" align=\"left\" valign=\"top\" width=\"92\" style=\"border: solid #[id number] 0px;\"><div align=\"left\" style=\"margin-left:0mm; margin-right:2mm; text-indent:0mm; margin-top:0.00mm; margin-bottom:0.00mm;\"><font face=\"[place]\"><span style=\" font-size:11pt\">Years of education:</span></font></div></td><td align=\"left\" valign=\"top\" width=\"93\" style=\"border: solid #738989 0px;\"><div align=\"left\" style=\"margin-left:0mm; margin-right:2mm; text-indent:0mm; margin-top:0.00mm; margin-bottom:0.00mm;\"><font face=\"[place]\"><span style=\" font-size:11pt\">None</span></font></div></td></tr><tr><td align=\"center\" valign=\"top\" width=\"15\" style=\"border: solid #435116 0px;\"><div align=\"center\"><font face=\"[place]\"><span style=\" font-size:11pt\">&bull;</span></font></div></td><td colspan=\"2\" align=\"left\" valign=\"top\" width=\"92\" style=\"border: solid #[id number] 0px;\"><div align=\"left\" style=\"margin-left:0mm; margin-right:2mm; text-indent:0mm; margin-top:0.00mm; margin-bottom:0.00mm;\"><font face=\"[place]\"><span style=\" font-size:11pt\">Highest education level:</span></font></div></td><td align=\"left\" valign=\"top\" width=\"93\" style=\"border: solid #418190 0px;\"><div align=\"left\" style=\"margin-left:0mm; margin-right:2mm; text-indent:0mm; margin-top:0.00mm; margin-bottom:0.00mm;\"><font face=\"[place]\"><span style=\" font-size:11pt\">None</span></font></div></td></tr><tr><td colspan=\"4\" align=\"left\" valign=\"top\" width=\"201\" bgcolor=\"#eeeeee\" style=\"border: solid #[id number] 0px;\"><div align=\"left\" style=\"margin-left:0mm; margin-right:2mm; text-indent:0mm; margin-top:0.00mm; margin-bottom:0.00mm;\"><font face=\"arial\" color=\"#[id number]\"><span style=\" font-size:11pt\">Occupational History</span></font></div></td></tr><tr><td align=\"center\" valign=\"top\" width=\"15\" style=\"border: solid #322992 0px;\"><div align=\"center\"><font face=\"[place]\"><span style=\" font-size:11pt\">&bull;</span></font></div></td><td colspan=\"2\" align=\"left\" valign=\"top\" width=\"92\" style=\"border: solid #699947 0px;\"><div align=\"left\" style=\"margin-left:0mm; margin-right:2mm; text-indent:0mm; margin-top:0.00mm; margin-bottom:0.00mm;\"><font face=\"[place]\"><span style=\" font-size:11pt\">Occupation:</span></font></div></td><td align=\"left\" valign=\"top\" width=\"93\" style=\"border: solid #223680 0px;\"><div align=\"left\" style=\"margin-left:0mm; margin-right:2mm; text-indent:0mm; margin-top:0.00mm; margin-bottom:0.00mm;\"><font face=\"[place]\"><span style=\" font-size:11pt\">retired</span></font></div></td></tr><tr><td colspan=\"4\" align=\"left\" valign=\"top\" width=\"201\" bgcolor=\"#eeeeee\" style=\"border: solid #399508 0px;\"><div align=\"left\" style=\"margin-left:0mm; margin-right:2mm; text-indent:0mm; margin-top:0.00mm; margin-bottom:0.00mm;\"><font face=\"arial\" color=\"#[id number]\"><span style=\" font-size:11pt\">Tobacco Use</span></font></div></td></tr><tr><td align=\"center\" valign=\"top\" width=\"15\" style=\"border: solid #066385 0px;\"><div align=\"center\"><font face=\"[place]\"><span style=\" font-size:11pt\">&bull;</span></font></div></td><td colspan=\"2\" align=\"left\" valign=\"top\" width=\"92\" style=\"border: solid #061858 0px;\"><div align=\"left\" style=\"margin-left:0mm; margin-right:2mm; text-indent:0mm; margin-top:0.00mm; margin-bottom:0.00mm;\"><font face=\"[place]\"><span style=\" font-size:11pt\">Smoking status:</span></font></div></td><td align=\"left\" valign=\"top\" width=\"93\" style=\"border: solid #953135 0px;\"><div align=\"left\" style=\"margin-left:0mm; margin-right:2mm; text-indent:0mm; margin-top:0.00mm; margin-bottom:0.00mm;\"><font face=\"[place]\"><span style=\" font-size:11pt\">Never Smoker</span></font></div></td></tr><tr><td align=\"center\" valign=\"top\" width=\"15\" style=\"border: solid #[id number] 0px;\"><div align=\"center\"><font face=\"[place]\"><span style=\" font-size:11pt\">&bull;</span></font></div></td><td colspan=\"2\" align=\"left\" valign=\"top\" width=\"92\" style=\"border: solid #709266 0px;\"><div align=\"left\" valign=\"top\" width=\"15\" style=\"border: solid #036393 0px;\"><div align=\"center\"><font face=\"[place]\"><span style=\" font-size:11pt\">&bull;</span></font></div></td><td colspan=\"2\" align=\"left\" valign=\"top\" width=\"92\" style=\"border: solid #985461 0px;\"><div align=\"left\" style=\"margin-left:0mm; margin-right:2mm; text-indent:0mm; margin-top:0.00mm; margin-bottom:0.00mm;\"><font face=\"[place]\"><span style=\" font-size:11pt\">Alcohol use:</span></font></div></td><td align=\"left\" valign=\"top\" width=\"93\" style=\"border: solid #798483 0px;\"><div align=\"left\" style=\"margin-left:0mm; margin-right:2mm; text-indent:0mm; margin-top:0.00mm; margin-bottom:0.00mm;\"><font face=\"[place]\"><span style=\" font-size:11pt\">Yes</span></font></div></td></tr><tr><td align=\"center\" valign=\"top\" width=\"15\" style=\"border: solid #734807 0px;\"><div align=\"center\"><font face=\"[place]\"><span style=\" font-size:11pt\">&nbsp;</span></font></div></td><td align=\"center\" valign=\"top\" width=\"22\" style=\"border: solid #951342 0px;\"><div align=\"center\"><font face=\"[place]\"><span style=\" font-size:11pt\">&nbsp;</span></font></div></td><td colspan=\"2\" align=\"left\" valign=\"top\" width=\"164\" style=\"border: solid #[id number] 0px;\"><div align=\"left\" style=\"margin-left:0mm; margin-right:2mm; text-indent:0mm; margin-top:0.00mm; margin-bottom:0.00mm;\"><font face=\"arial\"><span style=\" font-size:11pt\"><i>Comment: very rare</i></span></font></div></td></tr><tr><td align=\"center\" valign=\"top\" width=\"15\" style=\"border: solid #432738 0px;\"><div align=\"center\"><font face=\"[place]\"><span style=\" font-size:11pt\">&bull;</span></font></div></td><td colspan=\"2\" align=\"left\" valign=\"top\" width=\"92\" style=\"border: solid #000882 0px;\"><div align=\"left\" style=\"margin-left:0mm; margin-right:2mm; text-indent:0mm; margin-top:0.00mm; margin-bottom:0.00mm;\"><font face=\"[place]\"><span style=\" font-size:11pt\">Drug use:</span></font></div></td><td align=\"left\" valign=\"top\" width=\"93\" style=\"border: solid #820646 0px;\"><div align=\"left\" style=\"margin-left:0mm; margin-right:2mm; text-indent:0mm; margin-top:0.00mm; margin-bottom:0.00mm;\"><font face=\"[place]\"><span style=\" font-size:11pt\">No</span></font></div></td></tr><tr><td align=\"center\" valign=\"top\" width=\"15\" style=\"border: solid #877779 0px;\"><div align=\"center\"><font face=\"[place]\"><span style=\" font-size:11pt\">&bull;</span></font></div></td><td colspan=\"2\" align=\"left\" valign=\"top\" width=\"92\" style=\"border: solid #[id number] 0px;\"><div align=\"left\" style=\"margin-left:0mm; margin-right:2mm; text-indent:0mm; margin-top:0.00mm; margin-bottom:0.00mm;\"><font face=\"[place]\"><span style=\" font-size:11pt\">Sexual activity:</span></font></div></td><td align=\"left\" valign=\"top\" width=\"93\" style=\"border: solid #495143 0px;\"><div align=\"left\" style=\"margin-left:0mm; margin-right:2mm; text-indent:0mm; margin-top:0.00mm; margin-bottom:0.00mm;\"><font face=\"[place]\"><span style=\" font-size:11pt\">None</span></font></div></td></tr><tr><td colspan=\"3\" align=\"left\" valign=\"top\" width=\"108\" bgcolor=\"#eeeeee\" style=\"border: solid #798632 0px;\"><div align=\"left\" style=\"margin-left:0mm; margin-right:2mm; text-indent:0mm; margin-top:0.00mm; margin-bottom:0.00mm;\"><font face=\"arial\" color=\"#[id number]\"><span style=\" font-size:11pt\">Other Topics</span></font></div></td><td align=\"left\" valign=\"top\" width=\"93\" bgcolor=\"#eeeeee\" style=\"border: solid #837921 0px;\"><div align=\"left\" style=\"margin-left:0mm; margin-right:2mm; text-indent:0mm; margin-top:0.00mm; margin-bottom:0.00mm;\"><font face=\"arial\" color=\"#064885\"><span style=\" font-size:11pt\">Concern</span></font></div></td></tr><tr><td align=\"center\" valign=\"top\" width=\"15\" style=\"border: solid #418855 0px;\"><div align=\"center\"><font face=\"[place]\"><span style=\" font-size:11pt\">&bull;</span></font></div></td><td colspan=\"3\" align=\"left\" valign=\"top\" width=\"186\" style=\"border: solid #947908 0px;\"><div align=\"left\" style=\"margin-left:0mm; margin-right:2mm; 11pt;\">&nbsp;</span><div align=\"left\">&nbsp;</div></div></td><td align=\"left\" valign=\"middle\" width=\"182\" style=\"border: solid #237138 0px;\"><div align=\"left\"><span style=\"font-size: 11pt;\">&nbsp;</span></div></td></tr><tr><td colspan=\"2\" align=\"left\" valign=\"top\" width=\"197\" bgcolor=\"#eeeeee\" style=\"border: solid #548707 0px;\"><div align=\"left\" style=\"margin-left:0mm; margin-right:2mm; text-indent:0mm; margin-top:0.00mm; margin-bottom:0.00mm;\"><font face=\"arial\" color=\"#[id number]\"><span style=\" font-size:11pt\">Financial Resource Strain: </span></font></div></td></tr><tr><td align=\"center\" valign=\"top\" width=\"15\" style=\"border: solid #795257 0px;\"><div align=\"center\"><font face=\"[place]\"><span style=\" font-size:11pt\">&bull;</span></font></div></td><td align=\"left\" valign=\"top\" width=\"182\" style=\"border: solid #294649 0px;\"><div align=\"left\" style=\"margin-left:0mm; margin-right:2mm; text-indent:0mm; margin-top:0.00mm; margin-bottom:0.00mm;\"><font face=\"[place]\"><span style=\" font-size:11pt\">Difficulty of Paying Living Expenses: </span></font></div></td></tr><tr><td colspan=\"2\" align=\"left\" valign=\"top\" width=\"197\" bgcolor=\"#eeeeee\" style=\"border: solid #444615 0px;\"><div align=\"left\" style=\"margin-left:0mm; margin-right:2mm; text-indent:0mm; margin-top:0.00mm; margin-bottom:0.00mm;\"><font face=\"arial\" color=\"#[id number]\"><span style=\" font-size:11pt\">Food Insecurity: </span></font></div></td></tr><tr><td align=\"center\" valign=\"top\" width=\"15\" style=\"border: solid #433076 0px;\"><div align=\"center\"><font face=\"[place]\"><span style=\" font-size:11pt\">&bull;</span></font></div></td><td align=\"left\" valign=\"top\" width=\"182\" style=\"border: solid #964264 0px;\"><div align=\"left\" style=\"margin-left:0mm; margin-right:2mm; text-indent:0mm; margin-top:0.00mm; margin-bottom:0.00mm;\"><font face=\"[place]\"><span style=\" font-size:11pt\">Worried About Running Out of Food in the Last Year: </span></font></div></td></tr><tr><td align=\"center\" valign=\"top\" width=\"15\" style=\"border: solid #530578 0px;\"><div align=\"center\"><font face=\"[place]\"><span style=\" font-size:11pt\">&bull;</span></font></div></td><td align=\"left\" valign=\"top\" width=\"182\" style=\"border: solid #628368 0px;\"><div align=\"left\" style=\"margin-left:0mm; margin-right:2mm; text-indent:0mm; margin-top:0.00mm; margin-bottom:0.00mm;\"><font face=\"[place]\"><span style=\" font-size:11pt\">Ran Out of Food in the Last Year: </span></font></div></td></tr><tr><td colspan=\"2\" align=\"left\" valign=\"top\" width=\"197\" bgcolor=\"#eeeeee\" style=\"border: solid #892526 0px;\"><div align=\"left\" style=\"margin-left:0mm; margin-right:2mm; text-indent:0mm; margin-top:0.00mm; margin-bottom:0.00mm;\"><font face=\"arial\" color=\"#043936\"><span style=\" font-size:11pt\">Transportation Needs: </span></font></div></td></tr><tr><td align=\"center\" valign=\"top\" width=\"15\" style=\"border: solid #938763 0px;\"><div align=\"center\"><font face=\"[place]\"><span style=\" font-size:11pt\">&bull;</span></font></div></td><td align=\"left\" valign=\"top\" width=\"182\" style=\"border: solid #149818 0px;\"><div align=\"left\" style=\"margin-left:0mm; margin-right:2mm; text-indent:0mm; margin-top:0.00mm; margin-bottom:0.00mm;\"><font face=\"[place]\"><span style=\" font-size:11pt\">Lack of Transportation (Medical): </span></font></div></td></tr><tr><td align=\"center\" valign=\"top\" width=\"15\" style=\"border: solid #132988 0px;\"><div align=\"center\"><font face=\"[place]\"><span style=\" font-size:11pt\">&bull;</span></font></div></td><td align=\"left\" valign=\"top\" width=\"182\" style=\"border: solid #654574 0px;\"><div align=\"left\" style=\"margin-left:0mm; margin-right:2mm; text-indent:0mm; margin-top:0.00mm; margin-bottom:0.00mm;\"><font face=\"[place]\"><span style=\" font-size:11pt\">Lack of Transportation (Non-Medical): </span></font></div></td></tr><tr><td colspan=\"2\" align=\"left\" valign=\"top\" width=\"197\" bgcolor=\"#eeeeee\" style=\"border: solid #[id number] 0px;\"><div align=\"left\" style=\"margin-left:0mm; margin-right:2mm; text-indent:0mm; margin-top:0.00mm; margin-bottom:0.00mm;\"><font face=\"arial\" color=\"#[id number]\"><span style=\" font-size:11pt\">Physical Activity: </span></font></div></td></tr><tr><td align=\"center\" valign=\"top\" width=\"15\" style=\"border: solid #[id number] 0px;\"><div align=\"center\"><font face=\"[place]\"><span style=\" font-size:11pt\">&bull;</span></font></div></td><td align=\"left\" valign=\"top\" width=\"182\" style=\"border: solid #[id number] 0px;\"><div align=\"left\" style=\"margin-left:0mm; margin-right:2mm; text-indent:0mm; margin-top:0.00mm; margin-bottom:0.00mm;\"><font face=\"[place]\"><span style=\" font-size:11pt\">Days of Exercise per Week: </span></font></div></td></tr><tr><td align=\"center\" valign=\"top\" width=\"15\" style=\"border: solid #[id number] 0px;\"><div align=\"center\"><font face=\"[place]\"><span style=\" font-size:11pt\">&bull;</span></font></div></td><td align=\"left\" valign=\"top\" width=\"182\" style=\"border: solid #[id number] 0px;\"><div align=\"left\" style=\"margin-left:0mm; margin-right:2mm; text-indent:0mm; margin-top:0.00mm; margin-bottom:0.00mm;\"><font face=\"[place]\"><span style=\" font-size:11pt\">Minutes of Exercise per Session: </span></font></div></td></tr><tr><td colspan=\"2\" align=\"left\" valign=\"top\" width=\"197\" bgcolor=\"#eeeeee\" style=\"border: solid #[id number] 0px;\"><div align=\"left\" style=\"margin-left:0mm; margin-right:2mm; text-indent:0mm; margin-top:0.00mm; margin-bottom:0.00mm;\"><font face=\"arial\" color=\"#[id number]\"><span style=\" font-size:11pt\">Stress: </span></font></div></td></tr><tr><td align=\"center\" valign=\"top\" width=\"15\" style=\"border: solid #[id number] 0px;\"><div align=\"center\"><font face=\"[place]\"><span style=\" font-size:11pt\">&bull;</span></font></div></td><td align=\"left\" valign=\"top\" width=\"182\" style=\"border: solid #[id number] 0px;\"><div align=\"left\" style=\"margin-left:0mm; margin-right:2mm; text-indent:0mm; margin-top:0.00mm; margin-bottom:0.00mm;\"><font face=\"[place]\"><span style=\" font-size:11pt\">Feeling of Stress : </span></font></div></td></tr><tr><td colspan=\"2\" align=\"left\" valign=\"top\" width=\"197\" bgcolor=\"#eeeeee\" style=\"border: solid #[id number] 0px;\"><div align=\"left\" style=\"margin-left:0mm; margin-right:2mm; text-indent:0mm; margin-top:0.00mm; margin-bottom:0.00mm;\"><font face=\"arial\" color=\"#[id number]\"><span style=\" font-size:11pt\">Social Connections: </span></font></div></td></tr><tr><td align=\"center\" valign=\"top\" width=\"15\" style=\"border: solid #[id number] 0px;\"><div align=\"center\"><font face=\"[facility]\"><span style=\" font-size:11pt\">&bull;</span></font></div></td><td align=\"left\" valign=\"top\" width=\"182\" style=\"border: solid #[id number] 0px;\"><div align=\"left\" style=\"margin-left:0mm; margin-right:2mm; text-indent:0mm; margin-top:0.00mm; margin-bottom:0.00mm;\"><font face=\"[place]\"><span style=\" font-size:11pt\">Frequency of Communication with Friends and Family: </span></font></div></td></tr><tr><td align=\"center\" valign=\"top\" width=\"15\" style=\"border: solid #[id number] 0px;\"><div align=\"center\"><font face=\"[place]\"><span style=\" font-size:11pt\">&bull;</span></font></div></td><td align=\"left\" valign=\"top\" width=\"182\" style=\"border: solid #[id number] 0px;\"><div align=\"left\" style=\"margin-left:0mm; margin-right:2mm; text-indent:0mm; margin-top:0.00mm; margin-bottom:0.00mm;\"><font face=\"[place]\"><span style=\" font-size:11pt\">Frequency of Social Gatherings with Friends and Family: </span></font></div></td></tr><tr><td align=\"center\" valign=\"top\" width=\"15\" style=\"border: solid #[id number] 0px;\"><div align=\"center\"><font face=\"[place]\"><span style=\" font-size:11pt\">&bull;</span></font></div></td><td align=\"left\" valign=\"top\" width=\"182\" style=\"border: solid #[id number] 0px;\"><div align=\"left\" style=\"margin-left:0mm; margin-right:2mm; text-indent:0mm; margin-top:0.00mm; margin-bottom:0.00mm;\"><font face=\"[place]\"><span style=\" font-size:11pt\">Attends [employer]: </span></font></div></td></tr><tr><td align=\"center\" font-size:11pt\">&bull;</span></font></div></td><td align=\"left\" valign=\"top\" width=\"182\" style=\"border: solid #[id number] 0px;\"><div align=\"left\" style=\"margin-left:0mm; margin-right:2mm; text-indent:0mm; margin-top:0.00mm; margin-bottom:0.00mm;\"><font face=\"[place]\"><span style=\" font-size:11pt\">Fear of Current or Ex-Partner: </span></font></div></td></tr><tr><td align=\"center\" valign=\"top\" width=\"15\" style=\"border: solid #[id number] 0px;\"><div align=\"center\"><font face=\"[place]\"><span style=\" font-size:11pt\">&bull;</span></font></div></td><td align=\"left\" valign=\"top\" width=\"182\" style=\"border: solid #[id number] 0px;\"><div align=\"left\" style=\"margin-left:0mm; margin-right:2mm; text-indent:0mm; margin-top:0.00mm; margin-bottom:0.00mm;\"><font face=\"[place]\"><span style=\" font-size:11pt\">Emotionally Abused: </span></font></div></td></tr><tr><td align=\"center\" valign=\"top\" width=\"15\" style=\"border: solid #[id number] 0px;\"><div align=\"center\"><font face=\"[place]\"><span style=\" font-size:11pt\">&bull;</span></font></div></td><td align=\"left\" valign=\"top\" width=\"182\" style=\"border: solid #[id number] 0px;\"><div align=\"left\" style=\"margin-left:0mm; margin-right:2mm; text-indent:0mm; margin-top:0.00mm; margin-bottom:0.00mm;\"><font face=\"[place]\"><span style=\" font-size:11pt\">Physically Abused: </span></font></div></td></tr><tr><td align=\"center\" valign=\"top\" width=\"15\" style=\"border: solid #[id number] 0px;\"><div align=\"center\"><font face=\"[place]\"><span style=\" font-size:11pt\">&bull;</span></font></div></td><td align=\"left\" valign=\"top\" width=\"182\" style=\"border: solid #[id number] 0px;\"><div align=\"left\" style=\"margin-left:0mm; margin-right:2mm; text-indent:0mm; margin-top:0.00mm; margin-bottom:0.00mm;\"><font face=\"[place]\"><span style=\" font-size:11pt\">Sexually Abused:&nbsp;</span></font></div></td></tr></table><div align=\"left\">&nbsp;</div></div></td></tr></table><div align=\"left\"><font face=\"[place]\"><span style=\" font-size:11pt\">&nbsp;</span></font></div><div align=\"left\"><font face=\"[facility]\"><span style=\" font-size:11pt\">&nbsp;</span></font></div><div align=\"left\"><font face=\"[facility]\"><span style=\" font-size:11pt\">&nbsp;</span></font></div><div align=\"left\"><font face=\"[facility]\"><span style=\" font-size:11pt\">Family and Social Historyreviewed in the electronic medical record. </span></font></div><div align=\"left\"><font face=\"[facility]\"><span style=\" font-size:11pt\">&nbsp;</span></font></div><div align=\"left\"><font face=\"[facility]\"><span style=\" font-size:11pt\"><b>Imaging:</b></span></font><font face=\"[facility]\"><span style=\" font-size:11pt\">Reviewed available imaging in our system with the patient. </span></font></div><div align=\"left\"><font face=\"[facility]\"><span style=\" font-size:11pt\">No results found. </span></font></div><div align=\"left\"><font face=\"[facility]\"><span style=\" font-size:11pt\">&nbsp;</span></font></div><table width=\"672\" cellpadding=\"0\" cellspacing=\"0\" style=\"border-collapse: collapse; border: none; \"><tr><td align=\"left\" valign=\"top\" width=\"672\" height=\"16\" bgcolor=\"#dddddd\" style=\"border: solid #[id number] 0px;\"><div align=\"left\"><div align=\"left\"><font face=\"arial\" color=\"#[id number]\"><span style=\" font-size:11pt\"><b>Objective:</b></span></font></div></div></td></tr></table><div align=\"left\"><font face=\"[facility]\"><span style=\" font-size:11pt\">&nbsp;</span></font></div><div align=\"left\"><font face=\"[facility]\"><span style=\" font-size:11pt\"><b>Physical Exam:</b></span></font></div><table width=\"137\" cellpadding=\"0\" cellspacing=\"0\" style=\"border-collapse: collapse; border: none; \"><tr><td align=\"left\" valign=\"middle\" width=\"137\" style=\"border: solid #[id number] 0px;\"><div align=\"left\"><div align=\"left\"><font face=\"[facility]\"><span style=\" font-size:11pt\">Vitals</span></font><img src=\"C:\ProgramData\Epic\[email or web address]\[id number]. PNG\" width=\"1\" height=\"1\" alt=\"graphic\"/></div></div></td></tr><tr><td align=\"left\" valign=\"middle\" width=\"137\" style=\"border: solid #[id number] 0px;\"><div align=\"left\"><span style=\"font-size: 11pt;\">&nbsp;</span><table width=\"137\" cellpadding=\"0\" cellspacing=\"0\" style=\"border-collapse: collapse; border: none; \"><tr><td align=\"left\" valign=\"middle\" width=\"70\" style=\"border: solid #[id number] 0px;\"><div align=\"left\"><span style=\"font-size: 11pt;\">&nbsp;</span></div></td><td align=\"left\" valign=\"middle\" width=\"66\" style=\"border: solid #[id number] 0px;\"><div align=\"left\"><span style=\"font-size: 11pt;\">&nbsp;</span></div></td></tr><tr><td colspan=\"2\" align=\"left\" valign=\"top\" width=\"137\" style=\"border: solid #[id number] 0px;\"><div align=\"left\" style=\"margin-left:0mm; margin-right:2mm; text-indent:0mm; margin-top:0.00mm; margin-bottom:0.00mm;\"><font face=\"arial\" color=\"#[id number]\"><span style=\" font-size:11pt\"><b>Vitals:</b></span></font></div></td></tr><tr><td align=\"left\" valign=\"top\" width=\"70\" bgcolor=\"#eeeeee\" style=\"border: solid #[id number] 0px;\"><div align=\"left\"><font face=\"[facility]\"><span style=\" font-size:11pt\">&nbsp;</span></font></div></td><td align=\"left\" valign=\"top\" width=\"66\" bgcolor=\"#eeeeee\" style=\"border: solid #963249 0px;\"><div align=\"left\" style=\"margin-left:0mm; margin-right:2mm; text-indent:0mm; margin-top:0.00mm; margin-bottom:0.00mm;\"><font face=\"arial\" color=\"#[id number]\"><span style=\" font-size:11pt\">[birth year] 1450</span></font></div></td></tr><tr><td align=\"left\" valign=\"top\" width=\"70\" style=\"border: solid #[id number] 0px;\"><div align=\"left\" style=\"margin-left:0mm; margin-right:2mm; text-indent:0mm; margin-top:0.00mm; margin-bottom:0.00mm;\"><font face=\"[facility]\"><span style=\" font-size:11pt\">BP:</span></font></div></td><td align=\"left\" valign=\"top\" width=\"66\" style=\"border: solid #[id number] 0px;\"><div align=\"left\" style=\"margin-left:0mm; margin-right:2mm; text-indent:0mm; margin-top:0.00mm; margin-bottom:0.00mm;\"><font face=\"[facility]\"><span style=\" font-size:11pt\">118/82</span></font></div></td></tr><tr><td align=\"left\" valign=\"top\" width=\"70\" style=\"border: solid #[id number] 0px;\"><div align=\"left\" style=\"margin-left:0mm; margin-right:2mm; text-indent:0mm; margin-top:0.00mm; margin-bottom:0.00mm;\"><font face=\"[place]\"><span style=\" font-size:11pt\">Site:</span></font></div></td><td align=\"left\" valign=\"top\" width=\"66\" style=\"border: solid #[id number] 0px;\"><div align=\"left\" style=\"margin-left:0mm; margin-right:2mm; text-indent:0mm; margin-top:0.00mm; margin-bottom:0.00mm;\"><font face=\"[place]\"><span style=\" font-size:11pt\">Right Upper Arm</span></font></div></td></tr><tr><td align=\"left\" valign=\"top\" width=\"70\" style=\"border: solid #[id number] 0px;\"><div align=\"left\" style=\"margin-left:0mm; margin-right:2mm; text-indent:0mm; margin-top:0.00mm; margin-bottom:0.00mm;\"><font face=\"[place]\"><span style=\" font-size:11pt\">Position:</span></font></div></td><td align=\"left\" valign=\"top\" width=\"66\" style=\"border: solid #152043 0px;\"><div align=\"left\" style=\"margin-left:0mm; margin-right:2mm; text-indent:0mm; margin-top:0.00mm; margin-bottom:0.00mm;\"><font face=\"[place]\"><span style=\" font-size:11pt\">Sitting</span></font></div></td></tr><tr><td align=\"left\" valign=\"top\" width=\"70\" style=\"border: solid #[id number] 0px;\"><div align=\"left\" style=\"margin-left:0mm; margin-right:2mm; text-indent:0mm; margin-top:0.00mm; margin-bottom:0.00mm;\"><font face=\"[place]\"><span style=\" font-size:11pt\">Cuff Size:</span></font></div></td><td align=\"left\" valign=\"top\" width=\"66\" style=\"border: solid #[id number] 0px;\"><div align=\"left\" style=\"margin-left:0mm; margin-right:2mm; text-indent:0mm; margin-top:0.00mm; margin-bottom:0.00mm;\"><font face=\"[place]\"><span style=\" font-size:11pt\">Medium Adult</span></font></div></td></tr><tr><td align=\"left\" valign=\"top\" width=\"70\" style=\"border: solid #[id number] 0px;\"><div align=\"left\" style=\"margin-left:0mm; margin-right:2mm; text-indent:0mm; margin-top:0.00mm; margin-bottom:0.00mm;\"><font face=\"[place]\"><span style=\" font-size:11pt\">Pulse:</span></font></div></td><td align=\"left\" valign=\"top\" width=\"66\" style=\"border: solid #008702 0px;\"><div align=\"left\" style=\"margin-left:0mm; margin-right:2mm; text-indent:0mm; margin-top:0.00mm; margin-bottom:0.00mm;\"><font face=\"[place]\"><span style=\" font-size:11pt\">69</span></font></div></td></tr><tr><td align=\"left\" valign=\"top\" width=\"70\" style=\"border: solid #[id number] 0px;\"><div align=\"left\" style=\"margin-left:0mm; margin-right:2mm; text-indent:0mm; margin-top:0.00mm; margin-bottom:0.00mm;\"><font face=\"[place]\"><span style=\" font-size:11pt\">SpO2:</span></font></div></td><td align=\"left\" valign=\"top\" width=\"66\" style=\"border: solid #604757 0px;\"><div align=\"left\" style=\"margin-left:0mm; margin-right:2mm; text-indent:0mm; margin-top:0.00mm; margin-bottom:0.00mm;\"><font face=\"[place]\"><span style=\" font-size:11pt\">96%</span></font></div></td></tr><tr><td align=\"left\" valign=\"top\" width=\"70\" style=\"border: solid #[id number] 0px;\"><div align=\"left\" style=\"margin-left:0mm; margin-right:2mm; text-indent:0mm; margin-top:0.00mm; margin-bottom:0.00mm;\"><font face=\"[place]\"><span style=\" font-size:11pt\">Weight:</span></font></div></td><td align=\"left\" valign=\"top\" width=\"66\" style=\"border: solid #754636 0px;\"><div align=\"left\" style=\"margin-left:0mm; margin-right:2mm; text-indent:0mm; margin-top:0.00mm; margin-bottom:0.00mm;\"><font face=\"[place]\"><span style=\" font-size:11pt\">180 lb (81.6 kg)</span></font></div></td></tr><tr><td align=\"left\" valign=\"top\" width=\"70\" style=\"border: solid #[id number] 0px;\"><div align=\"left\" style=\"margin-left:0mm; margin-right:2mm; text-indent:0mm; margin-top:0.00mm; margin-bottom:0.00mm;\"><font face=\"[place]\"><span style=\" font-size:11pt\">Height:</span></font></div></td><td align=\"left\" valign=\"top\" width=\"66\" style=\"border: solid #[id number] 0px;\"><div align=\"left\" style=\"margin-left:0mm; margin-right:2mm; text-indent:0mm; margin-top:0.00mm; margin-bottom:0.00mm;\"><font face=\"[place]\"><span face=\"[place]\"><span style=\" font-size:11pt\">&nbsp;&nbsp;&nbsp;Trachea: Phonation&nbsp;normal. <br /></span></font><font face=\"arial\"><span style=\" font-size:11pt\"><u>Cardiovascular</u></span></font><font face=\"[place]\"><span style=\" font-size:11pt\">: </span></font></div><div align=\"left\"><font face=\"[place]\"><span style=\" font-size:11pt\">&nbsp;&nbsp;&nbsp;Comments: </span></font><font face=\"[place]\"><span style=\" font-size:11pt\"><b>No BLE edema present</b></span></font><font face=\"[place]\"><span style=\" font-size:11pt\"><br /></span></font><font face=\"arial\"><span style=\" font-size:11pt\"><u>Pulmonary</u></span></font><font face=\"[place]\"><span style=\" font-size:11pt\">: </span></font></div><div align=\"left\"><font face=\"[place]\"><span style=\" font-size:11pt\">&nbsp;&nbsp;&nbsp;Effort: Pulmonary effort is normal. No&nbsp;accessory muscle usage&nbsp;or respiratory distress. <br /></span></font><font face=\"arial\"><span style=\" font-size:11pt\"><u>Abdominal</u></span></font><font face=\"[place]\"><span style=\" font-size:11pt\">: <br />&nbsp;&nbsp;&nbsp;General: Abdomen is flat. <br />&nbsp;&nbsp;&nbsp;Palpations: Abdomen is soft.  </span></font></div><div align=\"left\"><font face=\"arial\"><span style=\" font-size:11pt\"><u>Genitourinary</u></span></font><font face=\"[place]\"><span style=\" font-size:11pt\">:</span></font></div><div align=\"left\"><font face=\"[place]\"><span style=\" font-size:11pt\">&nbsp;&nbsp;&nbsp;Comments: </span></font><font face=\"[place]\"><span style=\" font-size:11pt\"><b>deferred</b></span></font><font face=\"[place]\"><span style=\" font-size:11pt\"><br /></span></font><font face=\"arial\"><span style=\" font-size:11pt\"><u>Musculoskeletal</u></span></font><font face=\"[place]\"><span style=\" font-size:11pt\">: <br />&nbsp;&nbsp;&nbsp;Lumbar back: Negative right straight leg raise test&nbsp;and negative left straight leg raise test. </span></font></div><div align=\"left\"><font face=\"arial\"><span style=\" font-size:11pt\"><u>Skin</u></span></font><font face=\"[place]\"><span style=\" font-size:11pt\">:</span></font></div><div align=\"left\"><font face=\"[place]\"><span style=\" font-size:11pt\">&nbsp;&nbsp;&nbsp;General: Skin is warm&nbsp;and dry. <br />&nbsp;&nbsp;&nbsp;Coloration: Skin is not pale. <br />&nbsp;&nbsp;&nbsp;Findings: No erythema&nbsp;or rash. <br /></span></font><font face=\"arial\"><span style=\" font-size:11pt\"><u>Neurological</u></span></font><font face=\"[place]\"><span style=\" font-size:11pt\">: </span></font></div><div align=\"left\"><font face=\"[place]\"><span style=\" font-size:11pt\">&nbsp;&nbsp;&nbsp;General: No focal deficit&nbsp;present. <br />&nbsp;&nbsp;&nbsp;Mental Status: She is alert&nbsp;and oriented to person, place, and time.  <br /></span></font><font face=\"arial\"><span style=\" font-size:11pt\"><u>Psychiatric</u></span></font><font face=\"[place]\"><span style=\" font-size:11pt\">: &nbsp;&nbsp;&nbsp;<br />&nbsp;&nbsp;&nbsp;Mood and Affect: Mood&nbsp;normal. &nbsp;&nbsp;&nbsp;<br />&nbsp;&nbsp;&nbsp;Speech: Speech&nbsp;normal. &nbsp;&nbsp;&nbsp;<br />&nbsp;&nbsp;&nbsp;Behavior: Behavior&nbsp;normal. </span></font></div><div align=\"left\"><font face=\"arial\" size=\"2\"><span style=\" font-size:10pt\">&nbsp;</span></font></div><div align=\"left\"><font face=\"[place]\"><span style=\" font-size:11pt\">&nbsp;</span></font></div><div align=\"left\"><font face=\"[place]\"><span style=\" font-size:11pt\"><b>Back Exam</b></span></font><font face=\"[place]\"><span style=\" font-size:11pt\">&nbsp;</span></font></div><div align=\"left\"><font face=\"[place]\"><span style=\" font-size:11pt\">&nbsp;</span></font></div><div align=\"left\"><font face=\"arial\" color=\"#[id number]\"><span style=\" font-size:11pt\"><b>Tenderness </b></span></font></div><div align=\"left\"><font face=\"[place]\"><span style=\" font-size:11pt\">The patient is experiencing tenderness in the&nbsp;sacroiliac.</span></font></div><div align=\"left\"><font face=\"[place]\"><span style=\" font-size:11pt\">&nbsp;</span></font></div><div align=\"left\"><font face=\"arial\" color=\"#[id number]\"><span style=\" font-size:11pt\"><b>Range of Motion </b></span></font></div><div align=\"left\"><font face=\"[place]\"><span style=\" font-size:11pt\">Extension:&nbsp;normal&nbsp;</span></font></div><div align=\"left\"><font face=\"[place]\"><span style=\" font-size:11pt\">Flexion:&nbsp;normal&nbsp;</span></font></div><div align=\"left\"><font face=\"[place]\"><span style=\" font-size:11pt\">Lateral bend right:&nbsp;normal&nbsp;</span></font></div><div align=\"left\"><font face=\"[facility]\"><span style=\" font-size:11pt\">Lateral bend left:&nbsp;normal&nbsp;</span></font></div><div align=\"left\"><font face=\"[place]\"><span style=\" font-size:11pt\">Rotation right:&nbsp;normal&nbsp;</span></font></div><div align=\"left\"><font face=\"[facility]\"><span style=\" font-size:11pt\">Rotation left:&nbsp;normal&nbsp;</span></font></div><div align=\"left\"><font face=\"[place]\"><span style=\" font-size:11pt\">&nbsp;</span></font></div><div align=\"left\"><font face=\"arial\" color=\"#[id number]\"><span style=\" font-size:11pt\"><b>Muscle Strength </b></span></font></div><div align=\"left\"><font face=\"[place]\"><span style=\" font-size:11pt\">Right Quadriceps:&nbsp;&nbsp;5/5 </span></font></div><div align=\"left\"><font face=\"[facility]\"><span style=\" font-size:11pt\">Left Quadriceps:&nbsp;&nbsp;5/5 </span></font></div><div align=\"left\"><font face=\"[place]\"><span style=\" font-size:11pt\">Right Hamstrings:&nbsp;&nbsp;5/5 </span></font></div><div align=\"left\"><font face=\"[place]\"><span style=\" font-size:11pt\">Left Hamstrings:&nbsp;&nbsp;5/5 </span></font></div><div align=\"left\"><font face=\"[facility]\"><span style=\" font-size:11pt\">&nbsp;</span></font></div><div align=\"left\"><font face=\"arial\" color=\"#[id number]\"><span style=\" font-size:11pt\"><b>Tests </b></span></font></div><div align=\"left\"><font face=\"[place]\"><span style=\" font-size:11pt\">Straight leg raise right:&nbsp;negative</span></font></div><div align=\"left\"><font face=\"[place]\"><span style=\" font-size:11pt\">Straight leg raise left:&nbsp;negative</span></font></div><div align=\"left\"><font face=\"arial\" color=\"#[id number]\"><span style=\" font-size:11pt\"><b>&nbsp;</b></span></font></div><div align=\"left\"><font face=\"arial\" color=\"#[id number]\"><span style=\" font-size:11pt\"><b>Other</b></span></font><font face=\"[place]\"><span style=\" font-size:11pt\"><b>&nbsp;</b></span></font></div><div align=\"left\"><font face=\"[place]\"><span style=\" font-size:11pt\">Toe walk:&nbsp;normal</span></font></div><div align=\"left\"><font face=\"[place]\"><span style=\" font-size:11pt\">Heel walk:&nbsp;normal</span></font></div><div align=\"left\"><font face=\"[place]\"><span style=\" font-size:11pt\">Sensation:&nbsp;normal</span></font></div><div align=\"left\"><font face=\"[place]\"><span style=\" font-size:11pt\">Gait:&nbsp;normal&nbsp;</span></font></div><div align=\"left\"><font face=\"[place]\"><span style=\" font-size:11pt\">&nbsp;</span></font></div><div align=\"left\"><font face=\"[place]\"><span style=\" font-size:11pt\">Comments:&nbsp;&nbsp;Faberes &nbsp;?</span></font></div><div align=\"left\"><font face=\"[place]\"><span style=\" font-size:11pt\">&nbsp;</span></font></div><div align=\"left\"><font face=\"[place]\"><span style=\" font-size:11pt\">&nbsp;</span></font></div><div align=\"left\"><font face=\"[place]\"><span style=\" font-size:11pt\"><b>Right Hand Exam</b></span></font><font face=\"[place]\"><span style=\" font-size:11pt\">&nbsp;</span></font></div><div align=\"left\"><font face=\"[place]\"><span style=\" font-size:11pt\">&nbsp;</span></font></div><div align=\"left\"><font face=\"[place]\"><span style=\" font-size:11pt\">Comments:&nbsp;&nbsp;R &nbsp;4th finger &nbsp;&nbsp;Flexed at DIP PIP and MCP &nbsp;&nbsp;Tender &nbsp;Volar MCP &nbsp; Unable to &nbsp;Fully extend</span></font></div><div align=\"left\"><font face=\"[place]\"><span style=\" font-size:11pt\">MCP 2 B &nbsp;Red tender &nbsp; Active synovitis ? </span></font></div><div align=\"left\"><font face=\"[place]\"><span style=\" font-size:11pt\">&nbsp;</span></font></div><div align=\"left\"><font face=\"[place]\"><span style=\" font-size:11pt\">&nbsp;</span></font></div><div align=\"left\"><font face=\"[place]\"><span style=\" font-size:11pt\">&nbsp;</span></font></div><div align=\"left\"><font face=\"[place]\"><span style=\" font-size:11pt\">&nbsp;</span></font></div><div align=\"left\"><font face=\"[place]\"><span style=\" font-size:11pt\"><b>&nbsp;&nbsp;&nbsp;&nbsp;&nbsp;&nbsp;&nbsp;&nbsp;&nbsp;&nbsp;&nbsp;&nbsp;&nbsp;&nbsp;&nbsp;&nbsp;&nbsp;&nbsp;&nbsp;&nbsp;</b></span></font></div><div align=\"left\"><font face=\"[place]\"><span style=\" font-size:11pt\"><b>&nbsp;</b></span></font></div><div align=\"left\"><font face=\"[place]\"><span style=\" font-size:11pt\"><b>Research &nbsp;has found that &nbsp;Spine injections &nbsp;&nbsp;&nbsp;reduce pain and &nbsp;give &nbsp;better functional &nbsp;outcomes.  &nbsp;&nbsp;&nbsp;&nbsp;</b></span></font></div><table width=\"672\" cellpadding=\"0\" cellspacing=\"0\" style=\"border-collapse: collapse; border: none; \"><tr><td align=\"left\" valign=\"top\" width=\"672\" height=\"16\" bgcolor=\"#[id number]\" style=\"border: solid #[id number] 0px;\"><div align=\"left\"><div align=\"left\"><font face=\"arial\" color=\"#[id number]\"><span style=\" font-size:11pt\"><b>Assessment:</b></span></font></div></div></td></tr></table><div align=\"left\"><font face=\"[place]\"><span style=\" font-size:11pt\"><b>This is a&nbsp;80 y.o.&nbsp;female&nbsp;patient with:</b></span></font></div><div align=\"left\"><font face=\"[place]\"><span style=\" font-size:11pt\"><b>&nbsp;</b></span></font></div><div align=\"left\"><font face=\"[place]\"><span style=\" font-size:11pt\"><b>Diagnosis:</b></span></font></div><table width=\"672\" cellpadding=\"0\" cellspacing=\"0\" style=\"border-collapse: collapse; border: none; \"><tr><td align=\"left\" valign=\"top\" width=\"34\" bgcolor=\"#eeeeee\" style=\"border: solid #[id number] 0px;\"><div align=\"left\" style=\"margin-left:0mm; margin-right:2mm; text-indent:0mm; margin-top:0.00mm; margin-bottom:0.00mm;\"><div align=\"left\" style=\"margin-left:0mm; margin-right:2mm; text-indent:0mm; margin-top:0.00mm; margin-bottom:0.00mm;\"><font face=\"[place]\"><span style=\" font-size:11pt\">&nbsp;</span></font></div></div></td><td align=\"left\" valign=\"top\" width=\"319\" bgcolor=\"#eeeeee\" style=\"border: solid #[id number] 0px;\"><div align=\"left\" style=\"margin-left:0mm; margin-right:2mm; text-indent:0mm; margin-top:0.00mm; margin-bottom:0.00mm;\"><font face=\"arial\" color=\"#[id number]\"><span style=\" font-size:11pt\">Diagnosis</span></font></div></td><td align=\"left\" valign=\"top\" width=\"319\" bgcolor=\"#eeeeee\" style=\"border: solid #[id number] 0px;\"><div align=\"left\" style=\"margin-left:0mm; margin-right:2mm; text-indent:0mm; margin-top:0.00mm; margin-bottom:0.00mm;\"><font face=\"arial\" color=\"#[id number]\"><span style=\" font-size:11pt\">Orders</span></font></div></td></tr><tr><td align=\"left\" valign=\"top\" width=\"34\" style=\"border: solid #[id number] 0px;\"><div align=\"left\" style=\"margin-left:0mm; margin-right:2mm; text-indent:0mm; margin-top:0.00mm; margin-bottom:0.00mm;\"><font face=\"[place]\"><span style=\" font-size:11pt\"><b>1.</b></span></font></div></td><td align=\"left\" valign=\"top\" width=\"319\" style=\"border: solid #[id number] 0px;\"><div align=\"left\" style=\"margin-left:0mm; margin-right:2mm; text-indent:0mm; margin-top:0.00mm; margin-bottom:0.00mm;\"><font face=\"[place]\"><span style=\" font-size:11pt\"><b>Bilateral hand pain </b></span></font></div></td><td align=\"left\" valign=\"top\" width=\"319\" style=\"border: solid #[id number] 0px;\"><div align=\"left\"><font face=\"[place]\"><span style=\" font-size:11pt\">&nbsp;</span></font></div></td></tr><tr><td align=\"left\" valign=\"top\" width=\"34\" style=\"border: solid #[id number] 0px;\"><div align=\"left\" style=\"margin-left:0mm; margin-right:2mm; text-indent:0mm; margin-top:0.00mm; margin-bottom:0.00mm;\"><font face=\"[place]\"><span style=\" font-size:11pt\">2.</span></font></div></td><td align=\"left\" valign=\"top\" width=\"319\" style=\"border: solid #[id number] 0px;\"><div align=\"left\" style=\"margin-left:0mm; margin-right:2mm; text-indent:0mm; margin-top:0.00mm; margin-bottom:0.00mm;\"><font face=\"[place]\"><span style=\" font-size:11pt\">Trigger ring finger of right hand </span></font></div></td><td align=\"left\" valign=\"top\" width=\"319\" style=\"border: solid #[id number] 0px;\"><div align=\"left\"><font face=\"[place]\"><span style=\" font-size:11pt\">&nbsp;</span></font></div></td></tr><tr><td align=\"left\" valign=\"top\" width=\"34\" style=\"border: solid #[id number] 0px;\"><div align=\"left\" style=\"margin-left:0mm; margin-right:2mm; text-indent:0mm; margin-top:0.00mm; margin-bottom:0.00mm;\"><font face=\"[place]\"><span style=\" font-size:11pt\">3.</span></font></div></td><td align=\"left\" valign=\"top\" width=\"319\" style=\"border: solid #[id number] 0px;\"><div align=\"left\" style=\"margin-left:0mm; margin-right:2mm; text-indent:0mm; margin-top:0.00mm; margin-bottom:0.00mm;\"><font face=\"[place]\"><span style=\" font-size:11pt\">Piriformis syndrome of left side </span></font></div></td><td align=\"left\" valign=\"top\" width=\"319\" style=\"border: solid #[id number] 0px;\"><div align=\"left\"><font face=\"[place]\"><span style=\" font-size:11pt\">&nbsp;</span></font></div></td></tr><tr><td align=\"left\" valign=\"top\" width=\"34\" style=\"border: solid #075455 0px;\"><div align=\"left\" style=\"margin-left:0mm; margin-right:2mm; text-indent:0mm; margin-top:0.00mm; margin-bottom:0.00mm;\"><font face=\"[place]\"><span style=\" font-size:11pt\">4.</span></font></div></td><td align=\"left\" valign=\"top\" width=\"319\" style=\"border: solid #[id number] 0px;\"><div align=\"left\" style=\"margin-left:0mm; margin-right:2mm; text-indent:0mm; margin-top:0.00mm; margin-bottom:0.00mm;\"><font face=\"[place]\"><span style=\" font-size:11pt\">Encounter for long-term opiate analgesic use&nbsp;</span></font></div></td><td align=\"left\" valign=\"top\" width=\"319\" style=\"border: solid #[id number] 0px;\"><div align=\"left\"><font face=\"[place]\"><span style=\" font-size:11pt\">&nbsp;</span></font></div></td></tr></table><div align=\"left\"><font face=\"[place]\"><span style=\" dependence,and addiction. &nbsp;&nbsp;</span></font></div><div align=\"left\"><font face=\"[place]\"><span style=\" font-size:11pt\">&nbsp;</span></font></div><table width=\"672\" cellpadding=\"0\" cellspacing=\"0\" style=\"border-collapse: collapse; border: none; \"><tr><td align=\"left\" valign=\"middle\" width=\"13\" style=\"border: solid #[id number] 0px;\"><div align=\"left\"><span style=\"font-size: 11pt;\">&nbsp;</span><div align=\"left\">&nbsp;</div></div></td><td align=\"left\" valign=\"middle\" width=\"329\" style=\"border: solid #499607 0px;\"><div align=\"left\"><span style=\"font-size: 11pt;\">&nbsp;</span></div></td><td align=\"left\" valign=\"middle\" width=\"329\" style=\"border: solid #988593 0px;\"><div align=\"left\"><span style=\"font-size: 11pt;\">&nbsp;</span></div></td></tr><tr><td colspan=\"3\" align=\"left\" valign=\"top\" width=\"672\" bgcolor=\"#[id number]\" style=\"border: solid #705884 0px;\"><div align=\"left\"><font face=\"arial\"><span style=\" font-size:11pt\"><b><u>New Prescriptions</u></b></span></font></div></td></tr><tr><td align=\"left\" valign=\"top\" width=\"13\" style=\"border: solid #[id number] 0px;\"><div align=\"left\"><font face=\"[place]\"><span style=\" font-size:11pt\">&nbsp;</span></font></div></td><td align=\"left\" valign=\"top\" width=\"329\" style=\"border: solid #[id number] 0px;\"><div align=\"left\"><font face=\"[place]\"><span style=\" font-size:11pt\">KETOPROFEN (ORUDIS) 75 MG CAPSULE</span></font></div></td><td align=\"left\" valign=\"top\" width=\"329\" style=\"border: solid #[id number] 0px;\"><div align=\"left\"><font face=\"[place]\"><span style=\" font-size:11pt\">&nbsp;&nbsp;&nbsp; Take 1 capsule by mouth 2 times daily as needed for Pain</span></font></div></td></tr></table><div align=\"left\"><font face=\"[place]\"><span style=\" font-size:11pt\">&nbsp;</span></font></div><table width=\"170\" cellpadding=\"0\" cellspacing=\"0\" style=\"border-collapse: collapse; border: none; \"><tr><td align=\"left\" valign=\"middle\" width=\"170\" style=\"border: solid #[id number] 0px;\"><div align=\"left\"><div align=\"left\"><font face=\"[place]\"><span style=\" font-size:11pt\">Encounter Medications </span></font><img src=\"C:\ProgramData\Epic\95\TempData\[id number]. PNG\" width=\"1\" height=\"1\" alt=\"graphic\"/></div></div></td></tr><tr><td align=\"left\" valign=\"middle\" width=\"170\" style=\"border: solid #[id number] 0px;\"><div align=\"left\"><span style=\"font-size: 11pt;\">&nbsp;</span><table width=\"170\" cellpadding=\"0\" cellspacing=\"0\" style=\"border-collapse: collapse; border: none; \"><tr><td align=\"left\" valign=\"middle\" width=\"15\" style=\"border: solid #[id number] 0px;\"><div align=\"left\"><span style=\"font-size: 11pt;\">&nbsp;</span></div></td><td align=\"left\" valign=\"middle\" width=\"14\" style=\"border: solid #516138 0px;\"><div align=\"left\"><span style=\"font-size: 11pt;\">&nbsp;</span></div></td><td align=\"left\" valign=\"middle\" width=\"141\" style=\"border: solid #[id number] 0px;\"><div align=\"left\"><span style=\"font-size: 11pt;\">&nbsp;</span></div></td></tr><tr><td colspan=\"3\" align=\"left\" valign=\"top\" width=\"170\" style=\"border: solid #[id number] 0px;\"><div align=\"left\" style=\"margin-left:0mm; margin-right:2mm; text-indent:0mm; margin-top:0.00mm; margin-bottom:0.00mm;\"><font face=\"arial\" color=\"#[id number]\"><span style=\" font-size:11pt\"><b>Orders Placed This Encounter</b></span></font></div></td></tr><tr><td colspan=\"3\" align=\"left\" valign=\"top\" width=\"170\" bgcolor=\"#eeeeee\" style=\"border: solid #314683 0px;\"><div align=\"left\" style=\"margin-left:0mm; margin-right:2mm; text-indent:0mm; margin-top:0.00mm; margin-bottom:0.00mm;\"><font face=\"arial\" color=\"#959030\"><span style=\" font-size:11pt\">Medications</span></font></div></td></tr><tr><td align=\"center\" valign=\"top\" width=\"15\" style=\"border: solid #[id number] 0px;\"><div align=\"center\"><font face=\"[place]\"><span style=\" font-size:11pt\">&bull;</span></font></div></td><td colspan=\"2\" align=\"left\" valign=\"top\" width=\"155\" style=\"border: solid #774131 0px;\"><div align=\"left\" style=\"margin-left:0mm; margin-right:2mm; text-indent:0mm; margin-top:0.00mm; margin-bottom:0.00mm;\"><font face=\"[place]\"><span style=\" font-size:11pt\">ketoprofen (ORUDIS) 75 MG capsule</span></font></div></td></tr><tr><td align=\"center\" valign=\"top\" width=\"15\" style=\"border: solid #[id number] 0px;\"><div align=\"center\"><font face=\"[place]\"><span style=\" font-size:11pt\">&nbsp;</span></font></div></td><td align=\"center\" valign=\"top\" width=\"14\" style=\"border: solid #856420 0px;\"><div align=\"center\"><font face=\"[place]\"><span style=\" font-size:11pt\">&nbsp;</span></font></div></td><td align=\"left\" valign=\"top\" width=\"141\" style=\"border: solid #569430 0px;\"><div align=\"left\" style=\"margin-left:0mm; margin-right:2mm; text-indent:0mm; margin-top:0.00mm; margin-bottom:0.00mm;\"><font face=\"[place]\"><span style=\" font-size:11pt\">Sig: Take 1 capsule by mouth 2 times daily as needed for Pain</span></font></div></td></tr><tr><td align=\"center\" valign=\"top\" width=\"15\" style=\"border: solid #[id number] 0px;\"><div align=\"center\"><font face=\"[place]\"><span style=\" font-size:11pt\">&nbsp;</span></font></div></td><td align=\"center\" valign=\"top\" width=\"14\" style=\"border: solid #470393 0px;\"><div align=\"center\"><font face=\"[place]\"><span style=\" font-size:11pt\">&nbsp;</span></font></div></td><td align=\"left\" valign=\"top\" width=\"141\" style=\"border: solid #[id number] 0px;\"><div align=\"left\" style=\"margin-left:0mm; margin-right:2mm; text-indent:0mm; margin-top:0.00mm; margin-bottom:0.00mm;\"><font face=\"[place]\"><span style=\" font-size:11pt\">Dispense: &nbsp;120 capsule</span></font></div></td></tr><tr><td align=\"center\" valign=\"top\" width=\"15\" style=\"border: solid #[id number] 0px;\"><div align=\"center\"><font face=\"[place]\"><span style=\" font-size:11pt\">&nbsp;</span></font></div></td><td align=\"center\" valign=\"top\" width=\"14\" style=\"border: solid #[id number] 0px;\"><div align=\"center\"><font face=\"[place]\"><span style=\" font-size:11pt\">&nbsp;</span></font></div></td><td align=\"left\" valign=\"top\" width=\"141\" style=\"border: solid #[id number] 0px;\"><div align=\"left\" style=\"margin-left:0mm; margin-right:2mm; text-indent:0mm; margin-top:0.00mm; margin-bottom:0.00mm;\"><font face=\"[place]\"><span style=\" font-size:11pt\">Refill: &nbsp;0</span></font></div></td></tr></table><div align=\"left\">&nbsp;</div></div></td></tr></table><div align=\"left\"><font face=\"[place]\"><span style=\" font-size:11pt\">&nbsp;</span></font></div><div align=\"left\"><font face=\"[place]\"><span style=\" font-size:11pt\">No orders of the defined types were placed in this encounter. </span></font></div><div align=\"left\"><font face=\"[place]\"><span style=\" font-size:11pt\">&nbsp;</span></font></div><div align=\"left\"><font face=\"[place]\"><span style=\" font-size:11pt\"><b>&nbsp;</b></span></font></div><div align=\"left\"><font face=\"[place]\"><span style=\" font-size:11pt\">No follow-ups on file. </span></font></div><div align=\"left\"><font face=\"[place]\"><span style=\" font-size:11pt\"><b>Opioid medication has &nbsp;significant &nbsp;risk &nbsp;benefit concerns.</b></span></font></div><div align=\"left\"><font face=\"[place]\"><span style=\" font-size:11pt\"><b>&nbsp; We &nbsp;instruct &nbsp;&nbsp;&nbsp;our patients that &nbsp;a Random Urine Drug Screen is required &nbsp;along with a &nbsp;an Opioid assessment questionaire such as ORT &nbsp;or SOAPP</b></span></font></div><div align=\"left\"><font face=\"[place]\"><span style=\" font-size:11pt\">The patient expressed understanding of the above assessment and plan. </span></font></div><div align=\"left\"><font face=\"[place]\"><span style=\" font-size:11pt\">&nbsp;</span></font></div><div align=\"left\"><font face=\"[place]\"><span style=\" font-size:11pt\">Totaltime spent face to face with patient was&nbsp;25&nbsp;minutes inwhich &nbsp;50% or more of the time was spent in counseling, education about risk andbenefits of the above plan, and coordination of care. </span></font></div></td></tr></table><div align=\"left\">&nbsp;</div></td></tr></table><div align=\"left\">&nbsp;</div></div></td></tr></table><div align=\"left\">&nbsp;</div></div></td></tr></table><div align=\"left\">&nbsp;&nbsp;</div>

## 2021-07-30 ENCOUNTER — TELEPHONE (OUTPATIENT)
Dept: PAIN MANAGEMENT | Age: 81
End: 2021-07-30

## 2021-07-30 NOTE — TELEPHONE ENCOUNTER
Called and spoke with patiient; she states that she never got relief from the injection on 7.9.21. Pt states that pain is worse when she sits. Has tried ice but that is really short lived.      Please call her home number and leave a voicemail for her on recommendations

## 2021-07-30 NOTE — TELEPHONE ENCOUNTER
Patient is calling with post op pain, she is currently using heat and ice and Tylenol 650mg BID and relief is only short lived and her buttocks hurt unless she lays down. Please advise.

## 2021-08-17 ENCOUNTER — TELEPHONE (OUTPATIENT)
Dept: PAIN MANAGEMENT | Age: 81
End: 2021-08-17

## 2021-08-17 ENCOUNTER — OFFICE VISIT (OUTPATIENT)
Dept: PAIN MANAGEMENT | Age: 81
End: 2021-08-17
Payer: MEDICARE

## 2021-08-17 ENCOUNTER — HOSPITAL ENCOUNTER (OUTPATIENT)
Dept: GENERAL RADIOLOGY | Age: 81
Discharge: HOME | End: 2021-08-19
Payer: MEDICARE

## 2021-08-17 ENCOUNTER — HOSPITAL ENCOUNTER (OUTPATIENT)
Age: 81
Setting detail: SPECIMEN
Discharge: HOME | End: 2021-08-17
Payer: MEDICARE

## 2021-08-17 VITALS
HEIGHT: 64 IN | WEIGHT: 176 LBS | DIASTOLIC BLOOD PRESSURE: 64 MMHG | SYSTOLIC BLOOD PRESSURE: 104 MMHG | BODY MASS INDEX: 30.05 KG/M2

## 2021-08-17 DIAGNOSIS — M54.17 LUMBOSACRAL RADICULOPATHY: Primary | ICD-10-CM

## 2021-08-17 DIAGNOSIS — M25.552 HIP PAIN, BILATERAL: ICD-10-CM

## 2021-08-17 DIAGNOSIS — M25.551 HIP PAIN, BILATERAL: ICD-10-CM

## 2021-08-17 DIAGNOSIS — R10.32 LEFT GROIN PAIN: ICD-10-CM

## 2021-08-17 DIAGNOSIS — Z02.83 ENCOUNTER FOR DRUG SCREENING: ICD-10-CM

## 2021-08-17 DIAGNOSIS — Z79.891 ENCOUNTER FOR LONG-TERM OPIATE ANALGESIC USE: Primary | ICD-10-CM

## 2021-08-17 DIAGNOSIS — M54.16 LUMBAR RADICULITIS: ICD-10-CM

## 2021-08-17 DIAGNOSIS — M51.36 DDD (DEGENERATIVE DISC DISEASE), LUMBAR: ICD-10-CM

## 2021-08-17 DIAGNOSIS — Z79.891 ENCOUNTER FOR LONG-TERM OPIATE ANALGESIC USE: ICD-10-CM

## 2021-08-17 DIAGNOSIS — Z95.0 PACEMAKER: ICD-10-CM

## 2021-08-17 PROCEDURE — 1090F PRES/ABSN URINE INCON ASSESS: CPT | Performed by: PHYSICAL MEDICINE & REHABILITATION

## 2021-08-17 PROCEDURE — 4040F PNEUMOC VAC/ADMIN/RCVD: CPT | Performed by: PHYSICAL MEDICINE & REHABILITATION

## 2021-08-17 PROCEDURE — 1123F ACP DISCUSS/DSCN MKR DOCD: CPT | Performed by: PHYSICAL MEDICINE & REHABILITATION

## 2021-08-17 PROCEDURE — G8400 PT W/DXA NO RESULTS DOC: HCPCS | Performed by: PHYSICAL MEDICINE & REHABILITATION

## 2021-08-17 PROCEDURE — 1036F TOBACCO NON-USER: CPT | Performed by: PHYSICAL MEDICINE & REHABILITATION

## 2021-08-17 PROCEDURE — 73521 X-RAY EXAM HIPS BI 2 VIEWS: CPT

## 2021-08-17 PROCEDURE — G8417 CALC BMI ABV UP PARAM F/U: HCPCS | Performed by: PHYSICAL MEDICINE & REHABILITATION

## 2021-08-17 PROCEDURE — 99214 OFFICE O/P EST MOD 30 MIN: CPT | Performed by: PHYSICAL MEDICINE & REHABILITATION

## 2021-08-17 PROCEDURE — 80307 DRUG TEST PRSMV CHEM ANLYZR: CPT

## 2021-08-17 PROCEDURE — G8428 CUR MEDS NOT DOCUMENT: HCPCS | Performed by: PHYSICAL MEDICINE & REHABILITATION

## 2021-08-17 RX ORDER — B-COMPLEX WITH VITAMIN C
2 TABLET ORAL DAILY
COMMUNITY

## 2021-08-17 RX ORDER — AMOXICILLIN 500 MG/1
500 CAPSULE ORAL ONCE
Qty: 1 CAPSULE | Refills: 0 | Status: SHIPPED | OUTPATIENT
Start: 2021-08-17 | End: 2021-08-17

## 2021-08-17 RX ORDER — AMOXICILLIN 500 MG/1
CAPSULE ORAL
COMMUNITY
Start: 2021-07-20 | End: 2021-08-17 | Stop reason: SDUPTHER

## 2021-08-17 ASSESSMENT — ENCOUNTER SYMPTOMS
BACK PAIN: 1
CONSTIPATION: 0
RESPIRATORY NEGATIVE: 1
ALLERGIC/IMMUNOLOGIC NEGATIVE: 1
EYES NEGATIVE: 1
DIARRHEA: 0

## 2021-08-17 NOTE — PROGRESS NOTES
Reactions    Sulfa Antibiotics Shortness Of Breath    Sulfamethoxazole-Trimethoprim Anaphylaxis     (Bactrim)    Ansaid [Flurbiprofen] Other (See Comments)     Light headed and difficult with vision \"seeing\"    Gentamicin Other (See Comments)     Eye ointment caused eye swelling, redness    Motrin [Ibuprofen] Other (See Comments)     \"I coughed so bad I broke a rib\"    Quinidine      Light headed    Hydrocodone-Acetaminophen     Mirapex [Pramipexole Dihydrochloride] Other (See Comments)     Unknown reaction    Mobic [Meloxicam] Nausea Only    Nsaids Other (See Comments)     lightheaded    Reglan [Metoclopramide] Other (See Comments)     Hyperactive and stomach pain    Trazodone Other (See Comments)     unknown    Corgard [Nadolol] Other (See Comments)     Severe coughing and broke a rib as a result     Erythromycin Nausea Only    Etodolac      GI issues    Garamycin [Gentamicin Sulfate] Other (See Comments)     Redness and irritation    Inderal [Propranolol] Other (See Comments)     Severe cough    Lisinopril Itching    Loratadine      Does not work    Ultram [Tramadol] Other (See Comments)     Didn't work         Outpatient Medications Prior to Visit   Medication Sig Dispense Refill    amoxicillin (AMOXIL) 500 MG capsule take 4 capsules by mouth 1 hour prior to appointment      calcium carbonate-vitamin D 600-200 MG-UNIT TABS Take 2 tablets by mouth daily      oxyCODONE HCl (OXY-IR) 10 MG immediate release tablet Take 1 tablet by mouth 3 times daily for 30 days.  90 tablet 0    DULoxetine (CYMBALTA) 30 MG extended release capsule Take 30 mg by mouth daily      acetaminophen (TYLENOL) 650 MG suppository Place 650 mg rectally every 4 hours as needed for Fever       topiramate (TOPAMAX) 25 MG tablet Take 25 mg by mouth 2 times daily      furosemide (LASIX) 20 MG tablet 20 mg 3 times daily       gabapentin (NEURONTIN) 100 MG capsule TAKE 1 CAPSULE BY MOUTH THREE TIMES A DAY 90 capsule 2    baclofen (LIORESAL) 10 MG tablet Take 10 mg by mouth daily       ketotifen (ZADITOR) 0.025 % ophthalmic solution 1 drop 2 times daily      Multiple Vitamin (MULTI-VITAMIN DAILY) TABS Take by mouth      melatonin 1 MG tablet melatonin   once daily      Triprolidine-Pseudoephedrine (ANTIHISTAMINE PO) Take by mouth Indications: zyrtec       metoprolol tartrate (LOPRESSOR) 25 MG tablet Take 12.5 mg by mouth 2 times daily      aspirin 81 MG tablet Take 81 mg by mouth daily      levothyroxine (SYNTHROID) 75 MCG tablet Take 75 mcg by mouth daily      potassium chloride (KLOR-CON M) 20 MEQ extended release tablet TAKE ONE TABLET ONCE A DAY      guaiFENesin (MUCINEX) 600 MG extended release tablet Mucinex 600 mg tablet, extended release   Take 2 tablets every day by oral route.  Heating Pads (RADHA PAD/SMARTHEAT PRO/) PADS 1 Units by Does not apply route 4 times daily Use for 15 minutes to 30 minutes at a time four times a day. 1 each 0    lidocaine (ASPERCREME W/LIDOCAINE) 4 % cream Apply topically as needed for Pain Apply topically as needed.  fluticasone propionate 50 MCG/BLIST AEPB Inhale into the lungs      albuterol (ACCUNEB) 1.25 MG/3ML nebulizer solution Inhale 1 ampule into the lungs every 6 hours as needed for Wheezing       simvastatin (ZOCOR) 40 MG tablet Take 40 mg by mouth nightly      flecainide (TAMBOCOR) 50 MG tablet Take 50 mg by mouth 2 times daily.  omeprazole (PRILOSEC) 40 MG capsule Take 40 mg by mouth nightly.  montelukast (SINGULAIR) 10 MG tablet Take 10 mg by mouth nightly.  DULoxetine (CYMBALTA) 60 MG extended release capsule Take 60 mg by mouth daily       artificial tears (ARTIFICIAL TEARS) OINT as needed.       Saline 0.2 % SOLN by Nasal route daily as needed       diphenhydrAMINE (BENADRYL) 25 MG capsule Take 25 mg by mouth every 6 hours as needed for Itching       ketoprofen (ORUDIS) 75 MG capsule Take 1 capsule by mouth 2 times daily as needed for Pain (Patient not taking: Reported on 8/17/2021) 120 capsule 0    naloxone 4 MG/0.1ML LIQD nasal spray 1 spray by Nasal route as needed for Opioid Reversal (Patient not taking: Reported on 6/15/2021) 1 each 5    famotidine (PEPCID) 10 MG tablet Take 10 mg by mouth 2 times daily  (Patient not taking: Reported on 8/17/2021)      BiPAP Machine MISC  (Patient not taking: Reported on 6/15/2021)      Immune Globulin, Human, (HIZENTRA) 10 GM/50ML SOLN Inject into the skin Tuesdays (Patient not taking: Reported on 6/15/2021)       Facility-Administered Medications Prior to Visit   Medication Dose Route Frequency Provider Last Rate Last Admin    triamcinolone acetonide (KENALOG-40) injection 40 mg  40 mg Intra-articular Once Mancil MD Anatoly        lidocaine 2 % injection 5 mL  5 mL Intradermal Once Mancil MD Anatoly        bupivacaine (MARCAINE) 0.5 % injection 150 mg  30 mL Intra-articular Once Mancil MD Anatoly           Past Medical History:   Diagnosis Date    Anesthesia complication     2nd post op day from total knee patient stated \"I was wild and mean\"    Anxiety and depression     Arthritis     ASD (atrial septal defect)     repair in [facility] COPD [facility]chronic obstructive pulmonary disease) [facility]     COPD (chronic obstructive pulmonary disease) ([facility] 1980's    Disorder of immune system ([facility]     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 ([facility] 3/2015 [facility])    History of renal stone     passed    Hx of blood clots 1970    DVT    Hypertension     MVP (mitral valve prolapse)     LONI (obstructive sleep apnea)     has not used bipap since June, 2020    Rectocele     Spinal stenosis     Thyroid disease late 1980's    overactive radioactive iodine done       Past Surgical History:   Procedure Laterality Date    BACK INJECTION Bilateral 2021    Bilateral SACROILIAC Joint & Piriformis Injection performed by Angelina Napier MD at [address] LUMPECTOMY Left     BREAST LUMPECTOMY Left     CARDIAC SURGERY      ASD repair     SECTION  /    2x    CHOLECYSTECTOMY      CHOLECYSTECTOMY      COLONOSCOPY      three    DIAGNOSTIC CARDIAC CATH LAB PROCEDURE      DILATION AND CURETTAGE OF UTERUS      EYE SURGERY Bilateral     cataract    [facility] INJECTION PROCEDURE FOR SACROILIAC JOINT Left 2018    Left SIJ and piriformis, left trocanteric bursa injection performed by Angelina Napier MD at [address]  2009    HYSTERECTOMY  2009    Total    JOINT REPLACEMENT Bilateral     knee    KNEE ARTHROSCOPY Left 2008    LUMBAR SPINE SURGERY Bilateral 2019    Bilateral L4 TRANSFORAMINAL  [id number] performed by Angelina Napier MD at [address] [place] [address] Dr Bilateral 3/12/2019    Bilateral L4 TRANSFORAMINAL performed by Angelina Napier MD at [address] [place] [address] Dr Right 10/15/2019    RIGHT L4 & L5 TRANSFORAMINAL performed by Angelina Napier MD at [address] [place] [address] Dr Left 2019    Left L4 & L5 TRANSFORAMINAL performed by Angelina Napier MD at [place] [address]  2014    PAIN MANAGEMENT PROCEDURE Right 2020    Right L4 & L5 TRANSFORAMINAL performed by Angelina Napier MD at [address] [place] [address] Left 2020    Left L4 & L5 TRANSFORAMINAL performed by Angelina Napier MD at [address] [place] [address] Right 2021    Right L4 & L5 TRANSFORAMINAL performed by Angelina Napier MD at [address] [place] [address] Left 3/11/2021    left L4 & L5 TRANSFORAMINAL performed by Angelina Napier MD at [address] [place] [address] Left 2021    Left L4 & L5 TRANSFORAMINAL performed by [facility] MD at [facility] OR    NV INJ DX/THER AGNT PARAVERT FACET JOINT, LUMBAR/SAC, 2ND LEVEL Bilateral 8/30/2018    Bilateral L2 L3 L4 L5 Diagnostic Medial BB performed by Bucky Vincent MD at [address] [place] [address] DX/THER AGNT PARAVERT FACET JOINT, LUMBAR/SAC, 2ND LEVEL Right 9/13/2018    Right L2 L3 L4 L5 Confirmatory Medial BB performed by Bucky Vincent MD at [address] AA&/STRD TFRML EPI CERVICAL/THORACIC EA ADDL Right 7/20/2018    Right C5 C6 TRANSFORAMINAL performed by Bucky Vincent MD at [address] Left 12/20/2018    Left L2 L3 L4 L5 RADIOFREQUENCY performed by Bucky Vincent MD at [address] Right 1/3/2019    Right L2 L3 L4 L5 RADIOFREQUENCY performed by Bucky Vincent MD at [facility] Right 01/28/2005 & 03/16/2010    2x    TONSILLECTOMY      at age 12     Family History   Problem Relation Age of Onset    Heart Disease Father      Social History     Socioeconomic History    Marital status:      Spouse name: None    Number of children: None    Years of education: None    Highest education level: None   Occupational History    Occupation: retired   Tobacco Use    Smoking status: Never Smoker    Smokeless tobacco: Never Used   Vaping Use    Vaping Use: Never used   Substance and Sexual Activity    Alcohol use: Yes     Comment: very rare    Drug use: No    Sexual activity: None   Other Topics Concern    None   Social History Narrative    None     Social Determinants of Health     Financial Resource Strain:     Difficulty of Paying Living Expenses:    Food Insecurity:     Worried About Running Out of Food in the Last Year:     Ran Out of Food in the Last Year:    Transportation Needs:     Lack of Transportation (Medical):      Lack of Transportation (Non-Medical):    Physical Activity:     Days of Exercise per Week:     Minutes of Exercise per Session:    Stress:     Feeling of Stress :    Social Connections:  Frequency of Communication with Friends and Family:     Frequency of Social Gatherings with Friends and Family:     Attends [employer] Services:     Active Member of Clubs or Organizations:     Attends Club or Organization Meetings:     Marital Status:    Intimate Partner Violence:     Fear of Current or Ex-Partner:     Emotionally Abused:     Physically Abused:     Sexually Abused:          Family and Social Historyreviewed in the electronic medical record. Imaging:Reviewed available imaging in our system with the patient. No results found. Objective:     Physical Exam:  Vitals:    08/17/21 1415   BP: 104/64   Site: Right Upper Arm   Position: Sitting   Cuff Size: Medium Adult   Weight: 176 lb (79.8 kg)   Height: 5' 4\" (1.626 m)          Physical Exam  Constitutional:       General: She is not in acute distress. Appearance: Normal appearance. She is well-developed. She is not diaphoretic. HENT:      Head: Normocephalic and atraumatic. Right Ear: External ear normal. No decreased hearing noted. Left Ear: External ear normal. No decreased hearing noted. Nose: Nose normal.   Eyes:      General: Lids are normal. No scleral icterus. Conjunctiva/sclera: Conjunctivae normal.   Neck:      Trachea: Phonation normal.   Cardiovascular:      Comments: No BLE edema present  Pulmonary:      Effort: Pulmonary effort is normal. No accessory muscle usage or respiratory distress. Abdominal:      General: Abdomen is flat. Genitourinary:     Comments: deferred  Musculoskeletal:      Lumbar back: Positive right straight leg raise test and positive left straight leg raise test.   Skin:     General: Skin is warm and dry. Coloration: Skin is not pale. Findings: No erythema or rash. Neurological:      General: No focal deficit present. Mental Status: She is alert and oriented to person, place, and time.    Psychiatric:         Mood and Affect: Mood normal.         Speech: Speech normal.         Behavior: Behavior normal.       Back Exam     Tenderness   The patient is experiencing tenderness in the lumbar and sacroiliac (tender L groin  +Faberes mild L ). Range of Motion   Extension: normal   Flexion: normal   Lateral bend right: normal   Lateral bend left: normal   Rotation right: normal   Rotation left: normal     Muscle Strength   Right Quadriceps:  5/5   Left Quadriceps:  5/5   Right Hamstrings:  5/5   Left Hamstrings:  5/5     Tests   Straight leg raise right: positive  Straight leg raise left: positive    Other   Toe walk: normal  Heel walk: normal  Sensation: normal  Gait: normal                                   Research  has found that  Spine injections    reduce pain and  give  better functional  outcomes. Assessment: This is a 80 y.o. female patient with:    Diagnosis:   Diagnosis Orders   1. Lumbar radiculitis     2. DDD (degenerative disc disease), lumbar     3. Left groin pain     4. Pacemaker     5. Encounter for long-term opiate analgesic use         Medical Comorbidities:  As listed in the patient's past medical and surgical history    Functional Limitations:   Pain limits function and quality of life. Plan:   Lumbar CT  May need  Myelogram will start with  This   cant have   MRI pacemaker  Pelvis XRay   may take  1/2 one -half  More per day  Of oxycodone    Meds:   Controlled Substances Monitoring: Pt educated about the risks of taking opiates,including increased sedation, constipation, slowed breathing, tolerance, dependence,and addiction. New Prescriptions    No medications on file      No orders of the defined types were placed in this encounter. No orders of the defined types were placed in this encounter. No follow-ups on file. Opioid medication has  significant  risk  benefit concerns.    We  instruct    our patients that  a Random Urine Drug Screen is required  along with a  an Opioid assessment questionaire such as ORT  or SOAPP  The patient expressed understanding of the above assessment and plan. Totaltime spent face to face with patient was 30 minutes inwhich  50% or more of the time was spent in counseling, education about risk andbenefits of the above plan, and coordination of care.

## 2021-08-22 LAB
6-ACETYLMORPHINE, UR: NOT DETECTED
7-AMINOCLONAZEPAM, URINE: NOT DETECTED
ALPHA-OH-ALPRAZ, URINE: NOT DETECTED
ALPHA-OH-MIDAZOLAM, URINE: NOT DETECTED
ALPRAZOLAM, URINE: NOT DETECTED
AMPHETAMINES, URINE: NOT DETECTED
BARBITURATES, URINE: NOT DETECTED
BENZOYLECGONINE, UR: NOT DETECTED
BUPRENORPHINE URINE: NOT DETECTED
CARISOPRODOL, UR: NOT DETECTED
CLONAZEPAM, URINE: NOT DETECTED
CODEINE, URINE: NOT DETECTED
CREATININE URINE: 24.4 MG/DL (ref 20–400)
DIAZEPAM, URINE: NOT DETECTED
EER PAIN MGT DRUG PANEL, HIGH RES/EMIT U: NORMAL
ETHYL GLUCURONIDE UR: NOT DETECTED
FENTANYL URINE: NOT DETECTED
GABAPENTIN: PRESENT
HYDROCODONE, URINE: NOT DETECTED
HYDROMORPHONE, URINE: NOT DETECTED
LORAZEPAM, URINE: NOT DETECTED
MARIJUANA METAB, UR: NOT DETECTED
MDA, UR: NOT DETECTED
MDEA, EVE, UR: NOT DETECTED
MDMA URINE: NOT DETECTED
MEPERIDINE METAB, UR: NOT DETECTED
METHADONE, URINE: NOT DETECTED
METHAMPHETAMINE, URINE: NOT DETECTED
METHYLPHENIDATE: NOT DETECTED
MIDAZOLAM, URINE: NOT DETECTED
MORPHINE URINE: NOT DETECTED
NALOXONE URINE: NOT DETECTED
NORBUPRENORPHINE, URINE: NOT DETECTED
NORDIAZEPAM, URINE: NOT DETECTED
NORFENTANYL, URINE: NOT DETECTED
NORHYDROCODONE, URINE: NOT DETECTED
NOROXYCODONE, URINE: PRESENT
NOROXYMORPHONE, URINE: NOT DETECTED
OXAZEPAM, URINE: NOT DETECTED
OXYCODONE URINE: PRESENT
OXYMORPHONE, URINE: PRESENT
PAIN MGT DRUG PANEL, HI RES, UR: NORMAL
PCP,URINE: NOT DETECTED
PHENTERMINE, UR: NOT DETECTED
PREGABALIN: NOT DETECTED
TAPENTADOL, URINE: NOT DETECTED
TAPENTADOL-O-SULFATE, URINE: NOT DETECTED
TEMAZEPAM, URINE: NOT DETECTED
TRAMADOL, URINE: NOT DETECTED
ZOLPIDEM METABOLITE (ZCA), URINE: NOT DETECTED
ZOLPIDEM, URINE: NOT DETECTED

## 2021-08-23 ENCOUNTER — HOSPITAL ENCOUNTER (OUTPATIENT)
Dept: CT IMAGING | Age: 81
Discharge: HOME | End: 2021-08-25
Payer: MEDICARE

## 2021-08-23 DIAGNOSIS — M54.17 LUMBOSACRAL RADICULOPATHY: ICD-10-CM

## 2021-08-23 PROCEDURE — 72131 CT LUMBAR SPINE W/O DYE: CPT

## 2021-08-24 DIAGNOSIS — M47.817 LUMBOSACRAL SPONDYLOSIS WITHOUT MYELOPATHY: ICD-10-CM

## 2021-08-24 DIAGNOSIS — M54.16 LUMBAR RADICULOPATHY: ICD-10-CM

## 2021-08-24 DIAGNOSIS — G89.29 CHRONIC LEFT SACROILIAC JOINT PAIN: ICD-10-CM

## 2021-08-24 DIAGNOSIS — M53.3 CHRONIC LEFT SACROILIAC JOINT PAIN: ICD-10-CM

## 2021-08-24 RX ORDER — OXYCODONE HYDROCHLORIDE 10 MG/1
10 TABLET ORAL 3 TIMES DAILY
Qty: 90 TABLET | Refills: 0 | Status: SHIPPED | OUTPATIENT
Start: 2021-08-24 | End: 2021-09-29 | Stop reason: SDUPTHER

## 2021-08-24 NOTE — TELEPHONE ENCOUNTER
Last Appt:  8/17/2021  Next Appt:   10/19/2021  Med verified in [facility] checked for [place], [place], and [place]: 07/13/21 Oxycodone 10mg #90. Due NOW.

## 2021-09-08 ASSESSMENT — ENCOUNTER SYMPTOMS
EYES NEGATIVE: 1
ALLERGIC/IMMUNOLOGIC NEGATIVE: 1
DIARRHEA: 0
BACK PAIN: 1
RESPIRATORY NEGATIVE: 1
CONSTIPATION: 0

## 2021-09-08 NOTE — H&P
PAIN MANAGEMENT FOLLOW-UP NOTE  6/15/21    CHIEF COMPLAINT: This is a80 y.o. female patientwho returns to the [facility] with a history of No chief complaint on file. PAIN Rayna Arteaga returns today for  reevaluation. Since the visit, the patient reports that the pain is not changed. Patient  Received  One day of  Relief  In which R   Trigger  Finger  Extended out without  Pain but  Then returned to triggering  A day  Later. Also  Pain in  B MCP notes a little more swollen in last week    Pain tolerable in B S-I and piriformis      Location: B hands   Location Modifier: B Hands   Severity of Pain: 4  Duration of Pain: Intermittent  Frequency of Pain: Intermittent  Aggravating Factors: -  Limiting Behavior: using hands   Relieving Factors: relaxation        Previous pain medication trials have included:          Mental health:    Patient feels - secondary to their current pain problems as described above. H/O depression and anxiety: No   Patient is not seeing psychologist orpsychiatrist   Abuse history? No    Employed? No    ANALGESIA:   Are your Current Pain medication (s) helping to decrease pain? No.   Current Pain score:      ADVERSE AFFECTS:   Medication Side Effects: No.    ACTIVITY:  Are you able to be more active with your pain medications? No      ABERRANT BEHAVIORS SINCE LAST VISIT? No    Review of Systems   Constitutional: Positive for fatigue. HENT: Negative. Eyes: Negative. Respiratory: Negative. Cardiovascular: Negative. Gastrointestinal: Negative for constipation and diarrhea. Endocrine: Negative. Genitourinary: Negative for difficulty urinating and flank pain. Musculoskeletal: Positive for back pain and myalgias. Skin: Negative. Allergic/Immunologic: Negative. Neurological: Positive for weakness and numbness. Hematological: Negative. Psychiatric/Behavioral: Positive for sleep disturbance.         Allergies   Allergen Reactions    Sulfa Antibiotics Shortness Of Breath    Sulfamethoxazole-Trimethoprim Anaphylaxis     (Bactrim)    Ansaid [Flurbiprofen] Other (See Comments)     Light headed and difficult with vision \"seeing\"    Gentamicin Other (See Comments)     Eye ointment caused eye swelling, redness    Motrin [Ibuprofen] Other (See Comments)     \"I coughed so bad I broke a rib\"    Quinidine      Light headed    Hydrocodone-Acetaminophen     Mirapex [Pramipexole Dihydrochloride] Other (See Comments)     Unknown reaction    Mobic [Meloxicam] Nausea Only    Nsaids Other (See Comments)     lightheaded    Reglan [Metoclopramide] Other (See Comments)     Hyperactive and stomach pain    Trazodone Other (See Comments)     unknown    Corgard [Nadolol] Other (See Comments)     Severe coughing and broke a rib as a result     Erythromycin Nausea Only    Etodolac      GI issues    Garamycin [Gentamicin Sulfate] Other (See Comments)     Redness and irritation    Inderal [Propranolol] Other (See Comments)     Severe cough    Lisinopril Itching    Loratadine      Does not work    Ultram [Tramadol] Other (See Comments)     Didn't work         Outpatient Medications Prior to Visit   Medication Sig Dispense Refill    oxyCODONE HCl (OXY-IR) 10 MG immediate release tablet Take 1 tablet by mouth 3 times daily for 30 days.  90 tablet 0    DULoxetine (CYMBALTA) 30 MG extended release capsule Take 30 mg by mouth daily      topiramate (TOPAMAX) 25 MG tablet Take 25 mg by mouth 2 times daily      furosemide (LASIX) 20 MG tablet 20 mg 3 times daily       gabapentin (NEURONTIN) 100 MG capsule TAKE 1 CAPSULE BY MOUTH THREE TIMES A DAY 90 capsule 2    baclofen (LIORESAL) 10 MG tablet Take 10 mg by mouth daily       ketotifen (ZADITOR) 0.025 % ophthalmic solution 1 drop 2 times daily      Multiple Vitamin (MULTI-VITAMIN DAILY) TABS Take by mouth      melatonin 1 MG tablet melatonin   once daily      Triprolidine-Pseudoephedrine (ANTIHISTAMINE PO) Take by mouth Indications: zyrtec       metoprolol tartrate (LOPRESSOR) 25 MG tablet Take 12.5 mg by mouth 2 times daily      aspirin 81 MG tablet Take 81 mg by mouth daily      levothyroxine (SYNTHROID) 75 MCG tablet Take 75 mcg by mouth daily      potassium chloride (KLOR-CON M) 20 MEQ extended release tablet TAKE ONE TABLET ONCE A DAY      guaiFENesin (MUCINEX) 600 MG extended release tablet Mucinex 600 mg tablet, extended release   Take 2 tablets every day by oral route.  lidocaine (ASPERCREME W/LIDOCAINE) 4 % cream Apply topically as needed for Pain Apply topically as needed.  fluticasone propionate 50 MCG/BLIST AEPB Inhale into the lungs      simvastatin (ZOCOR) 40 MG tablet Take 40 mg by mouth nightly      flecainide (TAMBOCOR) 50 MG tablet Take 50 mg by mouth 2 times daily.  omeprazole (PRILOSEC) 40 MG capsule Take 40 mg by mouth nightly.  montelukast (SINGULAIR) 10 MG tablet Take 10 mg by mouth nightly.  DULoxetine (CYMBALTA) 60 MG extended release capsule Take 60 mg by mouth daily       artificial tears (ARTIFICIAL TEARS) OINT as needed.  Saline 0.2 % SOLN by Nasal route daily as needed       acetaminophen (TYLENOL) 650 MG suppository Place 650 mg rectally every 4 hours as needed for Fever (Patient not taking: Reported on 6/15/2021)      naloxone 4 MG/0.1ML LIQD nasal spray 1 spray by Nasal route as needed for Opioid Reversal (Patient not taking: Reported on 6/15/2021) 1 each 5    famotidine (PEPCID) 10 MG tablet Take 10 mg by mouth 2 times daily (Patient not taking: Reported on 6/15/2021)      BiPAP Machine MISC  (Patient not taking: Reported on 6/15/2021)      Heating Pads (RADHA PAD/SMARTHEAT PRO/) PADS 1 Units by Does not apply route 4 times daily Use for 15 minutes to 30 minutes at a time four times a day.  (Patient not taking: Reported on 6/15/2021) 1 each 0    albuterol (ACCUNEB) 1.25 MG/3ML nebulizer solution Inhale 1 ampule into the lungs every 6 hours as needed for Wheezing (Patient not taking: Reported on 6/15/2021)      Immune Globulin, Human, (HIZENTRA) 10 GM/50ML SOLN Inject into the skin Tuesdays (Patient not taking: Reported on 6/15/2021)      diphenhydrAMINE (BENADRYL) 25 MG capsule Take 25 mg by mouth every 6 hours as needed for Itching.  (Patient not taking: Reported on 6/15/2021)       Facility-Administered Medications Prior to Visit   Medication Dose Route Frequency Provider Last Rate Last Admin    triamcinolone acetonide (KENALOG-40) injection 40 mg  40 mg Intra-articular Once Emperatriz Alejandro MD        lidocaine 2 % injection 5 mL  5 mL Intradermal Once Emperatriz Alejandro MD        bupivacaine (MARCAINE) 0.5 % injection 150 mg  30 mL Intra-articular Once Emperatriz Alejandro MD           Past Medical History:   Diagnosis Date    Anesthesia complication     2nd post op day from total knee patient stated \"I was wild and mean\"    Anxiety and depression     Arthritis     ASD (atrial septal defect)     repair in [address] [place] [address] COPD (chronic obstructive pulmonary disease) ([facility] [address]     COPD (chronic obstructive pulmonary disease) ([facility] [address] [facility] 1980's    Disorder of immune system ([facility] [address] [facility]     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 (total [facility] 3/2015 [facility])    History of renal stone     passed    Hx of blood clots 1970    DVT    Hypertension     MVP (mitral valve prolapse)     LONI (obstructive sleep apnea)     has not used bipap since June, 2020    Rectocele     Spinal stenosis     Thyroid disease late 1980's    overactive radioactive iodine done       Past Surgical History:   Procedure Laterality Date    BACK INJECTION Bilateral 7/9/2021    Bilateral SACROILIAC Joint & Piriformis Injection performed by Andrew Westfall Indio Mackenzie MD at [address] [facility] LUMPECTOMY Left     BREAST LUMPECTOMY Left     CARDIAC SURGERY      ASD repair     SECTION      2x    CHOLECYSTECTOMY      CHOLECYSTECTOMY      COLONOSCOPY      three    DIAGNOSTIC CARDIAC CATH LAB PROCEDURE      DILATION AND CURETTAGE OF UTERUS      EYE SURGERY Bilateral     cataract    [facility] INJECTION PROCEDURE FOR SACROILIAC JOINT Left 2018    Left SIJ and piriformis, left trocanteric bursa injection performed by Murali Burton MD at [address] [facility]  2009    HYSTERECTOMY  2009    Total    JOINT REPLACEMENT Bilateral     knee    KNEE ARTHROSCOPY Left     LUMBAR SPINE SURGERY Bilateral 2019    Bilateral L4 TRANSFORAMINAL  [id number] performed by Murali Burton MD at [address] [place] [address] Bilateral 3/12/2019    Bilateral L4 TRANSFORAMINAL performed by Murali Burton MD at [address] [place] [address] Right 10/15/2019    RIGHT L4 & L5 TRANSFORAMINAL performed by Murali Burton MD at [address] [place] [address] Left 2019    Left L4 & L5 TRANSFORAMINAL performed by Murali Burton MD at [place] [address]  2014    PAIN MANAGEMENT PROCEDURE Right 2020    Right L4 & L5 TRANSFORAMINAL performed by Murali Burton MD at [address] [facility] Left 2020    Left L4 & L5 TRANSFORAMINAL performed by Murali Burton MD at [address] [facility] Right 2021    Right L4 & L5 TRANSFORAMINAL performed by Murali Burton MD at [address] [facility] Left 3/11/2021    left L4 & L5 TRANSFORAMINAL performed by Murali Burton MD at [address] [facility] Left 2021    Left L4 & L5 TRANSFORAMINAL performed by Murali Burton MD at [address] [place] [address] DX/THER AGNT PARAVERT FACET JOINT, LUMBAR/SAC, 2ND LEVEL Bilateral 2018    Bilateral L2 L3 L4 L5 Diagnostic Medial BB performed by Murali Burton MD at [address] [facility]  AL INJ DX/THER AGNT PARAVERT FACET JOINT, LUMBAR/SAC, 2ND LEVEL Right 9/13/2018    Right L2 L3 L4 L5 Confirmatory Medial BB performed by Stacy Ssoa MD at [address] AA&/STRD TFRML EPI CERVICAL/THORACIC EA ADDL Right 7/20/2018    Right C5 C6 TRANSFORAMINAL performed by Stacy Sosa MD at [address] Left 12/20/2018    Left L2 L3 L4 L5 RADIOFREQUENCY performed by Stacy Sosa MD at [address] Right 1/3/2019    Right L2 L3 L4 L5 RADIOFREQUENCY performed by Stacy Sosa MD at [address] Right 01/28/2005 & 03/16/2010    2x    TONSILLECTOMY      at age 12     Family History   Problem Relation Age of Onset    Heart Disease Father      Social History     Socioeconomic History    Marital status:      Spouse name: None    Number of children: None    Years of education: None    Highest education level: None   Occupational History    Occupation: retired   Tobacco Use    Smoking status: Never Smoker    Smokeless tobacco: Never Used   Vaping Use    Vaping Use: Never used   Substance and Sexual Activity    Alcohol use: Yes     Comment: very rare    Drug use: No    Sexual activity: None   Other Topics Concern    None   Social History Narrative    None     Social Determinants of Health     Financial Resource Strain:     Difficulty of Paying Living Expenses:    Food Insecurity:     Worried About Running Out of Food in the Last Year:     Ran Out of Food in the Last Year:    Transportation Needs:     Lack of Transportation (Medical):      Lack of Transportation (Non-Medical):    Physical Activity:     Days of Exercise per Week:     Minutes of Exercise per Session:    Stress:     Feeling of Stress :    Social Connections:     Frequency of Communication with Friends and Family:     Frequency of Social Gatherings with Friends and Family:     Attends [employer] Services:     Active Member of Clubs or Organizations:     Attends Club or Organization Meetings:     Marital Status:    Intimate Partner Violence:     Fear of Current or Ex-Partner:     Emotionally Abused:     Physically Abused:     Sexually Abused:          Family and Social Historyreviewed in the electronic medical record. Imaging:Reviewed available imaging in our system with the patient. No results found. Objective:     Physical Exam:  Vitals:    07/09/21 1103 07/09/21 1139 07/09/21 1147   BP: (!) 119/55 134/64 126/60   Pulse: 70 70 70   Resp: 18 18 16   Temp: 96.9 °F (36.1 °C)  97 °F (36.1 °C)   TempSrc: Temporal     SpO2: 95% 96% 97%   Weight: 178 lb (80.7 kg)     Height: 5' 4\" (1.626 m)            Physical Exam  Constitutional:       General: She is not in acute distress. Appearance: Normal appearance. She is well-developed. She is not diaphoretic. HENT:      Head: Normocephalic and atraumatic. Right Ear: External ear normal. No decreased hearing noted. Left Ear: External ear normal. No decreased hearing noted. Nose: Nose normal.   Eyes:      General: Lids are normal. No scleral icterus. Conjunctiva/sclera: Conjunctivae normal.   Neck:      Trachea: Phonation normal.   Cardiovascular:      Comments: No BLE edema present  Pulmonary:      Effort: Pulmonary effort is normal. No accessory muscle usage or respiratory distress. Abdominal:      General: Abdomen is flat. Palpations: Abdomen is soft. Genitourinary:     Comments: deferred  Musculoskeletal:      Lumbar back: Negative right straight leg raise test and negative left straight leg raise test.   Skin:     General: Skin is warm and dry. Coloration: Skin is not pale. Findings: No erythema or rash. Neurological:      General: No focal deficit present. Mental Status: She is alert and oriented to person, place, and time.    Psychiatric:         Mood and Affect: Mood normal.         Speech: Speech normal.         Behavior: Behavior normal. Back Exam     Tenderness   The patient is experiencing tenderness in the sacroiliac. Range of Motion   Extension: normal   Flexion: normal   Lateral bend right: normal   Lateral bend left: normal   Rotation right: normal   Rotation left: normal     Muscle Strength   Right Quadriceps:  5/5   Left Quadriceps:  5/5   Right Hamstrings:  5/5   Left Hamstrings:  5/5     Tests   Straight leg raise right: negative  Straight leg raise left: negative    Other   Toe walk: normal  Heel walk: normal  Sensation: normal  Gait: normal     Comments:  Faberes  ? Right Hand Exam     Comments:  R  4th finger   Flexed at DIP PIP and MCP   Tender  Volar MCP  Unable to  Fully extend  MCP 2 B  Red tender  Active synovitis ? Research  has found that  Spine injections    reduce pain and  give  better functional  outcomes. Assessment: This is a 80 y.o. female patient with:    Diagnosis:   Diagnosis Orders   1. Bilateral hand pain     2. Trigger ring finger of right hand     3. Piriformis syndrome of left side     4. Encounter for long-term opiate analgesic use         Medical Comorbidities:  As listed in the patient's past medical and surgical history    Functional Limitations:   Pain limits function and quality of life. Plan:   Trigger finger  Injection today #2    If no improvement  Will consult ortho hand    Meds:   Controlled Substances Monitoring: Pt educated about the risks of taking opiates,including increased sedation, constipation, slowed breathing, tolerance, dependence,and addiction.       Discharge Medication List as of 7/9/2021 11:53 AM         Orders Placed This Encounter   Medications    DISCONTD: dexamethasone (DECADRON) injection    DISCONTD: iohexol (OMNIPAQUE 240) injection    DISCONTD: lidocaine 1% (buffered) injection    DISCONTD: ropivacaine (NAROPIN) 0.5% injection    DISCONTD: lidocaine PF 2 % injection     Orders Placed This Encounter   Procedures    FLUORO FOR SURGICAL PROCEDURES     Standing Status:   Standing     Number of Occurrences:   1     Order Specific Question:   Reason for exam:     Answer:   Fluro    Discharge patient     Standing Status:   Standing     Number of Occurrences:   1     Order Specific Question:   Discharge Disposition     Answer:   Home       No follow-ups on file. Opioid medication has  significant  risk  benefit concerns. We  instruct    our patients that  a Random Urine Drug Screen is required  along with a  an Opioid assessment questionaire such as ORT  or SOAPP  The patient expressed understanding of the above assessment and plan. Totaltime spent face to face with patient was 25 minutes inwhich  50% or more of the time was spent in counseling, education about risk andbenefits of the above plan, and coordination of care.

## 2021-09-13 ENCOUNTER — OFFICE VISIT (OUTPATIENT)
Dept: PAIN MANAGEMENT | Age: 81
End: 2021-09-13
Payer: MEDICARE

## 2021-09-13 VITALS
DIASTOLIC BLOOD PRESSURE: 62 MMHG | WEIGHT: 177 LBS | SYSTOLIC BLOOD PRESSURE: 110 MMHG | BODY MASS INDEX: 30.22 KG/M2 | HEIGHT: 64 IN

## 2021-09-13 DIAGNOSIS — M25.511 CHRONIC RIGHT SHOULDER PAIN: Primary | ICD-10-CM

## 2021-09-13 DIAGNOSIS — G89.29 CHRONIC RIGHT SHOULDER PAIN: Primary | ICD-10-CM

## 2021-09-13 PROCEDURE — 99214 OFFICE O/P EST MOD 30 MIN: CPT | Performed by: NURSE PRACTITIONER

## 2021-09-13 PROCEDURE — 20610 DRAIN/INJ JOINT/BURSA W/O US: CPT | Performed by: NURSE PRACTITIONER

## 2021-09-13 RX ORDER — TRIAMCINOLONE ACETONIDE 40 MG/ML
40 INJECTION, SUSPENSION INTRA-ARTICULAR; INTRAMUSCULAR ONCE
Status: COMPLETED | OUTPATIENT
Start: 2021-09-13 | End: 2021-09-13

## 2021-09-13 RX ADMIN — TRIAMCINOLONE ACETONIDE 40 MG: 40 INJECTION, SUSPENSION INTRA-ARTICULAR; INTRAMUSCULAR at 14:56

## 2021-09-13 NOTE — PROGRESS NOTES
Stefanie Cotto  1940 9/13/21      PAIN MANAGEMENT CLINIC PROCEDURE NOTE    CHIEF COMPLAINT: This is a 80 y.o. female patient who presents to the [facility] Management Clinic with a history of pain in the right shoulder. PRE-PROCEDURE DIAGNOSIS: Right shoulder pain    POST-PROCEDURE DIAGNOSIS: same as pre-procedure diagnosis    Vitals:    09/13/21 1436   BP: 110/62       PROCEDURE:     The procedure was explained, including risks and benefits, and the patient has agreed to proceed. The injection site was marked on the patients skin. A large area around the injection site was cleaned with chlora-prep. JOINT INJECTION  A 25G 1.5 inch needle was inserted into the right shoulder(s) joint/ space using a posterolateral approach. Depth and direction of the needle were monitored at all times. The syringe was aspirated and was negative for heme. Synovial fluid  was not not aspirated. Then, 2 ml of  2% lidocaine and 40mg of kenalog (NDC# 4643-4668-18) was injected into the joint. The injection site was cleaned and dressed with a spot band aid. The patient tolerated the procedure well and with out complication The patient was instructed avoid heat and excessive activity for 24-48 hours.

## 2021-09-29 DIAGNOSIS — M53.3 CHRONIC LEFT SACROILIAC JOINT PAIN: ICD-10-CM

## 2021-09-29 DIAGNOSIS — G89.29 CHRONIC LEFT SACROILIAC JOINT PAIN: ICD-10-CM

## 2021-09-29 DIAGNOSIS — M54.16 LUMBAR RADICULOPATHY: ICD-10-CM

## 2021-09-29 DIAGNOSIS — M47.817 LUMBOSACRAL SPONDYLOSIS WITHOUT MYELOPATHY: ICD-10-CM

## 2021-09-29 RX ORDER — OXYCODONE HYDROCHLORIDE 10 MG/1
10 TABLET ORAL 3 TIMES DAILY
Qty: 90 TABLET | Refills: 0 | Status: SHIPPED | OUTPATIENT
Start: 2021-09-29 | End: 2021-11-03 | Stop reason: SDUPTHER

## 2021-09-29 NOTE — TELEPHONE ENCOUNTER
Last Appt:  9/13/2021  Next Appt:   10/19/2021  Med verified in [facility] [place] for [place], [place], and [place]:  Oxycodone 10 mg  8/24/21   #90 . Due now.

## 2021-10-25 ENCOUNTER — OFFICE VISIT (OUTPATIENT)
Dept: PAIN MANAGEMENT | Age: 81
End: 2021-10-25
Payer: MEDICARE

## 2021-10-25 VITALS
BODY MASS INDEX: 30.42 KG/M2 | HEART RATE: 71 BPM | HEIGHT: 64 IN | SYSTOLIC BLOOD PRESSURE: 104 MMHG | DIASTOLIC BLOOD PRESSURE: 64 MMHG | OXYGEN SATURATION: 92 % | WEIGHT: 178.2 LBS

## 2021-10-25 DIAGNOSIS — M51.26 LUMBAR DISCOGENIC PAIN SYNDROME: ICD-10-CM

## 2021-10-25 DIAGNOSIS — G89.29 ENCOUNTER FOR CHRONIC PAIN MANAGEMENT: ICD-10-CM

## 2021-10-25 PROCEDURE — 4040F PNEUMOC VAC/ADMIN/RCVD: CPT | Performed by: PHYSICAL MEDICINE & REHABILITATION

## 2021-10-25 PROCEDURE — 99214 OFFICE O/P EST MOD 30 MIN: CPT | Performed by: PHYSICAL MEDICINE & REHABILITATION

## 2021-10-25 PROCEDURE — G8484 FLU IMMUNIZE NO ADMIN: HCPCS | Performed by: PHYSICAL MEDICINE & REHABILITATION

## 2021-10-25 PROCEDURE — 1123F ACP DISCUSS/DSCN MKR DOCD: CPT | Performed by: PHYSICAL MEDICINE & REHABILITATION

## 2021-10-25 PROCEDURE — 1036F TOBACCO NON-USER: CPT | Performed by: PHYSICAL MEDICINE & REHABILITATION

## 2021-10-25 PROCEDURE — 1090F PRES/ABSN URINE INCON ASSESS: CPT | Performed by: PHYSICAL MEDICINE & REHABILITATION

## 2021-10-25 PROCEDURE — G8400 PT W/DXA NO RESULTS DOC: HCPCS | Performed by: PHYSICAL MEDICINE & REHABILITATION

## 2021-10-25 PROCEDURE — G8427 DOCREV CUR MEDS BY ELIG CLIN: HCPCS | Performed by: PHYSICAL MEDICINE & REHABILITATION

## 2021-10-25 PROCEDURE — 99215 OFFICE O/P EST HI 40 MIN: CPT | Performed by: PHYSICAL MEDICINE & REHABILITATION

## 2021-10-25 PROCEDURE — G8417 CALC BMI ABV UP PARAM F/U: HCPCS | Performed by: PHYSICAL MEDICINE & REHABILITATION

## 2021-10-25 RX ORDER — IPRATROPIUM BROMIDE 21 UG/1
2 SPRAY, METERED NASAL EVERY 12 HOURS
COMMUNITY
End: 2022-09-27 | Stop reason: SDUPTHER

## 2021-10-25 ASSESSMENT — ENCOUNTER SYMPTOMS
RESPIRATORY NEGATIVE: 1
DIARRHEA: 0
ALLERGIC/IMMUNOLOGIC NEGATIVE: 1
EYES NEGATIVE: 1
BACK PAIN: 1
CONSTIPATION: 0

## 2021-10-25 NOTE — PATIENT INSTRUCTIONS
guardian sign the permit to be able to do the procedure. 9.  You must have finished any antibiotic prescribed for recent infections. If required, please take pre-procedure antibiotic or other pre-procedure medications as instructed. 10. Bring inhalers and pain medications with you to your procedure. 11. Bring your MRI/CT films if they were done outside of the [facility]. 12. If you should develop a cold, sore throat, cough, fever or other new indication of illness or infection, or are started on antibiotics within 2 weeks of the scheduled procedure, please notify the [facility] office as early as possible at [phone]. If calling after 4:30pm the day prior to your scheduled procedure please contact [phone] and Leave a Voice Message.

## 2021-10-25 NOTE — PROGRESS NOTES
PAIN MANAGEMENT FOLLOW-UP NOTE  10/25/21    CHIEF COMPLAINT: This is a80 y.o. female patientwho returns to the [facility] with a history of Follow-up (2 month)      PAIN Nadia Ford returns today for  reevaluation. Since the visit, the patient reports that the pain is not changed. B lumbar pain     Bothers her    Does not always take   Percocet 4 per day  Makes too groggy . No new pain  In legs    has light headed spells       Location: Back  Location Modifier: -  Severity of Pain: 3  Duration of Pain: Intermittent  Frequency of Pain: Intermittent  Aggravating Factors: -  Limiting Behavior: Standing   Relieving Factors: relaxation        Previous pain medication trials have included:          Mental health:    Patient feels - secondary to their current pain problems as described above. H/O depression and anxiety: No   Patient is not seeing psychologist orpsychiatrist   Abuse history? No    Employed? No    ANALGESIA:   Are your Current Pain medication (s) helping to decrease pain? No.   Current Pain score:      ADVERSE AFFECTS:   Medication Side Effects: No.    ACTIVITY:  Are you able to be more active with your pain medications? No      ABERRANT BEHAVIORS SINCE LAST VISIT? -    Review of Systems   Constitutional: Positive for fatigue. HENT: Negative. Eyes: Negative. Respiratory: Negative. Cardiovascular: Negative. Gastrointestinal: Negative for constipation and diarrhea. Endocrine: Negative. Genitourinary: Negative for difficulty urinating and flank pain. Musculoskeletal: Positive for back pain and myalgias. Skin: Negative. Allergic/Immunologic: Negative. Neurological: Positive for weakness and numbness. Hematological: Negative. Psychiatric/Behavioral: Positive for sleep disturbance.         Allergies   Allergen Reactions    Sulfa Antibiotics Shortness Of Breath    Sulfamethoxazole-Trimethoprim Anaphylaxis     (Bactrim)    Ansaid [Flurbiprofen] Other (See Comments)     Light headed and difficult with vision \"seeing\"    Gentamicin Other (See Comments)     Eye ointment caused eye swelling, redness    Motrin [Ibuprofen] Other (See Comments)     \"I coughed so bad I broke a rib\"    Quinidine      Light headed    Hydrocodone-Acetaminophen     Mirapex [Pramipexole Dihydrochloride] Other (See Comments)     Unknown reaction    Mobic [Meloxicam] Nausea Only    Nsaids Other (See Comments)     lightheaded    Reglan [Metoclopramide] Other (See Comments)     Hyperactive and stomach pain    Trazodone Other (See Comments)     unknown    Corgard [Nadolol] Other (See Comments)     Severe coughing and broke a rib as a result     Erythromycin Nausea Only    Etodolac      GI issues    Garamycin [Gentamicin Sulfate] Other (See Comments)     Redness and irritation    Inderal [Propranolol] Other (See Comments)     Severe cough    Lisinopril Itching    Loratadine      Does not work    Ultram [Tramadol] Other (See Comments)     Didn't work         Outpatient Medications Prior to Visit   Medication Sig Dispense Refill    ADVAIR DISKUS 100-50 MCG/DOSE diskus inhaler inhale 1 puff by mouth twice a day      ipratropium (ATROVENT) 0.03 % nasal spray 2 sprays by Each Nostril route every 12 hours      oxyCODONE HCl (OXY-IR) 10 MG immediate release tablet Take 1 tablet by mouth 3 times daily for 30 days.  90 tablet 0    calcium carbonate-vitamin D 600-200 MG-UNIT TABS Take 2 tablets by mouth daily      DULoxetine (CYMBALTA) 30 MG extended release capsule Take 30 mg by mouth daily      topiramate (TOPAMAX) 25 MG tablet Take 25 mg by mouth 2 times daily      furosemide (LASIX) 20 MG tablet 20 mg 3 times daily       gabapentin (NEURONTIN) 100 MG capsule TAKE 1 CAPSULE BY MOUTH THREE TIMES A DAY 90 capsule 2    baclofen (LIORESAL) 10 MG tablet Take 10 mg by mouth daily       ketotifen (ZADITOR) 0.025 % ophthalmic solution 1 drop 2 times daily      Multiple Vitamin (MULTI-VITAMIN DAILY) TABS Take by mouth      melatonin 1 MG tablet melatonin   once daily      Triprolidine-Pseudoephedrine (ANTIHISTAMINE PO) Take by mouth Indications: zyrtec       metoprolol tartrate (LOPRESSOR) 25 MG tablet Take 12.5 mg by mouth 2 times daily      aspirin 81 MG tablet Take 81 mg by mouth daily      levothyroxine (SYNTHROID) 75 MCG tablet Take 75 mcg by mouth daily      potassium chloride (KLOR-CON M) 20 MEQ extended release tablet TAKE ONE TABLET ONCE A DAY      guaiFENesin (MUCINEX) 600 MG extended release tablet Mucinex 600 mg tablet, extended release   Take 2 tablets every day by oral route.  lidocaine (ASPERCREME W/LIDOCAINE) 4 % cream Apply topically as needed for Pain Apply topically as needed.  fluticasone propionate 50 MCG/BLIST AEPB Inhale into the lungs      albuterol (ACCUNEB) 1.25 MG/3ML nebulizer solution Inhale 1 ampule into the lungs every 6 hours as needed for Wheezing       simvastatin (ZOCOR) 40 MG tablet Take 40 mg by mouth nightly      flecainide (TAMBOCOR) 50 MG tablet Take 50 mg by mouth 2 times daily.  omeprazole (PRILOSEC) 40 MG capsule Take 40 mg by mouth nightly.  montelukast (SINGULAIR) 10 MG tablet Take 10 mg by mouth nightly.  DULoxetine (CYMBALTA) 60 MG extended release capsule Take 60 mg by mouth daily       artificial tears (ARTIFICIAL TEARS) OINT as needed.       Saline 0.2 % SOLN by Nasal route daily as needed       acetaminophen (TYLENOL) 650 MG suppository Place 650 mg rectally every 4 hours as needed for Fever  (Patient not taking: Reported on 10/25/2021)      naloxone 4 MG/0.1ML LIQD nasal spray 1 spray by Nasal route as needed for Opioid Reversal (Patient not taking: Reported on 6/15/2021) 1 each 5    famotidine (PEPCID) 10 MG tablet Take 10 mg by mouth 2 times daily       Heating Pads (RADHA PAD/SMARTHEAT PRO/) PADS 1 Units by Does not apply route 4 times daily Use for 15 minutes to 30 minutes at a time four times a day.  1 each 0    diphenhydrAMINE (BENADRYL) 25 MG capsule Take 25 mg by mouth every 6 hours as needed for Itching  (Patient not taking: Reported on 10/25/2021)      Immune Globulin, Human, (HIZENTRA) 10 GM/50ML SOLN Inject into the skin Tuesdays       Facility-Administered Medications Prior to Visit   Medication Dose Route Frequency Provider Last Rate Last Admin    triamcinolone acetonide (KENALOG-40) injection 40 mg  40 mg Intra-artICUlar Once Marii Figueroa MD        lidocaine 2 % injection 5 mL  5 mL IntraDERmal Once Marii Figueroa MD        bupivacaine (MARCAINE) 0.5 % injection 150 mg  30 mL Intra-artICUlar Once Marii Figueroa MD           Past Medical History:   Diagnosis Date    Anesthesia complication     2nd post op day from total knee patient stated \"I was wild and mean\"    Anxiety and depression     Arthritis     ASD (atrial septal defect)     repair in [address] [place] [address] COPD [facility] obstructive [facility]     COPD (chronic obstructive pulmonary disease) ([facility] 1980's    Disorder of immune system ([facility]     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 ([facility]/2015 [facility])    History of renal stone     passed    Hx of blood clots 1970    DVT    Hypertension     MVP (mitral valve prolapse)     LONI (obstructive sleep apnea)     has not used bipap since June, 2020    Rectocele     Spinal stenosis     Thyroid disease late 1980's    overactive radioactive iodine done       Past Surgical History:   Procedure Laterality Date    BACK INJECTION Bilateral 7/9/2021    Bilateral SACROILIAC Joint & Piriformis Injection performed by Mayank Hall MD at [facility] LUMPECTOMY Left     BREAST LUMPECTOMY Left     CARDIAC SURGERY      ASD repair     SECTION      2x    CHOLECYSTECTOMY      CHOLECYSTECTOMY      COLONOSCOPY      three    DIAGNOSTIC CARDIAC CATH LAB PROCEDURE      DILATION AND CURETTAGE OF UTERUS      EYE SURGERY Bilateral     cataract    [facility] INJECTION PROCEDURE FOR SACROILIAC JOINT Left 2018    Left SIJ and piriformis, left trocanteric bursa injection performed by Nohelia Lai MD at [facility] [address]  2009    HYSTERECTOMY  2009    Total    JOINT REPLACEMENT Bilateral     knee    KNEE ARTHROSCOPY Left     LUMBAR SPINE SURGERY Bilateral 2019    Bilateral L4 TRANSFORAMINAL  [id number] performed by Nohelia Lai MD at [address] [place] [address] Bilateral 3/12/2019    Bilateral L4 TRANSFORAMINAL performed by Nohelia Lai MD at [address] [place] [address] Right 10/15/2019    RIGHT L4 & L5 TRANSFORAMINAL performed by Nohelia Lai MD at [address] [place] [address] Left 2019    Left L4 & L5 TRANSFORAMINAL performed by Nohelia Lai MD at [place] [address]  2014    PAIN MANAGEMENT PROCEDURE Right 2020    Right L4 & L5 TRANSFORAMINAL performed by Nohelia Lai MD at [facility] Left 2020    Left L4 & L5 TRANSFORAMINAL performed by Nohelia Lai MD at [facility] Right 2021    Right L4 & L5 TRANSFORAMINAL performed by Nohelia Lai MD at [facility] Left 3/11/2021    left L4 & L5 TRANSFORAMINAL performed by Nohelia Lai MD at [facility] Left 2021    Left L4 & L5 TRANSFORAMINAL performed by Nohelia Lai MD at [address] [place] [address] DX/THER AGNT PARAVERT FACET JOINT, LUMBAR/SAC, 2ND LEVEL Bilateral 2018    Bilateral L2 L3 L4 L5 Diagnostic Medial BB performed by Nohelia Lai MD at [address] [place] [address] DX/THER AGNT PARAVERT FACET JOINT, LUMBAR/SAC, 2ND LEVEL Right 2018 Status:    Intimate Partner Violence:     Fear of Current or Ex-Partner:     Emotionally Abused:     Physically Abused:     Sexually Abused:          Family and Social Historyreviewed in the electronic medical record. Imaging:Reviewed available imaging in our system with the patient. No results found. Objective:     Physical Exam:  Vitals:    10/25/21 1352   BP: 104/64   Pulse: 71   SpO2: 92%   Weight: 178 lb 3.2 oz (80.8 kg)   Height: 5' 4\" (1.626 m)          Physical Exam  Constitutional:       General: She is not in acute distress. Appearance: Normal appearance. She is well-developed. She is not diaphoretic. HENT:      Head: Normocephalic and atraumatic. Right Ear: External ear normal. No decreased hearing noted. Left Ear: External ear normal. No decreased hearing noted. Nose: Nose normal.   Eyes:      General: Lids are normal. No scleral icterus. Conjunctiva/sclera: Conjunctivae normal.   Neck:      Trachea: Phonation normal.   Cardiovascular:      Comments: No BLE edema present  Pulmonary:      Effort: Pulmonary effort is normal. No accessory muscle usage or respiratory distress. Abdominal:      General: Abdomen is flat. Palpations: Abdomen is soft. Genitourinary:     Comments: deferred  Musculoskeletal:      Lumbar back: Negative right straight leg raise test and negative left straight leg raise test.   Skin:     General: Skin is warm and dry. Coloration: Skin is not pale. Findings: No erythema or rash. Neurological:      General: No focal deficit present. Mental Status: She is alert and oriented to person, place, and time.    Psychiatric:         Mood and Affect: Mood normal.         Speech: Speech normal.         Behavior: Behavior normal.       Back Exam     Range of Motion   Extension: normal   Flexion: normal   Lateral bend right: normal   Lateral bend left: normal   Rotation right: normal   Rotation left: normal     Muscle Strength   Right Quadriceps:  5/5   Left Quadriceps:  5/5   Right Hamstrings:  5/5   Left Hamstrings:  5/5     Tests   Straight leg raise right: negative  Straight leg raise left: negative    Other   Toe walk: normal  Heel walk: normal  Sensation: normal  Gait: normal                                   Research  has found that  Spine injections    reduce pain and  give  better functional  outcomes. Assessment: This is a 80 y.o. female patient with:    Diagnosis:   Diagnosis Orders   1. Encounter for chronic pain management         Medical Comorbidities:  As listed in the patient's past medical and surgical history    Functional Limitations:   Pain limits function and quality of life. Plan:   Percocet  3-4 per day    Lidoderm patches OTC     following with Cardiology  For pacer  eval  For   Faint spells   May be related to  meds  As well   Meds:   Controlled Substances Monitoring: Pt educated about the risks of taking opiates,including increased sedation, constipation, slowed breathing, tolerance, dependence,and addiction. New Prescriptions    No medications on file      No orders of the defined types were placed in this encounter. No orders of the defined types were placed in this encounter. No follow-ups on file. Opioid medication has  significant  risk  benefit concerns. We  instruct    our patients that  a Random Urine Drug Screen is required  along with a  an Opioid assessment questionaire such as ORT  or SOAPP  The patient expressed understanding of the above assessment and plan. Totaltime spent face to face with patient was 30 minutes inwhich  50% or more of the time was spent in counseling, education about risk andbenefits of the above plan, and coordination of care.

## 2021-10-25 NOTE — LETTER
[address] Pain Management A department of [place] [address]  Phone: [phone]  Fax: [phone]    Nabil Arora MD    October 25, 2021     Minesh Asencio, [address] [place] [address]    Patient: Colt Carrion   MR Number: [id number]   YOB: 1940   Date of Visit: 10/25/2021       Dear Minesh Asencio: Thank you for referring Colt Carrion to me for evaluation/treatment. Below are the relevant portions of my assessment and plan of care. If you have questions, please do not hesitate to call me. I look forward to following Julianne Paget along with you.     Sincerely,      Nabil Arora MD

## 2021-10-25 NOTE — Clinical Note
[address] Pain Management A department of [place] [address]  Phone: [phone]  Fax: [phone]    Francisco Riley MD        October 25, 2021     Patient: Lord Horton   YOB: 1940   Date of Visit: 10/25/2021       To Whom It May Concern: It is my medical opinion that Lord Horton {Work release (duty restriction):[id number]}. If you have any questions or concerns, please don't hesitate to call.     Sincerely,        Francisco Riley MD

## 2021-11-03 DIAGNOSIS — M54.16 LUMBAR RADICULOPATHY: ICD-10-CM

## 2021-11-03 DIAGNOSIS — G89.29 CHRONIC LEFT SACROILIAC JOINT PAIN: ICD-10-CM

## 2021-11-03 DIAGNOSIS — M47.817 LUMBOSACRAL SPONDYLOSIS WITHOUT MYELOPATHY: ICD-10-CM

## 2021-11-03 DIAGNOSIS — M53.3 CHRONIC LEFT SACROILIAC JOINT PAIN: ICD-10-CM

## 2021-11-03 RX ORDER — OXYCODONE HYDROCHLORIDE 10 MG/1
10 TABLET ORAL 3 TIMES DAILY
Qty: 90 TABLET | Refills: 0 | Status: SHIPPED | OUTPATIENT
Start: 2021-11-03 | End: 2021-12-07 | Stop reason: SDUPTHER

## 2021-11-03 NOTE — TELEPHONE ENCOUNTER
[place]: 09/29/21 oxycodone 10mg #90. Due NOW.     Last Appt:  10/25/2021  Next Appt:   12/21/2021  Med verified in Epic

## 2021-12-03 ENCOUNTER — APPOINTMENT (OUTPATIENT)
Dept: GENERAL RADIOLOGY | Age: 81
End: 2021-12-03
Attending: PHYSICAL MEDICINE & REHABILITATION
Payer: MEDICARE

## 2021-12-03 ENCOUNTER — HOSPITAL ENCOUNTER (OUTPATIENT)
Age: 81
Setting detail: OUTPATIENT SURGERY
Discharge: HOME | End: 2021-12-03
Attending: PHYSICAL MEDICINE & REHABILITATION | Admitting: PHYSICAL MEDICINE & REHABILITATION
Payer: MEDICARE

## 2021-12-03 VITALS
DIASTOLIC BLOOD PRESSURE: 63 MMHG | TEMPERATURE: 97.2 F | HEIGHT: 64 IN | RESPIRATION RATE: 16 BRPM | HEART RATE: 70 BPM | OXYGEN SATURATION: 97 % | SYSTOLIC BLOOD PRESSURE: 137 MMHG | BODY MASS INDEX: 29.88 KG/M2 | WEIGHT: 175 LBS

## 2021-12-03 PROCEDURE — 6360000004 HC RX CONTRAST MEDICATION: Performed by: PHYSICAL MEDICINE & REHABILITATION

## 2021-12-03 PROCEDURE — 7100000011 HC PHASE II RECOVERY - ADDTL 15 MIN: Performed by: PHYSICAL MEDICINE & REHABILITATION

## 2021-12-03 PROCEDURE — 20552 NJX 1/MLT TRIGGER POINT 1/2: CPT | Performed by: PHYSICAL MEDICINE & REHABILITATION

## 2021-12-03 PROCEDURE — 3600000056 HC PAIN LEVEL 4 BASE: Performed by: PHYSICAL MEDICINE & REHABILITATION

## 2021-12-03 PROCEDURE — 7100000010 HC PHASE II RECOVERY - FIRST 15 MIN: Performed by: PHYSICAL MEDICINE & REHABILITATION

## 2021-12-03 PROCEDURE — 27096 INJECT SACROILIAC JOINT: CPT | Performed by: PHYSICAL MEDICINE & REHABILITATION

## 2021-12-03 PROCEDURE — 6360000002 HC RX W HCPCS: Performed by: PHYSICAL MEDICINE & REHABILITATION

## 2021-12-03 PROCEDURE — 2709999900 HC NON-CHARGEABLE SUPPLY: Performed by: PHYSICAL MEDICINE & REHABILITATION

## 2021-12-03 PROCEDURE — 77002 NEEDLE LOCALIZATION BY XRAY: CPT | Performed by: PHYSICAL MEDICINE & REHABILITATION

## 2021-12-03 PROCEDURE — 2500000003 HC RX 250 WO HCPCS: Performed by: PHYSICAL MEDICINE & REHABILITATION

## 2021-12-03 PROCEDURE — 3209999900 FLUORO FOR SURGICAL PROCEDURES

## 2021-12-03 RX ORDER — DEXAMETHASONE SODIUM PHOSPHATE 10 MG/ML
INJECTION INTRAMUSCULAR; INTRAVENOUS PRN
Status: DISCONTINUED | OUTPATIENT
Start: 2021-12-03 | End: 2021-12-03 | Stop reason: ALTCHOICE

## 2021-12-03 RX ORDER — LIDOCAINE HYDROCHLORIDE 20 MG/ML
INJECTION, SOLUTION EPIDURAL; INFILTRATION; INTRACAUDAL; PERINEURAL PRN
Status: DISCONTINUED | OUTPATIENT
Start: 2021-12-03 | End: 2021-12-03 | Stop reason: ALTCHOICE

## 2021-12-03 RX ORDER — ROPIVACAINE HYDROCHLORIDE 5 MG/ML
INJECTION, SOLUTION EPIDURAL; INFILTRATION; PERINEURAL PRN
Status: DISCONTINUED | OUTPATIENT
Start: 2021-12-03 | End: 2021-12-03 | Stop reason: ALTCHOICE

## 2021-12-03 ASSESSMENT — PAIN - FUNCTIONAL ASSESSMENT: PAIN_FUNCTIONAL_ASSESSMENT: 0-10

## 2021-12-03 ASSESSMENT — ENCOUNTER SYMPTOMS
CONSTIPATION: 0
ALLERGIC/IMMUNOLOGIC NEGATIVE: 1
EYES NEGATIVE: 1
BACK PAIN: 1
RESPIRATORY NEGATIVE: 1
DIARRHEA: 0

## 2021-12-03 ASSESSMENT — PAIN SCALES - GENERAL: PAINLEVEL_OUTOF10: 0

## 2021-12-03 NOTE — H&P
and irritation    Inderal [Propranolol] Other (See Comments)     Severe cough    Lisinopril Itching    Loratadine      Does not work    Ultram [Tramadol] Other (See Comments)     Didn't work            Prior to Admission medications    Medication Sig Start Date End Date Taking? Authorizing Provider   oxyCODONE HCl (OXY-IR) 10 MG immediate release tablet Take 1 tablet by mouth 3 times daily for 30 days.  11/3/21 12/3/21 Yes KENY Partida CNP   ADVAIR DISKUS 100-50 MCG/DOSE diskus inhaler inhale 1 puff by mouth twice a day 10/13/21  Yes Historical Provider, MD   ipratropium (ATROVENT) 0.03 % nasal spray 2 sprays by Each Nostril route every 12 hours   Yes Historical Provider, MD   calcium carbonate-vitamin D 600-200 MG-UNIT TABS Take 2 tablets by mouth daily   Yes Historical Provider, MD   DULoxetine (CYMBALTA) 30 MG extended release capsule Take 30 mg by mouth daily   Yes Historical Provider, MD   topiramate (TOPAMAX) 25 MG tablet Take 25 mg by mouth 2 times daily   Yes Historical Provider, MD   gabapentin (NEURONTIN) 100 MG capsule TAKE 1 CAPSULE BY MOUTH THREE TIMES A DAY 11/1/20 12/3/21 Yes KENY Partida CNP   baclofen (LIORESAL) 10 MG tablet Take 10 mg by mouth daily    Yes Historical Provider, MD   ketotifen (ZADITOR) 0.025 % ophthalmic solution 1 drop 2 times daily   Yes Historical Provider, MD   Multiple Vitamin (MULTI-VITAMIN DAILY) TABS Take by mouth   Yes Historical Provider, MD   Triprolidine-Pseudoephedrine (ANTIHISTAMINE PO) Take by mouth Indications: zyrtec    Yes Historical Provider, MD   metoprolol tartrate (LOPRESSOR) 25 MG tablet Take 12.5 mg by mouth 2 times daily   Yes Historical Provider, MD   aspirin 81 MG tablet Take 81 mg by mouth daily   Yes Historical Provider, MD   levothyroxine (SYNTHROID) 75 MCG tablet Take 75 mcg by mouth daily 5/18/18 12/3/21 Yes Historical Provider, MD   potassium chloride (KLOR-CON M) 20 MEQ extended release tablet TAKE ONE TABLET ONCE A DAY 4/6/18  Yes Historical Provider, MD   guaiFENesin (MUCINEX) 600 MG extended release tablet Mucinex 600 mg tablet, extended release   Take 2 tablets every day by oral route. Yes Historical Provider, MD   lidocaine (ASPERCREME W/LIDOCAINE) 4 % cream Apply topically as needed for Pain Apply topically as needed. Yes Historical Provider, MD   fluticasone propionate 50 MCG/BLIST AEPB Inhale into the lungs   Yes Historical Provider, MD   albuterol (ACCUNEB) 1.25 MG/3ML nebulizer solution Inhale 1 ampule into the lungs every 6 hours as needed for Wheezing    Yes Historical Provider, MD   simvastatin (ZOCOR) 40 MG tablet Take 40 mg by mouth nightly   Yes Historical Provider, MD   flecainide (TAMBOCOR) 50 MG tablet Take 50 mg by mouth 2 times daily. Yes Historical Provider, MD   omeprazole (PRILOSEC) 40 MG capsule Take 40 mg by mouth nightly. Yes Historical Provider, MD   montelukast (SINGULAIR) 10 MG tablet Take 10 mg by mouth nightly. Yes Historical Provider, MD   DULoxetine (CYMBALTA) 60 MG extended release capsule Take 60 mg by mouth daily    Yes Historical Provider, MD   artificial tears (ARTIFICIAL TEARS) OINT as needed.    Yes Historical Provider, MD   acetaminophen (TYLENOL) 650 MG suppository Place 650 mg rectally every 4 hours as needed for Fever   Patient not taking: Reported on 10/25/2021    Historical Provider, MD   furosemide (LASIX) 20 MG tablet 20 mg 3 times daily  11/3/20 10/25/21  Historical Provider, MD   naloxone 4 MG/0.1ML LIQD nasal spray 1 spray by Nasal route as needed for Opioid Reversal  Patient not taking: Reported on 6/15/2021 5/27/20   Mine Castillo MD   melatonin 1 MG tablet melatonin   once daily    Historical Provider, MD   Saline 0.2 % SOLN by Nasal route daily as needed     Historical Provider, MD   diphenhydrAMINE (BENADRYL) 25 MG capsule Take 25 mg by mouth every 6 hours as needed for Itching   Patient not taking: Reported on 10/25/2021    Historical Provider, MD Desir Medical History:   Diagnosis Date    Anesthesia complication     2nd post op day from total knee patient stated \"I was wild and mean\"    Anxiety and depression     Arthritis     ASD (atrial septal defect)     repair in [address] COPD [facility]ch[facility] obstructive [facility]     COPD (chronic obstructive pulmonary disease) [facility]     Disorder of immune system ([facility]     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 ([facility] 3/2015 [facility])    History of renal stone     passed    Hx of blood clots     DVT    Hypertension     MVP (mitral valve prolapse)     LONI (obstructive sleep apnea)     has not used bipap since     Rectocele     Spinal stenosis     Thyroid disease late     overactive radioactive iodine done       Past Surgical History:   Procedure Laterality Date    BACK INJECTION Bilateral 2021    Bilateral SACROILIAC Joint & Piriformis Injection performed by Nikki Wang MD at [address] LUMPECTOMY Left     BREAST LUMPECTOMY Left     CARDIAC SURGERY      ASD repair     SECTION      2x    CHOLECYSTECTOMY      CHOLECYSTECTOMY      COLONOSCOPY      three    DIAGNOSTIC CARDIAC CATH LAB PROCEDURE      DILATION AND CURETTAGE OF UTERUS      EYE SURGERY Bilateral     cataract    HC INJECTION PROCEDURE FOR SACROILIAC JOINT Left 2018    Left SIJ and piriformis, left trocanteric bursa injection performed by [facility]ki [facility] MD at [facility]  2009    HYSTERECTOMY  2009    Total    JOINT REPLACEMENT Bilateral     knee    KNEE ARTHROSCOPY Left     LUMBAR SPINE SURGERY Bilateral 2019    Bilateral L4 TRANSFORAMINAL  [id number] performed by Nikki [facility] MD at [facility] OR    LUMBAR SPINE SURGERY Bilateral 3/12/2019    Bilateral L4 TRANSFORAMINAL performed by Rodrigo Lopes MD at [address] [place] [address]  Right 10/15/2019    RIGHT L4 & L5 TRANSFORAMINAL performed by Rodrigo Lopes MD at [address] [place] [address]  Left 11/1/2019    Left L4 & L5 TRANSFORAMINAL performed by Rodrigo Lopes MD at [place] [address]  8/2014    PAIN MANAGEMENT PROCEDURE Right 2/7/2020    Right L4 & L5 TRANSFORAMINAL performed by Rodrigo Lopes MD at [address] [place] [address] Left 2/21/2020    Left L4 & L5 TRANSFORAMINAL performed by Rodrigo Lopes MD at [address] [place] [address] Right 2/23/2021    Right L4 & L5 TRANSFORAMINAL performed by Rodrigo Lopes MD at [address] [place] [address] Left 3/11/2021    left L4 & L5 TRANSFORAMINAL performed by Rodrigo Lopes MD at [address] [place] [address] Left 5/6/2021    Left L4 & L5 TRANSFORAMINAL performed by Rodrigo Lopes MD at [address] [place] [address] DX/THER AGNT PARAVERT FACET JOINT, LUMBAR/SAC, 2ND LEVEL Bilateral 8/30/2018    Bilateral L2 L3 L4 L5 Diagnostic Medial BB performed by Rodrigo Lopes MD at [address] [place] [address] DX/THER AGNT PARAVERT FACET JOINT, LUMBAR/SAC, 2ND LEVEL Right 9/13/2018    Right L2 L3 L4 L5 Confirmatory Medial BB performed by Rodrigo Lopes MD at [address] [facility] AA&/STRD TFRML EPI CERVICAL/THORACIC EA ADDL Right 7/20/2018    Right C5 C6 TRANSFORAMINAL performed by Rodrigo Lopes MD at [address] Left 12/20/2018    Left L2 L3 L4 L5 RADIOFREQUENCY performed by Rodrigo Lopes MD at [address] Right 1/3/2019    Right L2 L3 L4 L5 RADIOFREQUENCY performed by Rodrigo Lopes MD at [facility] [address] Right 01/28/2005 & 03/16/2010    2x    TONSILLECTOMY      at age 12       Family andSocial History reviewed in the electronic medical record.     Imaging: Reviewed available imaging in oursystem with the patient. No results found. Objective: There were no vitals filed for this visit. Physical Exam  Constitutional:       General: She is not in acute distress. Appearance: Normal appearance. She is well-developed. She is not diaphoretic. HENT:      Head: Normocephalic and atraumatic. Right Ear: External ear normal. No decreased hearing noted. Left Ear: External ear normal. No decreased hearing noted. Nose: Nose normal.   Eyes:      General: Lids are normal. No scleral icterus. Conjunctiva/sclera: Conjunctivae normal.   Neck:      Trachea: Phonation normal.   Cardiovascular:      Comments: No BLE edema present  Pulmonary:      Effort: Pulmonary effort is normal. No accessory muscle usage or respiratory distress. Abdominal:      General: Abdomen is flat. Palpations: Abdomen is soft. Genitourinary:     Comments: deferred  Musculoskeletal:      Lumbar back: Negative right straight leg raise test and negative left straight leg raise test.   Skin:     General: Skin is warm and dry. Coloration: Skin is not pale. Findings: No erythema or rash. Neurological:      General: No focal deficit present. Mental Status: She is alert and oriented to person, place, and time. Psychiatric:         Mood and Affect: Mood normal.         Speech: Speech normal.         Behavior: Behavior normal.       Neurologic Exam     Mental Status   Oriented to person, place, and time. Speech: speech is normal     Motor Exam     Strength   Right quadriceps: 5/5  Left quadriceps: 5/5  Right hamstrin/5  Left hamstrin/5    Back Exam     Tenderness   The patient is experiencing tenderness in the sacroiliac (+faberes Dl  ).     Range of Motion   Extension: normal   Flexion: normal   Lateral bend right: normal   Lateral bend left: normal   Rotation right: normal   Rotation left: normal     Muscle Strength   Right Quadriceps:  5/5   Left Quadriceps:  5/5   Right Hamstrings:  5/5   Left Hamstrings:  5/5     Tests   Straight leg raise right: negative  Straight leg raise left: negative    Other   Toe walk: normal  Heel walk: normal  Sensation: normal  Gait: normal             Lab Results   Component Value Date    WBC 9.1 09/15/2015    HGB 12.2 09/15/2015    HCT 38.4 09/15/2015     09/15/2015     09/15/2015    K 4.1 09/15/2015     09/15/2015    CREATININE 0.67 09/15/2015    BUN 16 09/15/2015    CO2 25 09/15/2015       Assessment:                            Active Hospital Problems    Diagnosis Date Noted    Piriformis syndrome of left side [G57.02] 07/31/2018    Sacroiliitis (HCC) [M46.1]                   Plan:   Proceed with planned procedure  - Dl S-I  Piriformis  Injections     The patientunderstands the planned operation and its associated risks and benefits and agrees to proceed. The surgical consent form has been signed. Last NSAID/anticoagulant medication use was:na    Premedication taken for contrast allergy? No    Valium taken for oral sedation?  No

## 2021-12-03 NOTE — INTERVAL H&P NOTE
I have interviewed and examined the patient and reviewed the recent History and Physical.  There have been no changes to the recent H&P documentation. The surgical consent form has been signed. Last anticoagulant medication use was:na    Premedication taken for contrast allergy? No3    Valium taken for oral sedation? No    Outpatient Medications Marked as Taking for the 12/3/21 encounter [facility] Encounter)   Medication Sig Dispense Refill    oxyCODONE HCl (OXY-IR) 10 MG immediate release tablet Take 1 tablet by mouth 3 times daily for 30 days. 90 tablet 0    ADVAIR DISKUS 100-50 MCG/DOSE diskus inhaler inhale 1 puff by mouth twice a day      ipratropium (ATROVENT) 0.03 % nasal spray 2 sprays by Each Nostril route every 12 hours      calcium carbonate-vitamin D 600-200 MG-UNIT TABS Take 2 tablets by mouth daily      DULoxetine (CYMBALTA) 30 MG extended release capsule Take 30 mg by mouth daily      topiramate (TOPAMAX) 25 MG tablet Take 25 mg by mouth 2 times daily      gabapentin (NEURONTIN) 100 MG capsule TAKE 1 CAPSULE BY MOUTH THREE TIMES A DAY 90 capsule 2    baclofen (LIORESAL) 10 MG tablet Take 10 mg by mouth daily       ketotifen (ZADITOR) 0.025 % ophthalmic solution 1 drop 2 times daily      Multiple Vitamin (MULTI-VITAMIN DAILY) TABS Take by mouth      Triprolidine-Pseudoephedrine (ANTIHISTAMINE PO) Take by mouth Indications: zyrtec       metoprolol tartrate (LOPRESSOR) 25 MG tablet Take 12.5 mg by mouth 2 times daily      aspirin 81 MG tablet Take 81 mg by mouth daily      levothyroxine (SYNTHROID) 75 MCG tablet Take 75 mcg by mouth daily      potassium chloride (KLOR-CON M) 20 MEQ extended release tablet TAKE ONE TABLET ONCE A DAY      guaiFENesin (MUCINEX) 600 MG extended release tablet Mucinex 600 mg tablet, extended release   Take 2 tablets every day by oral route.      lidocaine (ASPERCREME W/LIDOCAINE) 4 % cream Apply topically as needed for Pain Apply topically as needed.       fluticasone propionate 50 MCG/BLIST AEPB Inhale into the lungs      albuterol (ACCUNEB) 1.25 MG/3ML nebulizer solution Inhale 1 ampule into the lungs every 6 hours as needed for Wheezing       simvastatin (ZOCOR) 40 MG tablet Take 40 mg by mouth nightly      flecainide (TAMBOCOR) 50 MG tablet Take 50 mg by mouth 2 times daily. omeprazole (PRILOSEC) 40 MG capsule Take 40 mg by mouth nightly. montelukast (SINGULAIR) 10 MG tablet Take 10 mg by mouth nightly. DULoxetine (CYMBALTA) 60 MG extended release capsule Take 60 mg by mouth daily       artificial tears (ARTIFICIAL TEARS) OINT as needed. The patient understands the planned operation and its associated risks and benefits and agrees to proceed.         Electronically signed by Meryl Cantrell MD on 12/3/2021 at 11:37 AM

## 2021-12-03 NOTE — OP NOTE
SACROILIAC JOINT INJECTION    12/3/21  The patient was counseled at length about the risks of min Covid-19 during their perioperative period and any recovery window from their procedure. The patient was made aware that min Covid-19  may worsen their prognosis for recovering from their procedure  and lend to a higher morbidity and/or mortality risk. All material risks, benefits, and reasonable alternatives including postponing the procedure were discussed. The patient does wish to proceed with the procedure at this time. Surgeon: Mine Castillo MD    Pre-operative Diagnosis:   Hospital Problems           Last Modified POA    * (Principal) Sacroiliitis ([facility] [address] 12/3/2021 Yes    Piriformis syndrome of left side 12/3/2021 Yes          Post-operative Diagnosis: Same    Assistants: none    INDICATION::Please see H&P for details on previous treatments, examination findings, and work up. Bilateral  sacroiliac joint injection with arthrography is requested for diagnostic reasons. Conservative treatment was ineffective i.e.: ice, NSAIDS, rest, narcotic medication, chiropractic care, physical therapy and message therapy. Patient is unable to perform the following ADL's: personal cares, ambulating and grooming                         Last Plain films: 2020    EXAMINATION: Bilateral sacroiliac joint injection with arthrography. CONSENT:  Written consent was obtained from the patient on preprinted consent form after explaining the procedure, indications, potential complications and outcomes. Alternative treatments were also discussed. DISCUSSION:  The patient was sterilely prepped and draped in the usual fashion in the prone position. Time out was verified for correct patient, side, level and procedure. SEDATION:   No conscious sedation was performed during the procedure. The patient remained awake and conversed throughout the procedure.   The patient underwent pulse oximetry and blood pressure monitoring independently by a trained observer, as well as by a physician. PROCEDURES #1 to #3:  Under image-intensifier control, a standard technique was employed, a 22 gauge needle x 5 inch spinal needle was guided successfully to cannulate the Bilateral sacroiliac joint via a posterior lateral approach. Instillation of.5 mL of Omnipaque 240 contrast medium demonstrated contiguous flow into the joint and the joint capsule. No vascular spread was noted. Digital subtraction was not employed to evaluate for vascular spread. The patient was monitored for any untoward reaction to contrast medium before proceeding with procedure #2. Then 3.0 mL of equal volumes of 0.50% ropivacaine, 2% lidocaine and betamethasone (Celestone 6 mg/mL), dexamethasone (Decadron 10 mg/mL), methylprednisolone (Depo-Medrol 80 mg/mL was injected into each joint. PIRIFORMIS MUSCLE TRIGGER POINT INJECTION:   Using a 22 gauge needle x 5 inch spinal needle and fluoroscopic imaging the Bilateral piriformis muscle was accessed just inferior and lateral to the inferior margin of the Bilateral sacroiliac joint. Then, 0.3 mL of Omnipaque 240 confirmed avascular injection into the muscle, and 3 mL of 2% lidocaine was injected into the muscle and the needle removed. PROVOCATION: The patient did not report pain reproduction in a concordant distribution upon capsular distention of the Bilateral sacroiliac joints. ARTHROGRAPHY: The Bilateral sacroiliac arthrogram revealed contrast in the joint space and inferior recesses; no contrast extravasation. EBL: no blood loss    SPECIMEN: none    The patient tolerated the procedure well and without complications and was noted to be in stable condition prior to discharge from the procedure center with discharge instructions. IMPRESSIONS:  1. Bilateral  sacroiliac arthrogram is abnormal: Linn Morocho   2. Bilateral  sacroiliac  joint injection  negative for provocation of the patient's pain symptoms. 3. Bilateral piriformis muscle injection    RECOMMENDATIONS:  1. Complete and return Post-Procedure Pain and Activity Diary.   2. Contact the [address] for symptom exacerbation, fever or unusual symptoms. 3. Post-procedure care according to verbal and written discharge instructions    PELVIC FLUOROSCOPIC IMAGE INTERPRETATION    EXAMINATION:  AP and lateral views. FLUORO TIME: 13 seconds    DISCUSSION:  Spot views of the spine reveal normal alignment and segmentation. Spinal needles are positioned in the Bilateral sacroiliac joint. Contrast outlines the joint space and reveals excellent contrast flow. Spinal needles are placed just anterior to the sacrum for the piriformis muscle injection. Contrast pattern is consistent with contrast in the muscle. Visualized spine reveals See radiology report. Soft tissues reveal no abnormalities.     IMPRESSION:  Bilateral sacroiliac joint arthrogram  with satiafactory needle placement and contrast dispersal.     Electronically signed by Dulce Maria Olivo MD on 12/3/2021 at 11:41 AM

## 2021-12-07 DIAGNOSIS — M53.3 CHRONIC LEFT SACROILIAC JOINT PAIN: ICD-10-CM

## 2021-12-07 DIAGNOSIS — G89.29 CHRONIC LEFT SACROILIAC JOINT PAIN: ICD-10-CM

## 2021-12-07 DIAGNOSIS — M47.817 LUMBOSACRAL SPONDYLOSIS WITHOUT MYELOPATHY: ICD-10-CM

## 2021-12-07 DIAGNOSIS — M54.16 LUMBAR RADICULOPATHY: ICD-10-CM

## 2021-12-07 RX ORDER — OXYCODONE HYDROCHLORIDE 10 MG/1
10 TABLET ORAL 3 TIMES DAILY
Qty: 90 TABLET | Refills: 0 | Status: SHIPPED | OUTPATIENT
Start: 2021-12-07 | End: 2021-12-28 | Stop reason: SDUPTHER

## 2021-12-07 NOTE — TELEPHONE ENCOUNTER
Last Appt:  10/25/2021  Next Appt:   12/21/2021  Med verified in [facility] checked for [place], and [place]:  Oxycodone 10 mg  11/04/21   #90  . Due now.

## 2021-12-28 DIAGNOSIS — G89.29 CHRONIC LEFT SACROILIAC JOINT PAIN: ICD-10-CM

## 2021-12-28 DIAGNOSIS — M47.817 LUMBOSACRAL SPONDYLOSIS WITHOUT MYELOPATHY: ICD-10-CM

## 2021-12-28 DIAGNOSIS — M54.16 LUMBAR RADICULOPATHY: ICD-10-CM

## 2021-12-28 DIAGNOSIS — M53.3 CHRONIC LEFT SACROILIAC JOINT PAIN: ICD-10-CM

## 2021-12-28 NOTE — TELEPHONE ENCOUNTER
[facility] Report checked for [place], and [place]: 12/8/21 Oxycodone 10mg #90. Due 1/7/22.     Last Appt:  10/25/2021  Next Appt:   Visit date not found  Med verified in [facility]

## 2021-12-29 RX ORDER — OXYCODONE HYDROCHLORIDE 10 MG/1
10 TABLET ORAL 3 TIMES DAILY
Qty: 90 TABLET | Refills: 0 | Status: SHIPPED | OUTPATIENT
Start: 2022-01-07 | End: 2022-02-02 | Stop reason: SDUPTHER

## 2022-01-18 ENCOUNTER — HOSPITAL ENCOUNTER (OUTPATIENT)
Age: 82
Setting detail: SPECIMEN
Discharge: HOME | End: 2022-01-18
Payer: MEDICARE

## 2022-01-18 ENCOUNTER — OFFICE VISIT (OUTPATIENT)
Dept: PAIN MANAGEMENT | Age: 82
End: 2022-01-18
Payer: MEDICARE

## 2022-01-18 VITALS
HEIGHT: 64 IN | SYSTOLIC BLOOD PRESSURE: 134 MMHG | DIASTOLIC BLOOD PRESSURE: 70 MMHG | BODY MASS INDEX: 29.81 KG/M2 | WEIGHT: 174.6 LBS | OXYGEN SATURATION: 97 % | HEART RATE: 69 BPM

## 2022-01-18 DIAGNOSIS — M53.3 CHRONIC LEFT SACROILIAC JOINT PAIN: ICD-10-CM

## 2022-01-18 DIAGNOSIS — M47.816 LUMBAR FACET JOINT SYNDROME: ICD-10-CM

## 2022-01-18 DIAGNOSIS — G89.29 CHRONIC LEFT SACROILIAC JOINT PAIN: ICD-10-CM

## 2022-01-18 DIAGNOSIS — M51.36 LUMBAR DEGENERATIVE DISC DISEASE: ICD-10-CM

## 2022-01-18 DIAGNOSIS — M51.26 LUMBAR DISCOGENIC PAIN SYNDROME: ICD-10-CM

## 2022-01-18 DIAGNOSIS — Z79.891 ENCOUNTER FOR LONG-TERM OPIATE ANALGESIC USE: ICD-10-CM

## 2022-01-18 DIAGNOSIS — M48.061 SPINAL STENOSIS, LUMBAR REGION, WITHOUT NEUROGENIC CLAUDICATION: Chronic | ICD-10-CM

## 2022-01-18 DIAGNOSIS — Z02.83 ENCOUNTER FOR DRUG SCREENING: Primary | ICD-10-CM

## 2022-01-18 DIAGNOSIS — Z02.83 ENCOUNTER FOR DRUG SCREENING: ICD-10-CM

## 2022-01-18 PROCEDURE — G8484 FLU IMMUNIZE NO ADMIN: HCPCS | Performed by: NURSE PRACTITIONER

## 2022-01-18 PROCEDURE — G8417 CALC BMI ABV UP PARAM F/U: HCPCS | Performed by: NURSE PRACTITIONER

## 2022-01-18 PROCEDURE — G8400 PT W/DXA NO RESULTS DOC: HCPCS | Performed by: NURSE PRACTITIONER

## 2022-01-18 PROCEDURE — 1090F PRES/ABSN URINE INCON ASSESS: CPT | Performed by: NURSE PRACTITIONER

## 2022-01-18 PROCEDURE — 4040F PNEUMOC VAC/ADMIN/RCVD: CPT | Performed by: NURSE PRACTITIONER

## 2022-01-18 PROCEDURE — G8427 DOCREV CUR MEDS BY ELIG CLIN: HCPCS | Performed by: NURSE PRACTITIONER

## 2022-01-18 PROCEDURE — 1123F ACP DISCUSS/DSCN MKR DOCD: CPT | Performed by: NURSE PRACTITIONER

## 2022-01-18 PROCEDURE — 80307 DRUG TEST PRSMV CHEM ANLYZR: CPT

## 2022-01-18 PROCEDURE — 99214 OFFICE O/P EST MOD 30 MIN: CPT | Performed by: NURSE PRACTITIONER

## 2022-01-18 PROCEDURE — 1036F TOBACCO NON-USER: CPT | Performed by: NURSE PRACTITIONER

## 2022-01-18 NOTE — PROGRESS NOTES
Subjective:      Patient ID: Danisha Vines is a 80 y.o. female. Chief Complaint   Patient presents with    Post-Op Check     sij and p - drug screen complete     HPI Here today for routine pain clinic recheck. SIJ/piriformis injection last month, she does not remember.  Complaining shoulder pain, interested in injection    Pain Assessment  Location of Pain: Shoulder  Location Modifiers: Left,Right  Severity of Pain: 6  Quality of Pain: Throbbing,Sharp,Dull,Aching  Duration of Pain: Persistent  Frequency of Pain: Intermittent  Aggravating Factors: Bending,Stretching,Straightening,Exercise,Kneeling,Squatting,Standing,Walking,Stairs  Limiting Behavior: Yes  Relieving Factors: Rest,Heat  Result of Injury: No  Work-Related Injury: No    Allergies   Allergen Reactions    Sulfa Antibiotics Shortness Of Breath    Sulfamethoxazole-Trimethoprim Anaphylaxis     (Bactrim)    Ansaid [Flurbiprofen] Other (See Comments)     Light headed and difficult with vision \"seeing\"    Gentamicin Other (See Comments)     Eye ointment caused eye swelling, redness    Motrin [Ibuprofen] Other (See Comments)     \"I coughed so bad I broke a rib\"    Quinidine      Light headed    Hydrocodone-Acetaminophen     Mirapex [Pramipexole Dihydrochloride] Other (See Comments)     Unknown reaction    Mobic [Meloxicam] Nausea Only    Nsaids Other (See Comments)     lightheaded    Reglan [Metoclopramide] Other (See Comments)     Hyperactive and stomach pain    Trazodone Other (See Comments)     unknown    Corgard [Nadolol] Other (See Comments)     Severe coughing and broke a rib as a result     Erythromycin Nausea Only    Etodolac      GI issues    Garamycin [Gentamicin Sulfate] Other (See Comments)     Redness and irritation    Inderal [Propranolol] Other (See Comments)     Severe cough    Lisinopril Itching    Loratadine      Does not work    Ultram [Tramadol] Other (See Comments)     Didn't work         Outpatient Medications Marked as Taking for the 1/18/22 encounter (Office Visit) with KENY Acosta - CNP   Medication Sig Dispense Refill    oxyCODONE HCl (OXY-IR) 10 MG immediate release tablet Take 1 tablet by mouth 3 times daily for 30 days. 90 tablet 0    ADVAIR DISKUS 100-50 MCG/DOSE diskus inhaler inhale 1 puff by mouth twice a day      ipratropium (ATROVENT) 0.03 % nasal spray 2 sprays by Each Nostril route every 12 hours      calcium carbonate-vitamin D 600-200 MG-UNIT TABS Take 2 tablets by mouth daily      DULoxetine (CYMBALTA) 30 MG extended release capsule Take 30 mg by mouth daily      acetaminophen (TYLENOL) 650 MG suppository Place 650 mg rectally every 4 hours as needed for Fever       topiramate (TOPAMAX) 25 MG tablet Take 25 mg by mouth 2 times daily      gabapentin (NEURONTIN) 100 MG capsule TAKE 1 CAPSULE BY MOUTH THREE TIMES A DAY 90 capsule 2    baclofen (LIORESAL) 10 MG tablet Take 10 mg by mouth daily       ketotifen (ZADITOR) 0.025 % ophthalmic solution 1 drop 2 times daily      Multiple Vitamin (MULTI-VITAMIN DAILY) TABS Take by mouth      melatonin 1 MG tablet melatonin   once daily      Triprolidine-Pseudoephedrine (ANTIHISTAMINE PO) Take by mouth Indications: zyrtec       metoprolol tartrate (LOPRESSOR) 25 MG tablet Take 12.5 mg by mouth 2 times daily      aspirin 81 MG tablet Take 81 mg by mouth daily      levothyroxine (SYNTHROID) 75 MCG tablet Take 75 mcg by mouth daily      potassium chloride (KLOR-CON M) 20 MEQ extended release tablet TAKE ONE TABLET ONCE A DAY      guaiFENesin (MUCINEX) 600 MG extended release tablet Mucinex 600 mg tablet, extended release   Take 2 tablets every day by oral route.  lidocaine (ASPERCREME W/LIDOCAINE) 4 % cream Apply topically as needed for Pain Apply topically as needed.       fluticasone propionate 50 MCG/BLIST AEPB Inhale into the lungs      simvastatin (ZOCOR) 40 MG tablet Take 40 mg by mouth nightly      flecainide (TAMBOCOR) 50 MG tablet Take 50 mg by mouth 2 times daily.  omeprazole (PRILOSEC) 40 MG capsule Take 40 mg by mouth nightly.  montelukast (SINGULAIR) 10 MG tablet Take 10 mg by mouth nightly.  DULoxetine (CYMBALTA) 60 MG extended release capsule Take 60 mg by mouth daily       artificial tears (ARTIFICIAL TEARS) OINT as needed.       Saline 0.2 % SOLN by Nasal route daily as needed       diphenhydrAMINE (BENADRYL) 25 MG capsule Take 25 mg by mouth every 6 hours as needed for Itching          Past Medical History:   Diagnosis Date    Anesthesia complication     2nd post op day from total knee patient stated \"I was wild and mean\"    Anxiety and depression     Arthritis     ASD (atrial septal defect)     repair in [address] [facility] COPD [facility]chronic obstructive [facility] disease[facility] [address] [facility]     COPD (chronic obstructive pulmonary disease) ([facility]     Disorder of immune system ([facility] [address]     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 (total left knee 3/2015 at [facility])    History of renal stone     passed    Hx of blood clots     DVT    Hypertension     MVP (mitral valve prolapse)     LONI (obstructive sleep apnea)     has not used bipap since     Rectocele     Spinal stenosis     Thyroid disease late     overactive radioactive iodine done       Past Surgical History:   Procedure Laterality Date    BACK INJECTION Bilateral 2021    Bilateral SACROILIAC Joint & Piriformis Injection performed by Randa Rodas MD at [place] [address]. Bilateral 12/3/2021    Bilateral SACROILIAC JOINT & Piriformis Injections performed by Randa Rodas MD at [address] [place] [address]      BREAST LUMPECTOMY Left     BREAST LUMPECTOMY Left     CARDIAC SURGERY      ASD repair     SECTION  1962/1966    2x    CHOLECYSTECTOMY      CHOLECYSTECTOMY      COLONOSCOPY      three    DIAGNOSTIC CARDIAC CATH LAB PROCEDURE      DILATION AND CURETTAGE OF UTERUS  1980    EYE SURGERY Bilateral     cataract    HC INJECTION PROCEDURE FOR SACROILIAC JOINT Left 7/31/2018    Left SIJ and piriformis, left trocanteric bursa injection performed by Deena Suarez MD at [facility]  12/2009    HYSTERECTOMY  2009    Total    JOINT REPLACEMENT Bilateral     knee    KNEE ARTHROSCOPY Left 2008    LUMBAR SPINE SURGERY Bilateral 2/26/2019    Bilateral L4 TRANSFORAMINAL  [id number] performed by Deena Suarez MD at [address] [place] [address]  Bilateral 3/12/2019    Bilateral L4 TRANSFORAMINAL performed by Deena Suarez MD at [address] [place] [address]  Right 10/15/2019    RIGHT L4 & L5 TRANSFORAMINAL performed by Deena Suarez MD at [address] [place] [address]  Left 11/1/2019    Left L4 & L5 TRANSFORAMINAL performed by Deena Suarez MD at [address] [place] [address]  8/2014    PAIN MANAGEMENT PROCEDURE Right 2/7/2020    Right L4 & L5 TRANSFORAMINAL performed by Deena Suarez MD at [address] [place] [address] Left 2/21/2020    Left L4 & L5 TRANSFORAMINAL performed by Deena Suarez MD at [address] [place] [address] Right 2/23/2021    Right L4 & L5 TRANSFORAMINAL performed by Deena Suarez MD at [address] [place] [address] Left 3/11/2021    left L4 & L5 TRANSFORAMINAL performed by Deena Suarez MD at [address] [place] [address] Left 5/6/2021    Left L4 & L5 TRANSFORAMINAL performed by Deena Suarez MD at [address] [place] [address] DX/THER AGNT PARAVERT FACET JOINT, LUMBAR/SAC, 2ND LEVEL Bilateral 8/30/2018    Bilateral L2 L3 L4 L5 Diagnostic Medial BB performed by Deena Suarez MD at [address] [place] [address] DX/THER AGNT PARAVERT FACET JOINT, LUMBAR/SAC, 2ND LEVEL Right 9/13/2018    Right L2 L3 L4 L5 Confirmatory Medial BB performed by Miguel Nowak Muna Henry MD at [address] [facility] [address] AA&/STRD TFRML EPI CERVICAL/THORACIC EA ADDL Right 7/20/2018    Right C5 C6 TRANSFORAMINAL performed by Yunior Bains MD at [address] Left 12/20/2018    Left L2 L3 L4 L5 RADIOFREQUENCY performed by Yunior Bains MD at [address] Right 1/3/2019    Right L2 L3 L4 L5 RADIOFREQUENCY performed by Yunior Bains MD at [address] Right 01/28/2005 & 03/16/2010    2x    TONSILLECTOMY      at age 12       Family History   Problem Relation Age of Onset    Heart Disease Father        Social History     Socioeconomic History    Marital status:      Spouse name: None    Number of children: None    Years of education: None    Highest education level: None   Occupational History    Occupation: retired   Tobacco Use    Smoking status: Never Smoker    Smokeless tobacco: Never Used   Vaping Use    Vaping Use: Never used   Substance and Sexual Activity    Alcohol use: Not Currently     Comment: very rare    Drug use: No    Sexual activity: None   Other Topics Concern    None   Social History Narrative    None     Social Determinants of Health     Financial Resource Strain:     Difficulty of Paying Living Expenses: Not on file   Food Insecurity:     Worried About Running Out of Food in the Last Year: Not on file    Rossi of Food in the Last Year: Not on file   Transportation Needs:     Lack of Transportation (Medical): Not on file    Lack of Transportation (Non-Medical):  Not on file   Physical Activity:     Days of Exercise per Week: Not on file    Minutes of Exercise per Session: Not on file   Stress:     Feeling of Stress : Not on file   Social Connections:     Frequency of Communication with Friends and Family: Not on file    Frequency of Social Gatherings with Friends and Family: Not on file    Attends Restorationist Services: Not on file   [employer] of Clubs or Organizations: Not on file    Attends Club or Organization Meetings: Not on file    Marital Status: Not on file   Intimate Partner Violence:     Fear of Current or Ex-Partner: Not on file    Emotionally Abused: Not on file    Physically Abused: Not on file    Sexually Abused: Not on file   Housing Stability:     Unable to Pay for Housing in the Last Year: Not on file    Number of [place] in the Last Year: Not on file    Unstable Housing in the Last Year: Not on file     Review of Systems    Objective:   Physical Exam    Assessment / Plan:      Diagnosis Orders   1. Lumbar facet joint syndrome     2. Lumbar discogenic pain syndrome     3. Lumbar degenerative disc disease     4. Spinal stenosis, lumbar region, without neurogenic claudication     5. Chronic left sacroiliac joint pain       Schedule right shoulder injection, will schedule bilateral L4 TFE as well  Random drug screen today for opiate medication compliance. Chronic pain diagnoses such as   1. Lumbar facet joint syndrome    2. Lumbar discogenic pain syndrome    3. Lumbar degenerative disc disease    4. Spinal stenosis, lumbar region, without neurogenic claudication    5. Chronic left sacroiliac joint pain     controlled on current medication regime, wll continue current pain medications to improve quality of life and function. Controlled Substance Monitoring:    Acute and Chronic Pain Monitoring:   RX Monitoring 9/29/2021   Attestation -   Periodic Controlled Substance Monitoring Possible medication side effects, risk of tolerance/dependence & alternative treatments discussed. ;No signs of potential drug abuse or diversion identified. Chronic Pain > 50 MEDD Re-evaluated the status of the patient's underlying condition causing pain.    Chronic Pain > 80 MEDD -

## 2022-01-21 ENCOUNTER — PROCEDURE VISIT (OUTPATIENT)
Dept: PAIN MANAGEMENT | Age: 82
End: 2022-01-21
Payer: MEDICARE

## 2022-01-21 VITALS
HEART RATE: 71 BPM | OXYGEN SATURATION: 98 % | HEIGHT: 64 IN | WEIGHT: 174 LBS | DIASTOLIC BLOOD PRESSURE: 76 MMHG | BODY MASS INDEX: 29.71 KG/M2 | RESPIRATION RATE: 16 BRPM | SYSTOLIC BLOOD PRESSURE: 134 MMHG

## 2022-01-21 DIAGNOSIS — M25.511 CHRONIC PAIN IN RIGHT SHOULDER: Primary | ICD-10-CM

## 2022-01-21 DIAGNOSIS — G89.29 CHRONIC PAIN IN RIGHT SHOULDER: Primary | ICD-10-CM

## 2022-01-21 PROCEDURE — 20610 DRAIN/INJ JOINT/BURSA W/O US: CPT | Performed by: NURSE PRACTITIONER

## 2022-01-21 PROCEDURE — 99214 OFFICE O/P EST MOD 30 MIN: CPT | Performed by: NURSE PRACTITIONER

## 2022-01-21 RX ORDER — TRIAMCINOLONE ACETONIDE 40 MG/ML
40 INJECTION, SUSPENSION INTRA-ARTICULAR; INTRAMUSCULAR ONCE
Status: COMPLETED | OUTPATIENT
Start: 2022-01-21 | End: 2022-01-21

## 2022-01-21 RX ADMIN — TRIAMCINOLONE ACETONIDE 40 MG: 40 INJECTION, SUSPENSION INTRA-ARTICULAR; INTRAMUSCULAR at 15:09

## 2022-01-21 NOTE — PROGRESS NOTES
Christine Landin  1940 9/13/21      PAIN MANAGEMENT CLINIC PROCEDURE NOTE    CHIEF COMPLAINT: This is a 80 y.o. female patient who presents to the Pain Management Clinic with a history of pain in the right shoulder. PRE-PROCEDURE DIAGNOSIS: Right shoulder pain    POST-PROCEDURE DIAGNOSIS: same as pre-procedure diagnosis    Vitals:    01/21/22 1420   BP: 134/76   Pulse: 71   Resp: 16   SpO2: 98%       PROCEDURE:     The procedure was explained, including risks and benefits, and the patient has agreed to proceed. The injection site was marked on the patients skin. A large area around the injection site was cleaned with chlora-prep. JOINT INJECTION  A 25G 1.5 inch needle was inserted into the right shoulder(s) joint/ space using a posterolateral approach. Depth and direction of the needle were monitored at all times. The syringe was aspirated and was negative for heme. Synovial fluid  was not not aspirated. Then, 2 ml of  2% lidocaine and 40mg of kenalog (NDC# 2013-3850-29) was injected into the joint. The injection site was cleaned and dressed with a spot band aid. The patient tolerated the procedure well and with out complication The patient was instructed avoid heat and excessive activity for 24-48 hours.

## 2022-01-22 LAB
6-ACETYLMORPHINE, UR: NOT DETECTED
7-AMINOCLONAZEPAM, URINE: NOT DETECTED
ALPHA-OH-ALPRAZ, URINE: NOT DETECTED
ALPHA-OH-MIDAZOLAM, URINE: NOT DETECTED
ALPRAZOLAM, URINE: NOT DETECTED
AMPHETAMINES, URINE: NOT DETECTED
BARBITURATES, URINE: NOT DETECTED
BENZOYLECGONINE, UR: NOT DETECTED
BUPRENORPHINE URINE: NOT DETECTED
CARISOPRODOL, UR: NOT DETECTED
CLONAZEPAM, URINE: NOT DETECTED
CODEINE, URINE: NOT DETECTED
CREATININE URINE: 68.2 MG/DL (ref 20–400)
DIAZEPAM, URINE: NOT DETECTED
EER PAIN MGT DRUG PANEL, HIGH RES/EMIT U: NORMAL
ETHYL GLUCURONIDE UR: NOT DETECTED
FENTANYL URINE: NOT DETECTED
GABAPENTIN: PRESENT
HYDROCODONE, URINE: NOT DETECTED
HYDROMORPHONE, URINE: NOT DETECTED
LORAZEPAM, URINE: NOT DETECTED
MARIJUANA METAB, UR: NOT DETECTED
MDA, UR: NOT DETECTED
MDEA, EVE, UR: NOT DETECTED
MDMA URINE: NOT DETECTED
MEPERIDINE METAB, UR: NOT DETECTED
METHADONE, URINE: NOT DETECTED
METHAMPHETAMINE, URINE: NOT DETECTED
METHYLPHENIDATE: NOT DETECTED
MIDAZOLAM, URINE: NOT DETECTED
MORPHINE URINE: NOT DETECTED
NALOXONE URINE: NOT DETECTED
NORBUPRENORPHINE, URINE: NOT DETECTED
NORDIAZEPAM, URINE: NOT DETECTED
NORFENTANYL, URINE: NOT DETECTED
NORHYDROCODONE, URINE: NOT DETECTED
NOROXYCODONE, URINE: PRESENT
NOROXYMORPHONE, URINE: NOT DETECTED
OXAZEPAM, URINE: NOT DETECTED
OXYCODONE URINE: PRESENT
OXYMORPHONE, URINE: PRESENT
PAIN MGT DRUG PANEL, HI RES, UR: NORMAL
PCP,URINE: NOT DETECTED
PHENTERMINE, UR: NOT DETECTED
PREGABALIN: NOT DETECTED
TAPENTADOL, URINE: NOT DETECTED
TAPENTADOL-O-SULFATE, URINE: NOT DETECTED
TEMAZEPAM, URINE: NOT DETECTED
TRAMADOL, URINE: NOT DETECTED
ZOLPIDEM METABOLITE (ZCA), URINE: NOT DETECTED
ZOLPIDEM, URINE: NOT DETECTED

## 2022-02-02 DIAGNOSIS — M47.817 LUMBOSACRAL SPONDYLOSIS WITHOUT MYELOPATHY: ICD-10-CM

## 2022-02-02 DIAGNOSIS — M54.16 LUMBAR RADICULOPATHY: ICD-10-CM

## 2022-02-02 DIAGNOSIS — G89.29 CHRONIC LEFT SACROILIAC JOINT PAIN: ICD-10-CM

## 2022-02-02 DIAGNOSIS — M53.3 CHRONIC LEFT SACROILIAC JOINT PAIN: ICD-10-CM

## 2022-02-02 RX ORDER — OXYCODONE HYDROCHLORIDE 10 MG/1
10 TABLET ORAL 3 TIMES DAILY
Qty: 90 TABLET | Refills: 0 | Status: SHIPPED | OUTPATIENT
Start: 2022-02-02 | End: 2022-03-02 | Stop reason: SDUPTHER

## 2022-02-02 NOTE — TELEPHONE ENCOUNTER
Jacquelynne Phalen called requesting a refill of the below medication which has been pended for you:     Requested Prescriptions     Pending Prescriptions Disp Refills    oxyCODONE HCl (OXY-IR) 10 MG immediate release tablet 90 tablet 0     Sig: Take 1 tablet by mouth 3 times daily for 30 days. Last Appointment Date: 1/21/2022  Next Appointment Date: Visit date not found    Allergies   Allergen Reactions    Sulfa Antibiotics Shortness Of Breath    Sulfamethoxazole-Trimethoprim Anaphylaxis     (Bactrim)    Ansaid [Flurbiprofen] Other (See Comments)     Light headed and difficult with vision \"seeing\"    Gentamicin Other (See Comments)     Eye ointment caused eye swelling, redness    Motrin [Ibuprofen] Other (See Comments)     \"I coughed so bad I broke a rib\"    Quinidine      Light headed    Hydrocodone-Acetaminophen     Mirapex [Pramipexole Dihydrochloride] Other (See Comments)     Unknown reaction    Mobic [Meloxicam] Nausea Only    Nsaids Other (See Comments)     lightheaded    Reglan [Metoclopramide] Other (See Comments)     Hyperactive and stomach pain    Trazodone Other (See Comments)     unknown    Corgard [Nadolol] Other (See Comments)     Severe coughing and broke a rib as a result     Erythromycin Nausea Only    Etodolac      GI issues    Garamycin [Gentamicin Sulfate] Other (See Comments)     Redness and irritation    Inderal [Propranolol] Other (See Comments)     Severe cough    Lisinopril Itching    Loratadine      Does not work    Ultram [Tramadol] Other (See Comments)     Didn't work     [place], and [place]: Oxycodone HCI 10mg 1-7-2022 #90. Due 2-6-2022.

## 2022-02-11 ENCOUNTER — TELEPHONE (OUTPATIENT)
Dept: PAIN MANAGEMENT | Age: 82
End: 2022-02-11

## 2022-03-02 DIAGNOSIS — M53.3 CHRONIC LEFT SACROILIAC JOINT PAIN: ICD-10-CM

## 2022-03-02 DIAGNOSIS — M54.16 LUMBAR RADICULOPATHY: ICD-10-CM

## 2022-03-02 DIAGNOSIS — M47.817 LUMBOSACRAL SPONDYLOSIS WITHOUT MYELOPATHY: ICD-10-CM

## 2022-03-02 DIAGNOSIS — G89.29 CHRONIC LEFT SACROILIAC JOINT PAIN: ICD-10-CM

## 2022-03-02 NOTE — TELEPHONE ENCOUNTER
Lucero Duran called requesting a refill of the below medication which has been pended for you:     Requested Prescriptions     Pending Prescriptions Disp Refills    oxyCODONE HCl (OXY-IR) 10 MG immediate release tablet 90 tablet 0     Sig: Take 1 tablet by mouth 3 times daily for 30 days. Last Appointment Date: 1/21/2022  Next Appointment Date:    DS11:  01/18/2022      Allergies   Allergen Reactions    Sulfa Antibiotics Shortness Of Breath    Sulfamethoxazole-Trimethoprim Anaphylaxis     (Bactrim)    Ansaid [Flurbiprofen] Other (See Comments)     Light headed and difficult with vision \"seeing\"    Gentamicin Other (See Comments)     Eye ointment caused eye swelling, redness    Motrin [Ibuprofen] Other (See Comments)     \"I coughed so bad I broke a rib\"    Quinidine      Light headed    Hydrocodone-Acetaminophen     Mirapex [Pramipexole Dihydrochloride] Other (See Comments)     Unknown reaction    Mobic [Meloxicam] Nausea Only    Nsaids Other (See Comments)     lightheaded    Reglan [Metoclopramide] Other (See Comments)     Hyperactive and stomach pain    Trazodone Other (See Comments)     unknown    Corgard [Nadolol] Other (See Comments)     Severe coughing and broke a rib as a result     Erythromycin Nausea Only    Etodolac      GI issues    Garamycin [Gentamicin Sulfate] Other (See Comments)     Redness and irritation    Inderal [Propranolol] Other (See Comments)     Severe cough    Lisinopril Itching    Loratadine      Does not work    Ultram [Tramadol] Other (See Comments)     Didn't work         [place] checked for [place] and [place]: Oxycodone IR 10mg #90. Due 03/10/2022.

## 2022-03-03 RX ORDER — OXYCODONE HYDROCHLORIDE 10 MG/1
10 TABLET ORAL 3 TIMES DAILY
Qty: 90 TABLET | Refills: 0 | Status: SHIPPED | OUTPATIENT
Start: 2022-03-10 | End: 2022-05-17 | Stop reason: SDUPTHER

## 2022-03-04 ENCOUNTER — APPOINTMENT (OUTPATIENT)
Dept: GENERAL RADIOLOGY | Age: 82
End: 2022-03-04
Attending: PHYSICAL MEDICINE & REHABILITATION
Payer: MEDICARE

## 2022-03-04 ENCOUNTER — HOSPITAL ENCOUNTER (OUTPATIENT)
Age: 82
Setting detail: OUTPATIENT SURGERY
Discharge: HOME | End: 2022-03-04
Attending: PHYSICAL MEDICINE & REHABILITATION | Admitting: PHYSICAL MEDICINE & REHABILITATION
Payer: MEDICARE

## 2022-03-04 VITALS
DIASTOLIC BLOOD PRESSURE: 63 MMHG | WEIGHT: 175 LBS | RESPIRATION RATE: 16 BRPM | SYSTOLIC BLOOD PRESSURE: 139 MMHG | OXYGEN SATURATION: 99 % | BODY MASS INDEX: 29.88 KG/M2 | HEART RATE: 62 BPM | HEIGHT: 64 IN | TEMPERATURE: 98.7 F

## 2022-03-04 PROCEDURE — 3209999900 FLUORO FOR SURGICAL PROCEDURES

## 2022-03-04 PROCEDURE — 2500000003 HC RX 250 WO HCPCS: Performed by: PHYSICAL MEDICINE & REHABILITATION

## 2022-03-04 PROCEDURE — 3600000056 HC PAIN LEVEL 4 BASE: Performed by: PHYSICAL MEDICINE & REHABILITATION

## 2022-03-04 PROCEDURE — 7100000010 HC PHASE II RECOVERY - FIRST 15 MIN: Performed by: PHYSICAL MEDICINE & REHABILITATION

## 2022-03-04 PROCEDURE — 64483 NJX AA&/STRD TFRM EPI L/S 1: CPT | Performed by: PHYSICAL MEDICINE & REHABILITATION

## 2022-03-04 PROCEDURE — 7100000011 HC PHASE II RECOVERY - ADDTL 15 MIN: Performed by: PHYSICAL MEDICINE & REHABILITATION

## 2022-03-04 PROCEDURE — 2580000003 HC RX 258: Performed by: PHYSICAL MEDICINE & REHABILITATION

## 2022-03-04 PROCEDURE — 6360000002 HC RX W HCPCS: Performed by: PHYSICAL MEDICINE & REHABILITATION

## 2022-03-04 PROCEDURE — 6360000004 HC RX CONTRAST MEDICATION: Performed by: PHYSICAL MEDICINE & REHABILITATION

## 2022-03-04 PROCEDURE — 2709999900 HC NON-CHARGEABLE SUPPLY: Performed by: PHYSICAL MEDICINE & REHABILITATION

## 2022-03-04 PROCEDURE — A4216 STERILE WATER/SALINE, 10 ML: HCPCS | Performed by: PHYSICAL MEDICINE & REHABILITATION

## 2022-03-04 RX ORDER — BUPIVACAINE HYDROCHLORIDE 5 MG/ML
INJECTION, SOLUTION EPIDURAL; INTRACAUDAL PRN
Status: DISCONTINUED | OUTPATIENT
Start: 2022-03-04 | End: 2022-03-04 | Stop reason: ALTCHOICE

## 2022-03-04 RX ORDER — DEXAMETHASONE SODIUM PHOSPHATE 10 MG/ML
INJECTION INTRAMUSCULAR; INTRAVENOUS PRN
Status: DISCONTINUED | OUTPATIENT
Start: 2022-03-04 | End: 2022-03-04 | Stop reason: ALTCHOICE

## 2022-03-04 RX ORDER — SODIUM CHLORIDE 9 MG/ML
INJECTION INTRAVENOUS PRN
Status: DISCONTINUED | OUTPATIENT
Start: 2022-03-04 | End: 2022-03-04 | Stop reason: ALTCHOICE

## 2022-03-04 ASSESSMENT — ENCOUNTER SYMPTOMS
BACK PAIN: 1
EYES NEGATIVE: 1
CONSTIPATION: 0
ALLERGIC/IMMUNOLOGIC NEGATIVE: 1
RESPIRATORY NEGATIVE: 1
DIARRHEA: 0

## 2022-03-04 ASSESSMENT — PAIN - FUNCTIONAL ASSESSMENT: PAIN_FUNCTIONAL_ASSESSMENT: 0-10

## 2022-03-04 ASSESSMENT — PAIN SCALES - GENERAL: PAINLEVEL_OUTOF10: 0

## 2022-03-04 ASSESSMENT — PAIN DESCRIPTION - DESCRIPTORS: DESCRIPTORS: ACHING

## 2022-03-04 NOTE — H&P
Subjective:       Patient is here today for a diagnostic/therapeutic injection. 3/4/22    Active Hospital Problems    Diagnosis Date Noted    Lumbar radiculitis [M54.16]     Spinal stenosis, lumbar region, without neurogenic claudication [M48.061] 03/15/2014    Degeneration of lumbar or lumbosacral intervertebral disc [M51.37] 03/15/2014       HPI: Patient is here today for adiagnostic/therapeutic injection. The most recent [facility] office visit notes have been reviewed and are unchanged. Review of Systems   Constitutional: Positive for fatigue. HENT: Negative. Eyes: Negative. Respiratory: Negative. Cardiovascular: Negative. Gastrointestinal: Negative for constipation and diarrhea. Endocrine: Negative. Genitourinary: Negative for difficulty urinating and flank pain. Musculoskeletal: Positive for back pain and myalgias. Skin: Negative. Allergic/Immunologic: Negative. Neurological: Positive for weakness and numbness. Hematological: Negative. Psychiatric/Behavioral: Positive for sleep disturbance.        Allergies   Allergen Reactions    Sulfa Antibiotics Shortness Of Breath    Sulfamethoxazole-Trimethoprim Anaphylaxis     (Bactrim)    Ansaid [Flurbiprofen] Other (See Comments)     Light headed and difficult with vision \"seeing\"    Gentamicin Other (See Comments)     Eye ointment caused eye swelling, redness    Motrin [Ibuprofen] Other (See Comments)     \"I coughed so bad I broke a rib\"    Quinidine      Light headed    Hydrocodone-Acetaminophen     Mirapex [Pramipexole Dihydrochloride] Other (See Comments)     Unknown reaction    Mobic [Meloxicam] Nausea Only    Nsaids Other (See Comments)     lightheaded    Reglan [Metoclopramide] Other (See Comments)     Hyperactive and stomach pain    Trazodone Other (See Comments)     unknown    Corgard [Nadolol] Other (See Comments)     Severe coughing and broke a rib as a result     Yes Historical Provider, MD   omeprazole (PRILOSEC) 40 MG capsule Take 40 mg by mouth nightly. Yes Historical Provider, MD   montelukast (SINGULAIR) 10 MG tablet Take 10 mg by mouth nightly. Yes Historical Provider, MD   DULoxetine (CYMBALTA) 60 MG extended release capsule Take 60 mg by mouth daily    Yes Historical Provider, MD   ipratropium (ATROVENT) 0.03 % nasal spray 2 sprays by Each Nostril route every 12 hours    Historical Provider, MD   acetaminophen (TYLENOL) 650 MG suppository Place 650 mg rectally every 4 hours as needed for Fever     Historical Provider, MD   furosemide (LASIX) 20 MG tablet 20 mg 3 times daily  11/3/20 1/21/22  Historical Provider, MD   gabapentin (NEURONTIN) 100 MG capsule TAKE 1 CAPSULE BY MOUTH THREE TIMES A DAY 11/1/20 1/21/22  [facility], APRN - CNP   naloxone 4 MG/0.1ML LIQD nasal spray 1 spray by Nasal route as needed for Opioid Reversal  Patient not taking: Reported on 6/15/2021 5/27/20   Nancy Godfrey MD   ketotifen (ZADITOR) 0.025 % ophthalmic solution 1 drop 2 times daily    Historical Provider, MD   Triprolidine-Pseudoephedrine (ANTIHISTAMINE PO) Take by mouth Indications: zyrtec     Historical Provider, MD   levothyroxine (SYNTHROID) 75 MCG tablet Take 75 mcg by mouth daily 5/18/18 1/21/22  Historical Provider, MD   lidocaine (ASPERCREME W/LIDOCAINE) 4 % cream Apply topically as needed for Pain Apply topically as needed. Historical Provider, MD   flecainide (TAMBOCOR) 50 MG tablet Take 50 mg by mouth 2 times daily. Historical Provider, MD   artificial tears (ARTIFICIAL TEARS) OINT as needed.     Historical Provider, MD   Saline 0.2 % SOLN by Nasal route daily as needed     Historical Provider, MD   diphenhydrAMINE (BENADRYL) 25 MG capsule Take 25 mg by mouth every 6 hours as needed for Itching     Historical Provider, MD       Past Medical History:   Diagnosis Date    Anesthesia complication     2nd post op day from total knee patient stated \"I was wild and mean\"    Anxiety and depression     Arthritis     ASD (atrial septal defect)     repair in [address] COPD [facility]ch[facility] obstructive [facility]     [facility]     Disorder of immune system ([facility] [address] [facility]     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 (total left knee 3/2015 at [facility])    History of renal stone     passed    Hx of blood clots     DVT    Hypertension     MVP (mitral valve prolapse)     LONI (obstructive sleep apnea)     has not used bipap since     Rectocele     Spinal stenosis     Thyroid disease late     overactive radioactive iodine done       Past Surgical History:   Procedure Laterality Date    BACK INJECTION Bilateral 2021    Bilateral SACROILIAC Joint & Piriformis Injection performed by Juan Simms MD at [place] [address]. Bilateral 12/3/2021    Bilateral SACROILIAC JOINT & Piriformis Injections performed by Juan Simms MD at [address] [facility] LUMPECTOMY Left     BREAST LUMPECTOMY Left     CARDIAC SURGERY      ASD repair     SECTION  /    2x    CHOLECYSTECTOMY      CHOLECYSTECTOMY      COLONOSCOPY      three    DIAGNOSTIC CARDIAC CATH LAB PROCEDURE      DILATION AND CURETTAGE OF UTERUS      EYE SURGERY Bilateral     cataract    [facility]. INJECTION PROCEDURE FOR SACROILIAC JOINT Left 2018    Left SIJ and piriformis, left trocanteric bursa injection performed by Juan Simms MD at [address]  2009    HYSTERECTOMY  2009    Total    JOINT REPLACEMENT Bilateral     knee    KNEE ARTHROSCOPY Left     LUMBAR SPINE SURGERY Bilateral 2019    Bilateral L4 TRANSFORAMINAL  [id number] performed by Porsha Eason Delbert Heck MD at [address] [place] [address] Dr Bilateral 3/12/2019    Bilateral L4 TRANSFORAMINAL performed by Nupur Young MD at [address] [place] [address] Dr Right 10/15/2019    RIGHT L4 & L5 TRANSFORAMINAL performed by Nupur Young MD at [address] [place] [address] Dr Left 11/1/2019    Left L4 & L5 TRANSFORAMINAL performed by Nupur Young MD at [place] [address]  8/2014    PAIN MANAGEMENT PROCEDURE Right 2/7/2020    Right L4 & L5 TRANSFORAMINAL performed by Nupur Young MD at [address] [place] [address] Left 2/21/2020    Left L4 & L5 TRANSFORAMINAL performed by Nupur Young MD at [address] [place] [address] Right 2/23/2021    Right L4 & L5 TRANSFORAMINAL performed by Nupur Young MD at [address] [place] [address] Left 3/11/2021    left L4 & L5 TRANSFORAMINAL performed by Nupur Young MD at [address] [place] [address] Left 5/6/2021    Left L4 & L5 TRANSFORAMINAL performed by Nupur Young MD at [address] [place] [address] DX/THER AGNT PARAVERT FACET JOINT, LUMBAR/SAC, 2ND LEVEL Bilateral 8/30/2018    Bilateral L2 L3 L4 L5 Diagnostic Medial BB performed by Nupur Young MD at [address] [place] [address] DX/THER AGNT PARAVERT FACET JOINT, LUMBAR/SAC, 2ND LEVEL Right 9/13/2018    Right L2 L3 L4 L5 Confirmatory Medial BB performed by Nupur Young MD at [address] [facility] AA&/STRD TFRML EPI CERVICAL/THORACIC EA ADDL Right 7/20/2018    Right C5 C6 TRANSFORAMINAL performed by Nupur Young MD at [address] Left 12/20/2018    Left L2 L3 L4 L5 RADIOFREQUENCY performed by Nupur Young MD at [address] Right 1/3/2019    Right L2 L3 L4 L5 RADIOFREQUENCY performed by Nupur Young MD at [address] Right 01/28/2005 & 03/16/2010    2x    TONSILLECTOMY      at age 12       Family andSocial History reviewed in the electronic medical record.     Imaging: Reviewed available imaging in oursystem with the patient. No results found. Objective:     Vitals:    22 1138   BP: 127/62   Pulse: 77   Resp: 18   Temp: 97.1 °F (36.2 °C)   SpO2: 97%          Physical Exam  Vitals and nursing note reviewed. Constitutional:       General: She is not in acute distress. Appearance: Normal appearance. She is well-developed. She is not diaphoretic. HENT:      Head: Normocephalic and atraumatic. Right Ear: External ear normal. No decreased hearing noted. Left Ear: External ear normal. No decreased hearing noted. Nose: Nose normal.   Eyes:      General: Lids are normal. No scleral icterus. Conjunctiva/sclera: Conjunctivae normal.   Neck:      Trachea: Phonation normal.   Cardiovascular:      Comments: No BLE edema present  Pulmonary:      Effort: Pulmonary effort is normal. No accessory muscle usage or respiratory distress. Abdominal:      General: Abdomen is flat. Palpations: Abdomen is soft. Genitourinary:     Comments: deferred  Musculoskeletal:      Lumbar back: Negative right straight leg raise test and negative left straight leg raise test.   Skin:     General: Skin is warm and dry. Coloration: Skin is not pale. Findings: No erythema or rash. Neurological:      General: No focal deficit present. Mental Status: She is alert and oriented to person, place, and time. Psychiatric:         Mood and Affect: Mood normal.         Speech: Speech normal.         Behavior: Behavior normal.       Neurologic Exam     Mental Status   Oriented to person, place, and time. Speech: speech is normal     Motor Exam     Strength   Right quadriceps: 5/5  Left quadriceps: 5/5  Right hamstrin/5  Left hamstrin/5    Back Exam     Tenderness   The patient is experiencing tenderness in the lumbar (+slump).     Range of Motion   Extension: normal   Flexion: normal   Lateral bend right: normal   Lateral bend left: normal   Rotation right: normal   Rotation left: normal     Muscle Strength   Right Quadriceps:  5/5   Left Quadriceps:  5/5   Right Hamstrings:  5/5   Left Hamstrings:  5/5     Tests   Straight leg raise right: negative  Straight leg raise left: negative    Other   Toe walk: normal  Heel walk: normal  Sensation: normal  Gait: normal             Lab Results   Component Value Date    WBC 9.1 09/15/2015    HGB 12.2 09/15/2015    HCT 38.4 09/15/2015     09/15/2015     09/15/2015    K 4.1 09/15/2015     09/15/2015    CREATININE 0.67 09/15/2015    BUN 16 09/15/2015    CO2 25 09/15/2015       Assessment:                            Active Hospital Problems    Diagnosis Date Noted    Lumbar radiculitis [M54.16]     Spinal stenosis, lumbar region, without neurogenic claudication [M48.061] 03/15/2014    Degeneration of lumbar or lumbosacral intervertebral disc [M51.37] 03/15/2014                  Plan:   Proceed with planned procedure  - B L4 TFE    The patientunderstands the planned operation and its associated risks and benefits and agrees to proceed. The surgical consent form has been signed. Last NSAID/anticoagulant medication use was:2-3 days    Premedication taken for contrast allergy? No    Valium taken for oral sedation?  No

## 2022-03-04 NOTE — OP NOTE
TRANSFORAMINAL EPIDURAL STEROID INJECTION    3/4/22  The patient was counseled at length about the risks of min Covid-19 during their perioperative period and any recovery window from their procedure. The patient was made aware that min Covid-19  may worsen their prognosis for recovering from their procedure  and lend to a higher morbidity and/or mortality risk. All material risks, benefits, and reasonable alternatives including postponing the procedure were discussed. The patient does wish to proceed with the procedure at this time. Surgeon: Kathy Milton MD    Pre-operative Diagnosis:   Hospital Problems           Last Modified POA    * (Principal) Lumbar radiculitis 3/4/2022 Yes    Degeneration of lumbar or lumbosacral intervertebral disc (Chronic) 3/4/2022 Yes    Spinal stenosis, lumbar region, without neurogenic claudication (Chronic) 3/4/2022 Yes          Post-operative Diagnosis: Same    Assistants: none    This is a 80 y.o. female patient with pain in the Back, left leg, right leg. Previous treatment and examination findings are noted in the H&P.BL 4 transforaminal epidural injection has been requested for diagnostic and therapeutic reasons. Conservative treatment was ineffective i.e.: ice, NSAIDS, rest, narcotic medication, chiropractic care, physical therapy and message therapy. Patient is unable to perform the following ADL's: ambulating and grooming     Pain Assessment: 0-10  Pain Level: 6     Pain Orientation: Right,Left  Pain Location: Back  Pain Descriptors: Aching    Last Plain films: 2020      EXAMINATION:  1. BL4 transforaminal radiculogram/epidurogram.   2. BL4 transforaminal epidural anesthetic injection. 3. BL4 transforaminal epidural steroid injection. CONSENT: Written consent was obtained from the patient on preprinted consent form after explaining the procedure, indications, potential complications and outcomes.  Alternative treatments were also discussed. DISCUSSION: The patient was sterilely prepped and draped in the usual fashion in the prone position. Time out was verified for correct patient, side, level and procedure. SEDATION:   No conscious sedation was performed during the procedure. The patient remained awake and conversed throughout the procedure. The patient underwent pulse oximetry and blood pressure monitoring independently by a trained observer, as well as by a physician. PROCEDURE:  Under image-intensifier control, a 22 gauge needle x 5 inch spinal needle was guided successfully into the epidural space employing a posterior lateral/oblique approach. Needle aspiration was negative for heme or CSF. Instillation of  .5 mL of Omnipaque 240 contrast medium opacified the spinal nerve and demonstrated contiguous flow into the epidural space. No vascular spread was noted. Digital subtraction was not employed to evaluate for vascular spread. The patient was monitored for any untoward reaction to contrast medium before proceeding with procedure #2. The patient did not report pain reproduction in a concordant distribution. Following needle position verification, a test dose of .5 mL of sterile lidocaine 0.5% was administered and patient monitored for any adverse effects. Then, 1 mL of   was instilled into the epidural space and the patient's response was again monitored. Finally, Dexamethasone (Decadron 10 mg/mL) 1 ml of 0.5% bupivacaine was then instilled. The patient's response was again monitored. The spinal needle was removed. The patient tolerated the procedure well and without complications and was noted to be in stable condition prior to discharge from the procedure center with discharge instructions. EBL: no blood loss    SPECIMEN: none    IMPRESSIONS:  1. BL 4 transforaminal epidurogram, epidural anesthesia and epidural steroid injection procedures accomplished without incident. RECOMMENDATIONS:  1.  Complete and return Post-Procedure Pain and Activity Diary.   2. Contact the [address] for symptom exacerbation, fever or unusual symptoms. 3. Post-procedure care according to verbal and written discharge instructions    POST-PROCEDURE EPIDUROGRAPHY INTERPRETATION:    EXAMINATION: AP, lateral, and oblique views    FLUORO TIME: 12 seconds    DISCUSSION: Spot views of the spine reveal normal alignment and segmentation. Spinal needle is positioned at the Bl4 neuroforamin. Contrast spreads and outlines the BL4 nerve/ neuroforamin and epidural space. The epidurogram reveals excellent contrast flow. Visualized spine reveals See radiology report. Soft tissues reveal no abnormalities. IMPRESSION: BL4 transforaminal epidurogram/epineurogram reveals satisfactory needle position and contrast spread.      Electronically signed by Marcial Essex, MD on 3/4/2022 at 12:19 PM

## 2022-03-04 NOTE — INTERVAL H&P NOTE
I have interviewed and examined the patient and reviewed the recent History and Physical.  There have been no changes to the recent H&P documentation. The surgical consent form has been signed. Last anticoagulant medication use was: 3days    Premedication taken for contrast allergy? No    Valium taken for oral sedation? No    Outpatient Medications Marked as Taking for the 3/4/22 encounter [facility] Encounter)   Medication Sig Dispense Refill    [START ON 3/10/2022] oxyCODONE HCl (OXY-IR) 10 MG immediate release tablet Take 1 tablet by mouth 3 times daily for 30 days. 90 tablet 0    ADVAIR DISKUS 100-50 MCG/DOSE diskus inhaler inhale 1 puff by mouth twice a day      calcium carbonate-vitamin D 600-200 MG-UNIT TABS Take 2 tablets by mouth daily      DULoxetine (CYMBALTA) 30 MG extended release capsule Take 30 mg by mouth daily      topiramate (TOPAMAX) 25 MG tablet Take 25 mg by mouth 2 times daily      baclofen (LIORESAL) 10 MG tablet Take 10 mg by mouth daily       Multiple Vitamin (MULTI-VITAMIN DAILY) TABS Take by mouth      melatonin 1 MG tablet melatonin   once daily      metoprolol tartrate (LOPRESSOR) 25 MG tablet Take 12.5 mg by mouth 2 times daily      aspirin 81 MG tablet Take 81 mg by mouth daily      guaiFENesin (MUCINEX) 600 MG extended release tablet Mucinex 600 mg tablet, extended release   Take 2 tablets every day by oral route. fluticasone propionate 50 MCG/BLIST AEPB Inhale into the lungs      albuterol (ACCUNEB) 1.25 MG/3ML nebulizer solution Inhale 1 ampule into the lungs every 6 hours as needed for Wheezing       simvastatin (ZOCOR) 40 MG tablet Take 40 mg by mouth nightly      omeprazole (PRILOSEC) 40 MG capsule Take 40 mg by mouth nightly. montelukast (SINGULAIR) 10 MG tablet Take 10 mg by mouth nightly.       DULoxetine (CYMBALTA) 60 MG extended release capsule Take 60 mg by mouth daily          The patient understands the planned operation and its associated risks and benefits and agrees to proceed.         Electronically signed by Marizol Ventura MD on 3/4/2022 at 12:18 PM

## 2022-03-18 ENCOUNTER — OFFICE VISIT (OUTPATIENT)
Dept: PAIN MANAGEMENT | Age: 82
End: 2022-03-18
Payer: MEDICARE

## 2022-03-18 ENCOUNTER — HOSPITAL ENCOUNTER (OUTPATIENT)
Age: 82
Setting detail: SPECIMEN
Discharge: HOME | End: 2022-03-18
Payer: MEDICARE

## 2022-03-18 VITALS
HEIGHT: 64 IN | HEART RATE: 70 BPM | WEIGHT: 178.5 LBS | BODY MASS INDEX: 30.48 KG/M2 | OXYGEN SATURATION: 97 % | DIASTOLIC BLOOD PRESSURE: 56 MMHG | SYSTOLIC BLOOD PRESSURE: 110 MMHG

## 2022-03-18 DIAGNOSIS — Z02.83 ENCOUNTER FOR DRUG SCREENING: ICD-10-CM

## 2022-03-18 DIAGNOSIS — M48.061 SPINAL STENOSIS, LUMBAR REGION, WITHOUT NEUROGENIC CLAUDICATION: Chronic | ICD-10-CM

## 2022-03-18 DIAGNOSIS — M51.36 LUMBAR DEGENERATIVE DISC DISEASE: ICD-10-CM

## 2022-03-18 DIAGNOSIS — Z79.891 ENCOUNTER FOR LONG-TERM OPIATE ANALGESIC USE: ICD-10-CM

## 2022-03-18 DIAGNOSIS — M54.16 LUMBAR RADICULITIS: ICD-10-CM

## 2022-03-18 DIAGNOSIS — M48.061 SPINAL STENOSIS OF LUMBAR REGION WITHOUT NEUROGENIC CLAUDICATION: ICD-10-CM

## 2022-03-18 DIAGNOSIS — M51.26 LUMBAR DISCOGENIC PAIN SYNDROME: ICD-10-CM

## 2022-03-18 DIAGNOSIS — M51.37 DEGENERATION OF LUMBAR OR LUMBOSACRAL INTERVERTEBRAL DISC: Primary | Chronic | ICD-10-CM

## 2022-03-18 PROCEDURE — 99214 OFFICE O/P EST MOD 30 MIN: CPT | Performed by: NURSE PRACTITIONER

## 2022-03-18 PROCEDURE — G8400 PT W/DXA NO RESULTS DOC: HCPCS | Performed by: NURSE PRACTITIONER

## 2022-03-18 PROCEDURE — G8427 DOCREV CUR MEDS BY ELIG CLIN: HCPCS | Performed by: NURSE PRACTITIONER

## 2022-03-18 PROCEDURE — 80307 DRUG TEST PRSMV CHEM ANLYZR: CPT

## 2022-03-18 PROCEDURE — 1123F ACP DISCUSS/DSCN MKR DOCD: CPT | Performed by: NURSE PRACTITIONER

## 2022-03-18 PROCEDURE — G8484 FLU IMMUNIZE NO ADMIN: HCPCS | Performed by: NURSE PRACTITIONER

## 2022-03-18 PROCEDURE — G8417 CALC BMI ABV UP PARAM F/U: HCPCS | Performed by: NURSE PRACTITIONER

## 2022-03-18 PROCEDURE — 1090F PRES/ABSN URINE INCON ASSESS: CPT | Performed by: NURSE PRACTITIONER

## 2022-03-18 PROCEDURE — 1036F TOBACCO NON-USER: CPT | Performed by: NURSE PRACTITIONER

## 2022-03-18 PROCEDURE — 4040F PNEUMOC VAC/ADMIN/RCVD: CPT | Performed by: NURSE PRACTITIONER

## 2022-03-18 ASSESSMENT — ENCOUNTER SYMPTOMS
SORE THROAT: 0
SHORTNESS OF BREATH: 0
COUGH: 0
BACK PAIN: 1

## 2022-03-18 NOTE — PROGRESS NOTES
Subjective:      Patient ID: Ej Contreras is a 80 y.o. female. Chief Complaint   Patient presents with    Lower Back Pain     post TFE on 3/4/22. patient states the injection helped for several days, but does not feel a whole lot better. HPI Here today for routine pain clinic recheck.     In the process of moving into assisted living facility    Pain Assessment  Location of Pain: Back  Severity of Pain: 4  Quality of Pain: Throbbing  Duration of Pain: Persistent  Frequency of Pain: Constant  Aggravating Factors: Standing  Limiting Behavior: Yes  Relieving Factors: Ice,Rest  Result of Injury: No  Work-Related Injury: No  Are there other pain locations you wish to document?: No    Allergies   Allergen Reactions    Sulfa Antibiotics Shortness Of Breath    Sulfamethoxazole-Trimethoprim Anaphylaxis     (Bactrim)    Ansaid [Flurbiprofen] Other (See Comments)     Light headed and difficult with vision \"seeing\"    Gentamicin Other (See Comments)     Eye ointment caused eye swelling, redness    Motrin [Ibuprofen] Other (See Comments)     \"I coughed so bad I broke a rib\"    Quinidine      Light headed    Hydrocodone-Acetaminophen     Mirapex [Pramipexole Dihydrochloride] Other (See Comments)     Unknown reaction    Mobic [Meloxicam] Nausea Only    Nsaids Other (See Comments)     lightheaded    Reglan [Metoclopramide] Other (See Comments)     Hyperactive and stomach pain    Trazodone Other (See Comments)     unknown    Amino Acids Nausea And Vomiting     Other reaction(s): AOF    Corgard [Nadolol] Other (See Comments)     Severe coughing and broke a rib as a result     Erythromycin Nausea Only    Etodolac      GI issues    Garamycin [Gentamicin Sulfate] Other (See Comments)     Redness and irritation    Inderal [Propranolol] Other (See Comments)     Severe cough    Iothalamate Nausea And Vomiting     Other reaction(s): AOF    Lisinopril Itching    Loratadine      Does not work    Ultram [Tramadol] Other (See Comments)     Didn't work         Outpatient Medications Marked as Taking for the 3/18/22 encounter (Office Visit) with KENY Freire - CNP   Medication Sig Dispense Refill    oxyCODONE HCl (OXY-IR) 10 MG immediate release tablet Take 1 tablet by mouth 3 times daily for 30 days. 90 tablet 0    ADVAIR DISKUS 100-50 MCG/DOSE diskus inhaler inhale 1 puff by mouth twice a day      ipratropium (ATROVENT) 0.03 % nasal spray 2 sprays by Each Nostril route every 12 hours      calcium carbonate-vitamin D 600-200 MG-UNIT TABS Take 2 tablets by mouth daily      DULoxetine (CYMBALTA) 30 MG extended release capsule Take 30 mg by mouth daily      topiramate (TOPAMAX) 25 MG tablet Take 25 mg by mouth 2 times daily      furosemide (LASIX) 20 MG tablet 20 mg 3 times daily       gabapentin (NEURONTIN) 100 MG capsule TAKE 1 CAPSULE BY MOUTH THREE TIMES A DAY 90 capsule 2    naloxone 4 MG/0.1ML LIQD nasal spray 1 spray by Nasal route as needed for Opioid Reversal 1 each 5    baclofen (LIORESAL) 10 MG tablet Take 10 mg by mouth daily       ketotifen (ZADITOR) 0.025 % ophthalmic solution 1 drop 2 times daily      Multiple Vitamin (MULTI-VITAMIN DAILY) TABS Take by mouth      melatonin 1 MG tablet melatonin   once daily      Triprolidine-Pseudoephedrine (ANTIHISTAMINE PO) Take by mouth Indications: zyrtec       metoprolol tartrate (LOPRESSOR) 25 MG tablet Take 12.5 mg by mouth 2 times daily      aspirin 81 MG tablet Take 81 mg by mouth daily      levothyroxine (SYNTHROID) 75 MCG tablet Take 75 mcg by mouth daily      guaiFENesin (MUCINEX) 600 MG extended release tablet Mucinex 600 mg tablet, extended release   Take 2 tablets every day by oral route.  lidocaine (ASPERCREME W/LIDOCAINE) 4 % cream Apply topically as needed for Pain Apply topically as needed.       fluticasone propionate 50 MCG/BLIST AEPB Inhale into the lungs      albuterol (ACCUNEB) 1.25 MG/3ML nebulizer solution Inhale 1 ampule into the lungs every 6 hours as needed for Wheezing       simvastatin (ZOCOR) 40 MG tablet Take 40 mg by mouth nightly      flecainide (TAMBOCOR) 50 MG tablet Take 50 mg by mouth 2 times daily.  omeprazole (PRILOSEC) 40 MG capsule Take 40 mg by mouth nightly.  montelukast (SINGULAIR) 10 MG tablet Take 10 mg by mouth nightly.  DULoxetine (CYMBALTA) 60 MG extended release capsule Take 60 mg by mouth daily       artificial tears (ARTIFICIAL TEARS) OINT as needed.       Saline 0.2 % SOLN by Nasal route daily as needed          Past Medical History:   Diagnosis Date    Anesthesia complication     2nd post op day from total knee patient stated \"I was wild and mean\"    Anxiety and depression     Arthritis     ASD (atrial septal defect)     repair in [address] [facility] COPD [facility]chronic obstructive pulmonary disease) [facility]     COPD (chronic obstructive pulmonary disease) ([facility] 1980's    Disorder of immune system ([facility]     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 ([facility] 3/[facility])    History of renal stone     passed    Hx of blood clots 1970    DVT    Hypertension     MVP (mitral valve prolapse)     LONI (obstructive sleep apnea)     has not used bipap since June, 2020    Rectocele     Spinal stenosis     Thyroid disease late 1980's    overactive radioactive iodine done       Past Surgical History:   Procedure Laterality Date    BACK INJECTION Bilateral 7/9/2021    Bilateral SACROILIAC Joint & Piriformis Injection performed by Yunior Bains MD at [facility] [address] Bilateral 12/3/2021    Bilateral SACROILIAC JOINT & Piriformis Injections performed by Yunior Bains MD at [address] [facility] LUMPECTOMY Left     BREAST LUMPECTOMY Left     CARDIAC SURGERY      ASD repair     SECTION      2x    CHOLECYSTECTOMY      CHOLECYSTECTOMY      COLONOSCOPY      three    DIAGNOSTIC CARDIAC CATH LAB PROCEDURE      DILATION AND CURETTAGE OF UTERUS      EYE SURGERY Bilateral     cataract    [facility] INJECTION PROCEDURE FOR SACROILIAC JOINT Left 2018    Left SIJ and piriformis, left trocanteric bursa injection performed by Sissy Palomares MD at [address]  2009    HYSTERECTOMY  2009    Total    JOINT REPLACEMENT Bilateral     knee    KNEE ARTHROSCOPY Left     LUMBAR SPINE SURGERY Bilateral 2019    Bilateral L4 TRANSFORAMINAL  [id number] performed by Sissy Palomares MD at [address] [place] [address]  Bilateral 3/12/2019    Bilateral L4 TRANSFORAMINAL performed by Sissy Palomares MD at [address] [place] [address] Dr Right 10/15/2019    RIGHT L4 & L5 TRANSFORAMINAL performed by Sissy Palomares MD at [address] [place] [address] Dr Left 2019    Left L4 & L5 TRANSFORAMINAL performed by Sissy Palomares MD at [place] [address]  2014    PAIN MANAGEMENT PROCEDURE Right 2020    Right L4 & L5 TRANSFORAMINAL performed by Sissy Palomares MD at [address] [place] [address] Left 2020    Left L4 & L5 TRANSFORAMINAL performed by Sissy Palomares MD at [address] [place] [address] Right 2021    Right L4 & L5 TRANSFORAMINAL performed by Sissy Palomares MD at [address] [place] [address] Left 3/11/2021    left L4 & L5 TRANSFORAMINAL performed by Sissy Palomares MD at [address] [place] [address] Left 2021    Left L4 & L5 TRANSFORAMINAL performed by Sissy Palomares MD at [address] [place] [address] Bilateral 3/4/2022    Bilateral L4 TRANSFORAMINAL performed by Sissy Palomares MD at [address] [place] [address] DX/THER AGNT PARAVERT FACET JOINT, LUMBAR/SAC, 2ND LEVEL Bilateral 2018    Bilateral L2 L3 L4 L5 Diagnostic Medial BB performed by Deena Suarez MD at [address] [facility] DX/THER AGNT PARAVERT FACET JOINT, LUMBAR/SAC, 2ND LEVEL Right 9/13/2018    Right L2 L3 L4 L5 Confirmatory Medial BB performed by Deena Suarez MD at [address] AA&/STRD TFRML EPI CERVICAL/THORACIC EA ADDL Right 7/20/2018    Right C5 C6 TRANSFORAMINAL performed by Deena Suarez MD at [facility] Left 12/20/2018    Left L2 L3 L4 L5 RADIOFREQUENCY performed by Deena Suarez MD at [facility] Right 1/3/2019    Right L2 L3 L4 L5 RADIOFREQUENCY performed by Deena Suarez MD at [address]  Right 01/28/2005 & 03/16/2010    2x    TONSILLECTOMY      at age 12       Family History   Problem Relation Age of Onset    Heart Disease Father        Social History     Socioeconomic History    Marital status:      Spouse name: None    Number of children: None    Years of education: None    Highest education level: None   Occupational History    Occupation: retired   Tobacco Use    Smoking status: Never Smoker    Smokeless tobacco: Never Used   Vaping Use    Vaping Use: Never used   Substance and Sexual Activity    Alcohol use: Not Currently     Comment: very rare    Drug use: No    Sexual activity: None   Other Topics Concern    None   Social History Narrative    None     Social Determinants of Health     Financial Resource Strain:     Difficulty of Paying Living Expenses: Not on file   Food Insecurity:     Worried About Running Out of Food in the Last Year: Not on file    Rossi of Food in the Last Year: Not on file   Transportation Needs:     Lack of Transportation (Medical): Not on file    Lack of Transportation (Non-Medical):  Not on file   Physical Activity:     Days of Exercise per Week: Not on file    Minutes of Exercise per Session: Not on file   Stress:     Feeling of Stress : Not on file   Social Connections:     Frequency of Communication with Friends and Family: Not on file    Frequency of Social Gatherings with Friends and Family: Not on file    Attends [employer] Services: Not on file    Active Member of Clubs or Organizations: Not on file    Attends Club or Organization Meetings: Not on file    Marital Status: Not on file   Intimate Partner Violence:     Fear of Current or Ex-Partner: Not on file    Emotionally Abused: Not on file    Physically Abused: Not on file    Sexually Abused: Not on file   Housing Stability:     Unable to Pay for Housing in the Last Year: Not on file    Number of [place] in the Last Year: Not on file    Unstable Housing in the Last Year: Not on file     Review of Systems   Constitutional: Positive for activity change (increased, packing and moving). Negative for fever. HENT: Negative for sore throat. Respiratory: Negative for cough and shortness of breath. Cardiovascular: Negative for chest pain. Musculoskeletal: Positive for arthralgias, back pain and myalgias. Skin: Negative. Hematological: Bruises/bleeds easily. Psychiatric/Behavioral: Positive for sleep disturbance. Objective:   Physical Exam  Vitals reviewed. Constitutional:       General: She is awake. She is not in acute distress. Appearance: Normal appearance. She is well-developed and well-groomed. She is not ill-appearing, toxic-appearing or diaphoretic. Interventions: She is not intubated. HENT:      Head: Normocephalic and atraumatic. Eyes:      General: Lids are normal.   Cardiovascular:      Rate and Rhythm: Normal rate. Pulmonary:      Effort: Pulmonary effort is normal. No tachypnea, bradypnea, accessory muscle usage, prolonged expiration, respiratory distress or retractions. She is not intubated. Skin:     General: Skin is warm and dry. Capillary Refill: Capillary refill takes less than 2 seconds. Coloration: Skin is not ashen, jaundiced or pale. Findings: No rash. Nails:  There is no clubbing. Neurological:      Mental Status: She is alert and oriented to person, place, and time. GCS: GCS eye subscore is 4. GCS verbal subscore is 5. GCS motor subscore is 6. Cranial Nerves: No cranial nerve deficit. Psychiatric:         Attention and Perception: Attention normal.         Mood and Affect: Mood normal.         Speech: Speech normal.         Behavior: Behavior is cooperative. Cognition and Memory: Cognition normal.         Judgment: Judgment normal.         Assessment / Plan:      Diagnosis Orders   1. Degeneration of lumbar or lumbosacral intervertebral disc     2. Spinal stenosis, lumbar region, without neurogenic claudication     3. Lumbar degenerative disc disease     4. Spinal stenosis of lumbar region without neurogenic claudication     5. Lumbar discogenic pain syndrome     6. Lumbar radiculitis         Chronic pain diagnoses such as   1. Degeneration of lumbar or lumbosacral intervertebral disc    2. Spinal stenosis, lumbar region, without neurogenic claudication    3. Lumbar degenerative disc disease    4. Spinal stenosis of lumbar region without neurogenic claudication    5. Lumbar discogenic pain syndrome    6. Lumbar radiculitis     controlled on current medication regime, wll continue current pain medications to improve quality of life and function. Random drug screen today for opiate medication compliance. Controlled Substance Monitoring:    Acute and Chronic Pain Monitoring:   RX Monitoring 3/18/2022   Attestation -   Periodic Controlled Substance Monitoring No signs of potential drug abuse or diversion identified.;Obtaining appropriate analgesic effect of treatment.    Chronic Pain > 50 MEDD -   Chronic Pain > 80 MEDD -

## 2022-03-24 LAB
6-ACETYLMORPHINE, UR: NOT DETECTED
7-AMINOCLONAZEPAM, URINE: NOT DETECTED
ALPHA-OH-ALPRAZ, URINE: NOT DETECTED
ALPHA-OH-MIDAZOLAM, URINE: NOT DETECTED
ALPRAZOLAM, URINE: NOT DETECTED
AMPHETAMINES, URINE: NOT DETECTED
BARBITURATES, URINE: NOT DETECTED
BENZOYLECGONINE, UR: NOT DETECTED
BUPRENORPHINE URINE: NOT DETECTED
CARISOPRODOL, UR: NOT DETECTED
CLONAZEPAM, URINE: NOT DETECTED
CODEINE, URINE: NOT DETECTED
CREATININE URINE: 137 MG/DL (ref 20–400)
DIAZEPAM, URINE: NOT DETECTED
EER PAIN MGT DRUG PANEL, HIGH RES/EMIT U: NORMAL
ETHYL GLUCURONIDE UR: NOT DETECTED
FENTANYL URINE: NOT DETECTED
GABAPENTIN: PRESENT
HYDROCODONE, URINE: NOT DETECTED
HYDROMORPHONE, URINE: NOT DETECTED
LORAZEPAM, URINE: NOT DETECTED
MARIJUANA METAB, UR: NOT DETECTED
MDA, UR: NOT DETECTED
MDEA, EVE, UR: NOT DETECTED
MDMA URINE: NOT DETECTED
MEPERIDINE METAB, UR: NOT DETECTED
METHADONE, URINE: NOT DETECTED
METHAMPHETAMINE, URINE: NOT DETECTED
METHYLPHENIDATE: NOT DETECTED
MIDAZOLAM, URINE: NOT DETECTED
MORPHINE URINE: NOT DETECTED
NALOXONE URINE: NOT DETECTED
NORBUPRENORPHINE, URINE: NOT DETECTED
NORDIAZEPAM, URINE: NOT DETECTED
NORFENTANYL, URINE: NOT DETECTED
NORHYDROCODONE, URINE: NOT DETECTED
NOROXYCODONE, URINE: PRESENT
NOROXYMORPHONE, URINE: PRESENT
OXAZEPAM, URINE: NOT DETECTED
OXYCODONE URINE: PRESENT
OXYMORPHONE, URINE: PRESENT
PAIN MGT DRUG PANEL, HI RES, UR: NORMAL
PCP,URINE: NOT DETECTED
PHENTERMINE, UR: NOT DETECTED
PREGABALIN: NOT DETECTED
TAPENTADOL, URINE: NOT DETECTED
TAPENTADOL-O-SULFATE, URINE: NOT DETECTED
TEMAZEPAM, URINE: NOT DETECTED
TRAMADOL, URINE: NOT DETECTED
ZOLPIDEM METABOLITE (ZCA), URINE: NOT DETECTED
ZOLPIDEM, URINE: NOT DETECTED

## 2022-04-27 ENCOUNTER — OFFICE VISIT (OUTPATIENT)
Dept: PAIN MANAGEMENT | Age: 82
End: 2022-04-27
Payer: MEDICARE

## 2022-04-27 VITALS
OXYGEN SATURATION: 97 % | RESPIRATION RATE: 16 BRPM | SYSTOLIC BLOOD PRESSURE: 132 MMHG | BODY MASS INDEX: 30.38 KG/M2 | DIASTOLIC BLOOD PRESSURE: 78 MMHG | HEART RATE: 70 BPM | WEIGHT: 177 LBS

## 2022-04-27 DIAGNOSIS — M51.36 LUMBAR DEGENERATIVE DISC DISEASE: ICD-10-CM

## 2022-04-27 DIAGNOSIS — M65.342 TRIGGER RING FINGER OF LEFT HAND: Primary | ICD-10-CM

## 2022-04-27 DIAGNOSIS — M54.06 PANNICULITIS INVOLVING LUMBAR REGION: ICD-10-CM

## 2022-04-27 DIAGNOSIS — M48.061 SPINAL STENOSIS OF LUMBAR REGION WITHOUT NEUROGENIC CLAUDICATION: ICD-10-CM

## 2022-04-27 DIAGNOSIS — M47.816 LUMBAR FACET JOINT SYNDROME: ICD-10-CM

## 2022-04-27 PROCEDURE — G8400 PT W/DXA NO RESULTS DOC: HCPCS | Performed by: NURSE PRACTITIONER

## 2022-04-27 PROCEDURE — 99214 OFFICE O/P EST MOD 30 MIN: CPT | Performed by: NURSE PRACTITIONER

## 2022-04-27 PROCEDURE — 4040F PNEUMOC VAC/ADMIN/RCVD: CPT | Performed by: NURSE PRACTITIONER

## 2022-04-27 PROCEDURE — 1123F ACP DISCUSS/DSCN MKR DOCD: CPT | Performed by: NURSE PRACTITIONER

## 2022-04-27 PROCEDURE — G8427 DOCREV CUR MEDS BY ELIG CLIN: HCPCS | Performed by: NURSE PRACTITIONER

## 2022-04-27 PROCEDURE — G8417 CALC BMI ABV UP PARAM F/U: HCPCS | Performed by: NURSE PRACTITIONER

## 2022-04-27 PROCEDURE — 1036F TOBACCO NON-USER: CPT | Performed by: NURSE PRACTITIONER

## 2022-04-27 PROCEDURE — 1090F PRES/ABSN URINE INCON ASSESS: CPT | Performed by: NURSE PRACTITIONER

## 2022-04-27 RX ORDER — SENNOSIDES 8.6 MG
650 CAPSULE ORAL EVERY 8 HOURS PRN
COMMUNITY

## 2022-04-27 RX ORDER — ONDANSETRON 4 MG/1
TABLET, FILM COATED ORAL
COMMUNITY
Start: 2022-04-08 | End: 2022-06-26

## 2022-04-27 RX ORDER — CETIRIZINE HYDROCHLORIDE 10 MG/1
TABLET ORAL
COMMUNITY
Start: 2022-04-21

## 2022-04-27 RX ORDER — METOPROLOL SUCCINATE 25 MG/1
TABLET, EXTENDED RELEASE ORAL
COMMUNITY
Start: 2022-03-22

## 2022-04-27 RX ORDER — SPIRONOLACTONE 25 MG/1
TABLET ORAL
COMMUNITY
Start: 2022-03-24 | End: 2022-08-23 | Stop reason: SDUPTHER

## 2022-04-27 ASSESSMENT — ENCOUNTER SYMPTOMS
SHORTNESS OF BREATH: 0
COUGH: 0
BACK PAIN: 1
SORE THROAT: 0

## 2022-04-27 NOTE — PROGRESS NOTES
Subjective:      Patient ID: Stefanie Cotto is a 80 y.o. female. Chief Complaint   Patient presents with    Injections     Requesting \"Back and finger injections\"     hospitals Here today for routine pain clinic recheck.     Pain Assessment  Location of Pain: Back (right hand/fingers)  Location Modifiers: Left,Medial,Inferior,Superior  Severity of Pain: 6  Quality of Pain: Sharp,Aching,Dull,Throbbing  Duration of Pain: Persistent  Frequency of Pain: Constant  Aggravating Factors: Stairs,Walking,Standing,Squatting,Kneeling,Exercise,Straightening,Stretching,Bending  Limiting Behavior: Yes  Relieving Factors: Rest,Ice,Heat (Rx, Patches)  Result of Injury: No  Work-Related Injury: No    Allergies   Allergen Reactions    Sulfa Antibiotics Shortness Of Breath    Sulfamethoxazole-Trimethoprim Anaphylaxis     (Bactrim)    Ansaid [Flurbiprofen] Other (See Comments)     Light headed and difficult with vision \"seeing\"    Gentamicin Other (See Comments)     Eye ointment caused eye swelling, redness    Motrin [Ibuprofen] Other (See Comments)     \"I coughed so bad I broke a rib\"    Quinidine      Light headed    Chlorthalidone Hives    Hydrocodone-Acetaminophen     Mirapex [Pramipexole Dihydrochloride] Other (See Comments)     Unknown reaction    Mobic [Meloxicam] Nausea Only    Nsaids Other (See Comments)     lightheaded    Quinolones Other (See Comments) and Hives    Reglan [Metoclopramide] Other (See Comments)     Hyperactive and stomach pain    Trazodone Other (See Comments)     unknown    Amino Acids Nausea And Vomiting     Other reaction(s): AOF    Corgard [Nadolol] Other (See Comments)     Severe coughing and broke a rib as a result     Erythromycin Nausea Only    Etodolac      GI issues    Garamycin [Gentamicin Sulfate] Other (See Comments)     Redness and irritation    Inderal [Propranolol] Other (See Comments)     Severe cough    Iothalamate Nausea And Vomiting     Other reaction(s): AOF    Lisinopril Itching    Loratadine      Does not work    Ultram [Tramadol] Other (See Comments)     Didn't work         Outpatient Medications Marked as Taking for the 4/27/22 encounter (Office Visit) with KENY Marks - CNP   Medication Sig Dispense Refill    acetaminophen (TYLENOL 8 HOUR) 650 MG extended release tablet Take 650 mg by mouth every 8 hours as needed for Pain      cetirizine (ZYRTEC) 10 MG tablet take 1 tablet by mouth once daily      spironolactone (ALDACTONE) 25 MG tablet take 1 tablet by mouth once daily      metoprolol succinate (TOPROL XL) 25 MG extended release tablet take 1 tablet by mouth once daily      oxyCODONE HCl (OXY-IR) 10 MG immediate release tablet Take 1 tablet by mouth 3 times daily for 30 days.  90 tablet 0    ADVAIR DISKUS 100-50 MCG/DOSE diskus inhaler inhale 1 puff by mouth twice a day      ipratropium (ATROVENT) 0.03 % nasal spray 2 sprays by Each Nostril route every 12 hours      calcium carbonate-vitamin D 600-200 MG-UNIT TABS Take 2 tablets by mouth daily      DULoxetine (CYMBALTA) 30 MG extended release capsule Take 30 mg by mouth daily      topiramate (TOPAMAX) 25 MG tablet Take 25 mg by mouth 2 times daily      furosemide (LASIX) 20 MG tablet 20 mg 3 times daily       gabapentin (NEURONTIN) 100 MG capsule TAKE 1 CAPSULE BY MOUTH THREE TIMES A DAY 90 capsule 2    baclofen (LIORESAL) 10 MG tablet Take 10 mg by mouth daily       ketotifen (ZADITOR) 0.025 % ophthalmic solution 1 drop 2 times daily      Multiple Vitamin (MULTI-VITAMIN DAILY) TABS Take by mouth      melatonin 1 MG tablet melatonin   once daily      Triprolidine-Pseudoephedrine (ANTIHISTAMINE PO) Take by mouth Indications: zyrtec       aspirin 81 MG tablet Take 81 mg by mouth daily      levothyroxine (SYNTHROID) 75 MCG tablet Take 75 mcg by mouth daily      guaiFENesin (MUCINEX) 600 MG extended release tablet Mucinex 600 mg tablet, extended release   Take 2 tablets every day by oral route.      lidocaine (ASPERCREME W/LIDOCAINE) 4 % cream Apply topically as needed for Pain Apply topically as needed.  fluticasone propionate 50 MCG/BLIST AEPB Inhale into the lungs      albuterol (ACCUNEB) 1.25 MG/3ML nebulizer solution Inhale 1 ampule into the lungs every 6 hours as needed for Wheezing       simvastatin (ZOCOR) 40 MG tablet Take 40 mg by mouth nightly      flecainide (TAMBOCOR) 50 MG tablet Take 50 mg by mouth 2 times daily.  omeprazole (PRILOSEC) 40 MG capsule Take 40 mg by mouth nightly.  montelukast (SINGULAIR) 10 MG tablet Take 10 mg by mouth nightly.  DULoxetine (CYMBALTA) 60 MG extended release capsule Take 60 mg by mouth daily       artificial tears (ARTIFICIAL TEARS) OINT as needed.       Saline 0.2 % SOLN by Nasal route daily as needed          Past Medical History:   Diagnosis Date    Anesthesia complication     2nd post op day from total knee patient stated \"I was wild and mean\"    Anxiety and depression     Arthritis     ASD (atrial septal defect)     [facility]air [facility] COPD [facility]ronic obstructive [facility] disease[facility]     COPD (chronic obstructive pulmonary disease) ([facility] 1980's    Disorder of immune system ([facility] [facility]     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 (total left knee 3/2015 at [facility])    History of renal stone     passed    Hx of blood clots 1970    DVT    Hypertension     MVP (mitral valve prolapse)     LONI (obstructive sleep apnea)     has not used bipap since June, 2020    Rectocele     Spinal stenosis     Thyroid disease late 1980's    overactive radioactive iodine done       Past Surgical History:   Procedure Laterality Date    BACK INJECTION Bilateral 7/9/2021    Bilateral SACROILIAC Joint & Piriformis Injection performed by Bc Basurto MD at [address] Bilateral 12/3/2021    Bilateral SACROILIAC JOINT & Piriformis Injections performed by Bc Basurto MD at [address] LUMPECTOMY Left     BREAST LUMPECTOMY Left     CARDIAC SURGERY      ASD repair     SECTION      2x    CHOLECYSTECTOMY      CHOLECYSTECTOMY      COLONOSCOPY      three    DIAGNOSTIC CARDIAC CATH LAB PROCEDURE      DILATION AND CURETTAGE OF UTERUS      EYE SURGERY Bilateral     cataract    [facility] INJECTION PROCEDURE FOR SACROILIAC JOINT Left 2018    Left SIJ and piriformis, left trocanteric bursa injection performed by Bc Basurto MD at [address]  2009    HYSTERECTOMY  2009    Total    JOINT REPLACEMENT Bilateral     knee    KNEE ARTHROSCOPY Left     LUMBAR SPINE SURGERY Bilateral 2019    Bilateral L4 TRANSFORAMINAL  [id number] performed by Bc Basurto MD at [address] [place] [address] Bilateral 3/12/2019    Bilateral L4 TRANSFORAMINAL performed by Bc Basurto MD at [address] [place] [address] Right 10/15/2019    RIGHT L4 & L5 TRANSFORAMINAL performed by Bc Basurto MD at [address] [place] [address] Left 2019    Left L4 & L5 TRANSFORAMINAL performed by Bc Basurto MD at [place] [address]  2014    PAIN MANAGEMENT PROCEDURE Right 2020    Right L4 & L5 TRANSFORAMINAL performed by Bc Basurto MD at [address] [place] [address] Left 2020    Left L4 & L5 TRANSFORAMINAL performed by Bc Basurto MD at [address] [place] [address] Right 2021    Right L4 & L5 TRANSFORAMINAL performed by Bc Basurto MD at [address] [place] [address] Left 3/11/2021    left L4 & L5 TRANSFORAMINAL performed by Bc Basurto MD at [address] [place] [address] Left 2021    Left L4 & L5 TRANSFORAMINAL performed by Bc Basurto MD at [place] [address]. MANAGEMENT PROCEDURE Bilateral 3/4/2022    Bilateral L4 TRANSFORAMINAL performed by Marisol Mederos MD at [address] [place] [address] DX/THER AGNT PARAVERT FACET JOINT, LUMBAR/SAC, 2ND LEVEL Bilateral 8/30/2018    Bilateral L2 L3 L4 L5 Diagnostic Medial BB performed by Marisol Mederos MD at [address] [place] [address] DX/THER AGNT PARAVERT FACET JOINT, LUMBAR/SAC, 2ND LEVEL Right 9/13/2018    Right L2 L3 L4 L5 Confirmatory Medial BB performed by Marisol Mederos MD at [address] AA&/STRD TFRML EPI CERVICAL/THORACIC EA ADDL Right 7/20/2018    Right C5 C6 TRANSFORAMINAL performed by Marisol Mederos MD at [address] Left 12/20/2018    Left L2 L3 L4 L5 RADIOFREQUENCY performed by Marisol Mederos MD at [address] Right 1/3/2019    Right L2 L3 L4 L5 RADIOFREQUENCY performed by Marisol Mederos MD at [address] Right 01/28/2005 & 03/16/2010    2x    TONSILLECTOMY      at age 12       Family History   Problem Relation Age of Onset    Heart Disease Father        Social History     Socioeconomic History    Marital status:      Spouse name: None    Number of children: None    Years of education: None    Highest education level: None   Occupational History    Occupation: retired   Tobacco Use    Smoking status: Never Smoker    Smokeless tobacco: Never Used   Vaping Use    Vaping Use: Never used   Substance and Sexual Activity    Alcohol use: Not Currently     Comment: very rare    Drug use: No    Sexual activity: None   Other Topics Concern    None   Social History Narrative    None     Social Determinants of Health     Financial Resource Strain:     Difficulty of Paying Living Expenses: Not on file   Food Insecurity:     Worried About Running Out of Food in the Last Year: Not on file    Rossi of Food in the Last Year: Not on file   Transportation Needs:     Lack of Transportation (Medical):  Not on file    Lack of Transportation (Non-Medical): Not on file   Physical Activity:     Days of Exercise per Week: Not on file    Minutes of Exercise per Session: Not on file   Stress:     Feeling of Stress : Not on file   Social Connections:     Frequency of Communication with Friends and Family: Not on file    Frequency of Social Gatherings with Friends and Family: Not on file    Attends Restoration Services: Not on file    Active Member of [address] [place] [address] or Organizations: Not on file    Attends Club or Organization Meetings: Not on file    Marital Status: Not on file   Intimate Partner Violence:     Fear of Current or Ex-Partner: Not on file    Emotionally Abused: Not on file    Physically Abused: Not on file    Sexually Abused: Not on file   Housing Stability:     Unable to Pay for Housing in the Last Year: Not on file    Number of [place] in the Last Year: Not on file    Unstable Housing in the Last Year: Not on file     Review of Systems   Constitutional: Positive for activity change (increased, packing and moving). Negative for fever. HENT: Negative for sore throat. Respiratory: Negative for cough and shortness of breath. Cardiovascular: Negative for chest pain. Musculoskeletal: Positive for arthralgias, back pain and myalgias. Skin: Negative. Hematological: Bruises/bleeds easily. Psychiatric/Behavioral: Positive for sleep disturbance. Objective:   Physical Exam  Vitals reviewed. Constitutional:       General: She is awake. She is not in acute distress. Appearance: Normal appearance. She is well-developed and well-groomed. She is not ill-appearing, toxic-appearing or diaphoretic. Interventions: She is not intubated. HENT:      Head: Normocephalic and atraumatic. Eyes:      General: Lids are normal.   Cardiovascular:      Rate and Rhythm: Normal rate.    Pulmonary:      Effort: Pulmonary effort is normal. No tachypnea, bradypnea, accessory muscle usage, prolonged expiration, respiratory distress or retractions. She is not intubated. Skin:     General: Skin is warm and dry. Capillary Refill: Capillary refill takes less than 2 seconds. Coloration: Skin is not ashen, jaundiced or pale. Findings: No rash. Nails: There is no clubbing. Neurological:      Mental Status: She is alert and oriented to person, place, and time. GCS: GCS eye subscore is 4. GCS verbal subscore is 5. GCS motor subscore is 6. Cranial Nerves: No cranial nerve deficit. Psychiatric:         Attention and Perception: Attention normal.         Mood and Affect: Mood normal.         Speech: Speech normal.         Behavior: Behavior is cooperative. Cognition and Memory: Cognition normal.         Judgment: Judgment normal.         Assessment / Plan:     {No diagnosis found. (Refresh or delete this SmartLink)}    Chronic pain diagnoses such as   No diagnosis found. controlled on current medication regime, wll continue current pain medications to improve quality of life and function. Controlled Substance Monitoring:    Acute and Chronic Pain Monitoring:   RX Monitoring 3/18/2022   Attestation -   Periodic Controlled Substance Monitoring No signs of potential drug abuse or diversion identified.;Obtaining appropriate analgesic effect of treatment.    Chronic Pain > 50 MEDD -   Chronic Pain > 80 MEDD -

## 2022-04-27 NOTE — PROGRESS NOTES
Subjective:      Patient ID: Antonette Cavanaugh is a 80 y.o. female. Chief Complaint   Patient presents with    Injections     Requesting \"Back and finger injections\"     [facility] Here today for routine pain clinic recheck.     Pain Assessment  Location of Pain: Back (right hand/fingers)  Location Modifiers: Left,Medial,Inferior,Superior  Severity of Pain: 6  Quality of Pain: Sharp,Aching,Dull,Throbbing  Duration of Pain: Persistent  Frequency of Pain: Constant  Aggravating Factors: Stairs,Walking,Standing,Squatting,Kneeling,Exercise,Straightening,Stretching,Bending  Limiting Behavior: Yes  Relieving Factors: Rest,Ice,Heat (Rx, Patches)  Result of Injury: No  Work-Related Injury: No    Allergies   Allergen Reactions    Sulfa Antibiotics Shortness Of Breath    Sulfamethoxazole-Trimethoprim Anaphylaxis     (Bactrim)    Ansaid [Flurbiprofen] Other (See Comments)     Light headed and difficult with vision \"seeing\"    Gentamicin Other (See Comments)     Eye ointment caused eye swelling, redness    Motrin [Ibuprofen] Other (See Comments)     \"I coughed so bad I broke a rib\"    Quinidine      Light headed    Chlorthalidone Hives    Hydrocodone-Acetaminophen     Mirapex [Pramipexole Dihydrochloride] Other (See Comments)     Unknown reaction    Mobic [Meloxicam] Nausea Only    Nsaids Other (See Comments)     lightheaded    Quinolones Other (See Comments) and Hives    Reglan [Metoclopramide] Other (See Comments)     Hyperactive and stomach pain    Trazodone Other (See Comments)     unknown    Amino Acids Nausea And Vomiting     Other reaction(s): AOF    Corgard [Nadolol] Other (See Comments)     Severe coughing and broke a rib as a result     Erythromycin Nausea Only    Etodolac      GI issues    Garamycin [Gentamicin Sulfate] Other (See Comments)     Redness and irritation    Inderal [Propranolol] Other (See Comments)     Severe cough    Iothalamate Nausea And Vomiting     Other reaction(s): AOF    Lisinopril Itching    Loratadine      Does not work    Ultram [Tramadol] Other (See Comments)     Didn't work         Outpatient Medications Marked as Taking for the 4/27/22 encounter (Office Visit) with KENY Keita - CNP   Medication Sig Dispense Refill    acetaminophen (TYLENOL 8 HOUR) 650 MG extended release tablet Take 650 mg by mouth every 8 hours as needed for Pain      cetirizine (ZYRTEC) 10 MG tablet take 1 tablet by mouth once daily      spironolactone (ALDACTONE) 25 MG tablet take 1 tablet by mouth once daily      metoprolol succinate (TOPROL XL) 25 MG extended release tablet take 1 tablet by mouth once daily      oxyCODONE HCl (OXY-IR) 10 MG immediate release tablet Take 1 tablet by mouth 3 times daily for 30 days.  90 tablet 0    ADVAIR DISKUS 100-50 MCG/DOSE diskus inhaler inhale 1 puff by mouth twice a day      ipratropium (ATROVENT) 0.03 % nasal spray 2 sprays by Each Nostril route every 12 hours      calcium carbonate-vitamin D 600-200 MG-UNIT TABS Take 2 tablets by mouth daily      DULoxetine (CYMBALTA) 30 MG extended release capsule Take 30 mg by mouth daily      topiramate (TOPAMAX) 25 MG tablet Take 25 mg by mouth 2 times daily      furosemide (LASIX) 20 MG tablet 20 mg 3 times daily       gabapentin (NEURONTIN) 100 MG capsule TAKE 1 CAPSULE BY MOUTH THREE TIMES A DAY 90 capsule 2    baclofen (LIORESAL) 10 MG tablet Take 10 mg by mouth daily       ketotifen (ZADITOR) 0.025 % ophthalmic solution 1 drop 2 times daily      Multiple Vitamin (MULTI-VITAMIN DAILY) TABS Take by mouth      melatonin 1 MG tablet melatonin   once daily      Triprolidine-Pseudoephedrine (ANTIHISTAMINE PO) Take by mouth Indications: zyrtec       aspirin 81 MG tablet Take 81 mg by mouth daily      levothyroxine (SYNTHROID) 75 MCG tablet Take 75 mcg by mouth daily      guaiFENesin (MUCINEX) 600 MG extended release tablet Mucinex 600 mg tablet, extended release   Take 2 tablets every day by oral route.      lidocaine (ASPERCREME W/LIDOCAINE) 4 % cream Apply topically as needed for Pain Apply topically as needed.  fluticasone propionate 50 MCG/BLIST AEPB Inhale into the lungs      albuterol (ACCUNEB) 1.25 MG/3ML nebulizer solution Inhale 1 ampule into the lungs every 6 hours as needed for Wheezing       simvastatin (ZOCOR) 40 MG tablet Take 40 mg by mouth nightly      flecainide (TAMBOCOR) 50 MG tablet Take 50 mg by mouth 2 times daily.  omeprazole (PRILOSEC) 40 MG capsule Take 40 mg by mouth nightly.  montelukast (SINGULAIR) 10 MG tablet Take 10 mg by mouth nightly.  DULoxetine (CYMBALTA) 60 MG extended release capsule Take 60 mg by mouth daily       artificial tears (ARTIFICIAL TEARS) OINT as needed.       Saline 0.2 % SOLN by Nasal route daily as needed          Past Medical History:   Diagnosis Date    Anesthesia complication     2nd post op day from total knee patient stated \"I was wild and mean\"    Anxiety and depression     Arthritis     ASD (atrial septal defect)     [facility]air in [facility] obstructive [facility]     COPD (chronic obstructive pulmonary disease) ([facility] 1980's    Disorder of immune system ([facility]     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 (total [facility] 3/2015 at [facility])    History of renal stone     passed    Hx of blood clots 1970    DVT    Hypertension     MVP (mitral valve prolapse)     LONI (obstructive sleep apnea)     has not used bipap since June, 2020    Rectocele     Spinal stenosis     Thyroid disease late 1980's    overactive radioactive iodine done       Past Surgical History:   Procedure Laterality Date    BACK INJECTION Bilateral 7/9/2021    Bilateral SACROILIAC Joint & Piriformis Injection performed by Theo Duncan MD at [facility]. Bilateral 12/3/2021    Bilateral SACROILIAC JOINT & Piriformis Injections performed by Theo Duncan MD at [address] LUMPECTOMY Left     BREAST LUMPECTOMY Left     CARDIAC SURGERY      ASD repair     SECTION  /    2x    CHOLECYSTECTOMY      CHOLECYSTECTOMY      COLONOSCOPY      three    DIAGNOSTIC CARDIAC CATH LAB PROCEDURE      DILATION AND CURETTAGE OF UTERUS      EYE SURGERY Bilateral     cataract    [facility] INJECTION PROCEDURE FOR SACROILIAC JOINT Left 2018    Left SIJ and piriformis, left trocanteric bursa injection performed by Theo Duncan MD at [place] [address]  2009    HYSTERECTOMY  2009    Total    JOINT REPLACEMENT Bilateral     knee    KNEE ARTHROSCOPY Left     LUMBAR SPINE SURGERY Bilateral 2019    Bilateral L4 TRANSFORAMINAL  [id number] performed by Theo Duncan MD at [address] [place] [address] Dr Bilateral 3/12/2019    Bilateral L4 TRANSFORAMINAL performed by Theo Duncan MD at [address] [place] [address] Dr Right 10/15/2019    RIGHT L4 & L5 TRANSFORAMINAL performed by Theo Duncan MD at [address] [place] [address] Dr Left 2019    Left L4 & L5 TRANSFORAMINAL performed by Theo Duncan MD at [address] [place] [address]  2014    PAIN MANAGEMENT PROCEDURE Right 2020    Right L4 & L5 TRANSFORAMINAL performed by Theo Duncan MD at [address] [place] [address] Left 2020    Left L4 & L5 TRANSFORAMINAL performed by Theo Duncan MD at [address] [place] [address] Right 2021    Right L4 & L5 TRANSFORAMINAL performed by Theo Duncan MD at [address] [place] [address] Left 3/11/2021    left L4 & L5 TRANSFORAMINAL performed by Theo Duncan MD at [address] [place] [address] Left 2021    Left L4 & L5 TRANSFORAMINAL performed by Theo Duncan MD at [place] [address]. MANAGEMENT PROCEDURE Bilateral 3/4/2022    Bilateral L4 TRANSFORAMINAL performed by Stacy Sosa MD at [address] [place] [address] DX/THER AGNT PARAVERT FACET JOINT, LUMBAR/SAC, 2ND LEVEL Bilateral 8/30/2018    Bilateral L2 L3 L4 L5 Diagnostic Medial BB performed by Stacy Sosa MD at [address] [place] [address] DX/THER AGNT PARAVERT FACET JOINT, LUMBAR/SAC, 2ND LEVEL Right 9/13/2018    Right L2 L3 L4 L5 Confirmatory Medial BB performed by Stacy Sosa MD at [address] [facility] AA&/STRD TFRML EPI CERVICAL/THORACIC EA ADDL Right 7/20/2018    Right C5 C6 TRANSFORAMINAL performed by Stacy Sosa MD at [facility] Left 12/20/2018    Left L2 L3 L4 L5 RADIOFREQUENCY performed by Stacy Sosa MD at [address] [place] [address] Right 1/3/2019    Right L2 L3 L4 L5 RADIOFREQUENCY performed by Stacy Sosa MD at [address] [place] [address] Right 01/28/2005 & 03/16/2010    2x    TONSILLECTOMY      at age 12       Family History   Problem Relation Age of Onset    Heart Disease Father        Social History     Socioeconomic History    Marital status:      Spouse name: None    Number of children: None    Years of education: None    Highest education level: None   Occupational History    Occupation: retired   Tobacco Use    Smoking status: Never Smoker    Smokeless tobacco: Never Used   Vaping Use    Vaping Use: Never used   Substance and Sexual Activity    Alcohol use: Not Currently     Comment: very rare    Drug use: No    Sexual activity: None   Other Topics Concern    None   Social History Narrative    None     Social Determinants of Health     Financial Resource Strain:     Difficulty of Paying Living Expenses: Not on file   Food Insecurity:     Worried About Running Out of Food in the Last Year: Not on file    Rossi of Food in the Last Year: Not on file   Transportation Needs:     Lack of Transportation (Medical):  Not on file    Lack of Transportation (Non-Medical): Not on file   Physical Activity:     Days of Exercise per Week: Not on file    Minutes of Exercise per Session: Not on file   Stress:     Feeling of Stress : Not on file   Social Connections:     Frequency of Communication with Friends and Family: Not on file    Frequency of Social Gatherings with Friends and Family: Not on file    Attends Bahai Services: Not on file    Active Member of [address] [place] [address] or Organizations: Not on file    Attends Club or Organization Meetings: Not on file    Marital Status: Not on file   Intimate Partner Violence:     Fear of Current or Ex-Partner: Not on file    Emotionally Abused: Not on file    Physically Abused: Not on file    Sexually Abused: Not on file   Housing Stability:     Unable to Pay for Housing in the Last Year: Not on file    Number of [place] in the Last Year: Not on file    Unstable Housing in the Last Year: Not on file     Review of Systems   Constitutional: Positive for activity change (increased, packing and moving). Negative for fever. HENT: Negative for sore throat. Respiratory: Negative for cough and shortness of breath. Cardiovascular: Negative for chest pain. Musculoskeletal: Positive for arthralgias, back pain and myalgias. Skin: Negative. Hematological: Bruises/bleeds easily. Psychiatric/Behavioral: Positive for sleep disturbance. Objective:   Physical Exam  Vitals reviewed. Constitutional:       General: She is awake. She is not in acute distress. Appearance: Normal appearance. She is well-developed and well-groomed. She is not ill-appearing, toxic-appearing or diaphoretic. Interventions: She is not intubated. HENT:      Head: Normocephalic and atraumatic. Eyes:      General: Lids are normal.   Cardiovascular:      Rate and Rhythm: Normal rate.    Pulmonary:      Effort: Pulmonary effort is normal. No tachypnea, bradypnea, accessory muscle usage, prolonged expiration, respiratory distress or retractions. She is not intubated. Skin:     General: Skin is warm and dry. Capillary Refill: Capillary refill takes less than 2 seconds. Coloration: Skin is not ashen, jaundiced or pale. Findings: No rash. Nails: There is no clubbing. Neurological:      Mental Status: She is alert and oriented to person, place, and time. GCS: GCS eye subscore is 4. GCS verbal subscore is 5. GCS motor subscore is 6. Cranial Nerves: No cranial nerve deficit. Psychiatric:         Attention and Perception: Attention normal.         Mood and Affect: Mood normal.         Speech: Speech normal.         Behavior: Behavior is cooperative. Cognition and Memory: Cognition normal.         Judgment: Judgment normal.         Assessment / Plan:      Diagnosis Orders   1. Trigger ring finger of left hand  Lizzy Lopes, [address], Orthopedic Surgery, [place]   2. Lumbar facet joint syndrome     3. Panniculitis involving lumbar region     4. Spinal stenosis of lumbar region without neurogenic claudication     5. Lumbar degenerative disc disease         Chronic pain diagnoses such as   1. Trigger ring finger of left hand    2. Lumbar facet joint syndrome    3. Panniculitis involving lumbar region    4. Spinal stenosis of lumbar region without neurogenic claudication    5. Lumbar degenerative disc disease     controlled on current medication regime, wll continue current pain medications to improve quality of life and function. Ortho referral for trigger finger  Schedule bilateral L3/L4 L4/L5 and L5/S1 confirmatory MBB    Controlled Substance Monitoring:    Acute and Chronic Pain Monitoring:   RX Monitoring 3/18/2022   Attestation -   Periodic Controlled Substance Monitoring No signs of potential drug abuse or diversion identified.;Obtaining appropriate analgesic effect of treatment.    Chronic Pain > 50 MEDD -   Chronic Pain > 80 MEDD -

## 2022-04-28 ENCOUNTER — TELEPHONE (OUTPATIENT)
Dept: PAIN MANAGEMENT | Age: 82
End: 2022-04-28

## 2022-05-04 DIAGNOSIS — M65.342 TRIGGER RING FINGER OF LEFT HAND: Primary | ICD-10-CM

## 2022-05-10 ENCOUNTER — OFFICE VISIT (OUTPATIENT)
Dept: ORTHOPEDIC SURGERY | Age: 82
End: 2022-05-10
Payer: MEDICARE

## 2022-05-10 ENCOUNTER — HOSPITAL ENCOUNTER (OUTPATIENT)
Dept: GENERAL RADIOLOGY | Age: 82
Discharge: HOME | End: 2022-05-12
Payer: MEDICARE

## 2022-05-10 VITALS
HEIGHT: 64 IN | WEIGHT: 177 LBS | DIASTOLIC BLOOD PRESSURE: 64 MMHG | HEART RATE: 73 BPM | BODY MASS INDEX: 30.22 KG/M2 | SYSTOLIC BLOOD PRESSURE: 126 MMHG

## 2022-05-10 DIAGNOSIS — M65.342 TRIGGER RING FINGER OF LEFT HAND: ICD-10-CM

## 2022-05-10 DIAGNOSIS — M65.30 TRIGGER FINGER OF RIGHT HAND, UNSPECIFIED FINGER: Primary | ICD-10-CM

## 2022-05-10 DIAGNOSIS — M79.641 RIGHT HAND PAIN: ICD-10-CM

## 2022-05-10 DIAGNOSIS — M79.641 RIGHT HAND PAIN: Primary | ICD-10-CM

## 2022-05-10 PROCEDURE — G8417 CALC BMI ABV UP PARAM F/U: HCPCS | Performed by: PHYSICIAN ASSISTANT

## 2022-05-10 PROCEDURE — 99203 OFFICE O/P NEW LOW 30 MIN: CPT | Performed by: PHYSICIAN ASSISTANT

## 2022-05-10 PROCEDURE — 1036F TOBACCO NON-USER: CPT | Performed by: PHYSICIAN ASSISTANT

## 2022-05-10 PROCEDURE — G8427 DOCREV CUR MEDS BY ELIG CLIN: HCPCS | Performed by: PHYSICIAN ASSISTANT

## 2022-05-10 PROCEDURE — 73130 X-RAY EXAM OF HAND: CPT

## 2022-05-10 PROCEDURE — 99214 OFFICE O/P EST MOD 30 MIN: CPT | Performed by: PHYSICIAN ASSISTANT

## 2022-05-10 PROCEDURE — 4040F PNEUMOC VAC/ADMIN/RCVD: CPT | Performed by: PHYSICIAN ASSISTANT

## 2022-05-10 PROCEDURE — 1123F ACP DISCUSS/DSCN MKR DOCD: CPT | Performed by: PHYSICIAN ASSISTANT

## 2022-05-10 PROCEDURE — G8400 PT W/DXA NO RESULTS DOC: HCPCS | Performed by: PHYSICIAN ASSISTANT

## 2022-05-10 PROCEDURE — 1090F PRES/ABSN URINE INCON ASSESS: CPT | Performed by: PHYSICIAN ASSISTANT

## 2022-05-10 NOTE — PROGRESS NOTES
Orthopaedic Progress Note      CHIEF COMPLAINT: Right ring Dupuytren's contracture    HISTORY OF PRESENT ILLNESS:       Ms. Maty Baker  is a 80 y.o. female  who presents with right ring Dupuytren's contracture been going on for 6 weeks now progressively getting worse. She is unable to extend the finger at this time denies any injuries.   With any attempts of extension of this PIP joint there is significant tightening along the palmar aspect of the hand      Past Medical History:    Past Medical History:   Diagnosis Date    Anesthesia complication     2nd post op day from total knee patient stated \"I was wild and mean\"    Anxiety and depression     Arthritis     ASD (atrial septal defect)     repair in [address] [facility] COPD [facility]chronic obstructive pulmonary disease) [facility]     COPD (chronic obstructive pulmonary disease) ([facility] 1980's    Disorder of immune system ([facility]     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 ([facility] 3[facility])    History of renal stone     passed    Hx of blood clots 1970    DVT    Hypertension     MVP (mitral valve prolapse)     LONI (obstructive sleep apnea)     has not used bipap since June, 2020    Rectocele     Spinal stenosis     Thyroid disease late 1980's    overactive radioactive iodine done       Past Surgical History:    Past Surgical History:   Procedure Laterality Date    BACK INJECTION Bilateral 7/9/2021    Bilateral SACROILIAC Joint & Piriformis Injection performed by Jose Ramon Royal MD at [place] [address]. Bilateral 12/3/2021    Bilateral SACROILIAC JOINT & Piriformis Injections performed by Jose Ramon Royal MD at [address] [place] [address]      BREAST LUMPECTOMY Left     BREAST LUMPECTOMY Left     CARDIAC SURGERY ASD repair     SECTION      2x    CHOLECYSTECTOMY      CHOLECYSTECTOMY      COLONOSCOPY      three    DIAGNOSTIC CARDIAC CATH LAB PROCEDURE      DILATION AND CURETTAGE OF UTERUS      EYE SURGERY Bilateral     cataract    [facility] INJECTION PROCEDURE FOR SACROILIAC JOINT Left 2018    Left SIJ and piriformis, left trocanteric bursa injection performed by Mal Rashid MD at [address]  2009    HYSTERECTOMY  2009    Total    JOINT REPLACEMENT Bilateral     knee    KNEE ARTHROSCOPY Left     LUMBAR SPINE SURGERY Bilateral 2019    Bilateral L4 TRANSFORAMINAL  [id number] performed by Mal Rashid MD at [address] [place] [address] Dr Bilateral 3/12/2019    Bilateral L4 TRANSFORAMINAL performed by Mal Rashid MD at [address] [place] [address] Dr Right 10/15/2019    RIGHT L4 & L5 TRANSFORAMINAL performed by Mal Rashid MD at [address] [place] [address] Dr Left 2019    Left L4 & L5 TRANSFORAMINAL performed by Mal Rashid MD at [place] [address]  2014    PAIN MANAGEMENT PROCEDURE Right 2020    Right L4 & L5 TRANSFORAMINAL performed by Mal Rashid MD at [address] [place] [address] Left 2020    Left L4 & L5 TRANSFORAMINAL performed by Mal Rashid MD at [address] [place] [address] Right 2021    Right L4 & L5 TRANSFORAMINAL performed by Mal Rashid MD at [address] [place] [address] Left 3/11/2021    left L4 & L5 TRANSFORAMINAL performed by Mal Rashid MD at [address] [place] [address] Left 2021    Left L4 & L5 TRANSFORAMINAL performed by Mal Rashid MD at [address] [place] [address] Bilateral 3/4/2022    Bilateral L4 TRANSFORAMINAL performed by Mal Rashid MD at [address] [place] [address] DX/THER AGNT PARAVERT FACET JOINT, LUMBAR/SAC, 2ND LEVEL Bilateral 2018    Bilateral L2 L3 L4 L5 Diagnostic Medial BB performed by Mal [facility]id MD at [facility] OR    [place] INJ DX/THER AGNT PARAVERT FACET JOINT, LUMBAR/SAC, 2ND LEVEL Right 9/13/2018    Right L2 L3 L4 L5 Confirmatory Medial BB performed by Theo Duncan MD at [address] AA&/STRD TFRML EPI CERVICAL/THORACIC EA ADDL Right 7/20/2018    Right C5 C6 TRANSFORAMINAL performed by Theo Duncan MD at [address] [place] [address] Left 12/20/2018    Left L2 L3 L4 L5 RADIOFREQUENCY performed by Theo Duncan MD at [address] [place] [address] Right 1/3/2019    Right L2 L3 L4 L5 RADIOFREQUENCY performed by Theo Duncan MD at [facility] Right 01/28/2005 & 03/16/2010    2x    TONSILLECTOMY      at age 12         Current  Medications:  Current Outpatient Medications   Medication Sig Dispense Refill    acetaminophen (TYLENOL 8 HOUR) 650 MG extended release tablet Take 650 mg by mouth every 8 hours as needed for Pain      cetirizine (ZYRTEC) 10 MG tablet take 1 tablet by mouth once daily      spironolactone (ALDACTONE) 25 MG tablet take 1 tablet by mouth once daily      metoprolol succinate (TOPROL XL) 25 MG extended release tablet take 1 tablet by mouth once daily      ADVAIR DISKUS 100-50 MCG/DOSE diskus inhaler inhale 1 puff by mouth twice a day      ipratropium (ATROVENT) 0.03 % nasal spray 2 sprays by Each Nostril route every 12 hours      calcium carbonate-vitamin D 600-200 MG-UNIT TABS Take 2 tablets by mouth daily      DULoxetine (CYMBALTA) 30 MG extended release capsule Take 30 mg by mouth daily      topiramate (TOPAMAX) 25 MG tablet Take 25 mg by mouth 2 times daily      baclofen (LIORESAL) 10 MG tablet Take 10 mg by mouth daily       ketotifen (ZADITOR) 0.025 % ophthalmic solution 1 drop 2 times daily      Multiple Vitamin (MULTI-VITAMIN DAILY) TABS Take by mouth      melatonin 1 MG tablet melatonin   once daily      Triprolidine-Pseudoephedrine (ANTIHISTAMINE PO) Take by mouth Indications: zyrtec       aspirin 81 MG tablet Take 81 mg by mouth daily      guaiFENesin (MUCINEX) 600 MG extended release tablet Mucinex 600 mg tablet, extended release   Take 2 tablets every day by oral route.  lidocaine (ASPERCREME W/LIDOCAINE) 4 % cream Apply topically as needed for Pain Apply topically as needed.  fluticasone propionate 50 MCG/BLIST AEPB Inhale into the lungs      albuterol (ACCUNEB) 1.25 MG/3ML nebulizer solution Inhale 1 ampule into the lungs every 6 hours as needed for Wheezing       simvastatin (ZOCOR) 40 MG tablet Take 40 mg by mouth nightly      flecainide (TAMBOCOR) 50 MG tablet Take 50 mg by mouth 2 times daily.  omeprazole (PRILOSEC) 40 MG capsule Take 40 mg by mouth nightly.  montelukast (SINGULAIR) 10 MG tablet Take 10 mg by mouth nightly.  DULoxetine (CYMBALTA) 60 MG extended release capsule Take 60 mg by mouth daily       artificial tears (ARTIFICIAL TEARS) OINT as needed.  Saline 0.2 % SOLN by Nasal route daily as needed       ondansetron (ZOFRAN) 4 MG tablet  (Patient not taking: Reported on 4/27/2022)      oxyCODONE HCl (OXY-IR) 10 MG immediate release tablet Take 1 tablet by mouth 3 times daily for 30 days.  90 tablet 0    furosemide (LASIX) 20 MG tablet 20 mg 3 times daily       gabapentin (NEURONTIN) 100 MG capsule TAKE 1 CAPSULE BY MOUTH THREE TIMES A DAY 90 capsule 2    naloxone 4 MG/0.1ML LIQD nasal spray 1 spray by Nasal route as needed for Opioid Reversal (Patient not taking: Reported on 4/27/2022) 1 each 5    levothyroxine (SYNTHROID) 75 MCG tablet Take 75 mcg by mouth daily       Current Facility-Administered Medications   Medication Dose Route Frequency Provider Last Rate Last Admin    triamcinolone acetonide (KENALOG-40) injection 40 mg  40 mg Intra-artICUlar Once Tammy Hines MD        lidocaine 2 % injection 5 mL  5 mL IntraDERmal Once Tammy Hines MD        bupivacaine (MARCAINE) 0.5 % injection 150 mg  30 mL Intra-artICUlar Once Tammy Hines MD Allergies:  Sulfa antibiotics, Sulfamethoxazole-trimethoprim, Ansaid [flurbiprofen], Gentamicin, Motrin [ibuprofen], Quinidine, Chlorthalidone, Hydrocodone-acetaminophen, Mirapex [pramipexole dihydrochloride], Mobic [meloxicam], Nsaids, Quinolones, Reglan [metoclopramide], Trazodone, Amino acids, Corgard [nadolol], Erythromycin, Etodolac, Garamycin [gentamicin sulfate], Inderal [propranolol], Iothalamate, Lisinopril, Loratadine, and Ultram [tramadol]    Social History:   Social History     Tobacco Use   Smoking Status Never Smoker   Smokeless Tobacco Never Used     Social History     Substance and Sexual Activity   Alcohol Use Not Currently    Comment: very rare     Social History     Substance and Sexual Activity   Drug Use No       Family History:  Family History   Problem Relation Age of Onset    Heart Disease Father        REVIEW OF SYSTEMS:  Constitutional: Denies any fever, chills. Musculoskeletal: Positive for Dupuytren's contracture right ring. PHYSICAL EXAM:  [unfilled]  General appearance:  Alert and oriented x 3. No apparent distress, appears stated age, calm and cooperative. Musculoskeletal: Right ring Dupuytren's contracture PIP joint hyperflexed. DATA:  CBC:   Lab Results   Component Value Date    WBC 9.1 09/15/2015    HGB 12.2 09/15/2015     09/15/2015     BMP:    Lab Results   Component Value Date     09/15/2015    K 4.1 09/15/2015     09/15/2015    CO2 25 09/15/2015    BUN 16 09/15/2015    CREATININE 0.67 09/15/2015     PT/INR:  No results found for: PROTIME, INR  Troponin:  No results found for: TROPONINI  No results for input(s): LIPASE, AMYLASE in the last 72 hours. No results for input(s): AST, ALT, BILIDIR, BILITOT, ALKPHOS in the last 72 hours. Uric Acid:  No components found for: URIC  Urine Culture:  No components found for: CURINE    Radiology:   No results found.     X-rays personally reviewed by me of hyperflexion of right ring       Diagnosis Orders   1. Trigger finger of right hand, unspecified finger           PLAN:  This time getting this patient into see hand specialist secondary to chronic hyperflexion of finger secondary to Dupuytren    No orders of the defined types were placed in this encounter.        Procedure:        Electronically signed by Brenda Augustin PA-C on 5/10/2022 at 11:01 AM

## 2022-05-12 ENCOUNTER — TELEPHONE (OUTPATIENT)
Dept: PAIN MANAGEMENT | Age: 82
End: 2022-05-12

## 2022-05-12 NOTE — TELEPHONE ENCOUNTER
Surgery center called stating the patient missed scheduled procedure this morning. Writer called patient to reschedule Bilateral L3-L4 L4-L5 L5-S1 CMBB with no sedation. Friendly reminder call given to daughter. Surgery center notified of new appt.

## 2022-05-17 ENCOUNTER — OFFICE VISIT (OUTPATIENT)
Dept: PAIN MANAGEMENT | Age: 82
End: 2022-05-17
Payer: MEDICARE

## 2022-05-17 VITALS
SYSTOLIC BLOOD PRESSURE: 110 MMHG | OXYGEN SATURATION: 96 % | DIASTOLIC BLOOD PRESSURE: 62 MMHG | WEIGHT: 173.2 LBS | HEART RATE: 82 BPM | BODY MASS INDEX: 29.73 KG/M2

## 2022-05-17 DIAGNOSIS — M53.3 CHRONIC LEFT SACROILIAC JOINT PAIN: ICD-10-CM

## 2022-05-17 DIAGNOSIS — M54.16 LUMBAR RADICULOPATHY: ICD-10-CM

## 2022-05-17 DIAGNOSIS — M47.817 LUMBOSACRAL SPONDYLOSIS WITHOUT MYELOPATHY: ICD-10-CM

## 2022-05-17 DIAGNOSIS — G89.29 CHRONIC LEFT SACROILIAC JOINT PAIN: ICD-10-CM

## 2022-05-17 PROCEDURE — G8400 PT W/DXA NO RESULTS DOC: HCPCS | Performed by: NURSE PRACTITIONER

## 2022-05-17 PROCEDURE — 1123F ACP DISCUSS/DSCN MKR DOCD: CPT | Performed by: NURSE PRACTITIONER

## 2022-05-17 PROCEDURE — 1090F PRES/ABSN URINE INCON ASSESS: CPT | Performed by: NURSE PRACTITIONER

## 2022-05-17 PROCEDURE — G8417 CALC BMI ABV UP PARAM F/U: HCPCS | Performed by: NURSE PRACTITIONER

## 2022-05-17 PROCEDURE — G8427 DOCREV CUR MEDS BY ELIG CLIN: HCPCS | Performed by: NURSE PRACTITIONER

## 2022-05-17 PROCEDURE — 99214 OFFICE O/P EST MOD 30 MIN: CPT | Performed by: NURSE PRACTITIONER

## 2022-05-17 PROCEDURE — 4040F PNEUMOC VAC/ADMIN/RCVD: CPT | Performed by: NURSE PRACTITIONER

## 2022-05-17 PROCEDURE — 1036F TOBACCO NON-USER: CPT | Performed by: NURSE PRACTITIONER

## 2022-05-17 RX ORDER — CLINDAMYCIN HYDROCHLORIDE 300 MG/1
CAPSULE ORAL
COMMUNITY
Start: 2022-05-06 | End: 2022-06-26 | Stop reason: ALTCHOICE

## 2022-05-17 RX ORDER — OXYCODONE HYDROCHLORIDE 10 MG/1
10 TABLET ORAL 3 TIMES DAILY
Qty: 90 TABLET | Refills: 0 | Status: SHIPPED | OUTPATIENT
Start: 2022-05-17 | End: 2022-06-22 | Stop reason: SDUPTHER

## 2022-05-17 ASSESSMENT — ENCOUNTER SYMPTOMS
COUGH: 0
SORE THROAT: 0
BACK PAIN: 1
SHORTNESS OF BREATH: 0

## 2022-05-17 NOTE — PROGRESS NOTES
Subjective:      Patient ID: Mitali Tripathi is a 80 y.o. female. Chief Complaint   Patient presents with    3 Month Follow-Up     degeneration of lumbar or lumbosacral intervertebral disc     HPI Here today for routine pain clinic recheck.     Upcoming confirmatory MBB scheduled    Pain Assessment  Location of Pain: Back  Severity of Pain: 4  Quality of Pain: Sharp,Aching,Dull,Throbbing  Duration of Pain: Persistent  Frequency of Pain: Intermittent  Aggravating Factors: Bending,Stretching,Straightening,Exercise,Kneeling,Squatting,Standing,Walking,Stairs  Limiting Behavior: Yes  Relieving Factors: Rest,Heat (meds)  Result of Injury: No  Work-Related Injury: No    Allergies   Allergen Reactions    Sulfa Antibiotics Shortness Of Breath    Sulfamethoxazole-Trimethoprim Anaphylaxis     (Bactrim)    Ansaid [Flurbiprofen] Other (See Comments)     Light headed and difficult with vision \"seeing\"    Gentamicin Other (See Comments)     Eye ointment caused eye swelling, redness    Motrin [Ibuprofen] Other (See Comments)     \"I coughed so bad I broke a rib\"    Quinidine      Light headed    Chlorthalidone Hives    Hydrocodone-Acetaminophen     Mirapex [Pramipexole Dihydrochloride] Other (See Comments)     Unknown reaction    Mobic [Meloxicam] Nausea Only    Nsaids Other (See Comments)     lightheaded    Quinolones Other (See Comments) and Hives    Reglan [Metoclopramide] Other (See Comments)     Hyperactive and stomach pain    Trazodone Other (See Comments)     unknown    Amino Acids Nausea And Vomiting     Other reaction(s): AOF    Corgard [Nadolol] Other (See Comments)     Severe coughing and broke a rib as a result     Erythromycin Nausea Only    Etodolac      GI issues    Garamycin [Gentamicin Sulfate] Other (See Comments)     Redness and irritation    Inderal [Propranolol] Other (See Comments)     Severe cough    Iothalamate Nausea And Vomiting     Other reaction(s): AOF    Lisinopril Itching    Loratadine      Does not work    Ultram [Tramadol] Other (See Comments)     Didn't work         Outpatient Medications Marked as Taking for the 5/17/22 encounter (Office Visit) with KENY Johnson CNP   Medication Sig Dispense Refill    clindamycin (CLEOCIN) 300 MG capsule TAKE 1 CAPSULE BY MOUTH 4 TIMES A DAY      oxyCODONE HCl (OXY-IR) 10 MG immediate release tablet Take 1 tablet by mouth 3 times daily for 30 days. 90 tablet 0    acetaminophen (TYLENOL 8 HOUR) 650 MG extended release tablet Take 650 mg by mouth every 8 hours as needed for Pain      cetirizine (ZYRTEC) 10 MG tablet take 1 tablet by mouth once daily      spironolactone (ALDACTONE) 25 MG tablet take 1 tablet by mouth once daily      metoprolol succinate (TOPROL XL) 25 MG extended release tablet take 1 tablet by mouth once daily      ADVAIR DISKUS 100-50 MCG/DOSE diskus inhaler inhale 1 puff by mouth twice a day      ipratropium (ATROVENT) 0.03 % nasal spray 2 sprays by Each Nostril route every 12 hours      calcium carbonate-vitamin D 600-200 MG-UNIT TABS Take 2 tablets by mouth daily      DULoxetine (CYMBALTA) 30 MG extended release capsule Take 30 mg by mouth daily      topiramate (TOPAMAX) 25 MG tablet Take 25 mg by mouth 2 times daily      baclofen (LIORESAL) 10 MG tablet Take 10 mg by mouth daily       ketotifen (ZADITOR) 0.025 % ophthalmic solution 1 drop 2 times daily      Multiple Vitamin (MULTI-VITAMIN DAILY) TABS Take by mouth      melatonin 1 MG tablet melatonin   once daily      Triprolidine-Pseudoephedrine (ANTIHISTAMINE PO) Take by mouth Indications: zyrtec       aspirin 81 MG tablet Take 81 mg by mouth daily      guaiFENesin (MUCINEX) 600 MG extended release tablet Mucinex 600 mg tablet, extended release   Take 2 tablets every day by oral route.  lidocaine (ASPERCREME W/LIDOCAINE) 4 % cream Apply topically as needed for Pain Apply topically as needed.       fluticasone propionate 50 MCG/BLIST AEPB Inhale into the lungs      albuterol (ACCUNEB) 1.25 MG/3ML nebulizer solution Inhale 1 ampule into the lungs every 6 hours as needed for Wheezing       simvastatin (ZOCOR) 40 MG tablet Take 40 mg by mouth nightly      flecainide (TAMBOCOR) 50 MG tablet Take 50 mg by mouth 2 times daily.  omeprazole (PRILOSEC) 40 MG capsule Take 40 mg by mouth nightly.  montelukast (SINGULAIR) 10 MG tablet Take 10 mg by mouth nightly.  DULoxetine (CYMBALTA) 60 MG extended release capsule Take 60 mg by mouth daily       artificial tears (ARTIFICIAL TEARS) OINT as needed.       Saline 0.2 % SOLN by Nasal route daily as needed          Past Medical History:   Diagnosis Date    Anesthesia complication     2nd post op day from total knee patient stated \"I was wild and mean\"    Anxiety and depression     Arthritis     ASD (atrial septal defect)     repair in [address] [place] [address] [facility]     COPD (chronic obstructive pulmonary disease) ([facility] 1980's    Disorder of immune system ([facility]     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 (total left knee 3/2015 at [facility])    History of renal stone     passed    Hx of blood clots 1970    DVT    Hypertension     MVP (mitral valve prolapse)     LONI (obstructive sleep apnea)     has not used bipap since June, 2020    Rectocele     Spinal stenosis     Thyroid disease late 1980's    overactive radioactive iodine done       Past Surgical History:   Procedure Laterality Date    BACK INJECTION Bilateral 7/9/2021    Bilateral SACROILIAC Joint & Piriformis Injection performed by Aljeandra Aguilar MD at [place] [address]. Bilateral 12/3/2021    Bilateral SACROILIAC JOINT & Piriformis Injections performed by [facility] Jenny Ahmadi MD [facility] [address] [facility] LUMPECTOMY Left     BREAST LUMPECTOMY Left     CARDIAC SURGERY      ASD repair     SECTION      2x    CHOLECYSTECTOMY      CHOLECYSTECTOMY      COLONOSCOPY      three    DIAGNOSTIC CARDIAC CATH LAB PROCEDURE      DILATION AND CURETTAGE OF UTERUS      EYE SURGERY Bilateral     cataract    [facility] INJECTION PROCEDURE FOR SACROILIAC JOINT Left 2018    Left SIJ and piriformis, left trocanteric bursa injection performed by Sommer Rodríguez MD at [address]  2009    HYSTERECTOMY  2009    Total    JOINT REPLACEMENT Bilateral     knee    KNEE ARTHROSCOPY Left     LUMBAR SPINE SURGERY Bilateral 2019    Bilateral L4 TRANSFORAMINAL  [id number] performed by Sommer Rodríguez MD at [facility] Bilateral 3/12/2019    Bilateral L4 TRANSFORAMINAL performed by Sommer Rodríguez MD at [address] [place] [address] Right 10/15/2019    RIGHT L4 & L5 TRANSFORAMINAL performed by Sommer Rodríguez MD at [address] [place] [address] Left 2019    Left L4 & L5 TRANSFORAMINAL performed by Sommer Rodríguez MD at [place] [address]  2014    PAIN MANAGEMENT PROCEDURE Right 2020    Right L4 & L5 TRANSFORAMINAL performed by Sommer Rodríguez MD at [address] [place] [address] Left 2020    Left L4 & L5 TRANSFORAMINAL performed by Sommer Rodríguez MD at [address] [place] [address] Right 2021    Right L4 & L5 TRANSFORAMINAL performed by Sommer Rodríguez MD at [address] [place] [address] Left 3/11/2021    left L4 & L5 TRANSFORAMINAL performed by Sommer Rodríguez MD at [address] [place] [address] Left 2021    Left L4 & L5 TRANSFORAMINAL performed by Sommer Rodríguez MD at [address] [place] [address] Bilateral 3/4/2022    Bilateral L4 TRANSFORAMINAL performed by Sommer Rodríguez MD at [address] [place] [address] DX/THER AGNT [address], LUMBAR/SAC, 2ND LEVEL Bilateral 8/30/2018    Bilateral L2 L3 L4 L5 Diagnostic Medial BB performed by Eddie Campuzano MD at [address] DX/THER AGNT PARAVERT FACET JOINT, LUMBAR/SAC, 2ND LEVEL Right 9/13/2018    Right L2 L3 L4 L5 Confirmatory Medial BB performed by Eddie Campuzano MD at [address] AA&/STRD TFRML EPI CERVICAL/THORACIC EA ADDL Right 7/20/2018    Right C5 C6 TRANSFORAMINAL performed by Eddie Campuzano MD at [address] Left 12/20/2018    Left L2 L3 L4 L5 RADIOFREQUENCY performed by Eddie Campuzano MD at [address] Right 1/3/2019    Right L2 L3 L4 L5 RADIOFREQUENCY performed by Eddie Campuzano MD at [place] [address] Right 01/28/2005 & 03/16/2010    2x    TONSILLECTOMY      at age 12       Family History   Problem Relation Age of Onset    Heart Disease Father        Social History     Socioeconomic History    Marital status:      Spouse name: None    Number of children: None    Years of education: None    Highest education level: None   Occupational History    Occupation: retired   Tobacco Use    Smoking status: Never Smoker    Smokeless tobacco: Never Used   Vaping Use    Vaping Use: Never used   Substance and Sexual Activity    Alcohol use: Not Currently     Comment: very rare    Drug use: No    Sexual activity: None   Other Topics Concern    None   Social History Narrative    None     Social Determinants of Health     Financial Resource Strain:     Difficulty of Paying Living Expenses: Not on file   Food Insecurity:     Worried About Running Out of Food in the Last Year: Not on file    Rossi of Food in the Last Year: Not on file   Transportation Needs:     Lack of Transportation (Medical): Not on file    Lack of Transportation (Non-Medical):  Not on file   Physical Activity:     Days of Exercise per Week: Not on file    Minutes of Exercise per Session: Not on file   Stress:     Feeling of Stress : Not on file   Social Connections:     Frequency of Communication with Friends and Family: Not on file    Frequency of Social Gatherings with Friends and Family: Not on file    Attends [employer] Services: Not on file    Active Member of Clubs or Organizations: Not on file    Attends Club or Organization Meetings: Not on file    Marital Status: Not on file   Intimate Partner Violence:     Fear of Current or Ex-Partner: Not on file    Emotionally Abused: Not on file    Physically Abused: Not on file    Sexually Abused: Not on file   Housing Stability:     Unable to Pay for Housing in the Last Year: Not on file    Number of [place] in the Last Year: Not on file    Unstable Housing in the Last Year: Not on file     Review of Systems   Constitutional: Positive for activity change (increased, packing and moving). Negative for fever. HENT: Negative for sore throat. Respiratory: Negative for cough and shortness of breath. Cardiovascular: Negative for chest pain. Musculoskeletal: Positive for arthralgias, back pain and myalgias. Skin: Negative. Hematological: Bruises/bleeds easily. Psychiatric/Behavioral: Positive for sleep disturbance. Objective:   Physical Exam  Vitals reviewed. Constitutional:       General: She is awake. She is not in acute distress. Appearance: Normal appearance. She is well-developed and well-groomed. She is not ill-appearing, toxic-appearing or diaphoretic. Interventions: She is not intubated. HENT:      Head: Normocephalic and atraumatic. Eyes:      General: Lids are normal.   Cardiovascular:      Rate and Rhythm: Normal rate. Pulmonary:      Effort: Pulmonary effort is normal. No tachypnea, bradypnea, accessory muscle usage, prolonged expiration, respiratory distress or retractions. She is not intubated. Skin:     General: Skin is warm and dry. Capillary Refill: Capillary refill takes less than 2 seconds.

## 2022-05-24 ENCOUNTER — TELEPHONE (OUTPATIENT)
Dept: PAIN MANAGEMENT | Age: 82
End: 2022-05-24

## 2022-05-24 DIAGNOSIS — G89.29 CHRONIC LEFT SACROILIAC JOINT PAIN: ICD-10-CM

## 2022-05-24 DIAGNOSIS — M47.817 LUMBOSACRAL SPONDYLOSIS WITHOUT MYELOPATHY: ICD-10-CM

## 2022-05-24 DIAGNOSIS — M54.16 LUMBAR RADICULOPATHY: ICD-10-CM

## 2022-05-24 DIAGNOSIS — M53.3 CHRONIC LEFT SACROILIAC JOINT PAIN: ICD-10-CM

## 2022-05-24 RX ORDER — OXYCODONE HYDROCHLORIDE 10 MG/1
10 TABLET ORAL 3 TIMES DAILY
Qty: 40 TABLET | Refills: 0 | Status: CANCELLED | OUTPATIENT
Start: 2022-05-24 | End: 2022-06-23

## 2022-05-24 NOTE — TELEPHONE ENCOUNTER
ALICIA in [place] pt wants to know if your going to order the extra so she has enough to take 4 a day

## 2022-05-24 NOTE — TELEPHONE ENCOUNTER
The patient called because she just received her refill of oxycodone and it only says to take three times daily, she stated that Dr. Olivia George had told her she can take 4 times per day. She stated that if possible she would like to take 4 times per day because 3 is not enough for her pain. The patient did say that Alean Goodpasture offered her morphine but she does not want to do that and would rather increase the oxy.      Please call her back with Carol's response   [phone]    Last Appt:  5/17/2022  Next Appt:   6/9/2022  Med verified in Epic

## 2022-06-08 ENCOUNTER — TELEPHONE (OUTPATIENT)
Dept: PAIN MANAGEMENT | Age: 82
End: 2022-06-08

## 2022-06-08 ENCOUNTER — OFFICE VISIT (OUTPATIENT)
Dept: PRIMARY CARE CLINIC | Age: 82
End: 2022-06-08
Payer: MEDICARE

## 2022-06-08 ENCOUNTER — OFFICE VISIT (OUTPATIENT)
Dept: ORTHOPEDIC SURGERY | Age: 82
End: 2022-06-08
Payer: MEDICARE

## 2022-06-08 VITALS
WEIGHT: 174 LBS | HEIGHT: 64 IN | BODY MASS INDEX: 29.71 KG/M2 | TEMPERATURE: 96 F | HEART RATE: 74 BPM | DIASTOLIC BLOOD PRESSURE: 70 MMHG | OXYGEN SATURATION: 96 % | SYSTOLIC BLOOD PRESSURE: 100 MMHG

## 2022-06-08 VITALS
HEIGHT: 64 IN | WEIGHT: 173 LBS | BODY MASS INDEX: 29.53 KG/M2 | SYSTOLIC BLOOD PRESSURE: 131 MMHG | HEART RATE: 71 BPM | DIASTOLIC BLOOD PRESSURE: 78 MMHG

## 2022-06-08 DIAGNOSIS — M65.30 TRIGGER FINGER OF RIGHT HAND, UNSPECIFIED FINGER: Primary | ICD-10-CM

## 2022-06-08 DIAGNOSIS — J20.9 ACUTE BRONCHITIS, UNSPECIFIED ORGANISM: Primary | ICD-10-CM

## 2022-06-08 PROCEDURE — 99215 OFFICE O/P EST HI 40 MIN: CPT | Performed by: ORTHOPAEDIC SURGERY

## 2022-06-08 PROCEDURE — 1123F ACP DISCUSS/DSCN MKR DOCD: CPT | Performed by: ORTHOPAEDIC SURGERY

## 2022-06-08 PROCEDURE — 1036F TOBACCO NON-USER: CPT | Performed by: FAMILY MEDICINE

## 2022-06-08 PROCEDURE — G8427 DOCREV CUR MEDS BY ELIG CLIN: HCPCS | Performed by: FAMILY MEDICINE

## 2022-06-08 PROCEDURE — G8417 CALC BMI ABV UP PARAM F/U: HCPCS | Performed by: ORTHOPAEDIC SURGERY

## 2022-06-08 PROCEDURE — 1123F ACP DISCUSS/DSCN MKR DOCD: CPT | Performed by: FAMILY MEDICINE

## 2022-06-08 PROCEDURE — 1090F PRES/ABSN URINE INCON ASSESS: CPT | Performed by: ORTHOPAEDIC SURGERY

## 2022-06-08 PROCEDURE — 99214 OFFICE O/P EST MOD 30 MIN: CPT | Performed by: FAMILY MEDICINE

## 2022-06-08 PROCEDURE — 1090F PRES/ABSN URINE INCON ASSESS: CPT | Performed by: FAMILY MEDICINE

## 2022-06-08 PROCEDURE — 99203 OFFICE O/P NEW LOW 30 MIN: CPT | Performed by: ORTHOPAEDIC SURGERY

## 2022-06-08 PROCEDURE — 1036F TOBACCO NON-USER: CPT | Performed by: ORTHOPAEDIC SURGERY

## 2022-06-08 PROCEDURE — G8400 PT W/DXA NO RESULTS DOC: HCPCS | Performed by: FAMILY MEDICINE

## 2022-06-08 PROCEDURE — 99213 OFFICE O/P EST LOW 20 MIN: CPT | Performed by: FAMILY MEDICINE

## 2022-06-08 PROCEDURE — G8427 DOCREV CUR MEDS BY ELIG CLIN: HCPCS | Performed by: ORTHOPAEDIC SURGERY

## 2022-06-08 PROCEDURE — G8400 PT W/DXA NO RESULTS DOC: HCPCS | Performed by: ORTHOPAEDIC SURGERY

## 2022-06-08 PROCEDURE — G8417 CALC BMI ABV UP PARAM F/U: HCPCS | Performed by: FAMILY MEDICINE

## 2022-06-08 RX ORDER — CEFUROXIME AXETIL 500 MG/1
500 TABLET ORAL 2 TIMES DAILY
COMMUNITY
Start: 2022-06-03 | End: 2022-06-13

## 2022-06-08 RX ORDER — PREDNISONE 20 MG/1
20 TABLET ORAL DAILY
Qty: 10 TABLET | Refills: 0 | Status: SHIPPED | OUTPATIENT
Start: 2022-06-08 | End: 2022-06-18

## 2022-06-08 RX ORDER — BUPROPION HYDROCHLORIDE 150 MG/1
TABLET ORAL
COMMUNITY
Start: 2022-06-01 | End: 2022-08-23 | Stop reason: SDUPTHER

## 2022-06-08 ASSESSMENT — PATIENT HEALTH QUESTIONNAIRE - PHQ9
2. FEELING DOWN, DEPRESSED OR HOPELESS: 1
SUM OF ALL RESPONSES TO PHQ9 QUESTIONS 1 & 2: 1
SUM OF ALL RESPONSES TO PHQ QUESTIONS 1-9: 1
1. LITTLE INTEREST OR PLEASURE IN DOING THINGS: 0
SUM OF ALL RESPONSES TO PHQ QUESTIONS 1-9: 1

## 2022-06-08 ASSESSMENT — ENCOUNTER SYMPTOMS
SHORTNESS OF BREATH: 0
EYE REDNESS: 0
NAUSEA: 0
TROUBLE SWALLOWING: 0
RHINORRHEA: 1
COUGH: 1
DIARRHEA: 0
SORE THROAT: 0
SINUS PAIN: 0
WHEEZING: 1
ABDOMINAL PAIN: 0
EYE DISCHARGE: 0
VOMITING: 0
SINUS PRESSURE: 0
CONSTIPATION: 0

## 2022-06-08 NOTE — TELEPHONE ENCOUNTER
The patient is on an antibiotic currently, her procedure scheduled for 6/9/22 has been cancelled with the surgery center but the patient will need called back to reschedule.  She is supposed to be off her antibiotic on 6/13/22

## 2022-06-08 NOTE — PROGRESS NOTES
History and Physical    Patient's Name/Date of Birth: Page Lawton / 1940, 80 y.o. yo    Date: 2022     PCP: Dorcas Song MD     History of Present Illness: This 77-year-old female presents with a several month history of a locked right ring finger. She has had a previous history of a left ring trigger finger treated conservatively with an injection. She reports occasional numbness and tingling to her fingers.     Family history negative    Past Medical History:   Diagnosis Date    Anesthesia complication     2nd post op day from total knee patient stated \"I was wild and mean\"    Anxiety and depression     Arthritis     ASD (atrial septal defect)     [facility] [address] [facility]ronic obstructive [facility]) [facility]     COPD (chronic obstructive pulmonary disease) [facility]     Disorder of immune system ([facility]     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 ([facility]    History of renal stone     passed    Hx of blood clots     DVT    Hypertension     MVP (mitral valve prolapse)     LONI (obstructive sleep apnea)     has not used bipap since     Rectocele     Spinal stenosis     Thyroid disease late     overactive radioactive iodine done      Past Surgical History:   Procedure Laterality Date    BACK INJECTION Bilateral 2021    Bilateral SACROILIAC Joint & Piriformis Injection performed by Sommer Rodríguez MD at [place] [address]. Bilateral 12/3/2021    Bilateral SACROILIAC JOINT & Piriformis Injections performed by Sommer Rodríguez MD at [facility] LUMPECTOMY Left     BREAST LUMPECTOMY Left     CARDIAC SURGERY      ASD repair     SECTION      2x  CHOLECYSTECTOMY      CHOLECYSTECTOMY      COLONOSCOPY      three    DIAGNOSTIC CARDIAC CATH LAB PROCEDURE      DILATION AND CURETTAGE OF UTERUS  1980    EYE SURGERY Bilateral     cataract    HC INJECTION PROCEDURE FOR SACROILIAC JOINT Left 7/31/2018    Left SIJ and piriformis, left trocanteric bursa injection performed by Chon Lopez MD at [facility] ([facility])  12/2009    HYSTERECTOMY (CERVIX STATUS UNKNOWN)  2009    Total    JOINT REPLACEMENT Bilateral     knee    KNEE ARTHROSCOPY Left 2008    LUMBAR SPINE SURGERY Bilateral 2/26/2019    Bilateral L4 TRANSFORAMINAL  [id number] performed by Chon Lopez MD at [address] [place] [address] Dr Bilateral 3/12/2019    Bilateral L4 TRANSFORAMINAL performed by Chon Lopez MD at [address] [place] [address] Dr Right 10/15/2019    RIGHT L4 & L5 TRANSFORAMINAL performed by Chon Lopez MD at [address] [place] [address] Dr Left 11/1/2019    Left L4 & L5 TRANSFORAMINAL performed by Chon Lopez MD at [place] [address]  8/2014    PAIN MANAGEMENT PROCEDURE Right 2/7/2020    Right L4 & L5 TRANSFORAMINAL performed by Chon Lopez MD at [place] [address] Left 2/21/2020    Left L4 & L5 TRANSFORAMINAL performed by Chon Lopez MD at [place] [address] Right 2/23/2021    Right L4 & L5 TRANSFORAMINAL performed by Chon Lopez MD at [place] [address] Left 3/11/2021    left L4 & L5 TRANSFORAMINAL performed by Chon Lopez MD at [place] [address] Left 5/6/2021    Left L4 & L5 TRANSFORAMINAL performed by Chon Lopez MD at [place] [address] Bilateral 3/4/2022    Bilateral L4 TRANSFORAMINAL performed by Chon Lopez MD at [address] [place] [address] DX/THER AGNT PARAVERT FACET JOINT, LUMBAR/SAC, 2ND LEVEL Bilateral 8/30/2018    Bilateral L2 L3 L4 L5 Diagnostic Medial BB performed by Chon Lopez MD at [address] [place] [address]  SD INJ DX/THER AGNT PARAVERT FACET JOINT, LUMBAR/SAC, 2ND LEVEL Right 9/13/2018    Right L2 L3 L4 L5 Confirmatory Medial BB performed by Wilnette Collet, MD at [address] AA&/STRD TFRML EPI CERVICAL/THORACIC EA ADDL Right 7/20/2018    Right C5 C6 TRANSFORAMINAL performed by Wilnette Collet, MD at [address] Left 12/20/2018    Left L2 L3 L4 L5 RADIOFREQUENCY performed by Wilnette Collet, MD at [address] Right 1/3/2019    Right L2 L3 L4 L5 RADIOFREQUENCY performed by Wilnette Collet, MD at [address] Right 01/28/2005 & 03/16/2010    2x    TONSILLECTOMY      at age 12      Allergies: Sulfa antibiotics, Sulfamethoxazole-trimethoprim, Ansaid [flurbiprofen], Gentamicin, Motrin [ibuprofen], Quinidine, Chlorthalidone, Hydrocodone-acetaminophen, Mirapex [pramipexole dihydrochloride], Mobic [meloxicam], Nsaids, Quinolones, Reglan [metoclopramide], Trazodone, Amino acids, Corgard [nadolol], Erythromycin, Etodolac, Garamycin [gentamicin sulfate], Inderal [propranolol], Iothalamate, Lisinopril, Loratadine, and Ultram [tramadol]     Current Outpatient Medications   Medication Sig Dispense Refill    clindamycin (CLEOCIN) 300 MG capsule TAKE 1 CAPSULE BY MOUTH 4 TIMES A DAY      oxyCODONE HCl (OXY-IR) 10 MG immediate release tablet Take 1 tablet by mouth 3 times daily for 30 days.  90 tablet 0    acetaminophen (TYLENOL 8 HOUR) 650 MG extended release tablet Take 650 mg by mouth every 8 hours as needed for Pain      cetirizine (ZYRTEC) 10 MG tablet take 1 tablet by mouth once daily      ondansetron (ZOFRAN) 4 MG tablet       spironolactone (ALDACTONE) 25 MG tablet take 1 tablet by mouth once daily      metoprolol succinate (TOPROL XL) 25 MG extended release tablet take 1 tablet by mouth once daily      ADVAIR DISKUS 100-50 MCG/DOSE diskus inhaler inhale 1 puff by mouth twice a day      ipratropium (ATROVENT) 0.03 % nasal spray 2 sprays by Each Nostril route every 12 hours      calcium carbonate-vitamin D 600-200 MG-UNIT TABS Take 2 tablets by mouth daily      DULoxetine (CYMBALTA) 30 MG extended release capsule Take 30 mg by mouth daily      topiramate (TOPAMAX) 25 MG tablet Take 25 mg by mouth 2 times daily      naloxone 4 MG/0.1ML LIQD nasal spray 1 spray by Nasal route as needed for Opioid Reversal 1 each 5    baclofen (LIORESAL) 10 MG tablet Take 10 mg by mouth daily       ketotifen (ZADITOR) 0.025 % ophthalmic solution 1 drop 2 times daily      Multiple Vitamin (MULTI-VITAMIN DAILY) TABS Take by mouth      melatonin 1 MG tablet melatonin   once daily      Triprolidine-Pseudoephedrine (ANTIHISTAMINE PO) Take by mouth Indications: zyrtec       aspirin 81 MG tablet Take 81 mg by mouth daily      guaiFENesin (MUCINEX) 600 MG extended release tablet Mucinex 600 mg tablet, extended release   Take 2 tablets every day by oral route.  lidocaine (ASPERCREME W/LIDOCAINE) 4 % cream Apply topically as needed for Pain Apply topically as needed.  fluticasone propionate 50 MCG/BLIST AEPB Inhale into the lungs      albuterol (ACCUNEB) 1.25 MG/3ML nebulizer solution Inhale 1 ampule into the lungs every 6 hours as needed for Wheezing       simvastatin (ZOCOR) 40 MG tablet Take 40 mg by mouth nightly      flecainide (TAMBOCOR) 50 MG tablet Take 50 mg by mouth 2 times daily.  omeprazole (PRILOSEC) 40 MG capsule Take 40 mg by mouth nightly.  montelukast (SINGULAIR) 10 MG tablet Take 10 mg by mouth nightly.  DULoxetine (CYMBALTA) 60 MG extended release capsule Take 60 mg by mouth daily       artificial tears (ARTIFICIAL TEARS) OINT as needed.       Saline 0.2 % SOLN by Nasal route daily as needed       furosemide (LASIX) 20 MG tablet 20 mg 3 times daily       gabapentin (NEURONTIN) 100 MG capsule TAKE 1 CAPSULE BY MOUTH THREE TIMES A DAY 90 capsule 2    levothyroxine (SYNTHROID) 75 MCG tablet Take 75 mcg by mouth daily       Current Facility-Administered Medications   Medication Dose Route Frequency Provider Last Rate Last Admin    triamcinolone acetonide (KENALOG-40) injection 40 mg  40 mg Intra-artICUlar Once Fritz Huerta MD        lidocaine 2 % injection 5 mL  5 mL IntraDERmal Once Fritz Huerta MD        bupivacaine (MARCAINE) 0.5 % injection 150 mg  30 mL Intra-artICUlar Once Fritz Huerta MD           Social History     Tobacco Use    Smoking status: Never Smoker    Smokeless tobacco: Never Used   Substance Use Topics    Alcohol use: Not Currently     Comment: very rare        Review of Systems:  Negative  Other than stated above in the HPI is negative      Physical exam:     General appearance: no acute distress  Head: NAD  Neck: supple  Lungs: No audible wheezing, respiratory rate normal  Heart: Perfusion to all extremeties  Extremities: Examination of her right hand revealed locked ring finger. She was tender to the A1 pulley level. She was assessed to be neurovascular intact. Radiographs reviewed noted to be noncontributory    Assessment:  Patient presents with a locked right ring trigger finger        Plan:  Recommendation was surgical release of the A1 pulley which she wished to proceed with. No orders of the defined types were placed in this encounter.               Esequiel Abraham MD,MD 6/8/2022 at 11:33 AM

## 2022-06-08 NOTE — PROGRESS NOTES
2022     Trang Holley (:  1940) is a 80 y.o. female, here for evaluation of the following medical concerns:    Cough  This is a new problem. The current episode started in the past 7 days (patient has had acute respiratory symptoms for the last week or so duration. States that she was seen at [facility] and had covid test at that time which was negative and placed on antibiotic. Now is getting worse chest symptoms). The problem has been gradually worsening. The problem occurs constantly. The cough is non-productive. Associated symptoms include nasal congestion, postnasal drip, rhinorrhea and wheezing. Pertinent negatives include no chest pain, chills, ear pain, eye redness, fever, headaches, myalgias, rash, sore throat or shortness of breath. Associated symptoms comments: Sinus symptoms have improved, but now feels like she is getting symptoms in her chest.   Does have history of significant allergies and typically when this gets acute bronchial symptoms needs steroid to get over it. . Treatments tried: ceftin. The treatment provided mild relief. There is no history of environmental allergies. Did review patient's med list, allergies, social history,pmhx and pshx today as noted in the record. Review of Systems   Constitutional: Negative for chills, fatigue and fever. HENT: Positive for congestion, postnasal drip and rhinorrhea. Negative for ear pain, sinus pressure, sinus pain, sore throat and trouble swallowing. Eyes: Negative for discharge and redness. Respiratory: Positive for cough and wheezing. Negative for shortness of breath. Cardiovascular: Negative for chest pain. Gastrointestinal: Negative for abdominal pain, constipation, diarrhea, nausea and vomiting. Genitourinary: Negative for dysuria, flank pain, frequency and urgency. Musculoskeletal: Negative for arthralgias, myalgias and neck pain. Skin: Negative for rash and wound.    Allergic/Immunologic: Negative for environmental allergies. Neurological: Negative for dizziness, weakness, light-headedness and headaches. Hematological: Negative for adenopathy. Psychiatric/Behavioral: Negative. Prior to Visit Medications    Medication Sig Taking? Authorizing Provider   cefUROXime (CEFTIN) 500 MG tablet Take 500 mg by mouth 2 times daily Yes Historical Provider, MD   buPROPion (WELLBUTRIN XL) 150 MG extended release tablet take 1 tablet by mouth once daily Yes Historical Provider, MD   oxyCODONE HCl (OXY-IR) 10 MG immediate release tablet Take 1 tablet by mouth 3 times daily for 30 days.  Yes KENY Cortez CNP   acetaminophen (TYLENOL 8 HOUR) 650 MG extended release tablet Take 650 mg by mouth every 8 hours as needed for Pain Yes Historical Provider, MD   cetirizine (ZYRTEC) 10 MG tablet take 1 tablet by mouth once daily Yes Historical Provider, MD   spironolactone (ALDACTONE) 25 MG tablet take 1 tablet by mouth once daily Yes Historical Provider, MD   metoprolol succinate (TOPROL XL) 25 MG extended release tablet take 1 tablet by mouth once daily Yes Historical Provider, MD   ADVAIR DISKUS 100-50 MCG/DOSE diskus inhaler inhale 1 puff by mouth twice a day Yes Historical Provider, MD   ipratropium (ATROVENT) 0.03 % nasal spray 2 sprays by Each Nostril route every 12 hours Yes Historical Provider, MD   calcium carbonate-vitamin D 600-200 MG-UNIT TABS Take 2 tablets by mouth daily Yes Historical Provider, MD   DULoxetine (CYMBALTA) 30 MG extended release capsule Take 30 mg by mouth daily Yes Historical Provider, MD   topiramate (TOPAMAX) 25 MG tablet Take 25 mg by mouth 2 times daily Yes Historical Provider, MD   naloxone 4 MG/0.1ML LIQD nasal spray 1 spray by Nasal route as needed for Opioid Reversal Yes Sommer Rodríguez MD   baclofen (LIORESAL) 10 MG tablet Take 10 mg by mouth daily  Yes Historical Provider, MD   ketotifen (ZADITOR) 0.025 % ophthalmic solution 1 drop 2 times daily Yes Historical Provider, MD   Multiple Vitamin (MULTI-VITAMIN DAILY) TABS Take by mouth Yes Historical Provider, MD   melatonin 1 MG tablet melatonin   once daily Yes Historical Provider, MD   Triprolidine-Pseudoephedrine (ANTIHISTAMINE PO) Take by mouth Indications: zyrtec  Yes Historical Provider, MD   aspirin 81 MG tablet Take 81 mg by mouth daily Yes Historical Provider, MD   guaiFENesin (MUCINEX) 600 MG extended release tablet Mucinex 600 mg tablet, extended release   Take 2 tablets every day by oral route. Yes Historical Provider, MD   fluticasone propionate 50 MCG/BLIST AEPB Inhale into the lungs Yes Historical Provider, MD   simvastatin (ZOCOR) 40 MG tablet Take 40 mg by mouth nightly Yes Historical Provider, MD   flecainide (TAMBOCOR) 50 MG tablet Take 50 mg by mouth 2 times daily. Yes Historical Provider, MD   omeprazole (PRILOSEC) 40 MG capsule Take 40 mg by mouth nightly. Yes Historical Provider, MD   montelukast (SINGULAIR) 10 MG tablet Take 10 mg by mouth nightly. Yes Historical Provider, MD   DULoxetine (CYMBALTA) 60 MG extended release capsule Take 60 mg by mouth daily  Yes Historical Provider, MD   artificial tears (ARTIFICIAL TEARS) OINT as needed. Yes Historical Provider, MD   Saline 0.2 % SOLN by Nasal route daily as needed  Yes Historical Provider, MD   clindamycin (CLEOCIN) 300 MG capsule TAKE 1 CAPSULE BY MOUTH 4 TIMES A DAY  Patient not taking: Reported on 6/8/2022  Historical Provider, MD   ondansetron (ZOFRAN) 4 MG tablet   Historical Provider, MD   furosemide (LASIX) 20 MG tablet 20 mg 3 times daily   Historical Provider, MD   gabapentin (NEURONTIN) 100 MG capsule TAKE 1 CAPSULE BY MOUTH THREE TIMES A DAY  Lila Lam, APRN - CNP   levothyroxine (SYNTHROID) 75 MCG tablet Take 75 mcg by mouth daily  Historical Provider, MD   lidocaine (ASPERCREME W/LIDOCAINE) 4 % cream Apply topically as needed for Pain Apply topically as needed.   Historical Provider, MD   albuterol (ACCUNEB) 1.25 MG/3ML nebulizer solution Inhale 1 ampule into the lungs every 6 hours as needed for Wheezing   Historical Provider, MD        Social History     Tobacco Use    Smoking status: Never Smoker    Smokeless tobacco: Never Used   Substance Use Topics    Alcohol use: Not Currently     Comment: very rare        Vitals:    06/08/22 1225   BP: 100/70   Pulse: 74   Temp: (!) 96 °F (35.6 °C)   TempSrc: Tympanic   SpO2: 96%   Weight: 174 lb (78.9 kg)   Height: 5' 4\" (1.626 m)     Estimated body mass index is 29.87 kg/m² as calculated from the following:    Height as of this encounter: 5' 4\" (1.626 m). Weight as of this encounter: 174 lb (78.9 kg). Physical Exam  Vitals and nursing note reviewed. Constitutional:       General: She is not in acute distress. Appearance: Normal appearance. She is well-developed. She is not diaphoretic. HENT:      Head: Normocephalic and atraumatic. Right Ear: External ear normal.      Left Ear: External ear normal.      Ears:      Comments: TMs dull with fluid behind the TM     Nose: Congestion and rhinorrhea present. Mouth/Throat:      Pharynx: No posterior oropharyngeal erythema. Comments: Post nasal drainage noted  Eyes:      General: No scleral icterus. Right eye: No discharge. Left eye: No discharge. Conjunctiva/sclera: Conjunctivae normal.      Pupils: Pupils are equal, round, and reactive to light. Neck:      Thyroid: No thyromegaly. Cardiovascular:      Rate and Rhythm: Normal rate and regular rhythm. Heart sounds: Normal heart sounds. Pulmonary:      Effort: Pulmonary effort is normal. No respiratory distress. Breath sounds: Normal breath sounds. No wheezing. Musculoskeletal:      Cervical back: Normal range of motion and neck supple. Lymphadenopathy:      Cervical: Cervical adenopathy present. Skin:     General: Skin is warm and dry. Findings: No rash.    Neurological:      Mental Status: She is alert and oriented to person, place, and time. Psychiatric:         Behavior: Behavior normal.         Thought Content: Thought content normal.         Judgment: Judgment normal.         ASSESSMENT/PLAN:  Encounter Diagnosis   Name Primary?  Acute bronchitis, unspecified organism Yes     Orders Placed This Encounter   Medications    predniSONE (DELTASONE) 20 MG tablet     Sig: Take 1 tablet by mouth daily for 10 days     Dispense:  10 tablet     Refill:  0     RX as noted above in addition to her current antibiotic therapy. Tylenol/Motrin prn    Continue with use of albuterol inhaler use. Return  if no improvement in symptoms or if any further symptoms arise. No follow-ups on file. An electronic signature was used to authenticate this note.     --Shima Casillas, DO on 6/8/2022 at 12:31 PM

## 2022-06-09 NOTE — TELEPHONE ENCOUNTER
Writer spoke to patient on phone today who stated that is has a sinus infection and is not feeling well today. Writer gave verbal instructions for patient to call office back to reschedule [facility] when feeling better. Patient verbally acknowledged understanding. writer LM with daughter after call with patient giving same instructions.

## 2022-06-14 ENCOUNTER — APPOINTMENT (OUTPATIENT)
Dept: CT IMAGING | Age: 82
End: 2022-06-14
Payer: MEDICARE

## 2022-06-14 ENCOUNTER — HOSPITAL ENCOUNTER (EMERGENCY)
Age: 82
Discharge: HOME | End: 2022-06-14
Attending: SPECIALIST
Payer: MEDICARE

## 2022-06-14 ENCOUNTER — APPOINTMENT (OUTPATIENT)
Dept: GENERAL RADIOLOGY | Age: 82
End: 2022-06-14
Payer: MEDICARE

## 2022-06-14 ENCOUNTER — HOSPITAL ENCOUNTER (EMERGENCY)
Age: 82
Discharge: HOME | End: 2022-06-14
Attending: EMERGENCY MEDICINE
Payer: MEDICARE

## 2022-06-14 VITALS
DIASTOLIC BLOOD PRESSURE: 61 MMHG | HEIGHT: 64 IN | HEART RATE: 79 BPM | TEMPERATURE: 96.3 F | BODY MASS INDEX: 29.53 KG/M2 | SYSTOLIC BLOOD PRESSURE: 143 MMHG | WEIGHT: 173 LBS | RESPIRATION RATE: 14 BRPM | OXYGEN SATURATION: 95 %

## 2022-06-14 DIAGNOSIS — M25.512 LEFT SHOULDER PAIN, UNSPECIFIED CHRONICITY: ICD-10-CM

## 2022-06-14 DIAGNOSIS — S01.81XA FACIAL LACERATION, INITIAL ENCOUNTER: ICD-10-CM

## 2022-06-14 DIAGNOSIS — S09.90XA INJURY OF HEAD, INITIAL ENCOUNTER: Primary | ICD-10-CM

## 2022-06-14 DIAGNOSIS — S01.81XA LACERATION OF FOREHEAD, INITIAL ENCOUNTER: Primary | ICD-10-CM

## 2022-06-14 DIAGNOSIS — S16.1XXA CERVICAL STRAIN, ACUTE, INITIAL ENCOUNTER: ICD-10-CM

## 2022-06-14 LAB
-: NORMAL
ABSOLUTE EOS #: 0.43 K/UL (ref 0–0.44)
ABSOLUTE IMMATURE GRANULOCYTE: 0.08 K/UL (ref 0–0.3)
ABSOLUTE LYMPH #: 1.5 K/UL (ref 1.1–3.7)
ABSOLUTE MONO #: 0.71 K/UL (ref 0.1–1.2)
ALBUMIN SERPL-MCNC: 4.2 G/DL (ref 3.5–5.2)
ALBUMIN/GLOBULIN RATIO: 2.1 (ref 1–2.5)
ALP BLD-CCNC: 125 U/L (ref 35–104)
ALT SERPL-CCNC: 13 U/L (ref 5–33)
ANION GAP SERPL CALCULATED.3IONS-SCNC: 8 MMOL/L (ref 9–17)
AST SERPL-CCNC: 18 U/L
BACTERIA: NORMAL
BASOPHILS # BLD: 1 % (ref 0–2)
BASOPHILS ABSOLUTE: 0.06 K/UL (ref 0–0.2)
BILIRUB SERPL-MCNC: 0.36 MG/DL (ref 0.3–1.2)
BILIRUBIN URINE: NEGATIVE
BUN BLDV-MCNC: 14 MG/DL (ref 8–23)
BUN/CREAT BLD: 20 (ref 9–20)
CALCIUM SERPL-MCNC: 9.2 MG/DL (ref 8.6–10.4)
CHLORIDE BLD-SCNC: 104 MMOL/L (ref 98–107)
CO2: 28 MMOL/L (ref 20–31)
CREAT SERPL-MCNC: 0.71 MG/DL (ref 0.5–0.9)
EOSINOPHILS RELATIVE PERCENT: 4 % (ref 1–4)
EPITHELIAL CELLS UA: NORMAL /HPF (ref 0–5)
GFR AFRICAN AMERICAN: >60 ML/MIN
GFR NON-AFRICAN AMERICAN: >60 ML/MIN
GFR SERPL CREATININE-BSD FRML MDRD: ABNORMAL ML/MIN/{1.73_M2}
GLUCOSE BLD-MCNC: 79 MG/DL (ref 70–99)
GLUCOSE URINE: NEGATIVE
HCT VFR BLD CALC: 43.5 % (ref 36.3–47.1)
HEMOGLOBIN: 13.5 G/DL (ref 11.9–15.1)
IMMATURE GRANULOCYTES: 1 %
KETONES, URINE: NEGATIVE
LEUKOCYTE ESTERASE, URINE: NEGATIVE
LYMPHOCYTES # BLD: 14 % (ref 24–43)
MCH RBC QN AUTO: 26.6 PG (ref 25.2–33.5)
MCHC RBC AUTO-ENTMCNC: 31 G/DL (ref 25.2–33.5)
MCV RBC AUTO: 85.8 FL (ref 82.6–102.9)
MONOCYTES # BLD: 7 % (ref 3–12)
NITRITE, URINE: NEGATIVE
NRBC AUTOMATED: 0 PER 100 WBC
PDW BLD-RTO: 15 % (ref 11.8–14.4)
PH UA: 5.5 (ref 5–6)
PLATELET # BLD: 225 K/UL (ref 138–453)
PMV BLD AUTO: 9.8 FL (ref 8.1–13.5)
POTASSIUM SERPL-SCNC: 3.7 MMOL/L (ref 3.7–5.3)
PROTEIN UA: NEGATIVE
RBC # BLD: 5.07 M/UL (ref 3.95–5.11)
RBC # BLD: ABNORMAL 10*6/UL
RBC UA: NORMAL /HPF (ref 0–4)
SEG NEUTROPHILS: 73 % (ref 36–65)
SEGMENTED NEUTROPHILS ABSOLUTE COUNT: 8.17 K/UL (ref 1.5–8.1)
SODIUM BLD-SCNC: 140 MMOL/L (ref 135–144)
SPECIFIC GRAVITY UA: 1.01 (ref 1.01–1.02)
TOTAL PROTEIN: 6.2 G/DL (ref 6.4–8.3)
TROPONIN, HIGH SENSITIVITY: 10 NG/L (ref 0–14)
TROPONIN, HIGH SENSITIVITY: 9 NG/L (ref 0–14)
URINE HGB: NEGATIVE
UROBILINOGEN, URINE: NORMAL
WBC # BLD: 11 K/UL (ref 3.5–11.3)
WBC UA: NORMAL /HPF (ref 0–4)

## 2022-06-14 PROCEDURE — 6370000000 HC RX 637 (ALT 250 FOR IP): Performed by: EMERGENCY MEDICINE

## 2022-06-14 PROCEDURE — 36415 COLL VENOUS BLD VENIPUNCTURE: CPT

## 2022-06-14 PROCEDURE — 85025 COMPLETE CBC W/AUTO DIFF WBC: CPT

## 2022-06-14 PROCEDURE — 71045 X-RAY EXAM CHEST 1 VIEW: CPT

## 2022-06-14 PROCEDURE — 12001 RPR S/N/AX/GEN/TRNK 2.5CM/<: CPT

## 2022-06-14 PROCEDURE — 99283 EMERGENCY DEPT VISIT LOW MDM: CPT

## 2022-06-14 PROCEDURE — 73030 X-RAY EXAM OF SHOULDER: CPT

## 2022-06-14 PROCEDURE — 81001 URINALYSIS AUTO W/SCOPE: CPT

## 2022-06-14 PROCEDURE — 70486 CT MAXILLOFACIAL W/O DYE: CPT

## 2022-06-14 PROCEDURE — 84484 ASSAY OF TROPONIN QUANT: CPT

## 2022-06-14 PROCEDURE — 2500000003 HC RX 250 WO HCPCS: Performed by: EMERGENCY MEDICINE

## 2022-06-14 PROCEDURE — 72125 CT NECK SPINE W/O DYE: CPT

## 2022-06-14 PROCEDURE — 80053 COMPREHEN METABOLIC PANEL: CPT

## 2022-06-14 PROCEDURE — 6370000000 HC RX 637 (ALT 250 FOR IP): Performed by: SPECIALIST

## 2022-06-14 PROCEDURE — 70450 CT HEAD/BRAIN W/O DYE: CPT

## 2022-06-14 PROCEDURE — 93005 ELECTROCARDIOGRAM TRACING: CPT | Performed by: EMERGENCY MEDICINE

## 2022-06-14 RX ORDER — LIDOCAINE HYDROCHLORIDE AND EPINEPHRINE 10; 10 MG/ML; UG/ML
20 INJECTION, SOLUTION INFILTRATION; PERINEURAL ONCE
Status: COMPLETED | OUTPATIENT
Start: 2022-06-14 | End: 2022-06-14

## 2022-06-14 RX ORDER — DIAPER,BRIEF,INFANT-TODD,DISP
EACH MISCELLANEOUS ONCE
Status: COMPLETED | OUTPATIENT
Start: 2022-06-14 | End: 2022-06-14

## 2022-06-14 RX ADMIN — BACITRACIN ZINC: 500 OINTMENT TOPICAL at 23:05

## 2022-06-14 RX ADMIN — BACITRACIN ZINC 2 G: 500 OINTMENT TOPICAL at 12:31

## 2022-06-14 RX ADMIN — LIDOCAINE HYDROCHLORIDE AND EPINEPHRINE 20 ML: 10; 10 INJECTION, SOLUTION INFILTRATION; PERINEURAL at 12:20

## 2022-06-14 ASSESSMENT — PAIN SCALES - GENERAL
PAINLEVEL_OUTOF10: 6
PAINLEVEL_OUTOF10: 2
PAINLEVEL_OUTOF10: 5

## 2022-06-14 ASSESSMENT — ENCOUNTER SYMPTOMS
BACK PAIN: 0
BLOOD IN STOOL: 0
CONSTIPATION: 0
VOMITING: 0
SHORTNESS OF BREATH: 0
ABDOMINAL PAIN: 0
DIARRHEA: 0
EYE PAIN: 0
NAUSEA: 0
COUGH: 0

## 2022-06-14 ASSESSMENT — PAIN - FUNCTIONAL ASSESSMENT
PAIN_FUNCTIONAL_ASSESSMENT: 0-10

## 2022-06-14 ASSESSMENT — LIFESTYLE VARIABLES: HOW OFTEN DO YOU HAVE A DRINK CONTAINING ALCOHOL: NEVER

## 2022-06-14 ASSESSMENT — PAIN DESCRIPTION - DESCRIPTORS: DESCRIPTORS: ACHING

## 2022-06-14 ASSESSMENT — PAIN DESCRIPTION - ORIENTATION: ORIENTATION: LEFT

## 2022-06-14 ASSESSMENT — PAIN DESCRIPTION - LOCATION
LOCATION: SHOULDER
LOCATION: HEAD

## 2022-06-14 ASSESSMENT — PAIN DESCRIPTION - PAIN TYPE: TYPE: ACUTE PAIN

## 2022-06-14 NOTE — ED PROVIDER NOTES
[facility]  eMERGENCY dEPARTMENT eNCOUnter      Pt Name: Emile Tian  MRN: [id number]  [place] 1940  Date of evaluation: 6/14/2022      CHIEF COMPLAINT       Chief Complaint   Patient presents with    Fall    Neck Pain    Shoulder Pain    Facial Injury         HISTORY OF PRESENT ILLNESS    Emile Tian is a 80 y.o. female who presents with a chief complaint of shoulder and neck pain as well as head injury with facial lacerations. Patient states she was in assisted living as she had just gotten up she was walking the next thing she knew she was down on the ground she does not think she is lost consciousness but she does not exactly recall what caused her to fall she struck her head she was able to get up she has chronic left shoulder pain due to a rotator cuff issue but is hurting much more with movement she also has left lateral neck pain and a headache and feels woozy. No chest pain no fevers no chills no cough  He was ambulatory for the squad and denies any leg injuries  She does take 1 baby aspirin a day  REVIEW OF SYSTEMS         Review of Systems   Constitutional: Negative for chills and fever. HENT: Negative for congestion and ear pain. Eyes: Negative for pain and visual disturbance. Respiratory: Negative for cough and shortness of breath. Cardiovascular: Negative for chest pain, palpitations and leg swelling. Gastrointestinal: Negative for abdominal pain, blood in stool, constipation, diarrhea, nausea and vomiting. Endocrine: Negative for polydipsia and polyuria. Genitourinary: Negative for difficulty urinating, dysuria and frequency. Musculoskeletal: Positive for neck pain. Negative for back pain, joint swelling, myalgias and neck stiffness. Left shoulder pain both chronic and acute   Skin: Negative for rash. Neurological: Positive for light-headedness and headaches. Negative for dizziness and weakness. Hematological: Negative for adenopathy. Does not bruise/bleed easily. Psychiatric/Behavioral: Negative for confusion, self-injury and suicidal ideas. PAST MEDICAL HISTORY    has a past medical history of Anesthesia complication, Anxiety and depression, Arthritis, ASD (atrial septal defect), Asthma, COPD (chronic obstructive pulmonary disease) ([address] COPD (chronic obstructive pulmonary disease) ([facility] [address] Disorder of immune system ([facility] [address] GERD (gastroesophageal reflux disease), H/O cardiac catheterization, Heart palpitations, History of anesthesia complications, History of renal stone, Hx of blood clots, Hypertension, MVP (mitral valve prolapse), LONI (obstructive sleep apnea), Rectocele, Spinal stenosis, and Thyroid disease. SURGICAL HISTORY      has a past surgical history that includes Hysterectomy (2009); Dilation and curettage of uterus (); Breast lumpectomy (Left); Cholecystectomy; Colonoscopy; Hysterectomy ();  section (4603/4312); Breast lumpectomy (Left); Rotator cuff repair (Right, 2005 & 2010); Knee arthroscopy (Left, ); pacemaker placement (2014); joint replacement (Bilateral); eye surgery (Bilateral); Diagnostic Cardiac Cath Lab Procedure; back surgery; Cholecystectomy; pr njx aa&/strd tfrml epi cervical/thoracic ea addl (Right, 2018); Injection Procedure For Sacroiliac Joint (Left, 2018); pr inj dx/ther agnt paravert facet joint, lumbar/sac, 2nd level (Bilateral, 2018); pr inj dx/ther agnt paravert facet joint, lumbar/sac, 2nd level (Right, 2018); RADIOFREQUENCY ABLATION NERVES (Left, 2018); RADIOFREQUENCY ABLATION NERVES (Right, 1/3/2019); Lumbar spine surgery (Bilateral, 2019); Lumbar spine surgery (Bilateral, 3/12/2019); Lumbar spine surgery (Right, 10/15/2019); Lumbar spine surgery (Left, 2019); Pain management procedure (Right, 2020); Pain management procedure (Left, 2020); Cardiac surgery;  Tonsillectomy; Pain management procedure (Right, 2021); Pain management procedure (Left, 3/11/2021); Pain management procedure (Left, 5/6/2021); Back Injection (Bilateral, 7/9/2021); Back Injection (Bilateral, 12/3/2021); and Pain management procedure (Bilateral, 3/4/2022). CURRENT MEDICATIONS       Previous Medications    ACETAMINOPHEN (TYLENOL 8 HOUR) 650 MG EXTENDED RELEASE TABLET    Take 650 mg by mouth every 8 hours as needed for Pain    ADVAIR DISKUS 100-50 MCG/DOSE DISKUS INHALER    inhale 1 puff by mouth twice a day    ALBUTEROL (ACCUNEB) 1.25 MG/3ML NEBULIZER SOLUTION    Inhale 1 ampule into the lungs every 6 hours as needed for Wheezing     ARTIFICIAL TEARS (ARTIFICIAL TEARS) OINT    as needed. ASPIRIN 81 MG TABLET    Take 81 mg by mouth daily    BACLOFEN (LIORESAL) 10 MG TABLET    Take 10 mg by mouth daily     BUPROPION (WELLBUTRIN XL) 150 MG EXTENDED RELEASE TABLET    take 1 tablet by mouth once daily    CALCIUM CARBONATE-VITAMIN D 600-200 MG-UNIT TABS    Take 2 tablets by mouth daily    CETIRIZINE (ZYRTEC) 10 MG TABLET    take 1 tablet by mouth once daily    CLINDAMYCIN (CLEOCIN) 300 MG CAPSULE    TAKE 1 CAPSULE BY MOUTH 4 TIMES A DAY    DULOXETINE (CYMBALTA) 30 MG EXTENDED RELEASE CAPSULE    Take 30 mg by mouth daily    DULOXETINE (CYMBALTA) 60 MG EXTENDED RELEASE CAPSULE    Take 60 mg by mouth daily     FLECAINIDE (TAMBOCOR) 50 MG TABLET    Take 50 mg by mouth 2 times daily. FLUTICASONE PROPIONATE 50 MCG/BLIST AEPB    Inhale into the lungs    FUROSEMIDE (LASIX) 20 MG TABLET    20 mg 3 times daily     GABAPENTIN (NEURONTIN) 100 MG CAPSULE    TAKE 1 CAPSULE BY MOUTH THREE TIMES A DAY    GUAIFENESIN (MUCINEX) 600 MG EXTENDED RELEASE TABLET    Mucinex 600 mg tablet, extended release   Take 2 tablets every day by oral route.     IPRATROPIUM (ATROVENT) 0.03 % NASAL SPRAY    2 sprays by Each Nostril route every 12 hours    KETOTIFEN (ZADITOR) 0.025 % OPHTHALMIC SOLUTION    1 drop 2 times daily    LEVOTHYROXINE (SYNTHROID) 75 MCG TABLET    Take 75 mcg by mouth daily    LIDOCAINE (ASPERCREME W/LIDOCAINE) 4 % CREAM    Apply topically as needed for Pain Apply topically as needed. MELATONIN 1 MG TABLET    melatonin   once daily    METOPROLOL SUCCINATE (TOPROL XL) 25 MG EXTENDED RELEASE TABLET    take 1 tablet by mouth once daily    MONTELUKAST (SINGULAIR) 10 MG TABLET    Take 10 mg by mouth nightly. MULTIPLE VITAMIN (MULTI-VITAMIN DAILY) TABS    Take by mouth    NALOXONE 4 MG/0.1ML LIQD NASAL SPRAY    1 spray by Nasal route as needed for Opioid Reversal    OMEPRAZOLE (PRILOSEC) 40 MG CAPSULE    Take 40 mg by mouth nightly. ONDANSETRON (ZOFRAN) 4 MG TABLET        OXYCODONE HCL (OXY-IR) 10 MG IMMEDIATE RELEASE TABLET    Take 1 tablet by mouth 3 times daily for 30 days. PREDNISONE (DELTASONE) 20 MG TABLET    Take 1 tablet by mouth daily for 10 days    SALINE 0.2 % SOLN    by Nasal route daily as needed     SIMVASTATIN (ZOCOR) 40 MG TABLET    Take 40 mg by mouth nightly    SPIRONOLACTONE (ALDACTONE) 25 MG TABLET    take 1 tablet by mouth once daily    TOPIRAMATE (TOPAMAX) 25 MG TABLET    Take 25 mg by mouth 2 times daily    TRIPROLIDINE-PSEUDOEPHEDRINE (ANTIHISTAMINE PO)    Take by mouth Indications: zyrtec        ALLERGIES     is allergic to sulfa antibiotics, sulfamethoxazole-trimethoprim, ansaid [flurbiprofen], gentamicin, motrin [ibuprofen], quinidine, chlorthalidone, hydrocodone-acetaminophen, mirapex [pramipexole dihydrochloride], mobic [meloxicam], nsaids, quinolones, reglan [metoclopramide], trazodone, amino acids, corgard [nadolol], erythromycin, etodolac, garamycin [gentamicin sulfate], inderal [propranolol], iothalamate, lisinopril, loratadine, and ultram [tramadol]. FAMILY HISTORY     She indicated that her mother is . She indicated that her father is . family history includes Heart Disease in her father. SOCIAL HISTORY      reports that she has never smoked.  She has never used smokeless tobacco. She reports previous alcohol use. She reports that she does not use drugs. PHYSICAL EXAM     INITIAL VITALS:  height is 5' 4\" (1.626 m) and weight is 173 lb (78.5 kg). Her tympanic temperature is 96.3 °F (35.7 °C) (abnormal). Her blood pressure is 133/84 and her pulse is 70. Her respiration is 15 and oxygen saturation is 97%. Physical Exam  Constitutional:       Appearance: She is well-developed. HENT:      Head: Normocephalic. Comments: Patient has a complex forehead laceration it is stellate but also has 2 parallel cuts rating down to the middle of it with rather active bleeding there is no obvious bony deformity she has a small 1 cm cut just above the right eyebrow she has a 1 cm cut above the upper lip     Right Ear: External ear normal.      Left Ear: External ear normal.   Eyes:      Conjunctiva/sclera: Conjunctivae normal.      Pupils: Pupils are equal, round, and reactive to light. Neck:      Comments: Patient has paraspinal tenderness into the left shoulder from the neck there is no midline tenderness  Cardiovascular:      Rate and Rhythm: Normal rate and regular rhythm. Pulmonary:      Effort: Pulmonary effort is normal.      Breath sounds: Normal breath sounds. Abdominal:      General: Bowel sounds are normal.      Palpations: Abdomen is soft. Musculoskeletal:      Cervical back: Tenderness present. Comments: Patient has tenderness of the left shoulder but seems to be moving it fairly well no tenderness at the elbow or wrist lower extremities are unremarkable good range of motion of the hip knees and ankles   Skin:     General: Skin is warm and dry. Capillary Refill: Capillary refill takes less than 2 seconds. Neurological:      General: No focal deficit present. Mental Status: She is alert and oriented to person, place, and time. Cranial Nerves: No cranial nerve deficit. Motor: No weakness.       Coordination: Coordination normal.      Gait: Gait normal.   Psychiatric: Behavior: Behavior normal.           DIFFERENTIAL DIAGNOSIS/ MDM:     Fall unclear etiology we will do a work-up also facial lacerations to be repaired and will CT her head face and neck as well as x-rays of the left shoulder    DIAGNOSTIC RESULTS     EKG: All EKG's are interpreted by the Emergency Department Physician who either signs or Co-signs this chart in the absence of a cardiologist.  EKG shows dual-chamber pacemaker at a rate of 70      RADIOLOGY:   I directly visualized the following  images and reviewed the radiologist interpretations:     EXAMINATION:   CT OF THE HEAD WITHOUT CONTRAST  6/14/2022 12:54 pm       TECHNIQUE:   CT of the head was performed without the administration of intravenous   contrast. Automated exposure control, iterative reconstruction, and/or weight   based adjustment of the mA/kV was utilized to reduce the radiation dose to as   low as reasonably achievable.       COMPARISON:   None.       HISTORY:   ORDERING SYSTEM PROVIDED HISTORY: fall   TECHNOLOGIST PROVIDED HISTORY:       fall   Decision Support Exception - unselect if not a suspected or confirmed   emergency medical condition->Emergency Medical Condition (MA)   Reason for Exam: fall       FINDINGS:   BRAIN/VENTRICLES: There is moderate cerebral atrophy with white matter   hypodensities in keeping with microvascular disease.  There is no acute   intracranial hemorrhage, mass effect or midline shift.  No abnormal   extra-axial fluid collection.  The gray-white differentiation is maintained   without evidence of an acute infarct.  There is no evidence of hydrocephalus.       ORBITS: The visualized portion of the orbits demonstrate no acute abnormality.       SINUSES: The visualized paranasal sinuses and mastoid air cells demonstrate   no acute abnormality.       SOFT TISSUES/SKULL:  No acute abnormality of the visualized skull or soft   tissues.           Impression   No acute intracranial abnormality.            CT OF THE FACE WITHOUT CONTRAST  6/14/2022 12:55 pm       TECHNIQUE:   CT of the face was performed without the administration of intravenous   contrast. Multiplanar reformatted images are provided for review. Automated   exposure control, iterative reconstruction, and/or weight based adjustment of   the mA/kV was utilized to reduce the radiation dose to as low as reasonably   achievable.       COMPARISON:   None       HISTORY:   ORDERING SYSTEM PROVIDED HISTORY: fall   TECHNOLOGIST PROVIDED HISTORY:   fall   Decision Support Exception - unselect if not a suspected or confirmed   emergency medical condition->Emergency Medical Condition (MA)   Reason for Exam: fall       FINDINGS:   FACIAL BONES: The frontal sinuses, orbital walls, maxilla, pterygoid plates,   zygomatic arches, hard palate, nasal bones and mandible are intact.  The   temporomandibular joints are aligned.       ORBITAL CONTENTS: The globes appear intact.  The extraocular muscles, optic   nerve sheath complexes and lacrimal glands appear unremarkable.  No   retrobulbar hematoma or mass is seen.       SINUSES: There is an air-fluid level noted in the the right maxillary sinus.       SOFT TISSUES: There is some subcutaneous edema in the right preorbital,   maxillary and nasal areas.           Impression   No acute facial bone trauma.  Air-fluid level noted in the right maxillary   sinus, which could be secondary to hemorrhage or bacterial sinusitis.       Some some subcutaneous edema in the right pre orbital, maxillary and nasal   areas.            CT OF THE CERVICAL SPINE WITHOUT CONTRAST 6/14/2022 12:55 pm       TECHNIQUE:   CT of the cervical spine was performed without the administration of   intravenous contrast. Multiplanar reformatted images are provided for review.    Automated exposure control, iterative reconstruction, and/or weight based   adjustment of the mA/kV was utilized to reduce the radiation dose to as low   as reasonably achievable.       COMPARISON: None.       HISTORY:   ORDERING SYSTEM PROVIDED HISTORY: fall   TECHNOLOGIST PROVIDED HISTORY:   fall   Decision Support Exception - unselect if not a suspected or confirmed   emergency medical condition->Emergency Medical Condition (MA)   Reason for Exam: fall       FINDINGS:   BONES/ALIGNMENT: There is no acute fracture or traumatic malalignment.       DEGENERATIVE CHANGES: There is degenerative disc disease from the C3-C4 to   the C6-C7 discs, with disc space narrowing and osteophytes.  There is mild   spondylolisthesis of C2 on C3 with fusion of the left C2-C3 facet joint.    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       SOFT TISSUES: There is no prevertebral soft tissue swelling.           Impression   No acute abnormality of the cervical spine.            EXAMINATION:   ONE XRAY VIEW OF THE CHEST       6/14/2022 1:02 pm       COMPARISON:   PA and lateral chest September 15, 2015.       HISTORY:   ORDERING SYSTEM PROVIDED HISTORY: fall   TECHNOLOGIST PROVIDED HISTORY:   fall   Reason for Exam: Fall; left shoulder pain; *all images done in wheelchair       FINDINGS:   The heart is enlarged with generalized configuration.  Pacemaker battery pack   overlies the left hemithorax with bipolar leads extending into the heart and   appearing intact.  No evidence of pneumothorax, pleural effusion, infiltrate,   or abnormal lung mass. Edgar Levans is some degenerative changes in the spine and   visualized portions of the shoulders.           Impression   Cardiomegaly.       No evidence of acute cardiopulmonary process.       Findings appear essentially unchanged when compared to the previous study.            THREE XRAY VIEWS OF THE LEFT SHOULDER       6/14/2022 1:02 pm       COMPARISON:   None.       HISTORY:   ORDERING SYSTEM PROVIDED HISTORY: fall   TECHNOLOGIST PROVIDED HISTORY:   fall   Reason for Exam: Fall; left shoulder pain; *all images done in wheelchair       FINDINGS:   The bones of the left shoulder are intact.  The glenohumeral and   acromioclavicular joint spaces are maintained.  There are degenerative   changes at the acromioclavicular articulating margins.  Some small punctate   calcifications are present just superior to the acromioclavicular joint space.       Normal alignment.  No acute process.           Impression   Degenerative changes left shoulder, more pronounced at the acromioclavicular   joint space.       No evidence of acute bony abnormality left shoulder.                     ED BEDSIDE ULTRASOUND:       LABS:  Labs Reviewed   CBC WITH AUTO DIFFERENTIAL - Abnormal; Notable for the following components:       Result Value    RDW 15.0 (*)     Seg Neutrophils 73 (*)     Lymphocytes 14 (*)     Immature Granulocytes 1 (*)     Segs Absolute 8.17 (*)     All other components within normal limits   COMPREHENSIVE METABOLIC PANEL - Abnormal; Notable for the following components:    Anion Gap 8 (*)     Alkaline Phosphatase 125 (*)     Total Protein 6.2 (*)     All other components within normal limits   TROPONIN   URINALYSIS WITH REFLEX TO CULTURE   MICROSCOPIC URINALYSIS   TROPONIN           EMERGENCY DEPARTMENT COURSE:   Vitals:    Vitals:    06/14/22 1139 06/14/22 1200   BP: (!) 94/35 133/84   Pulse: 71 70   Resp: 17 15   Temp: (!) 96.3 °F (35.7 °C)    TempSrc: Tympanic    SpO2: 95% 97%   Weight: 173 lb (78.5 kg)    Height: 5' 4\" (1.626 m)      -------------------------  BP: 133/84, Temp: (!) 96.3 °F (35.7 °C), Heart Rate: 70, Resp: 15        Re-evaluation Notes    Resting comfortably GCS remains 15 family at bedside    CRITICAL CARE:   None        CONSULTS:      PROCEDURES:      Stellate laceration probably 4 cm in length total forehead prep was Hibiclens anesthetic was 1% of lidocaine with epi 3 cc were used wound was irrigated with sterile saline there is no foreign bodies visible it was closed with 5-0 Ethilon 9 sutures were placed, the 1 cm laceration just lateral to that was cleansed with Hibiclens anesthetized with half a cc of lidocaine and closed with one 5-0 Ethilon suture the wound just above her lip was also cleansed with Hibiclens anesthetized with lidocaine with epi and closed with one 5-0 Ethilon suture bacitracin dressings were applied patient tolerated the procedure well    FINAL IMPRESSION      1. Injury of head, initial encounter    2. Cervical strain, acute, initial encounter    3. Facial laceration, initial encounter    4. Left shoulder pain, unspecified chronicity          DISPOSITION/PLAN   DISPOSITION discharged     Condition on Disposition    Stable    PATIENT REFERRED TO:  Ambika Stauffer MD  [address] [id number]  [phone]    In 3 days        DISCHARGE MEDICATIONS:  New Prescriptions    No medications on file       (Please note that portions of this note were completed with a voice recognition program.  Efforts were made to edit the dictations but occasionally words are mis-transcribed.)    Ry Benjamin MD,, MD, F.A.A.E.M.   Attending Emergency Physician                          Ry Benjamin MD  06/14/22 1427       Ry Benjamin MD  06/14/22 143

## 2022-06-15 VITALS
DIASTOLIC BLOOD PRESSURE: 87 MMHG | BODY MASS INDEX: 29.53 KG/M2 | SYSTOLIC BLOOD PRESSURE: 121 MMHG | OXYGEN SATURATION: 96 % | WEIGHT: 173 LBS | HEART RATE: 74 BPM | RESPIRATION RATE: 18 BRPM | HEIGHT: 64 IN | TEMPERATURE: 97.2 F

## 2022-06-15 LAB
EKG ATRIAL RATE: 70 BPM
EKG P-R INTERVAL: 256 MS
EKG Q-T INTERVAL: 496 MS
EKG QRS DURATION: 186 MS
EKG QTC CALCULATION (BAZETT): 535 MS
EKG R AXIS: -79 DEGREES
EKG T AXIS: 70 DEGREES
EKG VENTRICULAR RATE: 70 BPM

## 2022-06-15 NOTE — ED PROVIDER NOTES
Jill 69      Pt Name: Mitali Tripathi  MRN: [id number]  [place] 1940  Date of evaluation: 6/14/2022      CHIEF COMPLAINT       Chief Complaint   Patient presents with    Head Laceration         HISTORY OF PRESENT ILLNESS    Mitali Tripathi is a 80 y.o. female who presents to the emergency department for evaluation of bleeding through the right forehead laceration repaired earlier on the same day. Patient apparently was in assisted living and as she had just gotten up, she was walking and the next thing she knew that she was down on the floor. No history of loss of consciousness. She had stellate complicated laceration on the right forehead as well as another facial laceration below the nose. These were suture repaired and patient was discharged home with pressure dressing. Her CAT scan of the brain, facial bones and cervical spine were unremarkable. Patient noticed continued bleeding from the sutured laceration upon waking up after taking a nap in the afternoon. She was unable to stop the bleeding and that she comes to the emergency department. She takes baby aspirin daily but does not take any other antiplatelet agents or any anticoagulants. REVIEW OF SYSTEMS       Review of Systems   Constitutional: Negative for diaphoresis and fatigue. Musculoskeletal: Negative for neck pain. Skin: Positive for wound. Neurological: Negative for dizziness, light-headedness and headaches. All other systems reviewed and are negative.        PAST MEDICAL HISTORY    has a past medical history of Anesthesia complication, Anxiety and depression, Arthritis, ASD (atrial septal defect), Asthma, COPD (chronic obstructive pulmonary disease) ([address] COPD (chronic obstructive pulmonary disease) ([address] Disorder of immune system ([address] GERD (gastroesophageal reflux disease), H/O cardiac catheterization, Heart palpitations, History of anesthesia complications, History of renal stone, Hx of blood clots, Hypertension, MVP (mitral valve prolapse), LONI (obstructive sleep apnea), Rectocele, Spinal stenosis, and Thyroid disease. SURGICAL HISTORY      has a past surgical history that includes Hysterectomy (2009); Dilation and curettage of uterus (); Breast lumpectomy (Left); Cholecystectomy; Colonoscopy; Hysterectomy ();  section (9316/4168); Breast lumpectomy (Left); Rotator cuff repair (Right, 2005 & 2010); Knee arthroscopy (Left, ); pacemaker placement (2014); joint replacement (Bilateral); eye surgery (Bilateral); Diagnostic Cardiac Cath Lab Procedure; back surgery; Cholecystectomy; pr njx aa&/strd tfrml epi cervical/thoracic ea addl (Right, 2018); Injection Procedure For Sacroiliac Joint (Left, 2018); pr inj dx/ther agnt paravert facet joint, lumbar/sac, 2nd level (Bilateral, 2018); pr inj dx/ther agnt paravert facet joint, lumbar/sac, 2nd level (Right, 2018); RADIOFREQUENCY ABLATION NERVES (Left, 2018); RADIOFREQUENCY ABLATION NERVES (Right, 1/3/2019); Lumbar spine surgery (Bilateral, 2019); Lumbar spine surgery (Bilateral, 3/12/2019); Lumbar spine surgery (Right, 10/15/2019); Lumbar spine surgery (Left, 2019); Pain management procedure (Right, 2020); Pain management procedure (Left, 2020); Cardiac surgery; Tonsillectomy; Pain management procedure (Right, 2021); Pain management procedure (Left, 3/11/2021); Pain management procedure (Left, 2021); Back Injection (Bilateral, 2021); Back Injection (Bilateral, 12/3/2021); and Pain management procedure (Bilateral, 3/4/2022).     CURRENT MEDICATIONS       Discharge Medication List as of 2022 11:15 PM      CONTINUE these medications which have NOT CHANGED    Details   buPROPion (WELLBUTRIN XL) 150 MG extended release tablet take 1 tablet by mouth once dailyHistorical Med      predniSONE (DELTASONE) 20 MG tablet Take 1 tablet by mouth daily for 10 days, Disp-10 tablet, R-0Normal      clindamycin (CLEOCIN) 300 MG capsule TAKE 1 CAPSULE BY MOUTH 4 TIMES A DAYHistorical Med      oxyCODONE HCl (OXY-IR) 10 MG immediate release tablet Take 1 tablet by mouth 3 times daily for 30 days. , Disp-90 tablet, R-0Normal      acetaminophen (TYLENOL 8 HOUR) 650 MG extended release tablet Take 650 mg by mouth every 8 hours as needed for PainHistorical Med      cetirizine (ZYRTEC) 10 MG tablet take 1 tablet by mouth once dailyHistorical Med      ondansetron (ZOFRAN) 4 MG tablet Historical Med      spironolactone (ALDACTONE) 25 MG tablet take 1 tablet by mouth once dailyHistorical Med      metoprolol succinate (TOPROL XL) 25 MG extended release tablet take 1 tablet by mouth once dailyHistorical Med      ADVAIR DISKUS 100-50 MCG/DOSE diskus inhaler inhale 1 puff by mouth twice a day, DAWHistorical Med      ipratropium (ATROVENT) 0.03 % nasal spray 2 sprays by Each Nostril route every 12 hoursHistorical Med      calcium carbonate-vitamin D 600-200 MG-UNIT TABS Take 2 tablets by mouth dailyHistorical Med      !!  DULoxetine (CYMBALTA) 30 MG extended release capsule Take 30 mg by mouth dailyHistorical Med      topiramate (TOPAMAX) 25 MG tablet Take 25 mg by mouth 2 times dailyHistorical Med      furosemide (LASIX) 20 MG tablet 20 mg 3 times daily Historical Med      gabapentin (NEURONTIN) 100 MG capsule TAKE 1 CAPSULE BY MOUTH THREE TIMES A DAY, Disp-90 capsule, R-2Normal      naloxone 4 MG/0.1ML LIQD nasal spray 1 spray by Nasal route as needed for Opioid Reversal, Disp-1 each, R-5Normal      baclofen (LIORESAL) 10 MG tablet Take 10 mg by mouth daily Historical Med      ketotifen (ZADITOR) 0.025 % ophthalmic solution 1 drop 2 times dailyHistorical Med      Multiple Vitamin (MULTI-VITAMIN DAILY) TABS Take by mouthHistorical Med      melatonin 1 MG tablet melatonin   once dailyHistorical Med      Triprolidine-Pseudoephedrine (ANTIHISTAMINE PO) Take by mouth Indications: zyrtec Historical Med      aspirin 81 MG tablet Take 81 mg by mouth dailyHistorical Med      levothyroxine (SYNTHROID) 75 MCG tablet Take 75 mcg by mouth dailyHistorical Med      guaiFENesin (MUCINEX) 600 MG extended release tablet Mucinex 600 mg tablet, extended release   Take 2 tablets every day by oral route. Historical Med      lidocaine (ASPERCREME W/LIDOCAINE) 4 % cream Apply topically as needed for Pain Apply topically as needed., Topical, PRN, Until Discontinued, Historical Med      fluticasone propionate 50 MCG/BLIST AEPB Inhale into the lungs      albuterol (ACCUNEB) 1.25 MG/3ML nebulizer solution Inhale 1 ampule into the lungs every 6 hours as needed for Wheezing Historical Med      simvastatin (ZOCOR) 40 MG tablet Take 40 mg by mouth nightly      flecainide (TAMBOCOR) 50 MG tablet Take 50 mg by mouth 2 times daily. omeprazole (PRILOSEC) 40 MG capsule Take 40 mg by mouth nightly. montelukast (SINGULAIR) 10 MG tablet Take 10 mg by mouth nightly. !! DULoxetine (CYMBALTA) 60 MG extended release capsule Take 60 mg by mouth daily Historical Med      artificial tears (ARTIFICIAL TEARS) OINT as needed. Saline 0.2 % SOLN by Nasal route daily as needed        !! - Potential duplicate medications found. Please discuss with provider. ALLERGIES     is allergic to sulfa antibiotics, sulfamethoxazole-trimethoprim, ansaid [flurbiprofen], gentamicin, motrin [ibuprofen], quinidine, chlorthalidone, hydrocodone-acetaminophen, mirapex [pramipexole dihydrochloride], mobic [meloxicam], nsaids, quinolones, reglan [metoclopramide], trazodone, amino acids, corgard [nadolol], erythromycin, etodolac, garamycin [gentamicin sulfate], inderal [propranolol], iothalamate, lisinopril, loratadine, and ultram [tramadol]. FAMILY HISTORY     She indicated that her mother is . She indicated that her father is . family history includes Heart Disease in her father.     SOCIAL HISTORY      reports that she has never smoked. She has never used smokeless tobacco. She reports previous alcohol use. She reports that she does not use drugs. PHYSICAL EXAM     INITIAL VITALS:  height is 5' 4\" (1.626 m) and weight is 173 lb (78.5 kg). Her tympanic temperature is 97.2 °F (36.2 °C). Her blood pressure is 121/87 and her pulse is 74. Her respiration is 18 and oxygen saturation is 96%. Physical Exam  Vitals and nursing note reviewed. Constitutional:       Appearance: She is well-developed. HENT:      Head: Normocephalic. Laceration present. No raccoon eyes or Stovall's sign. Comments: Patient has several stitches in place on the right forehead stellate laceration and there is constant oozing of venous blood through the center of the laceration. Nose: Nose normal.   Eyes:      Extraocular Movements: Extraocular movements intact. Pupils: Pupils are equal, round, and reactive to light. Cardiovascular:      Rate and Rhythm: Normal rate and regular rhythm. Heart sounds: Normal heart sounds. No murmur heard. Pulmonary:      Effort: Pulmonary effort is normal. No respiratory distress. Breath sounds: Normal breath sounds. Abdominal:      General: Bowel sounds are normal. There is no distension. Palpations: Abdomen is soft. Tenderness: There is no abdominal tenderness. Musculoskeletal:      Cervical back: Normal range of motion and neck supple. Skin:     General: Skin is warm and dry. Neurological:      General: No focal deficit present. Mental Status: She is alert and oriented to person, place, and time. DIFFERENTIAL DIAGNOSIS/ MDM:     Laceration right forehead with bleeding through the wound. Will need few more stitches.     DIAGNOSTIC RESULTS     EKG: All EKG's are interpreted by the Emergency Department Physician who either signs or Co-signs this chart in the absence of a cardiologist.    None obtained      RADIOLOGY: Interpretation per the Radiologist below, if available at the time of this note:    CT HEAD WO CONTRAST    Result Date: 6/14/2022  EXAMINATION: CT OF THE HEAD WITHOUT CONTRAST  6/14/2022 12:54 pm TECHNIQUE: CT of the head was performed without the administration of intravenous contrast. Automated exposure control, iterative reconstruction, and/or weight based adjustment of the mA/kV was utilized to reduce the radiation dose to as low as reasonably achievable. COMPARISON: None. HISTORY: ORDERING SYSTEM PROVIDED HISTORY: fall TECHNOLOGIST PROVIDED HISTORY: fall Decision Support Exception - unselect if not a suspected or confirmed emergency medical condition->Emergency Medical Condition (MA) Reason for Exam: fall FINDINGS: BRAIN/VENTRICLES: There is moderate cerebral atrophy with white matter hypodensities in keeping with microvascular disease. There is no acute intracranial hemorrhage, mass effect or midline shift. No abnormal extra-axial fluid collection. The gray-white differentiation is maintained without evidence of an acute infarct. There is no evidence of hydrocephalus. ORBITS: The visualized portion of the orbits demonstrate no acute abnormality. SINUSES: The visualized paranasal sinuses and mastoid air cells demonstrate no acute abnormality. SOFT TISSUES/SKULL:  No acute abnormality of the visualized skull or soft tissues. No acute intracranial abnormality. CT FACIAL BONES WO CONTRAST    Result Date: 6/14/2022  EXAMINATION: CT OF THE FACE WITHOUT CONTRAST  6/14/2022 12:55 pm TECHNIQUE: CT of the face was performed without the administration of intravenous contrast. Multiplanar reformatted images are provided for review. Automated exposure control, iterative reconstruction, and/or weight based adjustment of the mA/kV was utilized to reduce the radiation dose to as low as reasonably achievable.  COMPARISON: None HISTORY: ORDERING SYSTEM PROVIDED HISTORY: fall TECHNOLOGIST PROVIDED HISTORY: fall Decision Support Exception - unselect if not a suspected or confirmed emergency medical condition->Emergency Medical Condition (MA) Reason for Exam: fall FINDINGS: FACIAL BONES: The frontal sinuses, orbital walls, maxilla, pterygoid plates, zygomatic arches, hard palate, nasal bones and mandible are intact. The temporomandibular joints are aligned. ORBITAL CONTENTS: The globes appear intact. The extraocular muscles, optic nerve sheath complexes and lacrimal glands appear unremarkable. No retrobulbar hematoma or mass is seen. SINUSES: There is an air-fluid level noted in the the right maxillary sinus. SOFT TISSUES: There is some subcutaneous edema in the right preorbital, maxillary and nasal areas. No acute facial bone trauma. Air-fluid level noted in the right maxillary sinus, which could be secondary to hemorrhage or bacterial sinusitis. Some some subcutaneous edema in the right pre orbital, maxillary and nasal areas. CT CERVICAL SPINE WO CONTRAST    Result Date: 6/14/2022  EXAMINATION: CT OF THE CERVICAL SPINE WITHOUT CONTRAST 6/14/2022 12:55 pm TECHNIQUE: CT of the cervical spine was performed without the administration of intravenous contrast. Multiplanar reformatted images are provided for review. Automated exposure control, iterative reconstruction, and/or weight based adjustment of the mA/kV was utilized to reduce the radiation dose to as low as reasonably achievable. COMPARISON: None. HISTORY: ORDERING SYSTEM PROVIDED HISTORY: fall TECHNOLOGIST PROVIDED HISTORY: fall Decision Support Exception - unselect if not a suspected or confirmed emergency medical condition->Emergency Medical Condition (MA) Reason for Exam: fall FINDINGS: BONES/ALIGNMENT: There is no acute fracture or traumatic malalignment. DEGENERATIVE CHANGES: There is degenerative disc disease from the C3-C4 to the C6-C7 discs, with disc space narrowing and osteophytes.   There is mild spondylolisthesis of C2 on C3 with fusion of the left C2-C3 facet joint. 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 SOFT TISSUES: There is no prevertebral soft tissue swelling. No acute abnormality of the cervical spine. XR SHOULDER LEFT (MIN 2 VIEWS)    Result Date: 6/14/2022  EXAMINATION: THREE XRAY VIEWS OF THE LEFT SHOULDER 6/14/2022 1:02 pm COMPARISON: None. HISTORY: ORDERING SYSTEM PROVIDED HISTORY: fall TECHNOLOGIST PROVIDED HISTORY: fall Reason for Exam: Fall; left shoulder pain; *all images done in wheelchair FINDINGS: The bones of the left shoulder are intact. The glenohumeral and acromioclavicular joint spaces are maintained. There are degenerative changes at the acromioclavicular articulating margins. Some small punctate calcifications are present just superior to the acromioclavicular joint space. Normal alignment. No acute process. Degenerative changes left shoulder, more pronounced at the acromioclavicular joint space. No evidence of acute bony abnormality left shoulder. XR CHEST PORTABLE    Result Date: 6/14/2022  EXAMINATION: ONE XRAY VIEW OF THE CHEST 6/14/2022 1:02 pm COMPARISON: PA and lateral chest September 15, 2015. HISTORY: ORDERING SYSTEM PROVIDED HISTORY: fall TECHNOLOGIST PROVIDED HISTORY: fall Reason for Exam: Fall; left shoulder pain; *all images done in wheelchair FINDINGS: The heart is enlarged with generalized configuration. Pacemaker battery pack overlies the left hemithorax with bipolar leads extending into the heart and appearing intact. No evidence of pneumothorax, pleural effusion, infiltrate, or abnormal lung mass. There is some degenerative changes in the spine and visualized portions of the shoulders. Cardiomegaly. No evidence of acute cardiopulmonary process. Findings appear essentially unchanged when compared to the previous study.               ED BEDSIDE ULTRASOUND:       LABS:  Labs Reviewed - No data to display      EMERGENCY DEPARTMENT COURSE:   Vitals:    Vitals:    06/14/22 2200 06/14/22 2215 06/14/22 2300   BP: 105/80 138/67 121/87   Pulse: 70 72 74   Resp: 18 18 18   Temp: 97.2 °F (36.2 °C)     TempSrc: Tympanic     SpO2: 96% 96% 96%   Weight: 173 lb (78.5 kg)     Height: 5' 4\" (1.626 m)       -------------------------  BP: 121/87, Temp: 97.2 °F (36.2 °C), Heart Rate: 74, Resp: 18    Orders Placed This Encounter   Medications    bacitracin zinc ointment       During the emergency department course, localized pressure dressing was applied initially and laceration repair was performed by myself. Patient tolerated the procedure well. Hemostasis was achieved. Please see procedure note. Wound care instructions were given and bacitracin ointment and dressing was applied per nursing staff. Patient is advised to follow-up with PCP in 2 days for wound recheck, sutures removal in 7 to 10 days, follow-up with PCP, return if worse. I have reviewed the disposition diagnosis with the patient and or their family/guardian. I have answered their questions and given discharge instructions. They voiced understanding of these instructions and did not have any further questions or complaints. Re-evaluation Notes    Patient is feeling much better and resting comfortably. CRITICAL CARE:   None        CONSULTS:      PROCEDURES:  Laceration Repair Procedure Note    Indication: Laceration    Procedure: The patient was placed in the appropriate position and anesthesia around the laceration was obtained by infiltration using 1% Lidocaine with epinephrine. The area was then cleansed with betadine and draped in a sterile fashion. The laceration was closed with 4-0 Prolene using interrupted sutures. There were no additional lacerations requiring repair. The wound area was then dressed with bacitracin and a sterile dressing. Total repaired wound length: 1 cm. Other Items: Suture count: 2    The patient tolerated the procedure well. Complications: None        FINAL IMPRESSION      1.  Laceration of forehead, initial encounter          DISPOSITION/PLAN   DISPOSITION Decision To Discharge    Condition on Disposition    Stable    PATIENT REFERRED TO:  Toni Santiago MD  [address] [facility]  [phone]    Call in 2 days  For wound re-check    Toni Santiago MD  [address] [facility]  [phone]    Call in 10 days  For suture removal    [address] [facility] ED  [address]  [address]  [phone]    If symptoms worsen      DISCHARGE MEDICATIONS:  Discharge Medication List as of 6/14/2022 11:15 PM          (Please note that portions of this note were completed with a voice recognition program.  Efforts were made to edit the dictations but occasionally words are mis-transcribed.)    Simona Chang MD,, MD, F.A.C.E.P.   Attending Emergency Physician                         Simona Chang MD  06/15/22 [id number]

## 2022-06-22 ENCOUNTER — TELEPHONE (OUTPATIENT)
Dept: PAIN MANAGEMENT | Age: 82
End: 2022-06-22

## 2022-06-22 DIAGNOSIS — M53.3 CHRONIC LEFT SACROILIAC JOINT PAIN: ICD-10-CM

## 2022-06-22 DIAGNOSIS — M54.16 LUMBAR RADICULOPATHY: ICD-10-CM

## 2022-06-22 DIAGNOSIS — M47.817 LUMBOSACRAL SPONDYLOSIS WITHOUT MYELOPATHY: ICD-10-CM

## 2022-06-22 DIAGNOSIS — G89.29 CHRONIC LEFT SACROILIAC JOINT PAIN: ICD-10-CM

## 2022-06-22 RX ORDER — OXYCODONE HYDROCHLORIDE 10 MG/1
10 TABLET ORAL 3 TIMES DAILY
Qty: 90 TABLET | Refills: 0 | Status: SHIPPED | OUTPATIENT
Start: 2022-06-22 | End: 2022-07-20 | Stop reason: SDUPTHER

## 2022-06-22 NOTE — TELEPHONE ENCOUNTER
Tati Vanegas called requesting a refill of the below medication which has been pended for you:     Requested Prescriptions     Pending Prescriptions Disp Refills    oxyCODONE HCl (OXY-IR) 10 MG immediate release tablet 90 tablet 0     Sig: Take 1 tablet by mouth 3 times daily for 30 days. Last Appointment Date: 5/17/2022  Next Appointment Date: 6/23/2022    Allergies   Allergen Reactions    Sulfa Antibiotics Shortness Of Breath    Sulfamethoxazole-Trimethoprim Anaphylaxis     (Bactrim)    Ansaid [Flurbiprofen] Other (See Comments)     Light headed and difficult with vision \"seeing\"    Gentamicin Other (See Comments)     Eye ointment caused eye swelling, redness    Motrin [Ibuprofen] Other (See Comments)     \"I coughed so bad I broke a rib\"    Quinidine      Light headed    Chlorthalidone Hives    Hydrocodone-Acetaminophen     Mirapex [Pramipexole Dihydrochloride] Other (See Comments)     Unknown reaction    Mobic [Meloxicam] Nausea Only    Nsaids Other (See Comments)     lightheaded    Quinolones Other (See Comments) and Hives    Reglan [Metoclopramide] Other (See Comments)     Hyperactive and stomach pain    Trazodone Other (See Comments)     unknown    Amino Acids Nausea And Vomiting     Other reaction(s): AOF    Corgard [Nadolol] Other (See Comments)     Severe coughing and broke a rib as a result     Erythromycin Nausea Only    Etodolac      GI issues    Garamycin [Gentamicin Sulfate] Other (See Comments)     Redness and irritation    Inderal [Propranolol] Other (See Comments)     Severe cough    Iothalamate Nausea And Vomiting     Other reaction(s): AOF    Lisinopril Itching    Loratadine      Does not work    Ultram [Tramadol] Other (See Comments)     Didn't work         Last DS11 on 03/18/22. Please review. [place], and [place]:  Oxy 10 mg  5/17/22   #90   . Due now.

## 2022-06-22 NOTE — TELEPHONE ENCOUNTER
[facility]  [address]  Telephone [phone]  Fax [phone]      PROCEDURE INSTRUCTIONS FOR  PAIN MANAGEMENT PROCEDURES WITHOUT IV SEDATION    Lina Ramirez scheduled to see Dr. Nalini Peace to undergo the following procedure:  Bilateral L3-L4 L4-L5 L5-S1 Confirmatory Medial Branch Block    Procedure Date: ____7/7/22____  Miroslava Christopher will receive a phone call the day prior to your procedure to confirm a time of arrival.    Report to the [place] [id number], Registration office on the 1st floor in the hospital, after check in and signing of paper work you will then go to the second floor to the surgery center. 1. Stop the following medications prior to the procedure:    none  Stop blood thinners as directed before the injection, with permission from your cardiologist or primary care physician. We will send a letter to them requesting permission to hold the blood thinners. If you take Warfarin (Coumadin), you must have your blood drawn for an INR the day before the procedure. INR must be less than 1.5. if not complete prior to check in the procedure this will be drawn at the procedure center prior to having the procedure completed. 2.  Take all routine medications unless otherwise instructed. Ok to take vitamins and antiinflammatory medications    3. EATING & DRINKING:  Ok to eat or drink before the injection - no IV sedation will be used. 4. If you are allergic to contrast or iodine, you must take benadryl and prednisone prior to the injection to prevent an allergic reaction. Follow the directions on the prescription for the times to take the medication. 5. Oral valium can be prescribed if your are anxious about the injections or feel that you can not lay still during the injection. If you take valium, you must have a . Make arrangements for a family member or friend to drive you to the surgery center.   Your ride must stay in the hospital while

## 2022-06-26 ENCOUNTER — OFFICE VISIT (OUTPATIENT)
Dept: PRIMARY CARE CLINIC | Age: 82
End: 2022-06-26
Payer: MEDICARE

## 2022-06-26 ENCOUNTER — HOSPITAL ENCOUNTER (OUTPATIENT)
Dept: GENERAL RADIOLOGY | Age: 82
Discharge: HOME | End: 2022-06-28
Payer: MEDICARE

## 2022-06-26 ENCOUNTER — HOSPITAL ENCOUNTER (OUTPATIENT)
Age: 82
Discharge: HOME | End: 2022-06-28
Payer: MEDICARE

## 2022-06-26 VITALS
WEIGHT: 174 LBS | DIASTOLIC BLOOD PRESSURE: 66 MMHG | SYSTOLIC BLOOD PRESSURE: 112 MMHG | HEIGHT: 64 IN | HEART RATE: 70 BPM | RESPIRATION RATE: 18 BRPM | TEMPERATURE: 98.4 F | OXYGEN SATURATION: 98 % | BODY MASS INDEX: 29.71 KG/M2

## 2022-06-26 DIAGNOSIS — R52 PAIN: ICD-10-CM

## 2022-06-26 DIAGNOSIS — G89.29 OTHER CHRONIC PAIN: ICD-10-CM

## 2022-06-26 DIAGNOSIS — R52 PAIN: Primary | ICD-10-CM

## 2022-06-26 DIAGNOSIS — T50.B95A PAIN AT INJECTION SITE OF COVID-19 VACCINE: ICD-10-CM

## 2022-06-26 DIAGNOSIS — R52 PAIN AT INJECTION SITE OF COVID-19 VACCINE: ICD-10-CM

## 2022-06-26 DIAGNOSIS — M79.601 PAIN OF RIGHT UPPER EXTREMITY: ICD-10-CM

## 2022-06-26 PROCEDURE — 73030 X-RAY EXAM OF SHOULDER: CPT

## 2022-06-26 PROCEDURE — 73130 X-RAY EXAM OF HAND: CPT

## 2022-06-26 PROCEDURE — G8417 CALC BMI ABV UP PARAM F/U: HCPCS | Performed by: NURSE PRACTITIONER

## 2022-06-26 PROCEDURE — G8400 PT W/DXA NO RESULTS DOC: HCPCS | Performed by: NURSE PRACTITIONER

## 2022-06-26 PROCEDURE — 73080 X-RAY EXAM OF ELBOW: CPT

## 2022-06-26 PROCEDURE — 99213 OFFICE O/P EST LOW 20 MIN: CPT | Performed by: NURSE PRACTITIONER

## 2022-06-26 PROCEDURE — 1090F PRES/ABSN URINE INCON ASSESS: CPT | Performed by: NURSE PRACTITIONER

## 2022-06-26 PROCEDURE — 73110 X-RAY EXAM OF WRIST: CPT

## 2022-06-26 PROCEDURE — G8427 DOCREV CUR MEDS BY ELIG CLIN: HCPCS | Performed by: NURSE PRACTITIONER

## 2022-06-26 PROCEDURE — 73090 X-RAY EXAM OF FOREARM: CPT

## 2022-06-26 PROCEDURE — 99212 OFFICE O/P EST SF 10 MIN: CPT

## 2022-06-26 PROCEDURE — 1123F ACP DISCUSS/DSCN MKR DOCD: CPT | Performed by: NURSE PRACTITIONER

## 2022-06-26 PROCEDURE — 1036F TOBACCO NON-USER: CPT | Performed by: NURSE PRACTITIONER

## 2022-06-26 RX ORDER — IPRATROPIUM BROMIDE 42 UG/1
SPRAY, METERED NASAL
COMMUNITY
Start: 2022-05-20

## 2022-06-26 RX ORDER — FLUTICASONE PROPIONATE 50 MCG
SPRAY, SUSPENSION (ML) NASAL
COMMUNITY
Start: 2022-05-26

## 2022-06-26 SDOH — ECONOMIC STABILITY: FOOD INSECURITY: WITHIN THE PAST 12 MONTHS, THE FOOD YOU BOUGHT JUST DIDN'T LAST AND YOU DIDN'T HAVE MONEY TO GET MORE.: NEVER TRUE

## 2022-06-26 SDOH — ECONOMIC STABILITY: FOOD INSECURITY: WITHIN THE PAST 12 MONTHS, YOU WORRIED THAT YOUR FOOD WOULD RUN OUT BEFORE YOU GOT MONEY TO BUY MORE.: NEVER TRUE

## 2022-06-26 ASSESSMENT — ENCOUNTER SYMPTOMS
WHEEZING: 0
DIARRHEA: 0
ABDOMINAL PAIN: 0
ALLERGIC/IMMUNOLOGIC NEGATIVE: 1
GASTROINTESTINAL NEGATIVE: 1
CONSTIPATION: 0
BACK PAIN: 0
SHORTNESS OF BREATH: 0
COUGH: 0
EYES NEGATIVE: 1
RESPIRATORY NEGATIVE: 1
CHEST TIGHTNESS: 0

## 2022-06-26 ASSESSMENT — SOCIAL DETERMINANTS OF HEALTH (SDOH): HOW HARD IS IT FOR YOU TO PAY FOR THE VERY BASICS LIKE FOOD, HOUSING, MEDICAL CARE, AND HEATING?: NOT HARD AT ALL

## 2022-06-26 NOTE — PROGRESS NOTES
2008    no blockage    Heart palpitations     History of anesthesia complications     pt states she went \"nuts\" with last anes. (total left knee 3/2015 at [facility])    History of renal stone     passed    Hx of blood clots 1970    DVT    Hypertension     MVP (mitral valve prolapse)     LONI (obstructive sleep apnea)     has not used bipap since June, 2020    Rectocele     Spinal stenosis     Thyroid disease late 1980's    overactive radioactive iodine done        Allergies   Allergen Reactions    Sulfa Antibiotics Shortness Of Breath    Sulfamethoxazole-Trimethoprim Anaphylaxis     (Bactrim)    Ansaid [Flurbiprofen] Other (See Comments)     Light headed and difficult with vision \"seeing\"    Gentamicin Other (See Comments)     Eye ointment caused eye swelling, redness    Motrin [Ibuprofen] Other (See Comments)     \"I coughed so bad I broke a rib\"    Quinidine      Light headed    Chlorthalidone Hives    Hydrocodone-Acetaminophen     Mirapex [Pramipexole Dihydrochloride] Other (See Comments)     Unknown reaction    Mobic [Meloxicam] Nausea Only    Nsaids Other (See Comments)     lightheaded    Quinolones Other (See Comments) and Hives    Reglan [Metoclopramide] Other (See Comments)     Hyperactive and stomach pain    Trazodone Other (See Comments)     unknown    Amino Acids Nausea And Vomiting     Other reaction(s): AOF    Corgard [Nadolol] Other (See Comments)     Severe coughing and broke a rib as a result     Erythromycin Nausea Only    Etodolac      GI issues    Garamycin [Gentamicin Sulfate] Other (See Comments)     Redness and irritation    Inderal [Propranolol] Other (See Comments)     Severe cough    Iothalamate Nausea And Vomiting     Other reaction(s): AOF    Lisinopril Itching    Loratadine      Does not work    Ultram [Tramadol] Other (See Comments)     Didn't work         [facility] Readings from Last 3 Encounters:   06/26/22 174 lb (78.9 kg)   06/14/22 173 lb (78.5 kg) 06/14/22 173 lb (78.5 kg)     BP Readings from Last 3 Encounters:   06/26/22 112/66   06/14/22 121/87   06/14/22 (!) 143/61      Temp Readings from Last 3 Encounters:   06/26/22 98.4 °F (36.9 °C) (Tympanic)   06/14/22 97.2 °F (36.2 °C) (Tympanic)   06/14/22 (!) 96.3 °F (35.7 °C) (Tympanic)     Pulse Readings from Last 3 Encounters:   06/26/22 70   06/14/22 74   06/14/22 79     SpO2 Readings from Last 3 Encounters:   06/26/22 98%   06/14/22 96%   06/14/22 95%       Subjective:      Review of Systems   Constitutional: Negative for activity change, appetite change, chills, fatigue and fever. HENT: Negative. Negative for sneezing. Eyes: Negative. Negative for visual disturbance. Respiratory: Negative. Negative for cough, chest tightness, shortness of breath and wheezing. Cardiovascular: Negative. Negative for chest pain, palpitations and leg swelling. Gastrointestinal: Negative. Negative for abdominal pain, constipation and diarrhea. Endocrine: Negative. Negative for polydipsia, polyphagia and polyuria. Genitourinary: Negative. Negative for difficulty urinating. Musculoskeletal: Positive for arthralgias (chronic pain.) and joint swelling (right arm at injection site was swollen earlier in the day and has gone down with ice.). Negative for back pain and myalgias. Skin: Negative for rash. Allergic/Immunologic: Negative. Negative for environmental allergies. Neurological: Negative. Negative for dizziness, syncope, weakness and light-headedness. Hematological: Negative. Psychiatric/Behavioral: Negative. Negative for dysphoric mood. All other systems reviewed and are negative. Objective:     Vitals:    06/26/22 1611   BP: 112/66   Site: Left Upper Arm   Position: Sitting   Cuff Size: Medium Adult   Pulse: 70   Resp: 18   Temp: 98.4 °F (36.9 °C)   TempSrc: Tympanic   SpO2: 98%   Weight: 174 lb (78.9 kg)   Height: 5' 4\" (1.626 m)     Body mass index is 29.87 kg/m².     /66 (Site: Left Upper Arm, Position: Sitting, Cuff Size: Medium Adult)   Pulse 70   Temp 98.4 °F (36.9 °C) (Tympanic)   Resp 18   Ht 5' 4\" (1.626 m)   Wt 174 lb (78.9 kg)   LMP  (LMP Unknown)   SpO2 98%   BMI 29.87 kg/m²   Physical Exam  Vitals and nursing note reviewed. Musculoskeletal:        Arms:       Comments: Right ring finger contracted due to trigger finger. Scar on top of right shoulder from rotator cuff surgery x 2   Small firm knot felt to upper deltoid area from injection. Has chronic numbness and tingling to both hands. Skin:     Findings: Bruising present. Comments: Bruising to face and forehead from fall. Sutures removed Friday. Assessment:      Diagnosis Orders   1. Pain  XR WRIST RIGHT (MIN 3 VIEWS)    XR HAND RIGHT (MIN 3 VIEWS)    XR RADIUS ULNA RIGHT (2 VIEWS)    XR SHOULDER RIGHT (MIN 2 VIEWS)    XR ELBOW RIGHT (MIN 3 VIEWS)   2. Pain of right upper extremity     3. Other chronic pain     4. Pain at injection site of COVID-19 vaccine       XR HAND RIGHT (MIN 3 VIEWS)  Narrative: EXAMINATION:  THREE XRAY VIEWS OF THE RIGHT ELBOW; THREE XRAY VIEWS OF THE RIGHT HAND; TWO  XRAY VIEWS OF THE RIGHT FOREARM; THREE XRAY VIEWS OF THE RIGHT SHOULDER;  THREE XRAY VIEWS OF THE RIGHT WRIST    6/26/2022 4:56 pm    COMPARISON:  None. HISTORY:  ORDERING SYSTEM PROVIDED HISTORY: Pain  TECHNOLOGIST PROVIDED HISTORY:  Reason for Exam: Pt fell a week ago, pain in the right elbow    FINDINGS:  HAND:    No evidence of fracture. There is marked flexion of the 4th digit PIP joint,  unclear whether related to acute injury. 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 Bony mineralization is normal. No appreciable soft  tissue abnormality. WRIST:    No evidence of fracture or dislocation involving the wrist.  Chondrocalcinosis overlies the TFCC.   There are mild degenerative changes of  the wrist including mild radiocarpal joint space narrowing and mild  osteoarthritis of the 1st digit CMC joint. No appreciable soft tissue  abnormality. FOREARM:    Bone mineralization is normal. There is no evidence of acute fracture or  dislocation. Proximal and distal joint relationships are maintained. No  appreciable soft tissue abnormality. ELBOW:    Normal alignment and mineralization of the bones. There are moderate  degenerative changes of the elbow joint. No acute fracture. No joint  effusion. Soft tissues are unremarkable. SHOULDER:    Curvilinear osseous fragment adjacent to the coracoid process on the scapular  Y-view is indeterminate for fracture. .  Moderate degenerative changes of the  glenohumeral and acromioclavicular joints. Acromioclavicular and  coracoacromial spaces appear preserved. Visualized lung parenchyma appears  well aerated. Partially visualized pacemaker leads. No appreciable soft  tissue abnormality. Impression: 1. Curvilinear osseous fragment adjacent to the coracoid process on the  scapular Y-view is indeterminate for fracture. Correlate with location of  patient's pain. 2.  There is marked flexion of the 4th digit PIP joint without evidence of  fracture, unclear whether related to acute injury. Correlate with clinical  history. 2.  Moderate degenerative changes of the glenohumeral, acromioclavicular, and  elbow joints. Mild degenerative changes of the wrist and interphalangeal  joints. XR RADIUS ULNA RIGHT (2 VIEWS)  Narrative: EXAMINATION:  THREE XRAY VIEWS OF THE RIGHT ELBOW; THREE XRAY VIEWS OF THE RIGHT HAND; TWO  XRAY VIEWS OF THE RIGHT FOREARM; THREE XRAY VIEWS OF THE RIGHT SHOULDER;  THREE XRAY VIEWS OF THE RIGHT WRIST    6/26/2022 4:56 pm    COMPARISON:  None. HISTORY:  ORDERING SYSTEM PROVIDED HISTORY: Pain  TECHNOLOGIST PROVIDED HISTORY:  Reason for Exam: Pt fell a week ago, pain in the right elbow    FINDINGS:  HAND:    No evidence of fracture.   There is marked flexion of the 4th digit PIP joint,  unclear whether related to acute injury. 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 Bony mineralization is normal. No appreciable soft  tissue abnormality. WRIST:    No evidence of fracture or dislocation involving the wrist.  Chondrocalcinosis overlies the TFCC. There are mild degenerative changes of  the wrist including mild radiocarpal joint space narrowing and mild  osteoarthritis of the 1st digit CMC joint. No appreciable soft tissue  abnormality. FOREARM:    Bone mineralization is normal. There is no evidence of acute fracture or  dislocation. Proximal and distal joint relationships are maintained. No  appreciable soft tissue abnormality. ELBOW:    Normal alignment and mineralization of the bones. There are moderate  degenerative changes of the elbow joint. No acute fracture. No joint  effusion. Soft tissues are unremarkable. SHOULDER:    Curvilinear osseous fragment adjacent to the coracoid process on the scapular  Y-view is indeterminate for fracture. .  Moderate degenerative changes of the  glenohumeral and acromioclavicular joints. Acromioclavicular and  coracoacromial spaces appear preserved. Visualized lung parenchyma appears  well aerated. Partially visualized pacemaker leads. No appreciable soft  tissue abnormality. Impression: 1. Curvilinear osseous fragment adjacent to the coracoid process on the  scapular Y-view is indeterminate for fracture. Correlate with location of  patient's pain. 2.  There is marked flexion of the 4th digit PIP joint without evidence of  fracture, unclear whether related to acute injury. Correlate with clinical  history. 2.  Moderate degenerative changes of the glenohumeral, acromioclavicular, and  elbow joints. Mild degenerative changes of the wrist and interphalangeal  joints.   XR SHOULDER RIGHT (MIN 2 VIEWS)  Narrative: EXAMINATION:  THREE XRAY VIEWS OF THE RIGHT ELBOW; THREE XRAY VIEWS OF THE RIGHT HAND; TWO  XRAY VIEWS OF THE RIGHT FOREARM; THREE XRAY VIEWS OF THE RIGHT SHOULDER;  THREE XRAY VIEWS OF THE RIGHT WRIST    6/26/2022 4:56 pm    COMPARISON:  None. HISTORY:  ORDERING SYSTEM PROVIDED HISTORY: Pain  TECHNOLOGIST PROVIDED HISTORY:  Reason for Exam: Pt fell a week ago, pain in the right elbow    FINDINGS:  HAND:    No evidence of fracture. There is marked flexion of the 4th digit PIP joint,  unclear whether related to acute injury. 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 Bony mineralization is normal. No appreciable soft  tissue abnormality. WRIST:    No evidence of fracture or dislocation involving the wrist.  Chondrocalcinosis overlies the TFCC. There are mild degenerative changes of  the wrist including mild radiocarpal joint space narrowing and mild  osteoarthritis of the 1st digit CMC joint. No appreciable soft tissue  abnormality. FOREARM:    Bone mineralization is normal. There is no evidence of acute fracture or  dislocation. Proximal and distal joint relationships are maintained. No  appreciable soft tissue abnormality. ELBOW:    Normal alignment and mineralization of the bones. There are moderate  degenerative changes of the elbow joint. No acute fracture. No joint  effusion. Soft tissues are unremarkable. SHOULDER:    Curvilinear osseous fragment adjacent to the coracoid process on the scapular  Y-view is indeterminate for fracture. .  Moderate degenerative changes of the  glenohumeral and acromioclavicular joints. Acromioclavicular and  coracoacromial spaces appear preserved. Visualized lung parenchyma appears  well aerated. Partially visualized pacemaker leads. No appreciable soft  tissue abnormality. Impression: 1. Curvilinear osseous fragment adjacent to the coracoid process on the  scapular Y-view is indeterminate for fracture. Correlate with location of  patient's pain.     2.  There is marked flexion of the 4th digit PIP joint without evidence of  fracture, unclear whether related to acute injury. Correlate with clinical  history. 2.  Moderate degenerative changes of the glenohumeral, acromioclavicular, and  elbow joints. Mild degenerative changes of the wrist and interphalangeal  joints. XR WRIST RIGHT (MIN 3 VIEWS)  Narrative: EXAMINATION:  THREE XRAY VIEWS OF THE RIGHT ELBOW; THREE XRAY VIEWS OF THE RIGHT HAND; TWO  XRAY VIEWS OF THE RIGHT FOREARM; THREE XRAY VIEWS OF THE RIGHT SHOULDER;  THREE XRAY VIEWS OF THE RIGHT WRIST    6/26/2022 4:56 pm    COMPARISON:  None. HISTORY:  ORDERING SYSTEM PROVIDED HISTORY: Pain  TECHNOLOGIST PROVIDED HISTORY:  Reason for Exam: Pt fell a week ago, pain in the right elbow    FINDINGS:  HAND:    No evidence of fracture. There is marked flexion of the 4th digit PIP joint,  unclear whether related to acute injury. 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 Bony mineralization is normal. No appreciable soft  tissue abnormality. WRIST:    No evidence of fracture or dislocation involving the wrist.  Chondrocalcinosis overlies the TFCC. There are mild degenerative changes of  the wrist including mild radiocarpal joint space narrowing and mild  osteoarthritis of the 1st digit CMC joint. No appreciable soft tissue  abnormality. FOREARM:    Bone mineralization is normal. There is no evidence of acute fracture or  dislocation. Proximal and distal joint relationships are maintained. No  appreciable soft tissue abnormality. ELBOW:    Normal alignment and mineralization of the bones. There are moderate  degenerative changes of the elbow joint. No acute fracture. No joint  effusion. Soft tissues are unremarkable. SHOULDER:    Curvilinear osseous fragment adjacent to the coracoid process on the scapular  Y-view is indeterminate for fracture. .  Moderate degenerative changes of the  glenohumeral and acromioclavicular joints. Acromioclavicular and  coracoacromial spaces appear preserved. Visualized lung parenchyma appears  well aerated. Partially visualized pacemaker leads. No appreciable soft  tissue abnormality. Impression: 1. Curvilinear osseous fragment adjacent to the coracoid process on the  scapular Y-view is indeterminate for fracture. Correlate with location of  patient's pain. 2.  There is marked flexion of the 4th digit PIP joint without evidence of  fracture, unclear whether related to acute injury. Correlate with clinical  history. 2.  Moderate degenerative changes of the glenohumeral, acromioclavicular, and  elbow joints. Mild degenerative changes of the wrist and interphalangeal  joints. XR ELBOW RIGHT (MIN 3 VIEWS)  Narrative: EXAMINATION:  THREE XRAY VIEWS OF THE RIGHT ELBOW; THREE XRAY VIEWS OF THE RIGHT HAND; TWO  XRAY VIEWS OF THE RIGHT FOREARM; THREE XRAY VIEWS OF THE RIGHT SHOULDER;  THREE XRAY VIEWS OF THE RIGHT WRIST    6/26/2022 4:56 pm    COMPARISON:  None. HISTORY:  ORDERING SYSTEM PROVIDED HISTORY: Pain  TECHNOLOGIST PROVIDED HISTORY:  Reason for Exam: Pt fell a week ago, pain in the right elbow    FINDINGS:  HAND:    No evidence of fracture. There is marked flexion of the 4th digit PIP joint,  unclear whether related to acute injury. 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 Bony mineralization is normal. No appreciable soft  tissue abnormality. WRIST:    No evidence of fracture or dislocation involving the wrist.  Chondrocalcinosis overlies the TFCC. There are mild degenerative changes of  the wrist including mild radiocarpal joint space narrowing and mild  osteoarthritis of the 1st digit CMC joint. No appreciable soft tissue  abnormality. FOREARM:    Bone mineralization is normal. There is no evidence of acute fracture or  dislocation. Proximal and distal joint relationships are maintained. No  appreciable soft tissue abnormality. ELBOW:    Normal alignment and mineralization of the bones.  There are moderate  degenerative changes of the elbow joint. No acute fracture. No joint  effusion. Soft tissues are unremarkable. SHOULDER:    Curvilinear osseous fragment adjacent to the coracoid process on the scapular  Y-view is indeterminate for fracture. .  Moderate degenerative changes of the  glenohumeral and acromioclavicular joints. Acromioclavicular and  coracoacromial spaces appear preserved. Visualized lung parenchyma appears  well aerated. Partially visualized pacemaker leads. No appreciable soft  tissue abnormality. Impression: 1. Curvilinear osseous fragment adjacent to the coracoid process on the  scapular Y-view is indeterminate for fracture. Correlate with location of  patient's pain. 2.  There is marked flexion of the 4th digit PIP joint without evidence of  fracture, unclear whether related to acute injury. Correlate with clinical  history. 2.  Moderate degenerative changes of the glenohumeral, acromioclavicular, and  elbow joints. Mild degenerative changes of the wrist and interphalangeal  joints. Plan:   Reviewed x-rays with patient. No obvious fractures identified. Questionable area on scapula is an area that she has had pain since her fall and is also present on the left side. Her increased pain is most likely in relation to COVID booster and also receiving allergy shot in the same arm. Continue to use current medications. May apply ice to the area for comfort. May also alternate with heat. Discussed exam, POCT findings, plan of care, and follow-up at length with patient. Reviewed all prescribed and recommended medications, administration and side effects. Encouraged patient to follow up with PCP or return to the clinic for no improvement and or worsening of symptoms. Patient instructed to go to ER or call 911 if any difficulty breathing, shortness of breath, inability to swallow, hives or temp greater than 103 degrees.  All questions were answered and they verbalized understanding and were agreeable with the plan. Return if symptoms worsen or fail to improve.         Electronically signed by KENY Denton CNP on 6/26/2022 at 6:15 PM

## 2022-07-07 ENCOUNTER — HOSPITAL ENCOUNTER (OUTPATIENT)
Age: 82
Setting detail: OUTPATIENT SURGERY
Discharge: HOME | End: 2022-07-07
Attending: PHYSICAL MEDICINE & REHABILITATION | Admitting: PHYSICAL MEDICINE & REHABILITATION
Payer: MEDICARE

## 2022-07-07 ENCOUNTER — APPOINTMENT (OUTPATIENT)
Dept: GENERAL RADIOLOGY | Age: 82
End: 2022-07-07
Attending: PHYSICAL MEDICINE & REHABILITATION
Payer: MEDICARE

## 2022-07-07 ENCOUNTER — TELEPHONE (OUTPATIENT)
Dept: PAIN MANAGEMENT | Age: 82
End: 2022-07-07

## 2022-07-07 VITALS
RESPIRATION RATE: 14 BRPM | BODY MASS INDEX: 29.37 KG/M2 | HEART RATE: 81 BPM | TEMPERATURE: 97 F | DIASTOLIC BLOOD PRESSURE: 91 MMHG | OXYGEN SATURATION: 98 % | HEIGHT: 64 IN | SYSTOLIC BLOOD PRESSURE: 167 MMHG | WEIGHT: 172 LBS

## 2022-07-07 PROCEDURE — 2500000003 HC RX 250 WO HCPCS: Performed by: PHYSICAL MEDICINE & REHABILITATION

## 2022-07-07 PROCEDURE — 3600000056 HC PAIN LEVEL 4 BASE: Performed by: PHYSICAL MEDICINE & REHABILITATION

## 2022-07-07 PROCEDURE — 3600000057 HC PAIN LEVEL 4 ADDL 15 MIN: Performed by: PHYSICAL MEDICINE & REHABILITATION

## 2022-07-07 PROCEDURE — 64495 INJ PARAVERT F JNT L/S 3 LEV: CPT | Performed by: PHYSICAL MEDICINE & REHABILITATION

## 2022-07-07 PROCEDURE — 7100000010 HC PHASE II RECOVERY - FIRST 15 MIN: Performed by: PHYSICAL MEDICINE & REHABILITATION

## 2022-07-07 PROCEDURE — 2709999900 HC NON-CHARGEABLE SUPPLY: Performed by: PHYSICAL MEDICINE & REHABILITATION

## 2022-07-07 PROCEDURE — 6360000004 HC RX CONTRAST MEDICATION: Performed by: PHYSICAL MEDICINE & REHABILITATION

## 2022-07-07 PROCEDURE — 64494 INJ PARAVERT F JNT L/S 2 LEV: CPT | Performed by: PHYSICAL MEDICINE & REHABILITATION

## 2022-07-07 PROCEDURE — 7100000011 HC PHASE II RECOVERY - ADDTL 15 MIN: Performed by: PHYSICAL MEDICINE & REHABILITATION

## 2022-07-07 PROCEDURE — 3209999900 FLUORO FOR SURGICAL PROCEDURES

## 2022-07-07 PROCEDURE — 64493 INJ PARAVERT F JNT L/S 1 LEV: CPT | Performed by: PHYSICAL MEDICINE & REHABILITATION

## 2022-07-07 RX ORDER — BUPIVACAINE HYDROCHLORIDE 7.5 MG/ML
INJECTION, SOLUTION EPIDURAL; RETROBULBAR PRN
Status: DISCONTINUED | OUTPATIENT
Start: 2022-07-07 | End: 2022-07-07 | Stop reason: ALTCHOICE

## 2022-07-07 RX ORDER — SODIUM CHLORIDE 0.9 % (FLUSH) 0.9 %
5-40 SYRINGE (ML) INJECTION PRN
Status: DISCONTINUED | OUTPATIENT
Start: 2022-07-07 | End: 2022-07-07 | Stop reason: HOSPADM

## 2022-07-07 RX ORDER — SODIUM CHLORIDE 9 MG/ML
INJECTION, SOLUTION INTRAVENOUS PRN
Status: DISCONTINUED | OUTPATIENT
Start: 2022-07-07 | End: 2022-07-07 | Stop reason: HOSPADM

## 2022-07-07 RX ORDER — SODIUM CHLORIDE 0.9 % (FLUSH) 0.9 %
5-40 SYRINGE (ML) INJECTION EVERY 12 HOURS SCHEDULED
Status: DISCONTINUED | OUTPATIENT
Start: 2022-07-07 | End: 2022-07-07 | Stop reason: HOSPADM

## 2022-07-07 ASSESSMENT — PAIN - FUNCTIONAL ASSESSMENT: PAIN_FUNCTIONAL_ASSESSMENT: 0-10

## 2022-07-07 ASSESSMENT — ENCOUNTER SYMPTOMS
ALLERGIC/IMMUNOLOGIC NEGATIVE: 1
CONSTIPATION: 0
RESPIRATORY NEGATIVE: 1
EYES NEGATIVE: 1
DIARRHEA: 0
BACK PAIN: 1

## 2022-07-07 ASSESSMENT — PAIN DESCRIPTION - DESCRIPTORS: DESCRIPTORS: ACHING

## 2022-07-07 ASSESSMENT — PAIN SCALES - GENERAL: PAINLEVEL_OUTOF10: 3

## 2022-07-07 NOTE — INTERVAL H&P NOTE
I have interviewed and examined the patient and reviewed the recent History and Physical.  There have been no changes to the recent H&P documentation. The surgical consent form has been signed. Last anticoagulant medication use was:na    Premedication taken for contrast allergy? No    Valium taken for oral sedation? No    No outpatient medications have been marked as taking for the 7/7/22 encounter [facility] Encounter). The patient understands the planned operation and its associated risks and benefits and agrees to proceed.         Electronically signed by Campbell Mckeon MD on 7/7/2022 at 11:29 AM

## 2022-07-07 NOTE — TELEPHONE ENCOUNTER
Bilateral L3/L4 L4/L5 L5/S1 CMBB done today. Left L3/L4 L4/L5 L5/S1 RFA  with no sedation scheduled for 8/15/22 with surgery center notified. Right L3/L4 L4/L5 L5/S1 RFA  with no sedation scheduled for 8/29/22 with surgery center notified. Catalino Lee

## 2022-07-07 NOTE — OP NOTE
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 bilateral L3-44 -- 5- 5 S1 medial branch blocks are requested for diagnostic reasons. Conservative treatment was ineffective i.e.: ice, NSAIDS, rest, narcotic medication, chiropractic care, physical therapy and message therapy. Patient is unable to perform the following ADL's: ambulating and grooming     Pain Assessment: 0-10  Pain Level: 5     Pain Orientation: Right,Left,Lower  Pain Location: Back  Pain Descriptors: Aching    Last Plain films: 2020    EXAMINATION:  bilateral L3-4 4--5- - 5S1 medial branch blocks. CONSENT:  Written consent was obtained from the patient on preprinted consent form after explaining the procedure, indications, potential complications and outcomes. Alternative treatments were also discussed. DISCUSSION:  The patient was sterilely prepped and draped in the usual fashion in the prone position. Time out was verified for correct patient, side, level and procedure. SEDATION:   No conscious sedation was performed during the procedure. The patient remained awake and conversed throughout the procedure. The patient underwent pulse oximetry and blood pressure monitoring independently by a trained observer, as well as by a physician.     PROCEDURE:   Under image-intensifier control, 22 gauge needle x 5 inch spinal needles were directed into the bilateral L3-4 -4- 5- 5S1 medial branches of the dorsal rami at the target points, according to ARACELI guidelines. Needle tip positions were confirmed with 0.2 mL of Omnipaque 240 contrast medium. Then, 1mL of equal volumes of 0.75% bupivacaine // 2% lidocaine / and Celestone> were instilled at each site. EBL: no blood loss    SPECIMEN: none    The patient tolerated the procedure well and without complications and was noted to be in stable condition prior to discharge from the procedure center with discharge instructions. IMPRESSIONS:  1.     bilateral L3-44 - 5 -5S1 medial branch blocks performed uneventfully. 2.      bilateralL3-4 4-5- 5S1 medial branch blocks decreased pain to a 2 on 0 to 10 numeric pain scale at 15 and 30 minutes after the procedure. RECOMMENDATIONS:  1. Complete and return the \"Post-Procedure Pain and Activity Diary.   2. Contact me for symptom exacerbation, fever or unusual symptoms. 4.      Post-procedure care according to verbal and written instructions at discharge. 3. Follow this block with physical therapy, as per MD directions. 4. Consider confirmatory MBB if there is > 80% pain relief and improved activity scores. POST-PROCEDURE LUMBAR/CERVICAL SPINE FLUOROSCOPIC IMAGE INTERPRETATION:    EXAMINATION: AP, lateral, and oblique views. FLUORO TIME: 12 seconds    DISCUSSION:  Spot views of the spine reveal normal alignment and segmentation. Spinal needles are positioned at the base of the SAP at the junction with the transverse process/sacral ala  OR center of  the articular pillars on PA and lateral views. Contrast at needle tip confirms satisfactory placement. Visualized spine reveals bilateral See radiology report. Soft tissues reveal no abnormalities. IMPRESSION:  Satisfactory needle placement and contrast dispersal for bilateral L3-4 4-5 -5- S1 medial branch block.      Electronically signed by April Alejandro MD on 7/7/2022 at 11:31 AM

## 2022-07-07 NOTE — H&P
Subjective:       Patient is here today for a diagnostic/therapeutic injection. 7/7/22    Active Hospital Problems    Diagnosis Date Noted    Lumbar canal stenosis [M48.061] 02/04/2014     Priority: High    Lumbar facet joint syndrome [M47.816] 08/30/2018    Spinal stenosis, lumbar region, without neurogenic claudication [M48.061] 03/15/2014       HPI: Patient is here today for adiagnostic/therapeutic injection. The most recent [facility] office visit notes have been reviewed and are unchanged. Review of Systems   Constitutional: Positive for fatigue. HENT: Negative. Eyes: Negative. Respiratory: Negative. Cardiovascular: Negative. Gastrointestinal: Negative for constipation and diarrhea. Endocrine: Negative. Genitourinary: Negative for difficulty urinating and flank pain. Musculoskeletal: Positive for back pain and myalgias. Skin: Negative. Allergic/Immunologic: Negative. Neurological: Positive for weakness and numbness. Hematological: Negative. Psychiatric/Behavioral: Positive for sleep disturbance.        Allergies   Allergen Reactions    Sulfa Antibiotics Shortness Of Breath    Sulfamethoxazole-Trimethoprim Anaphylaxis     (Bactrim)    Ansaid [Flurbiprofen] Other (See Comments)     Light headed and difficult with vision \"seeing\"    Gentamicin Other (See Comments)     Eye ointment caused eye swelling, redness    Motrin [Ibuprofen] Other (See Comments)     \"I coughed so bad I broke a rib\"    Quinidine      Light headed    Chlorthalidone Hives    Hydrocodone-Acetaminophen     Mirapex [Pramipexole Dihydrochloride] Other (See Comments)     Unknown reaction    Mobic [Meloxicam] Nausea Only    Nsaids Other (See Comments)     lightheaded    Quinolones Other (See Comments) and Hives    Reglan [Metoclopramide] Other (See Comments)     Hyperactive and stomach pain    Trazodone Other (See Comments)     unknown    Amino Acids Nausea And Historical Provider, MD   DULoxetine (CYMBALTA) 30 MG extended release capsule Take 30 mg by mouth daily    Historical Provider, MD   topiramate (TOPAMAX) 25 MG tablet Take 25 mg by mouth 2 times daily    Historical Provider, MD   furosemide (LASIX) 20 MG tablet 20 mg 3 times daily  11/3/20 4/27/22  Historical Provider, MD   naloxone 4 MG/0.1ML LIQD nasal spray 1 spray by Nasal route as needed for Opioid Reversal  Patient not taking: Reported on 6/26/2022 5/27/20   Raúl Dietz MD   baclofen (LIORESAL) 10 MG tablet Take 10 mg by mouth daily     Historical Provider, MD   ketotifen (ZADITOR) 0.025 % ophthalmic solution 1 drop 2 times daily    Historical Provider, MD   Multiple Vitamin (MULTI-VITAMIN DAILY) TABS Take by mouth    Historical Provider, MD   melatonin 1 MG tablet melatonin   once daily    Historical Provider, MD   Triprolidine-Pseudoephedrine (ANTIHISTAMINE PO) Take by mouth Indications: zyrtec     Historical Provider, MD   aspirin 81 MG tablet Take 81 mg by mouth daily    Historical Provider, MD   levothyroxine (SYNTHROID) 75 MCG tablet Take 75 mcg by mouth daily 5/18/18 4/27/22  Historical Provider, MD   guaiFENesin (MUCINEX) 600 MG extended release tablet Mucinex 600 mg tablet, extended release   Take 2 tablets every day by oral route. Historical Provider, MD   lidocaine (ASPERCREME W/LIDOCAINE) 4 % cream Apply topically as needed for Pain Apply topically as needed. Historical Provider, MD   fluticasone propionate 50 MCG/BLIST AEPB Inhale into the lungs    Historical Provider, MD   albuterol (ACCUNEB) 1.25 MG/3ML nebulizer solution Inhale 1 ampule into the lungs every 6 hours as needed for Wheezing     Historical Provider, MD   simvastatin (ZOCOR) 40 MG tablet Take 40 mg by mouth nightly    Historical Provider, MD   flecainide (TAMBOCOR) 50 MG tablet Take 50 mg by mouth 2 times daily. Historical Provider, MD   omeprazole (PRILOSEC) 40 MG capsule Take 40 mg by mouth nightly.     Historical Provider, MD   montelukast (SINGULAIR) 10 MG tablet Take 10 mg by mouth nightly. Historical Provider, MD   DULoxetine (CYMBALTA) 60 MG extended release capsule Take 60 mg by mouth daily     Historical Provider, MD   artificial tears (ARTIFICIAL TEARS) OINT as needed.     Historical Provider, MD   Saline 0.2 % SOLN by Nasal route daily as needed     Historical Provider, MD       Past Medical History:   Diagnosis Date    Anesthesia complication     2nd post op day from total knee patient stated \"I was wild and mean\"    Anxiety and depression     Arthritis     ASD (atrial septal defect)     repair in [address] COPD [facility]ch[facility] obstructive [facility]     COPD (chronic obstructive pulmonary disease) [facility]     Disorder of immune system ([facility]     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 (total left knee 3/2015 at [facility])    History of renal stone     passed    Hx of blood clots     DVT    Hypertension     MVP (mitral valve prolapse)     LONI (obstructive sleep apnea)     has not used bipap since     Rectocele     Spinal stenosis     Thyroid disease late     overactive radioactive iodine done       Past Surgical History:   Procedure Laterality Date    BACK INJECTION Bilateral 2021    Bilateral SACROILIAC Joint & Piriformis Injection performed by Bhupendra Wade MD at [place] [address]. Bilateral 12/3/2021    Bilateral SACROILIAC JOINT & Piriformis Injections performed by Bhupendra Wade MD at [address] [facility] LUMPECTOMY Left     BREAST LUMPECTOMY Left     CARDIAC SURGERY      ASD repair     SECTION  /    2x    CHOLECYSTECTOMY      CHOLECYSTECTOMY      COLONOSCOPY      three    DIAGNOSTIC CARDIAC CATH LAB PROCEDURE      DILATION AND CURETTAGE OF UTERUS  1980    EYE SURGERY Bilateral     cataract    HC INJECTION PROCEDURE FOR SACROILIAC JOINT Left 7/31/2018    Left SIJ and piriformis, left trocanteric bursa injection performed by Tata Kwan MD at [facility] ([address] [place] [address])  12/2009    HYSTERECTOMY (CERVIX STATUS UNKNOWN)  2009    Total    JOINT REPLACEMENT Bilateral     knee    KNEE ARTHROSCOPY Left 2008    LUMBAR SPINE SURGERY Bilateral 2/26/2019    Bilateral L4 TRANSFORAMINAL  [id number] performed by Tata Kwan MD at [address] [place] [address] Bilateral 3/12/2019    Bilateral L4 TRANSFORAMINAL performed by Tata Kwan MD at [address] [place] [address] Right 10/15/2019    RIGHT L4 & L5 TRANSFORAMINAL performed by Tata Kwan MD at [address] [place] [address] Left 11/1/2019    Left L4 & L5 TRANSFORAMINAL performed by Tata Kwan MD at [place] [address]  8/2014    PAIN MANAGEMENT PROCEDURE Right 2/7/2020    Right L4 & L5 TRANSFORAMINAL performed by Tata Kwan MD at [facility] Left 2/21/2020    Left L4 & L5 TRANSFORAMINAL performed by Tata Kwan MD at [facility] Right 2/23/2021    Right L4 & L5 TRANSFORAMINAL performed by Tata Kwan MD at [facility] Left 3/11/2021    left L4 & L5 TRANSFORAMINAL performed by Tata Kwan MD at [facility] Left 5/6/2021    Left L4 & L5 TRANSFORAMINAL performed by Tata Kwan MD at [facility] Bilateral 3/4/2022    Bilateral L4 TRANSFORAMINAL performed by Tata Kwan MD at [address] [place] [address] DX/THER AGNT PARAVERT FACET JOINT, LUMBAR/SAC, 2ND LEVEL Bilateral 8/30/2018    Bilateral L2 L3 L4 L5 Diagnostic Medial BB performed by Tata wKan MD at [address] [place] [address] DX/THER AGNT PARAVERT FACET JOINT, LUMBAR/SAC, 2ND LEVEL Right 9/13/2018 Status: She is alert and oriented to person, place, and time. Psychiatric:         Mood and Affect: Mood normal.         Speech: Speech normal.         Behavior: Behavior normal.       Neurologic Exam     Mental Status   Oriented to person, place, and time. Speech: speech is normal     Motor Exam     Strength   Right quadriceps: 5/5  Left quadriceps: 5/5  Right hamstrin/5  Left hamstrin/5    Back Exam     Tenderness   The patient is experiencing tenderness in the lumbar. Range of Motion   Extension: normal   Flexion: normal   Lateral bend right: normal   Lateral bend left: normal   Rotation right: normal   Rotation left: normal     Muscle Strength   Right Quadriceps:  5/5   Left Quadriceps:  5/5   Right Hamstrings:  5/5   Left Hamstrings:  5/5     Tests   Straight leg raise right: negative  Straight leg raise left: negative    Other   Toe walk: normal  Heel walk: normal  Sensation: normal  Gait: normal             Lab Results   Component Value Date    WBC 11.0 2022    HGB 13.5 2022    HCT 43.5 2022     2022    ALT 13 2022    AST 18 2022     2022    K 3.7 2022     2022    CREATININE 0.71 2022    BUN 14 2022    CO2 28 2022       Assessment:                            Active Hospital Problems    Diagnosis Date Noted    Lumbar canal stenosis [M48.061] 2014     Priority: High    Lumbar facet joint syndrome [M47.816] 2018    Spinal stenosis, lumbar region, without neurogenic claudication [M48.061] 03/15/2014                  Plan:   Proceed with planned procedure  - B L3-4 4-- 5- 5S1 Confirm  MBB    The patientunderstands the planned operation and its associated risks and benefits and agrees to proceed. The surgical consent form has been signed. Last NSAID/anticoagulant medication use was:na    Premedication taken for contrast allergy? No    Valium taken for oral sedation?  No

## 2022-07-19 DIAGNOSIS — M47.817 LUMBOSACRAL SPONDYLOSIS WITHOUT MYELOPATHY: ICD-10-CM

## 2022-07-19 DIAGNOSIS — M53.3 CHRONIC LEFT SACROILIAC JOINT PAIN: ICD-10-CM

## 2022-07-19 DIAGNOSIS — M54.16 LUMBAR RADICULOPATHY: ICD-10-CM

## 2022-07-19 DIAGNOSIS — G89.29 CHRONIC LEFT SACROILIAC JOINT PAIN: ICD-10-CM

## 2022-07-19 NOTE — TELEPHONE ENCOUNTER
Terrence Rosario  called requesting a refill of the below medication which has been pended for you:     Requested Prescriptions     Pending Prescriptions Disp Refills    oxyCODONE HCl (OXY-IR) 10 MG immediate release tablet 90 tablet 0     Sig: Take 1 tablet by mouth in the morning and 1 tablet at noon and 1 tablet before bedtime. Do all this for 30 days. Allergies   Allergen Reactions    Sulfa Antibiotics Shortness Of Breath    Sulfamethoxazole-Trimethoprim Anaphylaxis     (Bactrim)    Ansaid [Flurbiprofen] Other (See Comments)     Light headed and difficult with vision \"seeing\"    Gentamicin Other (See Comments)     Eye ointment caused eye swelling, redness    Motrin [Ibuprofen] Other (See Comments)     \"I coughed so bad I broke a rib\"    Quinidine      Light headed    Chlorthalidone Hives    Hydrocodone-Acetaminophen     Mirapex [Pramipexole Dihydrochloride] Other (See Comments)     Unknown reaction    Mobic [Meloxicam] Nausea Only    Nsaids Other (See Comments)     lightheaded    Quinolones Other (See Comments) and Hives    Reglan [Metoclopramide] Other (See Comments)     Hyperactive and stomach pain    Trazodone Other (See Comments)     unknown    Amino Acids Nausea And Vomiting     Other reaction(s): AOF    Corgard [Nadolol] Other (See Comments)     Severe coughing and broke a rib as a result     Erythromycin Nausea Only    Etodolac      GI issues    Garamycin [Gentamicin Sulfate] Other (See Comments)     Redness and irritation    Inderal [Propranolol] Other (See Comments)     Severe cough    Iothalamate Nausea And Vomiting     Other reaction(s): AOF    Lisinopril Itching    Loratadine      Does not work    Ultram [Tramadol] Other (See Comments)     Didn't work               Last Drug Screen:  3-18-22  Last Appointment:  5/17/2022   Next Appointment:  Visit date not found   [place] checked for [place], and [place]: Oxy IR  10mg 6-22-22 #90. Due NOW.

## 2022-07-20 RX ORDER — OXYCODONE HYDROCHLORIDE 10 MG/1
10 TABLET ORAL 3 TIMES DAILY
Qty: 90 TABLET | Refills: 0 | Status: SHIPPED | OUTPATIENT
Start: 2022-07-20 | End: 2022-08-31 | Stop reason: SDUPTHER

## 2022-08-01 ENCOUNTER — OFFICE VISIT (OUTPATIENT)
Dept: FAMILY MEDICINE CLINIC | Age: 82
End: 2022-08-01
Payer: MEDICARE

## 2022-08-01 VITALS
TEMPERATURE: 98 F | RESPIRATION RATE: 16 BRPM | HEIGHT: 64 IN | DIASTOLIC BLOOD PRESSURE: 60 MMHG | HEART RATE: 74 BPM | BODY MASS INDEX: 30.59 KG/M2 | OXYGEN SATURATION: 97 % | SYSTOLIC BLOOD PRESSURE: 130 MMHG | WEIGHT: 179.2 LBS

## 2022-08-01 DIAGNOSIS — R79.89 ABNORMAL CBC: ICD-10-CM

## 2022-08-01 DIAGNOSIS — R73.09 ELEVATED HEMOGLOBIN A1C: ICD-10-CM

## 2022-08-01 DIAGNOSIS — E78.2 MIXED HYPERLIPIDEMIA: Primary | ICD-10-CM

## 2022-08-01 DIAGNOSIS — Z00.00 WELLNESS EXAMINATION: ICD-10-CM

## 2022-08-01 DIAGNOSIS — R74.8 ELEVATED ALKALINE PHOSPHATASE LEVEL: ICD-10-CM

## 2022-08-01 DIAGNOSIS — I10 ESSENTIAL HYPERTENSION: ICD-10-CM

## 2022-08-01 DIAGNOSIS — Z13.220 ENCOUNTER FOR SCREENING FOR LIPOID DISORDERS: ICD-10-CM

## 2022-08-01 LAB — HBA1C MFR BLD: 5.5 %

## 2022-08-01 PROCEDURE — G8427 DOCREV CUR MEDS BY ELIG CLIN: HCPCS | Performed by: FAMILY MEDICINE

## 2022-08-01 PROCEDURE — 1090F PRES/ABSN URINE INCON ASSESS: CPT | Performed by: FAMILY MEDICINE

## 2022-08-01 PROCEDURE — 1036F TOBACCO NON-USER: CPT | Performed by: FAMILY MEDICINE

## 2022-08-01 PROCEDURE — G8417 CALC BMI ABV UP PARAM F/U: HCPCS | Performed by: FAMILY MEDICINE

## 2022-08-01 PROCEDURE — 99213 OFFICE O/P EST LOW 20 MIN: CPT | Performed by: FAMILY MEDICINE

## 2022-08-01 PROCEDURE — 83036 HEMOGLOBIN GLYCOSYLATED A1C: CPT | Performed by: FAMILY MEDICINE

## 2022-08-01 PROCEDURE — 1123F ACP DISCUSS/DSCN MKR DOCD: CPT | Performed by: FAMILY MEDICINE

## 2022-08-01 PROCEDURE — PBSHW POCT GLYCOSYLATED HEMOGLOBIN (HGB A1C): Performed by: FAMILY MEDICINE

## 2022-08-01 PROCEDURE — 99214 OFFICE O/P EST MOD 30 MIN: CPT | Performed by: FAMILY MEDICINE

## 2022-08-01 PROCEDURE — G8400 PT W/DXA NO RESULTS DOC: HCPCS | Performed by: FAMILY MEDICINE

## 2022-08-01 ASSESSMENT — PATIENT HEALTH QUESTIONNAIRE - PHQ9
SUM OF ALL RESPONSES TO PHQ QUESTIONS 1-9: 2
SUM OF ALL RESPONSES TO PHQ QUESTIONS 1-9: 2
SUM OF ALL RESPONSES TO PHQ9 QUESTIONS 1 & 2: 2
1. LITTLE INTEREST OR PLEASURE IN DOING THINGS: 0
2. FEELING DOWN, DEPRESSED OR HOPELESS: 2
SUM OF ALL RESPONSES TO PHQ QUESTIONS 1-9: 2
SUM OF ALL RESPONSES TO PHQ QUESTIONS 1-9: 2

## 2022-08-01 ASSESSMENT — ENCOUNTER SYMPTOMS
PHOTOPHOBIA: 0
CHEST TIGHTNESS: 0
ABDOMINAL PAIN: 0
COUGH: 0
CHOKING: 1
BACK PAIN: 1
SHORTNESS OF BREATH: 0
WHEEZING: 0

## 2022-08-01 NOTE — PROGRESS NOTES
Name: Wes Stratton  DOS: 8/1/2022  MRN: [id number]      Subjective:  Wes Stratton is a 80 y.o. female being seen for   Chief Complaint   Patient presents with    Memory Loss     Daughter wants to become conservator for her. She does not feel she has had memory loss.         Vitals:    08/01/22 1426   BP: (!) 164/80   Pulse:    Resp:    Temp:    SpO2:      Allergies   Allergen Reactions    Sulfa Antibiotics Shortness Of Breath    Sulfamethoxazole-Trimethoprim Anaphylaxis     (Bactrim)    Ansaid [Flurbiprofen] Other (See Comments)     Light headed and difficult with vision \"seeing\"    Gentamicin Other (See Comments)     Eye ointment caused eye swelling, redness    Motrin [Ibuprofen] Other (See Comments)     \"I coughed so bad I broke a rib\"    Quinidine      Light headed    Chlorthalidone Hives    Hydrocodone-Acetaminophen     Mirapex [Pramipexole Dihydrochloride] Other (See Comments)     Unknown reaction    Mobic [Meloxicam] Nausea Only    Nsaids Other (See Comments)     lightheaded    Quinolones Other (See Comments) and Hives    Reglan [Metoclopramide] Other (See Comments)     Hyperactive and stomach pain    Trazodone Other (See Comments)     unknown    Amino Acids Nausea And Vomiting     Other reaction(s): AOF    Corgard [Nadolol] Other (See Comments)     Severe coughing and broke a rib as a result     Erythromycin Nausea Only    Etodolac      GI issues    Garamycin [Gentamicin Sulfate] Other (See Comments)     Redness and irritation    Inderal [Propranolol] Other (See Comments)     Severe cough    Iothalamate Nausea And Vomiting     Other reaction(s): AOF    Lisinopril Itching    Loratadine      Does not work    Ultram [Tramadol] Other (See Comments)     Didn't work       Past Medical History:   Diagnosis Date    Anesthesia complication     2nd post op day from total knee patient stated \"I was wild and mean\"    Anxiety and depression     Arthritis     ASD (atrial septal defect)     repair in 1963 Asthma     COPD (chronic obstructive pulmonary disease) (HCC)     COPD (chronic obstructive pulmonary disease) (HCC)     Disorder of immune system (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 (total left knee 3/2015 at [facility])    History of renal stone     passed    Hx of blood clots     DVT    Hypertension     MVP (mitral valve prolapse)     LONI (obstructive sleep apnea)     has not used bipap since     Rectocele     Spinal stenosis     Thyroid disease late     overactive radioactive iodine done     Past Surgical History:   Procedure Laterality Date    BACK INJECTION Bilateral 2021    Bilateral SACROILIAC Joint & Piriformis Injection performed by Chayo Shore MD at [address] [facility]. Bilateral 12/3/2021    Bilateral SACROILIAC JOINT & Piriformis Injections performed by Chayo Shore MD at [address] [facility] LUMPECTOMY Left     BREAST LUMPECTOMY Left     CARDIAC SURGERY      ASD repair     SECTION      2x    CHOLECYSTECTOMY      CHOLECYSTECTOMY      COLONOSCOPY      three    DIAGNOSTIC CARDIAC CATH LAB PROCEDURE      DILATION AND CURETTAGE OF UTERUS      EYE SURGERY Bilateral     cataract    [facility]. INJECTION PROCEDURE FOR SACROILIAC JOINT Left 2018    Left SIJ and piriformis, left trocanteric bursa injection performed by Chayo Shore MD at [address] [facility] ([address] [place] [address])  2009    HYSTERECTOMY (CERVIX STATUS UNKNOWN)  2009    Total    JOINT REPLACEMENT Bilateral     knee    KNEE ARTHROSCOPY Left     LUMBAR SPINE SURGERY Bilateral 2019    Bilateral L4 TRANSFORAMINAL  [id number] performed by Chayo Shore MD at [address] [place] [address] Bilateral 3/12/2019    Bilateral L4 TRANSFORAMINAL performed by Cira Kaiser MD at [address] [place] [address]  Right 10/15/2019    RIGHT L4 & L5 TRANSFORAMINAL performed by Cira Kaiser MD at [address] [place] [address] Dr Left 11/1/2019    Left L4 & L5 TRANSFORAMINAL performed by Cira Kaiser MD at [facility] Bilateral 7/7/2022    Bilateral L3-L4 L4-L5 L5-S1 Confirmatory Medial Branch Block performed by Cira Kaiser MD [facility]  8/2014    PAIN MANAGEMENT PROCEDURE Right 2/7/2020    Right L4 & L5 TRANSFORAMINAL performed by Cira Kaiser MD at [address] Left 2/21/2020    Left L4 & L5 TRANSFORAMINAL performed by Cira Kaiser MD at [address] [place] [address] Right 2/23/2021    Right L4 & L5 TRANSFORAMINAL performed by Cira Kaiser MD at [address] [place] [address] Left 3/11/2021    left L4 & L5 TRANSFORAMINAL performed by Cira Kaiser MD at [address] [place] [address] Left 5/6/2021    Left L4 & L5 TRANSFORAMINAL performed by Cira Kaiser MD at [address] [place] [address] Bilateral 3/4/2022    Bilateral L4 TRANSFORAMINAL performed by Cira Kaiser MD at [address] [place] [address] DX/THER AGNT PARAVERT FACET JOINT, LUMBAR/SAC, 2ND LEVEL Bilateral 8/30/2018    Bilateral L2 L3 L4 L5 Diagnostic Medial BB performed by Cira Kaiser MD at [address] [place] [address] DX/THER AGNT PARAVERT FACET JOINT, LUMBAR/SAC, 2ND LEVEL Right 9/13/2018    Right L2 L3 L4 L5 Confirmatory Medial BB performed by Cira Kaiser MD at [facility] [address] AA&/STRD TFRML EPI CERVICAL/THORACIC EA ADDL Right 7/20/2018    Right C5 C6 TRANSFORAMINAL performed by Cira Kaiser MD at [facility] Left 12/20/2018    Left L2 L3 L4 L5 RADIOFREQUENCY performed by Cira Kaiser MD at [facility] [address] Right 1/3/2019    Right L2 L3 L4 L5 RADIOFREQUENCY performed by Cira Kaiser MD at [address] [facility] Right 01/28/2005 & 03/16/2010    2x    TONSILLECTOMY      at age 12     Social History     Socioeconomic History    Marital status:    Occupational History    Occupation: retired   Tobacco Use    Smoking status: Never    Smokeless tobacco: Never   Vaping Use    Vaping Use: Never used   Substance and Sexual Activity    Alcohol use: Not Currently     Comment: very rare    Drug use: No     Social Determinants of Health     Financial Resource Strain: Low Risk     Difficulty of Paying Living Expenses: Not hard at all   Food Insecurity: No Food Insecurity    Worried About Running Out of Food in the Last Year: Never true    [facility] in the Last Year: Never true       Current Outpatient Medications   Medication Sig Dispense Refill    oxyCODONE HCl (OXY-IR) 10 MG immediate release tablet Take 1 tablet by mouth in the morning and 1 tablet at noon and 1 tablet before bedtime. Do all this for 30 days.  90 tablet 0    fluticasone (FLONASE) 50 MCG/ACT nasal spray instill 2 sprays into each nostril once daily      ipratropium (ATROVENT) 0.06 % nasal spray instill 2 sprays into each nostril twice a day      buPROPion (WELLBUTRIN XL) 150 MG extended release tablet take 1 tablet by mouth once daily      acetaminophen (TYLENOL) 650 MG extended release tablet Take 650 mg by mouth every 8 hours as needed for Pain      cetirizine (ZYRTEC) 10 MG tablet take 1 tablet by mouth once daily      spironolactone (ALDACTONE) 25 MG tablet take 1 tablet by mouth once daily      metoprolol succinate (TOPROL XL) 25 MG extended release tablet take 1 tablet by mouth once daily      ADVAIR DISKUS 100-50 MCG/DOSE diskus inhaler inhale 1 puff by mouth twice a day      calcium carbonate-vitamin D 600-200 MG-UNIT TABS Take 2 tablets by mouth daily      DULoxetine (CYMBALTA) 30 MG extended release capsule Take 30 mg by mouth daily      topiramate (TOPAMAX) 25 MG tablet Take 25 mg by mouth 2 times daily      furosemide (LASIX) 20 MG tablet 20 mg 3 times daily naloxone 4 MG/0.1ML LIQD nasal spray 1 spray by Nasal route as needed for Opioid Reversal 1 each 5    baclofen (LIORESAL) 10 MG tablet Take 10 mg by mouth daily       ketotifen (ZADITOR) 0.025 % ophthalmic solution 1 drop 2 times daily      Multiple Vitamin (MULTI-VITAMIN DAILY) TABS Take by mouth      melatonin 1 MG tablet Takes 1/2 of tab.      aspirin 81 MG tablet Take 81 mg by mouth daily      levothyroxine (SYNTHROID) 75 MCG tablet Take 75 mcg by mouth daily      guaiFENesin (MUCINEX) 600 MG extended release tablet Mucinex 600 mg tablet, extended release   Take 2 tablets every day by oral route.      lidocaine (LMX) 4 % cream Apply topically as needed for Pain Apply topically as needed. fluticasone propionate 50 MCG/BLIST AEPB Inhale into the lungs      albuterol (ACCUNEB) 1.25 MG/3ML nebulizer solution Inhale 1 ampule into the lungs every 6 hours as needed for Wheezing       simvastatin (ZOCOR) 40 MG tablet Take 40 mg by mouth nightly      flecainide (TAMBOCOR) 50 MG tablet Take 50 mg by mouth 2 times daily. omeprazole (PRILOSEC) 40 MG delayed release capsule Take 40 mg by mouth nightly. montelukast (SINGULAIR) 10 MG tablet Take 10 mg by mouth nightly. DULoxetine (CYMBALTA) 60 MG extended release capsule Take 60 mg by mouth daily       artificial tears (ARTIFICIAL TEARS) OINT as needed.       Saline 0.2 % SOLN by Nasal route daily as needed       ipratropium (ATROVENT) 0.03 % nasal spray 2 sprays by Each Nostril route every 12 hours (Patient not taking: Reported on 8/1/2022)       Current Facility-Administered Medications   Medication Dose Route Frequency Provider Last Rate Last Admin    triamcinolone acetonide (KENALOG-40) injection 40 mg  40 mg Intra-artICUlar Once Kenia Veras MD        lidocaine 2 % injection 5 mL  5 mL IntraDERmal Once Kenia Veras MD        bupivacaine (MARCAINE) 0.5 % injection 150 mg  30 mL Intra-artICUlar Once Kenia Veras MD Objective:  Pt is here to be established. Pt is in an assisted living. Pt has been checked for a swallowing problem they could not find anything. Review of Systems   Constitutional:  Positive for fatigue (sometimes). Negative for unexpected weight change. Eyes:  Negative for photophobia and visual disturbance. Respiratory:  Positive for choking (sometimes, rice and lettuce). Negative for cough, chest tightness, shortness of breath and wheezing. Cardiovascular:  Positive for leg swelling (chronic swelling in morning it is better she takes diuretics). Negative for chest pain and palpitations. Gastrointestinal:  Negative for abdominal pain. Genitourinary:  Negative for difficulty urinating and hematuria. Musculoskeletal:  Positive for arthralgias (arthritis everywhere) and back pain (uses a cane due to back problems had back surgery chronic). Negative for myalgias. Skin:  Negative for rash and wound. Neurological:  Positive for weakness (low bakc pain so she uses a cane). Negative for dizziness, tremors, seizures, syncope, speech difficulty, light-headedness, numbness and headaches. Psychiatric/Behavioral:  Negative for agitation, confusion, decreased concentration, self-injury, sleep disturbance and suicidal ideas. The patient is nervous/anxious (nervous and depressed because of her children). Physical Exam  Constitutional:       Appearance: Normal appearance. HENT:      Head: Normocephalic and atraumatic. Right Ear: Tympanic membrane and external ear normal.      Left Ear: Tympanic membrane and external ear normal.      Nose: Nose normal.      Mouth/Throat:      Mouth: Mucous membranes are moist.      Pharynx: Oropharynx is clear. Eyes:      Extraocular Movements: Extraocular movements intact. Conjunctiva/sclera: Conjunctivae normal.      Pupils: Pupils are equal, round, and reactive to light. Cardiovascular:      Rate and Rhythm: Normal rate and regular rhythm. Pulses: Normal pulses. Heart sounds: Normal heart sounds. No murmur heard. Pulmonary:      Effort: Pulmonary effort is normal.      Breath sounds: Normal breath sounds. Abdominal:      General: Abdomen is flat. Bowel sounds are normal. There is no distension. Palpations: Abdomen is soft. There is no mass. Tenderness: There is no abdominal tenderness. There is no guarding. Musculoskeletal:         General: Normal range of motion. Cervical back: Normal range of motion and neck supple. Lymphadenopathy:      Cervical: No cervical adenopathy. Skin:     General: Skin is warm. Capillary Refill: Capillary refill takes less than 2 seconds. Neurological:      General: No focal deficit present. Mental Status: She is alert and oriented to person, place, and time. Psychiatric:         Mood and Affect: Mood normal.         Behavior: Behavior normal.         Thought Content: Thought content normal.        Assessment:   Diagnosis Orders   1. Mixed hyperlipidemia        2. Elevated alkaline phosphatase level        3. Abnormal CBC        4. Essential hypertension        5. Elevated hemoglobin A1c  POCT glycosylated hemoglobin (Hb A1C)      6. Encounter for screening for lipoid disorders        7. Wellness examination              Plan:  Orders Placed This Encounter   Procedures    POCT glycosylated hemoglobin (Hb A1C)         Patient Instructions   Nutrition Health Education:    Keep hydrated, walk 30 minutes minimum 3 times weekly as tolerated. Diet should consist of low fat, low sodium and high fiber. Nutritious foods such as fruits (if you're not diabetic), vegetables, lean meats, lean dairy, whole grains such as brown rice, quinoa, and dry beans. Derick Chafe with small amounts of heart healthy extra virgin olive oil. Be watchful of any extra salt/sugar/seasoning to your food. You should eat no more than 2 grams or 2,000 mg of salt daily. Salt will raise your BP.  Avoid regular/diet sodas,

## 2022-08-01 NOTE — PATIENT INSTRUCTIONS
Nutrition Health Education:    Keep hydrated, walk 30 minutes minimum 3 times weekly as tolerated. Diet should consist of low fat, low sodium and high fiber. Nutritious foods such as fruits (if you're not diabetic), vegetables, lean meats, lean dairy, whole grains such as brown rice, quinoa, and dry beans. Shirlie Cam with small amounts of heart healthy extra virgin olive oil. Be watchful of any extra salt/sugar/seasoning to your food. You should eat no more than 2 grams or 2,000 mg of salt daily. Salt will raise your BP. Avoid regular/diet sodas, caffeine, energy drinks as these are full of artificial ingredients/sweeteners, sugar, salt and chemicals that spike insulin and are harmful to your health. Sugar intake increases metabolic disfunction, type 2 diabetes, insulin resistance, addictive food behavior and obesity. Avoid all processed foods, foods from boxes, cans, microwave meals as these contain high salt, sugar or fat content and not much nutrition. Get at least 8 hrs of sleep every night and turn off all electronics at least 1 hour before bedtime as these decreases melatonin production and increases wakefulness. If your cholesterol is high, no greasy, fried, fast or fatty foods. Decrease red meat intake. No butter, nicole, lard or creams, no milk as these things clog your arteries and leads to heart attacks and death. If you smoke, smoking increases risk of lung disease, cancers, high BP, heart attack, stroke and death. Take your daily medications as prescribed and inform your family doctor if you are having any side effects or issues taking medications.    DO WHAT YOU CAN DO    Elevated Blood Pressure:  No caffeine  No fast foods  Decrease salt in diet (consume nothing in a can, nothing in a box as these things contain high sodium)  No energy drinks  Buy a BP cuff and take blood pressure 3 times a day and write down blood pressure and pulse and bring in to me when you RTO  Lose weight and increase exercise if you are capable of exercising  Start only walking then may advance to brisk walking and lift low poundage free weights if you are capable     HgA1C 5.5    Fasting blood work this week and follow up next week

## 2022-08-05 LAB
ALBUMIN/GLOBULIN RATIO: 1.6 G/DL
ALBUMIN: 4.2 G/DL (ref 3.5–5)
ALP BLD-CCNC: 118 UNITS/L (ref 38–126)
ALT SERPL-CCNC: 16 UNITS/L (ref 4–35)
ANION GAP SERPL CALCULATED.3IONS-SCNC: 7.4 MMOL/L
AST SERPL-CCNC: 23 UNITS/L (ref 14–36)
BASOPHILS %: 0.97 (ref 0–3)
BASOPHILS ABSOLUTE: 0.07 (ref 0–0.3)
BILIRUB SERPL-MCNC: 0.5 MG/DL (ref 0.2–1.3)
BUN BLDV-MCNC: 15 MG/DL (ref 7–17)
CALCIUM SERPL-MCNC: 9.2 MG/DL (ref 8.4–10.2)
CHLORIDE BLD-SCNC: 111 MMOL/L (ref 98–120)
CHOLESTEROL/HDL RATIO: 2.63 RATIO (ref 0–4.5)
CHOLESTEROL: 150 MG/DL (ref 50–200)
CO2: 23 MMOL/L (ref 22–31)
CREAT SERPL-MCNC: 0.9 MG/DL (ref 0.5–1)
EOSINOPHILS %: 8.33 (ref 0–10)
EOSINOPHILS ABSOLUTE: 0.59 (ref 0–1.1)
GFR CALCULATED: > 60
GLOBULIN: 2.6 G/DL
GLUCOSE: 89 MG/DL (ref 65–105)
HCT VFR BLD CALC: 45.4 % (ref 37–47)
HDLC SERPL-MCNC: 57 MG/DL (ref 36–68)
HEMOGLOBIN: 13.8 (ref 12–16)
LDL CHOLESTEROL CALCULATED: 72 MG/DL (ref 0–160)
LYMPHOCYTE %: 19.24 (ref 20–51.1)
LYMPHOCYTES ABSOLUTE: 1.37 (ref 1–5.5)
MCH RBC QN AUTO: 26.5 PG (ref 28.5–32.5)
MCHC RBC AUTO-ENTMCNC: 30.5 G/DL (ref 32–37)
MCV RBC AUTO: 86.9 FL (ref 80–94)
MONOCYTES %: 6.69 (ref 1.7–9.3)
MONOCYTES ABSOLUTE: 0.47 (ref 0.1–1)
NEUTROPHILS %: 64.77 (ref 42.2–75.2)
NEUTROPHILS ABSOLUTE: 4.6 (ref 2–8.1)
PDW BLD-RTO: 14.7 % (ref 8.5–15.5)
PLATELET # BLD: 201 THOU/MM3 (ref 130–400)
POTASSIUM SERPL-SCNC: 4.3 MMOL/L (ref 3.6–5)
RBC: 5.22 M/UL (ref 4.2–5.4)
SODIUM BLD-SCNC: 142 MMOL/L (ref 135–145)
TOTAL PROTEIN, SERUM: 6.8 G/DL (ref 6.3–8.2)
TRIGL SERPL-MCNC: 105 MG/DL (ref 10–250)
TSH REFLEX FT4: 2.45 MIU/ML (ref 0.49–4.67)
VLDLC SERPL CALC-MCNC: 21 MG/DL (ref 0–50)
WBC: 7.1 THOU/ML3 (ref 4.8–10.8)

## 2022-08-09 DIAGNOSIS — R74.8 ELEVATED ALKALINE PHOSPHATASE LEVEL: Primary | ICD-10-CM

## 2022-08-09 LAB
ALKALINE PHOSPHATASE BONE CALC: 66
ALKALINE PHOSPHATASE LIVER CALC: 64
ALKALINE PHOSPHATASE OTHER CALC: 0
ALP BLD-CCNC: 130 U/L

## 2022-08-11 ENCOUNTER — OFFICE VISIT (OUTPATIENT)
Dept: FAMILY MEDICINE CLINIC | Age: 82
End: 2022-08-11
Payer: MEDICARE

## 2022-08-11 VITALS
SYSTOLIC BLOOD PRESSURE: 110 MMHG | HEART RATE: 69 BPM | WEIGHT: 175.8 LBS | TEMPERATURE: 97.7 F | RESPIRATION RATE: 16 BRPM | BODY MASS INDEX: 30.56 KG/M2 | OXYGEN SATURATION: 97 % | DIASTOLIC BLOOD PRESSURE: 70 MMHG

## 2022-08-11 DIAGNOSIS — R79.89 ABNORMAL CBC: ICD-10-CM

## 2022-08-11 DIAGNOSIS — E78.2 MIXED HYPERLIPIDEMIA: Primary | ICD-10-CM

## 2022-08-11 DIAGNOSIS — I10 ESSENTIAL HYPERTENSION: ICD-10-CM

## 2022-08-11 DIAGNOSIS — J45.20 MILD INTERMITTENT ASTHMA WITHOUT COMPLICATION: ICD-10-CM

## 2022-08-11 DIAGNOSIS — Z78.0 POST-MENOPAUSAL: ICD-10-CM

## 2022-08-11 DIAGNOSIS — E03.9 HYPOTHYROIDISM, UNSPECIFIED TYPE: ICD-10-CM

## 2022-08-11 DIAGNOSIS — F32.A DEPRESSION, UNSPECIFIED DEPRESSION TYPE: ICD-10-CM

## 2022-08-11 PROCEDURE — 1036F TOBACCO NON-USER: CPT | Performed by: FAMILY MEDICINE

## 2022-08-11 PROCEDURE — 1090F PRES/ABSN URINE INCON ASSESS: CPT | Performed by: FAMILY MEDICINE

## 2022-08-11 PROCEDURE — 99212 OFFICE O/P EST SF 10 MIN: CPT | Performed by: FAMILY MEDICINE

## 2022-08-11 PROCEDURE — G8417 CALC BMI ABV UP PARAM F/U: HCPCS | Performed by: FAMILY MEDICINE

## 2022-08-11 PROCEDURE — 1123F ACP DISCUSS/DSCN MKR DOCD: CPT | Performed by: FAMILY MEDICINE

## 2022-08-11 PROCEDURE — G8400 PT W/DXA NO RESULTS DOC: HCPCS | Performed by: FAMILY MEDICINE

## 2022-08-11 PROCEDURE — G8427 DOCREV CUR MEDS BY ELIG CLIN: HCPCS | Performed by: FAMILY MEDICINE

## 2022-08-11 PROCEDURE — 99213 OFFICE O/P EST LOW 20 MIN: CPT | Performed by: FAMILY MEDICINE

## 2022-08-11 RX ORDER — DULOXETIN HYDROCHLORIDE 30 MG/1
30 CAPSULE, DELAYED RELEASE ORAL DAILY
Qty: 90 CAPSULE | Refills: 1 | Status: SHIPPED | OUTPATIENT
Start: 2022-08-11 | End: 2022-11-09

## 2022-08-11 RX ORDER — FLUTICASONE PROPIONATE AND SALMETEROL 100; 50 UG/1; UG/1
1 POWDER RESPIRATORY (INHALATION) EVERY 12 HOURS
COMMUNITY
End: 2022-08-11 | Stop reason: SDUPTHER

## 2022-08-11 RX ORDER — FUROSEMIDE 20 MG/1
20 TABLET ORAL 3 TIMES DAILY
Qty: 90 TABLET | Refills: 1 | Status: SHIPPED | OUTPATIENT
Start: 2022-08-11 | End: 2022-09-07 | Stop reason: SDUPTHER

## 2022-08-11 RX ORDER — LEVOTHYROXINE SODIUM 0.07 MG/1
75 TABLET ORAL DAILY
Qty: 90 TABLET | Refills: 1 | Status: SHIPPED | OUTPATIENT
Start: 2022-08-11 | End: 2022-11-09

## 2022-08-11 RX ORDER — FLUTICASONE PROPIONATE AND SALMETEROL 100; 50 UG/1; UG/1
1 POWDER RESPIRATORY (INHALATION) EVERY 12 HOURS
Qty: 3 EACH | Refills: 1 | Status: SHIPPED | OUTPATIENT
Start: 2022-08-11 | End: 2022-11-09

## 2022-08-11 ASSESSMENT — ENCOUNTER SYMPTOMS
CHEST TIGHTNESS: 0
COUGH: 0
WHEEZING: 0
CHOKING: 0
ABDOMINAL PAIN: 0
SHORTNESS OF BREATH: 0

## 2022-08-11 NOTE — PROGRESS NOTES
Name: Mj Doran  DOS: 8/11/2022  MRN: [id number]      Subjective:  Mj Doran is a 80 y.o. female being seen for   Chief Complaint   Patient presents with    Hypertension     1 week follow up. BP's have been running high in the evening. See readings. Patient reports some chest discomfort in evenings that she relates to family stresses. She also has leg swelling at times.      Hyperlipidemia     Review 8/5/2022 labs         Vitals:    08/11/22 1417   BP: 110/70   Pulse: 69   Resp: 16   Temp: 97.7 °F (36.5 °C)   SpO2: 97%     Allergies   Allergen Reactions    Sulfa Antibiotics Shortness Of Breath    Sulfamethoxazole-Trimethoprim Anaphylaxis     (Bactrim)    Ansaid [Flurbiprofen] Other (See Comments)     Light headed and difficult with vision \"seeing\"    Gentamicin Other (See Comments)     Eye ointment caused eye swelling, redness    Motrin [Ibuprofen] Other (See Comments)     \"I coughed so bad I broke a rib\"    Quinidine      Light headed    Chlorthalidone Hives    Hydrocodone-Acetaminophen     Mirapex [Pramipexole Dihydrochloride] Other (See Comments)     Unknown reaction    Mobic [Meloxicam] Nausea Only    Nsaids Other (See Comments)     lightheaded    Quinolones Other (See Comments) and Hives    Reglan [Metoclopramide] Other (See Comments)     Hyperactive and stomach pain    Trazodone Other (See Comments)     unknown    Amino Acids Nausea And Vomiting     Other reaction(s): AOF    Corgard [Nadolol] Other (See Comments)     Severe coughing and broke a rib as a result     Erythromycin Nausea Only    Etodolac      GI issues    Garamycin [Gentamicin Sulfate] Other (See Comments)     Redness and irritation    Inderal [Propranolol] Other (See Comments)     Severe cough    Iothalamate Nausea And Vomiting     Other reaction(s): AOF    Lisinopril Itching    Loratadine      Does not work    Ultram [Tramadol] Other (See Comments)     Didn't work       Past Medical History:   Diagnosis Date    Anesthesia complication     2nd post op day from total knee patient stated \"I was wild and mean\"    Anxiety and depression     Arthritis     ASD (atrial septal defect)     repair in     Asthma     COPD (chronic obstructive pulmonary disease) ([facility])     COPD (chronic obstructive pulmonary disease) ([facility] 's    Disorder of immune system ([facility]     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 (total left knee 3/2015 at [facility])    History of renal stone     passed    Hx of blood clots     DVT    Hypertension     MVP (mitral valve prolapse)     LONI (obstructive sleep apnea)     has not used bipap since     Rectocele     Spinal stenosis     Thyroid disease late     overactive radioactive iodine done     Past Surgical History:   Procedure Laterality Date    BACK INJECTION Bilateral 2021    Bilateral SACROILIAC Joint & Piriformis Injection performed by Heavenly Vera MD at [address] [facility]. Bilateral 12/3/2021    Bilateral SACROILIAC JOINT & Piriformis Injections performed by Heavenly Vera MD at [address] [facility] LUMPECTOMY Left     BREAST LUMPECTOMY Left     CARDIAC SURGERY      ASD repair     SECTION      2x    CHOLECYSTECTOMY      CHOLECYSTECTOMY      COLONOSCOPY      three    DIAGNOSTIC CARDIAC CATH LAB PROCEDURE      DILATION AND CURETTAGE OF UTERUS      EYE SURGERY Bilateral     cataract    [facility]. INJECTION PROCEDURE FOR SACROILIAC JOINT Left 2018    Left SIJ and piriformis, left trocanteric bursa injection performed by Heavenly Vera MD at [address] [facility] ([address] [place] [address])  2009    HYSTERECTOMY (CERVIX STATUS UNKNOWN)  2009    Total    JOINT REPLACEMENT Bilateral     knee    KNEE ARTHROSCOPY Left     LUMBAR SPINE SURGERY Bilateral 2/26/2019    Bilateral L4 TRANSFORAMINAL  [id number] performed by Mine Castillo MD at [address] [place] [address] Dr Bilateral 3/12/2019    Bilateral L4 TRANSFORAMINAL performed by Mine Castillo MD at [address] [place] [address] Dr Right 10/15/2019    RIGHT L4 & L5 TRANSFORAMINAL performed by Mine Castillo MD at [address] [place] [address] Dr Left 11/1/2019    Left L4 & L5 TRANSFORAMINAL performed by Mine Castillo MD at [facility] Bilateral 7/7/2022    Bilateral L3-L4 L4-L5 L5-S1 Confirmatory Medial Branch Block performed by Mine Castillo MD at [facility]  8/2014    PAIN MANAGEMENT PROCEDURE Right 2/7/2020    Right L4 & L5 TRANSFORAMINAL performed by Mine Castillo MD at [address] [place] [address] Left 2/21/2020    Left L4 & L5 TRANSFORAMINAL performed by Mine Castillo MD at [address] [place] [address] Right 2/23/2021    Right L4 & L5 TRANSFORAMINAL performed by Mine Castillo MD at [address] [place] [address] Left 3/11/2021    left L4 & L5 TRANSFORAMINAL performed by Mine Castillo MD at [address] [place] [address] Left 5/6/2021    Left L4 & L5 TRANSFORAMINAL performed by Mine Castillo MD at [address] [place] [address] Bilateral 3/4/2022    Bilateral L4 TRANSFORAMINAL performed by Mine Castillo MD at [address] [place] [address] DX/THER AGNT PARAVERT FACET JOINT, LUMBAR/SAC, 2ND LEVEL Bilateral 8/30/2018    Bilateral L2 L3 L4 L5 Diagnostic Medial BB performed by Mine Castillo MD at [address] [place] [address] DX/THER AGNT PARAVERT FACET JOINT, LUMBAR/SAC, 2ND LEVEL Right 9/13/2018    Right L2 L3 L4 L5 Confirmatory Medial BB performed by Mine Castillo MD at [place] [address] AA&/STRD TFRML EPI CERVICAL/THORACIC EA ADDL Right 7/20/2018    Right C5 C6 TRANSFORAMINAL performed by Mine Castillo MD at [place] [address] Left 12/20/2018    Left L2 L3 L4 L5 RADIOFREQUENCY performed by Mine Castillo MD at [facility]    RADIOFREQUENCY ABLATION NERVES Right 1/3/2019    Right L2 L3 L4 L5 RADIOFREQUENCY performed by Raul Watson MD at [place] [address] Right 01/28/2005 & 03/16/2010    2x    TONSILLECTOMY      at age 12     Social History     Socioeconomic History    Marital status:    Occupational History    Occupation: retired   Tobacco Use    Smoking status: Never    Smokeless tobacco: Never   Vaping Use    Vaping Use: Never used   Substance and Sexual Activity    Alcohol use: Not Currently     Comment: very rare    Drug use: No     Social Determinants of Health     Financial Resource Strain: Low Risk     Difficulty of Paying Living Expenses: Not hard at all   Food Insecurity: No Food Insecurity    Worried About [facility] in the Last Year: Never true    [facility] in the Last Year: Never true       Current Outpatient Medications   Medication Sig Dispense Refill    furosemide (LASIX) 20 MG tablet Take 1 tablet by mouth in the morning and 1 tablet at noon and 1 tablet before bedtime. 90 tablet 1    levothyroxine (SYNTHROID) 75 MCG tablet Take 1 tablet by mouth in the morning. 90 tablet 1    DULoxetine (CYMBALTA) 30 MG extended release capsule Take 1 capsule by mouth in the morning. 90 capsule 1    fluticasone-salmeterol (ADVIAR) 100-50 MCG/ACT AEPB diskus inhaler Inhale 1 puff into the lungs in the morning and 1 puff in the evening. 3 each 1    oxyCODONE HCl (OXY-IR) 10 MG immediate release tablet Take 1 tablet by mouth in the morning and 1 tablet at noon and 1 tablet before bedtime. Do all this for 30 days.  90 tablet 0    fluticasone (FLONASE) 50 MCG/ACT nasal spray instill 2 sprays into each nostril once daily      ipratropium (ATROVENT) 0.06 % nasal spray instill 2 sprays into each nostril twice a day      buPROPion (WELLBUTRIN XL) 150 MG extended release tablet take 1 tablet by mouth once daily      acetaminophen (TYLENOL) 650 MG extended release tablet Take 650 mg by mouth every 8 hours as needed for Pain      cetirizine (ZYRTEC) 10 MG tablet take 1 tablet by mouth once daily      spironolactone (ALDACTONE) 25 MG tablet take 1 tablet by mouth once daily      metoprolol succinate (TOPROL XL) 25 MG extended release tablet take 1 tablet by mouth once daily      ADVAIR DISKUS 100-50 MCG/DOSE diskus inhaler inhale 1 puff by mouth twice a day      calcium carbonate-vitamin D 600-200 MG-UNIT TABS Take 2 tablets by mouth daily      topiramate (TOPAMAX) 25 MG tablet Take 25 mg by mouth 2 times daily      naloxone 4 MG/0.1ML LIQD nasal spray 1 spray by Nasal route as needed for Opioid Reversal 1 each 5    baclofen (LIORESAL) 10 MG tablet Take 10 mg by mouth daily       ketotifen (ZADITOR) 0.025 % ophthalmic solution 1 drop 2 times daily      Multiple Vitamin (MULTI-VITAMIN DAILY) TABS Take by mouth      melatonin 1 MG tablet Takes 1/2 of tab.      aspirin 81 MG tablet Take 81 mg by mouth daily      guaiFENesin (MUCINEX) 600 MG extended release tablet Mucinex 600 mg tablet, extended release   Take 2 tablets every day by oral route.      lidocaine (LMX) 4 % cream Apply topically as needed for Pain Apply topically as needed. fluticasone propionate 50 MCG/BLIST AEPB Inhale into the lungs      albuterol (ACCUNEB) 1.25 MG/3ML nebulizer solution Inhale 1 ampule into the lungs every 6 hours as needed for Wheezing       simvastatin (ZOCOR) 40 MG tablet Take 40 mg by mouth nightly      flecainide (TAMBOCOR) 50 MG tablet Take 50 mg by mouth 2 times daily. omeprazole (PRILOSEC) 40 MG delayed release capsule Take 40 mg by mouth nightly. montelukast (SINGULAIR) 10 MG tablet Take 10 mg by mouth nightly. DULoxetine (CYMBALTA) 60 MG extended release capsule Take 60 mg by mouth daily       artificial tears (ARTIFICIAL TEARS) OINT as needed.       Saline 0.2 % SOLN by Nasal route daily as needed       ipratropium (ATROVENT) 0.03 % nasal spray 2 sprays by Each Nostril route every 12 hours (Patient not taking: No sig reported)       Current Facility-Administered Medications   Medication Dose Route Frequency Provider Last Rate Last Admin    triamcinolone acetonide (KENALOG-40) injection 40 mg  40 mg Intra-artICUlar Once Montey Cogan, MD        lidocaine 2 % injection 5 mL  5 mL IntraDERmal Once Montey Cogan, MD        bupivacaine (MARCAINE) 0.5 % injection 150 mg  30 mL Intra-artICUlar Once Montey Cogan, MD            Objective:  Pt is here for a follow up she feel much better than last visit. Happier. Things going well. She brought in her BP readings and it was 110//59,146/76,126/76 two readings were high when she was upset at 176/88, 159/98. Pt is seeing Dr. Lisa Muro next week for nerve ablation     Review of Systems   Constitutional:  Positive for fatigue (sometimes, she has been waking up alot during the night for 2 weeks). Negative for unexpected weight change. Eyes:  Negative for visual disturbance. Respiratory:  Negative for cough, choking, chest tightness, shortness of breath and wheezing. Cardiovascular:  Positive for chest pain (once for a half an our 1 weeks ago she thinks it was mainly stress under care of cardio in [facility] she will make an appt). Negative for palpitations and leg swelling. Gastrointestinal:  Negative for abdominal pain. Genitourinary:  Negative for difficulty urinating and hematuria. Skin:  Negative for rash and wound. Neurological:  Negative for dizziness, weakness, light-headedness and headaches. Hematological:  Negative for adenopathy. Does not bruise/bleed easily. Psychiatric/Behavioral:  Negative for agitation, confusion, decreased concentration and suicidal ideas. Physical Exam  Constitutional:       General: She is not in acute distress. Appearance: Normal appearance. She is not ill-appearing, toxic-appearing or diaphoretic. HENT:      Head: Normocephalic and atraumatic.       Right Ear: External ear normal. Left Ear: External ear normal.      Nose: Nose normal. No congestion or rhinorrhea. Mouth/Throat:      Mouth: Mucous membranes are moist.      Pharynx: Oropharynx is clear. No oropharyngeal exudate or posterior oropharyngeal erythema. Eyes:      Extraocular Movements: Extraocular movements intact. Conjunctiva/sclera: Conjunctivae normal.      Pupils: Pupils are equal, round, and reactive to light. Cardiovascular:      Rate and Rhythm: Normal rate and regular rhythm. Pulses: Normal pulses. Heart sounds: Normal heart sounds. No murmur heard. Pulmonary:      Effort: Pulmonary effort is normal.      Breath sounds: Normal breath sounds. No wheezing, rhonchi or rales. Abdominal:      General: Abdomen is flat. Bowel sounds are normal. There is no distension. Palpations: Abdomen is soft. There is no mass. Tenderness: There is no abdominal tenderness. There is no guarding. Musculoskeletal:         General: Normal range of motion. Cervical back: Normal range of motion and neck supple. Right lower leg: No edema. Left lower leg: No edema. Lymphadenopathy:      Cervical: No cervical adenopathy. Skin:     General: Skin is warm. Capillary Refill: Capillary refill takes less than 2 seconds. Neurological:      General: No focal deficit present. Mental Status: She is alert and oriented to person, place, and time. Gait: Gait abnormal (uses a cane). Psychiatric:         Mood and Affect: Mood normal.         Behavior: Behavior normal.         Thought Content: Thought content normal.        Assessment:   Diagnosis Orders   1. Mixed hyperlipidemia  Comprehensive Metabolic Panel    Lipid Panel      2. Essential hypertension  Comprehensive Metabolic Panel    furosemide (LASIX) 20 MG tablet      3. Post-menopausal  DEXA BONE DENSITY 2 SITES      4. Abnormal CBC  CBC with Auto Differential      5.  Hypothyroidism, unspecified type  levothyroxine (SYNTHROID) 75 MCG tablet      6. Depression, unspecified depression type  DULoxetine (CYMBALTA) 30 MG extended release capsule      7. Mild intermittent asthma without complication  fluticasone-salmeterol (ADVIAR) 100-50 MCG/ACT AEPB diskus inhaler            Plan:  Orders Placed This Encounter   Procedures    DEXA BONE DENSITY 2 SITES     Standing Status:   Future     Standing Expiration Date:   9/11/2022     Order Specific Question:   Reason for exam:     Answer:   post menopausal    Comprehensive Metabolic Panel     Standing Status:   Future     Standing Expiration Date:   12/11/2022    Lipid Panel     Standing Status:   Future     Standing Expiration Date:   12/11/2022     Order Specific Question:   Is Patient Fasting?/# of Hours     Answer:   Yes    CBC with Auto Differential     Standing Status:   Future     Standing Expiration Date:   12/11/2022           Patient Instructions   Nutrition Health Education:    Keep hydrated, walk 30 minutes minimum 3 times weekly as tolerated. Diet should consist of low fat, low sodium and high fiber. Nutritious foods such as fruits (if you're not diabetic), vegetables, lean meats, lean dairy, whole grains such as brown rice, quinoa, and dry beans. Angela Lasso with small amounts of heart healthy extra virgin olive oil. Be watchful of any extra salt/sugar/seasoning to your food. You should eat no more than 2 grams or 2,000 mg of salt daily. Salt will raise your BP. Avoid regular/diet sodas, caffeine, energy drinks as these are full of artificial ingredients/sweeteners, sugar, salt and chemicals that spike insulin and are harmful to your health. Sugar intake increases metabolic disfunction, type 2 diabetes, insulin resistance, addictive food behavior and obesity. Avoid all processed foods, foods from boxes, cans, microwave meals as these contain high salt, sugar or fat content and not much nutrition.  Get at least 8 hrs of sleep every night and turn off all electronics at least 1 hour before bedtime as these decreases melatonin production and increases wakefulness. If your cholesterol is high, no greasy, fried, fast or fatty foods. Decrease red meat intake. No butter, nicole, lard or creams, no milk as these things clog your arteries and leads to heart attacks and death. If you smoke, smoking increases risk of lung disease, cancers, high BP, heart attack, stroke and death. Take your daily medications as prescribed and inform your family doctor if you are having any side effects or issues taking medications. Elevated Blood Pressure:  No caffeine  No fast foods  Decrease salt in diet (consume nothing in a can, nothing in a box as these things contain high sodium)  No energy drinks  Buy a BP cuff and take blood pressure 3 times a day and write down blood pressure and pulse and bring in to me when you RTO  Lose weight and increase exercise if you are capable of exercising  Start only walking then may advance to brisk walking and lift low poundage free weights if you are capable     Elevated Cholesterol:  No greasy, fried, fast, fatty foods  No trans fats  Decreased red meat intake to once every 2 months  No butter, nicole nor cream cheese, cheese  No egg yolk  NO milk  Decrease your cholesterol in diet    Reviewed labs with pt cbc,cmp,lipids,tsh, alkaline phosphitase    Ordered a bone density    Fasting blood work and follow up in 3 months, AWV      Return for AWV with in 3-4 weeks,3 months fro follow up.      Tim Willard, DO

## 2022-08-11 NOTE — PATIENT INSTRUCTIONS
Nutrition Health Education:    Keep hydrated, walk 30 minutes minimum 3 times weekly as tolerated. Diet should consist of low fat, low sodium and high fiber. Nutritious foods such as fruits (if you're not diabetic), vegetables, lean meats, lean dairy, whole grains such as brown rice, quinoa, and dry beans. Didier Ego with small amounts of heart healthy extra virgin olive oil. Be watchful of any extra salt/sugar/seasoning to your food. You should eat no more than 2 grams or 2,000 mg of salt daily. Salt will raise your BP. Avoid regular/diet sodas, caffeine, energy drinks as these are full of artificial ingredients/sweeteners, sugar, salt and chemicals that spike insulin and are harmful to your health. Sugar intake increases metabolic disfunction, type 2 diabetes, insulin resistance, addictive food behavior and obesity. Avoid all processed foods, foods from boxes, cans, microwave meals as these contain high salt, sugar or fat content and not much nutrition. Get at least 8 hrs of sleep every night and turn off all electronics at least 1 hour before bedtime as these decreases melatonin production and increases wakefulness. If your cholesterol is high, no greasy, fried, fast or fatty foods. Decrease red meat intake. No butter, nicole, lard or creams, no milk as these things clog your arteries and leads to heart attacks and death. If you smoke, smoking increases risk of lung disease, cancers, high BP, heart attack, stroke and death. Take your daily medications as prescribed and inform your family doctor if you are having any side effects or issues taking medications.      Elevated Blood Pressure:  No caffeine  No fast foods  Decrease salt in diet (consume nothing in a can, nothing in a box as these things contain high sodium)  No energy drinks  Buy a BP cuff and take blood pressure 3 times a day and write down blood pressure and pulse and bring in to me when you RTO  Lose weight and increase exercise if you are capable of exercising  Start only walking then may advance to brisk walking and lift low poundage free weights if you are capable     Elevated Cholesterol:  No greasy, fried, fast, fatty foods  No trans fats  Decreased red meat intake to once every 2 months  No butter, nicole nor cream cheese, cheese  No egg yolk  NO milk  Decrease your cholesterol in diet    Reviewed labs with pt cbc,cmp,lipids,tsh, alkaline phosphitase    Ordered a bone density    Fasting blood work and follow up in 3 months, AWV

## 2022-08-12 RX ORDER — SIMVASTATIN 40 MG
40 TABLET ORAL NIGHTLY
Qty: 90 TABLET | Refills: 1 | Status: SHIPPED | OUTPATIENT
Start: 2022-08-12 | End: 2022-11-10

## 2022-08-12 RX ORDER — MONTELUKAST SODIUM 10 MG/1
10 TABLET ORAL NIGHTLY
Qty: 90 TABLET | Refills: 1 | Status: SHIPPED | OUTPATIENT
Start: 2022-08-12 | End: 2022-11-10

## 2022-08-12 NOTE — TELEPHONE ENCOUNTER
Danita Mcpherson is requesting a refill on the following medication(s):  Requested Prescriptions     Pending Prescriptions Disp Refills    montelukast (SINGULAIR) 10 MG tablet [Pharmacy Med Name: MONTELUKAST SOD 10 MG TABLET] 90 tablet      Sig: take 1 tablet by mouth at bedtime    simvastatin (ZOCOR) 40 MG tablet [Pharmacy Med Name: SIMVASTATIN 40 MG TABLET] 90 tablet      Sig: take 1 tablet by mouth at bedtime       Last Visit Date (If Applicable):  3/87/5517    Next Visit Date:    9/6/2022

## 2022-08-15 ENCOUNTER — APPOINTMENT (OUTPATIENT)
Dept: GENERAL RADIOLOGY | Age: 82
End: 2022-08-15
Attending: PHYSICAL MEDICINE & REHABILITATION
Payer: MEDICARE

## 2022-08-15 ENCOUNTER — HOSPITAL ENCOUNTER (OUTPATIENT)
Age: 82
Setting detail: OUTPATIENT SURGERY
Discharge: OTHER INSTITUTION | End: 2022-08-15
Attending: PHYSICAL MEDICINE & REHABILITATION | Admitting: PHYSICAL MEDICINE & REHABILITATION
Payer: MEDICARE

## 2022-08-15 VITALS
HEIGHT: 64 IN | HEART RATE: 68 BPM | OXYGEN SATURATION: 98 % | DIASTOLIC BLOOD PRESSURE: 73 MMHG | WEIGHT: 176.4 LBS | RESPIRATION RATE: 16 BRPM | SYSTOLIC BLOOD PRESSURE: 125 MMHG | TEMPERATURE: 97.9 F | BODY MASS INDEX: 30.11 KG/M2

## 2022-08-15 PROBLEM — M54.06 PANNICULITIS INVOLVING LUMBAR REGION: Status: ACTIVE | Noted: 2022-08-15

## 2022-08-15 PROCEDURE — 64636 DESTROY L/S FACET JNT ADDL: CPT | Performed by: PHYSICAL MEDICINE & REHABILITATION

## 2022-08-15 PROCEDURE — 7100000010 HC PHASE II RECOVERY - FIRST 15 MIN: Performed by: PHYSICAL MEDICINE & REHABILITATION

## 2022-08-15 PROCEDURE — 3600000057 HC PAIN LEVEL 4 ADDL 15 MIN: Performed by: PHYSICAL MEDICINE & REHABILITATION

## 2022-08-15 PROCEDURE — 6360000002 HC RX W HCPCS: Performed by: PHYSICAL MEDICINE & REHABILITATION

## 2022-08-15 PROCEDURE — 64635 DESTROY LUMB/SAC FACET JNT: CPT | Performed by: PHYSICAL MEDICINE & REHABILITATION

## 2022-08-15 PROCEDURE — 3209999900 FLUORO FOR SURGICAL PROCEDURES

## 2022-08-15 PROCEDURE — 7100000011 HC PHASE II RECOVERY - ADDTL 15 MIN: Performed by: PHYSICAL MEDICINE & REHABILITATION

## 2022-08-15 PROCEDURE — 2500000003 HC RX 250 WO HCPCS: Performed by: PHYSICAL MEDICINE & REHABILITATION

## 2022-08-15 PROCEDURE — 3600000056 HC PAIN LEVEL 4 BASE: Performed by: PHYSICAL MEDICINE & REHABILITATION

## 2022-08-15 PROCEDURE — 2709999900 HC NON-CHARGEABLE SUPPLY: Performed by: PHYSICAL MEDICINE & REHABILITATION

## 2022-08-15 RX ORDER — SODIUM CHLORIDE 9 MG/ML
INJECTION, SOLUTION INTRAVENOUS PRN
Status: CANCELLED | OUTPATIENT
Start: 2022-08-15

## 2022-08-15 RX ORDER — BUPIVACAINE HYDROCHLORIDE 5 MG/ML
INJECTION, SOLUTION EPIDURAL; INTRACAUDAL PRN
Status: DISCONTINUED | OUTPATIENT
Start: 2022-08-15 | End: 2022-08-15 | Stop reason: ALTCHOICE

## 2022-08-15 RX ORDER — DEXAMETHASONE SODIUM PHOSPHATE 10 MG/ML
INJECTION INTRAMUSCULAR; INTRAVENOUS PRN
Status: DISCONTINUED | OUTPATIENT
Start: 2022-08-15 | End: 2022-08-15 | Stop reason: ALTCHOICE

## 2022-08-15 RX ORDER — SODIUM CHLORIDE 0.9 % (FLUSH) 0.9 %
5-40 SYRINGE (ML) INJECTION PRN
Status: CANCELLED | OUTPATIENT
Start: 2022-08-15

## 2022-08-15 RX ORDER — LIDOCAINE HYDROCHLORIDE 20 MG/ML
INJECTION, SOLUTION INFILTRATION; PERINEURAL PRN
Status: DISCONTINUED | OUTPATIENT
Start: 2022-08-15 | End: 2022-08-15 | Stop reason: ALTCHOICE

## 2022-08-15 RX ORDER — SODIUM CHLORIDE 0.9 % (FLUSH) 0.9 %
5-40 SYRINGE (ML) INJECTION EVERY 12 HOURS SCHEDULED
Status: CANCELLED | OUTPATIENT
Start: 2022-08-15

## 2022-08-15 ASSESSMENT — ENCOUNTER SYMPTOMS
ALLERGIC/IMMUNOLOGIC NEGATIVE: 1
BACK PAIN: 1
EYES NEGATIVE: 1
DIARRHEA: 0
CONSTIPATION: 0
RESPIRATORY NEGATIVE: 1

## 2022-08-15 ASSESSMENT — PAIN DESCRIPTION - DESCRIPTORS: DESCRIPTORS: ACHING

## 2022-08-15 ASSESSMENT — PAIN - FUNCTIONAL ASSESSMENT: PAIN_FUNCTIONAL_ASSESSMENT: 0-10

## 2022-08-15 ASSESSMENT — PAIN SCALES - GENERAL: PAINLEVEL_OUTOF10: 0

## 2022-08-15 NOTE — OP NOTE
RADIOFREQUENCY ABLATION OPERATIVE REPORT    8/15/22  The patient was counseled at length about the risks of min Covid-19 during their perioperative period and any recovery window from their procedure. The patient was made aware that min Covid-19  may worsen their prognosis for recovering from their procedure  and lend to a higher morbidity and/or mortality risk. All material risks, benefits, and reasonable alternatives including postponing the procedure were discussed. The patient does wish to proceed with the procedure at this time. Surgeon: Cira Kaiser MD    Pre-operative Diagnosis:   Hospital Problems             Last Modified POA    * (Principal) Lumbar facet joint syndrome 8/15/2022 Yes    Panniculitis involving lumbar region 8/15/2022 Yes       Post-operative Diagnosis: Same    INDICATION:  Left L3-4 -4- 5- 5- S1 radiofrequency neurotomy procedure is requested for therapeutic reasons. Please see H&P for details on previous treatments, examination findings, and work up. left LL3-4 4-- 5- 5- S1 medial branch blocks are requested for diagnostic reasons. Conservative treatment was ineffective i.e.: ice, NSAIDS, rest,     Patient is unable to perform the following ADL's: personal cares, ambulating, grooming, sitting, and standing     Pain Assessment: 0-10  Pain Level: 3     Pain Orientation: Left, Mid  Pain Location: Back  Pain Descriptors: Aching    Last Plain films: 2021    EXAMINATION:  Left LL3-4 4- 5- 5- 56S1 radiofrequency neurotomies. CONSENT:  Written consent was obtained from the patient on the preprinted consent form after explaining the procedure, indications, potential complications and outcomes. Alternative treatments were also discussed. DISCUSSION:  The patient was sterilely prepped and draped in the usual fashion in the prone position. Time out was verified for the correct patient, side, level and procedure.     SEDATION:   No conscious sedation was performed during the procedure. The patient remained awake and conversed throughout the procedure. The patient underwent pulse oximetry and blood pressure monitoring independently by a trained observer, as well as by a physician. FACET RF    Under image-intensifier control, a 83815  mm iTagged RFK insulated radiofrequency probe with 5 mm active tips were successfully directed at the Left LL3-4 4-- 5- 5- 5S1 medial branches of the dorsal rami at the target points described by ARACELI. Needle tip positions were confirmed in 3 views and sensory and motor test stimulation was performed. The patient reported sensory stimulation at 0.0 to 0.0 volts at 50 Hz and motor stimulation at 0.0 to 0.9 volts at 2 Hz, which confirmed satisfactory sensory motor dissociation. 1 ml of 2% lidocaine was instilled  at each site prior to lesioning. 3 lesions were performed at each level  BY SAGITTAL APPROACH at a temperature of 80 degrees Celsius for a duration of 90 seconds, as described by ARACELI. Impedance was measured and ranged from 202-354 ohms. Then, 0.5mL of  0.5% bupivacaine was instilled at each site after lesioning. The patient tolerated the procedure(s) well and without complications and was noted to be in stable condition prior to discharge from procedure center with discharge instructions. EBL: no blood loss    SPECIMEN: none    IMPRESSION:    Left L3-4 -4- 5- 5- S1 radiofrequency neurotomies performed uneventfully. RECOMMENDATIONS:  1. Follow up in the [address] in 2 to 4 weeks  2. Continue Neurontin and opioid analgesia, as per protocol. 3.    Complete and return the \"Post-Procedure Pain and Activity Diary. 4.    Contact the [address] for symptom exacerbation, fever or unusual symptoms. 5.    Follow post-procedure care according to verbal and written instructions.     POST-PROCEDURE LUMBAR SPINE FLUOROSCOPIC IMAGE INTERPRETATION:    EXAMINATION:  AP, lateral and oblique views of

## 2022-08-15 NOTE — H&P
Subjective:       Patient is here today for a diagnostic/therapeutic injection. 8/15/22    Active Hospital Problems    Diagnosis Date Noted    Panniculitis involving lumbar region [M54.06] 08/15/2022     Priority: Medium    Lumbar facet joint syndrome [M47.816] 08/30/2018       HPI: Patient is here today for adiagnostic/therapeutic injection. The most recent [facility] Pain Management office visit notes have been reviewed and are unchanged. Review of Systems   Constitutional:  Positive for fatigue. HENT: Negative. Eyes: Negative. Respiratory: Negative. Cardiovascular: Negative. Gastrointestinal:  Negative for constipation and diarrhea. Endocrine: Negative. Genitourinary:  Negative for difficulty urinating and flank pain. Musculoskeletal:  Positive for back pain and myalgias. Skin: Negative. Allergic/Immunologic: Negative. Neurological:  Positive for weakness and numbness. Hematological: Negative. Psychiatric/Behavioral:  Positive for sleep disturbance.       Allergies   Allergen Reactions    Sulfa Antibiotics Shortness Of Breath    Sulfamethoxazole-Trimethoprim Anaphylaxis     (Bactrim)    Ansaid [Flurbiprofen] Other (See Comments)     Light headed and difficult with vision \"seeing\"    Gentamicin Other (See Comments)     Eye ointment caused eye swelling, redness    Motrin [Ibuprofen] Other (See Comments)     \"I coughed so bad I broke a rib\"    Quinidine      Light headed    Chlorthalidone Hives    Hydrocodone-Acetaminophen     Mirapex [Pramipexole Dihydrochloride] Other (See Comments)     Unknown reaction    Mobic [Meloxicam] Nausea Only    Nsaids Other (See Comments)     lightheaded    Quinolones Other (See Comments) and Hives    Reglan [Metoclopramide] Other (See Comments)     Hyperactive and stomach pain    Trazodone Other (See Comments)     unknown    Amino Acids Nausea And Vomiting     Other reaction(s): AOF    Corgard [Nadolol] Other (See Comments) Severe coughing and broke a rib as a result     Erythromycin Nausea Only    Etodolac      GI issues    Garamycin [Gentamicin Sulfate] Other (See Comments)     Redness and irritation    Inderal [Propranolol] Other (See Comments)     Severe cough    Iothalamate Nausea And Vomiting     Other reaction(s): AOF    Lisinopril Itching    Loratadine      Does not work    Ultram [Tramadol] Other (See Comments)     Didn't work            Prior to Admission medications    Medication Sig Start Date End Date Taking? Authorizing Provider   montelukast (SINGULAIR) 10 MG tablet Take 1 tablet by mouth nightly 8/12/22 11/10/22  Ursula Douglas, DO   simvastatin (ZOCOR) 40 MG tablet Take 1 tablet by mouth nightly 8/12/22 11/10/22  Ursula Douglas, DO   furosemide (LASIX) 20 MG tablet Take 1 tablet by mouth in the morning and 1 tablet at noon and 1 tablet before bedtime. 8/11/22 11/9/22  Ursula Douglas, DO   levothyroxine (SYNTHROID) 75 MCG tablet Take 1 tablet by mouth in the morning. 8/11/22 11/9/22  Ursula Douglas, DO   DULoxetine (CYMBALTA) 30 MG extended release capsule Take 1 capsule by mouth in the morning. 8/11/22 11/9/22  Ursula Douglas, DO   fluticasone-salmeterol (ADVIAR) 100-50 MCG/ACT AEPB diskus inhaler Inhale 1 puff into the lungs in the morning and 1 puff in the evening. 8/11/22 11/9/22  Ursula Douglas, DO   oxyCODONE HCl (OXY-IR) 10 MG immediate release tablet Take 1 tablet by mouth in the morning and 1 tablet at noon and 1 tablet before bedtime. Do all this for 30 days.  7/20/22 8/19/22  KENY Traore - CNP   fluticasone [facility]) 50 MCG/ACT nasal spray instill 2 sprays into each nostril once daily 5/26/22   Historical Provider, MD   ipratropium (ATROVENT) 0.06 % nasal spray instill 2 sprays into each nostril twice a day 5/20/22   Historical Provider, MD   buPROPion (WELLBUTRIN XL) 150 MG extended release tablet take 1 tablet by mouth once daily 6/1/22   Historical Provider, MD   acetaminophen (TYLENOL) 650 MG extended release tablet Take 650 mg by mouth every 8 hours as needed for Pain    Historical Provider, MD   cetirizine (ZYRTEC) 10 MG tablet take 1 tablet by mouth once daily 4/21/22   Historical Provider, MD   spironolactone (ALDACTONE) 25 MG tablet take 1 tablet by mouth once daily 3/24/22   Historical Provider, MD   metoprolol succinate (TOPROL XL) 25 MG extended release tablet take 1 tablet by mouth once daily 3/22/22   Historical Provider, MD   ADVAIR DISKUS 100-50 MCG/DOSE diskus inhaler inhale 1 puff by mouth twice a day 10/13/21   Historical Provider, MD   ipratropium (ATROVENT) 0.03 % nasal spray 2 sprays by Each Nostril route every 12 hours  Patient not taking: No sig reported    Historical Provider, MD   calcium carbonate-vitamin D 600-200 MG-UNIT TABS Take 2 tablets by mouth daily    Historical Provider, MD   topiramate (TOPAMAX) 25 MG tablet Take 25 mg by mouth 2 times daily    Historical Provider, MD   naloxone 4 MG/0.1ML LIQD nasal spray 1 spray by Nasal route as needed for Opioid Reversal 5/27/20   Dulce Maria Olivo MD   baclofen (LIORESAL) 10 MG tablet Take 10 mg by mouth daily     Historical Provider, MD   ketotifen (ZADITOR) 0.025 % ophthalmic solution 1 drop 2 times daily    Historical Provider, MD   Multiple Vitamin (MULTI-VITAMIN DAILY) TABS Take by mouth    Historical Provider, MD   melatonin 1 MG tablet Takes 1/2 of tab. Historical Provider, MD   aspirin 81 MG tablet Take 81 mg by mouth daily    Historical Provider, MD   guaiFENesin (MUCINEX) 600 MG extended release tablet Mucinex 600 mg tablet, extended release   Take 2 tablets every day by oral route. Historical Provider, MD   lidocaine (LMX) 4 % cream Apply topically as needed for Pain Apply topically as needed.     Historical Provider, MD   fluticasone propionate 50 MCG/BLIST AEPB Inhale into the lungs    Historical Provider, MD   albuterol (ACCUNEB) 1.25 MG/3ML nebulizer solution Inhale 1 ampule into the lungs every 6 hours as needed SECTION  1962/1966    2x    CHOLECYSTECTOMY      CHOLECYSTECTOMY      COLONOSCOPY      three    DIAGNOSTIC CARDIAC CATH LAB PROCEDURE      DILATION AND CURETTAGE OF UTERUS  1980    EYE SURGERY Bilateral     cataract    HC INJECTION PROCEDURE FOR SACROILIAC JOINT Left 7/31/2018    Left SIJ and piriformis, left trocanteric bursa injection performed by Rosalinda Schirmer, MD at [address] ([address])  12/2009    HYSTERECTOMY (CERVIX STATUS UNKNOWN)  2009    Total    JOINT REPLACEMENT Bilateral     knee    KNEE ARTHROSCOPY Left 2008    LUMBAR SPINE SURGERY Bilateral 2/26/2019    Bilateral L4 TRANSFORAMINAL  [id number] performed by Rosalinda Schirmer, MD at [address] [place] [address] Dr Bilateral 3/12/2019    Bilateral L4 TRANSFORAMINAL performed by Rosalinda Schirmer, MD at [address] [place] [address] Dr Right 10/15/2019    RIGHT L4 & L5 TRANSFORAMINAL performed by Rosalinda Schirmer, MD at [address] [place] [address] Dr Left 11/1/2019    Left L4 & L5 TRANSFORAMINAL performed by Rosalinda Schirmer, MD at [address] [facility] Bilateral 7/7/2022    Bilateral L3-L4 L4-L5 L5-S1 Confirmatory Medial Branch Block performed by Rosalinda Schirmer, MD at [address] [facility]  8/2014    PAIN MANAGEMENT PROCEDURE Right 2/7/2020    Right L4 & L5 TRANSFORAMINAL performed by Rosalinda Schirmer, MD at [address] Left 2/21/2020    Left L4 & L5 TRANSFORAMINAL performed by Rosalinda Schirmer, MD at [address] Right 2/23/2021    Right L4 & L5 TRANSFORAMINAL performed by Rosalinda Schirmer, MD at [address] Left 3/11/2021    left L4 & L5 TRANSFORAMINAL performed by Rosalinda Schirmer, MD at [address] Left 5/6/2021    Left L4 & L5 TRANSFORAMINAL performed by Rosalinda Schirmer, MD at [address] Bilateral 3/4/2022    Bilateral L4 TRANSFORAMINAL performed by Rosalinda Schirmer, MD at [address] [facility] DX/THER [address] [facility] FACET JOINT, LUMBAR/SAC, 2ND LEVEL Bilateral 8/30/2018    Bilateral L2 L3 L4 L5 Diagnostic Medial BB performed by Fawn Senior MD at [address] [facility] DX/THER AGNT [facility]ERT FACET JOINT, LUMBAR/SAC, 2ND LEVEL Right 9/13/2018    Right L2 L3 L4 L5 Confirmatory Medial BB performed by Fawn Senior MD at [address] [facility] AA&/STRD TFRML EPI CERVICAL/THORACIC EA ADDL Right 7/20/2018    Right C5 C6 TRANSFORAMINAL performed by Fawn Senior MD at [address] Left 12/20/2018    Left L2 L3 L4 L5 RADIOFREQUENCY performed by Fawn Senior MD at [address] Right 1/3/2019    Right L2 L3 L4 L5 RADIOFREQUENCY performed by Fawn Senior MD at [address] [facility] Right 01/28/2005 & 03/16/2010    2x    TONSILLECTOMY      at age 12       Family andSocial History reviewed in the electronic medical record. Imaging: Reviewed available imaging in oursystem with the patient. No results found. Objective:     Vitals:    08/15/22 0952   BP: 138/65   Pulse: 81   Resp: 18   Temp: 97.9 °F (36.6 °C)   SpO2: 95%          Physical Exam  Constitutional:       General: She is not in acute distress. Appearance: Normal appearance. She is well-developed. She is not diaphoretic. HENT:      Head: Normocephalic and atraumatic. Right Ear: External ear normal. No decreased hearing noted. Left Ear: External ear normal. No decreased hearing noted. Nose: Nose normal.   Eyes:      General: Lids are normal. No scleral icterus. Conjunctiva/sclera: Conjunctivae normal.   Neck:      Trachea: Phonation normal.   Cardiovascular:      Comments: No BLE edema present  Pulmonary:      Effort: Pulmonary effort is normal. No accessory muscle usage or respiratory distress. Abdominal:      General: Abdomen is flat. Palpations: Abdomen is soft.    Genitourinary:     Comments: deferred  Musculoskeletal:      Lumbar back: Negative right straight leg raise test and negative left straight leg raise test.   Skin:     General: Skin is warm and dry. Coloration: Skin is not pale. Findings: No erythema or rash. Neurological:      Mental Status: She is alert and oriented to person, place, and time. Psychiatric:         Speech: Speech normal.         Behavior: Behavior normal.     Neurologic Exam     Mental Status   Oriented to person, place, and time. Speech: speech is normal     Motor Exam     Strength   Right quadriceps: 5/5  Left quadriceps: 5/5  Right hamstrin/5  Left hamstrin/5  Back Exam     Tenderness   The patient is experiencing tenderness in the lumbar (+kemps L). Range of Motion   Extension:  normal   Flexion:  normal   Lateral bend right:  normal   Lateral bend left:  normal   Rotation right:  normal   Rotation left:  normal     Muscle Strength   Right Quadriceps:  5/5   Left Quadriceps:  5/5   Right Hamstrings:  5/5   Left Hamstrings:  5/5     Tests   Straight leg raise right: negative  Straight leg raise left: negative    Other   Toe walk: normal  Heel walk: normal  Sensation: normal  Gait: normal           Lab Results   Component Value Date    WBC 7.1 2022    HGB 13.8 2022    HCT 45.4 2022    .0 2022    CHOL 150 2022    TRIG 105 2022    HDL 57 2022    ALT 16 2022    AST 23 2022     2022    K 4.3 2022     2022    CREATININE 0.9 2022    BUN 15 2022    CO2 23 2022    LABA1C 5.5 2022       Assessment:                            Active Hospital Problems    Diagnosis Date Noted    Panniculitis involving lumbar region [M54.06] 08/15/2022     Priority: Medium    Lumbar facet joint syndrome [M47.816] 2018                  Plan:   Proceed with planned procedure  - Ll 3-4 -4- 5- 5S1 RFA    The patientunderstands the planned operation and its associated risks and benefits and agrees to proceed.  The surgical consent form has been signed. Last NSAID/anticoagulant medication use was:na    Premedication taken for contrast allergy? No    Valium taken for oral sedation?  No

## 2022-08-15 NOTE — DISCHARGE INSTRUCTIONS
Home Care after Radiofrequency Ablation    The doctor has done a radiofrequency procedure to disrupt the nerves ability to cause pain. You may feel sore at the injection site for the next 2-4 days. You may apply ice to the site for 20 minutes on and 20 minutes off to decrease pain and discomfort, if needed, for the first 24 hours. After 24 hours, you may use heat if needed. Do not expect your pain to subside right away. Limit your activities for the first few days to those that you can do without pain. It may take up to 8 weeks before you have pain relief. Take your pain medicines as prescribed. You may have some weakness for the next 3-5 hours. Plan to take it easy. No baths or soaking of the sites for one week. Do not shower for 48 hours. You may resume taking your routine medicines after the procedure including pain medicines as prescribed. Resume any medications held for the procedure (blood thinners, aspirin, anti-inflammatories). If you do not already have a follow-up appointment, call your referring doctors office to make one to discuss your results within 2-4 weeks after the treatment. Your doctor will have the report within 7-10 days. You will be given a pain log to complete for the next 14 days. Complete this form and make a copy for your own records. Then, mail it back to us or drop it off at the pain management clinic. We will need this information to decide the next step in your treatment plan. Signs of infection  Fever greater than 100.4°F by mouth for 2 readings taken 4 hours apart  Increased redness, swelling around the site  Any drainage from the site    If you have any new symptoms or any signs of infection, please call [phone] during business hours to notify us. You can also notify your primary care physician. After hours, nights and weekends, call [phone].

## 2022-08-15 NOTE — INTERVAL H&P NOTE
I have interviewed and examined the patient and reviewed the recent History and Physical.  There have been no changes to the recent H&P documentation. The surgical consent form has been signed. Last anticoagulant medication use was:na    Premedication taken for contrast allergy? No    Valium taken for oral sedation? No    No outpatient medications have been marked as taking for the 8/15/22 encounter [facility] Encounter). The patient understands the planned operation and its associated risks and benefits and agrees to proceed.         Electronically signed by Chanel Espinoza MD on 8/15/2022 at 10:39 AM

## 2022-08-15 NOTE — FLOWSHEET NOTE
rounding in [address]. Assessment: Patient (\"Sabina\") was prepped and waiting for her procedure. Patient had a positive former experience and was optimistic about the results to relieve her pain. Patient has been  twice, has the support of her 3 children, 4 step-children, large family, many friends and [employer] community. 08/15/22 1111   Encounter Summary   Encounter Overview/Reason  Initial Encounter   Service Provided For: Patient   Referral/Consult From: [address] [place] [address] Children;Family members;Friends/neighbors; Christianity/addie community   Last Encounter  08/15/22   Complexity of Encounter Low   Begin Time 1004   End Time  1026   Total Time Calculated 22 min   Encounter    Type Pre-Procedural   Assessment/Intervention/Outcome   Assessment Calm; Hopeful   Intervention Active listening;Prayer (assurance of)/[place]   Outcome Acceptance;Encouraged;Engaged in conversation;Expressed Gratitude; Optimistic;Receptive   Plan and Referrals   Plan/Referrals No future visits requested       Intervention: Engaged in conversation and active listening. Prayed with Patient. Outcome: Patient expressed appreciation for visit and offer of continued prayer. Plan: Chaplains are available on site or on call 24/7 for spiritual and emotional support.     Electronically signed by Angelito Mcneil on 8/15/2022 at 11:14 AM

## 2022-08-16 ENCOUNTER — TELEPHONE (OUTPATIENT)
Dept: FAMILY MEDICINE CLINIC | Age: 82
End: 2022-08-16

## 2022-08-16 NOTE — TELEPHONE ENCOUNTER
Pt called stating that she has a sinus infection and cannot get in today as she doesn't feel too well. Pt stated that she usually takes Ceftin 500mg for this but doesn't have any and would like a rx for it. Please advise.

## 2022-08-17 ENCOUNTER — OFFICE VISIT (OUTPATIENT)
Dept: FAMILY MEDICINE CLINIC | Age: 82
End: 2022-08-17
Payer: MEDICARE

## 2022-08-17 VITALS
DIASTOLIC BLOOD PRESSURE: 64 MMHG | HEART RATE: 71 BPM | TEMPERATURE: 96.4 F | RESPIRATION RATE: 18 BRPM | BODY MASS INDEX: 30.05 KG/M2 | SYSTOLIC BLOOD PRESSURE: 122 MMHG | WEIGHT: 176 LBS | HEIGHT: 64 IN | OXYGEN SATURATION: 98 %

## 2022-08-17 DIAGNOSIS — R05.9 COUGH: ICD-10-CM

## 2022-08-17 DIAGNOSIS — R09.81 CONGESTION OF NASAL SINUS: ICD-10-CM

## 2022-08-17 DIAGNOSIS — J02.9 SORE THROAT: ICD-10-CM

## 2022-08-17 DIAGNOSIS — J01.40 ACUTE PANSINUSITIS, RECURRENCE NOT SPECIFIED: Primary | ICD-10-CM

## 2022-08-17 DIAGNOSIS — R09.82 POST-NASAL DRIP: ICD-10-CM

## 2022-08-17 DIAGNOSIS — R51.9 SINUS HEADACHE: ICD-10-CM

## 2022-08-17 PROBLEM — J06.9 VIRAL UPPER RESPIRATORY ILLNESS: Status: ACTIVE | Noted: 2022-08-17

## 2022-08-17 PROCEDURE — 99213 OFFICE O/P EST LOW 20 MIN: CPT | Performed by: NURSE PRACTITIONER

## 2022-08-17 PROCEDURE — 99211 OFF/OP EST MAY X REQ PHY/QHP: CPT | Performed by: NURSE PRACTITIONER

## 2022-08-17 PROCEDURE — G8400 PT W/DXA NO RESULTS DOC: HCPCS | Performed by: NURSE PRACTITIONER

## 2022-08-17 PROCEDURE — 1090F PRES/ABSN URINE INCON ASSESS: CPT | Performed by: NURSE PRACTITIONER

## 2022-08-17 PROCEDURE — G8427 DOCREV CUR MEDS BY ELIG CLIN: HCPCS | Performed by: NURSE PRACTITIONER

## 2022-08-17 PROCEDURE — 1123F ACP DISCUSS/DSCN MKR DOCD: CPT | Performed by: NURSE PRACTITIONER

## 2022-08-17 PROCEDURE — 1036F TOBACCO NON-USER: CPT | Performed by: NURSE PRACTITIONER

## 2022-08-17 PROCEDURE — G8417 CALC BMI ABV UP PARAM F/U: HCPCS | Performed by: NURSE PRACTITIONER

## 2022-08-17 PROCEDURE — PBSHW PBB SHADOW CHARGE: Performed by: NURSE PRACTITIONER

## 2022-08-17 RX ORDER — METHYLPREDNISOLONE 4 MG/1
TABLET ORAL
Qty: 1 KIT | Refills: 0 | Status: SHIPPED | OUTPATIENT
Start: 2022-08-17 | End: 2022-08-23

## 2022-08-17 RX ORDER — AZELASTINE 1 MG/ML
1 SPRAY, METERED NASAL 2 TIMES DAILY
Qty: 30 ML | Refills: 0 | Status: SHIPPED | OUTPATIENT
Start: 2022-08-17 | End: 2022-10-13 | Stop reason: SDUPTHER

## 2022-08-17 RX ORDER — CEFUROXIME AXETIL 500 MG/1
250 TABLET ORAL 2 TIMES DAILY
Qty: 7 TABLET | Refills: 0 | Status: SHIPPED | OUTPATIENT
Start: 2022-08-17 | End: 2022-08-24

## 2022-08-17 ASSESSMENT — ENCOUNTER SYMPTOMS
COUGH: 1
NAUSEA: 0
RHINORRHEA: 1
SINUS PRESSURE: 1
VOMITING: 0
ALLERGIC/IMMUNOLOGIC NEGATIVE: 1
SINUS PAIN: 1
SORE THROAT: 1
EYES NEGATIVE: 1
GASTROINTESTINAL NEGATIVE: 1
DIARRHEA: 0
ABDOMINAL PAIN: 0
BACK PAIN: 1
SHORTNESS OF BREATH: 1

## 2022-08-17 NOTE — PATIENT INSTRUCTIONS
Continue saline nasal rinses. Try using Astelin spray in addition to your current sinus medications. Recommended over the counter medications/treatments: The use of an antihistamine (Claritin or Zyrtec) and a nasal steroid spray (Flonase or Nasacort) may help with sinus congestion and drainage. Mucinex will also help thin secretions and improve congestion. Works best if drinking plenty of water. Honey with or without Lemon may also help with coughing. Probiotics daily may help boost immune system. Alternating hot liquids with cold liquids helps to sooth sore throat  Use Acetaminophen (Tylenol) to help relieve fever, chills or body aches. If allowed, you may alternate using ibuprofen (Motrin) and Tylenol. Do not exceed 3,000mg of Tylenol in a 24 hour time period. Make sure to stay well hydrated by drinking plenty of water. Follow up with primary care provider in 1 to 2 days or as needed if no improvement or worsening of your symptoms.

## 2022-08-17 NOTE — PROGRESS NOTES
[address] [place] [address] of [facility]  [address] [address]  Phone: [phone]  Fax: [phone]      Elza Urban is a 80 y.o. female who presents to the [facility] today for her medical conditions/complaints as noted below. Elza Urban is c/o of Shortness of Breath (Pt presents to walk in with complaint of SO+B that started on Sunday, then developed sinus drainage on Monday so she started using a saline rinse, pt says as the week went on she developed a post nasal drip. When prompted and asked if she was in any pain patient answer well my throat hurts and I ache all over.)          HPI:     URI   This is a new problem. The current episode started in the past 7 days (Satrurday 8/13/2022). The problem has been gradually worsening. There has been no fever. Associated symptoms include congestion, coughing (clear to yellow), headaches (sinus.), rhinorrhea (clear), sinus pain and a sore throat. Pertinent negatives include no abdominal pain, chest pain (no recent episodes.), diarrhea, dysuria, nausea or vomiting. She has tried acetaminophen (Mucinex, zytrec, flonase, singular.) for the symptoms. The treatment provided mild relief.      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 (total left knee 3/2015 at [facility])    History of renal stone     passed    Hx of blood clots 1970    DVT    Hypertension     MVP (mitral valve prolapse)     LONI (obstructive sleep apnea)     has not used bipap since June, 2020    Rectocele     Spinal stenosis     Thyroid disease late 1980's    overactive radioactive iodine done        Allergies   Allergen Reactions    Sulfa Antibiotics Shortness Of Breath    Sulfamethoxazole-Trimethoprim Anaphylaxis     (Bactrim)    Ansaid [Flurbiprofen] Other (See Comments)     Light headed and difficult with vision \"seeing\"    Gentamicin Other (See Comments)     Eye ointment caused eye swelling, redness    Motrin [Ibuprofen] Other (See Comments)     \"I coughed so bad I broke a rib\"    Quinidine      Light headed    Chlorthalidone Hives    Hydrocodone-Acetaminophen     Mirapex [Pramipexole Dihydrochloride] Other (See Comments)     Unknown reaction    Mobic [Meloxicam] Nausea Only    Nsaids Other (See Comments)     lightheaded    Quinolones Other (See Comments) and Hives    Reglan [Metoclopramide] Other (See Comments)     Hyperactive and stomach pain    Trazodone Other (See Comments)     unknown    Amino Acids Nausea And Vomiting     Other reaction(s): AOF    Corgard [Nadolol] Other (See Comments)     Severe coughing and broke a rib as a result     Erythromycin Nausea Only    Etodolac      GI issues    Garamycin [Gentamicin Sulfate] Other (See Comments)     Redness and irritation    Inderal [Propranolol] Other (See Comments)     Severe cough    Iothalamate Nausea And Vomiting     Other reaction(s): AOF    Lisinopril Itching    Loratadine      Does not work    Ultram [Tramadol] Other (See Comments)     Didn't work         Adrien Rudy from Last 3 Encounters:   08/17/22 176 lb (79.8 kg)   08/15/22 176 lb 6.4 oz (80 kg)   08/11/22 175 lb 12.8 oz (79.7 kg)     BP Readings from Last 3 Encounters:   08/17/22 122/64   08/15/22 125/73   08/11/22 110/70      Temp Readings from Last 3 Encounters:   08/17/22 (!) 96.4 °F (35.8 °C) (Tympanic) 08/15/22 97.9 °F (36.6 °C) (Oral)   08/11/22 97.7 °F (36.5 °C)     Pulse Readings from Last 3 Encounters:   08/17/22 71   08/15/22 68   08/11/22 69     SpO2 Readings from Last 3 Encounters:   08/17/22 98%   08/15/22 98%   08/11/22 97%       Subjective:      Review of Systems   Constitutional:  Positive for fatigue. Negative for appetite change, chills and fever. HENT:  Positive for congestion, postnasal drip, rhinorrhea (clear), sinus pressure, sinus pain and sore throat. Eyes: Negative. Respiratory:  Positive for cough (clear to yellow) and shortness of breath (with increased coughing and post nasal drip.). Cardiovascular: Negative. Negative for chest pain (no recent episodes.) and palpitations. Gastrointestinal: Negative. Negative for abdominal pain, diarrhea, nausea and vomiting. Endocrine: Negative. Genitourinary: Negative. Negative for difficulty urinating and dysuria. Musculoskeletal:  Positive for back pain (chronic). Skin: Negative. Allergic/Immunologic: Negative. Neurological:  Positive for headaches (sinus. ). Hematological: Negative. Psychiatric/Behavioral: Negative. All other systems reviewed and are negative. Objective:     Vitals:    08/17/22 0957   BP: 122/64   Site: Right Upper Arm   Position: Sitting   Cuff Size: Medium Adult   Pulse: 71   Resp: 18   Temp: (!) 96.4 °F (35.8 °C)   TempSrc: Tympanic   SpO2: 98%   Weight: 176 lb (79.8 kg)   Height: 5' 4\" (1.626 m)     Body mass index is 30.21 kg/m². /64 (Site: Right Upper Arm, Position: Sitting, Cuff Size: Medium Adult)   Pulse 71   Temp (!) 96.4 °F (35.8 °C) (Tympanic)   Resp 18   Ht 5' 4\" (1.626 m)   Wt 176 lb (79.8 kg)   LMP  (LMP Unknown)   SpO2 98%   BMI 30.21 kg/m²   Physical Exam  Vitals and nursing note reviewed. Constitutional:       General: She is not in acute distress. Appearance: She is ill-appearing.    HENT:      Right Ear: Hearing, tympanic membrane, ear canal and external ear normal. There is no impacted cerumen. Left Ear: Hearing, tympanic membrane, ear canal and external ear normal. There is no impacted cerumen. Nose: Mucosal edema, congestion and rhinorrhea present. Rhinorrhea is clear. Right Turbinates: Swollen. Left Turbinates: Swollen. Right Sinus: Maxillary sinus tenderness present. Left Sinus: Maxillary sinus tenderness present. Mouth/Throat:      Lips: Pink. Mouth: Mucous membranes are moist.      Pharynx: Uvula midline. No oropharyngeal exudate or posterior oropharyngeal erythema. Tonsils: No tonsillar exudate or tonsillar abscesses. Comments: Moderate amount of mucus noted in the pharynx. Eyes:      General: Lids are normal.      Conjunctiva/sclera: Conjunctivae normal.      Pupils: Pupils are equal, round, and reactive to light. Cardiovascular:      Rate and Rhythm: Normal rate and regular rhythm. Pulses: Normal pulses. Heart sounds: Normal heart sounds. Pulmonary:      Effort: Pulmonary effort is normal. No respiratory distress. Breath sounds: Normal breath sounds. No decreased air movement. No decreased breath sounds, wheezing, rhonchi or rales. Chest:   Breasts:     Right: No supraclavicular adenopathy. Left: No supraclavicular adenopathy. Musculoskeletal:      Cervical back: Normal range of motion and neck supple. Comments: Slow to stand from sitting position and uses a cane to ambulate. Chronic back pain with recent radiofrequency ablation procedure   Lymphadenopathy:      Cervical: No cervical adenopathy. Right cervical: No superficial or posterior cervical adenopathy. Left cervical: No superficial or posterior cervical adenopathy. Upper Body:      Right upper body: No supraclavicular adenopathy. Left upper body: No supraclavicular adenopathy. Skin:     General: Skin is warm and dry. Capillary Refill: Capillary refill takes less than 2 seconds. Neurological:      General: No focal deficit present. Mental Status: She is alert and oriented to person, place, and time. Mental status is at baseline. Psychiatric:         Mood and Affect: Mood normal.         Behavior: Behavior normal.         Judgment: Judgment normal.       Assessment:      Diagnosis Orders   1. Acute pansinusitis, recurrence not specified  methylPREDNISolone (MEDROL, KIKI,) 4 MG tablet    cefUROXime (CEFTIN) 500 MG tablet      2. Sinus headache  methylPREDNISolone (MEDROL, KIKI,) 4 MG tablet    azelastine (ASTELIN) 0.1 % nasal spray    cefUROXime (CEFTIN) 500 MG tablet      3. Congestion of nasal sinus        4. Sore throat  cefUROXime (CEFTIN) 500 MG tablet      5. Post-nasal drip        6. Cough            Plan:     Discussed symptoms with patient and explained that she most likely has a viral infection since her COVID test was negative yesterday. She insisted that when these symptoms have occurred in the past that she does not get better without antibiotics and dose pack of steroids. She was advised on inappropriate use of antibiotics and continued to state that she feels that she needs one. Medrol dose pack Rx sent to the pharmacy along with Ceftin 250 mg tablets (will use BID for 7 days if needed). She was advised to wait until after tomorrow to start taking antibiotics and to only take them if her symptoms do not improve on Medrol dose pack and use of Astelin nasal spray. She was encouraged to continue saline nasal rinses and her current sinus medications. Discussed exam, POCT findings, plan of care, and follow-up at length with patient. Reviewed all prescribed and recommended medications, administration and side effects. Encouraged patient to follow up with PCP or return to the clinic for no improvement and or worsening of symptoms.  Patient instructed to go to ER or call 911 if any difficulty breathing, shortness of breath, inability to swallow, hives or temp greater than 103 degrees. All questions were answered and they verbalized understanding and were agreeable with the plan. Return if symptoms worsen or fail to improve.         Electronically signed by Herb Fothergill, APRN - CNP on 8/17/2022 at 10:34 AM

## 2022-08-23 ENCOUNTER — OFFICE VISIT (OUTPATIENT)
Dept: FAMILY MEDICINE CLINIC | Age: 82
End: 2022-08-23
Payer: MEDICARE

## 2022-08-23 VITALS
RESPIRATION RATE: 16 BRPM | OXYGEN SATURATION: 97 % | WEIGHT: 171.2 LBS | SYSTOLIC BLOOD PRESSURE: 122 MMHG | DIASTOLIC BLOOD PRESSURE: 78 MMHG | TEMPERATURE: 97.3 F | BODY MASS INDEX: 29.39 KG/M2 | HEART RATE: 74 BPM

## 2022-08-23 DIAGNOSIS — I10 ESSENTIAL HYPERTENSION: Primary | ICD-10-CM

## 2022-08-23 DIAGNOSIS — F32.A DEPRESSION, UNSPECIFIED DEPRESSION TYPE: ICD-10-CM

## 2022-08-23 DIAGNOSIS — G60.9 IDIOPATHIC PERIPHERAL NEUROPATHY: ICD-10-CM

## 2022-08-23 PROCEDURE — 1123F ACP DISCUSS/DSCN MKR DOCD: CPT | Performed by: FAMILY MEDICINE

## 2022-08-23 PROCEDURE — G8427 DOCREV CUR MEDS BY ELIG CLIN: HCPCS | Performed by: FAMILY MEDICINE

## 2022-08-23 PROCEDURE — 1090F PRES/ABSN URINE INCON ASSESS: CPT | Performed by: FAMILY MEDICINE

## 2022-08-23 PROCEDURE — 99212 OFFICE O/P EST SF 10 MIN: CPT | Performed by: FAMILY MEDICINE

## 2022-08-23 PROCEDURE — G8417 CALC BMI ABV UP PARAM F/U: HCPCS | Performed by: FAMILY MEDICINE

## 2022-08-23 PROCEDURE — G8400 PT W/DXA NO RESULTS DOC: HCPCS | Performed by: FAMILY MEDICINE

## 2022-08-23 PROCEDURE — 99214 OFFICE O/P EST MOD 30 MIN: CPT | Performed by: FAMILY MEDICINE

## 2022-08-23 PROCEDURE — 1036F TOBACCO NON-USER: CPT | Performed by: FAMILY MEDICINE

## 2022-08-23 RX ORDER — BUPROPION HYDROCHLORIDE 150 MG/1
TABLET ORAL
Qty: 90 TABLET | Refills: 1 | Status: SHIPPED | OUTPATIENT
Start: 2022-08-23

## 2022-08-23 RX ORDER — BACLOFEN 10 MG/1
10 TABLET ORAL DAILY
Qty: 90 TABLET | Refills: 0 | Status: SHIPPED | OUTPATIENT
Start: 2022-08-23 | End: 2022-11-21

## 2022-08-23 RX ORDER — SPIRONOLACTONE 25 MG/1
TABLET ORAL
Qty: 90 TABLET | Refills: 1 | Status: SHIPPED | OUTPATIENT
Start: 2022-08-23

## 2022-08-23 RX ORDER — TOPIRAMATE 25 MG/1
25 TABLET ORAL 2 TIMES DAILY
Qty: 180 TABLET | Refills: 1 | Status: SHIPPED | OUTPATIENT
Start: 2022-08-23 | End: 2022-11-21

## 2022-08-23 ASSESSMENT — ENCOUNTER SYMPTOMS
CHEST TIGHTNESS: 0
CHOKING: 0
WHEEZING: 0
COUGH: 1
ABDOMINAL PAIN: 0
SHORTNESS OF BREATH: 0

## 2022-08-23 NOTE — PATIENT INSTRUCTIONS
Nutrition Health Education:    Keep hydrated, walk 30 minutes minimum 3 times weekly as tolerated. Diet should consist of low fat, low sodium and high fiber. Nutritious foods such as fruits (if you're not diabetic), vegetables, lean meats, lean dairy, whole grains such as brown rice, quinoa, and dry beans. Fleurette Alvares with small amounts of heart healthy extra virgin olive oil. Be watchful of any extra salt/sugar/seasoning to your food. You should eat no more than 2 grams or 2,000 mg of salt daily. Salt will raise your BP. Avoid regular/diet sodas, caffeine, energy drinks as these are full of artificial ingredients/sweeteners, sugar, salt and chemicals that spike insulin and are harmful to your health. Sugar intake increases metabolic disfunction, type 2 diabetes, insulin resistance, addictive food behavior and obesity. Avoid all processed foods, foods from boxes, cans, microwave meals as these contain high salt, sugar or fat content and not much nutrition. Get at least 8 hrs of sleep every night and turn off all electronics at least 1 hour before bedtime as these decreases melatonin production and increases wakefulness. If your cholesterol is high, no greasy, fried, fast or fatty foods. Decrease red meat intake. No butter, nicole, lard or creams, no milk as these things clog your arteries and leads to heart attacks and death. If you smoke, smoking increases risk of lung disease, cancers, high BP, heart attack, stroke and death. Take your daily medications as prescribed and inform your family doctor if you are having any side effects or issues taking medications.      Elevated Blood Pressure:  No caffeine  No fast foods  Decrease salt in diet (consume nothing in a can, nothing in a box as these things contain high sodium)  No energy drinks  Buy a BP cuff and take blood pressure 3 times a day and write down blood pressure and pulse and bring in to me when you RTO  Lose weight and increase exercise if you are capable of exercising  Start only walking then may advance to brisk walking and lift low poundage free weights if you are capable     Keep your appt with the cardiologist    If you need to take the spironolactone earlier take it earlier until you see the cardiologist    Follow up with me as scheduled

## 2022-08-23 NOTE — PROGRESS NOTES
Name: Jayesh Langston  DOS: 8/23/2022  MRN: [id number]      Subjective:  Jayesh Langston is a 80 y.o. female being seen for   Chief Complaint   Patient presents with    Hypertension     Patient reports that her BP readings have been fluctuating.   193/95 this am.        Vitals:    08/23/22 1209   BP: 122/78   Pulse:    Resp:    Temp:    SpO2:      Allergies   Allergen Reactions    Sulfa Antibiotics Shortness Of Breath    Sulfamethoxazole-Trimethoprim Anaphylaxis     (Bactrim)    Ansaid [Flurbiprofen] Other (See Comments)     Light headed and difficult with vision \"seeing\"    Gentamicin Other (See Comments)     Eye ointment caused eye swelling, redness    Motrin [Ibuprofen] Other (See Comments)     \"I coughed so bad I broke a rib\"    Quinidine      Light headed    Chlorthalidone Hives    Hydrocodone-Acetaminophen     Mirapex [Pramipexole Dihydrochloride] Other (See Comments)     Unknown reaction    Mobic [Meloxicam] Nausea Only    Nsaids Other (See Comments)     lightheaded    Quinolones Other (See Comments) and Hives    Reglan [Metoclopramide] Other (See Comments)     Hyperactive and stomach pain    Trazodone Other (See Comments)     unknown    Amino Acids Nausea And Vomiting     Other reaction(s): AOF    Corgard [Nadolol] Other (See Comments)     Severe coughing and broke a rib as a result     Erythromycin Nausea Only    Etodolac      GI issues    Garamycin [Gentamicin Sulfate] Other (See Comments)     Redness and irritation    Inderal [Propranolol] Other (See Comments)     Severe cough    Iothalamate Nausea And Vomiting     Other reaction(s): AOF    Lisinopril Itching    Loratadine      Does not work    Ultram [Tramadol] Other (See Comments)     Didn't work       Past Medical History:   Diagnosis Date    Anesthesia complication     2nd post op day from total knee patient stated \"I was wild and mean\"    Anxiety and depression     Arthritis     ASD (atrial septal defect)     repair in 1963    Asthma COPD (chronic obstructive pulmonary disease) (HCC)     COPD (chronic obstructive pulmonary disease) (HCC)     Disorder of immune system ([facility] [address]     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 (total left knee 3/2015 at [facility])    History of renal stone     passed    Hx of blood clots     DVT    Hypertension     MVP (mitral valve prolapse)     LONI (obstructive sleep apnea)     has not used bipap since     Rectocele     Spinal stenosis     Thyroid disease late     overactive radioactive iodine done     Past Surgical History:   Procedure Laterality Date    BACK INJECTION Bilateral 2021    Bilateral SACROILIAC Joint & Piriformis Injection performed by Cely Gregory MD at [address] [facility]. Bilateral 12/3/2021    Bilateral SACROILIAC JOINT & Piriformis Injections performed by Cely Gregory MD at [address] [facility] LUMPECTOMY Left     BREAST LUMPECTOMY Left     CARDIAC SURGERY      ASD repair     SECTION      2x    CHOLECYSTECTOMY      CHOLECYSTECTOMY      COLONOSCOPY      three    DIAGNOSTIC CARDIAC CATH LAB PROCEDURE      DILATION AND CURETTAGE OF UTERUS      EYE SURGERY Bilateral     cataract    [facility] INJECTION PROCEDURE FOR SACROILIAC JOINT Left 2018    Left SIJ and piriformis, left trocanteric bursa injection performed by Cely Gregory MD at [facility] ([address] [place] [address])  2009    HYSTERECTOMY (CERVIX STATUS UNKNOWN)  2009    Total    JOINT REPLACEMENT Bilateral     knee    KNEE ARTHROSCOPY Left     LUMBAR SPINE SURGERY Bilateral 2019    Bilateral L4 TRANSFORAMINAL  [id number] performed by Cely Gregory MD at [address] [place] [address] Bilateral 3/12/2019    Bilateral L4 TRANSFORAMINAL performed by Christa Rivers MD at [address] [place] [address] Right 10/15/2019    RIGHT L4 & L5 TRANSFORAMINAL performed by Christa Rivers MD at [address] [place] [address] Left 11/1/2019    Left L4 & L5 TRANSFORAMINAL performed by Christa Rivers MD at [address] [place] [address] Bilateral 7/7/2022    Bilateral L3-L4 L4-L5 L5-S1 Confirmatory Medial Branch Block performed by Christa Rivers MD at [facility]  8/2014    PAIN MANAGEMENT PROCEDURE Right 2/7/2020    Right L4 & L5 TRANSFORAMINAL performed by Christa Rivers MD at [address] [place] [address] Left 2/21/2020    Left L4 & L5 TRANSFORAMINAL performed by Christa Rivers MD at [address] [place] [address] Right 2/23/2021    Right L4 & L5 TRANSFORAMINAL performed by Christa Rivers MD at [address] [place] [address] Left 3/11/2021    left L4 & L5 TRANSFORAMINAL performed by Christa Rivers MD at [address] [place] [address] Left 5/6/2021    Left L4 & L5 TRANSFORAMINAL performed by Christa Rivers MD at [address] [place] [address] Bilateral 3/4/2022    Bilateral L4 TRANSFORAMINAL performed by Christa Rivers MD at [address] [place] [address] Left 8/15/2022    Left L3-L4 L4-L5 L5-S1 RADIOFREQUENCY ABLATION performed by Christa Rivers MD at [address] [place] [address] DX/THER AGNT PARAVERT FACET JOINT, LUMBAR/SAC, 2ND LEVEL Bilateral 8/30/2018    Bilateral L2 L3 L4 L5 Diagnostic Medial BB performed by Christa Rivers MD at [address] [place] [address] DX/THER AGNT PARAVERT FACET JOINT, LUMBAR/SAC, 2ND LEVEL Right 9/13/2018    Right L2 L3 L4 L5 Confirmatory Medial BB performed by Christa Rivers MD at [facility] [address] AA&/STRD TFRML EPI CERVICAL/THORACIC EA ADDL Right 7/20/2018    Right C5 C6 TRANSFORAMINAL performed by Christa Rivers MD at [place] [address] Left 12/20/2018    Left L2 L3 L4 L5 RADIOFREQUENCY performed by Christa Rivers MD at [place] [address] Right mouth 2 times daily for 7 days (Patient not taking: Reported on 8/23/2022) 7 tablet 0    fluticasone-salmeterol (ADVIAR) 100-50 MCG/ACT AEPB diskus inhaler Inhale 1 puff into the lungs in the morning and 1 puff in the evening. (Patient not taking: Reported on 8/23/2022) 3 each 1    DULoxetine (CYMBALTA) 60 MG extended release capsule Take 60 mg by mouth daily  (Patient not taking: Reported on 8/23/2022)       Current Facility-Administered Medications   Medication Dose Route Frequency Provider Last Rate Last Admin    triamcinolone acetonide (KENALOG-40) injection 40 mg  40 mg Intra-artICUlar Once Orlin Wise MD        lidocaine 2 % injection 5 mL  5 mL IntraDERmal Once Orlin Wise MD        bupivacaine (MARCAINE) 0.5 % injection 150 mg  30 mL Intra-artICUlar Once Orlin Wise MD            Objective:  Pt called today for an appt today because today she says she started to get elevated blood pressure in the afternoon and at night. This has been happening over the past 2 months. She thinks that this is due to aggravation. She gets a HA with thi high BP. BP 11:00 190's over 90 something so she took spironolactone 2 hrs earlier. Pt will see the cardiologist next Tuesday. Review of Systems   Constitutional:  Positive for fatigue (sometimes). Negative for unexpected weight change. Eyes:  Negative for visual disturbance. Respiratory:  Positive for cough (ccasionally form allergies). Negative for choking, chest tightness, shortness of breath and wheezing. Cardiovascular:  Positive for leg swelling (so her lasix was increased which helped). Negative for chest pain and palpitations. Gastrointestinal:  Negative for abdominal pain. Genitourinary:  Negative for difficulty urinating and hematuria. Musculoskeletal:  Negative for myalgias. Skin:  Negative for rash and wound. Neurological:  Negative for dizziness and weakness. Psychiatric/Behavioral:  Positive for agitation. Negative for confusion, dysphoric mood and suicidal ideas. Physical Exam  Constitutional:       General: She is not in acute distress. Appearance: Normal appearance. She is not ill-appearing, toxic-appearing or diaphoretic. HENT:      Head: Normocephalic and atraumatic. Right Ear: Tympanic membrane and external ear normal.      Left Ear: Tympanic membrane and external ear normal.      Nose: Nose normal. No congestion or rhinorrhea. Mouth/Throat:      Mouth: Mucous membranes are moist.      Pharynx: Oropharynx is clear. No oropharyngeal exudate or posterior oropharyngeal erythema. Eyes:      Extraocular Movements: Extraocular movements intact. Conjunctiva/sclera: Conjunctivae normal.      Pupils: Pupils are equal, round, and reactive to light. Cardiovascular:      Rate and Rhythm: Normal rate and regular rhythm. Pulses: Normal pulses. Heart sounds: Normal heart sounds. No murmur heard. Pulmonary:      Effort: Pulmonary effort is normal.      Breath sounds: Normal breath sounds. No wheezing, rhonchi or rales. Abdominal:      General: Abdomen is flat. Bowel sounds are normal. There is no distension. Palpations: Abdomen is soft. There is no mass. Tenderness: There is no abdominal tenderness. There is no right CVA tenderness, left CVA tenderness or guarding. Musculoskeletal:         General: Normal range of motion. Cervical back: Normal range of motion and neck supple. Right lower leg: No edema. Left lower leg: No edema. Lymphadenopathy:      Cervical: No cervical adenopathy. Skin:     General: Skin is warm. Capillary Refill: Capillary refill takes less than 2 seconds. Neurological:      General: No focal deficit present. Mental Status: She is alert and oriented to person, place, and time. Gait: Gait abnormal (uses a cane).    Psychiatric:         Mood and Affect: Mood normal.         Behavior: Behavior normal.         Thought Content: Thought content normal.        Assessment:   Diagnosis Orders   1. Essential hypertension  spironolactone (ALDACTONE) 25 MG tablet      2. Idiopathic peripheral neuropathy  topiramate (TOPAMAX) 25 MG tablet    baclofen (LIORESAL) 10 MG tablet      3. Depression, unspecified depression type  buPROPion (WELLBUTRIN XL) 150 MG extended release tablet            Plan:  No orders of the defined types were placed in this encounter. Patient Instructions   Nutrition Health Education:    Keep hydrated, walk 30 minutes minimum 3 times weekly as tolerated. Diet should consist of low fat, low sodium and high fiber. Nutritious foods such as fruits (if you're not diabetic), vegetables, lean meats, lean dairy, whole grains such as brown rice, quinoa, and dry beans. Erwin C[place] with small amounts of heart healthy extra virgin olive oil. Be watchful of any extra salt/sugar/seasoning to your food. You should eat no more than 2 grams or 2,000 mg of salt daily. Salt will raise your BP. Avoid regular/diet sodas, caffeine, energy drinks as these are full of artificial ingredients/sweeteners, sugar, salt and chemicals that spike insulin and are harmful to your health. Sugar intake increases metabolic disfunction, type 2 diabetes, insulin resistance, addictive food behavior and obesity. Avoid all processed foods, foods from boxes, cans, microwave meals as these contain high salt, sugar or fat content and not much nutrition. Get at least 8 hrs of sleep every night and turn off all electronics at least 1 hour before bedtime as these decreases melatonin production and increases wakefulness. If your cholesterol is high, no greasy, fried, fast or fatty foods. Decrease red meat intake. No butter, nicole, lard or creams, no milk as these things clog your arteries and leads to heart attacks and death. If you smoke, smoking increases risk of lung disease, cancers, high BP, heart attack, stroke and death.  Take your daily medications as prescribed and inform your family doctor if you are having any side effects or issues taking medications. Elevated Blood Pressure:  No caffeine  No fast foods  Decrease salt in diet (consume nothing in a can, nothing in a box as these things contain high sodium)  No energy drinks  Buy a BP cuff and take blood pressure 3 times a day and write down blood pressure and pulse and bring in to me when you RTO  Lose weight and increase exercise if you are capable of exercising  Start only walking then may advance to brisk walking and lift low poundage free weights if you are capable     Keep your appt with the cardiologist    If you need to take the spironolactone earlier take it earlier until you see the cardiologist    Follow up with me as scheduled     No follow-ups on file.      Sulma Myers, DO

## 2022-08-24 ENCOUNTER — TELEPHONE (OUTPATIENT)
Dept: ORTHOPEDIC SURGERY | Age: 82
End: 2022-08-24

## 2022-08-24 NOTE — TELEPHONE ENCOUNTER
Patient has allergies wanted to be moved from her surgery on 8/31/22 to oct 12,22, till she fills better.

## 2022-08-24 NOTE — TELEPHONE ENCOUNTER
Patient called in stating she is still having some allergy symptoms and would like to reschedule to sometime in October

## 2022-08-29 ENCOUNTER — APPOINTMENT (OUTPATIENT)
Dept: GENERAL RADIOLOGY | Age: 82
End: 2022-08-29
Attending: PHYSICAL MEDICINE & REHABILITATION
Payer: MEDICARE

## 2022-08-29 ENCOUNTER — HOSPITAL ENCOUNTER (OUTPATIENT)
Age: 82
Setting detail: OUTPATIENT SURGERY
Discharge: HOME | End: 2022-08-29
Attending: PHYSICAL MEDICINE & REHABILITATION | Admitting: PHYSICAL MEDICINE & REHABILITATION
Payer: MEDICARE

## 2022-08-29 VITALS
OXYGEN SATURATION: 97 % | DIASTOLIC BLOOD PRESSURE: 59 MMHG | TEMPERATURE: 97 F | HEIGHT: 64 IN | WEIGHT: 175 LBS | HEART RATE: 76 BPM | SYSTOLIC BLOOD PRESSURE: 118 MMHG | BODY MASS INDEX: 29.88 KG/M2 | RESPIRATION RATE: 14 BRPM

## 2022-08-29 PROCEDURE — 3600000056 HC PAIN LEVEL 4 BASE: Performed by: PHYSICAL MEDICINE & REHABILITATION

## 2022-08-29 PROCEDURE — 3600000057 HC PAIN LEVEL 4 ADDL 15 MIN: Performed by: PHYSICAL MEDICINE & REHABILITATION

## 2022-08-29 PROCEDURE — 2709999900 HC NON-CHARGEABLE SUPPLY: Performed by: PHYSICAL MEDICINE & REHABILITATION

## 2022-08-29 PROCEDURE — 6360000002 HC RX W HCPCS: Performed by: PHYSICAL MEDICINE & REHABILITATION

## 2022-08-29 PROCEDURE — 64635 DESTROY LUMB/SAC FACET JNT: CPT | Performed by: PHYSICAL MEDICINE & REHABILITATION

## 2022-08-29 PROCEDURE — 2500000003 HC RX 250 WO HCPCS: Performed by: PHYSICAL MEDICINE & REHABILITATION

## 2022-08-29 PROCEDURE — 3209999900 FLUORO FOR SURGICAL PROCEDURES

## 2022-08-29 PROCEDURE — 64636 DESTROY L/S FACET JNT ADDL: CPT | Performed by: PHYSICAL MEDICINE & REHABILITATION

## 2022-08-29 PROCEDURE — 7100000011 HC PHASE II RECOVERY - ADDTL 15 MIN: Performed by: PHYSICAL MEDICINE & REHABILITATION

## 2022-08-29 PROCEDURE — 7100000010 HC PHASE II RECOVERY - FIRST 15 MIN: Performed by: PHYSICAL MEDICINE & REHABILITATION

## 2022-08-29 RX ORDER — SODIUM CHLORIDE 9 MG/ML
INJECTION, SOLUTION INTRAVENOUS PRN
Status: DISCONTINUED | OUTPATIENT
Start: 2022-08-29 | End: 2022-08-29 | Stop reason: HOSPADM

## 2022-08-29 RX ORDER — SODIUM CHLORIDE 0.9 % (FLUSH) 0.9 %
5-40 SYRINGE (ML) INJECTION PRN
Status: DISCONTINUED | OUTPATIENT
Start: 2022-08-29 | End: 2022-08-29 | Stop reason: HOSPADM

## 2022-08-29 RX ORDER — DEXAMETHASONE SODIUM PHOSPHATE 10 MG/ML
INJECTION INTRAMUSCULAR; INTRAVENOUS PRN
Status: DISCONTINUED | OUTPATIENT
Start: 2022-08-29 | End: 2022-08-29 | Stop reason: ALTCHOICE

## 2022-08-29 RX ORDER — BUPIVACAINE HYDROCHLORIDE 5 MG/ML
INJECTION, SOLUTION EPIDURAL; INTRACAUDAL PRN
Status: DISCONTINUED | OUTPATIENT
Start: 2022-08-29 | End: 2022-08-29 | Stop reason: ALTCHOICE

## 2022-08-29 RX ORDER — LIDOCAINE HYDROCHLORIDE 20 MG/ML
INJECTION, SOLUTION INFILTRATION; PERINEURAL PRN
Status: DISCONTINUED | OUTPATIENT
Start: 2022-08-29 | End: 2022-08-29 | Stop reason: ALTCHOICE

## 2022-08-29 RX ORDER — SODIUM CHLORIDE 0.9 % (FLUSH) 0.9 %
5-40 SYRINGE (ML) INJECTION EVERY 12 HOURS SCHEDULED
Status: DISCONTINUED | OUTPATIENT
Start: 2022-08-29 | End: 2022-08-29 | Stop reason: HOSPADM

## 2022-08-29 ASSESSMENT — ENCOUNTER SYMPTOMS
EYES NEGATIVE: 1
ALLERGIC/IMMUNOLOGIC NEGATIVE: 1
CONSTIPATION: 0
RESPIRATORY NEGATIVE: 1
BACK PAIN: 1
DIARRHEA: 0

## 2022-08-29 ASSESSMENT — PAIN SCALES - GENERAL: PAINLEVEL_OUTOF10: 0

## 2022-08-29 ASSESSMENT — PAIN - FUNCTIONAL ASSESSMENT: PAIN_FUNCTIONAL_ASSESSMENT: 0-10

## 2022-08-29 NOTE — INTERVAL H&P NOTE
I have interviewed and examined the patient and reviewed the recent History and Physical.  There have been no changes to the recent H&P documentation. The surgical consent form has been signed. Last anticoagulant medication use was:na    Premedication taken for contrast allergy? No    Valium taken for oral sedation? No    No outpatient medications have been marked as taking for the 8/29/22 encounter [facility] Encounter). The patient understands the planned operation and its associated risks and benefits and agrees to proceed.         Electronically signed by Bhupendra Wade MD on 8/29/2022 at 11:01 AM

## 2022-08-29 NOTE — OP NOTE
RADIOFREQUENCY ABLATION OPERATIVE REPORT    8/29/22  The patient was counseled at length about the risks of min Covid-19 during their perioperative period and any recovery window from their procedure. The patient was made aware that min Covid-19  may worsen their prognosis for recovering from their procedure  and lend to a higher morbidity and/or mortality risk. All material risks, benefits, and reasonable alternatives including postponing the procedure were discussed. The patient does wish to proceed with the procedure at this time. Surgeon: Raúl Dietz MD    Pre-operative Diagnosis:   Hospital Problems             Last Modified POA    * (Principal) Lumbar radiculitis 8/29/2022 Yes    Lumbar canal stenosis 8/29/2022 Yes    Lumbar discogenic pain syndrome 8/29/2022 Yes    Panniculitis involving lumbar region 8/29/2022 Yes       Post-operative Diagnosis: Same    INDICATION:  Right L3-44 5- 5- S1 radiofrequency neurotomy procedure is requested for therapeutic reasons. Please see H&P for details on previous treatments, examination findings, and work up. right L3-4 4- -5- 5- S1 medial branch blocks are requested for diagnostic reasons. Conservative treatment was ineffective i.e.: ice, NSAIDS, rest,     Patient is unable to perform the following ADL's: ambulating, grooming, sitting, and standing     Pain Assessment: 0-10  Pain Level: 3     Pain Orientation: Lower  Pain Location: Back       Last [place] films: 2021    EXAMINATION:  Right L3-4 -4- 5- 5-S1 radiofrequency neurotomies. CONSENT:  Written consent was obtained from the patient on the preprinted consent form after explaining the procedure, indications, potential complications and outcomes. Alternative treatments were also discussed. DISCUSSION:  The patient was sterilely prepped and draped in the usual fashion in the prone position. Time out was verified for the correct patient, side, level and procedure.     SEDATION:   No conscious sedation was performed during the procedure. The patient remained awake and conversed throughout the procedure. The patient underwent pulse oximetry and blood pressure monitoring independently by a trained observer, as well as by a physician. FACET RF    Under image-intensifier control, a 28824  mm Tenantrex RFK insulated radiofrequency probe with 5 mm active tips were successfully directed at the Right L3-4 -4-5 -5 -S1 medial branches of the dorsal rami at the target points described by ARACELI. Needle tip positions were confirmed in 3 views and sensory and motor test stimulation was performed. The patient reported sensory stimulation at 0.0 to 0.0 volts at 50 Hz and motor stimulation at 0.9 to 0.0 volts at 2 Hz, which confirmed satisfactory sensory motor dissociation. 1 ml of 2% lidocaine was instilled  at each site prior to lesioning. 3 lesions were performed at each level  BY SAGITTAL APPROACH at a temperature of 80 degrees Celsius for a duration of 90 seconds, as described by ARACELI. Impedance was measured and ranged from 232-333 ohms. Then, 0.5mL of  0.5% bupivacaine was instilled at each site after lesioning. The patient tolerated the procedure(s) well and without complications and was noted to be in stable condition prior to discharge from procedure center with discharge instructions. EBL: no blood loss    SPECIMEN: none    IMPRESSION:    Right L3-4 -4 -5- 5- S1 radiofrequency neurotomies performed uneventfully. RECOMMENDATIONS:  1. Follow up in the [address] in 2 to 4 weeks  2. Continue Neurontin and opioid analgesia, as per protocol. 3.    Complete and return the \"Post-Procedure Pain and Activity Diary. 4.    Contact the [address] for symptom exacerbation, fever or unusual symptoms. 5.    Follow post-procedure care according to verbal and written instructions.     POST-PROCEDURE LUMBAR SPINE FLUOROSCOPIC IMAGE INTERPRETATION:    EXAMINATION: AP, lateral and oblique views of the lumbar spine    FLUORO TIME: 25 seconds    DISCUSSION:  Spot views of the spine reveal normal alignment and segmentation. Spinal needles are positioned at the base of the SAP and across the pedicle on AP and lateral views. #3:  across the ventral and middle third of the articular pillar. Visualized spine reveals See radiology report. Soft tissues reveal no abnormalities. IMPRESSION: Satisfactory probe placement for RF (radiofrequency) lesioning of the Right L3-44- - 5- 5-S1 medial branches.     Electronically signed by Rosalinda Schirmer, MD on 8/29/2022 at 11:08 AM

## 2022-08-29 NOTE — DISCHARGE INSTRUCTIONS
Home Care after Radiofrequency Ablation    The doctor has done a radiofrequency procedure to disrupt the nerves ability to cause pain. You may feel sore at the injection site for the next 2-4 days. You may apply ice to the site for 20 minutes on and 20 minutes off to decrease pain and discomfort, if needed, for the first 24 hours. After 24 hours, you may use heat if needed. Do not expect your pain to subside right away. Limit your activities for the first few days to those that you can do without pain. It may take up to 8 weeks before you have pain relief. Take your pain medicines as prescribed. You may have some weakness for the next 3-5 hours. Plan to take it easy. No baths or soaking of the sites for one week. Do not shower for 48 hours. You may resume taking your routine medicines after the procedure including pain medicines as prescribed. Resume any medications held for the procedure (blood thinners, aspirin, anti-inflammatories). If you do not already have a follow-up appointment, call your referring doctors office to make one to discuss your results within 2-4 weeks after the treatment. Your doctor will have the report within 7-10 days. Signs of infection  Fever greater than 100.4°F by mouth for 2 readings taken 4 hours apart  Increased redness, swelling around the site  Any drainage from the site    If you have any new symptoms or any signs of infection, please call [phone] during business hours to notify us. You can also notify your primary care physician. After hours, nights and weekends, call [phone].

## 2022-08-29 NOTE — H&P
Subjective:       Patient is here today for a diagnostic/therapeutic injection. 8/15/22    Active Hospital Problems    Diagnosis Date Noted    Lumbar canal stenosis [M48.061] 02/04/2014     Priority: High    Lumbar discogenic pain syndrome [M51.26] 02/04/2014     Priority: High    Panniculitis involving lumbar region [M54.06] 08/15/2022     Priority: Medium    Lumbar radiculitis [M54.16]        HPI: Patient is here today for adiagnostic/therapeutic injection. The most recent [facility] Pain Management office visit notes have been reviewed and are unchanged. Review of Systems   Constitutional:  Positive for fatigue. HENT: Negative. Eyes: Negative. Respiratory: Negative. Cardiovascular: Negative. Gastrointestinal:  Negative for constipation and diarrhea. Endocrine: Negative. Genitourinary:  Negative for difficulty urinating and flank pain. Musculoskeletal:  Positive for back pain and myalgias. Skin: Negative. Allergic/Immunologic: Negative. Neurological:  Positive for weakness and numbness. Hematological: Negative. Psychiatric/Behavioral:  Positive for sleep disturbance.       Allergies   Allergen Reactions    Sulfa Antibiotics Shortness Of Breath    Sulfamethoxazole-Trimethoprim Anaphylaxis     (Bactrim)    Ansaid [Flurbiprofen] Other (See Comments)     Light headed and difficult with vision \"seeing\"    Gentamicin Other (See Comments)     Eye ointment caused eye swelling, redness    Motrin [Ibuprofen] Other (See Comments)     \"I coughed so bad I broke a rib\"    Quinidine      Light headed    Chlorthalidone Hives    Hydrocodone-Acetaminophen     Mirapex [Pramipexole Dihydrochloride] Other (See Comments)     Unknown reaction    Mobic [Meloxicam] Nausea Only    Nsaids Other (See Comments)     lightheaded    Quinolones Other (See Comments) and Hives    Reglan [Metoclopramide] Other (See Comments)     Hyperactive and stomach pain    Trazodone Other (See Comments) unknown    Amino Acids Nausea And Vomiting     Other reaction(s): AOF    Corgard [Nadolol] Other (See Comments)     Severe coughing and broke a rib as a result     Erythromycin Nausea Only    Etodolac      GI issues    Garamycin [Gentamicin Sulfate] Other (See Comments)     Redness and irritation    Inderal [Propranolol] Other (See Comments)     Severe cough    Iothalamate Nausea And Vomiting     Other reaction(s): AOF    Lisinopril Itching    Loratadine      Does not work    Ultram [Tramadol] Other (See Comments)     Didn't work            Prior to Admission medications    Medication Sig Start Date End Date Taking? Authorizing Provider   topiramate (TOPAMAX) 25 MG tablet Take 1 tablet by mouth 2 times daily 8/23/22 11/21/22  Marlea Qualia, DO   baclofen (LIORESAL) 10 MG tablet Take 1 tablet by mouth daily 8/23/22 11/21/22  Marlea Qualia, DO   buPROPion (WELLBUTRIN XL) 150 MG extended release tablet take 1 tablet by mouth once daily 8/23/22   [facility] Qualia, DO   spironolactone (ALDACTONE) 25 MG tablet take 1 tablet by mouth once daily 8/23/22   Ursula L Douglas, DO   azelastine (ASTELIN) 0.1 % nasal spray 1 spray by Nasal route 2 times daily Use in each nostril as directed  Patient not taking: Reported on 8/23/2022 8/17/22   Kathy Regine, APRN - CNP   montelukast (SINGULAIR) 10 MG tablet Take 1 tablet by mouth nightly 8/12/22 11/10/22  Rashmia Rigoia, DO   simvastatin (ZOCOR) 40 MG tablet Take 1 tablet by mouth nightly 8/12/22 11/10/22  Ursula L Douglas, DO   furosemide (LASIX) 20 MG tablet Take 1 tablet by mouth in the morning and 1 tablet at noon and 1 tablet before bedtime. 8/11/22 11/9/22  Ursula L Douglas, DO   levothyroxine (SYNTHROID) 75 MCG tablet Take 1 tablet by mouth in the morning. 8/11/22 11/9/22  Ursula L Douglas, DO   DULoxetine (CYMBALTA) 30 MG extended release capsule Take 1 capsule by mouth in the morning.  8/11/22 11/9/22  Ursula Douglas,    fluticasone-salmeterol (ADVIAR) 100-50 MCG/ACT AEPB diskus inhaler Inhale 1 puff into the lungs in the morning and 1 puff in the evening. Patient not taking: No sig reported 8/11/22 11/9/22  Lilia Andrews DO   oxyCODONE HCl (OXY-IR) 10 MG immediate release tablet Take 1 tablet by mouth in the morning and 1 tablet at noon and 1 tablet before bedtime. Do all this for 30 days. 7/20/22 8/23/22  Reba Givens APRN - CNP   fluticasone [facility]) 50 MCG/ACT nasal spray instill 2 sprays into each nostril once daily 5/26/22   Historical Provider, MD   ipratropium (ATROVENT) 0.06 % nasal spray instill 2 sprays into each nostril twice a day 5/20/22   Historical Provider, MD   acetaminophen (TYLENOL) 650 MG extended release tablet Take 650 mg by mouth every 8 hours as needed for Pain    Historical Provider, MD   cetirizine (ZYRTEC) 10 MG tablet take 1 tablet by mouth once daily 4/21/22   Historical Provider, MD   metoprolol succinate (TOPROL XL) 25 MG extended release tablet take 1 tablet by mouth once daily 3/22/22   Historical Provider, MD   ipratropium (ATROVENT) 0.03 % nasal spray 2 sprays by Each Nostril route every 12 hours    Historical Provider, MD   calcium carbonate-vitamin D 600-200 MG-UNIT TABS Take 2 tablets by mouth daily    Historical Provider, MD   naloxone 4 MG/0.1ML LIQD nasal spray 1 spray by Nasal route as needed for Opioid Reversal 5/27/20   Omero Lawrence MD   ketotifen (ZADITOR) 0.025 % ophthalmic solution 1 drop 2 times daily    Historical Provider, MD   Multiple Vitamin (MULTI-VITAMIN DAILY) TABS Take by mouth    Historical Provider, MD   melatonin 1 MG tablet Takes 1/2 of tab. Historical Provider, MD   aspirin 81 MG tablet Take 81 mg by mouth daily    Historical Provider, MD   guaiFENesin (MUCINEX) 600 MG extended release tablet Mucinex 600 mg tablet, extended release   Take 2 tablets every day by oral route. Historical Provider, MD   lidocaine (LMX) 4 % cream Apply topically as needed for Pain Apply topically as needed.     Historical Provider, MD fluticasone propionate 50 MCG/BLIST AEPB Inhale into the lungs    Historical Provider, MD   albuterol (ACCUNEB) 1.25 MG/3ML nebulizer solution Inhale 1 ampule into the lungs every 6 hours as needed for Wheezing     Historical Provider, MD   flecainide (TAMBOCOR) 50 MG tablet Take 50 mg by mouth 2 times daily. Historical Provider, MD   omeprazole (PRILOSEC) 40 MG delayed release capsule Take 40 mg by mouth nightly. Historical Provider, MD   DULoxetine (CYMBALTA) 60 MG extended release capsule Take 60 mg by mouth daily   Patient not taking: Reported on 8/23/2022    Historical Provider, MD   artificial tears (ARTIFICIAL TEARS) OINT as needed.     Historical Provider, MD   Saline 0.2 % SOLN by Nasal route daily as needed     Historical Provider, MD       Past Medical History:   Diagnosis Date    Anesthesia complication     2nd post op day from total knee patient stated \"I was wild and mean\"    Anxiety and depression     Arthritis     ASD (atrial septal defect)     repair in 1963    Asthma     COPD (chronic obstructive pulmonary disease) ([facility] [address]     COPD (chronic obstructive pulmonary disease) ([facility] [address] 1980's    Disorder of immune system ([facility] [address]     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 (total left knee 3/2015 at [facility])    History of renal stone     passed    Hx of blood clots 1970    DVT    Hypertension     MVP (mitral valve prolapse)     LONI (obstructive sleep apnea)     has not used bipap since June, 2020    Rectocele     Spinal stenosis     Thyroid disease late 1980's    overactive radioactive iodine done       Past Surgical History:   Procedure Laterality Date    BACK INJECTION Bilateral 7/9/2021    Bilateral SACROILIAC Joint & Piriformis Injection performed by Rosalinda Schirmer, MD at [address] [place] [address] INJECTION Bilateral 12/3/2021    Bilateral SACROILIAC JOINT & Piriformis Injections performed by Cielo Brennan MD at [address] [facility] LUMPECTOMY Left     BREAST LUMPECTOMY Left     CARDIAC SURGERY      ASD repair     SECTION      2x    CHOLECYSTECTOMY      CHOLECYSTECTOMY      COLONOSCOPY      three    DIAGNOSTIC CARDIAC CATH LAB PROCEDURE      DILATION AND CURETTAGE OF UTERUS      EYE SURGERY Bilateral     cataract    [facility] INJECTION PROCEDURE FOR SACROILIAC JOINT Left 2018    Left SIJ and piriformis, left trocanteric bursa injection performed by Cielo Brennan MD at [address] [facility] ([facility])  2009    HYSTERECTOMY (CERVIX STATUS UNKNOWN)  2009    Total    JOINT REPLACEMENT Bilateral     knee    KNEE ARTHROSCOPY Left     LUMBAR SPINE SURGERY Bilateral 2019    Bilateral L4 TRANSFORAMINAL  [id number] performed by Cielo Brennan MD at [address] [place] [address] Dr Bilateral 3/12/2019    Bilateral L4 TRANSFORAMINAL performed by Cielo Brennan MD at [address] [place] [address] Dr Right 10/15/2019    RIGHT L4 & L5 TRANSFORAMINAL performed by Cielo Brennan MD at [address] [place] [address] Dr Left 2019    Left L4 & L5 TRANSFORAMINAL performed by Cielo Brennan MD at [address] [facility] [place] Bilateral 2022    Bilateral L3-L4 L4-L5 L5-S1 Confirmatory Medial Branch Block performed by [facility]ielo [facility] MD [facility]  2014    PAIN MANAGEMENT PROCEDURE Right 2020    Right L4 & L5 TRANSFORAMINAL performed by Cielo Brennan MD at [place] [address] Left 2020    Left L4 & L5 TRANSFORAMINAL performed by Cielo Brennan MD at [place] [address] Right 2021    Right L4 & L5 TRANSFORAMINAL performed by Cielo Brennan MD at [facility] [place] [address] Left 3/11/2021    left L4 & L5 TRANSFORAMINAL performed by Cielo Brennan MD at [address] [facility] PROCEDURE Left 5/6/2021    Left L4 & L5 TRANSFORAMINAL performed by Sherron Camacho MD at [address] Bilateral 3/4/2022    Bilateral L4 TRANSFORAMINAL performed by Sherron Camacho MD at [address] Left 8/15/2022    Left L3-L4 L4-L5 L5-S1 RADIOFREQUENCY ABLATION performed by Sherron Camacho MD at [facility] DX/THER AGNT PARAVERT FACET JOINT, LUMBAR/SAC, 2ND LEVEL Bilateral 8/30/2018    Bilateral L2 L3 L4 L5 Diagnostic Medial BB performed by Sherron Camacho MD at [facility] DX/THER AGNT PARAVERT FACET JOINT, LUMBAR/SAC, 2ND LEVEL Right 9/13/2018    Right L2 L3 L4 L5 Confirmatory Medial BB performed by Sherron Camacho MD at [facility] [address] AA&/STRD TFRML EPI CERVICAL/THORACIC EA ADDL Right 7/20/2018    Right C5 C6 TRANSFORAMINAL performed by Sherron Camacho MD at [address] Left 12/20/2018    Left L2 L3 L4 L5 RADIOFREQUENCY performed by Sherron Camacho MD at [address] Right 1/3/2019    Right L2 L3 L4 L5 RADIOFREQUENCY performed by Sherron Camacho MD at [address] Right 01/28/2005 & 03/16/2010    2x    TONSILLECTOMY      at age 12       Family andSocial History reviewed in the electronic medical record. Imaging: Reviewed available imaging in oursystem with the patient. No results found. Objective:     Vitals:    08/29/22 1044   BP: (!) 120/58   Pulse: 75   Resp: 14   Temp: 97.3 °F (36.3 °C)   SpO2: 97%          Physical Exam  Constitutional:       General: She is not in acute distress. Appearance: Normal appearance. She is well-developed. She is not diaphoretic. HENT:      Head: Normocephalic and atraumatic. Right Ear: External ear normal. No decreased hearing noted. Left Ear: External ear normal. No decreased hearing noted. Nose: Nose normal.   Eyes:      General: Lids are normal. No scleral icterus.      Conjunctiva/sclera: Conjunctivae normal.   Neck:      Trachea: Phonation normal.   Cardiovascular:      Comments: No BLE edema present  Pulmonary:      Effort: Pulmonary effort is normal. No accessory muscle usage or respiratory distress. Abdominal:      General: Abdomen is flat. Palpations: Abdomen is soft. Genitourinary:     Comments: deferred  Musculoskeletal:      Lumbar back: Negative right straight leg raise test and negative left straight leg raise test.   Skin:     General: Skin is warm and dry. Coloration: Skin is not pale. Findings: No erythema or rash. Neurological:      Mental Status: She is alert and oriented to person, place, and time. Psychiatric:         Speech: Speech normal.         Behavior: Behavior normal.     Neurologic Exam     Mental Status   Oriented to person, place, and time. Speech: speech is normal     Motor Exam     Strength   Right quadriceps: 5/5  Left quadriceps: 5/5  Right hamstrin/5  Left hamstrin/5  Back Exam     Tenderness   The patient is experiencing tenderness in the lumbar (+kemps L).     Range of Motion   Extension:  normal   Flexion:  normal   Lateral bend right:  normal   Lateral bend left:  normal   Rotation right:  normal   Rotation left:  normal     Muscle Strength   Right Quadriceps:  5/5   Left Quadriceps:  5/5   Right Hamstrings:  5/5   Left Hamstrings:  5/5     Tests   Straight leg raise right: negative  Straight leg raise left: negative    Other   Toe walk: normal  Heel walk: normal  Sensation: normal  Gait: normal           Lab Results   Component Value Date    WBC 7.1 2022    HGB 13.8 2022    HCT 45.4 2022    .0 2022    CHOL 150 2022    TRIG 105 2022    HDL 57 2022    ALT 16 2022    AST 23 2022     2022    K 4.3 2022     2022    CREATININE 0.9 2022    BUN 15 2022    CO2 23 2022    LABA1C 5.5 2022       Assessment:                            Active Hospital Problems    Diagnosis Date Noted    Lumbar canal stenosis [M48.061] 02/04/2014     Priority: High    Lumbar discogenic pain syndrome [M51.26] 02/04/2014     Priority: High    Panniculitis involving lumbar region [M54.06] 08/15/2022     Priority: Medium    Lumbar radiculitis [M54.16]                   Plan:   Proceed with planned procedure  -  3-4 -4- 5- 5S1 RFA    The patientunderstands the planned operation and its associated risks and benefits and agrees to proceed. The surgical consent form has been signed. Last NSAID/anticoagulant medication use was:na    Premedication taken for contrast allergy? No    Valium taken for oral sedation?  No

## 2022-08-31 DIAGNOSIS — G89.29 CHRONIC LEFT SACROILIAC JOINT PAIN: ICD-10-CM

## 2022-08-31 DIAGNOSIS — M47.817 LUMBOSACRAL SPONDYLOSIS WITHOUT MYELOPATHY: ICD-10-CM

## 2022-08-31 DIAGNOSIS — M54.16 LUMBAR RADICULOPATHY: ICD-10-CM

## 2022-08-31 DIAGNOSIS — M53.3 CHRONIC LEFT SACROILIAC JOINT PAIN: ICD-10-CM

## 2022-08-31 RX ORDER — OXYCODONE HYDROCHLORIDE 10 MG/1
10 TABLET ORAL 3 TIMES DAILY
Qty: 90 TABLET | Refills: 0 | Status: SHIPPED | OUTPATIENT
Start: 2022-08-31 | End: 2022-09-27 | Stop reason: SDUPTHER

## 2022-08-31 NOTE — TELEPHONE ENCOUNTER
[place]AR[place] checked [place], [place], and [place]: 7/23/22 Oxycodone 10mg #90. Due NOW.     Last Appt:  5/17/2022  Next Appt:   9/19/2022  Med verified in Epic

## 2022-09-06 DIAGNOSIS — Z78.0 POST-MENOPAUSAL: ICD-10-CM

## 2022-09-07 ENCOUNTER — OFFICE VISIT (OUTPATIENT)
Dept: FAMILY MEDICINE CLINIC | Age: 82
End: 2022-09-07
Payer: MEDICARE

## 2022-09-07 VITALS
SYSTOLIC BLOOD PRESSURE: 114 MMHG | DIASTOLIC BLOOD PRESSURE: 66 MMHG | BODY MASS INDEX: 30.52 KG/M2 | WEIGHT: 177.8 LBS | HEART RATE: 83 BPM | OXYGEN SATURATION: 97 %

## 2022-09-07 DIAGNOSIS — I10 ESSENTIAL HYPERTENSION: ICD-10-CM

## 2022-09-07 DIAGNOSIS — Z00.00 INITIAL MEDICARE ANNUAL WELLNESS VISIT: Primary | ICD-10-CM

## 2022-09-07 DIAGNOSIS — Z23 NEED FOR PROPHYLACTIC VACCINATION AND INOCULATION AGAINST INFLUENZA: ICD-10-CM

## 2022-09-07 PROCEDURE — G0438 PPPS, INITIAL VISIT: HCPCS | Performed by: FAMILY MEDICINE

## 2022-09-07 PROCEDURE — 1123F ACP DISCUSS/DSCN MKR DOCD: CPT | Performed by: FAMILY MEDICINE

## 2022-09-07 PROCEDURE — 90471 IMMUNIZATION ADMIN: CPT | Performed by: FAMILY MEDICINE

## 2022-09-07 PROCEDURE — PBSHW INFLUENZA, FLUAD, (AGE 65 Y+), IM, PF, 0.5 ML: Performed by: FAMILY MEDICINE

## 2022-09-07 RX ORDER — FUROSEMIDE 20 MG/1
20 TABLET ORAL 3 TIMES DAILY
Qty: 90 TABLET | Refills: 1 | Status: SHIPPED | OUTPATIENT
Start: 2022-09-07 | End: 2022-12-06

## 2022-09-07 ASSESSMENT — LIFESTYLE VARIABLES: HOW OFTEN DO YOU HAVE A DRINK CONTAINING ALCOHOL: NEVER

## 2022-09-07 NOTE — PROGRESS NOTES
Medicare Annual Wellness Visit    Phuong Winn is here for Medicare AWV    Assessment & Plan   Initial Medicare annual wellness visit  Essential hypertension  The following orders have not been finalized:  -     furosemide (LASIX) 20 MG tablet  Need for prophylactic vaccination and inoculation against influenza  -     Influenza, FLUAD, (age 72 y+), IM, Preservative Free, 0.5 mL    Recommendations for Preventive Services Due: see orders and patient instructions/AVS.  Recommended screening schedule for the next 5-10 years is provided to the patient in written form: see Patient Instructions/AVS.     Return today (on 9/7/2022) for Medicare Annual Wellness Visit in 1 year. Subjective   The following acute and/or chronic problems were also addressed today:  Alkaline phosphatase level and repeating it. Patient's complete Health Risk Assessment and screening values have been reviewed and are found in Flowsheets. The following problems were reviewed today and where indicated follow up appointments were made and/or referrals ordered. Positive Risk Factor Screenings with Interventions:    Fall Risk:  Do you feel unsteady or are you worried about falling? : (!) yes  2 or more falls in past year?: (!) yes  Fall with injury in past year?: (!) yes   Fall Risk Interventions:    Pt fell at an assisted living so she bought her own condo and has not tripped nor fell since then            Opioid Risk: (Low risk score <55) Opioid risk score: 21    Patient is low risk for opioid use disorder or overdose. Last PDMP Max Bishop as Reviewed:  Review User Review Instant Review Result   KIA MANUEL 8/31/2022  1:05 PM Reviewed PDMP [1]     Last Controlled Substance Monitoring Documentation      [address] Office Visit from 9/7/2022 in [facility] [address] [facility]   Chronic Pain > 80 MEDD Obtained or confirmed \"Medication Contract\" on file.  filed at 09/07/2022 7642             [facility] Yes Ursula Douglas, DO   baclofen (LIORESAL) 10 MG tablet Take 1 tablet by mouth daily Yes Ursula Douglas DO   buPROPion (WELLBUTRIN XL) 150 MG extended release tablet take 1 tablet by mouth once daily Yes Ursula Douglas DO   spironolactone (ALDACTONE) 25 MG tablet take 1 tablet by mouth once daily Yes Ursula Douglas DO   azelastine (ASTELIN) 0.1 % nasal spray 1 spray by Nasal route 2 times daily Use in each nostril as directed Yes Renee Gardner, APRN - CNP   montelukast (SINGULAIR) 10 MG tablet Take 1 tablet by mouth nightly Yes Ursula Douglas DO   simvastatin (ZOCOR) 40 MG tablet Take 1 tablet by mouth nightly Yes Ursula Douglas DO   furosemide (LASIX) 20 MG tablet Take 1 tablet by mouth in the morning and 1 tablet at noon and 1 tablet before bedtime. Yes Loni Byrd, DO   levothyroxine (SYNTHROID) 75 MCG tablet Take 1 tablet by mouth in the morning. Yes Ursula Douglas DO   DULoxetine (CYMBALTA) 30 MG extended release capsule Take 1 capsule by mouth in the morning. Yes Ursula Douglas, DO   fluticasone-salmeterol (ADVIAR) 100-50 MCG/ACT AEPB diskus inhaler Inhale 1 puff into the lungs in the morning and 1 puff in the evening.  Yes Ursula Douglas DO   fluticasone (FLONASE) 50 MCG/ACT nasal spray instill 2 sprays into each nostril once daily Yes Historical Provider, MD   ipratropium (ATROVENT) 0.06 % nasal spray instill 2 sprays into each nostril twice a day Yes Historical Provider, MD   acetaminophen (TYLENOL) 650 MG extended release tablet Take 650 mg by mouth every 8 hours as needed for Pain Yes Historical Provider, MD   cetirizine (ZYRTEC) 10 MG tablet take 1 tablet by mouth once daily Yes Historical Provider, MD   metoprolol succinate (TOPROL XL) 25 MG extended release tablet take 1 tablet by mouth once daily Yes Historical Provider, MD   ipratropium (ATROVENT) 0.03 % nasal spray 2 sprays by Each Nostril route every 12 hours Yes Historical Provider, MD   calcium carbonate-vitamin D 600-200 MG-UNIT TABS Take 2 tablets by mouth daily Yes Historical Provider, MD   naloxone 4 MG/0.1ML LIQD nasal spray 1 spray by Nasal route as needed for Opioid Reversal Yes Francisco Riley MD   ketotifen (ZADITOR) 0.025 % ophthalmic solution 1 drop 2 times daily Yes Historical Provider, MD   Multiple Vitamin (MULTI-VITAMIN DAILY) TABS Take by mouth Yes Historical Provider, MD   melatonin 1 MG tablet Takes 1/2 of tab. Yes Historical Provider, MD   aspirin 81 MG tablet Take 81 mg by mouth daily Yes Historical Provider, MD   guaiFENesin (MUCINEX) 600 MG extended release tablet Mucinex 600 mg tablet, extended release   Take 2 tablets every day by oral route. Yes Historical Provider, MD   lidocaine (LMX) 4 % cream Apply topically as needed for Pain Apply topically as needed. Yes Historical Provider, MD   fluticasone propionate 50 MCG/BLIST AEPB Inhale into the lungs Yes Historical Provider, MD   albuterol (ACCUNEB) 1.25 MG/3ML nebulizer solution Inhale 1 ampule into the lungs every 6 hours as needed for Wheezing  Yes Historical Provider, MD   flecainide (TAMBOCOR) 50 MG tablet Take 50 mg by mouth 2 times daily. Yes Historical Provider, MD   omeprazole (PRILOSEC) 40 MG delayed release capsule Take 40 mg by mouth nightly. Yes Historical Provider, MD   DULoxetine (CYMBALTA) 60 MG extended release capsule Take 60 mg by mouth daily  Yes Historical Provider, MD   artificial tears (ARTIFICIAL TEARS) OINT as needed.  Yes Historical Provider, MD   Saline 0.2 % SOLN by Nasal route daily as needed  Yes Historical Provider, MD [place] (Including outside providers/suppliers regularly involved in providing care):   Patient Care Team:  Margoth Hoff DO as PCP - General (Family Medicine)  Margoth Hoff DO as PCP - [facility] Empaneled Provider  Simón Gomez MD as Consulting Physician     Reviewed and updated this visit:  Tobacco  Med Hx  Surg Hx  Soc Hx  Fam Hx

## 2022-09-07 NOTE — PROGRESS NOTES
Have you had an allergic reaction to the flu (influenza) shot? no  Are you allergic to eggs or any component of the flu vaccine? no  Do you have a history of Guillain-Clarendon Syndrome (GBS), which is paralysis after receiving the flu vaccine? no  Are you feeling well today? yes  Flu vaccine given as ordered. Patient tolerated it well. No questions re: VIS information.

## 2022-09-19 ENCOUNTER — HOSPITAL ENCOUNTER (OUTPATIENT)
Age: 82
Setting detail: SPECIMEN
Discharge: HOME | End: 2022-09-19
Payer: MEDICARE

## 2022-09-19 ENCOUNTER — OFFICE VISIT (OUTPATIENT)
Dept: PAIN MANAGEMENT | Age: 82
End: 2022-09-19
Payer: MEDICARE

## 2022-09-19 VITALS
OXYGEN SATURATION: 98 % | BODY MASS INDEX: 30.41 KG/M2 | RESPIRATION RATE: 17 BRPM | DIASTOLIC BLOOD PRESSURE: 70 MMHG | SYSTOLIC BLOOD PRESSURE: 104 MMHG | HEART RATE: 69 BPM | HEIGHT: 64 IN | WEIGHT: 178.13 LBS

## 2022-09-19 DIAGNOSIS — M47.816 LUMBAR FACET JOINT SYNDROME: ICD-10-CM

## 2022-09-19 DIAGNOSIS — Z02.83 ENCOUNTER FOR DRUG SCREENING: ICD-10-CM

## 2022-09-19 DIAGNOSIS — Z02.83 ENCOUNTER FOR DRUG SCREENING: Primary | ICD-10-CM

## 2022-09-19 DIAGNOSIS — G89.29 CHRONIC LEFT SACROILIAC JOINT PAIN: ICD-10-CM

## 2022-09-19 DIAGNOSIS — M53.3 CHRONIC LEFT SACROILIAC JOINT PAIN: ICD-10-CM

## 2022-09-19 DIAGNOSIS — M51.36 LUMBAR DEGENERATIVE DISC DISEASE: ICD-10-CM

## 2022-09-19 DIAGNOSIS — Z79.891 ENCOUNTER FOR LONG-TERM OPIATE ANALGESIC USE: ICD-10-CM

## 2022-09-19 PROCEDURE — 99214 OFFICE O/P EST MOD 30 MIN: CPT | Performed by: PHYSICAL MEDICINE & REHABILITATION

## 2022-09-19 PROCEDURE — G0481 DRUG TEST DEF 8-14 CLASSES: HCPCS

## 2022-09-19 PROCEDURE — G8400 PT W/DXA NO RESULTS DOC: HCPCS | Performed by: PHYSICAL MEDICINE & REHABILITATION

## 2022-09-19 PROCEDURE — 1090F PRES/ABSN URINE INCON ASSESS: CPT | Performed by: PHYSICAL MEDICINE & REHABILITATION

## 2022-09-19 PROCEDURE — 1123F ACP DISCUSS/DSCN MKR DOCD: CPT | Performed by: PHYSICAL MEDICINE & REHABILITATION

## 2022-09-19 PROCEDURE — G8427 DOCREV CUR MEDS BY ELIG CLIN: HCPCS | Performed by: PHYSICAL MEDICINE & REHABILITATION

## 2022-09-19 PROCEDURE — G8417 CALC BMI ABV UP PARAM F/U: HCPCS | Performed by: PHYSICAL MEDICINE & REHABILITATION

## 2022-09-19 PROCEDURE — 1036F TOBACCO NON-USER: CPT | Performed by: PHYSICAL MEDICINE & REHABILITATION

## 2022-09-19 PROCEDURE — 80307 DRUG TEST PRSMV CHEM ANLYZR: CPT

## 2022-09-19 RX ORDER — AMOXICILLIN 500 MG/1
CAPSULE ORAL
COMMUNITY
Start: 2022-09-13

## 2022-09-19 ASSESSMENT — ENCOUNTER SYMPTOMS
ALLERGIC/IMMUNOLOGIC NEGATIVE: 1
EYES NEGATIVE: 1
CONSTIPATION: 0
BACK PAIN: 1
DIARRHEA: 0
RESPIRATORY NEGATIVE: 1

## 2022-09-19 NOTE — PROGRESS NOTES
PAIN MANAGEMENT FOLLOW-UP NOTE  9/19/22    CHIEF COMPLAINT: This is a80 y.o. female patientwho returns to the [facility] with a history of Back Pain, Follow-up, and Injections      PAIN Mariah Michelle returns today for  reevaluation. Since the visit, the patient reports that the pain is not changed. Post Lumbar RFs  with some  less lower back pain     Intermittent  leg pain  feels weak in   anterior thighs   Had laminectomy  Dr Tam [place]        Location: Back  Location Modifier: entire back  Severity of Pain: 4  Duration of Pain: Intermittent  Frequency of Pain: Intermittent  Aggravating Factors:  -  Limiting Behavior: Standing   Relieving Factors: relaxation        Previous pain medication trials have included:          Mental health:    Patient feels - secondary to their current pain problems as described above. H/O depression and anxiety: No   Patient is not seeing psychologist orpsychiatrist   Abuse history? No    Employed? No    ANALGESIA:   Are your Current Pain medication (s) helping to decrease pain? No.   Current Pain score:      ADVERSE AFFECTS:   Medication Side Effects: No.    ACTIVITY:  Are you able to be more active with your pain medications? No      ABERRANT BEHAVIORS SINCE LAST VISIT? No    Review of Systems   Constitutional:  Positive for fatigue. HENT: Negative. Eyes: Negative. Respiratory: Negative. Cardiovascular: Negative. Gastrointestinal:  Negative for constipation and diarrhea. Endocrine: Negative. Genitourinary:  Negative for difficulty urinating and flank pain. Musculoskeletal:  Positive for back pain and myalgias. Skin: Negative. Allergic/Immunologic: Negative. Neurological:  Positive for weakness and numbness. Hematological: Negative. Psychiatric/Behavioral:  Positive for sleep disturbance.        Allergies   Allergen Reactions    Sulfa Antibiotics Shortness Of Breath    Sulfamethoxazole-Trimethoprim Anaphylaxis     (Bactrim) Ansaid [Flurbiprofen] Other (See Comments)     Light headed and difficult with vision \"seeing\"    Gentamicin Other (See Comments)     Eye ointment caused eye swelling, redness    Motrin [Ibuprofen] Other (See Comments)     \"I coughed so bad I broke a rib\"    Quinidine      Light headed    Chlorthalidone Hives    Hydrocodone-Acetaminophen     Mirapex [Pramipexole Dihydrochloride] Other (See Comments)     Unknown reaction    Mobic [Meloxicam] Nausea Only    Nsaids Other (See Comments)     lightheaded    Quinolones Other (See Comments) and Hives    Reglan [Metoclopramide] Other (See Comments)     Hyperactive and stomach pain    Trazodone Other (See Comments)     unknown    Amino Acids Nausea And Vomiting     Other reaction(s): AOF    Corgard [Nadolol] Other (See Comments)     Severe coughing and broke a rib as a result     Erythromycin Nausea Only    Etodolac      GI issues    Garamycin [Gentamicin Sulfate] Other (See Comments)     Redness and irritation    Inderal [Propranolol] Other (See Comments)     Severe cough    Iothalamate Nausea And Vomiting     Other reaction(s): AOF    Lisinopril Itching    Loratadine      Does not work    Procainamide      Other reaction(s): LIGHTHEADED, Unknown    Ultram [Tramadol] Other (See Comments)     Didn't work         Outpatient Medications Prior to Visit   Medication Sig Dispense Refill    furosemide (LASIX) 20 MG tablet Take 1 tablet by mouth 3 times daily 90 tablet 1    oxyCODONE HCl (OXY-IR) 10 MG immediate release tablet Take 1 tablet by mouth 3 times daily for 30 days.  90 tablet 0    topiramate (TOPAMAX) 25 MG tablet Take 1 tablet by mouth 2 times daily 180 tablet 1    baclofen (LIORESAL) 10 MG tablet Take 1 tablet by mouth daily 90 tablet 0    buPROPion (WELLBUTRIN XL) 150 MG extended release tablet take 1 tablet by mouth once daily 90 tablet 1    spironolactone (ALDACTONE) 25 MG tablet take 1 tablet by mouth once daily 90 tablet 1    azelastine (ASTELIN) 0.1 % nasal spray 1 spray by Nasal route 2 times daily Use in each nostril as directed 30 mL 0    montelukast (SINGULAIR) 10 MG tablet Take 1 tablet by mouth nightly 90 tablet 1    simvastatin (ZOCOR) 40 MG tablet Take 1 tablet by mouth nightly 90 tablet 1    levothyroxine (SYNTHROID) 75 MCG tablet Take 1 tablet by mouth in the morning. 90 tablet 1    DULoxetine (CYMBALTA) 30 MG extended release capsule Take 1 capsule by mouth in the morning. 90 capsule 1    fluticasone-salmeterol (ADVIAR) 100-50 MCG/ACT AEPB diskus inhaler Inhale 1 puff into the lungs in the morning and 1 puff in the evening. 3 each 1    fluticasone (FLONASE) 50 MCG/ACT nasal spray instill 2 sprays into each nostril once daily      ipratropium (ATROVENT) 0.06 % nasal spray instill 2 sprays into each nostril twice a day      acetaminophen (TYLENOL) 650 MG extended release tablet Take 650 mg by mouth every 8 hours as needed for Pain      cetirizine (ZYRTEC) 10 MG tablet take 1 tablet by mouth once daily      metoprolol succinate (TOPROL XL) 25 MG extended release tablet take 1 tablet by mouth once daily      ipratropium (ATROVENT) 0.03 % nasal spray 2 sprays by Each Nostril route every 12 hours      calcium carbonate-vitamin D 600-200 MG-UNIT TABS Take 2 tablets by mouth daily      naloxone 4 MG/0.1ML LIQD nasal spray 1 spray by Nasal route as needed for Opioid Reversal 1 each 5    ketotifen (ZADITOR) 0.025 % ophthalmic solution 1 drop 2 times daily      Multiple Vitamin (MULTI-VITAMIN DAILY) TABS Take by mouth      melatonin 1 MG tablet Takes 1/2 of tab.      aspirin 81 MG tablet Take 81 mg by mouth daily      guaiFENesin (MUCINEX) 600 MG extended release tablet Mucinex 600 mg tablet, extended release   Take 2 tablets every day by oral route.      lidocaine (LMX) 4 % cream Apply topically as needed for Pain Apply topically as needed.       fluticasone propionate 50 MCG/BLIST AEPB Inhale into the lungs      albuterol (ACCUNEB) 1.25 MG/3ML nebulizer solution Inhale 1 ampule into the lungs every 6 hours as needed for Wheezing       flecainide (TAMBOCOR) 50 MG tablet Take 50 mg by mouth 2 times daily. omeprazole (PRILOSEC) 40 MG delayed release capsule Take 40 mg by mouth nightly. DULoxetine (CYMBALTA) 60 MG extended release capsule Take 60 mg by mouth daily       artificial tears (ARTIFICIAL TEARS) OINT as needed.       Saline 0.2 % SOLN by Nasal route daily as needed       amoxicillin (AMOXIL) 500 MG capsule take 4 capsules by mouth 1 hour prior to dental appointment       Facility-Administered Medications Prior to Visit   Medication Dose Route Frequency Provider Last Rate Last Admin    triamcinolone acetonide (KENALOG-40) injection 40 mg  40 mg Intra-artICUlar Once Orjoby Lee MD        lidocaine 2 % injection 5 mL  5 mL IntraDERmal Once Shahrzad Lee MD        bupivacaine (MARCAINE) 0.5 % injection 150 mg  30 mL Intra-artICUlar Once Shahrzad Lee MD           Past Medical History:   Diagnosis Date    Anesthesia complication     2nd post op day from total knee patient stated \"I was wild and mean\"    Anxiety and depression     Arthritis     ASD (atrial septal defect)     repair in 1963    Asthma     COPD (chronic obstructive pulmonary disease) ([address]     COPD (chronic obstructive pulmonary disease) ([address] 1980's    Disorder of immune system ([address]     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 (total left knee 3/2015 at [facility])    History of renal stone     passed    Hx of blood clots 1970    DVT    Hypertension     MVP (mitral valve prolapse)     LONI (obstructive sleep apnea)     has not used bipap since June, 2020    Rectocele     Spinal stenosis     Thyroid disease late 1980's    overactive radioactive iodine done       Past Surgical History:   Procedure Laterality Date    BACK INJECTION Bilateral 2021    Bilateral SACROILIAC Joint & Piriformis Injection performed by Rodrigo Lopes MD at [address] [facility]. Bilateral 12/3/2021    Bilateral SACROILIAC JOINT & Piriformis Injections performed [facility] LUMPECTOMY Left     BREAST LUMPECTOMY Left     CARDIAC SURGERY      ASD repair     SECTION  /    2x    CHOLECYSTECTOMY      CHOLECYSTECTOMY      COLONOSCOPY      three    DIAGNOSTIC CARDIAC CATH LAB PROCEDURE      DILATION AND CURETTAGE OF UTERUS      EYE SURGERY Bilateral     cataract    [facility] INJECTION PROCEDURE FOR SACROILIAC JOINT Left 2018    Left SIJ and piriformis, left trocanteric bursa injection performed by Rodrigo Lopes MD at [address] [facility] ([address] [facility])  2009    HYSTERECTOMY (CERVIX STATUS UNKNOWN)  2009    Total    JOINT REPLACEMENT Bilateral     knee    KNEE ARTHROSCOPY Left     LUMBAR SPINE SURGERY Bilateral 2019    Bilateral L4 TRANSFORAMINAL  [id number] performed by Rodrigo Lopes MD at [address] [facility] Bilateral 3/12/2019    Bilateral L4 TRANSFORAMINAL performed by Rodrigo Lopes MD at [address] [facility] Right 10/15/2019    RIGHT L4 & L5 TRANSFORAMINAL performed by Rodrigo Lopes MD at [address] [facility] Left 2019    Left L4 & L5 TRANSFORAMINAL performed by Rodrigo Lopes MD at [address] [facility] Bilateral 2022    Bilateral L3-L4 L4-L5 L5-S1 Confirmatory Medial Branch Block [facility]  [facility]    PAIN MANAGEMENT PROCEDURE Right 2020    Right L4 & L5 TRANSFORAMINAL performed by Rodrigo Lopes MD at [address] Left 2020    Left L4 & L5 TRANSFORAMINAL performed by Rodrigo Lopes MD at [address] Right 2021    Right L4 & L5 TRANSFORAMINAL performed by Bran Basurto MD at [address] Left 3/11/2021    left L4 & L5 TRANSFORAMINAL performed by Bran Basurto MD at [address] Left 5/6/2021    Left L4 & L5 TRANSFORAMINAL performed by Bran Basurto MD at [address] Bilateral 3/4/2022    Bilateral L4 TRANSFORAMINAL performed by Bran Basurto MD at [address] Left 8/15/2022    Left L3-L4 L4-L5 L5-S1 RADIOFREQUENCY ABLATION performed by Bran Basurto MD at [address] Right 8/29/2022    Right L3-L4 L4-L5 L5-S1  Radiofrequency Ablation performed by Bran Basurto MD at [address] [facility] DX/THER AGNT PARAVERT FACET JOINT, LUMBAR/SAC, 2ND LEVEL Bilateral 8/30/2018    Bilateral L2 L3 L4 L5 Diagnostic Medial BB performed by Bran Basurto MD at [address] [facility] DX/THER AGNT PARAVERT FACET JOINT, LUMBAR/SAC, 2ND LEVEL Right 9/13/2018    Right L2 L3 L4 L5 Confirmatory Medial BB performed by Bran Basurto MD at [address] [facility] AA&/STRD TFRML EPI CERVICAL/THORACIC EA ADDL Right 7/20/2018    Right C5 C6 TRANSFORAMINAL performed by Bran Basurto MD at [facility] Left 12/20/2018    Left L2 L3 L4 L5 RADIOFREQUENCY performed by Bran Basurto MD at [facility] Right 1/3/2019    Right L2 L3 L4 L5 RADIOFREQUENCY performed by Bran Basurto MD at [address] Right 01/28/2005 & 03/16/2010    2x    TONSILLECTOMY      at age 12     Family History   Problem Relation Age of Onset    Heart Disease Father      Social History     Socioeconomic History    Marital status:       Spouse name: None    Number of children: None    Years of education: None    Highest education level: None   Occupational History    Occupation: retired   Tobacco Use    Smoking status: Never    Smokeless tobacco: Never   Vaping Use    Vaping Use: Never used   Substance and Sexual Activity Alcohol use: Not Currently     Comment: very rare    Drug use: No     Social Determinants of Health     Financial Resource Strain: Low Risk     Difficulty of Paying Living Expenses: Not hard at all   Food Insecurity: No Food Insecurity    Worried About Running Out of Food in the Last Year: Never true    Ran Out of Food in the Last Year: Never true   Physical Activity: Unknown    Days of Exercise per Week: 0 days         Family and Social Historyreviewed in the electronic medical record. Imaging:Reviewed available imaging in our system with the patient. No results found. Objective:     Physical Exam:  There were no vitals filed for this visit. Physical Exam  Constitutional:       General: She is not in acute distress. Appearance: Normal appearance. She is well-developed. She is not diaphoretic. HENT:      Head: Normocephalic and atraumatic. Right Ear: External ear normal. No decreased hearing noted. Left Ear: External ear normal. No decreased hearing noted. Nose: Nose normal.   Eyes:      General: Lids are normal. No scleral icterus. Conjunctiva/sclera: Conjunctivae normal.   Neck:      Trachea: Phonation normal.   Cardiovascular:      Comments: No BLE edema present  Pulmonary:      Effort: Pulmonary effort is normal. No accessory muscle usage or respiratory distress. Abdominal:      General: Abdomen is flat. Palpations: Abdomen is soft. Genitourinary:     Comments: deferred  Musculoskeletal:      Lumbar back: Negative right straight leg raise test and negative left straight leg raise test.   Skin:     General: Skin is warm and dry. Coloration: Skin is not pale. Findings: No erythema or rash. Neurological:      Mental Status: She is alert and oriented to person, place, and time. Psychiatric:         Speech: Speech normal.         Behavior: Behavior normal.     Back Exam     Tenderness   The patient is experiencing tenderness in the lumbar (+kemps).     Range of Motion   Extension:  normal   Flexion:  normal   Lateral bend right:  normal   Lateral bend left:  normal   Rotation right:  normal   Rotation left:  normal     Muscle Strength   Right Quadriceps:  5/5   Left Quadriceps:  5/5   Right Hamstrings:  5/5   Left Hamstrings:  5/5     Tests   Straight leg raise right: negative  Straight leg raise left: negative    Other   Toe walk: normal  Heel walk: normal  Sensation: normal  Gait: normal                                 Research  has found that  Spine injections    reduce pain and  give  better functional  outcomes. Assessment: This is a 80 y.o. female patient with:    Diagnosis:   Diagnosis Orders   1. Lumbar facet joint syndrome        2. Lumbar degenerative disc disease  Pain Management Drug Screen      3. Encounter for long-term opiate analgesic use  Pain Management Drug Screen      4. Encounter for drug screening  Pain Management Drug Screen      5. Chronic left sacroiliac joint pain            Medical Comorbidities:  As listed in the patient's past medical and surgical history    Functional Limitations:   Pain limits function and quality of life. Plan:     Dr Maggy Diaz consult   Meds:   Controlled Substances Monitoring: Pt educated about the risks of taking opiates,including increased sedation, constipation, slowed breathing, tolerance, dependence,and addiction. New Prescriptions    No medications on file      No orders of the defined types were placed in this encounter. Orders Placed This Encounter   Procedures    Pain Management Drug Screen       No follow-ups on file. Opioid medication has  significant  risk  benefit concerns. We  instruct    our patients that  a Random Urine Drug Screen is required  along with a  an Opioid assessment questionaire such as ORT  or SOAPP  The patient expressed understanding of the above assessment and plan.     Totaltime spent face to face with patient was 25 minutes inwhich  50% or more of the time was spent

## 2022-09-23 LAB
6-ACETYLMORPHINE, UR: NOT DETECTED
7-AMINOCLONAZEPAM, URINE: NOT DETECTED
ALPHA-OH-ALPRAZ, URINE: NOT DETECTED
ALPHA-OH-MIDAZOLAM, URINE: NOT DETECTED
ALPRAZOLAM, URINE: NOT DETECTED
AMPHETAMINES, URINE: NOT DETECTED
BARBITURATES, URINE: NOT DETECTED
BENZOYLECGONINE, UR: NOT DETECTED
BUPRENORPHINE URINE: NOT DETECTED
CARISOPRODOL, UR: NOT DETECTED
CLONAZEPAM, URINE: NOT DETECTED
CODEINE, URINE: NOT DETECTED
CREATININE URINE: 40.7 MG/DL (ref 20–400)
DIAZEPAM, URINE: NOT DETECTED
EER PAIN MGT DRUG PANEL, HIGH RES/EMIT U: NORMAL
ETHYL GLUCURONIDE UR: NOT DETECTED
FENTANYL URINE: NOT DETECTED
GABAPENTIN: NOT DETECTED
HYDROCODONE, URINE: NOT DETECTED
HYDROMORPHONE, URINE: NOT DETECTED
LORAZEPAM, URINE: NOT DETECTED
MARIJUANA METAB, UR: NOT DETECTED
MDA, UR: NOT DETECTED
MDEA, EVE, UR: NOT DETECTED
MDMA URINE: NOT DETECTED
MEPERIDINE METAB, UR: NOT DETECTED
METHADONE, URINE: NOT DETECTED
METHAMPHETAMINE, URINE: NOT DETECTED
METHYLPHENIDATE: NOT DETECTED
MIDAZOLAM, URINE: NOT DETECTED
MORPHINE URINE: NOT DETECTED
NALOXONE URINE: NOT DETECTED
NORBUPRENORPHINE, URINE: NOT DETECTED
NORDIAZEPAM, URINE: NOT DETECTED
NORFENTANYL, URINE: NOT DETECTED
NORHYDROCODONE, URINE: NOT DETECTED
NOROXYCODONE, URINE: PRESENT
NOROXYMORPHONE, URINE: NOT DETECTED
OXAZEPAM, URINE: NOT DETECTED
OXYCODONE URINE: PRESENT
OXYMORPHONE, URINE: PRESENT
PAIN MGT DRUG PANEL, HI RES, UR: NORMAL
PCP,URINE: NOT DETECTED
PHENTERMINE, UR: NOT DETECTED
PREGABALIN: NOT DETECTED
TAPENTADOL, URINE: NOT DETECTED
TAPENTADOL-O-SULFATE, URINE: NOT DETECTED
TEMAZEPAM, URINE: NOT DETECTED
TRAMADOL, URINE: NOT DETECTED
ZOLPIDEM METABOLITE (ZCA), URINE: NOT DETECTED
ZOLPIDEM, URINE: NOT DETECTED

## 2022-09-27 ENCOUNTER — OFFICE VISIT (OUTPATIENT)
Dept: PAIN MANAGEMENT | Age: 82
End: 2022-09-27
Payer: MEDICARE

## 2022-09-27 VITALS
BODY MASS INDEX: 30.05 KG/M2 | DIASTOLIC BLOOD PRESSURE: 74 MMHG | HEIGHT: 64 IN | OXYGEN SATURATION: 97 % | WEIGHT: 176 LBS | SYSTOLIC BLOOD PRESSURE: 114 MMHG | HEART RATE: 83 BPM

## 2022-09-27 DIAGNOSIS — M51.36 LUMBAR DEGENERATIVE DISC DISEASE: ICD-10-CM

## 2022-09-27 DIAGNOSIS — G89.29 CHRONIC LEFT SACROILIAC JOINT PAIN: ICD-10-CM

## 2022-09-27 DIAGNOSIS — M47.817 LUMBOSACRAL SPONDYLOSIS WITHOUT MYELOPATHY: ICD-10-CM

## 2022-09-27 DIAGNOSIS — M54.16 LUMBAR RADICULOPATHY: ICD-10-CM

## 2022-09-27 DIAGNOSIS — M53.3 CHRONIC LEFT SACROILIAC JOINT PAIN: ICD-10-CM

## 2022-09-27 DIAGNOSIS — M51.37 DEGENERATION OF LUMBAR OR LUMBOSACRAL INTERVERTEBRAL DISC: Primary | Chronic | ICD-10-CM

## 2022-09-27 PROCEDURE — 1123F ACP DISCUSS/DSCN MKR DOCD: CPT | Performed by: NURSE PRACTITIONER

## 2022-09-27 PROCEDURE — 1036F TOBACCO NON-USER: CPT | Performed by: NURSE PRACTITIONER

## 2022-09-27 PROCEDURE — 99214 OFFICE O/P EST MOD 30 MIN: CPT | Performed by: NURSE PRACTITIONER

## 2022-09-27 PROCEDURE — G8400 PT W/DXA NO RESULTS DOC: HCPCS | Performed by: NURSE PRACTITIONER

## 2022-09-27 PROCEDURE — G8417 CALC BMI ABV UP PARAM F/U: HCPCS | Performed by: NURSE PRACTITIONER

## 2022-09-27 PROCEDURE — G8427 DOCREV CUR MEDS BY ELIG CLIN: HCPCS | Performed by: NURSE PRACTITIONER

## 2022-09-27 PROCEDURE — 1090F PRES/ABSN URINE INCON ASSESS: CPT | Performed by: NURSE PRACTITIONER

## 2022-09-27 RX ORDER — OXYCODONE HYDROCHLORIDE 10 MG/1
10 TABLET ORAL 3 TIMES DAILY
Qty: 90 TABLET | Refills: 0 | Status: SHIPPED | OUTPATIENT
Start: 2022-09-27 | End: 2022-10-31 | Stop reason: SDUPTHER

## 2022-09-27 RX ORDER — PREDNISONE 10 MG/1
10 TABLET ORAL 2 TIMES DAILY
Qty: 10 TABLET | Refills: 0 | Status: SHIPPED | OUTPATIENT
Start: 2022-09-27 | End: 2022-10-02

## 2022-09-27 ASSESSMENT — ENCOUNTER SYMPTOMS
SORE THROAT: 0
SHORTNESS OF BREATH: 0
COUGH: 0
BACK PAIN: 1

## 2022-09-27 NOTE — PROGRESS NOTES
Subjective:      Patient ID: Dontrell Guthrie is a 80 y.o. female. Chief Complaint   Patient presents with    Back Pain     Follow up    Medication Refill     HPI Here today for routine pain clinic recheck. Underwent radiofrequency ablation with relief, but not as effective as previous ones.  She recently moved, did lifting so thinks that may have aggravated pain further    Pain Assessment  Location of Pain: Back  Severity of Pain: 5  Quality of Pain: Sharp, Aching  Duration of Pain: Persistent  Frequency of Pain: Constant  Aggravating Factors: Bending, Stretching, Straightening, Exercise, Kneeling, Squatting, Standing, Walking, Stairs  Limiting Behavior: Yes  Relieving Factors: Rest, Heat (lidoderm patch meds)  Result of Injury: No  Work-Related Injury: No    Allergies   Allergen Reactions    Sulfa Antibiotics Shortness Of Breath    Sulfamethoxazole-Trimethoprim Anaphylaxis     (Bactrim)    Ansaid [Flurbiprofen] Other (See Comments)     Light headed and difficult with vision \"seeing\"    Gentamicin Other (See Comments)     Eye ointment caused eye swelling, redness    Motrin [Ibuprofen] Other (See Comments)     \"I coughed so bad I broke a rib\"    Quinidine      Light headed    Chlorthalidone Hives    Hydrocodone-Acetaminophen     Mirapex [Pramipexole Dihydrochloride] Other (See Comments)     Unknown reaction    Mobic [Meloxicam] Nausea Only    Nsaids Other (See Comments)     lightheaded    Quinolones Other (See Comments) and Hives    Reglan [Metoclopramide] Other (See Comments)     Hyperactive and stomach pain    Trazodone Other (See Comments)     unknown    Amino Acids Nausea And Vomiting     Other reaction(s): AOF    Corgard [Nadolol] Other (See Comments)     Severe coughing and broke a rib as a result     Erythromycin Nausea Only    Etodolac      GI issues    Garamycin [Gentamicin Sulfate] Other (See Comments)     Redness and irritation    Inderal [Propranolol] Other (See Comments)     Severe cough Iothalamate Nausea And Vomiting     Other reaction(s): AOF    Lisinopril Itching    Loratadine      Does not work    Procainamide      Other reaction(s): LIGHTHEADED, Unknown    Ultram [Tramadol] Other (See Comments)     Didn't work         Outpatient Medications Marked as Taking for the 9/27/22 encounter (Office Visit) with KENY Wallace - CNP   Medication Sig Dispense Refill    predniSONE (DELTASONE) 10 MG tablet Take 1 tablet by mouth 2 times daily for 5 days 10 tablet 0    oxyCODONE HCl (OXY-IR) 10 MG immediate release tablet Take 1 tablet by mouth 3 times daily for 30 days. 90 tablet 0    amoxicillin (AMOXIL) 500 MG capsule take 4 capsules by mouth 1 hour prior to dental appointment      furosemide (LASIX) 20 MG tablet Take 1 tablet by mouth 3 times daily 90 tablet 1    topiramate (TOPAMAX) 25 MG tablet Take 1 tablet by mouth 2 times daily 180 tablet 1    baclofen (LIORESAL) 10 MG tablet Take 1 tablet by mouth daily 90 tablet 0    buPROPion (WELLBUTRIN XL) 150 MG extended release tablet take 1 tablet by mouth once daily 90 tablet 1    spironolactone (ALDACTONE) 25 MG tablet take 1 tablet by mouth once daily 90 tablet 1    azelastine (ASTELIN) 0.1 % nasal spray 1 spray by Nasal route 2 times daily Use in each nostril as directed 30 mL 0    montelukast (SINGULAIR) 10 MG tablet Take 1 tablet by mouth nightly 90 tablet 1    simvastatin (ZOCOR) 40 MG tablet Take 1 tablet by mouth nightly 90 tablet 1    levothyroxine (SYNTHROID) 75 MCG tablet Take 1 tablet by mouth in the morning. 90 tablet 1    DULoxetine (CYMBALTA) 30 MG extended release capsule Take 1 capsule by mouth in the morning. 90 capsule 1    fluticasone-salmeterol (ADVIAR) 100-50 MCG/ACT AEPB diskus inhaler Inhale 1 puff into the lungs in the morning and 1 puff in the evening.  3 each 1    fluticasone (FLONASE) 50 MCG/ACT nasal spray instill 2 sprays into each nostril once daily      ipratropium (ATROVENT) 0.06 % nasal spray instill 2 sprays into each nostril twice a day      acetaminophen (TYLENOL) 650 MG extended release tablet Take 650 mg by mouth every 8 hours as needed for Pain      cetirizine (ZYRTEC) 10 MG tablet take 1 tablet by mouth once daily      metoprolol succinate (TOPROL XL) 25 MG extended release tablet take 1 tablet by mouth once daily      calcium carbonate-vitamin D 600-200 MG-UNIT TABS Take 2 tablets by mouth daily      ketotifen (ZADITOR) 0.025 % ophthalmic solution 1 drop 2 times daily      Multiple Vitamin (MULTI-VITAMIN DAILY) TABS Take by mouth      melatonin 1 MG tablet Takes 1/2 of tab.      aspirin 81 MG tablet Take 81 mg by mouth daily      guaiFENesin (MUCINEX) 600 MG extended release tablet Mucinex 600 mg tablet, extended release   Take 2 tablets every day by oral route.      lidocaine (LMX) 4 % cream Apply topically as needed for Pain Apply topically as needed. fluticasone propionate 50 MCG/BLIST AEPB Inhale into the lungs      albuterol (ACCUNEB) 1.25 MG/3ML nebulizer solution Inhale 1 ampule into the lungs every 6 hours as needed for Wheezing       flecainide (TAMBOCOR) 50 MG tablet Take 50 mg by mouth 2 times daily. omeprazole (PRILOSEC) 40 MG delayed release capsule Take 40 mg by mouth nightly. DULoxetine (CYMBALTA) 60 MG extended release capsule Take 60 mg by mouth daily       artificial tears (ARTIFICIAL TEARS) OINT as needed.       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 (total left knee 3/2015 at [facility])    History of renal stone     passed    Hx of blood clots 1970    DVT    Hypertension     MVP (mitral valve prolapse)     LONI (obstructive sleep apnea)     has not used bipap since     Rectocele     Spinal stenosis     Thyroid disease late     overactive radioactive iodine done       Past Surgical History:   Procedure Laterality Date    BACK INJECTION Bilateral 2021    Bilateral SACROILIAC Joint & Piriformis Injection performed by Ivonne Soto MD at [address] [facility]. Bilateral 12/3/2021    Bilateral SACROILIAC JOINT & Piriformis Injections performed by Ivonne Soto MD at [address] [facility] LUMPECTOMY Left     BREAST LUMPECTOMY Left     CARDIAC SURGERY      ASD repair     SECTION      2x    CHOLECYSTECTOMY      CHOLECYSTECTOMY      COLONOSCOPY      three    DIAGNOSTIC CARDIAC CATH LAB PROCEDURE      DILATION AND CURETTAGE OF UTERUS      EYE SURGERY Bilateral     cataract    HC INJECTION PROCEDURE FOR SACROILIAC JOINT Left 2018    Left SIJ and piriformis, left trocanteric bursa injection performed by Ivonne Soto MD at [facility])  2009    HYSTERECTOMY (CERVIX STATUS UNKNOWN)  2009    Total    JOINT REPLACEMENT Bilateral     knee    KNEE ARTHROSCOPY Left     LUMBAR SPINE SURGERY Bilateral 2019    Bilateral L4 TRANSFORAMINAL  [id number] performed by Ivonne Soto MD at [place] [address] Bilateral 3/12/2019    Bilateral L4 TRANSFORAMINAL performed by Ivonne Soto MD at [place] [address] Right 10/15/2019    RIGHT L4 & L5 TRANSFORAMINAL performed by Ivonne Soto MD at [place] [address] Left 2019    Left L4 & L5 TRANSFORAMINAL performed by Ivonne Soto MD at [address] [facility] Bilateral 7/7/2022    Bilateral L3-L4 L4-L5 L5-S1 Confirmatory Medial Branch Block performed by Meryl Cantrell MD at [address] [facility]  8/2014    PAIN MANAGEMENT PROCEDURE Right 2/7/2020    Right L4 & L5 TRANSFORAMINAL performed by Meryl Cantrell MD at [facility] Left 2/21/2020    Left L4 & L5 TRANSFORAMINAL performed by Meryl Cantrell MD at [facility] Right 2/23/2021    Right L4 & L5 TRANSFORAMINAL performed by Meryl Cantrell MD at [facility] Left 3/11/2021    left L4 & L5 TRANSFORAMINAL performed by Meryl Cantrell MD at [facility] Left 5/6/2021    Left L4 & L5 TRANSFORAMINAL performed by Meryl Cantrell MD at [facility] Bilateral 3/4/2022    Bilateral L4 TRANSFORAMINAL performed by Meryl Cantrell MD at [facility] Left 8/15/2022    Left L3-L4 L4-L5 L5-S1 RADIOFREQUENCY ABLATION performed by Meryl Cantrell MD at [facility] Right 8/29/2022    Right L3-L4 L4-L5 L5-S1  Radiofrequency Ablation performed by Meryl Cantrell MD at [address] [place] [address] DX/THER AGNT PARAVERT FACET JOINT, LUMBAR/SAC, 2ND LEVEL Bilateral 8/30/2018    Bilateral L2 L3 L4 L5 Diagnostic Medial BB performed by Meryl Cantrell MD at [address] [place] [address] DX/THER AGNT PARAVERT FACET JOINT, LUMBAR/SAC, 2ND LEVEL Right 9/13/2018    Right L2 L3 L4 L5 Confirmatory Medial BB performed by Meryl Cantrell MD at [facility] AA&/STRD TFRML EPI CERVICAL/THORACIC EA ADDL Right 7/20/2018    Right C5 C6 TRANSFORAMINAL performed by Meryl Cantrell MD at [facility] Left 12/20/2018    Left L2 L3 L4 L5 RADIOFREQUENCY performed by Meryl Cantrell MD at [place] [address] Right 1/3/2019    Right L2 L3 L4 L5 RADIOFREQUENCY performed by Mreyl Cantrell MD at [address] [place] [address] Right 01/28/2005 & 03/16/2010 2x    TONSILLECTOMY      at age 12       Family History   Problem Relation Age of Onset    Heart Disease Father        Social History     Socioeconomic History    Marital status:      Spouse name: None    Number of children: None    Years of education: None    Highest education level: None   Occupational History    Occupation: retired   Tobacco Use    Smoking status: Never    Smokeless tobacco: Never   Vaping Use    Vaping Use: Never used   Substance and Sexual Activity    Alcohol use: Not Currently     Comment: very rare    Drug use: No     Social Determinants of Health     Financial Resource Strain: Low Risk     Difficulty of Paying Living Expenses: Not hard at all   Food Insecurity: No Food Insecurity    Worried About Running Out of Food in the Last Year: Never true    Ran Out of Food in the Last Year: Never true   Physical Activity: Unknown    Days of Exercise per Week: 0 days     Review of Systems   Constitutional:  Positive for activity change (increased, packing and moving). Negative for fever. HENT:  Negative for sore throat. Respiratory:  Negative for cough and shortness of breath. Cardiovascular:  Negative for chest pain. Musculoskeletal:  Positive for arthralgias, back pain and myalgias. Skin: Negative. Hematological:  Bruises/bleeds easily. Psychiatric/Behavioral:  Positive for sleep disturbance. Objective:   Physical Exam  Vitals reviewed. Constitutional:       General: She is awake. She is not in acute distress. Appearance: Normal appearance. She is well-developed and well-groomed. She is not ill-appearing, toxic-appearing or diaphoretic. Interventions: She is not intubated. HENT:      Head: Normocephalic and atraumatic. Eyes:      General: Lids are normal.   Cardiovascular:      Rate and Rhythm: Normal rate.    Pulmonary:      Effort: Pulmonary effort is normal. No tachypnea, bradypnea, accessory muscle usage, prolonged expiration, respiratory distress or retractions. She is not intubated. Skin:     General: Skin is warm and dry. Capillary Refill: Capillary refill takes less than 2 seconds. Coloration: Skin is not ashen, jaundiced or pale. Findings: No rash. Nails: There is no clubbing. Neurological:      Mental Status: She is alert and oriented to person, place, and time. GCS: GCS eye subscore is 4. GCS verbal subscore is 5. GCS motor subscore is 6. Cranial Nerves: No cranial nerve deficit. Psychiatric:         Attention and Perception: Attention normal.         Mood and Affect: Mood normal.         Speech: Speech normal.         Behavior: Behavior is cooperative. Cognition and Memory: Cognition normal.         Judgment: Judgment normal.       Assessment / Plan:      Diagnosis Orders   1. Degeneration of lumbar or lumbosacral intervertebral disc        2. Lumbar radiculopathy  oxyCODONE HCl (OXY-IR) 10 MG immediate release tablet      3. Lumbosacral spondylosis without myelopathy  oxyCODONE HCl (OXY-IR) 10 MG immediate release tablet      4. Chronic left sacroiliac joint pain  oxyCODONE HCl (OXY-IR) 10 MG immediate release tablet      5. Lumbar degenerative disc disease            Chronic pain diagnoses such as   1. Degeneration of lumbar or lumbosacral intervertebral disc    2. Lumbar radiculopathy    3. Lumbosacral spondylosis without myelopathy    4. Chronic left sacroiliac joint pain    5. Lumbar degenerative disc disease     controlled on current medication regime, wll continue current pain medications to improve quality of life and function. Prednisone Rx    Antoinette Route is here for recheck/reevaluation of chronic pain. She is able to maintain daily activities with the use of current pain medications and is generally satisfied with level of analgesia. She shows no evidence of misuse or misappropriation of medications. She denies use of alcohol or illegal substances.  UDS have been Appropriate with most recent screen     Controlled Substance Monitoring:    Acute and Chronic Pain Monitoring:   RX Monitoring 9/27/2022   Attestation -   Periodic Controlled Substance Monitoring No signs of potential drug abuse or diversion identified.    Chronic Pain > 50 MEDD -   Chronic Pain > 80 MEDD -

## 2022-10-04 ENCOUNTER — TELEPHONE (OUTPATIENT)
Dept: PAIN MANAGEMENT | Age: 82
End: 2022-10-04

## 2022-10-04 NOTE — TELEPHONE ENCOUNTER
The patient stated that her prednisone has been slightly helpful. However, it wears off quickly and then she is in quit a bit of pain. She stated that no injections were discussed at the last visit but wondered if that would be an option?      Last Appt:  9/27/2022  Next Appt:   10/31/2022  Med verified in [address] [place] [address] Rd

## 2022-10-05 NOTE — TELEPHONE ENCOUNTER
She was not asking for increased prednisone, she wanted to know if you would recommend a procedure with Dr. Mayra Magallon

## 2022-10-05 NOTE — TELEPHONE ENCOUNTER
I don't want to increase prednisone to due her age and risk osteoporosis. Is she taking oxycodone tid?

## 2022-10-10 ENCOUNTER — OFFICE VISIT (OUTPATIENT)
Dept: FAMILY MEDICINE CLINIC | Age: 82
End: 2022-10-10
Payer: MEDICARE

## 2022-10-10 VITALS
SYSTOLIC BLOOD PRESSURE: 132 MMHG | RESPIRATION RATE: 16 BRPM | DIASTOLIC BLOOD PRESSURE: 78 MMHG | HEIGHT: 64 IN | OXYGEN SATURATION: 96 % | HEART RATE: 78 BPM | WEIGHT: 179.8 LBS | BODY MASS INDEX: 30.7 KG/M2

## 2022-10-10 DIAGNOSIS — H92.02 LEFT EAR PAIN: ICD-10-CM

## 2022-10-10 DIAGNOSIS — R42 DIZZINESS: Primary | ICD-10-CM

## 2022-10-10 DIAGNOSIS — Z86.73 HISTORY OF TIA (TRANSIENT ISCHEMIC ATTACK): ICD-10-CM

## 2022-10-10 DIAGNOSIS — R60.0 LOWER EXTREMITY EDEMA: ICD-10-CM

## 2022-10-10 PROCEDURE — 99212 OFFICE O/P EST SF 10 MIN: CPT | Performed by: FAMILY MEDICINE

## 2022-10-10 PROCEDURE — 99214 OFFICE O/P EST MOD 30 MIN: CPT | Performed by: FAMILY MEDICINE

## 2022-10-10 PROCEDURE — G8400 PT W/DXA NO RESULTS DOC: HCPCS | Performed by: FAMILY MEDICINE

## 2022-10-10 PROCEDURE — 1036F TOBACCO NON-USER: CPT | Performed by: FAMILY MEDICINE

## 2022-10-10 PROCEDURE — 1090F PRES/ABSN URINE INCON ASSESS: CPT | Performed by: FAMILY MEDICINE

## 2022-10-10 PROCEDURE — 1123F ACP DISCUSS/DSCN MKR DOCD: CPT | Performed by: FAMILY MEDICINE

## 2022-10-10 PROCEDURE — G8427 DOCREV CUR MEDS BY ELIG CLIN: HCPCS | Performed by: FAMILY MEDICINE

## 2022-10-10 PROCEDURE — G8484 FLU IMMUNIZE NO ADMIN: HCPCS | Performed by: FAMILY MEDICINE

## 2022-10-10 PROCEDURE — G8417 CALC BMI ABV UP PARAM F/U: HCPCS | Performed by: FAMILY MEDICINE

## 2022-10-10 RX ORDER — OMEPRAZOLE 40 MG/1
40 CAPSULE, DELAYED RELEASE ORAL NIGHTLY
Qty: 30 CAPSULE | Refills: 5 | Status: SHIPPED | OUTPATIENT
Start: 2022-10-10

## 2022-10-10 RX ORDER — OMEPRAZOLE 40 MG/1
CAPSULE, DELAYED RELEASE ORAL
Qty: 90 CAPSULE | OUTPATIENT
Start: 2022-10-10

## 2022-10-10 ASSESSMENT — ENCOUNTER SYMPTOMS
COUGH: 1
CHEST TIGHTNESS: 0
SHORTNESS OF BREATH: 0
WHEEZING: 0
ABDOMINAL PAIN: 0
CHOKING: 0
PHOTOPHOBIA: 0

## 2022-10-10 NOTE — PROGRESS NOTES
Name: [facility]  DOS: 10/10/2022  MRN: [id number]      Subjective:  Shalini Seymour is a 80 y.o. female being seen for   Chief Complaint   Patient presents with    Follow-up     Pt went to the [facility] ED on 10/6/22. Pt went for being lightheaded in the morning. No medication was given at the ED. Pt also did a UA and there was no infection. Pt is having her pacemaker check this month. Pt is still feeling light headed in the morning.         Vitals:    10/10/22 1501   BP: 132/78   Pulse: 78   Resp: 16   SpO2: 96%     Allergies   Allergen Reactions    Sulfa Antibiotics Shortness Of Breath    Sulfamethoxazole-Trimethoprim Anaphylaxis     (Bactrim)    Ansaid [Flurbiprofen] Other (See Comments)     Light headed and difficult with vision \"seeing\"    Gentamicin Other (See Comments)     Eye ointment caused eye swelling, redness    Motrin [Ibuprofen] Other (See Comments)     \"I coughed so bad I broke a rib\"    Quinidine      Light headed    Chlorthalidone Hives    Hydrocodone-Acetaminophen     Mirapex [Pramipexole Dihydrochloride] Other (See Comments)     Unknown reaction    Mobic [Meloxicam] Nausea Only    Nsaids Other (See Comments)     lightheaded    Quinolones Other (See Comments) and Hives    Reglan [Metoclopramide] Other (See Comments)     Hyperactive and stomach pain    Trazodone Other (See Comments)     unknown    Amino Acids Nausea And Vomiting     Other reaction(s): AOF    Corgard [Nadolol] Other (See Comments)     Severe coughing and broke a rib as a result     Erythromycin Nausea Only    Etodolac      GI issues    Garamycin [Gentamicin Sulfate] Other (See Comments)     Redness and irritation    Inderal [Propranolol] Other (See Comments)     Severe cough    Iothalamate Nausea And Vomiting     Other reaction(s): AOF    Lisinopril Itching    Loratadine      Does not work    Procainamide      Other reaction(s): LIGHTHEADED, Unknown    Ultram [Tramadol] Other (See Comments)     Didn't work       Past Medical History:   Diagnosis Date    Anesthesia complication     2nd post op day from total knee patient stated \"I was wild and mean\"    Anxiety and depression     Arthritis     ASD (atrial septal defect)     repair in     Asthma     COPD (chronic obstructive pulmonary disease) ([facility])     COPD (chronic obstructive pulmonary disease) ([facility] 's    Disorder of immune system ([facility]     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 (total left knee 3/2015 at [facility])    History of renal stone     passed    Hx of blood clots     DVT    Hypertension     MVP (mitral valve prolapse)     LONI (obstructive sleep apnea)     has not used bipap since     Rectocele     Spinal stenosis     Thyroid disease late     overactive radioactive iodine done     Past Surgical History:   Procedure Laterality Date    BACK INJECTION Bilateral 2021    Bilateral SACROILIAC Joint & Piriformis Injection performed by Burton Jackson MD at [address] [facility]. Bilateral 12/3/2021    Bilateral SACROILIAC JOINT & Piriformis Injections performed by Burton Jackson MD at [address] [facility] LUMPECTOMY Left     BREAST LUMPECTOMY Left     CARDIAC SURGERY      ASD repair     SECTION      2x    CHOLECYSTECTOMY      CHOLECYSTECTOMY      COLONOSCOPY      three    DIAGNOSTIC CARDIAC CATH LAB PROCEDURE      DILATION AND CURETTAGE OF UTERUS      EYE SURGERY Bilateral     cataract    [facility]. INJECTION PROCEDURE FOR SACROILIAC JOINT Left 2018    Left SIJ and piriformis, left trocanteric bursa injection performed by Burton Jackson MD at [facility] ([address] [place] [address])  2009    HYSTERECTOMY (CERVIX STATUS UNKNOWN)      Total    JOINT REPLACEMENT Bilateral     knee    KNEE ARTHROSCOPY Left 2008    LUMBAR SPINE SURGERY Bilateral 2/26/2019    Bilateral L4 TRANSFORAMINAL  [id number] performed by Kunal Sotelo MD at [address] [place] [address] Dr Bilateral 3/12/2019    Bilateral L4 TRANSFORAMINAL performed by Kunal Sotelo MD at [address] [place] [address] Dr Right 10/15/2019    RIGHT L4 & L5 TRANSFORAMINAL performed by Kunal Sotelo MD at [address] [place] [address] Dr Left 11/1/2019    Left L4 & L5 TRANSFORAMINAL performed by Kunal Sotelo MD at [address] [facility] Bilateral 7/7/2022    Bilateral L3-L4 L4-L5 L5-S1 Confirmatory Medial Branch Block performed by Kunal Sotelo MD at [address] [facility]  8/2014    PAIN MANAGEMENT PROCEDURE Right 2/7/2020    Right L4 & L5 TRANSFORAMINAL performed by Kunal Sotelo MD at [facility] Left 2/21/2020    Left L4 & L5 TRANSFORAMINAL performed by Kunal Sotelo MD at [facility] Right 2/23/2021    Right L4 & L5 TRANSFORAMINAL performed by Kunal Sotelo MD at [facility] Left 3/11/2021    left L4 & L5 TRANSFORAMINAL performed by Kunal Sotelo MD at [facility] Left 5/6/2021    Left L4 & L5 TRANSFORAMINAL performed by Kunal Sotelo MD at [facility] Bilateral 3/4/2022    Bilateral L4 TRANSFORAMINAL performed by Kunal Sotelo MD at [facility] Left 8/15/2022    Left L3-L4 L4-L5 L5-S1 RADIOFREQUENCY ABLATION performed by Kunal Sotelo MD at [facility] Right 8/29/2022    Right L3-L4 L4-L5 L5-S1  Radiofrequency Ablation performed by Kunal Sotelo MD at [facility] [employer] [place] DX/THER AGNT PARAVERT FACET JOINT, LUMBAR/SAC, 2ND LEVEL Bilateral 8/30/2018    Bilateral L2 L3 L4 L5 Diagnostic Medial BB performed by Kunal Sotelo MD at [facility] [employer] [place] DX/THER AGNT PARAVERT FACET JOINT, LUMBAR/SAC, 2ND LEVEL Right 9/13/2018    Right L2 L3 L4 L5 Confirmatory Medial BB performed by Hira Huston MD at [facility] [address]77 AA&/STRD TFRML EPI CERVICAL/THORACIC EA ADDL Right 7/20/2018    Right C5 C6 TRANSFORAMINAL performed by Hira Huston MD at [place] [address] Left 12/20/2018    Left L2 L3 L4 L5 RADIOFREQUENCY performed by Hira Huston MD at [place] [address] Right 1/3/2019    Right L2 L3 L4 L5 RADIOFREQUENCY performed by Hira Huston MD at [facility] Right 01/28/2005 & 03/16/2010    2x    TONSILLECTOMY      at age 12     Social History     Socioeconomic History    Marital status:      Spouse name: None    Number of children: None    Years of education: None    Highest education level: None   Occupational History    Occupation: retired   Tobacco Use    Smoking status: Never    Smokeless tobacco: Never   Vaping Use    Vaping Use: Never used   Substance and Sexual Activity    Alcohol use: Not Currently     Comment: very rare    Drug use: No     Social Determinants of Health     Financial Resource Strain: Low Risk     Difficulty of Paying Living Expenses: Not hard at all   Food Insecurity: No Food Insecurity    Worried About Running Out of Food in the Last Year: Never true    Ran Out of Food in the Last Year: Never true   Physical Activity: Unknown    Days of Exercise per Week: 0 days       Current Outpatient Medications   Medication Sig Dispense Refill    omeprazole (PRILOSEC) 40 MG delayed release capsule Take 1 capsule by mouth nightly 30 capsule 5    oxyCODONE HCl (OXY-IR) 10 MG immediate release tablet Take 1 tablet by mouth 3 times daily for 30 days.  90 tablet 0    furosemide (LASIX) 20 MG tablet Take 1 tablet by mouth 3 times daily 90 tablet 1    topiramate (TOPAMAX) 25 MG tablet Take 1 tablet by mouth 2 times daily 180 tablet 1    baclofen (LIORESAL) 10 MG tablet Take 1 tablet by mouth daily 90 tablet 0    buPROPion (WELLBUTRIN XL) 150 MG extended release tablet take 1 tablet by mouth once daily 90 tablet 1    spironolactone (ALDACTONE) 25 MG tablet take 1 tablet by mouth once daily 90 tablet 1    azelastine (ASTELIN) 0.1 % nasal spray 1 spray by Nasal route 2 times daily Use in each nostril as directed 30 mL 0    montelukast (SINGULAIR) 10 MG tablet Take 1 tablet by mouth nightly 90 tablet 1    simvastatin (ZOCOR) 40 MG tablet Take 1 tablet by mouth nightly 90 tablet 1    levothyroxine (SYNTHROID) 75 MCG tablet Take 1 tablet by mouth in the morning. 90 tablet 1    DULoxetine (CYMBALTA) 30 MG extended release capsule Take 1 capsule by mouth in the morning. 90 capsule 1    fluticasone-salmeterol (ADVIAR) 100-50 MCG/ACT AEPB diskus inhaler Inhale 1 puff into the lungs in the morning and 1 puff in the evening. 3 each 1    fluticasone (FLONASE) 50 MCG/ACT nasal spray instill 2 sprays into each nostril once daily      ipratropium (ATROVENT) 0.06 % nasal spray instill 2 sprays into each nostril twice a day      acetaminophen (TYLENOL) 650 MG extended release tablet Take 650 mg by mouth every 8 hours as needed for Pain      cetirizine (ZYRTEC) 10 MG tablet take 1 tablet by mouth once daily      metoprolol succinate (TOPROL XL) 25 MG extended release tablet take 1 tablet by mouth once daily      calcium carbonate-vitamin D 600-200 MG-UNIT TABS Take 2 tablets by mouth daily      ketotifen (ZADITOR) 0.025 % ophthalmic solution 1 drop 2 times daily      Multiple Vitamin (MULTI-VITAMIN DAILY) TABS Take by mouth      melatonin 1 MG tablet Takes 1/2 of tab.      aspirin 81 MG tablet Take 81 mg by mouth daily      guaiFENesin (MUCINEX) 600 MG extended release tablet Mucinex 600 mg tablet, extended release   Take 2 tablets every day by oral route.      lidocaine (LMX) 4 % cream Apply topically as needed for Pain Apply topically as needed.       fluticasone propionate 50 MCG/BLIST AEPB Inhale into the lungs      albuterol (ACCUNEB) 1.25 MG/3ML nebulizer solution Inhale 1 ampule into the lungs every 6 hours as needed for Wheezing       flecainide (TAMBOCOR) 50 MG tablet Take 50 mg by mouth 2 times daily. DULoxetine (CYMBALTA) 60 MG extended release capsule Take 60 mg by mouth daily       artificial tears (ARTIFICIAL TEARS) OINT as needed. Saline 0.2 % SOLN by Nasal route daily as needed       amoxicillin (AMOXIL) 500 MG capsule take 4 capsules by mouth 1 hour prior to dental appointment (Patient not taking: Reported on 10/10/2022)       Current Facility-Administered Medications   Medication Dose Route Frequency Provider Last Rate Last Admin    triamcinolone acetonide (KENALOG-40) injection 40 mg  40 mg Intra-artICUlar Once Elisa Forrest MD        lidocaine 2 % injection 5 mL  5 mL IntraDERmal Once Elisa Forrest MD        bupivacaine (MARCAINE) 0.5 % injection 150 mg  30 mL Intra-artICUlar Once Elisa Forrest MD            Objective:  Pt is here for a follow up from the [facility] emergency room with complaints of dizziness heart palpitations and chest pain per note. She had an EKG and blood work which did not warrent a hospitalization. she was discharged home and was told to follow up with me. She tells me that she feels like there is pressure in her head places her hand on both sided of head not her temples. She does not know what is , she just doesn't feel right. It eases up about 4-5 pm she feels better. She says 100% better. She says she does not have chest pain. She also says that Friday her left ear started to hurt and her left face hurt so she took Ceftin 500 mg twice a day she is still taking it. This is her 4th day she has about 7 days worth. She says she feels better since she took it, the 2nd day she started feeling better. She started this medication the next day after her visit from the hospital. She says her left face/ear about 80-90 % better.  She says she does not have heart palpitations what she was trying to say in the ER was that she feels her pulse with her left hand and sometimes feels skipping beats. Review of Systems   Constitutional:  Positive for fatigue (always no changes). Negative for unexpected weight change. Eyes:  Negative for photophobia and visual disturbance. Respiratory:  Positive for cough (coughs every now and then she says for a long time). Negative for choking, chest tightness, shortness of breath and wheezing. Cardiovascular:  Positive for leg swelling (chronic, she is on a diuretic). Negative for chest pain and palpitations. Gastrointestinal:  Negative for abdominal pain. Genitourinary:  Negative for difficulty urinating and hematuria. Skin:  Negative for rash and wound. Neurological:  Positive for dizziness (not dizzy now, only when moving but later in the after noon not dizzy at all), weakness (uses a walker, chronic,) and headaches (above temples, light HA not a heavy HA she says). Psychiatric/Behavioral:  Negative for agitation, confusion and suicidal ideas. Physical Exam  Constitutional:       General: She is not in acute distress. Appearance: Normal appearance. She is not ill-appearing, toxic-appearing or diaphoretic. HENT:      Head: Normocephalic and atraumatic. Right Ear: Tympanic membrane, ear canal and external ear normal. There is no impacted cerumen. Left Ear: Tympanic membrane, ear canal and external ear normal. There is no impacted cerumen. Ears:      Comments: No ear pain B/L when pulling on the ear     Nose: Nose normal. No congestion or rhinorrhea. Mouth/Throat:      Mouth: Mucous membranes are moist.      Pharynx: Oropharynx is clear. Eyes:      Extraocular Movements: Extraocular movements intact. Conjunctiva/sclera: Conjunctivae normal.      Pupils: Pupils are equal, round, and reactive to light. Cardiovascular:      Rate and Rhythm: Normal rate and regular rhythm. Pulses: Normal pulses. Heart sounds: Murmur (2/6 systolic ej murmur) heard.    Pulmonary: Effort: Pulmonary effort is normal.      Breath sounds: Normal breath sounds. No wheezing, rhonchi or rales. Abdominal:      General: Abdomen is flat. Bowel sounds are normal. There is no distension. Palpations: Abdomen is soft. There is no mass. Tenderness: There is no abdominal tenderness. There is no guarding. Musculoskeletal:         General: Normal range of motion. Cervical back: Normal range of motion and neck supple. Right lower leg: Edema (mild) present. Left lower leg: Edema (mild) present. Lymphadenopathy:      Cervical: No cervical adenopathy. Skin:     General: Skin is warm. Capillary Refill: Capillary refill takes less than 2 seconds. Neurological:      General: No focal deficit present. Mental Status: She is alert and oriented to person, place, and time. Gait: Gait abnormal (uses a cane today has a walker at home, slow gait). Psychiatric:         Mood and Affect: Mood normal.         Behavior: Behavior normal.         Thought Content: Thought content normal.        Assessment:   Diagnosis Orders   1. [address] [facility] Neurology      2. History of TIA (transient ischemic attack)  [facility] Neurology      3. Left ear pain        4. Lower extremity edema              Plan:  Orders Placed This Encounter   Procedures    [facility] Neurology     Referral Priority:   Routine     Referral Type:   Eval and Treat     Referral Reason:   Specialty Services Required     Requested Specialty:   Neurology     Number of Visits Requested:   1           Patient Instructions   Nutrition Health Education:    Keep hydrated, walk 30 minutes minimum 3 times weekly as tolerated. Diet should consist of low fat, low sodium and high fiber. Nutritious foods such as fruits (if you're not diabetic), vegetables, lean meats, lean dairy, whole grains such as brown rice, quinoa, and dry beans.  Soco Breaker with small amounts of heart healthy extra virgin olive oil. Be watchful of any extra salt/sugar/seasoning to your food. You should eat no more than 2 grams or 2,000 mg of salt daily. Salt will raise your BP. Avoid regular/diet sodas, caffeine, energy drinks as these are full of artificial ingredients/sweeteners, sugar, salt and chemicals that spike insulin and are harmful to your health. Sugar intake increases metabolic disfunction, type 2 diabetes, insulin resistance, addictive food behavior and obesity. Avoid all processed foods, foods from boxes, cans, microwave meals as these contain high salt, sugar or fat content and not much nutrition. Get at least 8 hrs of sleep every night and turn off all electronics at least 1 hour before bedtime as these decreases melatonin production and increases wakefulness. If your cholesterol is high, no greasy, fried, fast or fatty foods. Decrease red meat intake. No butter, nicole, lard or creams, no milk as these things clog your arteries and leads to heart attacks and death. If you smoke, smoking increases risk of lung disease, cancers, high BP, heart attack, stroke and death. Take your daily medications as prescribed and inform your family doctor if you are having any side effects or issues taking medications.      Elevated Blood Pressure:  No caffeine  No fast foods  Decrease salt in diet (consume nothing in a can, nothing in a box as these things contain high sodium)  No energy drinks  Buy a BP cuff and take blood pressure 3 times a day and write down blood pressure and pulse and bring in to me when you RTO  Lose weight and increase exercise if you are capable of exercising  Start only walking then may advance to brisk walking and lift low poundage free weights if you are capable     Reviewed labs from hospital  Reviewed note from hospital    Refer to neurology    Pt under care of Dr. Seema Hernandez allergist  Pt is under care of Dr. Loyda Moe pain management  Pt under care of Dr. Emeterio River cardiology [facility]  Pt has an appt with me in the future so return as scheduled and as needed    Continue the Ceftin as directed and finish    If you feel worse at any time go to the hospital er     Return if symptoms worsen or fail to improve, for as scheduled.      [facility], DO

## 2022-10-10 NOTE — PATIENT INSTRUCTIONS
Nutrition Health Education:    Keep hydrated, walk 30 minutes minimum 3 times weekly as tolerated. Diet should consist of low fat, low sodium and high fiber. Nutritious foods such as fruits (if you're not diabetic), vegetables, lean meats, lean dairy, whole grains such as brown rice, quinoa, and dry beans. Lisseth Sole with small amounts of heart healthy extra virgin olive oil. Be watchful of any extra salt/sugar/seasoning to your food. You should eat no more than 2 grams or 2,000 mg of salt daily. Salt will raise your BP. Avoid regular/diet sodas, caffeine, energy drinks as these are full of artificial ingredients/sweeteners, sugar, salt and chemicals that spike insulin and are harmful to your health. Sugar intake increases metabolic disfunction, type 2 diabetes, insulin resistance, addictive food behavior and obesity. Avoid all processed foods, foods from boxes, cans, microwave meals as these contain high salt, sugar or fat content and not much nutrition. Get at least 8 hrs of sleep every night and turn off all electronics at least 1 hour before bedtime as these decreases melatonin production and increases wakefulness. If your cholesterol is high, no greasy, fried, fast or fatty foods. Decrease red meat intake. No butter, nicole, lard or creams, no milk as these things clog your arteries and leads to heart attacks and death. If you smoke, smoking increases risk of lung disease, cancers, high BP, heart attack, stroke and death. Take your daily medications as prescribed and inform your family doctor if you are having any side effects or issues taking medications.      Elevated Blood Pressure:  No caffeine  No fast foods  Decrease salt in diet (consume nothing in a can, nothing in a box as these things contain high sodium)  No energy drinks  Buy a BP cuff and take blood pressure 3 times a day and write down blood pressure and pulse and bring in to me when you RTO  Lose weight and increase exercise if you are capable of exercising  Start only walking then may advance to brisk walking and lift low poundage free weights if you are capable     Reviewed labs from hospital  Reviewed note from hospital    Refer to neurology    Pt under care of Dr. Klaus Cunningham allergist  Pt is under care of Dr. Vickie Owens pain management  Pt under care of Dr. Brian Elizondo cardiology [facility]  Pt has an appt with me in the future so return as scheduled and as needed    Continue the Ceftin as directed and finish    If you feel worse at any time go to the hospital er

## 2022-10-13 DIAGNOSIS — R51.9 SINUS HEADACHE: ICD-10-CM

## 2022-10-13 RX ORDER — AZELASTINE 1 MG/ML
1 SPRAY, METERED NASAL 2 TIMES DAILY
Qty: 30 ML | Refills: 1 | Status: SHIPPED | OUTPATIENT
Start: 2022-10-13

## 2022-10-13 NOTE — TELEPHONE ENCOUNTER
Terrie Shane is requesting a refill on the following medication(s):  Requested Prescriptions     Pending Prescriptions Disp Refills    azelastine (ASTELIN) 0.1 % nasal spray 30 mL 1     Si spray by Nasal route 2 times daily Use in each nostril as directed       Last Visit Date (If Applicable):      Next Visit Date:    2022
No

## 2022-10-31 ENCOUNTER — TELEPHONE (OUTPATIENT)
Dept: ORTHOPEDIC SURGERY | Age: 82
End: 2022-10-31

## 2022-10-31 ENCOUNTER — OFFICE VISIT (OUTPATIENT)
Dept: PAIN MANAGEMENT | Age: 82
End: 2022-10-31
Payer: MEDICARE

## 2022-10-31 VITALS
DIASTOLIC BLOOD PRESSURE: 70 MMHG | HEIGHT: 64 IN | BODY MASS INDEX: 30.39 KG/M2 | SYSTOLIC BLOOD PRESSURE: 118 MMHG | WEIGHT: 178 LBS | OXYGEN SATURATION: 95 % | HEART RATE: 70 BPM

## 2022-10-31 DIAGNOSIS — M54.16 LUMBAR RADICULOPATHY: ICD-10-CM

## 2022-10-31 DIAGNOSIS — M47.817 LUMBOSACRAL SPONDYLOSIS WITHOUT MYELOPATHY: ICD-10-CM

## 2022-10-31 DIAGNOSIS — G89.29 CHRONIC LEFT SACROILIAC JOINT PAIN: ICD-10-CM

## 2022-10-31 DIAGNOSIS — M53.3 CHRONIC LEFT SACROILIAC JOINT PAIN: ICD-10-CM

## 2022-10-31 PROCEDURE — 1090F PRES/ABSN URINE INCON ASSESS: CPT | Performed by: NURSE PRACTITIONER

## 2022-10-31 PROCEDURE — 1123F ACP DISCUSS/DSCN MKR DOCD: CPT | Performed by: NURSE PRACTITIONER

## 2022-10-31 PROCEDURE — G8484 FLU IMMUNIZE NO ADMIN: HCPCS | Performed by: NURSE PRACTITIONER

## 2022-10-31 PROCEDURE — G8427 DOCREV CUR MEDS BY ELIG CLIN: HCPCS | Performed by: NURSE PRACTITIONER

## 2022-10-31 PROCEDURE — 99214 OFFICE O/P EST MOD 30 MIN: CPT | Performed by: NURSE PRACTITIONER

## 2022-10-31 PROCEDURE — 1036F TOBACCO NON-USER: CPT | Performed by: NURSE PRACTITIONER

## 2022-10-31 PROCEDURE — G8417 CALC BMI ABV UP PARAM F/U: HCPCS | Performed by: NURSE PRACTITIONER

## 2022-10-31 PROCEDURE — G8400 PT W/DXA NO RESULTS DOC: HCPCS | Performed by: NURSE PRACTITIONER

## 2022-10-31 RX ORDER — DOXYCYCLINE HYCLATE 100 MG
TABLET ORAL
COMMUNITY
Start: 2022-10-24

## 2022-10-31 RX ORDER — OXYCODONE HYDROCHLORIDE 10 MG/1
10 TABLET ORAL 3 TIMES DAILY
Qty: 90 TABLET | Refills: 0 | Status: SHIPPED | OUTPATIENT
Start: 2022-10-31 | End: 2022-11-30

## 2022-10-31 ASSESSMENT — ENCOUNTER SYMPTOMS
COUGH: 0
BACK PAIN: 1
SORE THROAT: 0
SHORTNESS OF BREATH: 0

## 2022-10-31 NOTE — TELEPHONE ENCOUNTER
Patient called in about her surgery on Wednesday 11/2/2022. Patient went to [facility] last Monday 10/24/2022 and was prescribe medicine for a bacterial infection and needs to cancel surgery.

## 2022-10-31 NOTE — PROGRESS NOTES
Subjective:      Patient ID: Vaishali Aguirre is a 80 y.o. female. Chief Complaint   Patient presents with    Back Pain     1 month    Medication Refill     HPI Here today for routine pain clinic recheck.     Pain Assessment  Location of Pain: Back  Location Modifiers: Left, Right (legs)  Severity of Pain: 5  Quality of Pain: Throbbing, Sharp, Dull, Aching (weakness in her legs)  Duration of Pain: Persistent  Frequency of Pain: Constant  Aggravating Factors: Stairs, Walking, Standing, Squatting, Kneeling, Exercise, Straightening, Stretching, Bending  Limiting Behavior: Yes  Relieving Factors: Rest, Ice (meds)  Result of Injury: No  Work-Related Injury: No    Allergies   Allergen Reactions    Sulfa Antibiotics Shortness Of Breath    Sulfamethoxazole-Trimethoprim Anaphylaxis     (Bactrim)    Ansaid [Flurbiprofen] Other (See Comments)     Light headed and difficult with vision \"seeing\"    Gentamicin Other (See Comments)     Eye ointment caused eye swelling, redness    Motrin [Ibuprofen] Other (See Comments)     \"I coughed so bad I broke a rib\"    Quinidine      Light headed    Chlorthalidone Hives    Hydrocodone-Acetaminophen     Mirapex [Pramipexole Dihydrochloride] Other (See Comments)     Unknown reaction    Mobic [Meloxicam] Nausea Only    Nsaids Other (See Comments)     lightheaded    Quinolones Other (See Comments) and Hives    Reglan [Metoclopramide] Other (See Comments)     Hyperactive and stomach pain    Trazodone Other (See Comments)     unknown    Amino Acids Nausea And Vomiting     Other reaction(s): AOF    Corgard [Nadolol] Other (See Comments)     Severe coughing and broke a rib as a result     Erythromycin Nausea Only    Etodolac      GI issues    Garamycin [Gentamicin Sulfate] Other (See Comments)     Redness and irritation    Inderal [Propranolol] Other (See Comments)     Severe cough    Iothalamate Nausea And Vomiting     Other reaction(s): AOF    Lisinopril Itching    Loratadine      Does not work Procainamide      Other reaction(s): LIGHTHEADED, Unknown    Ultram [Tramadol] Other (See Comments)     Didn't work         Outpatient Medications Marked as Taking for the 10/31/22 encounter (Office Visit) with KENY Jovel CNP   Medication Sig Dispense Refill    doxycycline hyclate (VIBRA-TABS) 100 MG tablet take 1 tablet by mouth once daily      oxyCODONE HCl (OXY-IR) 10 MG immediate release tablet Take 1 tablet by mouth 3 times daily for 30 days. 90 tablet 0    azelastine (ASTELIN) 0.1 % nasal spray 1 spray by Nasal route 2 times daily Use in each nostril as directed 30 mL 1    omeprazole (PRILOSEC) 40 MG delayed release capsule Take 1 capsule by mouth nightly 30 capsule 5    furosemide (LASIX) 20 MG tablet Take 1 tablet by mouth 3 times daily 90 tablet 1    topiramate (TOPAMAX) 25 MG tablet Take 1 tablet by mouth 2 times daily 180 tablet 1    baclofen (LIORESAL) 10 MG tablet Take 1 tablet by mouth daily 90 tablet 0    buPROPion (WELLBUTRIN XL) 150 MG extended release tablet take 1 tablet by mouth once daily 90 tablet 1    spironolactone (ALDACTONE) 25 MG tablet take 1 tablet by mouth once daily 90 tablet 1    montelukast (SINGULAIR) 10 MG tablet Take 1 tablet by mouth nightly 90 tablet 1    simvastatin (ZOCOR) 40 MG tablet Take 1 tablet by mouth nightly 90 tablet 1    levothyroxine (SYNTHROID) 75 MCG tablet Take 1 tablet by mouth in the morning. 90 tablet 1    DULoxetine (CYMBALTA) 30 MG extended release capsule Take 1 capsule by mouth in the morning. 90 capsule 1    fluticasone-salmeterol (ADVIAR) 100-50 MCG/ACT AEPB diskus inhaler Inhale 1 puff into the lungs in the morning and 1 puff in the evening.  3 each 1    fluticasone (FLONASE) 50 MCG/ACT nasal spray instill 2 sprays into each nostril once daily      ipratropium (ATROVENT) 0.06 % nasal spray instill 2 sprays into each nostril twice a day      acetaminophen (TYLENOL) 650 MG extended release tablet Take 650 mg by mouth every 8 hours as needed for Pain      cetirizine (ZYRTEC) 10 MG tablet take 1 tablet by mouth once daily      metoprolol succinate (TOPROL XL) 25 MG extended release tablet take 1 tablet by mouth once daily      calcium carbonate-vitamin D 600-200 MG-UNIT TABS Take 2 tablets by mouth daily      ketotifen (ZADITOR) 0.025 % ophthalmic solution 1 drop 2 times daily      Multiple Vitamin (MULTI-VITAMIN DAILY) TABS Take by mouth      melatonin 1 MG tablet Takes 1/2 of tab.      aspirin 81 MG tablet Take 81 mg by mouth daily      guaiFENesin (MUCINEX) 600 MG extended release tablet Mucinex 600 mg tablet, extended release   Take 2 tablets every day by oral route.      lidocaine (LMX) 4 % cream Apply topically as needed for Pain Apply topically as needed. fluticasone propionate 50 MCG/BLIST AEPB Inhale into the lungs      albuterol (ACCUNEB) 1.25 MG/3ML nebulizer solution Inhale 1 ampule into the lungs every 6 hours as needed for Wheezing       flecainide (TAMBOCOR) 50 MG tablet Take 50 mg by mouth 2 times daily. DULoxetine (CYMBALTA) 60 MG extended release capsule Take 60 mg by mouth daily       artificial tears (ARTIFICIAL TEARS) OINT as needed.       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 (total left knee 3/2015 at [facility])    History of renal stone     passed    Hx of blood clots 1970    DVT    Hypertension     MVP (mitral valve prolapse)     LONI (obstructive sleep apnea)     has not used bipap since     Rectocele     Spinal stenosis     Thyroid disease late     overactive radioactive iodine done       Past Surgical History:   Procedure Laterality Date    BACK INJECTION Bilateral 2021    Bilateral SACROILIAC Joint & Piriformis Injection performed by Dali Roblero MD at [address] [facility]. Bilateral 12/3/2021    Bilateral SACROILIAC JOINT & Piriformis Injections performed by Dali Roblero MD at [address] [facility] LUMPECTOMY Left     BREAST LUMPECTOMY Left     CARDIAC SURGERY      ASD repair     SECTION      2x    CHOLECYSTECTOMY      CHOLECYSTECTOMY      COLONOSCOPY      three    DIAGNOSTIC CARDIAC CATH LAB PROCEDURE      DILATION AND CURETTAGE OF UTERUS      EYE SURGERY Bilateral     cataract    HC INJECTION PROCEDURE FOR SACROILIAC JOINT Left 2018    Left SIJ and piriformis, left trocanteric bursa injection performed by Dali Roblero MD at [address] [facility] ([address] [place] [address])  2009    HYSTERECTOMY (CERVIX STATUS UNKNOWN)  2009    Total    JOINT REPLACEMENT Bilateral     knee    KNEE ARTHROSCOPY Left     LUMBAR SPINE SURGERY Bilateral 2019    Bilateral L4 TRANSFORAMINAL  [id number] performed by Dali Roblero MD at [address] [place] [address] Dr Bilateral 3/12/2019    Bilateral L4 TRANSFORAMINAL performed by Dali Roblero MD at [address] [place] [address] Dr Right 10/15/2019    RIGHT L4 & L5 TRANSFORAMINAL performed by Dali Roblero MD at [address] [place] [address] Dr Left 2019    Left L4 & L5 TRANSFORAMINAL performed by Dali Roblero MD at [address] [facility] [place] Bilateral 2022    Bilateral L3-L4 L4-L5 L5-S1 Confirmatory Medial Branch Block performed by Dali Roblero MD at [address] [facility]  2014    PAIN MANAGEMENT PROCEDURE Right 2020    Right L4 & L5 TRANSFORAMINAL performed by Faisal Hernández MD at [address] [place] [address] Left 2/21/2020    Left L4 & L5 TRANSFORAMINAL performed by Faisal Hernández MD at [address] [place] [address] Right 2/23/2021    Right L4 & L5 TRANSFORAMINAL performed by Faisal Hernández MD at [address] [place] [address] Left 3/11/2021    left L4 & L5 TRANSFORAMINAL performed by Faisal Hernández MD at [address] [place] [address] Left 5/6/2021    Left L4 & L5 TRANSFORAMINAL performed by Faisal Hernández MD at [address] [place] [address] Bilateral 3/4/2022    Bilateral L4 TRANSFORAMINAL performed by Faisal Hernández MD at [address] [place] [address] Left 8/15/2022    Left L3-L4 L4-L5 L5-S1 RADIOFREQUENCY ABLATION performed by Faisal Hernández MD at [address] [place] [address] Right 8/29/2022    Right L3-L4 L4-L5 L5-S1  Radiofrequency Ablation performed by Faisal Hernández MD at [address] [place] [address] DX/THER AGNT PARAVERT FACET JOINT, LUMBAR/SAC, 2ND LEVEL Bilateral 8/30/2018    Bilateral L2 L3 L4 L5 Diagnostic Medial BB performed by Faisal Hernández MD at [facility] DX/THER AGNT PARAVERT FACET JOINT, LUMBAR/SAC, 2ND LEVEL Right 9/13/2018    Right L2 L3 L4 L5 Confirmatory Medial BB performed by Faisal Hernández MD at [facility] 1677 AA&/STRD TFRML EPI CERVICAL/THORACIC EA ADDL Right 7/20/2018    Right C5 C6 TRANSFORAMINAL performed by Faisal Hernández MD at [facility] Left 12/20/2018    Left L2 L3 L4 L5 RADIOFREQUENCY performed by Faisal Hernández MD at [facility] [address] Right 1/3/2019    Right L2 L3 L4 L5 RADIOFREQUENCY performed by Faisal Hernández MD at [facility] Right 01/28/2005 & 03/16/2010    2x    TONSILLECTOMY      at age 12       Family History   Problem Relation Age of Onset    Heart Disease Father        Social History     Socioeconomic History    Marital status:       Spouse name: None Number of children: None    Years of education: None    Highest education level: None   Occupational History    Occupation: retired   Tobacco Use    Smoking status: Never    Smokeless tobacco: Never   Vaping Use    Vaping Use: Never used   Substance and Sexual Activity    Alcohol use: Not Currently     Comment: very rare    Drug use: No     Social Determinants of Health     Financial Resource Strain: Low Risk     Difficulty of Paying Living Expenses: Not hard at all   Food Insecurity: No Food Insecurity    Worried About Running Out of Food in the Last Year: Never true    Ran Out of Food in the Last Year: Never true   Physical Activity: Unknown    Days of Exercise per Week: 0 days     Review of Systems   Constitutional:  Positive for activity change (increased, packing and moving). Negative for fever. HENT:  Negative for sore throat. Respiratory:  Negative for cough and shortness of breath. Cardiovascular:  Negative for chest pain. Musculoskeletal:  Positive for arthralgias, back pain and myalgias. Skin: Negative. Hematological:  Bruises/bleeds easily. Psychiatric/Behavioral:  Positive for sleep disturbance. Objective:   Physical Exam  Vitals reviewed. Constitutional:       General: She is awake. She is not in acute distress. Appearance: Normal appearance. She is well-developed and well-groomed. She is not ill-appearing, toxic-appearing or diaphoretic. Interventions: She is not intubated. HENT:      Head: Normocephalic and atraumatic. Eyes:      General: Lids are normal.   Cardiovascular:      Rate and Rhythm: Normal rate. Pulmonary:      Effort: Pulmonary effort is normal. No tachypnea, bradypnea, accessory muscle usage, prolonged expiration, respiratory distress or retractions. She is not intubated. Skin:     General: Skin is warm and dry. Capillary Refill: Capillary refill takes less than 2 seconds. Coloration: Skin is not ashen, jaundiced or pale.       Findings: No rash. Nails: There is no clubbing. Neurological:      Mental Status: She is alert and oriented to person, place, and time. GCS: GCS eye subscore is 4. GCS verbal subscore is 5. GCS motor subscore is 6. Cranial Nerves: No cranial nerve deficit. Psychiatric:         Attention and Perception: Attention normal.         Mood and Affect: Mood normal.         Speech: Speech normal.         Behavior: Behavior is cooperative. Cognition and Memory: Cognition normal.         Judgment: Judgment normal.       Assessment / Plan:      Diagnosis Orders   1. Lumbar radiculopathy  oxyCODONE HCl (OXY-IR) 10 MG immediate release tablet      2. Lumbosacral spondylosis without myelopathy  oxyCODONE HCl (OXY-IR) 10 MG immediate release tablet      3. Chronic left sacroiliac joint pain  oxyCODONE HCl (OXY-IR) 10 MG immediate release tablet          Chronic pain diagnoses such as   1. Lumbar radiculopathy    2. Lumbosacral spondylosis without myelopathy    3. Chronic left sacroiliac joint pain     controlled on current medication regime, wll continue current pain medications to improve quality of life and function. Garth Romero is here for recheck/reevaluation of chronic pain. She is able to maintain daily activities with the use of current pain medications and is generally satisfied with level of analgesia. She shows no evidence of misuse or misappropriation of medications. She denies use of alcohol or illegal substances. UDS have been Appropriate with most recent screen     Controlled Substance Monitoring:    Acute and Chronic Pain Monitoring:   RX Monitoring 10/31/2022   Attestation -   Periodic Controlled Substance Monitoring No signs of potential drug abuse or diversion identified.;Obtaining appropriate analgesic effect of treatment.    Chronic Pain > 50 MEDD -   Chronic Pain > 80 MEDD -

## 2022-11-07 ENCOUNTER — TELEPHONE (OUTPATIENT)
Dept: PAIN MANAGEMENT | Age: 82
End: 2022-11-07

## 2022-11-28 NOTE — TELEPHONE ENCOUNTER
Order placed, in workqueue. Topical Clindamycin Counseling: Patient counseled that this medication may cause skin irritation or allergic reactions.  In the event of skin irritation, the patient was advised to reduce the amount of the drug applied or use it less frequently.   The patient verbalized understanding of the proper use and possible adverse effects of clindamycin.  All of the patient's questions and concerns were addressed.

## 2022-12-07 ENCOUNTER — OFFICE VISIT (OUTPATIENT)
Dept: PAIN MANAGEMENT | Age: 82
End: 2022-12-07
Payer: MEDICARE

## 2022-12-07 VITALS
HEIGHT: 64 IN | WEIGHT: 179 LBS | DIASTOLIC BLOOD PRESSURE: 62 MMHG | OXYGEN SATURATION: 95 % | BODY MASS INDEX: 30.56 KG/M2 | SYSTOLIC BLOOD PRESSURE: 128 MMHG | HEART RATE: 87 BPM

## 2022-12-07 DIAGNOSIS — M47.816 LUMBAR FACET JOINT SYNDROME: ICD-10-CM

## 2022-12-07 DIAGNOSIS — M51.36 LUMBAR DEGENERATIVE DISC DISEASE: ICD-10-CM

## 2022-12-07 DIAGNOSIS — M51.37 DEGENERATION OF LUMBAR OR LUMBOSACRAL INTERVERTEBRAL DISC: Primary | Chronic | ICD-10-CM

## 2022-12-07 DIAGNOSIS — M54.16 LUMBAR RADICULOPATHY: ICD-10-CM

## 2022-12-07 DIAGNOSIS — M53.3 CHRONIC LEFT SACROILIAC JOINT PAIN: ICD-10-CM

## 2022-12-07 DIAGNOSIS — M48.061 SPINAL STENOSIS, LUMBAR REGION, WITHOUT NEUROGENIC CLAUDICATION: Chronic | ICD-10-CM

## 2022-12-07 DIAGNOSIS — G89.29 CHRONIC LEFT SACROILIAC JOINT PAIN: ICD-10-CM

## 2022-12-07 DIAGNOSIS — M47.817 LUMBOSACRAL SPONDYLOSIS WITHOUT MYELOPATHY: ICD-10-CM

## 2022-12-07 PROCEDURE — G8484 FLU IMMUNIZE NO ADMIN: HCPCS | Performed by: NURSE PRACTITIONER

## 2022-12-07 PROCEDURE — G8400 PT W/DXA NO RESULTS DOC: HCPCS | Performed by: NURSE PRACTITIONER

## 2022-12-07 PROCEDURE — 1090F PRES/ABSN URINE INCON ASSESS: CPT | Performed by: NURSE PRACTITIONER

## 2022-12-07 PROCEDURE — G8427 DOCREV CUR MEDS BY ELIG CLIN: HCPCS | Performed by: NURSE PRACTITIONER

## 2022-12-07 PROCEDURE — 99214 OFFICE O/P EST MOD 30 MIN: CPT | Performed by: NURSE PRACTITIONER

## 2022-12-07 PROCEDURE — G8417 CALC BMI ABV UP PARAM F/U: HCPCS | Performed by: NURSE PRACTITIONER

## 2022-12-07 PROCEDURE — 1036F TOBACCO NON-USER: CPT | Performed by: NURSE PRACTITIONER

## 2022-12-07 PROCEDURE — 1123F ACP DISCUSS/DSCN MKR DOCD: CPT | Performed by: NURSE PRACTITIONER

## 2022-12-07 RX ORDER — MORPHINE SULFATE 15 MG/1
15 TABLET ORAL EVERY 8 HOURS PRN
Qty: 42 TABLET | Refills: 0 | Status: SHIPPED | OUTPATIENT
Start: 2022-12-07 | End: 2022-12-21

## 2022-12-07 ASSESSMENT — ENCOUNTER SYMPTOMS
SORE THROAT: 0
BACK PAIN: 1
COUGH: 0
SHORTNESS OF BREATH: 0

## 2022-12-07 NOTE — PROGRESS NOTES
Subjective:      Patient ID: Ann Marie Leach is a 80 y.o. female. Chief Complaint   Patient presents with    Back Pain    Medication Refill     HPI Here today for routine pain clinic recheck. Pain is worsening. Oxycodone not as effective as it used to be.      Pain Assessment  Location of Pain: Back  Severity of Pain: 8  Quality of Pain: Sharp  Duration of Pain: Persistent  Frequency of Pain: Constant  Aggravating Factors: Stairs, Walking, Standing, Squatting, Kneeling, Exercise, Straightening, Stretching, Bending  Limiting Behavior: Yes  Relieving Factors: Rest, Ice, Heat (meds)  Result of Injury: No    Allergies   Allergen Reactions    Sulfa Antibiotics Shortness Of Breath    Sulfamethoxazole-Trimethoprim Anaphylaxis     (Bactrim)    Ansaid [Flurbiprofen] Other (See Comments)     Light headed and difficult with vision \"seeing\"    Gentamicin Other (See Comments)     Eye ointment caused eye swelling, redness    Motrin [Ibuprofen] Other (See Comments)     \"I coughed so bad I broke a rib\"    Quinidine      Light headed    Chlorthalidone Hives    Hydrocodone-Acetaminophen     Mirapex [Pramipexole Dihydrochloride] Other (See Comments)     Unknown reaction    Mobic [Meloxicam] Nausea Only    Nsaids Other (See Comments)     lightheaded    Quinolones Other (See Comments) and Hives    Reglan [Metoclopramide] Other (See Comments)     Hyperactive and stomach pain    Trazodone Other (See Comments)     unknown    Amino Acids Nausea And Vomiting     Other reaction(s): AOF    Corgard [Nadolol] Other (See Comments)     Severe coughing and broke a rib as a result     Erythromycin Nausea Only    Etodolac      GI issues    Garamycin [Gentamicin Sulfate] Other (See Comments)     Redness and irritation    Inderal [Propranolol] Other (See Comments)     Severe cough    Iothalamate Nausea And Vomiting     Other reaction(s): AOF    Lisinopril Itching    Loratadine      Does not work    Procainamide      Other reaction(s): LIGHTHEADED, Unknown    Ultram [Tramadol] Other (See Comments)     Didn't work         Outpatient Medications Marked as Taking for the 12/7/22 encounter (Office Visit) with KENY Faustin CNP   Medication Sig Dispense Refill    morphine (MSIR) 15 MG tablet Take 1 tablet by mouth every 8 hours as needed for Pain for up to 14 days. 42 tablet 0    doxycycline hyclate (VIBRA-TABS) 100 MG tablet take 1 tablet by mouth once daily      oxyCODONE HCl (OXY-IR) 10 MG immediate release tablet Take 1 tablet by mouth 3 times daily for 30 days. 90 tablet 0    azelastine (ASTELIN) 0.1 % nasal spray 1 spray by Nasal route 2 times daily Use in each nostril as directed 30 mL 1    omeprazole (PRILOSEC) 40 MG delayed release capsule Take 1 capsule by mouth nightly 30 capsule 5    amoxicillin (AMOXIL) 500 MG capsule take 4 capsules by mouth 1 hour prior to dental appointment      furosemide (LASIX) 20 MG tablet Take 1 tablet by mouth 3 times daily 90 tablet 1    topiramate (TOPAMAX) 25 MG tablet Take 1 tablet by mouth 2 times daily 180 tablet 1    buPROPion (WELLBUTRIN XL) 150 MG extended release tablet take 1 tablet by mouth once daily 90 tablet 1    spironolactone (ALDACTONE) 25 MG tablet take 1 tablet by mouth once daily 90 tablet 1    montelukast (SINGULAIR) 10 MG tablet Take 1 tablet by mouth nightly 90 tablet 1    simvastatin (ZOCOR) 40 MG tablet Take 1 tablet by mouth nightly 90 tablet 1    levothyroxine (SYNTHROID) 75 MCG tablet Take 1 tablet by mouth in the morning. 90 tablet 1    DULoxetine (CYMBALTA) 30 MG extended release capsule Take 1 capsule by mouth in the morning. 90 capsule 1    fluticasone-salmeterol (ADVIAR) 100-50 MCG/ACT AEPB diskus inhaler Inhale 1 puff into the lungs in the morning and 1 puff in the evening.  3 each 1    fluticasone (FLONASE) 50 MCG/ACT nasal spray instill 2 sprays into each nostril once daily      ipratropium (ATROVENT) 0.06 % nasal spray instill 2 sprays into each nostril twice a day acetaminophen (TYLENOL) 650 MG extended release tablet Take 650 mg by mouth every 8 hours as needed for Pain      cetirizine (ZYRTEC) 10 MG tablet take 1 tablet by mouth once daily      metoprolol succinate (TOPROL XL) 25 MG extended release tablet take 1 tablet by mouth once daily      calcium carbonate-vitamin D 600-200 MG-UNIT TABS Take 2 tablets by mouth daily      ketotifen (ZADITOR) 0.025 % ophthalmic solution 1 drop 2 times daily      Multiple Vitamin (MULTI-VITAMIN DAILY) TABS Take by mouth      melatonin 1 MG tablet Takes 1/2 of tab.      aspirin 81 MG tablet Take 81 mg by mouth daily      guaiFENesin (MUCINEX) 600 MG extended release tablet Mucinex 600 mg tablet, extended release   Take 2 tablets every day by oral route.      lidocaine (LMX) 4 % cream Apply topically as needed for Pain Apply topically as needed. fluticasone propionate 50 MCG/BLIST AEPB Inhale into the lungs      albuterol (ACCUNEB) 1.25 MG/3ML nebulizer solution Inhale 1 ampule into the lungs every 6 hours as needed for Wheezing       flecainide (TAMBOCOR) 50 MG tablet Take 50 mg by mouth 2 times daily. DULoxetine (CYMBALTA) 60 MG extended release capsule Take 60 mg by mouth daily       artificial tears (ARTIFICIAL TEARS) OINT as needed.       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 (total left knee 3/2015 at [facility])    History of renal stone     passed    Hx of blood clots     DVT    Hypertension     MVP (mitral valve prolapse)     LONI (obstructive sleep apnea)     has not used bipap since     Rectocele     Spinal stenosis     Thyroid disease late     overactive radioactive iodine done       Past Surgical History:   Procedure Laterality Date    BACK INJECTION Bilateral 2021    Bilateral SACROILIAC Joint & Piriformis Injection performed by Heath Rios MD at [address] [facility]. Bilateral 12/3/2021    Bilateral SACROILIAC JOINT & Piriformis Injections performed by Heath Rios MD at [address] [facility] LUMPECTOMY Left     BREAST LUMPECTOMY Left     CARDIAC SURGERY      ASD repair     SECTION      2x    CHOLECYSTECTOMY      CHOLECYSTECTOMY      COLONOSCOPY      three    DIAGNOSTIC CARDIAC CATH LAB PROCEDURE      DILATION AND CURETTAGE OF UTERUS      EYE SURGERY Bilateral     cataract    HC INJECTION PROCEDURE FOR SACROILIAC JOINT Left 2018    Left SIJ and piriformis, left trocanteric bursa injection performed by Heath Rios MD at [place] [address] ([address] [place] [address])  2009    HYSTERECTOMY (CERVIX STATUS UNKNOWN)  2009    Total    JOINT REPLACEMENT Bilateral     knee    KNEE ARTHROSCOPY Left     LUMBAR SPINE SURGERY Bilateral 2019    Bilateral L4 TRANSFORAMINAL  [id number] performed by Heath Rios MD at [address] [place] [address] Dr Bilateral 3/12/2019    Bilateral L4 TRANSFORAMINAL performed by Heath Rios MD at [address] [place] [address] Dr Right 10/15/2019    RIGHT L4 & L5 TRANSFORAMINAL performed by Heath Rios MD at [address] [place] [address] Dr Left 2019    Left L4 & L5 TRANSFORAMINAL performed by Heath Rios MD at [facility] Bilateral 2022    Bilateral L3-L4 L4-L5 L5-S1 Confirmatory Medial Branch Block performed by Heath Rios MD at [facility] OR    PACEMAKER PLACEMENT  8/2014    PAIN MANAGEMENT PROCEDURE Right 2/7/2020    Right L4 & L5 TRANSFORAMINAL performed by Reba Wells MD at [address] [place] [address] Left 2/21/2020    Left L4 & L5 TRANSFORAMINAL performed by Reba Wells MD at [address] [place] [address] Right 2/23/2021    Right L4 & L5 TRANSFORAMINAL performed by Reba Wells MD at [address] [place] [address] Left 3/11/2021    left L4 & L5 TRANSFORAMINAL performed by Reba Wells MD at [address] [place] [address] Left 5/6/2021    Left L4 & L5 TRANSFORAMINAL performed by Reba Wells MD at [address] [place] [address] Bilateral 3/4/2022    Bilateral L4 TRANSFORAMINAL performed by Reba Wells MD at [address] [place] [address] Left 8/15/2022    Left L3-L4 L4-L5 L5-S1 RADIOFREQUENCY ABLATION performed by Reba Wells MD at [address] [place] [address] Right 8/29/2022    Right L3-L4 L4-L5 L5-S1  Radiofrequency Ablation performed by Reba Wells MD at [address] [place] [address] DX/THER AGNT PARAVERT FACET JOINT, LUMBAR/SAC, 2ND LEVEL Bilateral 8/30/2018    Bilateral L2 L3 L4 L5 Diagnostic Medial BB performed by eRba Wells MD at [address] [place] [address] DX/THER AGNT PARAVERT FACET JOINT, LUMBAR/SAC, 2ND LEVEL Right 9/13/2018    Right L2 L3 L4 L5 Confirmatory Medial BB performed by Reba Wells MD at [facility] [address] AA&/STRD TFRML EPI CERVICAL/THORACIC EA ADDL Right 7/20/2018    Right C5 C6 TRANSFORAMINAL performed by Reba Wells MD at [facility] Left 12/20/2018    Left L2 L3 L4 L5 RADIOFREQUENCY performed by Reba Wells MD at [place] [address] Right 1/3/2019    Right L2 L3 L4 L5 RADIOFREQUENCY performed by Reba Wells MD at [address] [place] [address] Right 01/28/2005 & 03/16/2010    2x    TONSILLECTOMY      at age 12       Family History   Problem Relation Age of Onset    Heart Disease Father Social History     Socioeconomic History    Marital status:      Spouse name: None    Number of children: None    Years of education: None    Highest education level: None   Occupational History    Occupation: retired   Tobacco Use    Smoking status: Never    Smokeless tobacco: Never   Vaping Use    Vaping Use: Never used   Substance and Sexual Activity    Alcohol use: Not Currently     Comment: very rare    Drug use: No     Social Determinants of Health     Financial Resource Strain: Low Risk     Difficulty of Paying Living Expenses: Not hard at all   Food Insecurity: No Food Insecurity    Worried About Running Out of Food in the Last Year: Never true    Ran Out of Food in the Last Year: Never true   Physical Activity: Unknown    Days of Exercise per Week: 0 days     Review of Systems   Constitutional:  Positive for activity change (increased, packing and moving). Negative for fever. HENT:  Negative for sore throat. Respiratory:  Negative for cough and shortness of breath. Cardiovascular:  Negative for chest pain. Musculoskeletal:  Positive for arthralgias, back pain and myalgias. Skin: Negative. Hematological:  Bruises/bleeds easily. Psychiatric/Behavioral:  Positive for sleep disturbance. Objective:   Physical Exam  Vitals reviewed. Constitutional:       General: She is awake. She is not in acute distress. Appearance: Normal appearance. She is well-developed and well-groomed. She is not ill-appearing, toxic-appearing or diaphoretic. Interventions: She is not intubated. HENT:      Head: Normocephalic and atraumatic. Eyes:      General: Lids are normal.   Cardiovascular:      Rate and Rhythm: Normal rate. Pulmonary:      Effort: Pulmonary effort is normal. No tachypnea, bradypnea, accessory muscle usage, prolonged expiration, respiratory distress or retractions. She is not intubated. Skin:     General: Skin is warm and dry.       Capillary Refill: Capillary refill takes less than 2 seconds. Coloration: Skin is not ashen, jaundiced or pale. Findings: No rash. Nails: There is no clubbing. Neurological:      Mental Status: She is alert and oriented to person, place, and time. GCS: GCS eye subscore is 4. GCS verbal subscore is 5. GCS motor subscore is 6. Cranial Nerves: No cranial nerve deficit. Psychiatric:         Attention and Perception: Attention normal.         Mood and Affect: Mood normal.         Speech: Speech normal.         Behavior: Behavior is cooperative. Cognition and Memory: Cognition normal.         Judgment: Judgment normal.       Assessment / Plan:      Diagnosis Orders   1. Degeneration of lumbar or lumbosacral intervertebral disc        2. Lumbar radiculopathy  morphine (MSIR) 15 MG tablet      3. Lumbosacral spondylosis without myelopathy  morphine (MSIR) 15 MG tablet      4. Chronic left sacroiliac joint pain  morphine (MSIR) 15 MG tablet      5. Spinal stenosis, lumbar region, without neurogenic claudication        6. Lumbar degenerative disc disease        7. Lumbar facet joint syndrome          Opiate rotation from oxycodone to [address] [place] [address] [place] is here for recheck/reevaluation of chronic pain. She is able to maintain daily activities with the use of current pain medications and is generally satisfied with level of analgesia. She shows no evidence of misuse or misappropriation of medications. She denies use of alcohol or illegal substances. UDS have been Appropriate with most recent screen     Controlled Substance Monitoring:    Acute and Chronic Pain Monitoring:   RX Monitoring 12/7/2022   Attestation -   Periodic Controlled Substance Monitoring No signs of potential drug abuse or diversion identified.;Obtaining appropriate analgesic effect of treatment.    Chronic Pain > 50 MEDD -   Chronic Pain > 80 MEDD -

## 2022-12-08 ENCOUNTER — TELEPHONE (OUTPATIENT)
Dept: PAIN MANAGEMENT | Age: 82
End: 2022-12-08

## 2022-12-08 DIAGNOSIS — M53.3 CHRONIC LEFT SACROILIAC JOINT PAIN: Primary | ICD-10-CM

## 2022-12-08 DIAGNOSIS — G89.29 CHRONIC LEFT SACROILIAC JOINT PAIN: Primary | ICD-10-CM

## 2022-12-08 RX ORDER — OXYCODONE HYDROCHLORIDE 15 MG/1
15 TABLET ORAL EVERY 8 HOURS PRN
Qty: 90 TABLET | Refills: 0 | Status: SHIPPED | OUTPATIENT
Start: 2022-12-08 | End: 2023-01-07

## 2022-12-08 NOTE — TELEPHONE ENCOUNTER
The patient took her morphine yesterday for the first time around 4:30pm. She stated that it made her very dizzy and lightheaded and she is still feeling unwell at this time (11:18am). She was advised to hold of on taking another dose until contacted. Please advise.

## 2023-01-12 ENCOUNTER — OFFICE VISIT (OUTPATIENT)
Dept: PAIN MANAGEMENT | Age: 83
End: 2023-01-12
Payer: MEDICARE

## 2023-01-12 VITALS
OXYGEN SATURATION: 96 % | BODY MASS INDEX: 31.41 KG/M2 | SYSTOLIC BLOOD PRESSURE: 124 MMHG | RESPIRATION RATE: 18 BRPM | DIASTOLIC BLOOD PRESSURE: 70 MMHG | HEIGHT: 64 IN | WEIGHT: 184 LBS | HEART RATE: 68 BPM

## 2023-01-12 DIAGNOSIS — M48.061 SPINAL STENOSIS OF LUMBAR REGION, UNSPECIFIED WHETHER NEUROGENIC CLAUDICATION PRESENT: ICD-10-CM

## 2023-01-12 DIAGNOSIS — M51.26 LUMBAR DISCOGENIC PAIN SYNDROME: ICD-10-CM

## 2023-01-12 DIAGNOSIS — M51.37 DEGENERATION OF LUMBAR OR LUMBOSACRAL INTERVERTEBRAL DISC: Primary | Chronic | ICD-10-CM

## 2023-01-12 DIAGNOSIS — M53.3 CHRONIC LEFT SACROILIAC JOINT PAIN: ICD-10-CM

## 2023-01-12 DIAGNOSIS — M47.816 LUMBAR FACET JOINT SYNDROME: ICD-10-CM

## 2023-01-12 DIAGNOSIS — F11.20 OPIOID DEPENDENCE WITH CURRENT USE (HCC): ICD-10-CM

## 2023-01-12 DIAGNOSIS — G89.29 CHRONIC LEFT SACROILIAC JOINT PAIN: ICD-10-CM

## 2023-01-12 DIAGNOSIS — M51.36 LUMBAR DEGENERATIVE DISC DISEASE: ICD-10-CM

## 2023-01-12 PROCEDURE — G8427 DOCREV CUR MEDS BY ELIG CLIN: HCPCS | Performed by: NURSE PRACTITIONER

## 2023-01-12 PROCEDURE — 1036F TOBACCO NON-USER: CPT | Performed by: NURSE PRACTITIONER

## 2023-01-12 PROCEDURE — G8484 FLU IMMUNIZE NO ADMIN: HCPCS | Performed by: NURSE PRACTITIONER

## 2023-01-12 PROCEDURE — G8400 PT W/DXA NO RESULTS DOC: HCPCS | Performed by: NURSE PRACTITIONER

## 2023-01-12 PROCEDURE — 1123F ACP DISCUSS/DSCN MKR DOCD: CPT | Performed by: NURSE PRACTITIONER

## 2023-01-12 PROCEDURE — 1090F PRES/ABSN URINE INCON ASSESS: CPT | Performed by: NURSE PRACTITIONER

## 2023-01-12 PROCEDURE — G8417 CALC BMI ABV UP PARAM F/U: HCPCS | Performed by: NURSE PRACTITIONER

## 2023-01-12 PROCEDURE — 99214 OFFICE O/P EST MOD 30 MIN: CPT | Performed by: NURSE PRACTITIONER

## 2023-01-12 RX ORDER — MORPHINE SULFATE 15 MG/1
15 TABLET ORAL EVERY 4 HOURS PRN
COMMUNITY
End: 2023-01-12

## 2023-01-12 RX ORDER — HYDROMORPHONE HYDROCHLORIDE 2 MG/1
2 TABLET ORAL EVERY 12 HOURS PRN
Qty: 60 TABLET | Refills: 0 | Status: SHIPPED | OUTPATIENT
Start: 2023-01-12 | End: 2023-02-11

## 2023-01-12 RX ORDER — BUPRENORPHINE 5 UG/H
1 PATCH TRANSDERMAL WEEKLY
Qty: 4 PATCH | Refills: 0 | Status: SHIPPED | OUTPATIENT
Start: 2023-01-12 | End: 2023-02-11

## 2023-01-12 ASSESSMENT — ENCOUNTER SYMPTOMS
BACK PAIN: 1
SORE THROAT: 0
COUGH: 0
SHORTNESS OF BREATH: 0

## 2023-01-12 NOTE — PROGRESS NOTES
Subjective:      Patient ID: Sandhya Mike is a 80 y.o. female. Chief Complaint   Patient presents with    Back Pain     Lower and leg pain     HPI Here today for routine pain clinic recheck.     Opiate rotation from oxycodone to morphine, takes 1/2 tablet with relief but makes her tired    Pain Assessment  Location of Pain: Back  Location Modifiers: Posterior, Medial, Lateral  Severity of Pain: 5  Quality of Pain: Sharp, Aching (stabbing)  Duration of Pain: Persistent  Frequency of Pain: Constant  Aggravating Factors: Standing, Walking  Limiting Behavior: Yes  Relieving Factors: Rest, Ice, Heat (meds)  Result of Injury: No  Work-Related Injury: No  Are there other pain locations you wish to document?: No    Allergies   Allergen Reactions    Sulfa Antibiotics Shortness Of Breath    Sulfamethoxazole-Trimethoprim Anaphylaxis     (Bactrim)    Ansaid [Flurbiprofen] Other (See Comments)     Light headed and difficult with vision \"seeing\"    Gentamicin Other (See Comments)     Eye ointment caused eye swelling, redness    Motrin [Ibuprofen] Other (See Comments)     \"I coughed so bad I broke a rib\"    Quinidine      Light headed    Chlorthalidone Hives    Hydrocodone-Acetaminophen     Mirapex [Pramipexole Dihydrochloride] Other (See Comments)     Unknown reaction    Mobic [Meloxicam] Nausea Only    Nsaids Other (See Comments)     lightheaded    Quinolones Other (See Comments) and Hives    Reglan [Metoclopramide] Other (See Comments)     Hyperactive and stomach pain    Trazodone Other (See Comments)     unknown    Amino Acids Nausea And Vomiting     Other reaction(s): AOF    Corgard [Nadolol] Other (See Comments)     Severe coughing and broke a rib as a result     Erythromycin Nausea Only    Etodolac      GI issues    Garamycin [Gentamicin Sulfate] Other (See Comments)     Redness and irritation    Inderal [Propranolol] Other (See Comments)     Severe cough    Iothalamate Nausea And Vomiting     Other reaction(s): AOF Lisinopril Itching    Loratadine      Does not work    Procainamide      Other reaction(s): LIGHTHEADED, Unknown    Ultram [Tramadol] Other (See Comments)     Didn't work         Outpatient Medications Marked as Taking for the 1/12/23 encounter (Office Visit) with Isi Friend, KENY Subramanian CNP   Medication Sig Dispense Refill    buprenorphine (BUTRANS) 5 MCG/HR PTWK Place 1 patch onto the skin once a week for 30 days. Max Daily Amount: 1 patch 4 patch 0    HYDROmorphone (DILAUDID) 2 MG tablet Take 1 tablet by mouth every 12 hours as needed for Pain for up to 30 days. Max Daily Amount: 4 mg 60 tablet 0    doxycycline hyclate (VIBRA-TABS) 100 MG tablet take 1 tablet by mouth once daily      azelastine (ASTELIN) 0.1 % nasal spray 1 spray by Nasal route 2 times daily Use in each nostril as directed 30 mL 1    omeprazole (PRILOSEC) 40 MG delayed release capsule Take 1 capsule by mouth nightly 30 capsule 5    amoxicillin (AMOXIL) 500 MG capsule take 4 capsules by mouth 1 hour prior to dental appointment      furosemide (LASIX) 20 MG tablet Take 1 tablet by mouth 3 times daily (Patient taking differently: Take 40 mg by mouth daily) 90 tablet 1    topiramate (TOPAMAX) 25 MG tablet Take 1 tablet by mouth 2 times daily 180 tablet 1    buPROPion (WELLBUTRIN XL) 150 MG extended release tablet take 1 tablet by mouth once daily 90 tablet 1    spironolactone (ALDACTONE) 25 MG tablet take 1 tablet by mouth once daily (Patient taking differently: 25 mg take 2 tablet by mouth once daily) 90 tablet 1    montelukast (SINGULAIR) 10 MG tablet Take 1 tablet by mouth nightly 90 tablet 1    simvastatin (ZOCOR) 40 MG tablet Take 1 tablet by mouth nightly 90 tablet 1    levothyroxine (SYNTHROID) 75 MCG tablet Take 1 tablet by mouth in the morning. 90 tablet 1    DULoxetine (CYMBALTA) 30 MG extended release capsule Take 1 capsule by mouth in the morning.  90 capsule 1    fluticasone-salmeterol (ADVIAR) 100-50 MCG/ACT AEPB diskus inhaler Inhale 1 puff into the lungs in the morning and 1 puff in the evening. 3 each 1    fluticasone (FLONASE) 50 MCG/ACT nasal spray instill 2 sprays into each nostril once daily      ipratropium (ATROVENT) 0.06 % nasal spray instill 2 sprays into each nostril twice a day      acetaminophen (TYLENOL) 650 MG extended release tablet Take 650 mg by mouth every 8 hours as needed for Pain      cetirizine (ZYRTEC) 10 MG tablet take 1 tablet by mouth once daily      metoprolol succinate (TOPROL XL) 25 MG extended release tablet take 1 tablet by mouth once daily      calcium carbonate-vitamin D 600-200 MG-UNIT TABS Take 2 tablets by mouth daily      ketotifen (ZADITOR) 0.025 % ophthalmic solution 1 drop 2 times daily      Multiple Vitamin (MULTI-VITAMIN DAILY) TABS Take by mouth      melatonin 1 MG tablet Takes 1/2 of tab.      aspirin 81 MG tablet Take 81 mg by mouth daily      guaiFENesin (MUCINEX) 600 MG extended release tablet Mucinex 600 mg tablet, extended release   Take 2 tablets every day by oral route.      lidocaine (LMX) 4 % cream Apply topically as needed for Pain Apply topically as needed. fluticasone propionate 50 MCG/BLIST AEPB Inhale into the lungs      albuterol (ACCUNEB) 1.25 MG/3ML nebulizer solution Inhale 1 ampule into the lungs every 6 hours as needed for Wheezing       flecainide (TAMBOCOR) 50 MG tablet Take 50 mg by mouth 2 times daily. DULoxetine (CYMBALTA) 60 MG extended release capsule Take 60 mg by mouth daily       artificial tears (ARTIFICIAL TEARS) OINT as needed.       Saline 0.2 % SOLN by Nasal route daily as needed          Past Medical History:   Diagnosis Date    Anesthesia complication     2nd post op day from total knee patient stated \"I was wild and mean\"    Anxiety and depression     Arthritis     ASD (atrial septal defect)     repair in 1963    Asthma     COPD (chronic obstructive pulmonary disease) ([facility])     COPD (chronic obstructive pulmonary disease) ([facility] [address] 1980's    Disorder of immune system ([facility] [address]     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 (total left knee 3/2015 at [facility])    History of renal stone     passed    Hx of blood clots     DVT    Hypertension     MVP (mitral valve prolapse)     LONI (obstructive sleep apnea)     has not used bipap since     Rectocele     Spinal stenosis     Thyroid disease late     overactive radioactive iodine done       Past Surgical History:   Procedure Laterality Date    BACK INJECTION Bilateral 2021    Bilateral SACROILIAC Joint & Piriformis Injection performed by Vahe Mcneil MD at [address] [facility]. Bilateral 12/3/2021    Bilateral SACROILIAC JOINT & Piriformis Injections performed by Vahe Mcneil MD at [address] [facility] LUMPECTOMY Left     BREAST LUMPECTOMY Left     CARDIAC SURGERY      ASD repair     SECTION  /    2x    CHOLECYSTECTOMY      CHOLECYSTECTOMY      COLONOSCOPY      three    DIAGNOSTIC CARDIAC CATH LAB PROCEDURE      DILATION AND CURETTAGE OF UTERUS      EYE SURGERY Bilateral     cataract    HC INJECTION PROCEDURE FOR SACROILIAC JOINT Left 2018    Left SIJ and piriformis, left trocanteric bursa injection performed by Vahe Mcneil MD at [address] [facility] [address])  2009    HYSTERECTOMY (CERVIX STATUS UNKNOWN)      Total    JOINT REPLACEMENT Bilateral     knee    KNEE ARTHROSCOPY Left     LUMBAR SPINE SURGERY Bilateral 2019    Bilateral L4 TRANSFORAMINAL  [id number] performed by Vahe Mcneil MD at [address] [place] [address]  Bilateral 3/12/2019    Bilateral L4 TRANSFORAMINAL performed by Vahe Mcneil MD at [address] [place] [address] Dr Right 10/15/2019    RIGHT L4 & L5 TRANSFORAMINAL performed by [employer] Romayne Medin, MD at [address] [place] [address] Left 11/1/2019    Left L4 & L5 TRANSFORAMINAL performed by Rosalio Diaz MD at [address] [facility] Bilateral 7/7/2022    Bilateral L3-L4 L4-L5 L5-S1 Confirmatory Medial Branch Block performed by Rosalio Diaz MD at [address] [facility]  8/2014    PAIN MANAGEMENT PROCEDURE Right 2/7/2020    Right L4 & L5 TRANSFORAMINAL performed by Rosalio Diaz MD at [facility] Left 2/21/2020    Left L4 & L5 TRANSFORAMINAL performed by Rosalio Diaz MD at [facility] Right 2/23/2021    Right L4 & L5 TRANSFORAMINAL performed by Rosalio Diaz MD at [facility] Left 3/11/2021    left L4 & L5 TRANSFORAMINAL performed by Rosalio Diaz MD at [facility] Left 5/6/2021    Left L4 & L5 TRANSFORAMINAL performed by Rosalio Diaz MD at [facility] Bilateral 3/4/2022    Bilateral L4 TRANSFORAMINAL performed by Rosalio Diaz MD at [facility] Left 8/15/2022    Left L3-L4 L4-L5 L5-S1 RADIOFREQUENCY ABLATION performed by Rosalio Diaz MD at [facility] Right 8/29/2022    Right L3-L4 L4-L5 L5-S1  Radiofrequency Ablation performed by Rosailo Diaz MD at [address] AA&/STRD TFRML EPI CERVICAL/THORACIC EA ADDL Right 7/20/2018    Right C5 C6 TRANSFORAMINAL performed by Rosalio Diaz MD at [address] DX/THER AGT PVRT FACET JT LMBR/SAC 2ND LEVEL Bilateral 8/30/2018    Bilateral L2 L3 L4 L5 Diagnostic Medial BB performed by Rosalio Diaz MD at [address] DX/THER AGT PVRT FACET JT LMBR/SAC 2ND LEVEL Right 9/13/2018    Right L2 L3 L4 L5 Confirmatory Medial BB performed by Rosalio Diaz MD at [place] [address] Left 12/20/2018    Left L2 L3 L4 L5 RADIOFREQUENCY performed by Rosalio Diaz MD at [place] [address] Right 1/3/2019    Right L2 L3 L4 L5 RADIOFREQUENCY performed by Jose J Arzate MD at [address] [facility] Right 01/28/2005 & 03/16/2010    2x    TONSILLECTOMY      at age 12       Family History   Problem Relation Age of Onset    Heart Disease Father        Social History     Socioeconomic History    Marital status:      Spouse name: None    Number of children: None    Years of education: None    Highest education level: None   Occupational History    Occupation: retired   Tobacco Use    Smoking status: Never    Smokeless tobacco: Never   Vaping Use    Vaping Use: Never used   Substance and Sexual Activity    Alcohol use: Not Currently     Comment: very rare    Drug use: No     Social Determinants of Health     Financial Resource Strain: Low Risk     Difficulty of Paying Living Expenses: Not hard at all   Food Insecurity: No Food Insecurity    Worried About Running Out of Food in the Last Year: Never true    Ran Out of Food in the Last Year: Never true   Physical Activity: Unknown    Days of Exercise per Week: 0 days     Review of Systems   Constitutional:  Positive for activity change (increased, packing and moving). Negative for fever. HENT:  Negative for sore throat. Respiratory:  Negative for cough and shortness of breath. Cardiovascular:  Negative for chest pain. Musculoskeletal:  Positive for arthralgias, back pain and myalgias. Skin: Negative. Hematological:  Bruises/bleeds easily. Psychiatric/Behavioral:  Positive for sleep disturbance. Objective:   Physical Exam  Vitals reviewed. Constitutional:       General: She is awake. She is not in acute distress. Appearance: Normal appearance. She is well-developed and well-groomed. She is not ill-appearing, toxic-appearing or diaphoretic. Interventions: She is not intubated. HENT:      Head: Normocephalic and atraumatic. Eyes:      General: Lids are normal.   Cardiovascular:      Rate and Rhythm: Normal rate.    Pulmonary:      Effort: Pulmonary effort is normal. No tachypnea, bradypnea, accessory muscle usage, prolonged expiration, respiratory distress or retractions. She is not intubated. Skin:     General: Skin is warm and dry. Capillary Refill: Capillary refill takes less than 2 seconds. Coloration: Skin is not ashen, jaundiced or pale. Findings: No rash. Nails: There is no clubbing. Neurological:      Mental Status: She is alert and oriented to person, place, and time. GCS: GCS eye subscore is 4. GCS verbal subscore is 5. GCS motor subscore is 6. Cranial Nerves: No cranial nerve deficit. Psychiatric:         Attention and Perception: Attention normal.         Mood and Affect: Mood normal.         Speech: Speech normal.         Behavior: Behavior is cooperative. Cognition and Memory: Cognition normal.         Judgment: Judgment normal.       Assessment / Plan:      Diagnosis Orders   1. Degeneration of lumbar or lumbosacral intervertebral disc  buprenorphine (BUTRANS) 5 MCG/HR PTWK    HYDROmorphone (DILAUDID) 2 MG tablet      2. Opioid dependence with current use ([address]        3. Chronic left sacroiliac joint pain        4. Lumbar degenerative disc disease        5. Spinal stenosis of lumbar region, unspecified whether neurogenic claudication present        6. Lumbar discogenic pain syndrome        7. Lumbar facet joint syndrome          Butrans patch, if problems with insurance, will try 2mg dilauid    Sandra Santiago is here for recheck/reevaluation of chronic pain. She is able to maintain daily activities with the use of current pain medications and is generally satisfied with level of analgesia. She shows no evidence of misuse or misappropriation of medications. She denies use of alcohol or illegal substances.  UDS have been Appropriate with most recent screen     Controlled Substance Monitoring:    Acute and Chronic Pain Monitoring:   RX Monitoring 1/12/2023   Attestation -   Periodic Controlled Substance Monitoring No signs of potential drug abuse or diversion identified.    Chronic Pain > 50 MEDD -   Chronic Pain > 80 MEDD -

## 2023-01-16 ENCOUNTER — TELEPHONE (OUTPATIENT)
Dept: PAIN MANAGEMENT | Age: 83
End: 2023-01-16

## 2023-02-27 ENCOUNTER — OFFICE VISIT (OUTPATIENT)
Dept: PAIN MANAGEMENT | Age: 83
End: 2023-02-27
Payer: MEDICARE

## 2023-02-27 VITALS
BODY MASS INDEX: 31.41 KG/M2 | SYSTOLIC BLOOD PRESSURE: 124 MMHG | OXYGEN SATURATION: 98 % | HEIGHT: 64 IN | HEART RATE: 74 BPM | DIASTOLIC BLOOD PRESSURE: 70 MMHG | WEIGHT: 184 LBS

## 2023-02-27 DIAGNOSIS — M53.3 CHRONIC LEFT SACROILIAC JOINT PAIN: ICD-10-CM

## 2023-02-27 DIAGNOSIS — G89.29 CHRONIC LEFT SACROILIAC JOINT PAIN: ICD-10-CM

## 2023-02-27 DIAGNOSIS — M47.816 LUMBAR FACET JOINT SYNDROME: ICD-10-CM

## 2023-02-27 DIAGNOSIS — M51.37 DEGENERATION OF LUMBAR OR LUMBOSACRAL INTERVERTEBRAL DISC: Primary | Chronic | ICD-10-CM

## 2023-02-27 DIAGNOSIS — M48.061 SPINAL STENOSIS, LUMBAR REGION, WITHOUT NEUROGENIC CLAUDICATION: Chronic | ICD-10-CM

## 2023-02-27 DIAGNOSIS — M51.36 LUMBAR DEGENERATIVE DISC DISEASE: ICD-10-CM

## 2023-02-27 PROCEDURE — 99214 OFFICE O/P EST MOD 30 MIN: CPT | Performed by: NURSE PRACTITIONER

## 2023-02-27 PROCEDURE — 1036F TOBACCO NON-USER: CPT | Performed by: NURSE PRACTITIONER

## 2023-02-27 PROCEDURE — G8400 PT W/DXA NO RESULTS DOC: HCPCS | Performed by: NURSE PRACTITIONER

## 2023-02-27 PROCEDURE — G8417 CALC BMI ABV UP PARAM F/U: HCPCS | Performed by: NURSE PRACTITIONER

## 2023-02-27 PROCEDURE — G8484 FLU IMMUNIZE NO ADMIN: HCPCS | Performed by: NURSE PRACTITIONER

## 2023-02-27 PROCEDURE — G8427 DOCREV CUR MEDS BY ELIG CLIN: HCPCS | Performed by: NURSE PRACTITIONER

## 2023-02-27 PROCEDURE — 1123F ACP DISCUSS/DSCN MKR DOCD: CPT | Performed by: NURSE PRACTITIONER

## 2023-02-27 PROCEDURE — 1090F PRES/ABSN URINE INCON ASSESS: CPT | Performed by: NURSE PRACTITIONER

## 2023-02-27 RX ORDER — OXYCODONE HYDROCHLORIDE 15 MG/1
15 TABLET ORAL EVERY 8 HOURS PRN
Qty: 90 TABLET | Refills: 0 | Status: CANCELLED | OUTPATIENT
Start: 2023-02-27 | End: 2023-03-29

## 2023-02-27 ASSESSMENT — ENCOUNTER SYMPTOMS
SORE THROAT: 0
COUGH: 0
BACK PAIN: 1
SHORTNESS OF BREATH: 0

## 2023-02-27 NOTE — PROGRESS NOTES
Subjective:      Patient ID: Colt Carrion is a 80 y.o. female. Chief Complaint   Patient presents with    Back Pain    Medication Refill     HPI Here today for routine pain clinic recheck.     Miralax, dulcolax    Pain Assessment  Location of Pain: Back  Severity of Pain: 7  Quality of Pain: Sharp, Dull, Aching  Duration of Pain: Persistent  Frequency of Pain: Constant  Aggravating Factors: Bending, Stretching, Straightening, Exercise, Kneeling, Squatting, Standing, Walking, Stairs  Limiting Behavior: Yes  Relieving Factors: Rest, Heat, Ice (meds)  Result of Injury: No  Work-Related Injury: No    Allergies   Allergen Reactions    Sulfa Antibiotics Shortness Of Breath    Sulfamethoxazole-Trimethoprim Anaphylaxis     (Bactrim)    Ansaid [Flurbiprofen] Other (See Comments)     Light headed and difficult with vision \"seeing\"    Gentamicin Other (See Comments)     Eye ointment caused eye swelling, redness    Motrin [Ibuprofen] Other (See Comments)     \"I coughed so bad I broke a rib\"    Quinidine      Light headed    Chlorthalidone Hives    Hydrocodone-Acetaminophen     Mirapex [Pramipexole Dihydrochloride] Other (See Comments)     Unknown reaction    Mobic [Meloxicam] Nausea Only    Nsaids Other (See Comments)     lightheaded    Quinolones Other (See Comments) and Hives    Reglan [Metoclopramide] Other (See Comments)     Hyperactive and stomach pain    Trazodone Other (See Comments)     unknown    Amino Acids Nausea And Vomiting     Other reaction(s): AOF    Corgard [Nadolol] Other (See Comments)     Severe coughing and broke a rib as a result     Erythromycin Nausea Only    Etodolac      GI issues    Garamycin [Gentamicin Sulfate] Other (See Comments)     Redness and irritation    Inderal [Propranolol] Other (See Comments)     Severe cough    Iothalamate Nausea And Vomiting     Other reaction(s): AOF    Lisinopril Itching    Loratadine      Does not work    Procainamide      Other reaction(s): LIGHTHEADED, Unknown    Ultram [Tramadol] Other (See Comments)     Didn't work         Outpatient Medications Marked as Taking for the 2/27/23 encounter (Office Visit) with KENY Robertson CNP   Medication Sig Dispense Refill    naloxegol (MOVANTIK) 25 MG TABS tablet Take 1 tablet by mouth every morning (before breakfast) 30 tablet 5    NONFORMULARY Immune health      doxycycline hyclate (VIBRA-TABS) 100 MG tablet take 1 tablet by mouth once daily      azelastine (ASTELIN) 0.1 % nasal spray 1 spray by Nasal route 2 times daily Use in each nostril as directed 30 mL 1    omeprazole (PRILOSEC) 40 MG delayed release capsule Take 1 capsule by mouth nightly 30 capsule 5    furosemide (LASIX) 20 MG tablet Take 1 tablet by mouth 3 times daily (Patient taking differently: Take 40 mg by mouth daily) 90 tablet 1    topiramate (TOPAMAX) 25 MG tablet Take 1 tablet by mouth 2 times daily 180 tablet 1    buPROPion (WELLBUTRIN XL) 150 MG extended release tablet take 1 tablet by mouth once daily 90 tablet 1    spironolactone (ALDACTONE) 25 MG tablet take 1 tablet by mouth once daily (Patient taking differently: 25 mg take 2 tablet by mouth once daily) 90 tablet 1    montelukast (SINGULAIR) 10 MG tablet Take 1 tablet by mouth nightly 90 tablet 1    simvastatin (ZOCOR) 40 MG tablet Take 1 tablet by mouth nightly 90 tablet 1    levothyroxine (SYNTHROID) 75 MCG tablet Take 1 tablet by mouth in the morning. 90 tablet 1    DULoxetine (CYMBALTA) 30 MG extended release capsule Take 1 capsule by mouth in the morning. 90 capsule 1    fluticasone-salmeterol (ADVIAR) 100-50 MCG/ACT AEPB diskus inhaler Inhale 1 puff into the lungs in the morning and 1 puff in the evening.  3 each 1    fluticasone (FLONASE) 50 MCG/ACT nasal spray instill 2 sprays into each nostril once daily      ipratropium (ATROVENT) 0.06 % nasal spray instill 2 sprays into each nostril twice a day      acetaminophen (TYLENOL) 650 MG extended release tablet Take 650 mg by mouth every 8 hours as needed for Pain      cetirizine (ZYRTEC) 10 MG tablet take 1 tablet by mouth once daily      metoprolol succinate (TOPROL XL) 25 MG extended release tablet take 1 tablet by mouth once daily      calcium carbonate-vitamin D 600-200 MG-UNIT TABS Take 2 tablets by mouth daily      ketotifen (ZADITOR) 0.025 % ophthalmic solution 1 drop 2 times daily      Multiple Vitamin (MULTI-VITAMIN DAILY) TABS Take by mouth      melatonin 1 MG tablet Takes 1/2 of tab.      aspirin 81 MG tablet Take 81 mg by mouth daily      guaiFENesin (MUCINEX) 600 MG extended release tablet Mucinex 600 mg tablet, extended release   Take 2 tablets every day by oral route.      lidocaine (LMX) 4 % cream Apply topically as needed for Pain Apply topically as needed. fluticasone propionate 50 MCG/BLIST AEPB Inhale into the lungs      albuterol (ACCUNEB) 1.25 MG/3ML nebulizer solution Inhale 1 ampule into the lungs every 6 hours as needed for Wheezing       flecainide (TAMBOCOR) 50 MG tablet Take 50 mg by mouth 2 times daily. DULoxetine (CYMBALTA) 60 MG extended release capsule Take 60 mg by mouth daily       artificial tears (ARTIFICIAL TEARS) OINT as needed.       Saline 0.2 % SOLN by Nasal route daily as needed          Past Medical History:   Diagnosis Date    Anesthesia complication     2nd post op day from total knee patient stated \"I was wild and mean\"    Anxiety and depression     Arthritis     ASD (atrial septal defect)     repair in 1963    Asthma     COPD (chronic obstructive pulmonary disease) ([facility])     COPD (chronic obstructive pulmonary disease) ([facility]) 1980's    Disorder of immune system ([facility] [address]     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 (total left knee 3/2015 at [facility])    History of renal stone     passed    Hx of blood clots     DVT    Hypertension     MVP (mitral valve prolapse)     LONI (obstructive sleep apnea)     has not used bipap since     Rectocele     Spinal stenosis     Thyroid disease late     overactive radioactive iodine done       Past Surgical History:   Procedure Laterality Date    BACK INJECTION Bilateral 2021    Bilateral SACROILIAC Joint & Piriformis Injection performed by Mariano Auguste MD at [facility] OR    BACK INJECTION Bilateral 12/3/2021    Bilateral SACROILIAC JOINT & Piriformis Injections performed by Mariano Auguste MD at [facility] OR    BACK SURGERY      BREAST LUMPECTOMY Left     BREAST LUMPECTOMY Left     CARDIAC SURGERY      ASD repair     SECTION      2x    CHOLECYSTECTOMY      CHOLECYSTECTOMY      COLONOSCOPY      three    DIAGNOSTIC CARDIAC CATH LAB PROCEDURE      DILATION AND CURETTAGE OF UTERUS      EYE SURGERY Bilateral     cataract    HC INJECTION PROCEDURE FOR SACROILIAC JOINT Left 2018    Left SIJ and piriformis, left trocanteric bursa injection performed by Mariano Auguste MD at [facility] OR    HYSTERECTOMY (CERVIX STATUS UNKNOWN)  2009    HYSTERECTOMY (CERVIX STATUS UNKNOWN)      Total    JOINT REPLACEMENT Bilateral     knee    KNEE ARTHROSCOPY Left     LUMBAR SPINE SURGERY Bilateral 2019    Bilateral L4 TRANSFORAMINAL  [id number] performed by Mariano Auguste MD at [facility] OR    LUMBAR SPINE SURGERY Bilateral 3/12/2019    Bilateral L4 TRANSFORAMINAL performed by Mariano Auguste MD at [facility] OR    LUMBAR SPINE SURGERY Right 10/15/2019    RIGHT L4 & L5 TRANSFORAMINAL performed by Mariano Auguste MD at [facility] OR    LUMBAR SPINE SURGERY Left 2019    Left L4 & L5 TRANSFORAMINAL performed by Mariano Auguste MD at [facility] OR    NERVE BLOCK Bilateral 2022    Bilateral L3-L4 L4-L5 L5-S1 Confirmatory Medial Branch Block performed by Mariano Auguste MD at [facility] OR    PACEMAKER PLACEMENT  2014    PAIN MANAGEMENT PROCEDURE Right 2020  Right L4 & L5 TRANSFORAMINAL performed by Dilcia Segura MD at [address] Left 2/21/2020    Left L4 & L5 TRANSFORAMINAL performed by Dilcia Segura MD at [address] [place] [address] Right 2/23/2021    Right L4 & L5 TRANSFORAMINAL performed by Dilcia Segura MD at [address] [place] [address] Left 3/11/2021    left L4 & L5 TRANSFORAMINAL performed by Dilcia Segura MD at [address] [place] [address] Left 5/6/2021    Left L4 & L5 TRANSFORAMINAL performed by Dilcia Segura MD at [address] [place] [address] Bilateral 3/4/2022    Bilateral L4 TRANSFORAMINAL performed by Dilcia Segura MD at [address] [place] [address] Left 8/15/2022    Left L3-L4 L4-L5 L5-S1 RADIOFREQUENCY ABLATION performed by Dilcia Segura MD at [address] [place] [address] Right 8/29/2022    Right L3-L4 L4-L5 L5-S1  Radiofrequency Ablation performed by Dilcia Segura MD at [address] AA&/STRD TFRML EPI CERVICAL/THORACIC EA ADDL Right 7/20/2018    Right C5 C6 TRANSFORAMINAL performed by Dilcia Segura MD at [address] DX/THER AGT PVRT FACET JT LMBR/SAC 2ND LEVEL Bilateral 8/30/2018    Bilateral L2 L3 L4 L5 Diagnostic Medial BB performed by Dilcia Segura MD at [address] DX/THER AGT PVRT FACET JT LMBR/SAC 2ND LEVEL Right 9/13/2018    Right L2 L3 L4 L5 Confirmatory Medial BB performed by Dilcia Segura MD at [facility] Left 12/20/2018    Left L2 L3 L4 L5 RADIOFREQUENCY performed by Dilcia Segura MD at [facility] 30 Right 1/3/2019    Right L2 L3 L4 L5 RADIOFREQUENCY performed by Dilcia Segura MD at [facility] Right 01/28/2005 & 03/16/2010    2x    TONSILLECTOMY      at age 12       Family History   Problem Relation Age of Onset    Heart Disease Father        Social History     Socioeconomic History    Marital status:       Spouse name: None    Number of children: None    Years of education: None    Highest education level: None   Occupational History    Occupation: retired   Tobacco Use    Smoking status: Never    Smokeless tobacco: Never   Vaping Use    Vaping Use: Never used   Substance and Sexual Activity    Alcohol use: Not Currently     Comment: very rare    Drug use: No     Social Determinants of Health     Financial Resource Strain: Low Risk     Difficulty of Paying Living Expenses: Not hard at all   Food Insecurity: No Food Insecurity    Worried About Running Out of Food in the Last Year: Never true    [address] in the Last Year: Never true   Physical Activity: Unknown    Days of Exercise per Week: 0 days     Review of Systems   Constitutional:  Positive for activity change (increased, packing and moving). Negative for fever. HENT:  Negative for sore throat. Respiratory:  Negative for cough and shortness of breath. Cardiovascular:  Negative for chest pain. Musculoskeletal:  Positive for arthralgias, back pain and myalgias. Skin: Negative. Hematological:  Bruises/bleeds easily. Psychiatric/Behavioral:  Positive for sleep disturbance. Objective:   Physical Exam  Vitals reviewed. Constitutional:       General: She is awake. She is not in acute distress. Appearance: Normal appearance. She is well-developed and well-groomed. She is not ill-appearing, toxic-appearing or diaphoretic. Interventions: She is not intubated. HENT:      Head: Normocephalic and atraumatic. Eyes:      General: Lids are normal.   Cardiovascular:      Rate and Rhythm: Normal rate. Pulmonary:      Effort: Pulmonary effort is normal. No tachypnea, bradypnea, accessory muscle usage, prolonged expiration, respiratory distress or retractions. She is not intubated. Skin:     General: Skin is warm and dry. Capillary Refill: Capillary refill takes less than 2 seconds. Coloration: Skin is not ashen, jaundiced or pale. Findings: No rash. Nails: There is no clubbing. Neurological:      Mental Status: She is alert and oriented to person, place, and time. GCS: GCS eye subscore is 4. GCS verbal subscore is 5. GCS motor subscore is 6. Cranial Nerves: No cranial nerve deficit. Psychiatric:         Attention and Perception: Attention normal.         Mood and Affect: Mood normal.         Speech: Speech normal.         Behavior: Behavior is cooperative. Cognition and Memory: Cognition normal.         Judgment: Judgment normal.       Assessment / Plan:      Diagnosis Orders   1. Degeneration of lumbar or lumbosacral intervertebral disc        2. Chronic left sacroiliac joint pain        3. Spinal stenosis, lumbar region, without neurogenic claudication        4. Lumbar degenerative disc disease        5. Lumbar facet joint syndrome          Chronic pain diagnoses such as   1. Degeneration of lumbar or lumbosacral intervertebral disc    2. Chronic left sacroiliac joint pain    3. Spinal stenosis, lumbar region, without neurogenic claudication    4. Lumbar degenerative disc disease    5. Lumbar facet joint syndrome     controlled on current medication regime, wll continue current pain medications to improve quality of life and function. Movantik Rx for constipation    Danita Mcpherson is here for recheck/reevaluation of chronic pain. She is able to maintain daily activities with the use of current pain medications and is generally satisfied with level of analgesia. She shows no evidence of misuse or misappropriation of medications. She denies use of alcohol or illegal substances. UDS have been Appropriate with most recent screen     Controlled Substance Monitoring:    Acute and Chronic Pain Monitoring:   RX Monitoring 2/27/2023   Attestation -   Periodic Controlled Substance Monitoring No signs of potential drug abuse or diversion identified.;Obtaining appropriate analgesic effect of treatment.    Chronic Pain > 50 MEDD -   Chronic Pain > 80 MEDD -

## 2023-03-01 DIAGNOSIS — M53.3 CHRONIC LEFT SACROILIAC JOINT PAIN: ICD-10-CM

## 2023-03-01 DIAGNOSIS — G89.29 CHRONIC LEFT SACROILIAC JOINT PAIN: ICD-10-CM

## 2023-03-02 DIAGNOSIS — J45.909 ASTHMA, UNSPECIFIED ASTHMA SEVERITY, UNSPECIFIED WHETHER COMPLICATED, UNSPECIFIED WHETHER PERSISTENT: Primary | ICD-10-CM

## 2023-03-02 RX ORDER — OXYCODONE HYDROCHLORIDE 15 MG/1
15 TABLET ORAL EVERY 8 HOURS PRN
Qty: 90 TABLET | Refills: 0 | Status: SHIPPED | OUTPATIENT
Start: 2023-03-02 | End: 2023-04-01

## 2023-03-02 RX ORDER — OXYCODONE HYDROCHLORIDE 15 MG/1
TABLET ORAL
Qty: 90 TABLET | OUTPATIENT
Start: 2023-03-02

## 2023-03-02 NOTE — TELEPHONE ENCOUNTER
Abad Grady called requesting a refill of the below medication which has been pended for you:     Requested Prescriptions     Pending Prescriptions Disp Refills    oxyCODONE (OXY-IR) 15 MG immediate release tablet 90 tablet 0     Sig: Take 1 tablet by mouth every 8 hours as needed for Pain for up to 30 days.      Refused Prescriptions Disp Refills    oxyCODONE (OXY-IR) 15 MG immediate release tablet [Pharmacy Med Name: OXYCODONE HCL (IR) 15 MG TAB] 90 tablet      Sig: take 1 tablet by mouth every 8 hours AS NEEDED FOR PAIN     Refused By: Yannick Ruano     Reason for Refusal: Medication discontinued       Last Appointment Date: 2/27/2023  Next Appointment Date: 4/27/2023    Allergies   Allergen Reactions    Sulfa Antibiotics Shortness Of Breath    Sulfamethoxazole-Trimethoprim Anaphylaxis     (Bactrim)    Ansaid [Flurbiprofen] Other (See Comments)     Light headed and difficult with vision \"seeing\"    Gentamicin Other (See Comments)     Eye ointment caused eye swelling, redness    Motrin [Ibuprofen] Other (See Comments)     \"I coughed so bad I broke a rib\"    Quinidine      Light headed    Chlorthalidone Hives    Hydrocodone-Acetaminophen     Mirapex [Pramipexole Dihydrochloride] Other (See Comments)     Unknown reaction    Mobic [Meloxicam] Nausea Only    Nsaids Other (See Comments)     lightheaded    Quinolones Other (See Comments) and Hives    Reglan [Metoclopramide] Other (See Comments)     Hyperactive and stomach pain    Trazodone Other (See Comments)     unknown    Amino Acids Nausea And Vomiting     Other reaction(s): AOF    Corgard [Nadolol] Other (See Comments)     Severe coughing and broke a rib as a result     Erythromycin Nausea Only    Etodolac      GI issues    Garamycin [Gentamicin Sulfate] Other (See Comments)     Redness and irritation    Inderal [Propranolol] Other (See Comments)     Severe cough    Iothalamate Nausea And Vomiting     Other reaction(s): AOF    Lisinopril Itching    Loratadine Does not work    Procainamide      Other reaction(s): LIGHTHEADED, Unknown    Ultram [Tramadol] Other (See Comments)     Didn't work         Pharmacy:  [email or web address]     Last DS11 9/19/23. Please review. [place]AR[place] checked [place], and [place]:oxy 15 mg  12/8/23   #90  . Due now. PATIENT WAS INITIALLY PUT ON BUTRANS PATCH AND HYDROMORPHONE BUT IT IS CAUSING HER TO BE REALLY TIRED AND SHE IS WORRIED ABOUT FALLING. SHE STATED THAT THE OXY 15 TID WAS EFFECTIVE AT CONTROLLING HER PAIN.      ALSO SHE STATED THAT SHE IS HAVING TROUBLE WITH CONSTIPATION. SHE DOES NOT LIKE MIRALAX OR COLACE BECAUSE ALTHOUGH EFFECTIVE IT WILL CAUSE DIARRHEA. PLEASE SEND SOMETHING IN FOR HER TO TRY. THANK YOU.

## 2023-04-27 ENCOUNTER — OFFICE VISIT (OUTPATIENT)
Dept: VASCULAR SURGERY | Age: 83
End: 2023-04-27
Payer: MEDICARE

## 2023-04-27 ENCOUNTER — OFFICE VISIT (OUTPATIENT)
Dept: PAIN MANAGEMENT | Age: 83
End: 2023-04-27
Payer: MEDICARE

## 2023-04-27 VITALS
BODY MASS INDEX: 31.92 KG/M2 | DIASTOLIC BLOOD PRESSURE: 70 MMHG | SYSTOLIC BLOOD PRESSURE: 124 MMHG | OXYGEN SATURATION: 95 % | HEART RATE: 67 BPM | WEIGHT: 187 LBS | HEIGHT: 64 IN

## 2023-04-27 VITALS
SYSTOLIC BLOOD PRESSURE: 136 MMHG | WEIGHT: 187 LBS | BODY MASS INDEX: 31.92 KG/M2 | HEIGHT: 64 IN | HEART RATE: 80 BPM | DIASTOLIC BLOOD PRESSURE: 72 MMHG

## 2023-04-27 DIAGNOSIS — I74.10 AORTIC MURAL THROMBUS (HCC): ICD-10-CM

## 2023-04-27 DIAGNOSIS — M48.061 SPINAL STENOSIS, LUMBAR REGION, WITHOUT NEUROGENIC CLAUDICATION: Primary | Chronic | ICD-10-CM

## 2023-04-27 DIAGNOSIS — M51.26 LUMBAR DISCOGENIC PAIN SYNDROME: ICD-10-CM

## 2023-04-27 DIAGNOSIS — M51.36 LUMBAR DEGENERATIVE DISC DISEASE: ICD-10-CM

## 2023-04-27 DIAGNOSIS — M51.37 DEGENERATION OF LUMBAR OR LUMBOSACRAL INTERVERTEBRAL DISC: Chronic | ICD-10-CM

## 2023-04-27 PROBLEM — I71.019 AORTIC DISSECTION DISTAL TO LEFT SUBCLAVIAN (HCC): Status: ACTIVE | Noted: 2023-04-27

## 2023-04-27 PROCEDURE — G8400 PT W/DXA NO RESULTS DOC: HCPCS | Performed by: SURGERY

## 2023-04-27 PROCEDURE — G8400 PT W/DXA NO RESULTS DOC: HCPCS | Performed by: NURSE PRACTITIONER

## 2023-04-27 PROCEDURE — 1090F PRES/ABSN URINE INCON ASSESS: CPT | Performed by: NURSE PRACTITIONER

## 2023-04-27 PROCEDURE — 99203 OFFICE O/P NEW LOW 30 MIN: CPT | Performed by: SURGERY

## 2023-04-27 PROCEDURE — G8417 CALC BMI ABV UP PARAM F/U: HCPCS | Performed by: SURGERY

## 2023-04-27 PROCEDURE — 99214 OFFICE O/P EST MOD 30 MIN: CPT | Performed by: NURSE PRACTITIONER

## 2023-04-27 PROCEDURE — 1036F TOBACCO NON-USER: CPT | Performed by: SURGERY

## 2023-04-27 PROCEDURE — G8417 CALC BMI ABV UP PARAM F/U: HCPCS | Performed by: NURSE PRACTITIONER

## 2023-04-27 PROCEDURE — G8427 DOCREV CUR MEDS BY ELIG CLIN: HCPCS | Performed by: NURSE PRACTITIONER

## 2023-04-27 PROCEDURE — 1123F ACP DISCUSS/DSCN MKR DOCD: CPT | Performed by: SURGERY

## 2023-04-27 PROCEDURE — G8427 DOCREV CUR MEDS BY ELIG CLIN: HCPCS | Performed by: SURGERY

## 2023-04-27 PROCEDURE — 1036F TOBACCO NON-USER: CPT | Performed by: NURSE PRACTITIONER

## 2023-04-27 PROCEDURE — 1090F PRES/ABSN URINE INCON ASSESS: CPT | Performed by: SURGERY

## 2023-04-27 PROCEDURE — 99214 OFFICE O/P EST MOD 30 MIN: CPT | Performed by: SURGERY

## 2023-04-27 PROCEDURE — 1123F ACP DISCUSS/DSCN MKR DOCD: CPT | Performed by: NURSE PRACTITIONER

## 2023-04-27 ASSESSMENT — ENCOUNTER SYMPTOMS
BACK PAIN: 1
SHORTNESS OF BREATH: 0
SORE THROAT: 0
COUGH: 0

## 2023-04-27 NOTE — PROGRESS NOTES
Subjective:      Patient ID: Gloria Crespo is a 80 y.o. female. Chief Complaint   Patient presents with    Pain     Lower back and left hip pain. Follow-up     2 month f/u & med management . Pain  Associated symptoms include arthralgias and myalgias. Pertinent negatives include no chest pain, coughing, fever or sore throat. Here today for routine pain clinic recheck.     Increased pain, has not trialed butrans yet, hesitant due to side effects    Pain Assessment  Location of Pain: Back (left hip)  Location Modifiers: Posterior, Inferior  Severity of Pain: 7  Quality of Pain: Sharp  Duration of Pain: Persistent  Frequency of Pain: Intermittent  Aggravating Factors: Walking, Standing  Limiting Behavior: Yes  Relieving Factors: Rest, Heat, Ice    Allergies   Allergen Reactions    Sulfa Antibiotics Shortness Of Breath    Sulfamethoxazole-Trimethoprim Anaphylaxis     (Bactrim)    Ansaid [Flurbiprofen] Other (See Comments)     Light headed and difficult with vision \"seeing\"    Gentamicin Other (See Comments)     Eye ointment caused eye swelling, redness    Motrin [Ibuprofen] Other (See Comments)     \"I coughed so bad I broke a rib\"    Quinidine      Light headed    Chlorthalidone Hives    Hydrocodone-Acetaminophen     Mirapex [Pramipexole Dihydrochloride] Other (See Comments)     Unknown reaction    Mobic [Meloxicam] Nausea Only    Nsaids Other (See Comments)     lightheaded    Quinolones Other (See Comments) and Hives    Reglan [Metoclopramide] Other (See Comments)     Hyperactive and stomach pain    Trazodone Other (See Comments)     unknown    Amino Acids Nausea And Vomiting     Other reaction(s): AOF    Corgard [Nadolol] Other (See Comments)     Severe coughing and broke a rib as a result     Erythromycin Nausea Only    Etodolac      GI issues    Garamycin [Gentamicin Sulfate] Other (See Comments)     Redness and irritation    Inderal [Propranolol] Other (See Comments)     Severe cough

## 2023-04-27 NOTE — PROGRESS NOTES
[address]  [facility] VASCULAR SURG A DEPARTMENT OF [address]  [address] [facility], [address]  [facility] [address]    Brionna Rothman  1940  80 y. 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 This is an 80-year-old female who recently had a CT scan of her chest.  In addition to seeing some pulmonary hypertension it was noted that she had an eccentric thrombus in her descending thoracic aorta without any aneurysmal changes. I discussed with her that these are relatively common findings and in a very small percent of patients this can embolized to the legs or other organs. She does not need to be on systemic anticoagulation and she does not need to be rechecked. She already is on dual antiplatelet therapy which I think should stabilize any residual mural thrombus. Past Medical History:  has a past medical history of Anesthesia complication, Anxiety and depression, Arthritis, ASD (atrial septal defect), Asthma, COPD (chronic obstructive pulmonary disease) ([address] COPD (chronic obstructive pulmonary disease) ([address] Disorder of immune system ([address] GERD (gastroesophageal reflux disease), H/O cardiac catheterization, Heart palpitations, History of anesthesia complications, History of renal stone, Hx of blood clots, Hypertension, MVP (mitral valve prolapse), LONI (obstructive sleep apnea), Rectocele, Spinal stenosis, and Thyroid disease.     Past Surgical History:   Past Surgical History:   Procedure Laterality Date    BACK INJECTION Bilateral 7/9/2021    Bilateral SACROILIAC Joint & Piriformis Injection performed by Obdulia Mccartney MD at [address] [place] [address]. Bilateral 12/3/2021    Bilateral

## 2023-05-25 ENCOUNTER — HOSPITAL ENCOUNTER (OUTPATIENT)
Age: 83
Setting detail: SPECIMEN
Discharge: HOME | End: 2023-05-25
Payer: MEDICARE

## 2023-05-25 ENCOUNTER — OFFICE VISIT (OUTPATIENT)
Dept: PAIN MANAGEMENT | Age: 83
End: 2023-05-25
Payer: MEDICARE

## 2023-05-25 VITALS
SYSTOLIC BLOOD PRESSURE: 122 MMHG | HEART RATE: 72 BPM | DIASTOLIC BLOOD PRESSURE: 68 MMHG | WEIGHT: 186 LBS | RESPIRATION RATE: 16 BRPM | BODY MASS INDEX: 31.93 KG/M2 | OXYGEN SATURATION: 95 %

## 2023-05-25 DIAGNOSIS — M51.26 LUMBAR DISCOGENIC PAIN SYNDROME: ICD-10-CM

## 2023-05-25 DIAGNOSIS — Z02.83 ENCOUNTER FOR DRUG SCREENING: ICD-10-CM

## 2023-05-25 DIAGNOSIS — M70.62 GREATER TROCHANTERIC BURSITIS OF BOTH HIPS: ICD-10-CM

## 2023-05-25 DIAGNOSIS — M48.061 SPINAL STENOSIS, LUMBAR REGION, WITHOUT NEUROGENIC CLAUDICATION: Chronic | ICD-10-CM

## 2023-05-25 DIAGNOSIS — M51.36 LUMBAR DEGENERATIVE DISC DISEASE: ICD-10-CM

## 2023-05-25 DIAGNOSIS — Z79.891 ENCOUNTER FOR LONG-TERM OPIATE ANALGESIC USE: ICD-10-CM

## 2023-05-25 DIAGNOSIS — M46.1 SACROILIITIS (HCC): ICD-10-CM

## 2023-05-25 DIAGNOSIS — M51.37 DEGENERATION OF LUMBAR OR LUMBOSACRAL INTERVERTEBRAL DISC: Chronic | ICD-10-CM

## 2023-05-25 DIAGNOSIS — M70.61 GREATER TROCHANTERIC BURSITIS OF BOTH HIPS: ICD-10-CM

## 2023-05-25 DIAGNOSIS — Z79.891 ENCOUNTER FOR LONG-TERM OPIATE ANALGESIC USE: Primary | ICD-10-CM

## 2023-05-25 DIAGNOSIS — M54.06 PANNICULITIS INVOLVING LUMBAR REGION: ICD-10-CM

## 2023-05-25 PROCEDURE — 99214 OFFICE O/P EST MOD 30 MIN: CPT | Performed by: NURSE PRACTITIONER

## 2023-05-25 PROCEDURE — G8427 DOCREV CUR MEDS BY ELIG CLIN: HCPCS | Performed by: NURSE PRACTITIONER

## 2023-05-25 PROCEDURE — G0481 DRUG TEST DEF 8-14 CLASSES: HCPCS

## 2023-05-25 PROCEDURE — G8400 PT W/DXA NO RESULTS DOC: HCPCS | Performed by: NURSE PRACTITIONER

## 2023-05-25 PROCEDURE — 80307 DRUG TEST PRSMV CHEM ANLYZR: CPT

## 2023-05-25 PROCEDURE — 1036F TOBACCO NON-USER: CPT | Performed by: NURSE PRACTITIONER

## 2023-05-25 PROCEDURE — 1090F PRES/ABSN URINE INCON ASSESS: CPT | Performed by: NURSE PRACTITIONER

## 2023-05-25 PROCEDURE — 1123F ACP DISCUSS/DSCN MKR DOCD: CPT | Performed by: NURSE PRACTITIONER

## 2023-05-25 PROCEDURE — G8417 CALC BMI ABV UP PARAM F/U: HCPCS | Performed by: NURSE PRACTITIONER

## 2023-05-25 RX ORDER — AMOXICILLIN 875 MG/1
875 TABLET, COATED ORAL 2 TIMES DAILY
COMMUNITY
Start: 2023-05-07

## 2023-05-25 RX ORDER — CLOPIDOGREL BISULFATE 75 MG/1
75 TABLET ORAL DAILY
COMMUNITY
Start: 2023-05-19

## 2023-05-25 RX ORDER — BENZONATATE 200 MG/1
CAPSULE ORAL
COMMUNITY
Start: 2023-05-03

## 2023-05-25 ASSESSMENT — PATIENT HEALTH QUESTIONNAIRE - PHQ9
2. FEELING DOWN, DEPRESSED OR HOPELESS: 0
SUM OF ALL RESPONSES TO PHQ QUESTIONS 1-9: 0
1. LITTLE INTEREST OR PLEASURE IN DOING THINGS: 0
SUM OF ALL RESPONSES TO PHQ9 QUESTIONS 1 & 2: 0
SUM OF ALL RESPONSES TO PHQ QUESTIONS 1-9: 0

## 2023-05-25 ASSESSMENT — ENCOUNTER SYMPTOMS
COUGH: 0
SHORTNESS OF BREATH: 0
SORE THROAT: 0
BACK PAIN: 1

## 2023-05-25 NOTE — PROGRESS NOTES
Pt verified name and  on urine specimen label. Pt's verified label then affixed to urine specimen container. Pt's labeled urine then taken to lab for testing.
intubated. HENT:      Head: Normocephalic and atraumatic. Eyes:      General: Lids are normal.   Cardiovascular:      Rate and Rhythm: Normal rate. Pulmonary:      Effort: Pulmonary effort is normal. No tachypnea, bradypnea, accessory muscle usage, prolonged expiration, respiratory distress or retractions. She is not intubated. Skin:     General: Skin is warm and dry. Capillary Refill: Capillary refill takes less than 2 seconds. Coloration: Skin is not ashen, jaundiced or pale. Findings: No rash. Nails: There is no clubbing. Neurological:      Mental Status: She is alert and oriented to person, place, and time. GCS: GCS eye subscore is 4. GCS verbal subscore is 5. GCS motor subscore is 6. Cranial Nerves: No cranial nerve deficit. Psychiatric:         Attention and Perception: Attention normal.         Mood and Affect: Mood normal.         Speech: Speech normal.         Behavior: Behavior is cooperative. Cognition and Memory: Cognition normal.         Judgment: Judgment normal.       Assessment / Plan:      Diagnosis Orders   1. Encounter for long-term opiate analgesic use  Pain Management Drug Screen      2. Encounter for drug screening  Pain Management Drug Screen      3. Degeneration of lumbar or lumbosacral intervertebral disc  Pain Management Drug Screen      4. Spinal stenosis, lumbar region, without neurogenic claudication  Pain Management Drug Screen      5. Lumbar degenerative disc disease  Pain Management Drug Screen      6. Lumbar discogenic pain syndrome  Pain Management Drug Screen      7. Greater trochanteric bursitis of both hips  Pain Management Drug Screen      8. Panniculitis involving lumbar region  Pain Management Drug Screen      9. Sacroiliitis (HCC)  Pain Management Drug Screen        Chronic pain diagnoses such as   1. Encounter for long-term opiate analgesic use    2. Encounter for drug screening    3.  Degeneration of lumbar or lumbosacral

## 2023-05-28 LAB
6-ACETYLMORPHINE, UR: NOT DETECTED
7-AMINOCLONAZEPAM, URINE: NOT DETECTED
ALPHA-OH-ALPRAZ, URINE: NOT DETECTED
ALPHA-OH-MIDAZOLAM, URINE: NOT DETECTED
ALPRAZOLAM, URINE: NOT DETECTED
AMPHETAMINES, URINE: NOT DETECTED
BARBITURATES, URINE: NOT DETECTED
BENZOYLECGONINE, UR: NOT DETECTED
BUPRENORPHINE URINE: NOT DETECTED
CARISOPRODOL, UR: NOT DETECTED
CLONAZEPAM, URINE: NOT DETECTED
CODEINE, URINE: NOT DETECTED
CREATININE URINE: 25.8 MG/DL (ref 20–400)
DIAZEPAM, URINE: NOT DETECTED
EER PAIN MGT DRUG PANEL, HIGH RES/EMIT U: NORMAL
ETHYL GLUCURONIDE UR: NOT DETECTED
FENTANYL URINE: NOT DETECTED
GABAPENTIN: NOT DETECTED
HYDROCODONE, URINE: NOT DETECTED
HYDROMORPHONE, URINE: PRESENT
LORAZEPAM, URINE: NOT DETECTED
MARIJUANA METAB, UR: NOT DETECTED
MDA, UR: NOT DETECTED
MDEA, EVE, UR: NOT DETECTED
MDMA URINE: NOT DETECTED
MEPERIDINE METAB, UR: NOT DETECTED
METHADONE, URINE: NOT DETECTED
METHAMPHETAMINE, URINE: NOT DETECTED
METHYLPHENIDATE: NOT DETECTED
MIDAZOLAM, URINE: NOT DETECTED
MORPHINE URINE: NOT DETECTED
NALOXONE URINE: NOT DETECTED
NORBUPRENORPHINE, URINE: NOT DETECTED
NORDIAZEPAM, URINE: NOT DETECTED
NORFENTANYL, URINE: NOT DETECTED
NORHYDROCODONE, URINE: NOT DETECTED
NOROXYCODONE, URINE: PRESENT
NOROXYMORPHONE, URINE: NOT DETECTED
OXAZEPAM, URINE: NOT DETECTED
OXYCODONE URINE: PRESENT
OXYMORPHONE, URINE: PRESENT
PAIN MGT DRUG PANEL, HI RES, UR: NORMAL
PCP,URINE: NOT DETECTED
PHENTERMINE, UR: NOT DETECTED
PREGABALIN: NOT DETECTED
TAPENTADOL, URINE: NOT DETECTED
TAPENTADOL-O-SULFATE, URINE: NOT DETECTED
TEMAZEPAM, URINE: NOT DETECTED
TRAMADOL, URINE: NOT DETECTED
ZOLPIDEM METABOLITE (ZCA), URINE: NOT DETECTED
ZOLPIDEM, URINE: NOT DETECTED

## 2023-06-01 ENCOUNTER — OFFICE VISIT (OUTPATIENT)
Dept: PULMONOLOGY | Age: 83
End: 2023-06-01
Payer: MEDICARE

## 2023-06-01 ENCOUNTER — OFFICE VISIT (OUTPATIENT)
Dept: NEUROLOGY | Age: 83
End: 2023-06-01
Payer: MEDICARE

## 2023-06-01 VITALS
HEIGHT: 64 IN | DIASTOLIC BLOOD PRESSURE: 68 MMHG | HEART RATE: 68 BPM | BODY MASS INDEX: 32.1 KG/M2 | SYSTOLIC BLOOD PRESSURE: 122 MMHG | WEIGHT: 188 LBS | OXYGEN SATURATION: 97 %

## 2023-06-01 VITALS
TEMPERATURE: 97 F | HEART RATE: 68 BPM | WEIGHT: 188 LBS | SYSTOLIC BLOOD PRESSURE: 122 MMHG | HEIGHT: 64 IN | BODY MASS INDEX: 32.1 KG/M2 | OXYGEN SATURATION: 97 % | DIASTOLIC BLOOD PRESSURE: 68 MMHG

## 2023-06-01 DIAGNOSIS — G62.9 PERIPHERAL POLYNEUROPATHY: ICD-10-CM

## 2023-06-01 DIAGNOSIS — J43.9 PULMONARY EMPHYSEMA, UNSPECIFIED EMPHYSEMA TYPE (HCC): ICD-10-CM

## 2023-06-01 DIAGNOSIS — Z86.73 H/O: STROKE: ICD-10-CM

## 2023-06-01 DIAGNOSIS — M48.061 SPINAL STENOSIS, LUMBAR REGION, WITHOUT NEUROGENIC CLAUDICATION: Chronic | ICD-10-CM

## 2023-06-01 DIAGNOSIS — M70.62 GREATER TROCHANTERIC BURSITIS OF BOTH HIPS: ICD-10-CM

## 2023-06-01 DIAGNOSIS — M48.061 SPINAL STENOSIS OF LUMBAR REGION WITHOUT NEUROGENIC CLAUDICATION: ICD-10-CM

## 2023-06-01 DIAGNOSIS — M70.61 GREATER TROCHANTERIC BURSITIS OF BOTH HIPS: ICD-10-CM

## 2023-06-01 DIAGNOSIS — R26.9 GAIT DIFFICULTY: ICD-10-CM

## 2023-06-01 DIAGNOSIS — F11.20 OPIOID DEPENDENCE WITH CURRENT USE (HCC): ICD-10-CM

## 2023-06-01 DIAGNOSIS — F41.9 ANXIETY AND DEPRESSION: ICD-10-CM

## 2023-06-01 DIAGNOSIS — R26.89 BALANCE PROBLEMS: ICD-10-CM

## 2023-06-01 DIAGNOSIS — Q21.10 ASD (ATRIAL SEPTAL DEFECT): ICD-10-CM

## 2023-06-01 DIAGNOSIS — Z91.81 H/O FALL: ICD-10-CM

## 2023-06-01 DIAGNOSIS — J45.40 MODERATE PERSISTENT ASTHMA WITHOUT COMPLICATION: Primary | ICD-10-CM

## 2023-06-01 DIAGNOSIS — I67.82 CHRONIC CEREBRAL ISCHEMIA: ICD-10-CM

## 2023-06-01 DIAGNOSIS — G93.89 BRAIN DYSFUNCTION: ICD-10-CM

## 2023-06-01 DIAGNOSIS — F32.A ANXIETY AND DEPRESSION: ICD-10-CM

## 2023-06-01 DIAGNOSIS — R41.3 MEMORY PROBLEM: ICD-10-CM

## 2023-06-01 DIAGNOSIS — M47.816 LUMBAR FACET JOINT SYNDROME: ICD-10-CM

## 2023-06-01 DIAGNOSIS — R42 DIZZINESS: Primary | ICD-10-CM

## 2023-06-01 DIAGNOSIS — G31.9 DIFFUSE CEREBRAL ATROPHY (HCC): ICD-10-CM

## 2023-06-01 PROCEDURE — 1123F ACP DISCUSS/DSCN MKR DOCD: CPT | Performed by: INTERNAL MEDICINE

## 2023-06-01 PROCEDURE — G8427 DOCREV CUR MEDS BY ELIG CLIN: HCPCS | Performed by: INTERNAL MEDICINE

## 2023-06-01 PROCEDURE — G8400 PT W/DXA NO RESULTS DOC: HCPCS | Performed by: INTERNAL MEDICINE

## 2023-06-01 PROCEDURE — G8417 CALC BMI ABV UP PARAM F/U: HCPCS | Performed by: INTERNAL MEDICINE

## 2023-06-01 PROCEDURE — 99214 OFFICE O/P EST MOD 30 MIN: CPT | Performed by: PSYCHIATRY & NEUROLOGY

## 2023-06-01 PROCEDURE — 1090F PRES/ABSN URINE INCON ASSESS: CPT | Performed by: INTERNAL MEDICINE

## 2023-06-01 PROCEDURE — 1036F TOBACCO NON-USER: CPT | Performed by: INTERNAL MEDICINE

## 2023-06-01 PROCEDURE — 99213 OFFICE O/P EST LOW 20 MIN: CPT | Performed by: INTERNAL MEDICINE

## 2023-06-01 PROCEDURE — 99214 OFFICE O/P EST MOD 30 MIN: CPT | Performed by: INTERNAL MEDICINE

## 2023-06-01 ASSESSMENT — ENCOUNTER SYMPTOMS
VOMITING: 0
PHOTOPHOBIA: 0
SORE THROAT: 0
CONSTIPATION: 0
BACK PAIN: 1
EYE ITCHING: 0
BLOOD IN STOOL: 0
CHEST TIGHTNESS: 0
COLOR CHANGE: 0
DIARRHEA: 0
VOICE CHANGE: 0
APNEA: 0
NAUSEA: 0
EYE PAIN: 0
ABDOMINAL PAIN: 0
CHOKING: 0
EYE REDNESS: 0
EYE DISCHARGE: 0
COUGH: 0
ABDOMINAL DISTENTION: 0
SHORTNESS OF BREATH: 0
TROUBLE SWALLOWING: 0
WHEEZING: 0
FACIAL SWELLING: 0
SINUS PRESSURE: 0

## 2023-06-01 NOTE — PROGRESS NOTES
the administration of intravenous  contrast. Automated exposure control, iterative reconstruction, and/or weight  based adjustment of the mA/kV was utilized to reduce the radiation dose to as  low as reasonably achievable. COMPARISON:  None. HISTORY:  ORDERING SYSTEM PROVIDED HISTORY: fall  TECHNOLOGIST PROVIDED HISTORY:     fall  Decision Support Exception - unselect if not a suspected or confirmed  emergency medical condition->Emergency Medical Condition (MA)  Reason for Exam: fall     FINDINGS:  BRAIN/VENTRICLES: There is moderate cerebral atrophy with white matter  hypodensities in keeping with microvascular disease. There is no acute  intracranial hemorrhage, mass effect or midline shift. No abnormal  extra-axial fluid collection. The gray-white differentiation is maintained  without evidence of an acute infarct. There is no evidence of hydrocephalus. ORBITS: The visualized portion of the orbits demonstrate no acute abnormality. SINUSES: The visualized paranasal sinuses and mastoid air cells demonstrate  no acute abnormality. SOFT TISSUES/SKULL:  No acute abnormality of the visualized skull or soft  tissues. IMPRESSION:  No acute intracranial abnormality. VISITING DIAGNOSIS:          ICD-10-CM    1. Dizziness  R42       2. Lumbar facet joint syndrome  M47.816       3. Opioid dependence with current use ([facility])  F11.20       4. Spinal stenosis of lumbar region without neurogenic claudication  M48.061       5. Greater trochanteric bursitis of both hips  M70.61     M70.62       6. Spinal stenosis, lumbar region, without neurogenic claudication  M48.061       7. ASD (atrial septal defect)  Q21.10       8. Anxiety and depression  F41.9     F32. A       9. Pulmonary emphysema, unspecified emphysema type ([facility] [address]  J43.9       10. Memory problem  R41.3       11. Gait difficulty  R26.9       12. Balance problems  R26.89       13. H/O fall  Z91.81       14.  Peripheral polyneuropathy  G62.9

## 2023-06-01 NOTE — PATIENT INSTRUCTIONS
* FALL   PRECAUTIONS. *  USE   WALKING  ASSISTANCE  DEVICES    /   WALKER        *   SUPERVISED    CARE        *   ADEQUATE   FLUID  INTAKE   AND  ELECTROLYTE  BALANCE             * KEEP  DAIRY  OF   THE  NEUROLOGICAL  SYMPTOMS          *  TO  MAINTAIN  REGULAR  SLEEP  WAKE  CYCLES. *   TO  HAVE  ADEQUATE  REST  AND   SLEEP    HOURS.          *    AVOID  USAGE OF   TOBACCO,  EXCESSIVE  ALCOHOL                AND   ILLEGAL   SUBSTANCES,  IF  ANY          *  Maintain   Healthy  Life Style    With   Periodic  Monitoring  Of         Any  Medical  Conditions  Including   Elevated  Blood  Pressure,  Lipid  Profile,       Blood  Sugar levels  And   Heart  Disease. *   Period   Screening  For  Cancers  Involving  Breast,  Colon,         Lungs  And  Other  Organs  As  Applicable,           In consultation   With  Your  Primary Care Providers. *  If   There is  Any  Significant  Worsening   Of  Current  Symptoms  And             Or  If    Any additional  New  Neurological  Symptoms  and          Significant  Concerns   Should  Call  911 or  Go  To  Emergency  Department            For  Further  Immediate  Evaluation.

## 2023-06-02 NOTE — PROGRESS NOTES
PULMONARY MEDICINE OUTPATIENT NOTE                                                                       PATIENT:  Chasity Jackson  YOB: 1940  MRN: [id number]     REFERRED BY: Luzma Yousif DO   CHIEF COMPLIANT: COPD and Asthma (Pft 5/22/2023, new pt )       HISTORY     Chasity Jackson is a 80y.o. year old female here for establishing care/follow-up    INITIAL VISIT: 6/1/2023  LAST VISIT:    PULMONARY PROBLEM LIST:  1. Moderate persistent asthma without complication       HISTORY OF PRESENT ILLNESS/CURRENT SYMPTOMS:  Patient is a non-smoker. She reports longstanding history of asthma. She is currently on Advair, Singulair and as needed albuterol. Reports worsening of symptoms from spring through fall. She was recently prescribed amoxicillin for acute flareup. Patient scheduled for PFT in coming week. PULMONARY COMORBIDITIES/RISK FACTORS:     Yes No   NASO-BRONCHIAL/ENVIRONMENTAL ALLERGIES [x] []   ASTHMA [x] []   CHRONIC SINUSITIS/POSTNASAL DRIP [] [x]   GERD/ACID REFLUX [] [x]   ASPIRIN USE [] [x]   CORONARY ARTERY DISEASE  []  [x]   CONGESTIVE CARDIAC FAILURE [] [x]   ATRIAL FIBRILLATION [] [x]   AMIODARONE USE [] [x]   PULMONARY HYPERTENSION [] [x]   VENOUS THROMBOEMBOLISM [] [x]   CHRONIC LIVER DISEASE/CIRRHOSIS [] [x]   CONNECTIVE TISSUE DISORDER [] [x]   DIAPHRAGMATIC DISORDER [] [x]   CHEST WALL RESTRICTION [] [x]   MORBID OBESITY [] [x]   AMBULATORY OXYGEN USE [] [x]   NOCTURNAL CPAP USE [] [x]   NOCTURNAL BIPAP/NIV USE [] [x]     CURRENT BRONCHODILATORS/OTHER PULMONARY MEDS: Advair, Singulair, azelastine nasal spray    TOBACCO HISTORY:  She  reports that she has never smoked.  She has never used smokeless tobacco.    AVOCATION/OCCUPATIONAL EXPOSURE:  [] Asbestos [] Silica dust [] Coal [] Foundry [] [employer] [] Estefani Ravel [] Pets [] Exotic birds   [] Tuberculosis [] Hot tub  [] Drugs [] Others    PAST MEDICAL HISTORY:      Diagnosis Date    Anesthesia complication     2nd

## 2023-06-05 ENCOUNTER — HOSPITAL ENCOUNTER (OUTPATIENT)
Dept: PULMONOLOGY | Age: 83
Discharge: HOME | End: 2023-06-05
Payer: MEDICARE

## 2023-06-05 DIAGNOSIS — J45.909 ASTHMA, UNSPECIFIED ASTHMA SEVERITY, UNSPECIFIED WHETHER COMPLICATED, UNSPECIFIED WHETHER PERSISTENT: ICD-10-CM

## 2023-06-05 LAB
DLCO %PRED: NORMAL
DLCO PRED: NORMAL
DLCO/VA %PRED: NORMAL
DLCO/VA PRED: NORMAL
DLCO/VA: NORMAL
DLCO: NORMAL
EXPIRATORY TIME-POST: NORMAL
EXPIRATORY TIME: NORMAL
FEF 25-75% %CHNG: NORMAL
FEF 25-75% %PRED-POST: NORMAL
FEF 25-75% %PRED-PRE: NORMAL
FEF 25-75% PRED: NORMAL
FEF 25-75%-POST: NORMAL
FEF 25-75%-PRE: NORMAL
FEV1 %PRED-POST: NORMAL
FEV1 %PRED-PRE: NORMAL
FEV1 PRED: NORMAL
FEV1-POST: NORMAL
FEV1-PRE: NORMAL
FEV1/FVC %PRED-POST: NORMAL
FEV1/FVC %PRED-PRE: NORMAL
FEV1/FVC PRED: NORMAL
FEV1/FVC-POST: NORMAL
FEV1/FVC-PRE: NORMAL
FVC %PRED-POST: NORMAL
FVC %PRED-PRE: NORMAL
FVC PRED: NORMAL
FVC-POST: NORMAL
FVC-PRE: NORMAL
GAW %PRED: NORMAL
GAW PRED: NORMAL
GAW: NORMAL
IC %PRED: NORMAL
IC PRED: NORMAL
IC: NORMAL
MEP: NORMAL
MIP: NORMAL
MVV %PRED-PRE: NORMAL
MVV PRED: NORMAL
MVV-PRE: NORMAL
PEF %PRED-POST: NORMAL
PEF %PRED-PRE: NORMAL
PEF PRED: NORMAL
PEF%CHNG: NORMAL
PEF-POST: NORMAL
PEF-PRE: NORMAL
RAW %PRED: NORMAL
RAW PRED: NORMAL
RAW: NORMAL
RV %PRED: NORMAL
RV PRED: NORMAL
RV: NORMAL
SVC %PRED: NORMAL
SVC PRED: NORMAL
SVC: NORMAL
TLC %PRED: NORMAL
TLC PRED: NORMAL
TLC: NORMAL
VA %PRED: NORMAL
VA PRED: NORMAL
VA: NORMAL
VTG %PRED: NORMAL
VTG PRED: NORMAL
VTG: NORMAL

## 2023-06-05 PROCEDURE — 94729 DIFFUSING CAPACITY: CPT

## 2023-06-05 PROCEDURE — 6370000000 HC RX 637 (ALT 250 FOR IP): Performed by: INTERNAL MEDICINE

## 2023-06-05 PROCEDURE — 94640 AIRWAY INHALATION TREATMENT: CPT

## 2023-06-05 PROCEDURE — 94060 EVALUATION OF WHEEZING: CPT

## 2023-06-05 PROCEDURE — 94726 PLETHYSMOGRAPHY LUNG VOLUMES: CPT

## 2023-06-05 RX ORDER — ALBUTEROL SULFATE 90 UG/1
4 AEROSOL, METERED RESPIRATORY (INHALATION) ONCE
Status: COMPLETED | OUTPATIENT
Start: 2023-06-05 | End: 2023-06-05

## 2023-06-05 RX ADMIN — ALBUTEROL SULFATE 4 PUFF: 90 AEROSOL, METERED RESPIRATORY (INHALATION) at 10:24

## 2023-06-22 ENCOUNTER — HOSPITAL ENCOUNTER (OUTPATIENT)
Dept: INTERVENTIONAL RADIOLOGY/VASCULAR | Age: 83
Discharge: HOME | End: 2023-06-24
Attending: PSYCHIATRY & NEUROLOGY
Payer: MEDICARE

## 2023-06-22 DIAGNOSIS — I67.82 CHRONIC CEREBRAL ISCHEMIA: ICD-10-CM

## 2023-06-22 DIAGNOSIS — M48.061 SPINAL STENOSIS OF LUMBAR REGION WITHOUT NEUROGENIC CLAUDICATION: ICD-10-CM

## 2023-06-22 DIAGNOSIS — Z91.81 H/O FALL: ICD-10-CM

## 2023-06-22 DIAGNOSIS — R26.89 BALANCE PROBLEMS: ICD-10-CM

## 2023-06-22 DIAGNOSIS — G31.9 DIFFUSE CEREBRAL ATROPHY (HCC): ICD-10-CM

## 2023-06-22 DIAGNOSIS — R26.9 GAIT DIFFICULTY: ICD-10-CM

## 2023-06-22 DIAGNOSIS — R42 DIZZINESS: ICD-10-CM

## 2023-06-22 DIAGNOSIS — G62.9 PERIPHERAL POLYNEUROPATHY: ICD-10-CM

## 2023-06-22 DIAGNOSIS — R41.3 MEMORY PROBLEM: ICD-10-CM

## 2023-06-22 DIAGNOSIS — Z86.73 H/O: STROKE: ICD-10-CM

## 2023-06-22 PROCEDURE — 93880 EXTRACRANIAL BILAT STUDY: CPT

## 2023-06-27 ENCOUNTER — HOSPITAL ENCOUNTER (OUTPATIENT)
Dept: PHYSICAL THERAPY | Age: 83
Setting detail: THERAPIES SERIES
Discharge: HOME | End: 2023-06-27
Payer: MEDICARE

## 2023-06-27 DIAGNOSIS — M53.3 CHRONIC LEFT SACROILIAC JOINT PAIN: ICD-10-CM

## 2023-06-27 DIAGNOSIS — G89.29 CHRONIC LEFT SACROILIAC JOINT PAIN: ICD-10-CM

## 2023-06-27 PROCEDURE — 97161 PT EVAL LOW COMPLEX 20 MIN: CPT | Performed by: PHYSICAL THERAPIST

## 2023-06-27 RX ORDER — OXYCODONE HYDROCHLORIDE 15 MG/1
15 TABLET ORAL EVERY 8 HOURS PRN
Qty: 90 TABLET | Refills: 0 | Status: SHIPPED | OUTPATIENT
Start: 2023-06-27 | End: 2023-07-27

## 2023-06-27 ASSESSMENT — PAIN DESCRIPTION - PAIN TYPE: TYPE: CHRONIC PAIN

## 2023-06-27 ASSESSMENT — PAIN SCALES - GENERAL: PAINLEVEL_OUTOF10: 5

## 2023-06-27 ASSESSMENT — PAIN DESCRIPTION - DESCRIPTORS: DESCRIPTORS: ACHING;SHOOTING

## 2023-06-27 ASSESSMENT — PAIN DESCRIPTION - LOCATION: LOCATION: BACK;HIP

## 2023-06-27 ASSESSMENT — PAIN DESCRIPTION - ORIENTATION: ORIENTATION: LEFT

## 2023-06-29 ENCOUNTER — HOSPITAL ENCOUNTER (OUTPATIENT)
Dept: PHYSICAL THERAPY | Age: 83
Setting detail: THERAPIES SERIES
Discharge: HOME | End: 2023-06-29
Payer: MEDICARE

## 2023-06-29 PROCEDURE — 97110 THERAPEUTIC EXERCISES: CPT

## 2023-07-05 ENCOUNTER — APPOINTMENT (OUTPATIENT)
Dept: PHYSICAL THERAPY | Age: 83
End: 2023-07-05
Payer: MEDICARE

## 2023-07-06 ENCOUNTER — HOSPITAL ENCOUNTER (OUTPATIENT)
Dept: PHYSICAL THERAPY | Age: 83
Setting detail: THERAPIES SERIES
Discharge: HOME | End: 2023-07-06
Payer: MEDICARE

## 2023-07-06 PROCEDURE — 97110 THERAPEUTIC EXERCISES: CPT

## 2023-07-06 NOTE — PROGRESS NOTES
Physical Therapy    Physical Therapy Daily Treatment Note    Date:  2023    Patient Name:  Susie Brooks    :  1940  MRN: [id number]  Restrictions/Precautions:   Pacemaker  Medical/Treatment Diagnosis Information:   Diagnosis: R26.9 gait difficulty, R26.89 balance problem  Treatment Diagnosis: R26.9 gait difficulty, R26.89 balance difficulty  Insurance/Certification information:  PT Insurance Information: Medicare  Physician Information:   [facility] care signed (Y/N):    Visit# / total visits:  3/10  Pain level: 7/10       Time In: 2:10  Time Out: 2:43    Progress Note: []  Yes  [x]  No  Next due by: Visit #10, or 23      Subjective:   Pt reports thighs have been hurting a lot. Feels fatigued all the time. Breathing still having issues    Objective: 10 minutes late this date. GUILHERME per flowsheet to improve LE strength and endurance. Rest breaks taken. Counter HEP given     Observation:   Test measurements:      Exercises:   Exercise/Equipment Resistance/Repetitions Other comments   NUSTEP 8' L1     Counter ex 10x HR/March/Ham curl/ 3 way   Squat Matrix 10x    Lunge     Sit to stand 10x foam no UE         Toe tap on step     Step up     Lateral step 2 laps     Seated LAQ  10x    Seated March  10x    Seated Hip abd/add Ball/red x 10     Seated HS curls 10x red         [x] Provided verbal/tactile cueing for activities related to strengthening, flexibility, endurance, ROM. (16632)  [] Provided verbal/tactile cueing for activities related to improving balance, coordination, kinesthetic sense, posture, motor skill, proprioception. (77341)    Therapeutic Activities:     [] Therapeutic activities, direct (one-on-one) patient contact (use of dynamic activities to improve functional performance). (41841)    Gait:   [] Provided training and instruction to the patient for ambulation re-education.  (87794)    Self-Care/ADL's  [] Self-care/home management training and compensatory training, meal

## 2023-07-17 ENCOUNTER — HOSPITAL ENCOUNTER (OUTPATIENT)
Dept: PHYSICAL THERAPY | Age: 83
Setting detail: THERAPIES SERIES
Discharge: HOME | End: 2023-07-17
Payer: MEDICARE

## 2023-07-17 PROCEDURE — 97110 THERAPEUTIC EXERCISES: CPT | Performed by: PHYSICAL THERAPIST

## 2023-07-17 NOTE — PROGRESS NOTES
procedures, and instructions in use of assistive technology devices/adaptive equipment, direct one-on-one contact. (83902)    Home Exercise Program: counter HEP    [x] Reviewed/Progressed HEP activities related to strengthening, flexibility, endurance, ROM. (60171)  [] Reviewed/Progressed HEP activities related to improving balance, coordination, kinesthetic sense, posture, motor skill, proprioception.  (49330)    Manual Treatments:    [] Provided manual therapy to mobilize soft tissue/joints for the purpose of modulating pain, promoting relaxation,  increasing ROM, reducing/eliminating soft tissue swelling/inflammation/restriction, improving soft tissue extensibility.  (25667)    Service Based Modalities:      Timed Code Treatment Minutes:   45' therex    Total Treatment Minutes:   45'    Treatment/Activity Tolerance:  [x] Patient tolerated treatment well [] Patient limited by fatique  [] Patient limited by pain  [] Patient limited by other medical complications  [] Other:     Prognosis: [] Good [x] Fair  [] Poor    Patient Requires Follow-up: [x] Yes  [] No      Goals:  Short Term Goals  Time Frame for Short Term Goals: 1 week  Short Term Goal 1: Start HEP    Long Term Goals  Time Frame for Long Term Goals : 4 weeks  Long Term Goal 1: Sit to  30\" x9 for B LE  power & balance  Long Term Goal 2: TUG 15 sec for gait efficiency  Long Term Goal 3: Mcgarry Balance Scale improve to 42/56 to reduce fall risk  Long Term Goal 4: Able to stand 5 min at a time      Plan:   [x] Continue per plan of care [] Alter current plan (see comments)  [] Plan of care initiated [] Hold pending MD visit [] Discharge    Plan for Next Session:      Electronically signed by:  Silvio Muhammad PT

## 2023-07-20 ENCOUNTER — HOSPITAL ENCOUNTER (OUTPATIENT)
Dept: PHYSICAL THERAPY | Age: 83
Setting detail: THERAPIES SERIES
Discharge: HOME | End: 2023-07-20
Payer: MEDICARE

## 2023-07-20 PROCEDURE — 97110 THERAPEUTIC EXERCISES: CPT

## 2023-07-20 NOTE — PROGRESS NOTES
ambulation re-education. (48759)    Self-Care/ADL's  [] Self-care/home management training and compensatory training, meal preparation, safety procedures, and instructions in use of assistive technology devices/adaptive equipment, direct one-on-one contact. (40440)    Home Exercise Program: counter HEP    [x] Reviewed/Progressed HEP activities related to strengthening, flexibility, endurance, ROM. (09430)  [] Reviewed/Progressed HEP activities related to improving balance, coordination, kinesthetic sense, posture, motor skill, proprioception.  (64231)    Manual Treatments:    [] Provided manual therapy to mobilize soft tissue/joints for the purpose of modulating pain, promoting relaxation,  increasing ROM, reducing/eliminating soft tissue swelling/inflammation/restriction, improving soft tissue extensibility.  (37176)    Service Based Modalities:      Timed Code Treatment Minutes:   45' therex    Total Treatment Minutes:   45'    Treatment/Activity Tolerance:  [x] Patient tolerated treatment well [] Patient limited by fatique  [] Patient limited by pain  [] Patient limited by other medical complications  [] Other:     Prognosis: [] Good [x] Fair  [] Poor    Patient Requires Follow-up: [x] Yes  [] No      Goals:  Short Term Goals  Time Frame for Short Term Goals: 1 week  Short Term Goal 1: Start HEP    Long Term Goals  Time Frame for Long Term Goals : 4 weeks  Long Term Goal 1: Sit to  30\" x9 for B LE  power & balance  Long Term Goal 2: TUG 15 sec for gait efficiency  Long Term Goal 3: Mcgarry Balance Scale improve to 42/56 to reduce fall risk  Long Term Goal 4: Able to stand 5 min at a time      Plan:   [x] Continue per plan of care [] Alter current plan (see comments)  [] Plan of care initiated [] Hold pending MD visit [] Discharge    Plan for Next Session:      Electronically signed by:  Rebeca Marin PTA

## 2023-07-24 ENCOUNTER — HOSPITAL ENCOUNTER (OUTPATIENT)
Dept: PHYSICAL THERAPY | Age: 83
Setting detail: THERAPIES SERIES
Discharge: HOME | End: 2023-07-24
Payer: MEDICARE

## 2023-07-24 PROCEDURE — 97110 THERAPEUTIC EXERCISES: CPT

## 2023-07-24 NOTE — PROGRESS NOTES
dynamic activities to improve functional performance). (75944)    Gait:   [] Provided training and instruction to the patient for ambulation re-education. (88959)    Self-Care/ADL's  [] Self-care/home management training and compensatory training, meal preparation, safety procedures, and instructions in use of assistive technology devices/adaptive equipment, direct one-on-one contact. (44860)    Home Exercise Program: counter HEP    [x] Reviewed/Progressed HEP activities related to strengthening, flexibility, endurance, ROM. (34045)  [] Reviewed/Progressed HEP activities related to improving balance, coordination, kinesthetic sense, posture, motor skill, proprioception.  (66230)    Manual Treatments:    [] Provided manual therapy to mobilize soft tissue/joints for the purpose of modulating pain, promoting relaxation,  increasing ROM, reducing/eliminating soft tissue swelling/inflammation/restriction, improving soft tissue extensibility.  (23363)    Service Based Modalities:      Timed Code Treatment Minutes:   36' therex    Total Treatment Minutes:   36'    Treatment/Activity Tolerance:  [x] Patient tolerated treatment well [] Patient limited by fatique  [] Patient limited by pain  [] Patient limited by other medical complications  [] Other:     Prognosis: [] Good [x] Fair  [] Poor    Patient Requires Follow-up: [x] Yes  [] No      Goals:  Short Term Goals  Time Frame for Short Term Goals: 1 week  Short Term Goal 1: Start HEP (provider)    Long Term Goals  Time Frame for Long Term Goals : 4 weeks  Long Term Goal 1: Sit to  30\" x9 for B LE  power & balance (see above)  Long Term Goal 2: TUG 15 sec for gait efficiency (see above)  Long Term Goal 3: Mcgarry Balance Scale improve to 42/56 to reduce fall risk (see above)  Long Term Goal 4: Able to stand 5 min at a time (20 minutes)      Plan:   [x] Continue per plan of care [] Alter current plan (see comments)  [] Plan of care initiated [] Hold pending MD visit []

## 2023-07-27 ENCOUNTER — HOSPITAL ENCOUNTER (OUTPATIENT)
Dept: PHYSICAL THERAPY | Age: 83
Setting detail: THERAPIES SERIES
Discharge: HOME | End: 2023-07-27
Payer: MEDICARE

## 2023-07-27 NOTE — PROGRESS NOTES
Physical Therapy  Outpatient Physical Therapy    [x] [place]  Phone: [phone]  Fax: [phone]      [] [place]  Phone: [phone]  Fax: [phone]    Physical Therapy  Cancellation/No-show Note  Patient Name:  Sandra Becerra  :  1940   Date:  2023  Cancelled visits to date: 1  No-shows to date: 0    For today's appointment patient:  [x]  Cancelled  []  Rescheduled appointment  []  No-show     Reason given by patient:  [x]  Patient ill  []  Conflicting appointment  []  No transportation    []  Conflict with work  []  No reason given  []  Other:     Comments:      Electronically signed by: Beau Ordonez PTA

## 2023-08-01 ENCOUNTER — HOSPITAL ENCOUNTER (OUTPATIENT)
Dept: PHYSICAL THERAPY | Age: 83
Setting detail: THERAPIES SERIES
Discharge: HOME | End: 2023-08-01
Payer: MEDICARE

## 2023-08-01 PROCEDURE — 97110 THERAPEUTIC EXERCISES: CPT | Performed by: PHYSICAL THERAPIST

## 2023-08-01 NOTE — PROGRESS NOTES
preparation, safety procedures, and instructions in use of assistive technology devices/adaptive equipment, direct one-on-one contact. (41111)    Home Exercise Program: counter HEP    [x] Reviewed/Progressed HEP activities related to strengthening, flexibility, endurance, ROM. (72840)  [] Reviewed/Progressed HEP activities related to improving balance, coordination, kinesthetic sense, posture, motor skill, proprioception.  (42471)    Manual Treatments:    [] Provided manual therapy to mobilize soft tissue/joints for the purpose of modulating pain, promoting relaxation,  increasing ROM, reducing/eliminating soft tissue swelling/inflammation/restriction, improving soft tissue extensibility.  (02979)    Service Based Modalities:      Timed Code Treatment Minutes:   28' therex    Total Treatment Minutes:   28'    Treatment/Activity Tolerance:  [x] Patient tolerated treatment well [] Patient limited by fatique  [] Patient limited by pain  [] Patient limited by other medical complications  [] Other:     Prognosis: [] Good [x] Fair  [] Poor    Patient Requires Follow-up: [x] Yes  [] No      Goals:  Short Term Goals  Time Frame for Short Term Goals: 1 week  Short Term Goal 1: Start HEP (provider)    Long Term Goals  Time Frame for Long Term Goals : 4 weeks  Long Term Goal 1: Sit to  30\" x9 for B LE  power & balance (see above)  Long Term Goal 2: TUG 15 sec for gait efficiency (see above)  Long Term Goal 3: Mcgarry Balance Scale improve to 42/56 to reduce fall risk (see above)  Long Term Goal 4: Able to stand 5 min at a time (20 minutes)      Plan:   [x] Continue per plan of care [] Alter current plan (see comments)  [] Plan of care initiated [] Hold pending MD visit [] Discharge    Plan for Next Session:      Electronically signed by:  Evangelist Mejia PT

## 2023-08-03 ENCOUNTER — HOSPITAL ENCOUNTER (OUTPATIENT)
Dept: PHYSICAL THERAPY | Age: 83
Setting detail: THERAPIES SERIES
Discharge: HOME | End: 2023-08-03
Payer: MEDICARE

## 2023-08-10 ENCOUNTER — OFFICE VISIT (OUTPATIENT)
Dept: NEUROLOGY | Age: 83
End: 2023-08-10
Payer: MEDICARE

## 2023-08-10 VITALS
SYSTOLIC BLOOD PRESSURE: 122 MMHG | BODY MASS INDEX: 32.44 KG/M2 | HEART RATE: 68 BPM | WEIGHT: 190 LBS | DIASTOLIC BLOOD PRESSURE: 70 MMHG | HEIGHT: 64 IN | OXYGEN SATURATION: 95 %

## 2023-08-10 DIAGNOSIS — M48.061 SPINAL STENOSIS OF LUMBAR REGION WITHOUT NEUROGENIC CLAUDICATION: ICD-10-CM

## 2023-08-10 DIAGNOSIS — R26.89 BALANCE PROBLEMS: ICD-10-CM

## 2023-08-10 DIAGNOSIS — Z91.81 H/O FALL: ICD-10-CM

## 2023-08-10 DIAGNOSIS — M48.061 SPINAL STENOSIS, LUMBAR REGION, WITHOUT NEUROGENIC CLAUDICATION: ICD-10-CM

## 2023-08-10 DIAGNOSIS — Z86.73 H/O: STROKE: ICD-10-CM

## 2023-08-10 DIAGNOSIS — F32.A ANXIETY AND DEPRESSION: ICD-10-CM

## 2023-08-10 DIAGNOSIS — I65.21 STENOSIS OF RIGHT INTERNAL CAROTID ARTERY: ICD-10-CM

## 2023-08-10 DIAGNOSIS — M47.816 LUMBAR FACET JOINT SYNDROME: ICD-10-CM

## 2023-08-10 DIAGNOSIS — F41.9 ANXIETY AND DEPRESSION: ICD-10-CM

## 2023-08-10 DIAGNOSIS — R42 DIZZINESS: Primary | ICD-10-CM

## 2023-08-10 DIAGNOSIS — G31.9 DIFFUSE CEREBRAL ATROPHY (HCC): ICD-10-CM

## 2023-08-10 DIAGNOSIS — I67.82 CHRONIC CEREBRAL ISCHEMIA: ICD-10-CM

## 2023-08-10 DIAGNOSIS — Q21.10 ASD (ATRIAL SEPTAL DEFECT): ICD-10-CM

## 2023-08-10 DIAGNOSIS — F11.20 OPIOID DEPENDENCE WITH CURRENT USE (HCC): ICD-10-CM

## 2023-08-10 DIAGNOSIS — G62.9 PERIPHERAL POLYNEUROPATHY: ICD-10-CM

## 2023-08-10 DIAGNOSIS — R26.9 GAIT DIFFICULTY: ICD-10-CM

## 2023-08-10 DIAGNOSIS — R41.3 MEMORY PROBLEM: ICD-10-CM

## 2023-08-10 PROCEDURE — 1123F ACP DISCUSS/DSCN MKR DOCD: CPT | Performed by: PSYCHIATRY & NEUROLOGY

## 2023-08-10 PROCEDURE — 99214 OFFICE O/P EST MOD 30 MIN: CPT | Performed by: PSYCHIATRY & NEUROLOGY

## 2023-08-10 PROCEDURE — G8400 PT W/DXA NO RESULTS DOC: HCPCS | Performed by: PSYCHIATRY & NEUROLOGY

## 2023-08-10 PROCEDURE — 1090F PRES/ABSN URINE INCON ASSESS: CPT | Performed by: PSYCHIATRY & NEUROLOGY

## 2023-08-10 PROCEDURE — 1036F TOBACCO NON-USER: CPT | Performed by: PSYCHIATRY & NEUROLOGY

## 2023-08-10 PROCEDURE — G8417 CALC BMI ABV UP PARAM F/U: HCPCS | Performed by: PSYCHIATRY & NEUROLOGY

## 2023-08-10 PROCEDURE — G8427 DOCREV CUR MEDS BY ELIG CLIN: HCPCS | Performed by: PSYCHIATRY & NEUROLOGY

## 2023-08-10 RX ORDER — LORATADINE 10 MG/1
10 CAPSULE, LIQUID FILLED ORAL DAILY
COMMUNITY

## 2023-08-10 ASSESSMENT — ENCOUNTER SYMPTOMS
CHOKING: 0
SHORTNESS OF BREATH: 0
TROUBLE SWALLOWING: 0
VOICE CHANGE: 0
APNEA: 0
CHEST TIGHTNESS: 0
SINUS PRESSURE: 0
FACIAL SWELLING: 0
WHEEZING: 0

## 2023-08-10 NOTE — PROGRESS NOTES
azelastine (ASTELIN) 0.1 % nasal spray 1 spray by Nasal route 2 times daily Use in each nostril as directed 30 mL 1    omeprazole (PRILOSEC) 40 MG delayed release capsule Take 1 capsule by mouth nightly 30 capsule 5    furosemide (LASIX) 20 MG tablet Take 1 tablet by mouth 3 times daily (Patient taking differently: Take 2 tablets by mouth) 90 tablet 1    buPROPion (WELLBUTRIN XL) 150 MG extended release tablet take 1 tablet by mouth once daily 90 tablet 1    spironolactone (ALDACTONE) 25 MG tablet take 1 tablet by mouth once daily (Patient taking differently: 1 tablet take 2 tablet by mouth once daily) 90 tablet 1    fluticasone (FLONASE) 50 MCG/ACT nasal spray instill 2 sprays into each nostril once daily      ipratropium (ATROVENT) 0.06 % nasal spray instill 2 sprays into each nostril twice a day      acetaminophen (TYLENOL) 650 MG extended release tablet Take 1 tablet by mouth every 8 hours as needed for Pain      cetirizine (ZYRTEC) 10 MG tablet take 1 tablet by mouth once daily      metoprolol succinate (TOPROL XL) 25 MG extended release tablet take 1 tablet by mouth once daily      calcium carbonate-vitamin D 600-200 MG-UNIT TABS Take 2 tablets by mouth daily      ketotifen (ZADITOR) 0.025 % ophthalmic solution 1 drop 2 times daily      Multiple Vitamin (MULTI-VITAMIN DAILY) TABS Take by mouth      melatonin 1 MG tablet Takes 1/2 of tab.      aspirin 81 MG tablet Take 1 tablet by mouth daily      guaiFENesin (MUCINEX) 600 MG extended release tablet Mucinex 600 mg tablet, extended release   Take 2 tablets every day by oral route.      lidocaine (LMX) 4 % cream Apply topically as needed for Pain Apply topically as needed. fluticasone propionate 50 MCG/BLIST AEPB Inhale into the lungs      flecainide (TAMBOCOR) 50 MG tablet Take 1 tablet by mouth 2 times daily      DULoxetine (CYMBALTA) 60 MG extended release capsule Take 1 capsule by mouth daily      artificial tears (ARTIFICIAL TEARS) OINT as needed.

## 2023-08-14 DIAGNOSIS — M53.3 CHRONIC LEFT SACROILIAC JOINT PAIN: ICD-10-CM

## 2023-08-14 DIAGNOSIS — G89.29 CHRONIC LEFT SACROILIAC JOINT PAIN: ICD-10-CM

## 2023-08-14 PROBLEM — J45.40 MODERATE PERSISTENT ASTHMA WITHOUT COMPLICATION: Status: ACTIVE | Noted: 2023-08-14

## 2023-08-14 PROBLEM — J06.9 VIRAL UPPER RESPIRATORY ILLNESS: Status: RESOLVED | Noted: 2022-08-17 | Resolved: 2023-08-14

## 2023-08-14 NOTE — TELEPHONE ENCOUNTER
Andrew Glover  called requesting a refill of the below medication which has been pended for you:     Requested Prescriptions     Pending Prescriptions Disp Refills    oxyCODONE (OXY-IR) 15 MG immediate release tablet 90 tablet 0     Sig: Take 1 tablet by mouth every 8 hours as needed for Pain for up to 30 days. Allergies   Allergen Reactions    Sulfa Antibiotics Shortness Of Breath    Sulfamethoxazole-Trimethoprim Anaphylaxis     (Bactrim)    Ansaid [Flurbiprofen] Other (See Comments)     Light headed and difficult with vision \"seeing\"    Gentamicin Other (See Comments)     Eye ointment caused eye swelling, redness    Motrin [Ibuprofen] Other (See Comments)     \"I coughed so bad I broke a rib\"    Quinidine      Light headed    Chlorthalidone Hives    Hydrocodone-Acetaminophen     Mirapex [Pramipexole Dihydrochloride] Other (See Comments)     Unknown reaction    Mobic [Meloxicam] Nausea Only    Nsaids Other (See Comments)     lightheaded    Quinolones Other (See Comments) and Hives    Reglan [Metoclopramide] Other (See Comments)     Hyperactive and stomach pain    Trazodone Other (See Comments)     unknown    Amino Acids Nausea And Vomiting     Other reaction(s): AOF    Corgard [Nadolol] Other (See Comments)     Severe coughing and broke a rib as a result     Erythromycin Nausea Only    Etodolac      GI issues    Garamycin [Gentamicin Sulfate] Other (See Comments)     Redness and irritation    Inderal [Propranolol] Other (See Comments)     Severe cough    Iothalamate Nausea And Vomiting     Other reaction(s): AOF    Lisinopril Itching    Loratadine      Does not work    Procainamide      Other reaction(s): LIGHTHEADED, Unknown    Ultram [Tramadol] Other (See Comments)     Didn't work               Last Drug Screen:  5-25-23  Last Appointment:  5/25/2023   Next Appointment: 9-14-23    [place] Report checked for [place], [place], and [place]: Oxy IR 15mg 6-27-23 #90. Due now.

## 2023-08-15 RX ORDER — OXYCODONE HYDROCHLORIDE 15 MG/1
15 TABLET ORAL EVERY 8 HOURS PRN
Qty: 90 TABLET | Refills: 0 | Status: SHIPPED | OUTPATIENT
Start: 2023-08-15 | End: 2023-09-14

## 2023-08-22 ENCOUNTER — OFFICE VISIT (OUTPATIENT)
Dept: PULMONOLOGY | Age: 83
End: 2023-08-22
Payer: MEDICARE

## 2023-08-22 VITALS
HEART RATE: 82 BPM | HEIGHT: 64 IN | OXYGEN SATURATION: 97 % | WEIGHT: 187 LBS | BODY MASS INDEX: 31.92 KG/M2 | SYSTOLIC BLOOD PRESSURE: 122 MMHG | DIASTOLIC BLOOD PRESSURE: 68 MMHG

## 2023-08-22 DIAGNOSIS — J45.40 MODERATE PERSISTENT ASTHMA WITHOUT COMPLICATION: ICD-10-CM

## 2023-08-22 DIAGNOSIS — G47.33 OSA (OBSTRUCTIVE SLEEP APNEA): Primary | ICD-10-CM

## 2023-08-22 PROCEDURE — 1036F TOBACCO NON-USER: CPT | Performed by: INTERNAL MEDICINE

## 2023-08-22 PROCEDURE — 99213 OFFICE O/P EST LOW 20 MIN: CPT | Performed by: INTERNAL MEDICINE

## 2023-08-22 PROCEDURE — G8427 DOCREV CUR MEDS BY ELIG CLIN: HCPCS | Performed by: INTERNAL MEDICINE

## 2023-08-22 PROCEDURE — 1090F PRES/ABSN URINE INCON ASSESS: CPT | Performed by: INTERNAL MEDICINE

## 2023-08-22 PROCEDURE — 99214 OFFICE O/P EST MOD 30 MIN: CPT | Performed by: INTERNAL MEDICINE

## 2023-08-22 PROCEDURE — G8417 CALC BMI ABV UP PARAM F/U: HCPCS | Performed by: INTERNAL MEDICINE

## 2023-08-22 PROCEDURE — 1123F ACP DISCUSS/DSCN MKR DOCD: CPT | Performed by: INTERNAL MEDICINE

## 2023-08-22 PROCEDURE — G8400 PT W/DXA NO RESULTS DOC: HCPCS | Performed by: INTERNAL MEDICINE

## 2023-08-22 RX ORDER — IPRATROPIUM BROMIDE AND ALBUTEROL SULFATE 2.5; .5 MG/3ML; MG/3ML
1 SOLUTION RESPIRATORY (INHALATION) EVERY 4 HOURS
Qty: 360 ML | Refills: 11 | Status: SHIPPED | OUTPATIENT
Start: 2023-08-22

## 2023-08-22 RX ORDER — BUDESONIDE AND FORMOTEROL FUMARATE DIHYDRATE 160; 4.5 UG/1; UG/1
2 AEROSOL RESPIRATORY (INHALATION) 2 TIMES DAILY
Qty: 2 EACH | Refills: 2 | Status: SHIPPED | OUTPATIENT
Start: 2023-08-22 | End: 2023-11-20

## 2023-08-23 NOTE — PROGRESS NOTES
PULMONARY MEDICINE OUTPATIENT NOTE                                                                       PATIENT:  Elesa Felty  YOB: 1940  MRN: [id number]     REFERRED BY: Simin Quijano DO   CHIEF COMPLIANT: Asthma (3 mo f/u)       HISTORY     Elesa Felty is a 80y.o. year old female here for establishing care/follow-up    INITIAL VISIT: 6/1/2023  DATE OF VISIT:8/22/2023    Patient is returning for follow-up. PFT showed a solitary mild reduction in diffusion capacity. Patient reports increased fatigue. She was previously diagnosed with sleep apnea. She has not been using her BiPAP for more than a year. Order new machine and supplies. Continue Symbicort and as needed albuterol. PULMONARY PROBLEM LIST:  1. LONI (obstructive sleep apnea)    2. Moderate persistent asthma without complication       HISTORY OF PRESENT ILLNESS/CURRENT SYMPTOMS:  Patient is a non-smoker. She reports longstanding history of asthma. She is currently on Advair, Singulair and as needed albuterol. Reports worsening of symptoms from spring through fall. She was recently prescribed amoxicillin for acute flareup. Patient scheduled for PFT in coming week.       PULMONARY COMORBIDITIES/RISK FACTORS:     Yes No   NASO-BRONCHIAL/ENVIRONMENTAL ALLERGIES [x] []   ASTHMA [x] []   CHRONIC SINUSITIS/POSTNASAL DRIP [] [x]   GERD/ACID REFLUX [] [x]   ASPIRIN USE [] [x]   CORONARY ARTERY DISEASE  []  [x]   CONGESTIVE CARDIAC FAILURE [] [x]   ATRIAL FIBRILLATION [] [x]   AMIODARONE USE [] [x]   PULMONARY HYPERTENSION [] [x]   VENOUS THROMBOEMBOLISM [] [x]   CHRONIC LIVER DISEASE/CIRRHOSIS [] [x]   CONNECTIVE TISSUE DISORDER [] [x]   DIAPHRAGMATIC DISORDER [] [x]   CHEST WALL RESTRICTION [] [x]   MORBID OBESITY [] [x]   AMBULATORY OXYGEN USE [] [x]   NOCTURNAL CPAP USE [] [x]   NOCTURNAL BIPAP/NIV USE [] [x]     CURRENT BRONCHODILATORS/OTHER PULMONARY MEDS: Advair, Singulair, azelastine nasal spray    TOBACCO

## 2023-08-27 NOTE — H&P
Subjective:       Patient is here today for a diagnostic/therapeutic injection. 2/7/20    Active Hospital Problems    Diagnosis Date Noted    Lumbar radiculitis [M54.16]     Lumbar facet joint syndrome [M47.816] 08/30/2018       HPI: Patient is here today for adiagnostic/therapeutic injection. The most recent [facility] office visit notes have been reviewed and are unchanged. Review of Systems   Constitutional: Positive for fatigue. HENT: Negative. Eyes: Negative. Respiratory: Negative. Cardiovascular: Negative. Gastrointestinal: Negative for constipation and diarrhea. Endocrine: Negative. Genitourinary: Negative for difficulty urinating and flank pain. Musculoskeletal: Positive for back pain and myalgias. Skin: Negative. Allergic/Immunologic: Negative. Neurological: Positive for weakness and numbness. Hematological: Negative. Psychiatric/Behavioral: Positive for sleep disturbance.        Allergies   Allergen Reactions    Sulfa Antibiotics Shortness Of Breath    Sulfamethoxazole-Trimethoprim Anaphylaxis    Ansaid [Flurbiprofen] Other (See Comments)     Light headed and difficult with vision \"seeing\"    Gentamicin Other (See Comments)     Eye ointment caused eye swelling, redness    Quinidine      Light headed    Hydrocodone-Acetaminophen     Mirapex [Pramipexole Dihydrochloride] Other (See Comments)     Unknown reaction    Mobic [Meloxicam] Nausea Only    Motrin [Ibuprofen] Other (See Comments)     \"I coughed so bad I broke a rib\"    Nsaids Other (See Comments)     lightheaded    Reglan [Metoclopramide] Other (See Comments)     Hyperactive and stomach pain    Trazodone Other (See Comments)     unknown    Corgard [Nadolol] Other (See Comments)     Severe coughing and broke a rib as a result     Erythromycin Nausea Only    Etodolac      GI issues    Garamycin [Gentamicin Sulfate] Other (See Comments)     Redness and irritation    Inderal [Propranolol] Other (See Comments)     Severe cough    Lisinopril Itching    Loratadine      Does not work    Ultram [Tramadol] Other (See Comments)     Didn't work            Prior to Admission medications    Medication Sig Start Date End Date Taking? Authorizing Provider   oxyCODONE HCl (OXY-IR) 10 MG immediate release tablet Take 1 tablet by mouth 2 times daily for 30 days. 1/25/20 2/24/20 Yes Rosemary Holly MD   BiPAP Machine [address] [facility]    Yes Historical Provider, MD   melatonin 1 MG tablet melatonin   once daily   Yes Historical Provider, MD   Triprolidine-Pseudoephedrine (ANTIHISTAMINE PO) Take by mouth Indications: zyrtec    Yes Historical Provider, MD   metoprolol tartrate (LOPRESSOR) 25 MG tablet Take 12.5 mg by mouth 2 times daily   Yes Historical Provider, MD   levothyroxine (SYNTHROID) 75 MCG tablet Take 75 mcg by mouth daily 5/18/18 2/7/20 Yes Historical Provider, MD   potassium chloride (KLOR-CON M) 20 MEQ extended release tablet TAKE ONE TABLET ONCE A DAY 4/6/18  Yes Historical Provider, MD   guaiFENesin (MUCINEX) 600 MG extended release tablet Mucinex 600 mg tablet, extended release   Take 2 tablets every day by oral route. Yes Historical Provider, MD   Heating Pads (RADHA PAD/SMARTHEAT PRO/) PADS 1 Units by Does not apply route 4 times daily Use for 15 minutes to 30 minutes at a time four times a day. 5/15/18 2/7/20 Yes Samanta Mitchell MD   topiramate (TOPAMAX) 25 MG tablet Take 1 tablet by mouth 2 times daily  Patient taking differently: Take 25 mg by mouth daily  3/5/18 2/7/20 Yes Samanta Mitchell MD   budesonide-formoterol (SYMBICORT) 160-4.5 MCG/ACT AERO Inhale 2 puffs into the lungs 2 times daily   Yes Historical Provider, MD   lidocaine (ASPERCREME W/LIDOCAINE) 4 % cream Apply topically as needed for Pain Apply topically as needed.    Yes Historical Provider, MD   furosemide (LASIX) 40 MG tablet Take 40 mg by mouth 3 times daily Indications: 40 in am & 20 @ night, 20mg additional if needed    Yes Historical Provider, MD   fluticasone propionate 50 MCG/BLIST AEPB Inhale into the lungs   Yes Historical Provider, MD   simvastatin (ZOCOR) 40 MG tablet Take 40 mg by mouth nightly   Yes Historical Provider, MD   flecainide (TAMBOCOR) 50 MG tablet Take 50 mg by mouth 2 times daily. Yes Historical Provider, MD   omeprazole (PRILOSEC) 40 MG capsule Take 40 mg by mouth nightly. Yes Historical Provider, MD   montelukast (SINGULAIR) 10 MG tablet Take 10 mg by mouth nightly. Yes Historical Provider, MD   DULoxetine (CYMBALTA) 60 MG capsule Take 60 mg by mouth daily. Yes Historical Provider, MD   artificial tears (ARTIFICIAL TEARS) OINT as needed. Yes Historical Provider, MD   diclofenac sodium 1 % GEL Apply 2 g topically 4 times daily 5/13/19   KENY Brooks - CNP   aspirin 81 MG tablet Take 81 mg by mouth daily    Historical Provider, MD   albuterol (ACCUNEB) 1.25 MG/3ML nebulizer solution Inhale 1 ampule into the lungs every 6 hours as needed for Wheezing    Historical Provider, MD   Saline 0.2 % SOLN by Nasal route daily as needed     Historical Provider, MD   Immune Globulin, Human, (HIZENTRA) 10 GM/50ML SOLN Inject into the skin Tuesdays    Historical Provider, MD   diphenhydrAMINE (BENADRYL) 25 MG capsule Take 25 mg by mouth every 6 hours as needed for Itching.     Historical Provider, MD       Past Medical History:   Diagnosis Date    Anesthesia complication     2nd post op day from total knee patient stated \"I was wild and mean\"    Anxiety and depression     Arthritis     ASD (atrial septal defect)     repair in 1963    Asthma     [facility]     COPD (chronic obstructive pulmonary disease) ([facility] 1980's    Disorder of immune system ([facility]     low immune count patient on hizentra injection    GERD (gastroesophageal reflux disease)     H/O cardiac catheterization 2008    no blockage    Heart palpitations     History of Statement Selected anesthesia complications     pt states she went \"nuts\" with last anes. (total left knee 3/2015 at [facility])    History of renal stone     passed    Hx of blood clots     DVT    Hypertension     MVP (mitral valve prolapse)     LONI (obstructive sleep apnea)     uses bipap nightly    Rectocele     Spinal stenosis     Thyroid disease late     overactive radioactive iodine done       Past Surgical History:   Procedure Laterality Date    BACK SURGERY      BREAST LUMPECTOMY Left     BREAST LUMPECTOMY Left     CARDIAC SURGERY      ASD repair    CARDIAC VALVE SURGERY       SECTION  0077/3048    2x    CHOLECYSTECTOMY      CHOLECYSTECTOMY      COLONOSCOPY      three    DIAGNOSTIC CARDIAC CATH LAB PROCEDURE      DILATION AND CURETTAGE OF UTERUS      EYE SURGERY Bilateral     cataract    HC INJECTION PROCEDURE FOR SACROILIAC JOINT Left 2018    Left SIJ and piriformis, left trocanteric bursa injection performed by Yvrose Arteaga MD at [address]  2009    HYSTERECTOMY  2009    Total    JOINT REPLACEMENT Bilateral     knee    KNEE ARTHROSCOPY Left     LUMBAR SPINE SURGERY Bilateral 2019    Bilateral L4 TRANSFORAMINAL  [id number] performed by Yvrose Arteaga MD at [address] [place] [address] Dr Bilateral 3/12/2019    Bilateral L4 TRANSFORAMINAL performed by Yvrose Arteaga MD at [address] [place] [address] Dr Right 10/15/2019    RIGHT L4 & L5 TRANSFORAMINAL performed by Yvrose Arteaga MD at [address] [place] [address] Dr Left 2019    Left L4 & L5 TRANSFORAMINAL performed by Yvrose Arteaga MD at [place] [address]  2014    WA INJ DX/THER AGNT PARAVERT FACET JOINT, LUMBAR/SAC, 2ND LEVEL Bilateral 2018    Bilateral L2 L3 L4 L5 Diagnostic Medial BB performed by Yvrose Arteaga MD at [address] [place] [address] DX/THER AGNT PARAVERT FACET JOINT, LUMBAR/SAC, 2ND LEVEL Right 2018    Right L2 L3 L4 L5 Confirmatory Medial BB

## 2023-08-30 ENCOUNTER — TELEPHONE (OUTPATIENT)
Dept: PAIN MANAGEMENT | Age: 83
End: 2023-08-30

## 2023-08-30 NOTE — TELEPHONE ENCOUNTER
Refill of oxy 15 mg due on 9/14/23 appears to be 2 weeks early according to [address] Call out to patient to verify if she does need a refill, if so, how many pills are left and how often daily is she taking medication. Daughter may contacted. Last office visit = 5/25/23. Next office visit = 9/14/23.

## 2023-09-05 DIAGNOSIS — G89.29 CHRONIC LEFT SACROILIAC JOINT PAIN: ICD-10-CM

## 2023-09-05 DIAGNOSIS — M53.3 CHRONIC LEFT SACROILIAC JOINT PAIN: ICD-10-CM

## 2023-09-05 NOTE — TELEPHONE ENCOUNTER
Laila Costello called requesting a refill of the below medication which has been pended for you:     Requested Prescriptions     Pending Prescriptions Disp Refills    oxyCODONE (OXY-IR) 15 MG immediate release tablet 90 tablet 0     Sig: Take 1 tablet by mouth every 8 hours as needed for Pain for up to 30 days. Last Appointment Date: 5/25/2023  Next Appointment Date: 9/14/2023    Allergies   Allergen Reactions    Sulfa Antibiotics Shortness Of Breath    Sulfamethoxazole-Trimethoprim Anaphylaxis     (Bactrim)    Ansaid [Flurbiprofen] Other (See Comments)     Light headed and difficult with vision \"seeing\"    Gentamicin Other (See Comments)     Eye ointment caused eye swelling, redness    Motrin [Ibuprofen] Other (See Comments)     \"I coughed so bad I broke a rib\"    Quinidine      Light headed    Chlorthalidone Hives    Hydrocodone-Acetaminophen     Mirapex [Pramipexole Dihydrochloride] Other (See Comments)     Unknown reaction    Mobic [Meloxicam] Nausea Only    Nsaids Other (See Comments)     lightheaded    Quinolones Other (See Comments) and Hives    Reglan [Metoclopramide] Other (See Comments)     Hyperactive and stomach pain    Trazodone Other (See Comments)     unknown    Amino Acids Nausea And Vomiting     Other reaction(s): AOF    Corgard [Nadolol] Other (See Comments)     Severe coughing and broke a rib as a result     Erythromycin Nausea Only    Etodolac      GI issues    Garamycin [Gentamicin Sulfate] Other (See Comments)     Redness and irritation    Inderal [Propranolol] Other (See Comments)     Severe cough    Iothalamate Nausea And Vomiting     Other reaction(s): AOF    Lisinopril Itching    Loratadine      Does not work    Procainamide      Other reaction(s): LIGHTHEADED, Unknown    Ultram [Tramadol] Other (See Comments)     Didn't work         Pharmacy:  [facility] Report checked for [place], [place], and [place]:Oxy IR 15mg 08/15/23 #90. Due 09/14/23.

## 2023-09-06 NOTE — TELEPHONE ENCOUNTER
Patient called and states she is taking 2 to 3 a day depending on the pain that day. Patient states she has 60  pills in her bottle left patient filled them on 8/15/23 and should fill again on 9/14/23 patient has only used 30 pills to today patient orders when she has 4 or 5 days left.

## 2023-09-10 RX ORDER — OXYCODONE HYDROCHLORIDE 15 MG/1
15 TABLET ORAL EVERY 8 HOURS PRN
Qty: 90 TABLET | Refills: 0 | Status: SHIPPED | OUTPATIENT
Start: 2023-09-14 | End: 2023-10-14

## 2023-09-15 ENCOUNTER — OFFICE VISIT (OUTPATIENT)
Dept: PAIN MANAGEMENT | Age: 83
End: 2023-09-15
Payer: MEDICARE

## 2023-09-15 VITALS
WEIGHT: 192 LBS | DIASTOLIC BLOOD PRESSURE: 70 MMHG | HEIGHT: 64 IN | OXYGEN SATURATION: 95 % | BODY MASS INDEX: 32.78 KG/M2 | SYSTOLIC BLOOD PRESSURE: 118 MMHG | RESPIRATION RATE: 18 BRPM | HEART RATE: 47 BPM

## 2023-09-15 DIAGNOSIS — M46.1 SACROILIITIS (HCC): ICD-10-CM

## 2023-09-15 DIAGNOSIS — G89.29 CHRONIC LEFT SACROILIAC JOINT PAIN: Primary | ICD-10-CM

## 2023-09-15 DIAGNOSIS — M53.3 CHRONIC LEFT SACROILIAC JOINT PAIN: Primary | ICD-10-CM

## 2023-09-15 DIAGNOSIS — M51.36 LUMBAR DEGENERATIVE DISC DISEASE: ICD-10-CM

## 2023-09-15 DIAGNOSIS — M48.061 SPINAL STENOSIS, LUMBAR REGION, WITHOUT NEUROGENIC CLAUDICATION: Chronic | ICD-10-CM

## 2023-09-15 PROCEDURE — 1123F ACP DISCUSS/DSCN MKR DOCD: CPT | Performed by: NURSE PRACTITIONER

## 2023-09-15 PROCEDURE — G8417 CALC BMI ABV UP PARAM F/U: HCPCS | Performed by: NURSE PRACTITIONER

## 2023-09-15 PROCEDURE — 99214 OFFICE O/P EST MOD 30 MIN: CPT | Performed by: NURSE PRACTITIONER

## 2023-09-15 PROCEDURE — G8400 PT W/DXA NO RESULTS DOC: HCPCS | Performed by: NURSE PRACTITIONER

## 2023-09-15 PROCEDURE — 1090F PRES/ABSN URINE INCON ASSESS: CPT | Performed by: NURSE PRACTITIONER

## 2023-09-15 PROCEDURE — G8427 DOCREV CUR MEDS BY ELIG CLIN: HCPCS | Performed by: NURSE PRACTITIONER

## 2023-09-15 PROCEDURE — 1036F TOBACCO NON-USER: CPT | Performed by: NURSE PRACTITIONER

## 2023-09-15 RX ORDER — BACLOFEN 10 MG/1
TABLET ORAL
COMMUNITY
Start: 2023-08-17

## 2023-09-15 ASSESSMENT — ENCOUNTER SYMPTOMS
BACK PAIN: 1
SHORTNESS OF BREATH: 0
SORE THROAT: 0
COUGH: 0

## 2023-09-15 NOTE — PROGRESS NOTES
Eyes:      General: Lids are normal.   Cardiovascular:      Rate and Rhythm: Normal rate. Pulmonary:      Effort: Pulmonary effort is normal. No tachypnea, bradypnea, accessory muscle usage, prolonged expiration, respiratory distress or retractions. She is not intubated. Skin:     General: Skin is warm and dry. Capillary Refill: Capillary refill takes less than 2 seconds. Coloration: Skin is not ashen, jaundiced or pale. Findings: No rash. Nails: There is no clubbing. Neurological:      Mental Status: She is alert and oriented to person, place, and time. GCS: GCS eye subscore is 4. GCS verbal subscore is 5. GCS motor subscore is 6. Cranial Nerves: No cranial nerve deficit. Psychiatric:         Attention and Perception: Attention normal.         Mood and Affect: Mood normal.         Speech: Speech normal.         Behavior: Behavior is cooperative. Cognition and Memory: Cognition normal.         Judgment: Judgment normal.         Assessment / Plan:      Diagnosis Orders   1. Chronic left sacroiliac joint pain        2. Sacroiliitis ([address])        3. Lumbar degenerative disc disease        4. Spinal stenosis, lumbar region, without neurogenic claudication            Chronic pain diagnoses such as   1. Chronic left sacroiliac joint pain    2. Sacroiliitis ([address])    3. Lumbar degenerative disc disease    4. Spinal stenosis, lumbar region, without neurogenic claudication       controlled on current medication regime, wll continue current pain medications to improve quality of life and function. Rosalva Dickens is here for recheck/reevaluation of chronic pain. She is able to maintain daily activities with the use of current pain medications and is generally satisfied with level of analgesia. She shows no evidence of misuse or misappropriation of medications. She denies use of alcohol or illegal substances.  UDS have been Appropriate with most recent screen     Controlled

## 2023-10-26 ENCOUNTER — OFFICE VISIT (OUTPATIENT)
Dept: PAIN MANAGEMENT | Age: 83
End: 2023-10-26
Payer: MEDICARE

## 2023-10-26 VITALS
SYSTOLIC BLOOD PRESSURE: 120 MMHG | BODY MASS INDEX: 32.61 KG/M2 | HEIGHT: 64 IN | OXYGEN SATURATION: 97 % | DIASTOLIC BLOOD PRESSURE: 61 MMHG | WEIGHT: 191 LBS | RESPIRATION RATE: 16 BRPM | HEART RATE: 70 BPM

## 2023-10-26 DIAGNOSIS — G89.29 CHRONIC LEFT SACROILIAC JOINT PAIN: ICD-10-CM

## 2023-10-26 DIAGNOSIS — M54.16 LUMBAR RADICULITIS: Primary | ICD-10-CM

## 2023-10-26 DIAGNOSIS — M53.3 CHRONIC LEFT SACROILIAC JOINT PAIN: ICD-10-CM

## 2023-10-26 DIAGNOSIS — M51.26 LUMBAR DISCOGENIC PAIN SYNDROME: ICD-10-CM

## 2023-10-26 DIAGNOSIS — M48.061 SPINAL STENOSIS OF LUMBAR REGION, UNSPECIFIED WHETHER NEUROGENIC CLAUDICATION PRESENT: ICD-10-CM

## 2023-10-26 DIAGNOSIS — M51.36 LUMBAR DEGENERATIVE DISC DISEASE: ICD-10-CM

## 2023-10-26 DIAGNOSIS — G62.9 PERIPHERAL POLYNEUROPATHY: ICD-10-CM

## 2023-10-26 DIAGNOSIS — R26.89 BALANCE PROBLEMS: ICD-10-CM

## 2023-10-26 PROCEDURE — 1090F PRES/ABSN URINE INCON ASSESS: CPT | Performed by: NURSE PRACTITIONER

## 2023-10-26 PROCEDURE — 1123F ACP DISCUSS/DSCN MKR DOCD: CPT | Performed by: NURSE PRACTITIONER

## 2023-10-26 PROCEDURE — G8484 FLU IMMUNIZE NO ADMIN: HCPCS | Performed by: NURSE PRACTITIONER

## 2023-10-26 PROCEDURE — G8417 CALC BMI ABV UP PARAM F/U: HCPCS | Performed by: NURSE PRACTITIONER

## 2023-10-26 PROCEDURE — 99214 OFFICE O/P EST MOD 30 MIN: CPT | Performed by: NURSE PRACTITIONER

## 2023-10-26 PROCEDURE — 1036F TOBACCO NON-USER: CPT | Performed by: NURSE PRACTITIONER

## 2023-10-26 PROCEDURE — G8427 DOCREV CUR MEDS BY ELIG CLIN: HCPCS | Performed by: NURSE PRACTITIONER

## 2023-10-26 PROCEDURE — G8400 PT W/DXA NO RESULTS DOC: HCPCS | Performed by: NURSE PRACTITIONER

## 2023-10-26 RX ORDER — OXYCODONE HYDROCHLORIDE 15 MG/1
15 TABLET ORAL EVERY 8 HOURS PRN
Qty: 90 TABLET | Refills: 0 | Status: SHIPPED | OUTPATIENT
Start: 2023-10-26 | End: 2023-11-25

## 2023-10-26 RX ORDER — DOXYCYCLINE HYCLATE 100 MG/1
CAPSULE ORAL
COMMUNITY
Start: 2023-10-16

## 2023-10-26 RX ORDER — BUSPIRONE HYDROCHLORIDE 5 MG/1
1 TABLET ORAL 2 TIMES DAILY PRN
COMMUNITY
Start: 2023-09-29

## 2023-10-26 ASSESSMENT — ENCOUNTER SYMPTOMS
COUGH: 0
SHORTNESS OF BREATH: 0
BACK PAIN: 1
SORE THROAT: 0

## 2023-10-26 NOTE — PROGRESS NOTES
Subjective:      Patient ID: Navarro Bailey is a 80 y.o. female. Chief Complaint   Patient presents with    Back Pain     lower     Pain  Associated symptoms include arthralgias and myalgias. Pertinent negatives include no chest pain, coughing, fever or sore throat. Chronic Pain  Pertinent negatives include no chest pain, fever or shortness of breath. Back Pain  Pertinent negatives include no chest pain or fever. Here today for routine pain clinic recheck.     Pain Assessment  Location of Pain: Back  Location Modifiers: Medial, Posterior, Lateral  Severity of Pain: 5  Quality of Pain: Sharp, Dull  Duration of Pain: Persistent  Frequency of Pain: Constant  Aggravating Factors: Walking, Standing, Bending, Stretching, Straightening  Limiting Behavior: Yes  Relieving Factors: Rest, Ice, Heat (meds)  Result of Injury: No  Work-Related Injury: No  Are there other pain locations you wish to document?: No    Allergies   Allergen Reactions    Sulfa Antibiotics Shortness Of Breath    Sulfamethoxazole-Trimethoprim Anaphylaxis     (Bactrim)    Ansaid [Flurbiprofen] Other (See Comments)     Light headed and difficult with vision \"seeing\"    Gentamicin Other (See Comments)     Eye ointment caused eye swelling, redness    Motrin [Ibuprofen] Other (See Comments)     \"I coughed so bad I broke a rib\"    Quinidine      Light headed    Chlorthalidone Hives    Hydrocodone-Acetaminophen     Mirapex [Pramipexole Dihydrochloride] Other (See Comments)     Unknown reaction    Mobic [Meloxicam] Nausea Only    Nsaids Other (See Comments)     lightheaded    Quinolones Other (See Comments) and Hives    Reglan [Metoclopramide] Other (See Comments)     Hyperactive and stomach pain    Trazodone Other (See Comments)     unknown    Amino Acids Nausea And Vomiting     Other reaction(s): AOF    Corgard [Nadolol] Other (See Comments)     Severe coughing and broke a rib as a result     Erythromycin Nausea Only    Etodolac      GI issues

## 2023-12-13 DIAGNOSIS — M53.3 CHRONIC LEFT SACROILIAC JOINT PAIN: ICD-10-CM

## 2023-12-13 DIAGNOSIS — G89.29 CHRONIC LEFT SACROILIAC JOINT PAIN: ICD-10-CM

## 2023-12-13 RX ORDER — OXYCODONE HYDROCHLORIDE 15 MG/1
15 TABLET ORAL EVERY 8 HOURS PRN
Qty: 90 TABLET | Refills: 0 | Status: SHIPPED | OUTPATIENT
Start: 2023-12-13 | End: 2024-01-12

## 2023-12-13 NOTE — TELEPHONE ENCOUNTER
Patient called refill line for their   Oxycodone 15 mg  [facility] [place]    Last Appt:  10/26/2023  Next Appt:   12/21/2023  Med verified in [address] [place] [address]: 11/30/23    [place] Report checked for [place], [place], and [place]:    Last filled 10/26/23  Due 12/13/23

## 2023-12-21 PROBLEM — M47.816 LUMBAR FACET JOINT SYNDROME: Status: ACTIVE | Noted: 2023-12-21

## 2024-01-03 ENCOUNTER — TELEPHONE (OUTPATIENT)
Dept: PAIN MANAGEMENT | Age: 84
End: 2024-01-03

## 2024-01-22 ENCOUNTER — OFFICE VISIT (OUTPATIENT)
Dept: PAIN MANAGEMENT | Age: 84
End: 2024-01-22
Payer: MEDICARE

## 2024-01-22 VITALS
OXYGEN SATURATION: 92 % | HEIGHT: 64 IN | SYSTOLIC BLOOD PRESSURE: 122 MMHG | BODY MASS INDEX: 32.44 KG/M2 | DIASTOLIC BLOOD PRESSURE: 62 MMHG | HEART RATE: 46 BPM | WEIGHT: 190 LBS | RESPIRATION RATE: 17 BRPM

## 2024-01-22 DIAGNOSIS — M51.36 LUMBAR DEGENERATIVE DISC DISEASE: ICD-10-CM

## 2024-01-22 DIAGNOSIS — M48.061 SPINAL STENOSIS, LUMBAR REGION, WITHOUT NEUROGENIC CLAUDICATION: Chronic | ICD-10-CM

## 2024-01-22 DIAGNOSIS — M47.816 LUMBAR FACET JOINT SYNDROME: ICD-10-CM

## 2024-01-22 DIAGNOSIS — M46.1 SACROILIITIS (HCC): ICD-10-CM

## 2024-01-22 DIAGNOSIS — I65.21 STENOSIS OF RIGHT INTERNAL CAROTID ARTERY: ICD-10-CM

## 2024-01-22 DIAGNOSIS — M51.26 LUMBAR DISCOGENIC PAIN SYNDROME: ICD-10-CM

## 2024-01-22 DIAGNOSIS — M51.37 DEGENERATION OF LUMBAR OR LUMBOSACRAL INTERVERTEBRAL DISC: Primary | ICD-10-CM

## 2024-01-22 DIAGNOSIS — G62.9 PERIPHERAL POLYNEUROPATHY: ICD-10-CM

## 2024-01-22 PROCEDURE — G8400 PT W/DXA NO RESULTS DOC: HCPCS | Performed by: NURSE PRACTITIONER

## 2024-01-22 PROCEDURE — 99214 OFFICE O/P EST MOD 30 MIN: CPT | Performed by: NURSE PRACTITIONER

## 2024-01-22 PROCEDURE — 1036F TOBACCO NON-USER: CPT | Performed by: NURSE PRACTITIONER

## 2024-01-22 PROCEDURE — 1123F ACP DISCUSS/DSCN MKR DOCD: CPT | Performed by: NURSE PRACTITIONER

## 2024-01-22 PROCEDURE — G8417 CALC BMI ABV UP PARAM F/U: HCPCS | Performed by: NURSE PRACTITIONER

## 2024-01-22 PROCEDURE — G8484 FLU IMMUNIZE NO ADMIN: HCPCS | Performed by: NURSE PRACTITIONER

## 2024-01-22 PROCEDURE — 1090F PRES/ABSN URINE INCON ASSESS: CPT | Performed by: NURSE PRACTITIONER

## 2024-01-22 PROCEDURE — 99215 OFFICE O/P EST HI 40 MIN: CPT | Performed by: NURSE PRACTITIONER

## 2024-01-22 PROCEDURE — G8427 DOCREV CUR MEDS BY ELIG CLIN: HCPCS | Performed by: NURSE PRACTITIONER

## 2024-01-22 ASSESSMENT — ENCOUNTER SYMPTOMS: BACK PAIN: 1

## 2024-01-22 NOTE — PROGRESS NOTES
[facility]  [address] [place]. [address]    Patient Name: Vignesh Sena  MRN: [id number]  Encounter Date: 1/25/2024     SUBJECTIVE:  Vignesh Sena is a 83 y.o., female being seen today regarding   Chief Complaint   Patient presents with    Back Pain     lower    and routine medication management.    Functionality Assessment & Goals:  On a scale of 0 (Does not Interfere) to 10 (Completely Interferes)  Which number describes how during the past week pain has interfered with the following:   A.  General Activity: 8    B.  Mood:  4   C.  Walking Ability:  8   D.  Normal Work (Includes both work outside the home and housework):  8   E.  Relations with Other People:  3   F.  Sleep:  8    G.  Enjoyment of Life:  3  2.  Patient prefers to Take their Pain Medications:   [x] On a regular basis   [x] Only when necessary   [] Does not take pain medications  3.  What are the Patient's Goals/ Expectations for Visiting Pain Management?   [] Learn about my pain   [] Physical Therapy   [x] Receive Injections   [] Deal with Anxiety and Stress   [x] Receive Medication   [] Treat Depression    [] Treat Sleep   [] Treat Opioid Dependence/ Addiction    radiofrequency ablation >50% pain relief 2022 lasted >6 months    Current Pain Assessment:         12/21/2023     9:32 AM   AMB PAIN ASSESSMENT   Location of Pain Back   Location Modifiers Anterior;Posterior;Superior;Inferior;Medial;Lateral   Severity of Pain 6   Quality of Pain Throbbing;Sharp;Dull;Aching   Duration of Pain Persistent   Frequency of Pain Constant   Aggravating Factors Bending;Stretching;Straightening;Exercise;Kneeling;Squatting;Standing;Walking;Stairs   Limiting Behavior Yes   Relieving Factors Rest;Ice;Heat   Result of Injury No   Work-Related Injury No   Are there other pain locations you wish to document? No        Current Medications & Allergies:   Current Outpatient Medications   Medication Instructions

## 2024-01-23 ENCOUNTER — TELEPHONE (OUTPATIENT)
Dept: PULMONOLOGY | Age: 84
End: 2024-01-23

## 2024-01-23 DIAGNOSIS — J45.40 MODERATE PERSISTENT ASTHMA WITHOUT COMPLICATION: Primary | ICD-10-CM

## 2024-01-23 RX ORDER — FLUTICASONE PROPIONATE AND SALMETEROL 250; 50 UG/1; UG/1
1 POWDER RESPIRATORY (INHALATION) EVERY 12 HOURS
Qty: 60 EACH | Refills: 3 | Status: SHIPPED | OUTPATIENT
Start: 2024-01-23

## 2024-01-23 NOTE — TELEPHONE ENCOUNTER
Received correspondence from patients insurance stating that symbicort 160-4.5 MCG Inhaler is not on their formulary. Alternative medications that are covered are Advair HFA, Breo ellipta, and Dulera. They will no longer pay for the symbicort. Please send alternative rx.   Next ov: no visit scheduled  Last ov: 08/2023

## 2024-01-25 RX ORDER — OXYCODONE HYDROCHLORIDE 15 MG/1
15 TABLET ORAL EVERY 8 HOURS PRN
Qty: 90 TABLET | Refills: 0 | Status: SHIPPED | OUTPATIENT
Start: 2024-01-25 | End: 2024-02-24

## 2024-01-29 ENCOUNTER — TELEPHONE (OUTPATIENT)
Dept: PAIN MANAGEMENT | Age: 84
End: 2024-01-29

## 2024-01-29 NOTE — TELEPHONE ENCOUNTER
Dl L4-L5 L5-S1 RFA  with MAC  sedation scheduled for 2/26/24 with surgery center notified.       Patient Educated to have .       No GLP-1

## 2024-02-05 ENCOUNTER — TELEPHONE (OUTPATIENT)
Dept: PAIN MANAGEMENT | Age: 84
End: 2024-02-05

## 2024-02-06 NOTE — TELEPHONE ENCOUNTER
[facility] faxed notes saying patient was seen in ER both 2/5 and 2/6. CT and xray showed no fractures. Patient was admitted today for pain control. Attached is notes.

## 2024-02-07 ENCOUNTER — OFFICE VISIT (OUTPATIENT)
Dept: PAIN MANAGEMENT | Age: 84
End: 2024-02-07
Payer: MEDICARE

## 2024-02-07 VITALS
SYSTOLIC BLOOD PRESSURE: 162 MMHG | DIASTOLIC BLOOD PRESSURE: 76 MMHG | WEIGHT: 188 LBS | HEIGHT: 64 IN | BODY MASS INDEX: 32.1 KG/M2

## 2024-02-07 DIAGNOSIS — R10.32 LEFT GROIN PAIN: Primary | ICD-10-CM

## 2024-02-07 DIAGNOSIS — M51.36 LUMBAR DEGENERATIVE DISC DISEASE: ICD-10-CM

## 2024-02-07 DIAGNOSIS — M47.816 LUMBAR FACET JOINT SYNDROME: ICD-10-CM

## 2024-02-07 DIAGNOSIS — M53.3 CHRONIC LEFT SACROILIAC JOINT PAIN: ICD-10-CM

## 2024-02-07 DIAGNOSIS — G89.29 CHRONIC LEFT SACROILIAC JOINT PAIN: ICD-10-CM

## 2024-02-07 DIAGNOSIS — M48.061 SPINAL STENOSIS, LUMBAR REGION, WITHOUT NEUROGENIC CLAUDICATION: Chronic | ICD-10-CM

## 2024-02-07 DIAGNOSIS — M54.16 LUMBAR RADICULITIS: ICD-10-CM

## 2024-02-07 DIAGNOSIS — M51.37 DEGENERATION OF LUMBAR OR LUMBOSACRAL INTERVERTEBRAL DISC: Chronic | ICD-10-CM

## 2024-02-07 DIAGNOSIS — M70.62 GREATER TROCHANTERIC BURSITIS OF BOTH HIPS: ICD-10-CM

## 2024-02-07 DIAGNOSIS — M70.61 GREATER TROCHANTERIC BURSITIS OF BOTH HIPS: ICD-10-CM

## 2024-02-07 PROCEDURE — 1123F ACP DISCUSS/DSCN MKR DOCD: CPT | Performed by: NURSE PRACTITIONER

## 2024-02-07 PROCEDURE — G8400 PT W/DXA NO RESULTS DOC: HCPCS | Performed by: NURSE PRACTITIONER

## 2024-02-07 PROCEDURE — 1036F TOBACCO NON-USER: CPT | Performed by: NURSE PRACTITIONER

## 2024-02-07 PROCEDURE — 1090F PRES/ABSN URINE INCON ASSESS: CPT | Performed by: NURSE PRACTITIONER

## 2024-02-07 PROCEDURE — G8427 DOCREV CUR MEDS BY ELIG CLIN: HCPCS | Performed by: NURSE PRACTITIONER

## 2024-02-07 PROCEDURE — G8417 CALC BMI ABV UP PARAM F/U: HCPCS | Performed by: NURSE PRACTITIONER

## 2024-02-07 PROCEDURE — 99214 OFFICE O/P EST MOD 30 MIN: CPT | Performed by: NURSE PRACTITIONER

## 2024-02-07 PROCEDURE — G8484 FLU IMMUNIZE NO ADMIN: HCPCS | Performed by: NURSE PRACTITIONER

## 2024-02-07 RX ORDER — METAXALONE 800 MG/1
800 TABLET ORAL 3 TIMES DAILY PRN
Qty: 90 TABLET | Refills: 1 | Status: SHIPPED | OUTPATIENT
Start: 2024-02-07 | End: 2024-04-07

## 2024-02-07 RX ORDER — TIZANIDINE 2 MG/1
TABLET ORAL
Qty: 60 TABLET | Refills: 2 | Status: SHIPPED | OUTPATIENT
Start: 2024-02-07

## 2024-02-07 RX ORDER — DULOXETIN HYDROCHLORIDE 60 MG/1
60 CAPSULE, DELAYED RELEASE ORAL DAILY
Qty: 30 CAPSULE | Refills: 3 | Status: SHIPPED | OUTPATIENT
Start: 2024-02-07

## 2024-02-07 ASSESSMENT — ENCOUNTER SYMPTOMS
BACK PAIN: 1
COLOR CHANGE: 1
CONSTIPATION: 1

## 2024-02-07 NOTE — PROGRESS NOTES
[facility]  [address] [place]. [address]    Patient Name: Vignesh Sena  MRN: [id number]  Encounter Date: 2/7/2024     SUBJECTIVE:  Vignesh Sena is a 83 y.o., female being seen today regarding   Chief Complaint   Patient presents with    Lower Back Pain     L groin and thigh--muscle spasms the last two nights    Follow-Up from Hospital     Post H, see notes in media    and routine medication management.    Functionality Assessment & Goals:  On a scale of 0 (Does not Interfere) to 10 (Completely Interferes)  Which number describes how during the past week pain has interfered with the following:   A.  General Activity: 9-10    B.  Mood:  9-10   C.  Walking Ability:  9-10   D.  Normal Work (Includes both work outside the home and housework):  9-10   E.  Relations with Other People:  9-10   F.  Sleep:  9-10    G.  Enjoyment of Life:  9-10  2.  Patient prefers to Take their Pain Medications:   [x] On a regular basis   [] Only when necessary   [] Does not take pain medications  3.  What are the Patient's Goals/ Expectations for Visiting Pain Management?   [] Learn about my pain   [] Physical Therapy   [] Receive Injections   [] Deal with Anxiety and Stress   [x] Receive Medication   [] Treat Depression    [x] Treat Sleep   [] Treat Opioid Dependence/ Addiction    Recent Imaging since last appointment related to chief complaint:  N/A  Recent Interventional Procedures since last appointment related to chief complaint:  N/A    Most recent Oswestry Disability Questionnaire completed by patient on  10/26/23    Current Pain Assessment:         2/7/2024    10:24 AM   AMB PAIN ASSESSMENT   Location of Pain Back   Location Modifiers Left;Right   Severity of Pain 9   Quality of Pain Sharp   Duration of Pain Persistent   Frequency of Pain Intermittent   Limiting Behavior Yes   Relieving Factors Heat   Result of Injury No   Work-Related Injury No   Are there other pain locations

## 2024-02-08 ENCOUNTER — TELEPHONE (OUTPATIENT)
Dept: PAIN MANAGEMENT | Age: 84
End: 2024-02-08

## 2024-02-08 NOTE — TELEPHONE ENCOUNTER
Carol reviewed L hip xray from  yesterday and orders L hip injection at  surgery center with fluoroscopy with DR. Auguste.  We have 2/21 available.    LM for pt to call to discuss.

## 2024-02-12 ENCOUNTER — TELEPHONE (OUTPATIENT)
Dept: PAIN MANAGEMENT | Age: 84
End: 2024-02-12

## 2024-02-12 NOTE — TELEPHONE ENCOUNTER
RFA scheduled on 1/22/24 and denied by medicare. Letter scanned into media .  Please review and advise.      Thank you.

## 2024-02-13 DIAGNOSIS — R10.32 LEFT GROIN PAIN: ICD-10-CM

## 2024-02-13 NOTE — TELEPHONE ENCOUNTER
Pt is admitted at  currently.  Transferring to inpatient rehab.  She wants to go ahead with Hip injection.    I have her on the schedule for  2/21 for now.  She would like to proceed.  Maybe [facility] in [place], not sure yet.  Her phone number is her cell and she is answering from the hospital today.

## 2024-02-14 ENCOUNTER — OUTSIDE SERVICES (OUTPATIENT)
Dept: INTERNAL MEDICINE | Age: 84
End: 2024-02-14
Payer: MEDICARE

## 2024-02-14 DIAGNOSIS — I50.9 CONGESTIVE HEART FAILURE, UNSPECIFIED HF CHRONICITY, UNSPECIFIED HEART FAILURE TYPE (HCC): Primary | ICD-10-CM

## 2024-02-14 DIAGNOSIS — G31.9 DIFFUSE CEREBRAL ATROPHY (HCC): ICD-10-CM

## 2024-02-14 DIAGNOSIS — F11.20 OPIOID DEPENDENCE WITH CURRENT USE (HCC): ICD-10-CM

## 2024-02-14 DIAGNOSIS — E78.5 HYPERLIPIDEMIA, UNSPECIFIED HYPERLIPIDEMIA TYPE: ICD-10-CM

## 2024-02-14 DIAGNOSIS — F03.90 DEMENTIA, UNSPECIFIED DEMENTIA SEVERITY, UNSPECIFIED DEMENTIA TYPE, UNSPECIFIED WHETHER BEHAVIORAL, PSYCHOTIC, OR MOOD DISTURBANCE OR ANXIETY (HCC): ICD-10-CM

## 2024-02-14 DIAGNOSIS — I74.10 AORTIC MURAL THROMBUS (HCC): ICD-10-CM

## 2024-02-14 DIAGNOSIS — I10 HYPERTENSION, UNSPECIFIED TYPE: ICD-10-CM

## 2024-02-14 PROCEDURE — 99305 1ST NF CARE MODERATE MDM 35: CPT | Performed by: INTERNAL MEDICINE

## 2024-02-15 NOTE — TELEPHONE ENCOUNTER
Just received denial letter which states:    Documentation submitted dos not support two diagnostic facet procedures each providing minimum 80 % primary pain relief prior to planned facet denervation.     Letter scanned into media.  Please advise.  Thanks.

## 2024-02-16 ENCOUNTER — TELEPHONE (OUTPATIENT)
Dept: PAIN MANAGEMENT | Age: 84
End: 2024-02-16

## 2024-02-16 NOTE — PROGRESS NOTES
Date of Admission: 2/14/2024  Date of Encounter: 2/14/2024  Patient:  Vignesh Sena  YOB: 1940      Chief Complaint: Admission after treatment the hospital for altered mental status    History of Present Illness:   it seems that she was admitted to the hospital for altered mental status, and [facility]  She has a history of COPD says that she has trouble breathing for which he uses nebulizers frequently.  I advised her to start a maintenance inhaler and she agrees.  She denies chest pain, fever, chills, cough at this time.    Past Medical History: Dementia, hypertension, hyperlipidemia  Past Family History: Noncontributory  Social History: Nonsmoker, non-drinker at this time    Review of Systems:  Constitutional: Denies fever, chills  Cardiac: Denies chest pain, palpitations  Except as dictated above, all other systems reviewed and are negative     Physical Exam  Vitals: Blood pressure 117/74.  Pulse 91.  Temperature 97.7.  Constitutional: No acute distress  Eyes: No lid lag or proptosis. PERRLA  HENT: External ears normal. No lesions on the lips  Respiratory: Decreased air entry bilaterally.  No wheezing or crackles  Cardiovascular: Normal S1-S2.  No murmurs  Gastrointestinal: No visible veins or masses. Good bowel sounds  Skin: No abnormal rashes. No digital cyanosis  Neurologic: Cranial nerves grossly intact. Sensation grossly intact  Psychiatric: Alert and oriented. Normal Affect.     Assessments   Diagnosis Orders   1. Congestive heart failure, unspecified HF chronicity, unspecified heart failure type (HCC)        2. Aortic mural thrombus (HCC)        3. Diffuse cerebral atrophy (HCC)        4. Opioid dependence with current use (HCC)        5. Dementia, unspecified dementia severity, unspecified dementia type, unspecified whether behavioral, psychotic, or mood disturbance or anxiety (HCC)        6. Hypertension,

## 2024-02-16 NOTE — TELEPHONE ENCOUNTER
Patient called wanting something stronger. Patient is stating that she wants it 3 times a day like she was getting it at home. Writer told patient that the every 8 hours, IS 3 times a day, as it was prescribed. Patient is stating that it is not enough and she needs something stronger    [facility].

## 2024-02-16 NOTE — TELEPHONE ENCOUNTER
Writer called [facility] and they stated the script was written for Q8 hours and that she was asking for it a couple hours before the 8 hour noelle, but has been getting it Q along with Tramadol.    Requested [facility]. Nurse said she would fax it over.

## 2024-02-16 NOTE — TELEPHONE ENCOUNTER
[facility] called again and stated that when the MAR prints, it only shows the last time it was given.    Per Carol, writer asked if they could verbally give me 3 days worth of dates and times given. Nurse stated she will give us a call back after med pass.

## 2024-02-16 NOTE — TELEPHONE ENCOUNTER
Pt called from [facility] in [place].stating she is not getting her pain medication as she was taking it at home. She is very sick and needs someone to come take care of it for her.    [phone]    Last Appt:  1/22/2024  Next Appt:   3/4/2024  Med verified in Epic

## 2024-02-18 ENCOUNTER — TELEPHONE (OUTPATIENT)
Dept: INTERNAL MEDICINE | Age: 84
End: 2024-02-18

## 2024-02-18 DIAGNOSIS — M51.37 DEGENERATION OF LUMBAR OR LUMBOSACRAL INTERVERTEBRAL DISC: ICD-10-CM

## 2024-02-18 DIAGNOSIS — M51.37 DEGENERATION OF LUMBAR OR LUMBOSACRAL INTERVERTEBRAL DISC: Primary | ICD-10-CM

## 2024-02-18 RX ORDER — OXYCODONE HYDROCHLORIDE 15 MG/1
15 TABLET ORAL EVERY 8 HOURS PRN
Qty: 90 TABLET | Refills: 0 | Status: SHIPPED | OUTPATIENT
Start: 2024-02-18 | End: 2024-03-19

## 2024-02-19 NOTE — TELEPHONE ENCOUNTER
I was informed by the nursing staff at [facility] that she uses oxycodone 15 mg 3 times a day, for pain management, which is confirmed by our records.  I advised that I will [facility]

## 2024-02-21 ENCOUNTER — OUTSIDE SERVICES (OUTPATIENT)
Dept: INTERNAL MEDICINE | Age: 84
End: 2024-02-21
Payer: MEDICARE

## 2024-02-21 DIAGNOSIS — I10 HYPERTENSION, UNSPECIFIED TYPE: ICD-10-CM

## 2024-02-21 DIAGNOSIS — I48.91 ATRIAL FIBRILLATION, UNSPECIFIED TYPE (HCC): ICD-10-CM

## 2024-02-21 DIAGNOSIS — I50.9 CONGESTIVE HEART FAILURE, UNSPECIFIED HF CHRONICITY, UNSPECIFIED HEART FAILURE TYPE (HCC): ICD-10-CM

## 2024-02-21 DIAGNOSIS — M51.37 DEGENERATION OF LUMBAR OR LUMBOSACRAL INTERVERTEBRAL DISC: Primary | ICD-10-CM

## 2024-02-21 DIAGNOSIS — G31.9 DIFFUSE CEREBRAL ATROPHY (HCC): ICD-10-CM

## 2024-02-21 DIAGNOSIS — F03.90 DEMENTIA, UNSPECIFIED DEMENTIA SEVERITY, UNSPECIFIED DEMENTIA TYPE, UNSPECIFIED WHETHER BEHAVIORAL, PSYCHOTIC, OR MOOD DISTURBANCE OR ANXIETY (HCC): ICD-10-CM

## 2024-02-21 DIAGNOSIS — I74.10 AORTIC MURAL THROMBUS (HCC): ICD-10-CM

## 2024-02-21 DIAGNOSIS — E78.5 HYPERLIPIDEMIA, UNSPECIFIED HYPERLIPIDEMIA TYPE: ICD-10-CM

## 2024-02-21 PROCEDURE — 99309 SBSQ NF CARE MODERATE MDM 30: CPT | Performed by: INTERNAL MEDICINE

## 2024-02-24 NOTE — PROGRESS NOTES
Date of Encounter: 2/21/2024  Patient:  Vignesh Sena  YOB: 1940      Chief Complaint: Follow up on chronic medical conditions    History of Present Illness:  I was informed by the nursing staff that she wants to discuss her medications.  She says that she has a history of atrial fibrillation, and says that she has been on metoprolol and propafenone for a long time.  It seems that she was discharged in the hospital only on metoprolol.  She asks why she not on propafenone.  I informed her that I am not sure, but she says that she has an appointment with her cardiologist, she is not sure that it was not stopped in the hospital due to a side effect.  However, she says that she does not have any chest pain, shortness of breath, palpitations, lightheadedness at this time.  I informed her that he can ask her cardiologist and that we can start it if he thinks is necessary.    Past Medical/Surgical/Social History: Per admission note    Physical Exam  Vitals: Blood Pressure 117/62. Pulse 70. Temperature 97.9. Respirations 18  Constitutional: No acute distress  Eyes: Sclerae nonicteric. No lid lag or proptosis  HENT: External ears normal. No external lesions on the nose. No lesions on the lips  Neck: No gross masses. Trachea visibly midline  Respiratory: Good air entry bilaterally. No wheezing or crackles  Cardiovascular: Normal S1-S2. No murmurs  Gastrointestinal: No visible masses.   Skin: No abnormal rashes. No digital cyanosis  Neurologic: Cranial nerves grossly intact    Assessments   Diagnosis Orders   1. Degeneration of lumbar or lumbosacral intervertebral disc        2. Congestive heart failure, unspecified HF chronicity, unspecified heart failure type (HCC)        3. Aortic mural thrombus (HCC)        4. Diffuse cerebral atrophy (HCC)        5. Dementia, unspecified dementia severity, unspecified dementia type, unspecified whether behavioral, psychotic, or mood disturbance or anxiety (HCC)

## 2024-02-26 NOTE — TELEPHONE ENCOUNTER
I have been on hold >11 minutes at [facility] [place].  Trying to see if pt was discharged as expected per pt.    Pt does have a pain mgmt follow up scheduled in March.

## 2024-02-26 NOTE — TELEPHONE ENCOUNTER
Pt is still a resident at [facility].  Her nurse tells me she had a hip injection at [facility] today and lidocaine patch was ordered.  No discharge date yet.  They will be in touch as needed.  Nurse tells me pt is not even taking her oxy tid, which is how it is ordered tid prn.

## 2024-02-27 ENCOUNTER — TELEPHONE (OUTPATIENT)
Dept: PAIN MANAGEMENT | Age: 84
End: 2024-02-27

## 2024-02-27 RX ORDER — LIDOCAINE 50 MG/G
3 PATCH TOPICAL DAILY
Qty: 90 PATCH | Refills: 0 | Status: SHIPPED | OUTPATIENT
Start: 2024-02-27 | End: 2024-03-28

## 2024-02-27 RX ORDER — LIDOCAINE 50 MG/G
1 PATCH TOPICAL DAILY
Refills: 0 | Status: CANCELLED | OUTPATIENT
Start: 2024-02-27

## 2024-02-27 NOTE — TELEPHONE ENCOUNTER
Patient called wanting know if Dr. Auguste or Carol could call in [place] patched to her pharmacy, pt had to cancel her appt on 3/1/24 due to there is no transportation at the nursing home to bring her. She states she uses 3 patches a day.    She would like it sent to [facility] in [place].

## 2024-02-27 NOTE — TELEPHONE ENCOUNTER
Pt had a steroid injection yesterday with Dr Scott in her left hip. Is she still ok to come tomorrow for the injection with Dr Auguste?    [phone]    Last Appt:  1/22/2024  Next Appt:   3/4/2024  Med verified in Epic

## 2024-02-27 NOTE — TELEPHONE ENCOUNTER
Writer called surgery center, patient does NOT have injection tomorrow. Writer clarified this with patient, she has f/u appt schedule for 3/1. Patient verbalized understanding.

## 2024-02-29 LAB
T4 FREE: 0.98 NG/DL (ref 0.78–2.19)
TSH SERPL DL<=0.05 MIU/L-ACNC: 1.43 MIU/ML (ref 0.49–4.67)

## 2024-03-29 DIAGNOSIS — M51.37 DEGENERATION OF LUMBAR OR LUMBOSACRAL INTERVERTEBRAL DISC: ICD-10-CM

## 2024-03-29 RX ORDER — OXYCODONE HYDROCHLORIDE 15 MG/1
15 TABLET ORAL EVERY 8 HOURS PRN
Qty: 90 TABLET | Refills: 0 | Status: SHIPPED | OUTPATIENT
Start: 2024-03-29 | End: 2024-04-28

## 2024-03-29 NOTE — TELEPHONE ENCOUNTER
Pt calling to request refill of Oxycodone IR 15 mg q 8 h prn to [facility].  She has one tablet left.    Last Appt:  1/22/2024  Next Appt:   4/1/2024  Med verified in [facility]    OARRS Report checked for [place], [place], and [place]:  Oxycodone IR 15 mg #90 LF 1/25/24.  Due Now.  Pt was in the [facility] in [place] and the last written rx had a start date of 2/18.    Please review last drug screen report 12/21/23

## 2024-04-05 ENCOUNTER — OFFICE VISIT (OUTPATIENT)
Dept: PAIN MANAGEMENT | Age: 84
End: 2024-04-05
Payer: MEDICARE

## 2024-04-05 VITALS
SYSTOLIC BLOOD PRESSURE: 132 MMHG | BODY MASS INDEX: 32.1 KG/M2 | HEART RATE: 83 BPM | DIASTOLIC BLOOD PRESSURE: 76 MMHG | OXYGEN SATURATION: 97 % | HEIGHT: 64 IN | WEIGHT: 188 LBS

## 2024-04-05 DIAGNOSIS — G62.9 PERIPHERAL POLYNEUROPATHY: ICD-10-CM

## 2024-04-05 DIAGNOSIS — M51.37 DEGENERATION OF LUMBAR OR LUMBOSACRAL INTERVERTEBRAL DISC: Chronic | ICD-10-CM

## 2024-04-05 DIAGNOSIS — M51.26 LUMBAR DISCOGENIC PAIN SYNDROME: ICD-10-CM

## 2024-04-05 DIAGNOSIS — M54.16 LUMBAR RADICULITIS: ICD-10-CM

## 2024-04-05 DIAGNOSIS — M54.06 PANNICULITIS INVOLVING LUMBAR REGION: Primary | ICD-10-CM

## 2024-04-05 DIAGNOSIS — M48.061 SPINAL STENOSIS, LUMBAR REGION, WITHOUT NEUROGENIC CLAUDICATION: Chronic | ICD-10-CM

## 2024-04-05 PROCEDURE — 1123F ACP DISCUSS/DSCN MKR DOCD: CPT | Performed by: PHYSICAL MEDICINE & REHABILITATION

## 2024-04-05 PROCEDURE — G8427 DOCREV CUR MEDS BY ELIG CLIN: HCPCS | Performed by: PHYSICAL MEDICINE & REHABILITATION

## 2024-04-05 PROCEDURE — G8417 CALC BMI ABV UP PARAM F/U: HCPCS | Performed by: PHYSICAL MEDICINE & REHABILITATION

## 2024-04-05 PROCEDURE — 99214 OFFICE O/P EST MOD 30 MIN: CPT | Performed by: PHYSICAL MEDICINE & REHABILITATION

## 2024-04-05 PROCEDURE — G8400 PT W/DXA NO RESULTS DOC: HCPCS | Performed by: PHYSICAL MEDICINE & REHABILITATION

## 2024-04-05 PROCEDURE — 1090F PRES/ABSN URINE INCON ASSESS: CPT | Performed by: PHYSICAL MEDICINE & REHABILITATION

## 2024-04-05 PROCEDURE — 1036F TOBACCO NON-USER: CPT | Performed by: PHYSICAL MEDICINE & REHABILITATION

## 2024-04-05 ASSESSMENT — ENCOUNTER SYMPTOMS
BACK PAIN: 1
APNEA: 1

## 2024-04-05 NOTE — PROGRESS NOTES
[] general MAC sedation [] local MAC sedation [] IV sedation [] local sedation  [] without sedation  [] with valiumNo    Consider Lumbar RFA, Epidural  Imaging:  Providing order for -    Referrals:  Place referral to -    Follow-up Plan:  Schedule patient to follow up with our office for recheck in 1 mo  needs  Urine screen         Pain Management  [facility]

## 2024-04-22 DIAGNOSIS — M51.37 DEGENERATION OF LUMBAR OR LUMBOSACRAL INTERVERTEBRAL DISC: ICD-10-CM

## 2024-04-22 NOTE — TELEPHONE ENCOUNTER
Summer a nurse from [facility] called and requested that Sabina's oxycodone (OXY-IR) 15 mg be routine every 8 hours or twice a day instead of prn.   Patient is now a [facility] resident.     Number to reach Summer [phone]

## 2024-04-23 RX ORDER — OXYCODONE HYDROCHLORIDE 15 MG/1
15 TABLET ORAL EVERY 8 HOURS
Qty: 90 TABLET | Refills: 0 | Status: SHIPPED | OUTPATIENT
Start: 2024-04-23 | End: 2024-05-23

## 2024-04-23 NOTE — TELEPHONE ENCOUNTER
Medication is pended. The nurse asked that the script be sent to [facility], but that she also get faxed a copy of the order to [phone] Attn: Summer.     Last Appt:  4/5/2024  Next Appt:   5/6/2024  Med verified in Epic

## 2024-05-01 ENCOUNTER — TELEPHONE (OUTPATIENT)
Dept: PAIN MANAGEMENT | Age: 84
End: 2024-05-01

## 2024-05-01 NOTE — TELEPHONE ENCOUNTER
Pt called stating she is still in the SNF. Pt states it is too hot and she tried opening windows and hurt her back. Stating she tried to opened the door window,   Please advise.          Genaro    [phone]

## 2024-05-03 NOTE — TELEPHONE ENCOUNTER
Writer called SNF and spoke to patient's nurse, rene.  Patient did  herself to the ER on the day she hurt her back.  She was turned from the ER with orders to take tylenol as needed.   Nurse eRne stated that they were aware of her back compliant at the time.  As of this call patient's back ache was much better and patient is \"doing fine\".

## 2024-05-05 ENCOUNTER — OFFICE VISIT (OUTPATIENT)
Dept: PRIMARY CARE CLINIC | Age: 84
End: 2024-05-05

## 2024-05-05 VITALS
BODY MASS INDEX: 31.24 KG/M2 | DIASTOLIC BLOOD PRESSURE: 72 MMHG | OXYGEN SATURATION: 96 % | HEART RATE: 88 BPM | WEIGHT: 182 LBS | TEMPERATURE: 97.7 F | RESPIRATION RATE: 18 BRPM | SYSTOLIC BLOOD PRESSURE: 124 MMHG

## 2024-05-05 DIAGNOSIS — J06.9 UPPER RESPIRATORY TRACT INFECTION, UNSPECIFIED TYPE: Primary | ICD-10-CM

## 2024-05-05 RX ORDER — CEPHALEXIN 500 MG/1
500 CAPSULE ORAL 2 TIMES DAILY
Qty: 14 CAPSULE | Refills: 0 | Status: SHIPPED | OUTPATIENT
Start: 2024-05-05 | End: 2024-05-12

## 2024-05-06 ENCOUNTER — OFFICE VISIT (OUTPATIENT)
Dept: PAIN MANAGEMENT | Age: 84
End: 2024-05-06
Payer: MEDICARE

## 2024-05-06 ENCOUNTER — HOSPITAL ENCOUNTER (OUTPATIENT)
Age: 84
Setting detail: SPECIMEN
Discharge: HOME | End: 2024-05-06
Payer: MEDICARE

## 2024-05-06 VITALS
WEIGHT: 182 LBS | OXYGEN SATURATION: 94 % | DIASTOLIC BLOOD PRESSURE: 74 MMHG | HEART RATE: 99 BPM | SYSTOLIC BLOOD PRESSURE: 122 MMHG | BODY MASS INDEX: 31.24 KG/M2

## 2024-05-06 DIAGNOSIS — Z02.83 ENCOUNTER FOR DRUG SCREENING: ICD-10-CM

## 2024-05-06 DIAGNOSIS — G62.9 PERIPHERAL POLYNEUROPATHY: ICD-10-CM

## 2024-05-06 DIAGNOSIS — Z79.891 ENCOUNTER FOR LONG-TERM OPIATE ANALGESIC USE: ICD-10-CM

## 2024-05-06 DIAGNOSIS — M51.36 LUMBAR DEGENERATIVE DISC DISEASE: ICD-10-CM

## 2024-05-06 DIAGNOSIS — M51.37 DEGENERATION OF LUMBAR OR LUMBOSACRAL INTERVERTEBRAL DISC: ICD-10-CM

## 2024-05-06 DIAGNOSIS — M46.1 SACROILIITIS (HCC): ICD-10-CM

## 2024-05-06 DIAGNOSIS — M51.37 DEGENERATION OF LUMBAR OR LUMBOSACRAL INTERVERTEBRAL DISC: Primary | ICD-10-CM

## 2024-05-06 DIAGNOSIS — M51.26 LUMBAR DISCOGENIC PAIN SYNDROME: ICD-10-CM

## 2024-05-06 DIAGNOSIS — M47.816 LUMBAR FACET JOINT SYNDROME: ICD-10-CM

## 2024-05-06 PROCEDURE — G8427 DOCREV CUR MEDS BY ELIG CLIN: HCPCS | Performed by: NURSE PRACTITIONER

## 2024-05-06 PROCEDURE — G0481 DRUG TEST DEF 8-14 CLASSES: HCPCS

## 2024-05-06 PROCEDURE — 99214 OFFICE O/P EST MOD 30 MIN: CPT | Performed by: NURSE PRACTITIONER

## 2024-05-06 PROCEDURE — G8417 CALC BMI ABV UP PARAM F/U: HCPCS | Performed by: NURSE PRACTITIONER

## 2024-05-06 PROCEDURE — 1090F PRES/ABSN URINE INCON ASSESS: CPT | Performed by: NURSE PRACTITIONER

## 2024-05-06 PROCEDURE — 80307 DRUG TEST PRSMV CHEM ANLYZR: CPT

## 2024-05-06 PROCEDURE — G8400 PT W/DXA NO RESULTS DOC: HCPCS | Performed by: NURSE PRACTITIONER

## 2024-05-06 PROCEDURE — 1123F ACP DISCUSS/DSCN MKR DOCD: CPT | Performed by: NURSE PRACTITIONER

## 2024-05-06 PROCEDURE — 1036F TOBACCO NON-USER: CPT | Performed by: NURSE PRACTITIONER

## 2024-05-06 ASSESSMENT — ENCOUNTER SYMPTOMS
BACK PAIN: 1
COLOR CHANGE: 1
CONSTIPATION: 1

## 2024-05-06 NOTE — PROGRESS NOTES
[facility]  [address] [place]. [address]    Patient Name: Vignesh Sena  MRN: [id number]  Encounter Date: 5/6/2024     SUBJECTIVE:  Vignesh Sena is a 83 y.o., female being seen today regarding   Chief Complaint   Patient presents with    Back Pain    Medication Refill    and routine medication management.    Resides at assisted living    Functionality Assessment & Goals:  On a scale of 0 (Does not Interfere) to 10 (Completely Interferes)  Which number describes how during the past week pain has interfered with the following:   A.  General Activity: 9-10    B.  Mood:  9-10   C.  Walking Ability:  9-10   D.  Normal Work (Includes both work outside the home and housework):  9-10   E.  Relations with Other People:  9-10   F.  Sleep:  9-10    G.  Enjoyment of Life:  9-10  2.  Patient prefers to Take their Pain Medications:   [x] On a regular basis   [] Only when necessary   [] Does not take pain medications  3.  What are the Patient's Goals/ Expectations for Visiting Pain Management?   [] Learn about my pain   [] Physical Therapy   [] Receive Injections   [] Deal with Anxiety and Stress   [x] Receive Medication   [] Treat Depression    [x] Treat Sleep   [] Treat Opioid Dependence/ Addiction    Recent Imaging since last appointment related to chief complaint:  N/A  Recent Interventional Procedures since last appointment related to chief complaint:  N/A    Most recent Oswestry Disability Questionnaire completed by patient on  10/26/23    Current Pain Assessment:         5/6/2024     3:29 PM   AMB PAIN ASSESSMENT   Location of Pain Back   Severity of Pain 5   Quality of Pain Throbbing;Sharp;Dull;Aching   Duration of Pain Persistent   Frequency of Pain Constant   Aggravating Factors Bending;Stretching;Straightening;Exercise;Kneeling;Squatting;Standing;Walking;Stairs   Limiting Behavior Yes   Relieving Factors Rest;Heat   Result of Injury Yes   Work-Related Injury No

## 2024-05-08 LAB
6-ACETYLMORPHINE, UR: NOT DETECTED
7-AMINOCLONAZEPAM, URINE: NOT DETECTED
ALPHA-OH-ALPRAZ, URINE: NOT DETECTED
ALPHA-OH-MIDAZOLAM, URINE: NOT DETECTED
ALPRAZOLAM, URINE: NOT DETECTED
AMPHETAMINES, URINE: NOT DETECTED
BARBITURATES, URINE: NEGATIVE
BENZOYLECGONINE, UR: NEGATIVE
BUPRENORPHINE URINE: NOT DETECTED
CARISOPRODOL, UR: NEGATIVE
CLONAZEPAM, URINE: NOT DETECTED
CODEINE, URINE: NOT DETECTED
CREAT UR-MCNC: 46.5 MG/DL (ref 20–400)
DIAZEPAM, URINE: NOT DETECTED
EER PAIN MGT DRUG PANEL, HIGH RES/EMIT U: NORMAL
ETHYL GLUCURONIDE UR: NEGATIVE
FENTANYL URINE: NOT DETECTED
GABAPENTIN: NOT DETECTED
HYDROCODONE, URINE: NOT DETECTED
HYDROMORPHONE, URINE: NOT DETECTED
LORAZEPAM, URINE: NOT DETECTED
MARIJUANA METAB, UR: NEGATIVE
MDA, UR: NOT DETECTED
MDEA, EVE, UR: NOT DETECTED
MDMA URINE: NOT DETECTED
MEPERIDINE METAB, UR: NOT DETECTED
METHADONE, URINE: NEGATIVE
METHAMPHETAMINE, URINE: NOT DETECTED
METHYLPHENIDATE: NOT DETECTED
MIDAZOLAM, URINE: NOT DETECTED
MORPHINE, OPI1M: NOT DETECTED
NALOXONE URINE: NOT DETECTED
NORBUPRENORPHINE, URINE: NOT DETECTED
NORDIAZEPAM, URINE: NOT DETECTED
NORFENTANYL, URINE: NOT DETECTED
NORHYDROCODONE, URINE: NOT DETECTED
NOROXYCODONE, URINE: PRESENT
NOROXYMORPHONE, URINE: NOT DETECTED
OXAZEPAM, URINE: NOT DETECTED
OXYCODONE URINE: PRESENT
OXYMORPHONE, URINE: PRESENT
PAIN MGT DRUG PANEL, HI RES, UR: NORMAL
PCP,URINE: NEGATIVE
PHENTERMINE, UR: NOT DETECTED
PREGABALIN: NOT DETECTED
TAPENTADOL, URINE: NOT DETECTED
TAPENTADOL-O-SULFATE, URINE: NOT DETECTED
TEMAZEPAM, URINE: NOT DETECTED
TRAMADOL, URINE: NEGATIVE
ZOLPIDEM METABOLITE (ZCA), URINE: NOT DETECTED

## 2024-05-09 ENCOUNTER — TELEPHONE (OUTPATIENT)
Dept: FAMILY MEDICINE CLINIC | Age: 84
End: 2024-05-09

## 2024-05-09 NOTE — TELEPHONE ENCOUNTER
Pt presented to the window stating that she was here for an appointment. I asked pt who she was seeing, and pt didn't know. I looked at her appointment desk, and pt doesn't have an appointment with [facility] until August for pain management. Pt stated \"yeah. I come here for pain management\" but has never been seen here for pain management - only in [place]. Pt stated that her PCP, Seble Boles, told her that she has some appointment here today, but never gave a name to her. I call Seble's office, and spoke with Leanne, and Leanne stated that pt was very confused yesterday when she was seen.  Pt was very confused today. Pt stated \"I seem really confused today, but I'm really not.\"

## 2024-05-17 ENCOUNTER — TELEPHONE (OUTPATIENT)
Dept: PULMONOLOGY | Age: 84
End: 2024-05-17

## 2024-05-17 NOTE — TELEPHONE ENCOUNTER
[facility] called asking about her PFT on Monday. Read through the chart note last time we saw her 08/22/23.  I did see the order put in 06/01/23 then closed. She is confused on her treatment schedule.     Please Contact

## 2024-07-15 DIAGNOSIS — M51.37 DEGENERATION OF LUMBAR OR LUMBOSACRAL INTERVERTEBRAL DISC: ICD-10-CM

## 2024-07-15 RX ORDER — OXYCODONE HYDROCHLORIDE 15 MG/1
15 TABLET ORAL EVERY 8 HOURS
Qty: 90 TABLET | Refills: 0 | Status: SHIPPED | OUTPATIENT
Start: 2024-07-15 | End: 2024-08-14

## 2024-07-15 NOTE — TELEPHONE ENCOUNTER
Nurse from [facility] called stating [facility] had called last Wed or Thurs for the pt oxycodone. Nothing pended. Please send to     [facility]    [facility]- [phone]    Last Appt:  5/6/2024  Next Appt:   8/5/2024  Med verified in Epic

## 2024-08-05 ENCOUNTER — OFFICE VISIT (OUTPATIENT)
Dept: PAIN MANAGEMENT | Age: 84
End: 2024-08-05
Payer: MEDICARE

## 2024-08-05 VITALS
BODY MASS INDEX: 31.24 KG/M2 | DIASTOLIC BLOOD PRESSURE: 74 MMHG | OXYGEN SATURATION: 96 % | WEIGHT: 182 LBS | HEART RATE: 77 BPM | SYSTOLIC BLOOD PRESSURE: 128 MMHG

## 2024-08-05 DIAGNOSIS — M51.36 LUMBAR DEGENERATIVE DISC DISEASE: ICD-10-CM

## 2024-08-05 DIAGNOSIS — G62.9 PERIPHERAL POLYNEUROPATHY: ICD-10-CM

## 2024-08-05 DIAGNOSIS — M51.37 DEGENERATION OF LUMBAR OR LUMBOSACRAL INTERVERTEBRAL DISC: ICD-10-CM

## 2024-08-05 DIAGNOSIS — M48.061 SPINAL STENOSIS, LUMBAR REGION, WITHOUT NEUROGENIC CLAUDICATION: Primary | Chronic | ICD-10-CM

## 2024-08-05 DIAGNOSIS — M46.1 SACROILIITIS (HCC): ICD-10-CM

## 2024-08-05 DIAGNOSIS — M47.816 LUMBAR FACET JOINT SYNDROME: ICD-10-CM

## 2024-08-05 PROCEDURE — 99214 OFFICE O/P EST MOD 30 MIN: CPT | Performed by: NURSE PRACTITIONER

## 2024-08-05 PROCEDURE — G8417 CALC BMI ABV UP PARAM F/U: HCPCS | Performed by: NURSE PRACTITIONER

## 2024-08-05 PROCEDURE — 1123F ACP DISCUSS/DSCN MKR DOCD: CPT | Performed by: NURSE PRACTITIONER

## 2024-08-05 PROCEDURE — 1090F PRES/ABSN URINE INCON ASSESS: CPT | Performed by: NURSE PRACTITIONER

## 2024-08-05 PROCEDURE — G8400 PT W/DXA NO RESULTS DOC: HCPCS | Performed by: NURSE PRACTITIONER

## 2024-08-05 PROCEDURE — 1036F TOBACCO NON-USER: CPT | Performed by: NURSE PRACTITIONER

## 2024-08-05 PROCEDURE — G8427 DOCREV CUR MEDS BY ELIG CLIN: HCPCS | Performed by: NURSE PRACTITIONER

## 2024-08-05 RX ORDER — OXYCODONE HYDROCHLORIDE 15 MG/1
15 TABLET ORAL EVERY 8 HOURS
Qty: 90 TABLET | Refills: 0 | Status: SHIPPED | OUTPATIENT
Start: 2024-08-05 | End: 2024-09-04

## 2024-08-05 ASSESSMENT — ENCOUNTER SYMPTOMS
COLOR CHANGE: 1
BACK PAIN: 1
CONSTIPATION: 1

## 2024-08-05 NOTE — PROGRESS NOTES
[facility]  [address] [place]. [address]    Patient Name: Vignesh Sena  MRN: [id number]  Encounter Date: 8/5/2024     SUBJECTIVE:  Vignesh Sena is a 83 y.o., female being seen today regarding   Chief Complaint   Patient presents with    Back Pain    and routine medication management.    Resides at assisted living    Functionality Assessment & Goals:  On a scale of 0 (Does not Interfere) to 10 (Completely Interferes)  Which number describes how during the past week pain has interfered with the following:   A.  General Activity: 9-10    B.  Mood:  9-10   C.  Walking Ability:  9-10   D.  Normal Work (Includes both work outside the home and housework):  9-10   E.  Relations with Other People:  9-10   F.  Sleep:  9-10    G.  Enjoyment of Life:  9-10  2.  Patient prefers to Take their Pain Medications:   [x] On a regular basis   [] Only when necessary   [] Does not take pain medications  3.  What are the Patient's Goals/ Expectations for Visiting Pain Management?   [] Learn about my pain   [] Physical Therapy   [] Receive Injections   [] Deal with Anxiety and Stress   [x] Receive Medication   [] Treat Depression    [x] Treat Sleep   [] Treat Opioid Dependence/ Addiction    Recent Imaging since last appointment related to chief complaint:  N/A  Recent Interventional Procedures since last appointment related to chief complaint:  N/A    Most recent Oswestry Disability Questionnaire completed by patient on  10/26/23    Current Pain Assessment:         8/5/2024     2:53 PM   AMB PAIN ASSESSMENT   Location of Pain Back   Severity of Pain 5   Quality of Pain Throbbing;Sharp;Dull   Duration of Pain Persistent   Frequency of Pain Constant   Aggravating Factors Bending;Stretching;Straightening;Exercise;Kneeling;Squatting;Standing;Walking;Stairs   Limiting Behavior Yes   Relieving Factors Rest;Ice;Heat   Result of Injury Yes   Work-Related Injury No        Current Medications &

## 2024-08-15 DIAGNOSIS — M51.37 DEGENERATION OF LUMBAR OR LUMBOSACRAL INTERVERTEBRAL DISC: ICD-10-CM

## 2024-08-15 RX ORDER — OXYCODONE HYDROCHLORIDE 15 MG/1
15 TABLET ORAL EVERY 8 HOURS
Qty: 90 TABLET | Refills: 0 | Status: SHIPPED | OUTPATIENT
Start: 2024-08-15 | End: 2024-09-14

## 2024-10-15 DIAGNOSIS — M51.379 DEGENERATION OF LUMBAR OR LUMBOSACRAL INTERVERTEBRAL DISC: ICD-10-CM

## 2024-10-15 NOTE — TELEPHONE ENCOUNTER
Vignesh called requesting a refill of the below medication which has been pended for you:     Requested Prescriptions     Pending Prescriptions Disp Refills    oxyCODONE (OXY-IR) 15 MG immediate release tablet 90 tablet 0     Sig: Take 1 tablet by mouth in the morning and 1 tablet at noon and 1 tablet in the evening. Do all this for 30 days. Max Daily Amount: 45 mg.       Last Appointment Date: 8/5/2024  Next Appointment Date: 11/4/2024    Allergies   Allergen Reactions    Sulfa Antibiotics Shortness Of Breath    Sulfamethoxazole-Trimethoprim Anaphylaxis     (Bactrim)    Ansaid [Flurbiprofen] Other (See Comments)     Light headed and difficult with vision \"seeing\"    Gentamicin Other (See Comments)     Eye ointment caused eye swelling, redness    Motrin [Ibuprofen] Other (See Comments)     \"I coughed so bad I broke a rib\"    Quinidine      Light headed    Chlorthalidone Hives    Hydrocodone-Acetaminophen     Mirapex [Pramipexole Dihydrochloride] Other (See Comments)     Unknown reaction    Mobic [Meloxicam] Nausea Only    Nsaids Other (See Comments)     lightheaded    Quinolones Other (See Comments) and Hives    Reglan [Metoclopramide] Other (See Comments)     Hyperactive and stomach pain    Trazodone Other (See Comments)     unknown    Amino Acids Nausea And Vomiting     Other reaction(s): AOF    Corgard [Nadolol] Other (See Comments)     Severe coughing and broke a rib as a result     Erythromycin Nausea Only    Etodolac      GI issues    Garamycin [Gentamicin Sulfate] Other (See Comments)     Redness and irritation    Inderal [Propranolol] Other (See Comments)     Severe cough    Iothalamate Nausea And Vomiting     Other reaction(s): AOF    Lisinopril Itching    Loratadine      Does not work    Procainamide      Other reaction(s): LIGHTHEADED, Unknown    Ultram [Tramadol] Other (See Comments)     Didn't work         Ph[facility]acy:  [facility]    No OARRS due to nursing home patient

## 2024-10-16 RX ORDER — OXYCODONE HYDROCHLORIDE 15 MG/1
15 TABLET ORAL EVERY 8 HOURS
Qty: 90 TABLET | Refills: 0 | Status: SHIPPED | OUTPATIENT
Start: 2024-10-16 | End: 2024-11-15

## 2024-11-04 ENCOUNTER — OFFICE VISIT (OUTPATIENT)
Dept: PAIN MANAGEMENT | Age: 84
End: 2024-11-04
Payer: MEDICARE

## 2024-11-04 VITALS
HEIGHT: 64 IN | WEIGHT: 198 LBS | DIASTOLIC BLOOD PRESSURE: 64 MMHG | RESPIRATION RATE: 12 BRPM | HEART RATE: 56 BPM | SYSTOLIC BLOOD PRESSURE: 126 MMHG | BODY MASS INDEX: 33.8 KG/M2

## 2024-11-04 DIAGNOSIS — M51.360 LUMBAR DISCOGENIC PAIN SYNDROME: ICD-10-CM

## 2024-11-04 DIAGNOSIS — M48.061 SPINAL STENOSIS, LUMBAR REGION, WITHOUT NEUROGENIC CLAUDICATION: Chronic | ICD-10-CM

## 2024-11-04 DIAGNOSIS — M51.360 DEGENERATION OF INTERVERTEBRAL DISC OF LUMBAR REGION WITH DISCOGENIC BACK PAIN: Primary | ICD-10-CM

## 2024-11-04 DIAGNOSIS — M70.62 GREATER TROCHANTERIC BURSITIS OF BOTH HIPS: ICD-10-CM

## 2024-11-04 DIAGNOSIS — M70.61 GREATER TROCHANTERIC BURSITIS OF BOTH HIPS: ICD-10-CM

## 2024-11-04 DIAGNOSIS — M48.061 SPINAL STENOSIS OF LUMBAR REGION WITHOUT NEUROGENIC CLAUDICATION: ICD-10-CM

## 2024-11-04 PROCEDURE — 99214 OFFICE O/P EST MOD 30 MIN: CPT | Performed by: NURSE PRACTITIONER

## 2024-11-04 RX ORDER — OXYCODONE HYDROCHLORIDE 15 MG/1
15 TABLET ORAL EVERY 8 HOURS
Qty: 90 TABLET | Refills: 0 | Status: SHIPPED | OUTPATIENT
Start: 2024-11-04 | End: 2024-12-04

## 2024-11-04 ASSESSMENT — ENCOUNTER SYMPTOMS
COLOR CHANGE: 1
BACK PAIN: 1
CONSTIPATION: 1

## 2024-11-04 NOTE — PROGRESS NOTES
Behavior: Behavior is cooperative.         Cognition and Memory: Cognition normal.         Judgment: Judgment normal.          ASSESSMENT:    ICD-10-CM    1. Degeneration of intervertebral disc of lumbar region with discogenic back pain  M51.360       2. Degeneration of lumbar or lumbosacral intervertebral disc  M51.379 oxyCODONE (OXY-IR) 15 MG immediate release tablet      3. Lumbar discogenic pain syndrome  M51.360       4. Spinal stenosis of lumbar region without neurogenic claudication  M48.061       5. Spinal stenosis, lumbar region, without neurogenic claudication  M48.061       6. Greater trochanteric bursitis of both hips  M70.61     M70.62            I have reviewed the chief complaint and the history of present illness, vitals and all subjective documentation by [facility] clinical staff.    PLAN:  Deborala Nathen is here for a recheck of chronic pain and medication management.    Chronic pain diagnoses such as   1. Degeneration of intervertebral disc of lumbar region with discogenic back pain    2. Degeneration of lumbar or lumbosacral intervertebral disc    3. Lumbar discogenic pain syndrome    4. Spinal stenosis of lumbar region without neurogenic claudication    5. Spinal stenosis, lumbar region, without neurogenic claudication    6. Greater trochanteric bursitis of both hips     controlled on current medication regime, wll continue current pain medications to improve quality of life and function.     Controlled Substance Monitoring:    Acute and Chronic Pain Monitoring:   RX Monitoring Periodic Controlled Substance Monitoring   11/4/2024   3:36 PM No signs of potential drug abuse or diversion identified.;Obtaining appropriate analgesic effect of treatment.       Random drug screen today for opiate medication compliance.    Follow-up Plan:  Schedule patient to follow up with our office for recheck in one month       KENY Melara - CNP  Pain Management  [facility]

## 2024-12-13 DIAGNOSIS — M51.360 DEGENERATION OF INTERVERTEBRAL DISC OF LUMBAR REGION WITH DISCOGENIC BACK PAIN: Primary | ICD-10-CM

## 2024-12-13 RX ORDER — OXYCODONE HYDROCHLORIDE 15 MG/1
15 TABLET ORAL EVERY 8 HOURS
Qty: 90 TABLET | Refills: 0 | Status: SHIPPED | OUTPATIENT
Start: 2024-12-13 | End: 2025-01-12

## 2024-12-13 NOTE — TELEPHONE ENCOUNTER
Patients LTC facility called requesting a refill OXY IR      11/4/2024 1/9/2025    OAR[place] Report checked for [place], and [place]: OXY IR 11/11/24 #90. Due NOW.

## 2025-01-07 DIAGNOSIS — M51.360 DEGENERATION OF INTERVERTEBRAL DISC OF LUMBAR REGION WITH DISCOGENIC BACK PAIN: ICD-10-CM

## 2025-01-07 RX ORDER — OXYCODONE HYDROCHLORIDE 15 MG/1
15 TABLET ORAL EVERY 8 HOURS
Qty: 90 TABLET | Refills: 0 | Status: SHIPPED | OUTPATIENT
Start: 2025-01-12 | End: 2025-02-11

## 2025-01-07 NOTE — TELEPHONE ENCOUNTER
Oxycodone 15 mg  DS11 5/6/24  [facility]  Last filled 12/13/24  Due 1/12/25  Last Appt:  11/4/2024  Next Appt:   1/20/2025  Med verified in Epic

## 2025-01-30 ENCOUNTER — OFFICE VISIT (OUTPATIENT)
Dept: PAIN MANAGEMENT | Age: 85
End: 2025-01-30
Payer: MEDICARE

## 2025-01-30 ENCOUNTER — HOSPITAL ENCOUNTER (OUTPATIENT)
Age: 85
Setting detail: SPECIMEN
Discharge: HOME | End: 2025-01-30
Payer: MEDICARE

## 2025-01-30 VITALS
DIASTOLIC BLOOD PRESSURE: 72 MMHG | OXYGEN SATURATION: 95 % | SYSTOLIC BLOOD PRESSURE: 122 MMHG | HEART RATE: 90 BPM | WEIGHT: 198 LBS | HEIGHT: 64 IN | RESPIRATION RATE: 14 BRPM | BODY MASS INDEX: 33.8 KG/M2

## 2025-01-30 DIAGNOSIS — M51.370 DEGENERATION OF INTERVERTEBRAL DISC OF LUMBOSACRAL REGION WITH DISCOGENIC BACK PAIN: Chronic | ICD-10-CM

## 2025-01-30 DIAGNOSIS — M51.360 DEGENERATION OF INTERVERTEBRAL DISC OF LUMBAR REGION WITH DISCOGENIC BACK PAIN: ICD-10-CM

## 2025-01-30 DIAGNOSIS — M48.061 SPINAL STENOSIS, LUMBAR REGION, WITHOUT NEUROGENIC CLAUDICATION: Chronic | ICD-10-CM

## 2025-01-30 DIAGNOSIS — Z79.891 ENCOUNTER FOR LONG-TERM OPIATE ANALGESIC USE: ICD-10-CM

## 2025-01-30 DIAGNOSIS — Z02.83 ENCOUNTER FOR DRUG SCREENING: ICD-10-CM

## 2025-01-30 DIAGNOSIS — G62.9 PERIPHERAL POLYNEUROPATHY: ICD-10-CM

## 2025-01-30 DIAGNOSIS — Z02.83 ENCOUNTER FOR DRUG SCREENING: Primary | ICD-10-CM

## 2025-01-30 DIAGNOSIS — M54.16 LUMBAR RADICULITIS: ICD-10-CM

## 2025-01-30 DIAGNOSIS — M48.061 SPINAL STENOSIS OF LUMBAR REGION WITHOUT NEUROGENIC CLAUDICATION: ICD-10-CM

## 2025-01-30 DIAGNOSIS — M51.360 LUMBAR DISCOGENIC PAIN SYNDROME: ICD-10-CM

## 2025-01-30 DIAGNOSIS — M47.816 LUMBAR FACET JOINT SYNDROME: ICD-10-CM

## 2025-01-30 PROCEDURE — 80307 DRUG TEST PRSMV CHEM ANLYZR: CPT

## 2025-01-30 PROCEDURE — G0481 DRUG TEST DEF 8-14 CLASSES: HCPCS

## 2025-01-30 PROCEDURE — 99214 OFFICE O/P EST MOD 30 MIN: CPT | Performed by: NURSE PRACTITIONER

## 2025-01-30 RX ORDER — SERTRALINE HYDROCHLORIDE 25 MG/1
25 TABLET, FILM COATED ORAL DAILY
COMMUNITY
Start: 2024-12-23

## 2025-01-30 ASSESSMENT — ENCOUNTER SYMPTOMS
COLOR CHANGE: 1
BACK PAIN: 1
CONSTIPATION: 1

## 2025-01-30 NOTE — PROGRESS NOTES
(See Comments)     Severe cough    Iothalamate Nausea And Vomiting     Other reaction(s): AOF    Lisinopril Itching    Loratadine      Does not work    Procainamide      Other reaction(s): LIGHTHEADED, Unknown    Ultram [Tramadol] Other (See Comments)     Didn't work         Review of Systems:   Review of Systems   Constitutional:  Positive for activity change, appetite change and fatigue.   Gastrointestinal:  Positive for constipation.   Musculoskeletal:  Positive for back pain, gait problem, joint swelling and myalgias.        R ankle yesterday and today swollen   Skin:  Positive for color change.   Neurological:  Positive for weakness.   Psychiatric/Behavioral:  Positive for sleep disturbance. Negative for self-injury and suicidal ideas. The patient is nervous/anxious.         OBJECTIVE:  Vitals:    01/30/25 1121   BP: 122/72   Pulse: 90   Resp: 14   SpO2: 95%       PHYSICAL EXAM  Physical Exam  Vitals reviewed.   Constitutional:       General: She is awake. She is not in acute distress.     Appearance: Normal appearance. She is well-developed and well-groomed. She is not ill-appearing, toxic-appearing or diaphoretic.      Interventions: She is not intubated.  HENT:      Head: Normocephalic and atraumatic.   Eyes:      General: Lids are normal.   Cardiovascular:      Rate and Rhythm: Normal rate.   Pulmonary:      Effort: Pulmonary effort is normal. No tachypnea, bradypnea, accessory muscle usage, prolonged expiration, respiratory distress or retractions. She is not intubated.   Skin:     General: Skin is warm and dry.      Capillary Refill: Capillary refill takes less than 2 seconds.      Coloration: Skin is not ashen, jaundiced or pale.      Findings: No rash.      Nails: There is no clubbing.   Neurological:      Mental Status: She is alert and oriented to person, place, and time.      GCS: GCS eye subscore is 4. GCS verbal subscore is 5. GCS motor subscore is 6.      Cranial Nerves: No cranial nerve deficit.

## 2025-01-31 RX ORDER — OXYCODONE HYDROCHLORIDE 15 MG/1
15 TABLET ORAL EVERY 8 HOURS
Qty: 90 TABLET | Refills: 0 | Status: SHIPPED | OUTPATIENT
Start: 2025-01-31 | End: 2025-03-02

## 2025-02-02 LAB
6-ACETYLMORPHINE, UR: NOT DETECTED
7-AMINOCLONAZEPAM, URINE: NOT DETECTED
ALPHA-OH-ALPRAZ, URINE: NOT DETECTED
ALPHA-OH-MIDAZOLAM, URINE: NOT DETECTED
ALPRAZOLAM, URINE: NOT DETECTED
AMPHETAMINE, URINE: NOT DETECTED
BARBITURATES, URINE: NEGATIVE
BENZOYLECGONINE, UR: NEGATIVE
BUPRENORPHINE URINE: NOT DETECTED
CARISOPRODOL, UR: NEGATIVE
CLONAZEPAM, URINE: NOT DETECTED
CODEINE, URINE: NOT DETECTED
CREAT UR-MCNC: 53 MG/DL (ref 20–400)
DIAZEPAM, URINE: NOT DETECTED
EER PAIN MGT DRUG PANEL, HIGH RES/EMIT U: NORMAL
ETHYL GLUCURONIDE UR: NEGATIVE
FENTANYL URINE: NOT DETECTED
GABAPENTIN: NOT DETECTED
HYDROCODONE, URINE: NOT DETECTED
HYDROMORPHONE, URINE: NOT DETECTED
LORAZEPAM, URINE: NOT DETECTED
MARIJUANA METAB, UR: NEGATIVE
MDA, URINE: NOT DETECTED
MDEA, EVE, UR: NOT DETECTED
MDMA, URINE: NOT DETECTED
MEPERIDINE METAB, UR: NOT DETECTED
METHADONE, URINE: NEGATIVE
METHAMPHETAMINE, URINE: NOT DETECTED
METHYLPHENIDATE: NOT DETECTED
MIDAZOLAM, URINE: NOT DETECTED
MORPHINE, OPI1M: NOT DETECTED
NALOXONE URINE: NOT DETECTED
NORBUPRENORPHINE, URINE: NOT DETECTED
NORDIAZEPAM, URINE: NOT DETECTED
NORFENTANYL, URINE: NOT DETECTED
NORHYDROCODONE, URINE: NOT DETECTED
NOROXYCODONE, URINE: PRESENT
NOROXYMORPHONE, URINE: NOT DETECTED
OXAZEPAM, URINE: NOT DETECTED
OXYCODONE URINE: PRESENT
OXYMORPHONE, URINE: PRESENT
PAIN MGT DRUG PANEL, HI RES, UR: NORMAL
PCP,URINE: NEGATIVE
PHENTERMINE, UR: NOT DETECTED
PREGABALIN: NOT DETECTED
TAPENTADOL, URINE: NOT DETECTED
TAPENTADOL-O-SULFATE, URINE: NOT DETECTED
TEMAZEPAM, URINE: NOT DETECTED
TRAMADOL, URINE: NEGATIVE
ZOLPIDEM METABOLITE (ZCA), URINE: NOT DETECTED
ZOLPIDEM, URINE: NOT DETECTED

## 2025-02-06 ENCOUNTER — TELEPHONE (OUTPATIENT)
Dept: PAIN MANAGEMENT | Age: 85
End: 2025-02-06

## 2025-02-06 DIAGNOSIS — M51.360 DEGENERATION OF INTERVERTEBRAL DISC OF LUMBAR REGION WITH DISCOGENIC BACK PAIN: ICD-10-CM

## 2025-02-06 NOTE — TELEPHONE ENCOUNTER
Summer at [facility] called regarding this. Yes will need new order.    [phone]    Can take verbal on phone.

## 2025-02-06 NOTE — TELEPHONE ENCOUNTER
Oxycodone IR 15mg q 8 hr routine was changed to prn due to increased tiredness and increased fall risk. Patient is not happy and would like my opinion.     It was ordered routine to make sure she receives it to manage her pain in a timely manner. Past has showed ECF are not the greatest at PRN pain medications for their residents. I ordered it routine hoping nursing staff could use their nursing judgement and hold the medication if the patient is too sleepy. Would I need to write an order for this? Scheduled med but hold if patient too drowsy?

## 2025-02-10 NOTE — TELEPHONE ENCOUNTER
LM #1 for nursing staff to call office to answer additional questions regarding making pain medication PRN.

## 2025-02-10 NOTE — TELEPHONE ENCOUNTER
Patients SNF called back again this morning regarding the PRN status of this medication. Nursing home is insistant that this patients med be PRN. Patient does NOT want to go PRN. Nurse \"rene\" expressed understanding, but then said that patient will need a new RX because patient is running low. A new script with a quantity of 90 was sent with a start date of 1/31/25. I explained to \"rene\" that patient should not be low, to which she replied \"well maybe she's not as low as I thought she was then\". Writer just wanted to be sure that this encounter was documented. Please be aware and advise if necessary.

## 2025-02-13 RX ORDER — OXYCODONE HYDROCHLORIDE 15 MG/1
15 TABLET ORAL EVERY 8 HOURS
Qty: 90 TABLET | Refills: 0 | Status: SHIPPED | OUTPATIENT
Start: 2025-02-13 | End: 2025-03-15

## 2025-02-13 NOTE — TELEPHONE ENCOUNTER
Nurse Summer from [facility] called again this morning. Summer states that patient has enough medication to last the next 2 days but then she is out. Oarrs shows that patient was given 90 on 1/31/25 which should not put the patient due until 3/2/25.    Summer states she will \"see if she can figure this out and call us back\".

## 2025-02-27 ENCOUNTER — OFFICE VISIT (OUTPATIENT)
Dept: PAIN MANAGEMENT | Age: 85
End: 2025-02-27
Payer: MEDICARE

## 2025-02-27 VITALS
HEART RATE: 95 BPM | BODY MASS INDEX: 33.97 KG/M2 | SYSTOLIC BLOOD PRESSURE: 122 MMHG | OXYGEN SATURATION: 97 % | DIASTOLIC BLOOD PRESSURE: 68 MMHG | WEIGHT: 199 LBS | RESPIRATION RATE: 14 BRPM | HEIGHT: 64 IN

## 2025-02-27 DIAGNOSIS — M51.360 DEGENERATION OF INTERVERTEBRAL DISC OF LUMBAR REGION WITH DISCOGENIC BACK PAIN: ICD-10-CM

## 2025-02-27 DIAGNOSIS — M48.061 SPINAL STENOSIS OF LUMBAR REGION WITHOUT NEUROGENIC CLAUDICATION: ICD-10-CM

## 2025-02-27 DIAGNOSIS — M48.061 SPINAL STENOSIS, LUMBAR REGION, WITHOUT NEUROGENIC CLAUDICATION: Chronic | ICD-10-CM

## 2025-02-27 DIAGNOSIS — M51.370 DEGENERATION OF INTERVERTEBRAL DISC OF LUMBOSACRAL REGION WITH DISCOGENIC BACK PAIN: Primary | Chronic | ICD-10-CM

## 2025-02-27 DIAGNOSIS — M51.360 LUMBAR DISCOGENIC PAIN SYNDROME: ICD-10-CM

## 2025-02-27 PROCEDURE — 1160F RVW MEDS BY RX/DR IN RCRD: CPT | Performed by: NURSE PRACTITIONER

## 2025-02-27 PROCEDURE — G8400 PT W/DXA NO RESULTS DOC: HCPCS | Performed by: NURSE PRACTITIONER

## 2025-02-27 PROCEDURE — G8417 CALC BMI ABV UP PARAM F/U: HCPCS | Performed by: NURSE PRACTITIONER

## 2025-02-27 PROCEDURE — 1090F PRES/ABSN URINE INCON ASSESS: CPT | Performed by: NURSE PRACTITIONER

## 2025-02-27 PROCEDURE — 1036F TOBACCO NON-USER: CPT | Performed by: NURSE PRACTITIONER

## 2025-02-27 PROCEDURE — G8427 DOCREV CUR MEDS BY ELIG CLIN: HCPCS | Performed by: NURSE PRACTITIONER

## 2025-02-27 PROCEDURE — 1123F ACP DISCUSS/DSCN MKR DOCD: CPT | Performed by: NURSE PRACTITIONER

## 2025-02-27 PROCEDURE — 99214 OFFICE O/P EST MOD 30 MIN: CPT | Performed by: NURSE PRACTITIONER

## 2025-02-27 PROCEDURE — 1159F MED LIST DOCD IN RCRD: CPT | Performed by: NURSE PRACTITIONER

## 2025-02-27 RX ORDER — OXYCODONE HYDROCHLORIDE 15 MG/1
15 TABLET ORAL EVERY 8 HOURS
Qty: 90 TABLET | Refills: 0 | Status: SHIPPED | OUTPATIENT
Start: 2025-02-27 | End: 2025-03-29

## 2025-02-27 ASSESSMENT — ENCOUNTER SYMPTOMS
COLOR CHANGE: 1
BACK PAIN: 1
CONSTIPATION: 1

## 2025-02-27 NOTE — PROGRESS NOTES
[facility]  [address] [place]. [address]    Patient Name: Vignesh Sena  MRN: [id number]  Encounter Date: 2/27/2025     SUBJECTIVE:  Vignesh Sena is a 84 y.o., female being seen today regarding   Chief Complaint   Patient presents with    Back Pain     Low back pain    and routine medication management.    History of Present Illness  The patient is an 84-year-old female who presents for pain management. She is accompanied by her son.    She reports a general feeling of unwellness today. She has been under the care of another physician, with a scheduled appointment later this afternoon. She has been responding positively to oxycodone, which she takes every 8 hours without experiencing any side effects. She acknowledges persistent fatigue but notes that it has not significantly worsened. Her bowel movements remain regular. There was an attempt to change her oxycodone to PRN, but she experienced intermittent pain as a result. Consequently, she resumed her medication, which is now administered every 8 hours.    A few weeks ago, she had a fall that required 4 stitches. Despite the incident, she did not lose consciousness. She was evaluated in the emergency room, where she underwent extensive imaging of her head and neck. The cause of the fall remains unclear to her. She recalls returning from dinner and standing by her table before suddenly falling. She speculates that she may have tripped over an object in her room. A previous bone scan had revealed poor bone health, and she was advised to exercise caution to prevent falls. She has had multiple falls in the past but considers herself fortunate to have avoided serious injury.    MEDICATIONS  Oxycodone.        Functionality Assessment & Goals:  On a scale of 0 (Does not Interfere) to 10 (Completely Interferes)  Which number describes how during the past week pain has interfered with the following:   A.  General

## 2025-03-31 DIAGNOSIS — M51.360 DEGENERATION OF INTERVERTEBRAL DISC OF LUMBAR REGION WITH DISCOGENIC BACK PAIN: ICD-10-CM

## 2025-03-31 RX ORDER — OXYCODONE HYDROCHLORIDE 15 MG/1
15 TABLET ORAL EVERY 8 HOURS
Qty: 90 TABLET | Refills: 0 | Status: SHIPPED | OUTPATIENT
Start: 2025-03-31 | End: 2025-04-30

## 2025-05-21 ENCOUNTER — OUTSIDE SERVICES (OUTPATIENT)
Dept: INTERNAL MEDICINE | Age: 85
End: 2025-05-21
Payer: MEDICARE

## 2025-05-21 DIAGNOSIS — I48.91 ATRIAL FIBRILLATION, UNSPECIFIED TYPE (HCC): ICD-10-CM

## 2025-05-21 DIAGNOSIS — E78.5 HYPERLIPIDEMIA, UNSPECIFIED HYPERLIPIDEMIA TYPE: ICD-10-CM

## 2025-05-21 DIAGNOSIS — I10 HYPERTENSION, UNSPECIFIED TYPE: ICD-10-CM

## 2025-05-21 DIAGNOSIS — M51.379 DEGENERATION OF INTERVERTEBRAL DISC OF LUMBOSACRAL REGION, UNSPECIFIED WHETHER PAIN PRESENT: Primary | ICD-10-CM

## 2025-05-21 DIAGNOSIS — G31.9 DIFFUSE CEREBRAL ATROPHY: ICD-10-CM

## 2025-05-21 DIAGNOSIS — I74.10 AORTIC MURAL THROMBUS (HCC): ICD-10-CM

## 2025-05-21 DIAGNOSIS — I50.9 CONGESTIVE HEART FAILURE, UNSPECIFIED HF CHRONICITY, UNSPECIFIED HEART FAILURE TYPE (HCC): ICD-10-CM

## 2025-05-21 DIAGNOSIS — F03.90 DEMENTIA, UNSPECIFIED DEMENTIA SEVERITY, UNSPECIFIED DEMENTIA TYPE, UNSPECIFIED WHETHER BEHAVIORAL, PSYCHOTIC, OR MOOD DISTURBANCE OR ANXIETY (HCC): ICD-10-CM

## 2025-05-21 PROCEDURE — 99349 HOME/RES VST EST MOD MDM 40: CPT | Performed by: INTERNAL MEDICINE

## 2025-05-22 NOTE — PROGRESS NOTES
Date of Encounter: 5/21/2025  Patient:  Vignesh Sena  YOB: 1940      Chief Complaint:  Lower extremity edema    History of Present Illness:  She has a history of congestive heart failure, she reports that she has swelling in both her legs.  She also says that she has occasional trouble breathing, but is doing okay right now.  Denies fever, chills, chest pain, cough, wheezing.  She says that she has ROSY hose, but does not use them all the time.  She uses Bumex 1 mg daily.  I advised that we can increase the dose temporarily and she seemed to agree.    Past Medical/Surgical/Social History: Per admission note    Physical Exam    Constitutional: No acute distress  Eyes: Sclerae nonicteric. No lid lag or proptosis  HENT: External ears normal. No external lesions on the nose. No lesions on the lips  Neck: No gross masses. Trachea visibly midline  Respiratory: Good air entry bilaterally. No wheezing or crackles  Cardiovascular: Normal S1-S2. No murmurs  Gastrointestinal: No visible masses.   Skin: No abnormal rashes. No digital cyanosis  Neurologic: Cranial nerves grossly intact    Assessments   Diagnosis Orders   1. Degeneration of intervertebral disc of lumbosacral region, unspecified whether pain present        2. Congestive heart failure, unspecified HF chronicity, unspecified heart failure type (HCC)        3. Aortic mural thrombus (HCC)        4. Diffuse cerebral atrophy        5. Dementia, unspecified dementia severity, unspecified dementia type, unspecified whether behavioral, psychotic, or mood disturbance or anxiety (HCC)        6. Hypertension, unspecified type        7. Hyperlipidemia, unspecified hyperlipidemia type        8. Atrial fibrillation, unspecified type (HCC)            Plan  Will increase Bumex to 2 mg daily for a week, and then revert to 1 mg daily.  I will be notified of any changes to the patient's condition. Unless otherwise noted, a gradual dose reduction in

## 2025-05-23 ENCOUNTER — OFFICE VISIT (OUTPATIENT)
Dept: PAIN MANAGEMENT | Age: 85
End: 2025-05-23
Payer: MEDICARE

## 2025-05-23 VITALS
HEART RATE: 65 BPM | HEIGHT: 64 IN | BODY MASS INDEX: 33.97 KG/M2 | OXYGEN SATURATION: 95 % | WEIGHT: 199 LBS | DIASTOLIC BLOOD PRESSURE: 64 MMHG | SYSTOLIC BLOOD PRESSURE: 132 MMHG

## 2025-05-23 DIAGNOSIS — M54.16 LUMBAR RADICULITIS: ICD-10-CM

## 2025-05-23 DIAGNOSIS — M46.1 SACROILIITIS: ICD-10-CM

## 2025-05-23 DIAGNOSIS — M51.360 DEGENERATION OF INTERVERTEBRAL DISC OF LUMBAR REGION WITH DISCOGENIC BACK PAIN: ICD-10-CM

## 2025-05-23 DIAGNOSIS — M70.61 GREATER TROCHANTERIC BURSITIS OF BOTH HIPS: ICD-10-CM

## 2025-05-23 DIAGNOSIS — M51.360 LUMBAR DISCOGENIC PAIN SYNDROME: ICD-10-CM

## 2025-05-23 DIAGNOSIS — M70.62 GREATER TROCHANTERIC BURSITIS OF BOTH HIPS: ICD-10-CM

## 2025-05-23 DIAGNOSIS — M48.061 SPINAL STENOSIS, LUMBAR REGION, WITHOUT NEUROGENIC CLAUDICATION: Primary | ICD-10-CM

## 2025-05-23 DIAGNOSIS — M47.816 LUMBAR FACET JOINT SYNDROME: ICD-10-CM

## 2025-05-23 DIAGNOSIS — G62.9 PERIPHERAL POLYNEUROPATHY: ICD-10-CM

## 2025-05-23 PROCEDURE — G8417 CALC BMI ABV UP PARAM F/U: HCPCS | Performed by: NURSE PRACTITIONER

## 2025-05-23 PROCEDURE — G8427 DOCREV CUR MEDS BY ELIG CLIN: HCPCS | Performed by: NURSE PRACTITIONER

## 2025-05-23 PROCEDURE — G8400 PT W/DXA NO RESULTS DOC: HCPCS | Performed by: NURSE PRACTITIONER

## 2025-05-23 PROCEDURE — 1159F MED LIST DOCD IN RCRD: CPT | Performed by: NURSE PRACTITIONER

## 2025-05-23 PROCEDURE — 1123F ACP DISCUSS/DSCN MKR DOCD: CPT | Performed by: NURSE PRACTITIONER

## 2025-05-23 PROCEDURE — 1160F RVW MEDS BY RX/DR IN RCRD: CPT | Performed by: NURSE PRACTITIONER

## 2025-05-23 PROCEDURE — 1090F PRES/ABSN URINE INCON ASSESS: CPT | Performed by: NURSE PRACTITIONER

## 2025-05-23 PROCEDURE — 1036F TOBACCO NON-USER: CPT | Performed by: NURSE PRACTITIONER

## 2025-05-23 PROCEDURE — 99214 OFFICE O/P EST MOD 30 MIN: CPT | Performed by: NURSE PRACTITIONER

## 2025-05-23 RX ORDER — OXYCODONE HYDROCHLORIDE 15 MG/1
15 TABLET ORAL EVERY 8 HOURS
Qty: 90 TABLET | Refills: 0 | Status: SHIPPED | OUTPATIENT
Start: 2025-05-23 | End: 2025-06-22

## 2025-05-23 ASSESSMENT — ENCOUNTER SYMPTOMS
COLOR CHANGE: 1
CONSTIPATION: 1
BACK PAIN: 1

## 2025-05-23 NOTE — PROGRESS NOTES
[facility]  [address] [place]. [address]    Patient Name: Vignesh Sena  MRN: [id number]  Encounter Date: 5/23/2025     SUBJECTIVE:  Vignesh Sena is a 84 y.o., female being seen today regarding   Chief Complaint   Patient presents with    Back Pain     Low back pain    and routine medication management.    History of Present Illness      The patient is an 84-year-old female who presents for evaluation of pain. She is accompanied by her son.    She reports persistent pain, which is effectively managed with her current medication regimen. She is currently on a regimen of oxycodone 15 mg, administered three times daily. She also mentions that she can usually tell when it is time for another dose. Her family doctor changed it to as needed, but she did not want her to take it. The nurses gave it to her at 6, 2, and 10. She was prescribed one in the morning, one in the noon, and one in the evening. If she is drowsy or asleep, then she should not take it.    Supplemental Information  She hears music when she goes to bed at night for about 5 years.    MEDICATIONS  Oxycodone.        Functionality Assessment & Goals:  On a scale of 0 (Does not Interfere) to 10 (Completely Interferes)  Which number describes how during the past week pain has interfered with the following:   A.  General Activity: 9-10    B.  Mood:  9-10   C.  Walking Ability:  9-10   D.  Normal Work (Includes both work outside the home and housework):  9-10   E.  Relations with Other People:  9-10   F.  Sleep:  9-10    G.  Enjoyment of Life:  9-10  2.  Patient prefers to Take their Pain Medications:   [x] On a regular basis   [] Only when necessary   [] Does not take pain medications  3.  What are the Patient's Goals/ Expectations for Visiting Pain Management?   [] Learn about my pain   [] Physical Therapy   [] Receive Injections   [] Deal with Anxiety and Stress   [x] Receive Medication   [] Treat

## 2025-06-06 DIAGNOSIS — M47.816 LUMBAR FACET JOINT SYNDROME: ICD-10-CM

## 2025-06-06 DIAGNOSIS — M54.16 LUMBAR RADICULITIS: ICD-10-CM

## 2025-06-06 DIAGNOSIS — M51.360 LUMBAR DISCOGENIC PAIN SYNDROME: ICD-10-CM

## 2025-06-06 DIAGNOSIS — M70.61 GREATER TROCHANTERIC BURSITIS OF BOTH HIPS: ICD-10-CM

## 2025-06-06 DIAGNOSIS — M46.1 SACROILIITIS: ICD-10-CM

## 2025-06-06 DIAGNOSIS — M51.360 DEGENERATION OF INTERVERTEBRAL DISC OF LUMBAR REGION WITH DISCOGENIC BACK PAIN: ICD-10-CM

## 2025-06-06 DIAGNOSIS — G62.9 PERIPHERAL POLYNEUROPATHY: ICD-10-CM

## 2025-06-06 DIAGNOSIS — M48.061 SPINAL STENOSIS, LUMBAR REGION, WITHOUT NEUROGENIC CLAUDICATION: ICD-10-CM

## 2025-06-06 DIAGNOSIS — M70.62 GREATER TROCHANTERIC BURSITIS OF BOTH HIPS: ICD-10-CM

## 2025-06-06 RX ORDER — OXYCODONE HYDROCHLORIDE 15 MG/1
15 TABLET ORAL EVERY 8 HOURS
Qty: 90 TABLET | Refills: 0 | Status: SHIPPED | OUTPATIENT
Start: 2025-06-06 | End: 2025-07-06

## 2025-06-06 NOTE — TELEPHONE ENCOUNTER
Oxy 15 mg  DS11 1/30/25  Last filled 4/12/25  Due  NOW  Last Appt:  5/23/2025  Next Appt:   6/30/2025  Med verified in Epic

## 2025-06-30 ENCOUNTER — OFFICE VISIT (OUTPATIENT)
Dept: PAIN MANAGEMENT | Age: 85
End: 2025-06-30
Payer: MEDICARE

## 2025-06-30 ENCOUNTER — HOSPITAL ENCOUNTER (OUTPATIENT)
Age: 85
Setting detail: SPECIMEN
Discharge: HOME | End: 2025-06-30
Payer: MEDICARE

## 2025-06-30 VITALS
DIASTOLIC BLOOD PRESSURE: 64 MMHG | RESPIRATION RATE: 12 BRPM | OXYGEN SATURATION: 94 % | BODY MASS INDEX: 33.97 KG/M2 | WEIGHT: 199 LBS | HEART RATE: 70 BPM | HEIGHT: 64 IN | SYSTOLIC BLOOD PRESSURE: 120 MMHG

## 2025-06-30 DIAGNOSIS — Z02.83 ENCOUNTER FOR DRUG SCREENING: ICD-10-CM

## 2025-06-30 DIAGNOSIS — M54.16 LUMBAR RADICULITIS: ICD-10-CM

## 2025-06-30 DIAGNOSIS — Z79.891 ENCOUNTER FOR LONG-TERM OPIATE ANALGESIC USE: ICD-10-CM

## 2025-06-30 DIAGNOSIS — M47.816 LUMBAR FACET JOINT SYNDROME: ICD-10-CM

## 2025-06-30 DIAGNOSIS — M51.360 LUMBAR DISCOGENIC PAIN SYNDROME: ICD-10-CM

## 2025-06-30 DIAGNOSIS — M51.360 DEGENERATION OF INTERVERTEBRAL DISC OF LUMBAR REGION WITH DISCOGENIC BACK PAIN: Primary | ICD-10-CM

## 2025-06-30 DIAGNOSIS — M48.061 SPINAL STENOSIS, LUMBAR REGION, WITHOUT NEUROGENIC CLAUDICATION: ICD-10-CM

## 2025-06-30 DIAGNOSIS — M51.360 DEGENERATION OF INTERVERTEBRAL DISC OF LUMBAR REGION WITH DISCOGENIC BACK PAIN: ICD-10-CM

## 2025-06-30 DIAGNOSIS — M70.61 GREATER TROCHANTERIC BURSITIS OF BOTH HIPS: ICD-10-CM

## 2025-06-30 DIAGNOSIS — G62.9 PERIPHERAL POLYNEUROPATHY: ICD-10-CM

## 2025-06-30 DIAGNOSIS — M70.62 GREATER TROCHANTERIC BURSITIS OF BOTH HIPS: ICD-10-CM

## 2025-06-30 DIAGNOSIS — M46.1 SACROILIITIS: ICD-10-CM

## 2025-06-30 DIAGNOSIS — R52 PAIN MANAGEMENT: ICD-10-CM

## 2025-06-30 PROCEDURE — G0481 DRUG TEST DEF 8-14 CLASSES: HCPCS

## 2025-06-30 PROCEDURE — G8427 DOCREV CUR MEDS BY ELIG CLIN: HCPCS | Performed by: NURSE PRACTITIONER

## 2025-06-30 PROCEDURE — 1123F ACP DISCUSS/DSCN MKR DOCD: CPT | Performed by: NURSE PRACTITIONER

## 2025-06-30 PROCEDURE — 80307 DRUG TEST PRSMV CHEM ANLYZR: CPT

## 2025-06-30 PROCEDURE — 1160F RVW MEDS BY RX/DR IN RCRD: CPT | Performed by: NURSE PRACTITIONER

## 2025-06-30 PROCEDURE — 1036F TOBACCO NON-USER: CPT | Performed by: NURSE PRACTITIONER

## 2025-06-30 PROCEDURE — G8400 PT W/DXA NO RESULTS DOC: HCPCS | Performed by: NURSE PRACTITIONER

## 2025-06-30 PROCEDURE — 99215 OFFICE O/P EST HI 40 MIN: CPT | Performed by: NURSE PRACTITIONER

## 2025-06-30 PROCEDURE — 99214 OFFICE O/P EST MOD 30 MIN: CPT | Performed by: NURSE PRACTITIONER

## 2025-06-30 PROCEDURE — 1090F PRES/ABSN URINE INCON ASSESS: CPT | Performed by: NURSE PRACTITIONER

## 2025-06-30 PROCEDURE — 1159F MED LIST DOCD IN RCRD: CPT | Performed by: NURSE PRACTITIONER

## 2025-06-30 PROCEDURE — G8417 CALC BMI ABV UP PARAM F/U: HCPCS | Performed by: NURSE PRACTITIONER

## 2025-06-30 RX ORDER — AZITHROMYCIN 250 MG/1
TABLET, FILM COATED ORAL
COMMUNITY
Start: 2025-06-27

## 2025-06-30 RX ORDER — MEMANTINE HYDROCHLORIDE 5 MG/1
TABLET ORAL
COMMUNITY
Start: 2025-04-28

## 2025-06-30 RX ORDER — APIXABAN 5 MG/1
5 TABLET, FILM COATED ORAL 2 TIMES DAILY
COMMUNITY
Start: 2025-03-13

## 2025-06-30 RX ORDER — AMIODARONE HYDROCHLORIDE 200 MG/1
TABLET ORAL
COMMUNITY
Start: 2025-06-11

## 2025-06-30 RX ORDER — OXYCODONE HYDROCHLORIDE 15 MG/1
15 TABLET ORAL EVERY 8 HOURS
Qty: 90 TABLET | Refills: 0 | Status: SHIPPED | OUTPATIENT
Start: 2025-06-30 | End: 2025-07-30

## 2025-06-30 RX ORDER — BUMETANIDE 2 MG/1
TABLET ORAL
COMMUNITY
Start: 2025-05-09

## 2025-06-30 ASSESSMENT — ENCOUNTER SYMPTOMS
CONSTIPATION: 1
COLOR CHANGE: 1
BACK PAIN: 1

## 2025-06-30 NOTE — PROGRESS NOTES
[facility]  [address] [place]. [address]    Patient Name: Vignesh Sena  MRN: [id number]  Encounter Date: 6/30/2025     SUBJECTIVE:  Vignesh Sena is a 84 y.o., female being seen today regarding   Chief Complaint   Patient presents with    Back Pain     Low back pain     and routine medication management.    History of Present Illness    The patient presents for evaluation of leg pain.    She reports experiencing leg pain today, which she attributes to a couple of falls she had approximately a month ago. Compression stockings were measured to fit her legs, but she finds them excessively tight. She also notes swelling in her left leg, which is more pronounced than in her right leg. Her current medication regimen includes oxycodone 15 mg, taken three times daily, which she reports as effective. Occasionally, she supplements this with Tylenol, taken between doses of oxycodone, but not on a daily basis. She does not administer the oxycodone herself due to a previous incident of overdose. She resides in a facility where her medications are managed by the staff.    She occasionally takes a medication to aid bowel movements, which she finds beneficial.    Functionality Assessment & Goals:  On a scale of 0 (Does not Interfere) to 10 (Completely Interferes)  Which number describes how during the past week pain has interfered with the following:   A.  General Activity: 9-10    B.  Mood:  9-10   C.  Walking Ability:  9-10   D.  Normal Work (Includes both work outside the home and housework):  9-10   E.  Relations with Other People:  9-10   F.  Sleep:  9-10    G.  Enjoyment of Life:  9-10  2.  Patient prefers to Take their Pain Medications:   [x] On a regular basis   [] Only when necessary   [] Does not take pain medications  3.  What are the Patient's Goals/ Expectations for Visiting Pain Management?   [] Learn about my pain   [] Physical Therapy   [] Receive Injections   []

## 2025-07-03 LAB
6-ACETYLMORPHINE, UR: NOT DETECTED
7-AMINOCLONAZEPAM, URINE: NOT DETECTED
ALBUMIN/GLOBULIN RATIO: 1.9 G/DL
ALBUMIN: 3.5 G/DL (ref 3.5–5)
ALP BLD-CCNC: 125 UNITS/L (ref 38–126)
ALPHA-OH-ALPRAZ, URINE: NOT DETECTED
ALPHA-OH-MIDAZOLAM, URINE: NOT DETECTED
ALPRAZOLAM, URINE: NOT DETECTED
ALT SERPL-CCNC: 14 UNITS/L (ref 4–35)
AMPHETAMINE, URINE: NOT DETECTED
ANION GAP SERPL CALCULATED.3IONS-SCNC: 6.6 MMOL/L (ref 3–11)
AST SERPL-CCNC: 26 UNITS/L (ref 14–36)
BARBITURATES, URINE: NEGATIVE
BASOPHILS ABSOLUTE: 0.06 X10E3/?L (ref 0–0.3)
BASOPHILS RELATIVE PERCENT: 0.68 % (ref 0–3)
BENZOYLECGONINE, UR: NEGATIVE
BILIRUB SERPL-MCNC: 0.3 MG/DL (ref 0.2–1.3)
BUN BLDV-MCNC: 22 MG/DL (ref 7–17)
BUPRENORPHINE URINE: NOT DETECTED
CALCIUM SERPL-MCNC: 8.8 MG/DL (ref 8.4–10.2)
CARISOPRODOL, URINE: NEGATIVE
CHLORIDE BLD-SCNC: 102 MMOL/L (ref 98–120)
CLONAZEPAM, URINE: NOT DETECTED
CO2: 29 MMOL/L (ref 22–31)
CODEINE, URINE: NOT DETECTED
CREAT SERPL-MCNC: 0.7 MG/DL (ref 0.5–1)
CREAT UR-MCNC: 53.6 MG/DL (ref 20–400)
DIAZEPAM, URINE: NOT DETECTED
EER PAIN MGT DRUG PANEL, HIGH RES/EMIT U: ABNORMAL
EOSINOPHILS ABSOLUTE: 0.4 X10E3/?L (ref 0–1.1)
EOSINOPHILS RELATIVE PERCENT: 4.34 % (ref 0–10)
ETHYL GLUCURONIDE UR: NEGATIVE
FENTANYL URINE: NOT DETECTED
GABAPENTIN: NOT DETECTED
GFR, ESTIMATED: > 60
GLOBULIN: 1.9 G/DL
GLUCOSE: 77 MG/DL (ref 65–105)
HCT VFR BLD CALC: 39.5 % (ref 37–47)
HEMOGLOBIN: 11.9 G/DL (ref 12–16)
HYDROCODONE, URINE: NOT DETECTED
HYDROMORPHONE, URINE: NOT DETECTED
LORAZEPAM, URINE: NOT DETECTED
LYMPHOCYTES ABSOLUTE: 2.17 X10E3/?L (ref 1–5.5)
LYMPHOCYTES RELATIVE PERCENT: 23.32 % (ref 20–51.1)
MARIJUANA METAB, UR: NEGATIVE
MCH RBC QN AUTO: 25.7 PG (ref 28.5–32.5)
MCHC RBC AUTO-ENTMCNC: 30 G/DL (ref 32–37)
MCV RBC AUTO: 85.4 FL (ref 80–94)
MDA, URINE: NOT DETECTED
MDEA, EVE, UR: NOT DETECTED
MDMA, URINE: NOT DETECTED
MEPERIDINE METAB, UR: NOT DETECTED
METHADONE, URINE: NEGATIVE
METHAMPHETAMINE, URINE: NOT DETECTED
METHYLPHENIDATE: NOT DETECTED
MIDAZOLAM, URINE: NOT DETECTED
MONOCYTES ABSOLUTE: 0.78 X10E3/?L (ref 0.1–1)
MONOCYTES RELATIVE PERCENT: 8.4 % (ref 1.7–9.3)
MORPHINE, OPI1M: NOT DETECTED
NALOXONE URINE: NOT DETECTED
NEUTROPHILS ABSOLUTE: 5.89 X10E3/?L (ref 2–8.1)
NEUTROPHILS RELATIVE PERCENT: 63.26 % (ref 42.2–75.2)
NORBUPRENORPHINE, URINE: NOT DETECTED
NORDIAZEPAM, URINE: NOT DETECTED
NORFENTANYL, URINE: NOT DETECTED
NORHYDROCODONE, URINE: NOT DETECTED
NOROXYCODONE, URINE: PRESENT
NOROXYMORPHONE, URINE: NOT DETECTED
OXAZEPAM, URINE: NOT DETECTED
OXYCODONE URINE: PRESENT
OXYMORPHONE, URINE: PRESENT
PAIN MGT DRUG PANEL, HI RES, UR: ABNORMAL
PCP,URINE: NEGATIVE
PDW BLD-RTO: 14.1 % (ref 8.5–15.5)
PHENTERMINE, UR: NOT DETECTED
PLATELET # BLD: 243.5 THOU/MM3 (ref 130–400)
POTASSIUM SERPL-SCNC: 3.6 MMOL/L (ref 3.6–5)
PREGABALIN: NOT DETECTED
RBC # BLD: 4.62 M/UL (ref 4.2–5.4)
SODIUM BLD-SCNC: 138 MMOL/L (ref 135–145)
TAPENTADOL, URINE: NOT DETECTED
TAPENTADOL-O-SULFATE, URINE: NOT DETECTED
TEMAZEPAM, URINE: NOT DETECTED
TOTAL PROTEIN: 5.4 G/DL (ref 6.3–8.2)
TRAMADOL, URINE: NEGATIVE
TSH SERPL DL<=0.05 MIU/L-ACNC: 0.89 MIU/ML (ref 0.49–4.67)
VITAMIN B-12: 321 PG/ML (ref 239–931)
WBC # BLD: 9.3 THOU/ML3 (ref 4.8–10.8)
ZOLPIDEM METABOLITE (ZCA), URINE: NOT DETECTED
ZOLPIDEM, URINE: NOT DETECTED

## 2025-07-05 ENCOUNTER — RESULTS FOLLOW-UP (OUTPATIENT)
Dept: INTERNAL MEDICINE | Age: 85
End: 2025-07-05

## 2025-07-17 LAB
ALBUMIN/GLOBULIN RATIO: 1.7 G/DL
ALBUMIN: 3.7 G/DL (ref 3.5–5)
ALP BLD-CCNC: 113 UNITS/L (ref 38–126)
ALT SERPL-CCNC: 15 UNITS/L (ref 4–35)
ANION GAP SERPL CALCULATED.3IONS-SCNC: 9.1 MMOL/L (ref 3–11)
AST SERPL-CCNC: 27 UNITS/L (ref 14–36)
BILIRUB SERPL-MCNC: 0.4 MG/DL (ref 0.2–1.3)
BUN BLDV-MCNC: 16 MG/DL (ref 7–17)
CALCIUM SERPL-MCNC: 8.5 MG/DL (ref 8.4–10.2)
CHLORIDE BLD-SCNC: 103 MMOL/L (ref 98–120)
CO2: 31 MMOL/L (ref 22–31)
CREAT SERPL-MCNC: 0.8 MG/DL (ref 0.5–1)
GFR, ESTIMATED: > 60
GLOBULIN: 2.2 G/DL
GLUCOSE: 89 MG/DL (ref 65–105)
HCT VFR BLD CALC: 39.5 % (ref 37–47)
HEMOGLOBIN: 12.1 G/DL (ref 12–16)
MCH RBC QN AUTO: 25.6 PG (ref 28.5–32.5)
MCHC RBC AUTO-ENTMCNC: 30.7 G/DL (ref 32–37)
MCV RBC AUTO: 83.4 FL (ref 80–94)
PDW BLD-RTO: 13.9 % (ref 8.5–15.5)
PLATELET # BLD: 164.4 THOU/MM3 (ref 130–400)
POTASSIUM SERPL-SCNC: 3.1 MMOL/L (ref 3.6–5)
RBC # BLD: 4.73 M/UL (ref 4.2–5.4)
SODIUM BLD-SCNC: 140 MMOL/L (ref 135–145)
TOTAL PROTEIN: 5.9 G/DL (ref 6.3–8.2)
TSH SERPL DL<=0.05 MIU/L-ACNC: 0.89 MIU/ML (ref 0.49–4.67)
VITAMIN B-12: 389 PG/ML (ref 239–931)
WBC # BLD: 6.7 THOU/ML3 (ref 4.8–10.8)

## 2025-07-18 LAB
BACTERIA, URINE: ABNORMAL /HPF
BILIRUBIN, URINE: NEGATIVE
BLOOD, URINE: NEGATIVE
CASTS UA: ABNORMAL /LPF
CLARITY, UA: CLEAR
COLOR, UA: YELLOW
CRYSTALS, UA: ABNORMAL
GLUCOSE URINE: NEGATIVE MG/DL
KETONES, URINE: NEGATIVE MG/DL
LEUKOCYTE ESTERASE, URINE: NEGATIVE
MUCUS, URINE: ABNORMAL
NITRITE, URINE: NEGATIVE
PH, URINE: 6 (ref 5–8.5)
PROTEIN UA: NEGATIVE MG/DL
RBC URINE: ABNORMAL /HPF (ref 0–2)
SPECIFIC GRAVITY UA: 1.01 E.U./DL (ref 1–1.03)
SQUAMOUS EPITHELIAL: ABNORMAL /HPF
UROBILINOGEN, URINE: 0.2 MG/DL (ref 0.2–1)
WBC URINE: ABNORMAL /HPF (ref 0–4)
YEAST, URINE: PRESENT

## 2025-07-24 ENCOUNTER — TELEPHONE (OUTPATIENT)
Dept: CARDIOLOGY | Age: 85
End: 2025-07-24

## 2025-07-24 NOTE — TELEPHONE ENCOUNTER
Patient is a resident of [facility]. She is moving to [facility] on 7/28/25. [facility] wont send narcotics to new facility . They said the remaining would be destroyed. Patient asking for a paper rx to get started at new facility. Please call daughter Sarah Guzman [phone]

## 2025-07-25 DIAGNOSIS — M46.1 SACROILIITIS: ICD-10-CM

## 2025-07-25 DIAGNOSIS — G62.9 PERIPHERAL POLYNEUROPATHY: ICD-10-CM

## 2025-07-25 DIAGNOSIS — M48.061 SPINAL STENOSIS, LUMBAR REGION, WITHOUT NEUROGENIC CLAUDICATION: ICD-10-CM

## 2025-07-25 DIAGNOSIS — M51.360 LUMBAR DISCOGENIC PAIN SYNDROME: ICD-10-CM

## 2025-07-25 DIAGNOSIS — M70.62 GREATER TROCHANTERIC BURSITIS OF BOTH HIPS: ICD-10-CM

## 2025-07-25 DIAGNOSIS — M47.816 LUMBAR FACET JOINT SYNDROME: ICD-10-CM

## 2025-07-25 DIAGNOSIS — M54.16 LUMBAR RADICULITIS: ICD-10-CM

## 2025-07-25 DIAGNOSIS — M51.360 DEGENERATION OF INTERVERTEBRAL DISC OF LUMBAR REGION WITH DISCOGENIC BACK PAIN: ICD-10-CM

## 2025-07-25 DIAGNOSIS — M70.61 GREATER TROCHANTERIC BURSITIS OF BOTH HIPS: ICD-10-CM

## 2025-07-25 RX ORDER — OXYCODONE HYDROCHLORIDE 15 MG/1
15 TABLET ORAL EVERY 8 HOURS
Qty: 90 TABLET | Refills: 0 | Status: CANCELLED | OUTPATIENT
Start: 2025-07-30 | End: 2025-08-29

## 2025-07-28 DIAGNOSIS — G62.9 PERIPHERAL POLYNEUROPATHY: ICD-10-CM

## 2025-07-28 DIAGNOSIS — M51.360 DEGENERATION OF INTERVERTEBRAL DISC OF LUMBAR REGION WITH DISCOGENIC BACK PAIN: ICD-10-CM

## 2025-07-28 DIAGNOSIS — M51.360 LUMBAR DISCOGENIC PAIN SYNDROME: ICD-10-CM

## 2025-07-28 DIAGNOSIS — M70.61 GREATER TROCHANTERIC BURSITIS OF BOTH HIPS: ICD-10-CM

## 2025-07-28 DIAGNOSIS — M47.816 LUMBAR FACET JOINT SYNDROME: ICD-10-CM

## 2025-07-28 DIAGNOSIS — M70.62 GREATER TROCHANTERIC BURSITIS OF BOTH HIPS: ICD-10-CM

## 2025-07-28 DIAGNOSIS — M48.061 SPINAL STENOSIS, LUMBAR REGION, WITHOUT NEUROGENIC CLAUDICATION: ICD-10-CM

## 2025-07-28 DIAGNOSIS — M46.1 SACROILIITIS: ICD-10-CM

## 2025-07-28 DIAGNOSIS — M54.16 LUMBAR RADICULITIS: ICD-10-CM

## 2025-07-28 RX ORDER — OXYCODONE HYDROCHLORIDE 15 MG/1
15 TABLET ORAL EVERY 8 HOURS
Qty: 90 TABLET | Refills: 0 | Status: SHIPPED | OUTPATIENT
Start: 2025-07-30 | End: 2025-07-29 | Stop reason: CLARIF

## 2025-07-28 NOTE — TELEPHONE ENCOUNTER
Spoke to patients daughter. She would like call back today as [place] is leaving the country. She spoke to someone last week who said they would call [facility]     Please call daughter Sarah Guzman [phone]

## 2025-07-28 NOTE — TELEPHONE ENCOUNTER
Patient moving from [facility] to [facility]. Needing new script sent to hometown pharmacy within [facility]. Patient will not be permitted to take her leftover oxy with her.     4/5/2024 9/29/2025    [place]AR[place] Report checked for [place], [place], and [place]: 7/22/25 #90. Due 7/30/25 DUE TO RESIDENT MOVING FACILITIES. .

## 2025-07-29 DIAGNOSIS — M48.061 SPINAL STENOSIS, LUMBAR REGION, WITHOUT NEUROGENIC CLAUDICATION: ICD-10-CM

## 2025-07-29 DIAGNOSIS — M70.62 GREATER TROCHANTERIC BURSITIS OF BOTH HIPS: ICD-10-CM

## 2025-07-29 DIAGNOSIS — M47.816 LUMBAR FACET JOINT SYNDROME: ICD-10-CM

## 2025-07-29 DIAGNOSIS — M54.16 LUMBAR RADICULITIS: ICD-10-CM

## 2025-07-29 DIAGNOSIS — M46.1 SACROILIITIS: ICD-10-CM

## 2025-07-29 DIAGNOSIS — G62.9 PERIPHERAL POLYNEUROPATHY: ICD-10-CM

## 2025-07-29 DIAGNOSIS — M70.61 GREATER TROCHANTERIC BURSITIS OF BOTH HIPS: ICD-10-CM

## 2025-07-29 DIAGNOSIS — M51.360 LUMBAR DISCOGENIC PAIN SYNDROME: ICD-10-CM

## 2025-07-29 DIAGNOSIS — M51.360 DEGENERATION OF INTERVERTEBRAL DISC OF LUMBAR REGION WITH DISCOGENIC BACK PAIN: ICD-10-CM

## 2025-07-29 RX ORDER — OXYCODONE HYDROCHLORIDE 15 MG/1
15 TABLET ORAL EVERY 8 HOURS
Qty: 90 TABLET | Refills: 0 | Status: SHIPPED | OUTPATIENT
Start: 2025-07-30 | End: 2025-07-29 | Stop reason: CLARIF

## 2025-07-29 RX ORDER — OXYCODONE HYDROCHLORIDE 15 MG/1
15 TABLET ORAL EVERY 8 HOURS
Qty: 90 TABLET | Refills: 0 | Status: SHIPPED | OUTPATIENT
Start: 2025-07-29 | End: 2025-07-30 | Stop reason: SDUPTHER

## 2025-07-30 RX ORDER — OXYCODONE HYDROCHLORIDE 15 MG/1
15 TABLET ORAL EVERY 8 HOURS
Qty: 90 TABLET | Refills: 0 | Status: SHIPPED | OUTPATIENT
Start: 2025-07-30 | End: 2025-08-29

## 2025-07-30 RX ORDER — OXYCODONE HYDROCHLORIDE 15 MG/1
15 TABLET ORAL EVERY 8 HOURS
Qty: 90 TABLET | Refills: 0 | Status: CANCELLED | OUTPATIENT
Start: 2025-07-30 | End: 2025-08-29

## 2025-07-30 NOTE — TELEPHONE ENCOUNTER
Please resend only to [facility] pharmacy abigail Guo at [facility], 's new facility.    [facility] in [place] may need to be cancelled please.

## 2025-08-27 DIAGNOSIS — M48.061 SPINAL STENOSIS, LUMBAR REGION, WITHOUT NEUROGENIC CLAUDICATION: ICD-10-CM

## 2025-08-27 DIAGNOSIS — G62.9 PERIPHERAL POLYNEUROPATHY: ICD-10-CM

## 2025-08-27 DIAGNOSIS — M70.61 GREATER TROCHANTERIC BURSITIS OF BOTH HIPS: ICD-10-CM

## 2025-08-27 DIAGNOSIS — M47.816 LUMBAR FACET JOINT SYNDROME: ICD-10-CM

## 2025-08-27 DIAGNOSIS — M70.62 GREATER TROCHANTERIC BURSITIS OF BOTH HIPS: ICD-10-CM

## 2025-08-27 DIAGNOSIS — M46.1 SACROILIITIS: ICD-10-CM

## 2025-08-27 DIAGNOSIS — M51.360 DEGENERATION OF INTERVERTEBRAL DISC OF LUMBAR REGION WITH DISCOGENIC BACK PAIN: ICD-10-CM

## 2025-08-27 DIAGNOSIS — M51.360 LUMBAR DISCOGENIC PAIN SYNDROME: ICD-10-CM

## 2025-08-27 DIAGNOSIS — M54.16 LUMBAR RADICULITIS: ICD-10-CM

## 2025-08-28 RX ORDER — OXYCODONE HYDROCHLORIDE 15 MG/1
15 TABLET ORAL EVERY 8 HOURS
Qty: 90 TABLET | Refills: 0 | Status: SHIPPED | OUTPATIENT
Start: 2025-08-29 | End: 2025-09-28

## (undated) DEVICE — TRAY PAIN CUST

## (undated) DEVICE — CHLORAPREP 26ML CLEAR

## (undated) DEVICE — GOWN,AURORA,NONRNF,XL,30/CS: Brand: MEDLINE

## (undated) DEVICE — NEEDLE, QUINCKE, 22GX5": Brand: MEDLINE

## (undated) DEVICE — GLOVE ORANGE PI 8   MSG9080

## (undated) DEVICE — GLOVE SURG SZ 8 L12IN THK91MIL BRN LTX FREE POLYCHLOROPRENE

## (undated) DEVICE — GLOVE SURG SZ 65 THK91MIL LTX FREE SYN POLYISOPRENE

## (undated) DEVICE — PAD ELECSURG GRND DISP

## (undated) DEVICE — GLOVE ORANGE PI 7   MSG9070

## (undated) DEVICE — LINE SAMP O2 6.5FT W/FEMALE CONN F/ADULT CAPNOLINE PLUS

## (undated) DEVICE — Z DISCONTINUED USE 2651424 COVER EQUIP D18IN CNTOUR ELAS BND SNUG CLSR

## (undated) DEVICE — COVER LT HNDL BLU PLAS

## (undated) DEVICE — NEEDLE FLTR 18GA L1.5IN MEM THK5UM BLNT DISP

## (undated) DEVICE — STANDARD HYPODERMIC NEEDLE,POLYPROPYLENE HUB: Brand: MONOJECT

## (undated) DEVICE — SHEET, T, LAPAROTOMY, STERILE: Brand: MEDLINE

## (undated) DEVICE — Z DISCONTINUED USE 2624852 GLOVE SURG 7 PF TEXT NEOPRNE BRN STRL NEOLON 2G LF

## (undated) DEVICE — COVER,TABLE,77X90,STERILE: Brand: MEDLINE

## (undated) DEVICE — BANDAGE ADH DIA7/8IN NAT FLEX-FABRIC WVN FOR WND PROTCT

## (undated) DEVICE — SYRINGE MED 7ML PLAS LUERSLIP LOSS OF RESISTANCE EPILOR

## (undated) DEVICE — GOWN,AURORA,NONREINFORCED,LARGE: Brand: MEDLINE

## (undated) DEVICE — SYRINGE ANGIO 3ML W/ CLR POLYCARB BRL YEL PLUNG BLK MED

## (undated) DEVICE — CANNULA 15CM, 5MM TIP, 18G: Brand: CANNULARFK

## (undated) DEVICE — 6 ML SYRINGE LUER-LOCK TIP: Brand: MONOJECT

## (undated) DEVICE — Z INACTIVE USE 2423038 APPLICATOR MEDICATED 10.5 CC CHLORHEXIDINE GLUC 2%

## (undated) DEVICE — Device

## (undated) DEVICE — TIDISHIELD BAND BAGS CLEAR POLYETHYLENE STERILE 20IN X 20IN 100 PER CASE: Brand: TIDISHIELD

## (undated) DEVICE — C-ARMOR C-ARM EQUIPMENT COVERS CLEAR STERILE UNIVERSAL FIT 12 PER CASE: Brand: C-ARMOR

## (undated) DEVICE — AVANOS* QUINCKE NEEDLE: Brand: AVANOS

## (undated) DEVICE — CANNULA NSL AD L2IN ETCO2 SAMP SFT CRUSH RESIST FEM AIRLFE

## (undated) DEVICE — SET EXTN SM 0.5ML L13IN BOR W/O INJ SITE

## (undated) DEVICE — GLOVE SURG SZ 65 L12IN THK91MIL BRN LTX FREE